# Patient Record
Sex: MALE | Race: WHITE | Employment: OTHER | URBAN - METROPOLITAN AREA
[De-identification: names, ages, dates, MRNs, and addresses within clinical notes are randomized per-mention and may not be internally consistent; named-entity substitution may affect disease eponyms.]

---

## 2019-01-05 ENCOUNTER — APPOINTMENT (OUTPATIENT)
Dept: GENERAL RADIOLOGY | Age: 64
End: 2019-01-05

## 2019-01-05 ENCOUNTER — HOSPITAL ENCOUNTER (EMERGENCY)
Age: 64
Discharge: HOME | End: 2019-01-05

## 2019-01-05 VITALS
RESPIRATION RATE: 16 BRPM | SYSTOLIC BLOOD PRESSURE: 168 MMHG | BODY MASS INDEX: 27.56 KG/M2 | HEART RATE: 80 BPM | DIASTOLIC BLOOD PRESSURE: 88 MMHG | TEMPERATURE: 98 F | OXYGEN SATURATION: 96 % | WEIGHT: 220.46 LBS

## 2019-01-05 DIAGNOSIS — L03.113 CELLULITIS OF RIGHT UPPER EXTREMITY: ICD-10-CM

## 2019-01-05 DIAGNOSIS — M25.531 ACUTE PAIN OF RIGHT WRIST: Primary | ICD-10-CM

## 2019-01-05 DIAGNOSIS — R93.7 ABNORMAL X-RAY OF BONE: ICD-10-CM

## 2019-01-05 LAB
ANION GAP SERPL CALCULATED.3IONS-SCNC: 14 MMOL/L (ref 3–16)
BASOPHILS ABSOLUTE: 0.1 K/UL (ref 0–0.2)
BASOPHILS RELATIVE PERCENT: 0.6 %
BUN BLDV-MCNC: 10 MG/DL (ref 7–20)
C-REACTIVE PROTEIN: 100.3 MG/L (ref 0–5.1)
CALCIUM SERPL-MCNC: 9.5 MG/DL (ref 8.3–10.6)
CHLORIDE BLD-SCNC: 96 MMOL/L (ref 99–110)
CO2: 25 MMOL/L (ref 21–32)
CREAT SERPL-MCNC: <0.5 MG/DL (ref 0.8–1.3)
EOSINOPHILS ABSOLUTE: 0.1 K/UL (ref 0–0.6)
EOSINOPHILS RELATIVE PERCENT: 0.5 %
GFR AFRICAN AMERICAN: >60
GFR NON-AFRICAN AMERICAN: >60
GLUCOSE BLD-MCNC: 117 MG/DL (ref 70–99)
HCT VFR BLD CALC: 47 % (ref 40.5–52.5)
HEMOGLOBIN: 16.1 G/DL (ref 13.5–17.5)
LYMPHOCYTES ABSOLUTE: 1.7 K/UL (ref 1–5.1)
LYMPHOCYTES RELATIVE PERCENT: 14.8 %
MCH RBC QN AUTO: 33.2 PG (ref 26–34)
MCHC RBC AUTO-ENTMCNC: 34.2 G/DL (ref 31–36)
MCV RBC AUTO: 96.9 FL (ref 80–100)
MONOCYTES ABSOLUTE: 1 K/UL (ref 0–1.3)
MONOCYTES RELATIVE PERCENT: 8.4 %
NEUTROPHILS ABSOLUTE: 8.9 K/UL (ref 1.7–7.7)
NEUTROPHILS RELATIVE PERCENT: 75.7 %
PDW BLD-RTO: 14.7 % (ref 12.4–15.4)
PLATELET # BLD: 136 K/UL (ref 135–450)
PMV BLD AUTO: 8.1 FL (ref 5–10.5)
POTASSIUM SERPL-SCNC: 3.5 MMOL/L (ref 3.5–5.1)
RBC # BLD: 4.85 M/UL (ref 4.2–5.9)
SEDIMENTATION RATE, ERYTHROCYTE: 29 MM/HR (ref 0–20)
SODIUM BLD-SCNC: 135 MMOL/L (ref 136–145)
URIC ACID, SERUM: 4.3 MG/DL (ref 3.5–7.2)
WBC # BLD: 11.7 K/UL (ref 4–11)

## 2019-01-05 PROCEDURE — 99283 EMERGENCY DEPT VISIT LOW MDM: CPT

## 2019-01-05 PROCEDURE — 86140 C-REACTIVE PROTEIN: CPT

## 2019-01-05 PROCEDURE — 36415 COLL VENOUS BLD VENIPUNCTURE: CPT

## 2019-01-05 PROCEDURE — 73110 X-RAY EXAM OF WRIST: CPT

## 2019-01-05 PROCEDURE — 85025 COMPLETE CBC W/AUTO DIFF WBC: CPT

## 2019-01-05 PROCEDURE — 85652 RBC SED RATE AUTOMATED: CPT

## 2019-01-05 PROCEDURE — 80048 BASIC METABOLIC PNL TOTAL CA: CPT

## 2019-01-05 PROCEDURE — 84550 ASSAY OF BLOOD/URIC ACID: CPT

## 2019-01-05 PROCEDURE — 6370000000 HC RX 637 (ALT 250 FOR IP): Performed by: NURSE PRACTITIONER

## 2019-01-05 RX ORDER — MELOXICAM 7.5 MG/1
7.5 TABLET ORAL DAILY
Qty: 10 TABLET | Refills: 0 | Status: SHIPPED | OUTPATIENT
Start: 2019-01-05 | End: 2019-06-04

## 2019-01-05 RX ORDER — CEPHALEXIN 500 MG/1
500 CAPSULE ORAL 4 TIMES DAILY
Qty: 40 CAPSULE | Refills: 0 | Status: SHIPPED | OUTPATIENT
Start: 2019-01-05 | End: 2019-06-04 | Stop reason: ALTCHOICE

## 2019-01-05 RX ORDER — HYDROCODONE BITARTRATE AND ACETAMINOPHEN 5; 325 MG/1; MG/1
1 TABLET ORAL EVERY 8 HOURS PRN
Qty: 9 TABLET | Refills: 0 | Status: SHIPPED | OUTPATIENT
Start: 2019-01-05 | End: 2019-01-08

## 2019-01-05 RX ORDER — SULFAMETHOXAZOLE AND TRIMETHOPRIM 800; 160 MG/1; MG/1
1 TABLET ORAL ONCE
Status: COMPLETED | OUTPATIENT
Start: 2019-01-05 | End: 2019-01-05

## 2019-01-05 RX ORDER — SULFAMETHOXAZOLE AND TRIMETHOPRIM 800; 160 MG/1; MG/1
1 TABLET ORAL 2 TIMES DAILY
Qty: 20 TABLET | Refills: 0 | Status: SHIPPED | OUTPATIENT
Start: 2019-01-05 | End: 2019-01-15

## 2019-01-05 RX ORDER — HYDROCODONE BITARTRATE AND ACETAMINOPHEN 5; 325 MG/1; MG/1
1 TABLET ORAL ONCE
Status: COMPLETED | OUTPATIENT
Start: 2019-01-05 | End: 2019-01-05

## 2019-01-05 RX ORDER — CEPHALEXIN 500 MG/1
500 CAPSULE ORAL ONCE
Status: COMPLETED | OUTPATIENT
Start: 2019-01-05 | End: 2019-01-05

## 2019-01-05 RX ADMIN — CEPHALEXIN 500 MG: 500 CAPSULE ORAL at 15:41

## 2019-01-05 RX ADMIN — SULFAMETHOXAZOLE AND TRIMETHOPRIM 1 TABLET: 800; 160 TABLET ORAL at 15:42

## 2019-01-05 RX ADMIN — HYDROCODONE BITARTRATE AND ACETAMINOPHEN 1 TABLET: 5; 325 TABLET ORAL at 14:35

## 2019-01-05 ASSESSMENT — PAIN SCALES - GENERAL
PAINLEVEL_OUTOF10: 8
PAINLEVEL_OUTOF10: 10
PAINLEVEL_OUTOF10: 10
PAINLEVEL_OUTOF10: 7

## 2019-01-05 ASSESSMENT — PAIN DESCRIPTION - PAIN TYPE: TYPE: ACUTE PAIN

## 2019-01-05 ASSESSMENT — PAIN DESCRIPTION - LOCATION: LOCATION: WRIST;HAND

## 2019-01-05 ASSESSMENT — PAIN DESCRIPTION - ORIENTATION: ORIENTATION: RIGHT

## 2019-01-06 ASSESSMENT — ENCOUNTER SYMPTOMS
RESPIRATORY NEGATIVE: 1
GASTROINTESTINAL NEGATIVE: 1

## 2019-06-04 ENCOUNTER — OFFICE VISIT (OUTPATIENT)
Dept: FAMILY MEDICINE CLINIC | Age: 64
End: 2019-06-04
Payer: MEDICARE

## 2019-06-04 VITALS
HEART RATE: 66 BPM | WEIGHT: 243 LBS | SYSTOLIC BLOOD PRESSURE: 136 MMHG | BODY MASS INDEX: 32.91 KG/M2 | DIASTOLIC BLOOD PRESSURE: 82 MMHG | HEIGHT: 72 IN | OXYGEN SATURATION: 99 %

## 2019-06-04 DIAGNOSIS — Z23 NEED FOR PNEUMOCOCCAL VACCINE: ICD-10-CM

## 2019-06-04 DIAGNOSIS — I25.10 CORONARY ARTERY DISEASE INVOLVING NATIVE CORONARY ARTERY OF NATIVE HEART WITHOUT ANGINA PECTORIS: ICD-10-CM

## 2019-06-04 DIAGNOSIS — F17.211 CIGARETTE NICOTINE DEPENDENCE IN REMISSION: ICD-10-CM

## 2019-06-04 DIAGNOSIS — M51.36 DDD (DEGENERATIVE DISC DISEASE), LUMBAR: Primary | ICD-10-CM

## 2019-06-04 DIAGNOSIS — N32.81 OAB (OVERACTIVE BLADDER): ICD-10-CM

## 2019-06-04 DIAGNOSIS — M54.16 LUMBAR RADICULOPATHY: ICD-10-CM

## 2019-06-04 DIAGNOSIS — R39.12 WEAK URINARY STREAM: ICD-10-CM

## 2019-06-04 DIAGNOSIS — L30.9 DERMATITIS: ICD-10-CM

## 2019-06-04 PROCEDURE — 99203 OFFICE O/P NEW LOW 30 MIN: CPT | Performed by: FAMILY MEDICINE

## 2019-06-04 PROCEDURE — G0009 ADMIN PNEUMOCOCCAL VACCINE: HCPCS | Performed by: FAMILY MEDICINE

## 2019-06-04 PROCEDURE — 90670 PCV13 VACCINE IM: CPT | Performed by: FAMILY MEDICINE

## 2019-06-04 RX ORDER — OXYBUTYNIN CHLORIDE 10 MG/1
10 TABLET, EXTENDED RELEASE ORAL DAILY
Qty: 30 TABLET | Refills: 3 | Status: SHIPPED | OUTPATIENT
Start: 2019-06-04 | End: 2020-08-25 | Stop reason: ALTCHOICE

## 2019-06-04 RX ORDER — HYDROCHLOROTHIAZIDE 25 MG/1
25 TABLET ORAL DAILY
COMMUNITY
End: 2019-08-13 | Stop reason: SDUPTHER

## 2019-06-04 RX ORDER — ISOSORBIDE MONONITRATE 30 MG/1
30 TABLET, EXTENDED RELEASE ORAL DAILY
COMMUNITY
End: 2019-08-13 | Stop reason: SDUPTHER

## 2019-06-04 ASSESSMENT — PATIENT HEALTH QUESTIONNAIRE - PHQ9
SUM OF ALL RESPONSES TO PHQ QUESTIONS 1-9: 0
SUM OF ALL RESPONSES TO PHQ QUESTIONS 1-9: 0
1. LITTLE INTEREST OR PLEASURE IN DOING THINGS: 0
SUM OF ALL RESPONSES TO PHQ9 QUESTIONS 1 & 2: 0
2. FEELING DOWN, DEPRESSED OR HOPELESS: 0

## 2019-06-04 NOTE — PROGRESS NOTES
2019     Nikki Liu (:  1955) is a 58 y.o. male, here for evaluation of the following medical concerns:    HPI  Chief Complaint   Patient presents with    New Patient     NEW PT ESTABLISH CARE IS NOT INTERESTED IN  [facility] [employer] RIGHT SIDE 2 MO OR MORE NO INJURY HAD BACK SURGERY IN  HAS NOT HAD ANY ISSUE SINCE UNTIL NOW      A lot of low back pain for couple months   Had laminectomy . Sometimes hurts a lot to stand -other times not painful at all  Found new pair of dr. Davis Ruiz shoes which may help - has pes planus. Some right radicular pain at times - had on left side prior to surgery    Fine ever since. nki - no change in activity. Pain mainly right low back - occ pain to buttocks/ back of thigh -not a lot. No stretching/ exercise. Raising leg while standing seems to help some. Always moving through day - quit smoking   Knew he had to quit - could hear rattling in chest - hocking up mucus - went away quickly w/ stopping smoking. Smoked 1/2 to 1 pack per day for years. Gained 30# since stopped smoking - joining [employer] - silver sneakers. Uses ibuprofen which helps well w/ pain. No stomach issues w/ this  No n/v/c/d/ gerd/ abd pain  Urge to urinate strong lately - in last 1-2 years. Flow is fine - stands for a little while - slightly weak stream - not sure emptying bladder all the way. Took tamsulosin and didn't see response. Pretty frequent. Not big on chocolate -not big caffeine. Not drinking water as often. Doesn't like carbonation  No cp/palp/sob - had ami  - cad - seen by cardiology - matthias  No angina  No dizzy/ ha  No allergy issues since stopped smoking  Vision/ hearing stable.   Dry skin in winter - now mildly itchy rash on forearms  Past Surgical History:   Procedure Laterality Date    BACK SURGERY N/A     herniated disc    CORONARY ANGIOPLASTY WITH STENT PLACEMENT           Review of Systems    Prior to Visit moist. No oropharyngeal exudate. Eyes: Conjunctivae are normal. No scleral icterus. Neck: No thyromegaly present. Cardiovascular: Normal rate, regular rhythm, normal heart sounds and intact distal pulses. Exam reveals no gallop. No murmur heard. Pulmonary/Chest: Effort normal and breath sounds normal. No respiratory distress. He has no wheezes. He has no rhonchi. He has no rales. Abdominal: Soft. Bowel sounds are normal. He exhibits no distension. There is no tenderness. Musculoskeletal: He exhibits no edema. Lymphadenopathy:     He has no cervical adenopathy. Neurological: He is alert. Skin: Skin is intact. No rash noted. No erythema. Psychiatric: He has a normal mood and affect. ASSESSMENT/PLAN:  There are no diagnoses linked to this encounter. Diagnosis Orders   1. DDD (degenerative disc disease), lumbar     2. Cigarette nicotine dependence in remission  CT Chest WO Contrast   3. OAB (overactive bladder)     4. Weak urinary stream     5. Dermatitis     6. Lumbar radiculopathy     7. Coronary artery disease involving native coronary artery of native heart without angina pectoris         No follow-ups on file. Ct of chest  Immunizations d/w pt   Start prevnar now and pneumovax 1 year  Consider tdap/ shingrix  Ditropan for oab  Avoid bladder irritants - push water  Topical hc cream/ moisturizer for mild eczema rash  Cad stable -on present meds  cpm  Some component of bph likely  Lipid / cmp in near future w/ psa due to bph  Holding off on colon ca screen for now - dw/ pt  Exercise for back/ back care d/w pt  Motrin prn    An electronic signature was used to authenticate this note.     --Paolo Varma MD on 6/4/2019 at 11:12 AM

## 2019-06-12 ENCOUNTER — TELEPHONE (OUTPATIENT)
Dept: FAMILY MEDICINE CLINIC | Age: 64
End: 2019-06-12

## 2019-06-12 DIAGNOSIS — Z12.9 CANCER SCREENING: Primary | ICD-10-CM

## 2019-06-12 DIAGNOSIS — F17.210 CIGARETTE NICOTINE DEPENDENCE, UNCOMPLICATED: ICD-10-CM

## 2019-06-12 NOTE — TELEPHONE ENCOUNTER
[facility] is calling because they needs us to change the order for CT CHEST [facility] (ORDER [id number]) TO CT LUNG SCREENING - .

## 2019-06-14 ENCOUNTER — TELEPHONE (OUTPATIENT)
Dept: FAMILY MEDICINE CLINIC | Age: 64
End: 2019-06-14

## 2019-06-14 ENCOUNTER — HOSPITAL ENCOUNTER (OUTPATIENT)
Dept: CT IMAGING | Age: 64
Discharge: HOME | End: 2019-06-14
Payer: MEDICARE

## 2019-06-14 DIAGNOSIS — Z12.9 CANCER SCREENING: ICD-10-CM

## 2019-06-14 DIAGNOSIS — F17.211 CIGARETTE NICOTINE DEPENDENCE IN REMISSION: ICD-10-CM

## 2019-06-14 DIAGNOSIS — F17.210 CIGARETTE NICOTINE DEPENDENCE, UNCOMPLICATED: ICD-10-CM

## 2019-06-14 PROCEDURE — G0297 LDCT FOR LUNG CA SCREEN: HCPCS

## 2019-06-14 NOTE — TELEPHONE ENCOUNTER
PT @  [phone]    PLEASE CALL WITH RESULTS - CT OF THE LUNG TODAY BEFORE THE WEEKEND - PT VERY ANXIOUS FOR THE RESULTS

## 2019-08-13 RX ORDER — ISOSORBIDE MONONITRATE 30 MG/1
30 TABLET, EXTENDED RELEASE ORAL DAILY
Qty: 30 TABLET | Refills: 3 | Status: SHIPPED | OUTPATIENT
Start: 2019-08-13 | End: 2020-02-13 | Stop reason: SDUPTHER

## 2019-08-13 RX ORDER — SERTRALINE HYDROCHLORIDE 100 MG/1
100 TABLET, FILM COATED ORAL DAILY
Qty: 30 TABLET | Refills: 3 | Status: SHIPPED | OUTPATIENT
Start: 2019-08-13 | End: 2020-02-13 | Stop reason: SDUPTHER

## 2019-08-13 RX ORDER — HYDROCHLOROTHIAZIDE 25 MG/1
25 TABLET ORAL DAILY
Qty: 30 TABLET | Refills: 3 | Status: SHIPPED | OUTPATIENT
Start: 2019-08-13 | End: 2020-02-13 | Stop reason: SDUPTHER

## 2019-08-13 RX ORDER — LISINOPRIL 20 MG/1
10 TABLET ORAL DAILY
Qty: 30 TABLET | Refills: 3 | Status: SHIPPED | OUTPATIENT
Start: 2019-08-13 | End: 2020-02-13 | Stop reason: SDUPTHER

## 2019-08-13 RX ORDER — ATENOLOL 50 MG/1
TABLET ORAL
Qty: 30 TABLET | Refills: 3 | Status: SHIPPED | OUTPATIENT
Start: 2019-08-13 | End: 2020-02-13 | Stop reason: SDUPTHER

## 2020-01-16 ENCOUNTER — OFFICE VISIT (OUTPATIENT)
Dept: FAMILY MEDICINE CLINIC | Age: 65
End: 2020-01-16
Payer: MEDICARE

## 2020-01-16 VITALS
BODY MASS INDEX: 33.59 KG/M2 | SYSTOLIC BLOOD PRESSURE: 128 MMHG | WEIGHT: 248 LBS | DIASTOLIC BLOOD PRESSURE: 82 MMHG | HEIGHT: 72 IN

## 2020-01-16 LAB
A/G RATIO: 1.8 (ref 1.1–2.2)
ALBUMIN SERPL-MCNC: 4.6 G/DL (ref 3.4–5)
ALP BLD-CCNC: 65 U/L (ref 40–129)
ALT SERPL-CCNC: 62 U/L (ref 10–40)
ANION GAP SERPL CALCULATED.3IONS-SCNC: 14 MMOL/L (ref 3–16)
AST SERPL-CCNC: 56 U/L (ref 15–37)
BILIRUB SERPL-MCNC: 0.5 MG/DL (ref 0–1)
BILIRUBIN, POC: ABNORMAL
BLOOD URINE, POC: ABNORMAL
BUN BLDV-MCNC: 14 MG/DL (ref 7–20)
CALCIUM SERPL-MCNC: 9.5 MG/DL (ref 8.3–10.6)
CHLORIDE BLD-SCNC: 100 MMOL/L (ref 99–110)
CLARITY, POC: CLEAR
CO2: 27 MMOL/L (ref 21–32)
COLOR, POC: YELLOW
CREAT SERPL-MCNC: 0.8 MG/DL (ref 0.8–1.3)
GFR AFRICAN AMERICAN: >60
GFR NON-AFRICAN AMERICAN: >60
GLOBULIN: 2.6 G/DL
GLUCOSE BLD-MCNC: 141 MG/DL (ref 70–99)
GLUCOSE URINE, POC: ABNORMAL
KETONES, POC: ABNORMAL
LEUKOCYTE EST, POC: ABNORMAL
NITRITE, POC: ABNORMAL
PH, POC: 6.5
POTASSIUM SERPL-SCNC: 4.3 MMOL/L (ref 3.5–5.1)
PROSTATE SPECIFIC ANTIGEN: 1.41 NG/ML (ref 0–4)
PROTEIN, POC: 30
SODIUM BLD-SCNC: 141 MMOL/L (ref 136–145)
SPECIFIC GRAVITY, POC: 1.02
TOTAL PROTEIN: 7.2 G/DL (ref 6.4–8.2)
UROBILINOGEN, POC: 0.2

## 2020-01-16 PROCEDURE — 99213 OFFICE O/P EST LOW 20 MIN: CPT | Performed by: FAMILY MEDICINE

## 2020-01-16 PROCEDURE — 36415 COLL VENOUS BLD VENIPUNCTURE: CPT | Performed by: FAMILY MEDICINE

## 2020-01-16 PROCEDURE — 81002 URINALYSIS NONAUTO W/O SCOPE: CPT | Performed by: FAMILY MEDICINE

## 2020-01-16 RX ORDER — TAMSULOSIN HYDROCHLORIDE 0.4 MG/1
0.4 CAPSULE ORAL DAILY
Qty: 30 CAPSULE | Refills: 0 | Status: SHIPPED | OUTPATIENT
Start: 2020-01-16 | End: 2020-02-11

## 2020-01-16 ASSESSMENT — PATIENT HEALTH QUESTIONNAIRE - PHQ9
SUM OF ALL RESPONSES TO PHQ9 QUESTIONS 1 & 2: 0
SUM OF ALL RESPONSES TO PHQ QUESTIONS 1-9: 0
SUM OF ALL RESPONSES TO PHQ QUESTIONS 1-9: 0
2. FEELING DOWN, DEPRESSED OR HOPELESS: 0
1. LITTLE INTEREST OR PLEASURE IN DOING THINGS: 0

## 2020-01-16 NOTE — PROGRESS NOTES
1 tablet by mouth daily 30 tablet 3    lisinopril (PRINIVIL;ZESTRIL) 20 MG tablet Take 0.5 tablets by mouth daily 30 tablet 3    Multiple Vitamins-Minerals (THERAPEUTIC MULTIVITAMIN-MINERALS) tablet Take 1 tablet by mouth daily      Ascorbic Acid (VITAMIN C) 500 MG tablet Take 500 mg by mouth daily      aspirin 325 MG tablet Take 1 tablet by mouth daily. 30 tablet     nitroGLYCERIN (NITROSTAT) 0.4 MG SL tablet Place 1 tablet under the tongue every 5 minutes as needed for Chest pain. 25 tablet 2    oxybutynin (DITROPAN XL) 10 MG extended release tablet Take 1 tablet by mouth daily (Patient not taking: Reported on 2020) 30 tablet 3     No current facility-administered medications for this visit. Review of Systems:   All others are negative, except as noted in the HPI. Patient History:  Past Medical History:   Diagnosis Date    Acute MI ([facility] [address] 2013    Anxiety     Arthritis     CAD (coronary artery disease)     Hypertension     Influenza A 01/15/2017        Past Surgical History:   Procedure Laterality Date    BACK SURGERY N/A     herniated disc    CORONARY ANGIOPLASTY WITH STENT PLACEMENT          Social History     Socioeconomic History    Marital status: Single     Spouse name: Not on file    Number of children: Not on file    Years of education: Not on file    Highest education level: Not on file   Occupational History    Not on file   Social Needs    Financial resource strain: Not on file    Food insecurity:     Worry: Not on file     Inability: Not on file    Transportation needs:     Medical: Not on file     Non-medical: Not on file   Tobacco Use    Smoking status: Former Smoker     Packs/day: 0.50     Years: 40.00     Pack years: 20.00     Types: Cigarettes     Last attempt to quit: 2019     Years since quittin.0    Smokeless tobacco: Former User   Substance and Sexual Activity    Alcohol use:  Yes     Alcohol/week: 2.0 standard drinks     Types: 2 Cans person, place, and time. Laboratory Studies:  No results found for: LABA1C     Lab Results   Component Value Date    WBC 11.7 (H) 01/05/2019    HGB 16.1 01/05/2019    HCT 47.0 01/05/2019    MCV 96.9 01/05/2019     01/05/2019        Lab Results   Component Value Date     (L) 01/05/2019    K 3.5 01/05/2019    CL 96 (L) 01/05/2019    CO2 25 01/05/2019    BUN 10 01/05/2019    CREATININE <0.5 (L) 01/05/2019    GLUCOSE 117 (H) 01/05/2019    CALCIUM 9.5 01/05/2019    LABGLOM >60 01/05/2019    GFRAA >60 01/05/2019       Lab Results   Component Value Date    CHOL 154 09/09/2013    TRIG 64 09/09/2013    HDL 51 09/09/2013    LDLCALC 90 09/09/2013    LABVLDL 13 09/09/2013       No results found for: TSH, L5NKOOQ, H2XKHAD, THYROIDAB, FT3, T4FREE     No results found for: [address] DIGGS Maintenance Review:  Health Maintenance Due   Topic Date Due    AAA screen  1955    Hepatitis C screen  1955    DTaP/Tdap/Td vaccine (1 - Tdap) 06/20/1965    HIV screen  06/20/1969    Diabetes screen  06/20/1994    Shingles Vaccine (1 of 2) 06/20/2004    Colon cancer screen colonoscopy  06/20/2004    Lipid screen  09/09/2018    Annual Wellness Visit (AWV)  06/04/2019    Potassium monitoring  01/05/2020    Creatinine monitoring  01/05/2020         Immunizations:  Immunization History   Administered Date(s) Administered    Influenza, High Dose (Fluzone 65 yrs and older) 11/25/2019    Pneumococcal Conjugate 13-valent ([id number]) 06/04/2019         Assessment:   1. Urinary frequency    2. Cigarette nicotine dependence in remission           Plan:  Urinary frequency  -     POCT Urinalysis no Micro  - UA showed trace of protein, no blood, Urine concentrated  -     PSA, Prostatic Specific Antigen; Future  -     Comprehensive Metabolic Panel; Future  -     Amb External Referral To Urology  -     tamsulosin (FLOMAX) 0.4 MG capsule;  Take 1 capsule by mouth daily  - Side effects dw/ pt      Cigarette

## 2020-01-17 ENCOUNTER — TELEPHONE (OUTPATIENT)
Dept: FAMILY MEDICINE CLINIC | Age: 65
End: 2020-01-17

## 2020-02-11 RX ORDER — TAMSULOSIN HYDROCHLORIDE 0.4 MG/1
CAPSULE ORAL
Qty: 30 CAPSULE | Refills: 0 | Status: SHIPPED | OUTPATIENT
Start: 2020-02-11 | End: 2020-03-13

## 2020-02-13 RX ORDER — ATENOLOL 50 MG/1
TABLET ORAL
Qty: 30 TABLET | Refills: 2 | Status: SHIPPED | OUTPATIENT
Start: 2020-02-13 | End: 2020-02-17 | Stop reason: SDUPTHER

## 2020-02-13 RX ORDER — LISINOPRIL 20 MG/1
10 TABLET ORAL DAILY
Qty: 30 TABLET | Refills: 2 | Status: SHIPPED | OUTPATIENT
Start: 2020-02-13 | End: 2020-02-17 | Stop reason: SDUPTHER

## 2020-02-13 RX ORDER — ISOSORBIDE MONONITRATE 30 MG/1
30 TABLET, EXTENDED RELEASE ORAL DAILY
Qty: 30 TABLET | Refills: 2 | Status: SHIPPED | OUTPATIENT
Start: 2020-02-13 | End: 2020-02-17 | Stop reason: SDUPTHER

## 2020-02-13 RX ORDER — SERTRALINE HYDROCHLORIDE 100 MG/1
100 TABLET, FILM COATED ORAL DAILY
Qty: 30 TABLET | Refills: 2 | Status: SHIPPED | OUTPATIENT
Start: 2020-02-13 | End: 2020-02-17 | Stop reason: SDUPTHER

## 2020-02-13 RX ORDER — HYDROCHLOROTHIAZIDE 25 MG/1
25 TABLET ORAL DAILY
Qty: 30 TABLET | Refills: 2 | Status: SHIPPED | OUTPATIENT
Start: 2020-02-13 | End: 2020-02-17 | Stop reason: SDUPTHER

## 2020-02-17 ENCOUNTER — TELEPHONE (OUTPATIENT)
Dept: FAMILY MEDICINE CLINIC | Age: 65
End: 2020-02-17

## 2020-02-17 RX ORDER — LISINOPRIL 20 MG/1
10 TABLET ORAL DAILY
Qty: 30 TABLET | Refills: 2 | Status: SHIPPED | OUTPATIENT
Start: 2020-02-17 | End: 2020-08-17

## 2020-02-17 RX ORDER — SERTRALINE HYDROCHLORIDE 100 MG/1
100 TABLET, FILM COATED ORAL DAILY
Qty: 30 TABLET | Refills: 2 | Status: SHIPPED | OUTPATIENT
Start: 2020-02-17 | End: 2020-06-01

## 2020-02-17 RX ORDER — HYDROCHLOROTHIAZIDE 25 MG/1
25 TABLET ORAL DAILY
Qty: 30 TABLET | Refills: 2 | Status: SHIPPED | OUTPATIENT
Start: 2020-02-17 | End: 2020-06-01

## 2020-02-17 RX ORDER — ATENOLOL 50 MG/1
TABLET ORAL
Qty: 30 TABLET | Refills: 2 | Status: SHIPPED | OUTPATIENT
Start: 2020-02-17 | End: 2020-06-01

## 2020-02-17 RX ORDER — ISOSORBIDE MONONITRATE 30 MG/1
30 TABLET, EXTENDED RELEASE ORAL DAILY
Qty: 30 TABLET | Refills: 2 | Status: SHIPPED | OUTPATIENT
Start: 2020-02-17 | End: 2020-06-15

## 2020-02-17 NOTE — TELEPHONE ENCOUNTER
Patient is calling because he wants to know why [facility] Dmit[facility] is only giving him enough medication for a 15 day supply and in his chart, it looks like we sent these to [facility] [employer] on 2/13/20 and should have been sent to [address] [place] [address]    Please give him a call back.

## 2020-02-27 RX ORDER — ISOSORBIDE MONONITRATE 30 MG/1
TABLET, EXTENDED RELEASE ORAL
Qty: 15 TABLET | Refills: 0 | OUTPATIENT
Start: 2020-02-27

## 2020-03-13 RX ORDER — TAMSULOSIN HYDROCHLORIDE 0.4 MG/1
CAPSULE ORAL
Qty: 30 CAPSULE | Refills: 2 | Status: SHIPPED | OUTPATIENT
Start: 2020-03-13 | End: 2020-06-12

## 2020-03-26 ENCOUNTER — TELEPHONE (OUTPATIENT)
Dept: FAMILY MEDICINE CLINIC | Age: 65
End: 2020-03-26

## 2020-06-01 RX ORDER — ATENOLOL 50 MG/1
TABLET ORAL
Qty: 90 TABLET | Refills: 1 | Status: SHIPPED | OUTPATIENT
Start: 2020-06-01 | End: 2020-11-13

## 2020-06-01 RX ORDER — HYDROCHLOROTHIAZIDE 25 MG/1
TABLET ORAL
Qty: 90 TABLET | Refills: 1 | Status: SHIPPED | OUTPATIENT
Start: 2020-06-01 | End: 2020-11-13

## 2020-06-01 RX ORDER — SERTRALINE HYDROCHLORIDE 100 MG/1
TABLET, FILM COATED ORAL
Qty: 90 TABLET | Refills: 1 | Status: SHIPPED | OUTPATIENT
Start: 2020-06-01 | End: 2020-11-13

## 2020-06-12 RX ORDER — TAMSULOSIN HYDROCHLORIDE 0.4 MG/1
CAPSULE ORAL
Qty: 90 CAPSULE | Refills: 0 | Status: SHIPPED | OUTPATIENT
Start: 2020-06-12 | End: 2020-09-14

## 2020-06-15 RX ORDER — ISOSORBIDE MONONITRATE 30 MG/1
TABLET, EXTENDED RELEASE ORAL
Qty: 30 TABLET | Refills: 0 | Status: SHIPPED | OUTPATIENT
Start: 2020-06-15 | End: 2020-07-16

## 2020-07-16 RX ORDER — ISOSORBIDE MONONITRATE 30 MG/1
TABLET, EXTENDED RELEASE ORAL
Qty: 30 TABLET | Refills: 0 | Status: SHIPPED | OUTPATIENT
Start: 2020-07-16 | End: 2020-08-17

## 2020-08-17 RX ORDER — ISOSORBIDE MONONITRATE 30 MG/1
TABLET, EXTENDED RELEASE ORAL
Qty: 30 TABLET | Refills: 0 | Status: SHIPPED | OUTPATIENT
Start: 2020-08-17 | End: 2020-09-14

## 2020-08-17 NOTE — TELEPHONE ENCOUNTER
LAST VISIT 06/04/2019 WITH DR VILLAVICENCIO, NEXT VISIT NONE. I SPOKE TO PT AND SCHEDULED AN APPT FOR 08/25/2020 WITH PILAR.   [address] [place] [address]

## 2020-08-25 ENCOUNTER — VIRTUAL VISIT (OUTPATIENT)
Dept: FAMILY MEDICINE CLINIC | Age: 65
End: 2020-08-25
Payer: MEDICARE

## 2020-08-25 PROCEDURE — 99213 OFFICE O/P EST LOW 20 MIN: CPT | Performed by: NURSE PRACTITIONER

## 2020-08-25 RX ORDER — MIRABEGRON 50 MG/1
1 TABLET, FILM COATED, EXTENDED RELEASE ORAL DAILY
Status: ON HOLD | COMMUNITY
Start: 2020-08-15 | End: 2022-01-10

## 2020-08-25 NOTE — PROGRESS NOTES
2020     Rossy Chamorro (:  1955) is a 76 y.o. male, here for evaluation of the following medical concerns:  Rossy Chamorro is a 76 y.o. male being evaluated by a Virtual Visit (video visit) encounter to address concerns as mentioned above. A caregiver was present when appropriate. Due to this being a TeleHealth encounter (During EZHUG-25 public health emergency), evaluation of the following organ systems was limited: Vitals/Constitutional/EENT/Resp/CV/GI//MS/Neuro/Skin/Heme-Lymph-Imm. Pursuant to the emergency declaration under the [address] [place] [address], [address] [place] [address] authority and the [employer] Act, this Virtual Visit was conducted with patient's (and/or legal guardian's) consent, to reduce the patient's risk of exposure to COVID-19 and provide necessary medical care. The patient (and/or legal guardian) has also been advised to contact this office for worsening conditions or problems, and seek emergency medical treatment and/or call 911 if deemed necessary. Patient identification was verified at the start of the visit: Yes    Total time spent for this encounter: 15 min    Services were provided through a video synchronous discussion virtually to substitute for in-person clinic visit. Patient and provider were located at their individual homes. --MARSHALL Gerardo CNP on 2020 at 11:18 AM    An electronic signature was used to authenticate this note. HPI   Chief Complaint   Patient presents with    Hypertension     PT TO FOLLOW UP ON HYPERTENSION      Hypertension:  Home blood pressure monitoring: Yes today he did but not usually- his cuff is [de-identified] years old- today it was really high 2000/100. He is adherent to a low sodium diet. Patient denies chest pain, shortness of breath, headache, lightheadedness, blurred vision, peripheral edema, palpitations, dry cough and fatigue. Antihypertensive medication side effects: no medication side effects noted. Use of agents associated with hypertension: none. Sodium (mmol/L)   Date Value   01/16/2020 141    BUN (mg/dL)   Date Value   01/16/2020 14    Glucose (mg/dL)   Date Value   01/16/2020 141 (H)      Potassium (mmol/L)   Date Value   01/16/2020 4.3    CREATININE (mg/dL)   Date Value   01/16/2020 0.8           Review of Systems   Eyes: Negative for visual disturbance. Cardiovascular: Negative for chest pain, palpitations and leg swelling. All other systems reviewed and are negative. Prior to Visit Medications    Medication Sig Taking? Authorizing Provider   MYRBETRIQ 50 MG TB24 Take 1 tablet by mouth daily Yes Historical Provider, MD   isosorbide mononitrate (IMDUR) 30 MG extended release tablet TAKE ONE TABLET BY MOUTH DAILY Yes MARSHALL Prakash CNP   lisinopril (PRINIVIL;ZESTRIL) 20 MG tablet TAKE ONE-HALF TABLET BY MOUTH DAILY Yes Erin Nash MD   tamsulosin (FLOMAX) 0.4 MG capsule TAKE ONE CAPSULE BY MOUTH DAILY Yes Erin Nash MD   sertraline (ZOLOFT) 100 MG tablet TAKE ONE TABLET BY MOUTH DAILY Yes Erin Nash MD   hydroCHLOROthiazide (HYDRODIURIL) 25 MG tablet TAKE ONE TABLET BY MOUTH DAILY Yes Erin Nash MD   atenolol (TENORMIN) 50 MG tablet TAKE ONE TABLET BY MOUTH DAILY Yes Erin Nash MD   Multiple Vitamins-Minerals (THERAPEUTIC MULTIVITAMIN-MINERALS) tablet Take 1 tablet by mouth daily Yes Historical Provider, MD   Ascorbic Acid (VITAMIN C) 500 MG tablet Take 500 mg by mouth daily Yes Historical Provider, MD   aspirin 325 MG tablet Take 1 tablet by mouth daily. Yes Krista Leventhal, APRN - CNP   nitroGLYCERIN (NITROSTAT) 0.4 MG SL tablet Place 1 tablet under the tongue every 5 minutes as needed for Chest pain.  Yes Krista Leventhal, APRN - CNP        Social History     Tobacco Use    Smoking status: Former Smoker     Packs/day: 0.50     Years: 40.00     Pack years: 20. 00     Types: Cigarettes     Last attempt to quit: 2019     Years since quittin.6    Smokeless tobacco: Former User   Substance Use Topics    Alcohol use: Yes     Alcohol/week: 2.0 standard drinks     Types: 2 Cans of beer per week     Comment: states used to drink 1 pint/vodka a day / now socially drinks        There were no vitals filed for this visit. Estimated body mass index is 33.63 kg/m² as calculated from the following:    Height as of 20: 6' (1.829 m). Weight as of 20: 248 lb (112.5 kg). Physical Exam  Constitutional:       General: He is awake. He is not in acute distress. Appearance: Normal appearance. He is well-developed, well-groomed and normal weight. He is not ill-appearing, toxic-appearing or diaphoretic. Neurological:      Mental Status: He is alert. Psychiatric:         Attention and Perception: Attention and perception normal.         Mood and Affect: Mood and affect normal.         Speech: Speech normal.         Behavior: Behavior normal. Behavior is cooperative. Thought Content: Thought content normal.         Cognition and Memory: Cognition and memory normal.         Judgment: Judgment normal.         Michelle Boucher was seen today for hypertension. Diagnoses and all orders for this visit:    Essential hypertension  Pt advised to go to er due to elevated b/p 200/100 he declined states he does not feel bad- I  Told him the HTN is the silent killer and does not always present with any symptoms but it does increase his risk of cardiovascular events  He will take the lisinopril 10 mg as he only takes half the tab in the am- he will cone to office for b/p check bringing his home cuff  Consider increasing tenormin to 50 mg if b/p elevated in office  Follow up in office three months  Lipid screening  -     LIPID PANEL;  Future    Elevated glucose  -     GLUCOSE; Future

## 2020-11-13 RX ORDER — SERTRALINE HYDROCHLORIDE 100 MG/1
TABLET, FILM COATED ORAL
Qty: 90 TABLET | Refills: 0 | Status: SHIPPED | OUTPATIENT
Start: 2020-11-13 | End: 2021-01-15

## 2020-11-13 RX ORDER — ATENOLOL 50 MG/1
TABLET ORAL
Qty: 90 TABLET | Refills: 0 | Status: SHIPPED | OUTPATIENT
Start: 2020-11-13 | End: 2021-02-12

## 2020-11-13 RX ORDER — HYDROCHLOROTHIAZIDE 25 MG/1
TABLET ORAL
Qty: 90 TABLET | Refills: 0 | Status: SHIPPED | OUTPATIENT
Start: 2020-11-13 | End: 2021-02-12

## 2020-11-13 RX ORDER — LISINOPRIL 20 MG/1
TABLET ORAL
Qty: 45 TABLET | Refills: 0 | Status: SHIPPED | OUTPATIENT
Start: 2020-11-13 | End: 2021-03-16 | Stop reason: SDUPTHER

## 2021-01-15 ENCOUNTER — TELEPHONE (OUTPATIENT)
Dept: FAMILY MEDICINE CLINIC | Age: 66
End: 2021-01-15

## 2021-01-15 RX ORDER — SERTRALINE HYDROCHLORIDE 100 MG/1
150 TABLET, FILM COATED ORAL DAILY
Qty: 45 TABLET | Refills: 0 | Status: SHIPPED | OUTPATIENT
Start: 2021-01-15 | End: 2021-02-12

## 2021-01-15 NOTE — TELEPHONE ENCOUNTER
[facility] rx [facility] [address] for sertraline - increase to 150mg daily (1.5 tabs)  F/u in next month

## 2021-01-15 NOTE — TELEPHONE ENCOUNTER
Patient has been on th same dose of sertraline for years. Has now started having occasional panic attacks and wants to know if he should be increasing his dose.   Please advise

## 2021-01-15 NOTE — TELEPHONE ENCOUNTER
Called pt and informed. He will call back to schedule VV.  I told him to call back if  He has any questions or concerns

## 2021-02-12 RX ORDER — HYDROCHLOROTHIAZIDE 25 MG/1
TABLET ORAL
Qty: 90 TABLET | Refills: 0 | Status: SHIPPED | OUTPATIENT
Start: 2021-02-12 | End: 2021-05-17

## 2021-02-12 RX ORDER — SERTRALINE HYDROCHLORIDE 100 MG/1
TABLET, FILM COATED ORAL
Qty: 45 TABLET | Refills: 0 | Status: SHIPPED | OUTPATIENT
Start: 2021-02-12 | End: 2021-03-15

## 2021-02-12 RX ORDER — ATENOLOL 50 MG/1
TABLET ORAL
Qty: 90 TABLET | Refills: 0 | Status: ON HOLD | OUTPATIENT
Start: 2021-02-12 | End: 2021-05-12 | Stop reason: SDUPTHER

## 2021-02-12 NOTE — TELEPHONE ENCOUNTER
I spoke to pt today and scheduled a HTN/Panic attack f/u inMarch. He did state that the increase in his medication has helped the panic attack.

## 2021-02-26 ENCOUNTER — IMMUNIZATION (OUTPATIENT)
Dept: PRIMARY CARE CLINIC | Age: 66
End: 2021-02-26
Payer: MEDICARE

## 2021-02-26 PROCEDURE — 91300 COVID-19, PFIZER VACCINE 30MCG/0.3ML DOSE: CPT | Performed by: FAMILY MEDICINE

## 2021-02-26 PROCEDURE — 0001A COVID-19, PFIZER VACCINE 30MCG/0.3ML DOSE: CPT | Performed by: FAMILY MEDICINE

## 2021-03-15 RX ORDER — SERTRALINE HYDROCHLORIDE 100 MG/1
TABLET, FILM COATED ORAL
Qty: 45 TABLET | Refills: 0 | Status: SHIPPED | OUTPATIENT
Start: 2021-03-15 | End: 2021-04-12

## 2021-03-15 RX ORDER — TAMSULOSIN HYDROCHLORIDE 0.4 MG/1
CAPSULE ORAL
Qty: 90 CAPSULE | Refills: 0 | Status: ON HOLD
Start: 2021-03-15 | End: 2021-05-12 | Stop reason: HOSPADM

## 2021-03-16 ENCOUNTER — OFFICE VISIT (OUTPATIENT)
Dept: FAMILY MEDICINE CLINIC | Age: 66
End: 2021-03-16
Payer: MEDICARE

## 2021-03-16 VITALS
BODY MASS INDEX: 33.72 KG/M2 | HEART RATE: 66 BPM | DIASTOLIC BLOOD PRESSURE: 76 MMHG | SYSTOLIC BLOOD PRESSURE: 128 MMHG | HEIGHT: 72 IN | OXYGEN SATURATION: 95 % | TEMPERATURE: 98 F | RESPIRATION RATE: 18 BRPM | WEIGHT: 249 LBS

## 2021-03-16 DIAGNOSIS — R74.8 ELEVATED LIVER ENZYMES: ICD-10-CM

## 2021-03-16 DIAGNOSIS — M19.90 ARTHRITIS: ICD-10-CM

## 2021-03-16 DIAGNOSIS — I10 ESSENTIAL HYPERTENSION: Primary | ICD-10-CM

## 2021-03-16 DIAGNOSIS — N40.1 BENIGN PROSTATIC HYPERPLASIA WITH WEAK URINARY STREAM: ICD-10-CM

## 2021-03-16 DIAGNOSIS — N32.81 OAB (OVERACTIVE BLADDER): ICD-10-CM

## 2021-03-16 DIAGNOSIS — R39.12 BENIGN PROSTATIC HYPERPLASIA WITH WEAK URINARY STREAM: ICD-10-CM

## 2021-03-16 DIAGNOSIS — Z12.11 COLON CANCER SCREENING: ICD-10-CM

## 2021-03-16 DIAGNOSIS — F32.5 MAJOR DEPRESSION IN REMISSION (HCC): ICD-10-CM

## 2021-03-16 DIAGNOSIS — I10 ESSENTIAL HYPERTENSION: ICD-10-CM

## 2021-03-16 DIAGNOSIS — I25.10 CORONARY ARTERY DISEASE INVOLVING NATIVE HEART WITHOUT ANGINA PECTORIS, UNSPECIFIED VESSEL OR LESION TYPE: ICD-10-CM

## 2021-03-16 LAB
A/G RATIO: 1.5 (ref 1.1–2.2)
ALBUMIN SERPL-MCNC: 4.2 G/DL (ref 3.4–5)
ALP BLD-CCNC: 74 U/L (ref 40–129)
ALT SERPL-CCNC: 30 U/L (ref 10–40)
ANION GAP SERPL CALCULATED.3IONS-SCNC: 11 MMOL/L (ref 3–16)
AST SERPL-CCNC: 25 U/L (ref 15–37)
BILIRUB SERPL-MCNC: 0.8 MG/DL (ref 0–1)
BUN BLDV-MCNC: 14 MG/DL (ref 7–20)
CALCIUM SERPL-MCNC: 9.4 MG/DL (ref 8.3–10.6)
CHLORIDE BLD-SCNC: 100 MMOL/L (ref 99–110)
CO2: 30 MMOL/L (ref 21–32)
CREAT SERPL-MCNC: 0.8 MG/DL (ref 0.8–1.3)
GAMMA GLUTAMYL TRANSFERASE: 27 U/L (ref 8–61)
GFR AFRICAN AMERICAN: >60
GFR NON-AFRICAN AMERICAN: >60
GLOBULIN: 2.8 G/DL
GLUCOSE BLD-MCNC: 165 MG/DL (ref 70–99)
HEPATITIS C ANTIBODY INTERPRETATION: NORMAL
POTASSIUM SERPL-SCNC: 3.8 MMOL/L (ref 3.5–5.1)
PROSTATE SPECIFIC ANTIGEN: 1.59 NG/ML (ref 0–4)
SODIUM BLD-SCNC: 141 MMOL/L (ref 136–145)
TOTAL PROTEIN: 7 G/DL (ref 6.4–8.2)

## 2021-03-16 PROCEDURE — 99214 OFFICE O/P EST MOD 30 MIN: CPT | Performed by: FAMILY MEDICINE

## 2021-03-16 PROCEDURE — 36415 COLL VENOUS BLD VENIPUNCTURE: CPT | Performed by: FAMILY MEDICINE

## 2021-03-16 RX ORDER — LISINOPRIL 20 MG/1
TABLET ORAL
Qty: 45 TABLET | Refills: 3 | Status: ON HOLD | OUTPATIENT
Start: 2021-03-16 | End: 2021-05-12 | Stop reason: SDUPTHER

## 2021-03-16 SDOH — ECONOMIC STABILITY: INCOME INSECURITY: HOW HARD IS IT FOR YOU TO PAY FOR THE VERY BASICS LIKE FOOD, HOUSING, MEDICAL CARE, AND HEATING?: NOT HARD AT ALL

## 2021-03-16 SDOH — ECONOMIC STABILITY: FOOD INSECURITY: WITHIN THE PAST 12 MONTHS, THE FOOD YOU BOUGHT JUST DIDN'T LAST AND YOU DIDN'T HAVE MONEY TO GET MORE.: NEVER TRUE

## 2021-03-16 SDOH — ECONOMIC STABILITY: FOOD INSECURITY: WITHIN THE PAST 12 MONTHS, YOU WORRIED THAT YOUR FOOD WOULD RUN OUT BEFORE YOU GOT MONEY TO BUY MORE.: NEVER TRUE

## 2021-03-16 SDOH — ECONOMIC STABILITY: TRANSPORTATION INSECURITY
IN THE PAST 12 MONTHS, HAS THE LACK OF TRANSPORTATION KEPT YOU FROM MEDICAL APPOINTMENTS OR FROM GETTING MEDICATIONS?: NO

## 2021-03-16 ASSESSMENT — PATIENT HEALTH QUESTIONNAIRE - PHQ9
SUM OF ALL RESPONSES TO PHQ QUESTIONS 1-9: 0
SUM OF ALL RESPONSES TO PHQ9 QUESTIONS 1 & 2: 0
2. FEELING DOWN, DEPRESSED OR HOPELESS: 0
SUM OF ALL RESPONSES TO PHQ QUESTIONS 1-9: 0

## 2021-03-16 NOTE — PROGRESS NOTES
[address] Note    Date: 3/16/2021                                               Subjective:   No chief complaint on file. Chief Complaint   Patient presents with    Hypertension     HTN ROUTINE FOLLOW UP    GETS 2ND SHOT ON Friday FOR COVID VACCINE  81 yo mom lives w/ her - cooks for mom - provides for her needs  Doing well overall. Works on projects around house - retired from job  Stays moving  Diet about the same overall - not real good. Having some weak stream - seen by dr. Shelli Pathak - on myrbetriq  Prostate enlarged on exam.  Quit smoking 2 years ago - noted change soon after stopping  Able to breathe better - less soboe  Drinks 3 beers/ day. No liquor anymore. Not a lot of caffeine - 1 cup tea in am.  No dizzy/ ha/ sob/ cp/ palp  May get 2 ha/ year  No swelling in feet/ ankles.     HPI         Patient Active Problem List    Diagnosis Date Noted    Acute MI ([address]     Tobacco abuse 09/19/2013    HTN (hypertension) 09/19/2013    MI, acute, non ST segment elevation ([address] 09/09/2013    Chest pain 09/08/2013     Past Medical History:   Diagnosis Date    Acute MI ([address] 09/2013    Anxiety     Arthritis     CAD (coronary artery disease)     Hypertension     Influenza A 01/15/2017        Past Surgical History:   Procedure Laterality Date    BACK SURGERY N/A 1989    herniated disc    CORONARY ANGIOPLASTY WITH STENT PLACEMENT  9/13     Office Visit on 01/16/2020   Component Date Value Ref Range Status    Color, UA 01/16/2020 YELLOW   Final    Clarity, UA 01/16/2020 CLEAR   Final    Glucose, UA POC 01/16/2020 NEG   Final    Bilirubin, UA 01/16/2020 NEG   Final    Ketones, UA 01/16/2020 NEG   Final    Spec Grav, UA 01/16/2020 1.025   Final    Blood, UA POC 01/16/2020 NEG   Final    pH, UA 01/16/2020 6.5   Final    Protein, UA POC 01/16/2020 30   Final    Urobilinogen, UA 01/16/2020 0.2   Final    Leukocytes, UA 01/16/2020 NEG   Final    Nitrite, UA 01/16/2020 NEG   Final  Sodium 01/16/2020 141  136 - 145 mmol/L Final    Potassium 01/16/2020 4.3  3.5 - 5.1 mmol/L Final    Chloride 01/16/2020 100  99 - 110 mmol/L Final    CO2 01/16/2020 27  21 - 32 mmol/L Final    Anion Gap 01/16/2020 14  3 - 16 Final    Glucose 01/16/2020 141* 70 - 99 mg/dL Final    BUN 01/16/2020 14  7 - 20 mg/dL Final    CREATININE 01/16/2020 0.8  0.8 - 1.3 mg/dL Final    GFR Non- 01/16/2020 >60  >60 Final    Comment: >60 mL/min/1.73m2 EGFR, calc. for ages 25 and older using the  MDRD formula (not corrected for weight), is valid for stable  renal function.  GFR  01/16/2020 >60  >60 Final    Comment: Chronic Kidney Disease: less than 60 ml/min/1.73 sq.m. Kidney Failure: less than 15 ml/min/1.73 sq.m. Results valid for patients 18 years and older.       Calcium 01/16/2020 9.5  8.3 - 10.6 mg/dL Final    Total Protein 01/16/2020 7.2  6.4 - 8.2 g/dL Final    Albumin 01/16/2020 4.6  3.4 - 5.0 g/dL Final    Albumin/Globulin Ratio 01/16/2020 1.8  1.1 - 2.2 Final    Total Bilirubin 01/16/2020 0.5  0.0 - 1.0 mg/dL Final    Alkaline Phosphatase 01/16/2020 65  40 - 129 U/L Final    ALT 01/16/2020 62* 10 - 40 U/L Final    AST 01/16/2020 56* 15 - 37 U/L Final    Globulin 01/16/2020 2.6  g/dL Final    PSA 01/16/2020 1.41  0.00 - 4.00 ng/mL Final     Family History   Problem Relation Age of Onset    Hypertension Mother     Hypertension Father     Heart Failure Father     Stroke Maternal Grandfather     Cancer Paternal Uncle         throat     Current Outpatient Medications   Medication Sig Dispense Refill    tamsulosin (FLOMAX) 0.4 MG capsule TAKE ONE CAPSULE BY MOUTH DAILY 90 capsule 0    sertraline (ZOLOFT) 100 MG tablet TAKE 1 AND 1/2 TABLET BY MOUTH DAILY 45 tablet 0    atenolol (TENORMIN) 50 MG tablet TAKE ONE TABLET BY MOUTH DAILY 90 tablet 0    hydroCHLOROthiazide (HYDRODIURIL) 25 MG tablet TAKE ONE TABLET BY MOUTH DAILY 90 tablet 0    lisinopril (PRINIVIL;ZESTRIL) 20 MG tablet TAKE ONE-HALF TABLET BY MOUTH DAILY 45 tablet 0    isosorbide mononitrate (IMDUR) 30 MG extended release tablet TAKE ONE TABLET BY MOUTH DAILY 30 tablet 3    MYRBETRIQ 50 MG TB24 Take 1 tablet by mouth daily      Multiple Vitamins-Minerals (THERAPEUTIC MULTIVITAMIN-MINERALS) tablet Take 1 tablet by mouth daily      Ascorbic Acid (VITAMIN C) 500 MG tablet Take 500 mg by mouth daily      aspirin 325 MG tablet Take 1 tablet by mouth daily. 30 tablet     nitroGLYCERIN (NITROSTAT) 0.4 MG SL tablet Place 1 tablet under the tongue every 5 minutes as needed for Chest pain. 25 tablet 2     No current facility-administered medications for this visit. No Known Allergies    Review of Systems    Objective: There were no vitals taken for this visit. BP Readings from Last 3 Encounters:   01/16/20 128/82   06/04/19 136/82   01/05/19 (!) 168/88     BP Readings from Last 3 Encounters:   03/16/21 128/76   01/16/20 128/82   06/04/19 136/82     Pulse Readings from Last 3 Encounters:   03/16/21 66   06/04/19 66   01/05/19 80         Pulse Readings from Last 3 Encounters:   06/04/19 66   01/05/19 80   01/19/17 60       Wt Readings from Last 3 Encounters:   01/16/20 248 lb (112.5 kg)   06/04/19 243 lb (110.2 kg)   01/05/19 220 lb 7.4 oz (100 kg)       Physical Exam  Constitutional:       Appearance: Normal appearance. HENT:      Head: Normocephalic. Cardiovascular:      Rate and Rhythm: Normal rate and regular rhythm. Heart sounds: Normal heart sounds. No murmur. Pulmonary:      Effort: Pulmonary effort is normal.      Breath sounds: Normal breath sounds. Abdominal:      General: There is no distension. Palpations: Abdomen is soft. Musculoskeletal:         General: No swelling. Neurological:      Mental Status: He is alert. Assessment/Plan:   There are no diagnoses linked to this encounter. Diagnosis Orders   1.  Essential hypertension  Comprehensive Metabolic Panel   2. Coronary artery disease involving native heart without angina pectoris, unspecified vessel or lesion type     3. Elevated liver enzymes     4. Benign prostatic hyperplasia with weak urinary stream     5. OAB (overactive bladder)     6. Major depression in remission ([facility] [address]     7. Colon cancer screening  Cologuard (For External Results Only)   8. Arthritis       Care of 79 yo mother w/ early dementia dw pt  Cont zoloft for now - helping w/ mood  cologuard as low risk - no hx of colonnoscopy  Glucosamine/ chondroitin o/w consider turmeric/ cbd  Diet/ activity dw/ pt  Good bp control  Refill lisinopril  No angina  Seen by urology - cont flomax/ myrbetriq w/ hydration during day - working well  Check liver/ hep c - consider u/s if elevated liver enzymes persist  No follow-ups on file.     Laurent Rothman DO  3/16/2021  10:53 AM

## 2021-03-19 ENCOUNTER — IMMUNIZATION (OUTPATIENT)
Dept: PRIMARY CARE CLINIC | Age: 66
End: 2021-03-19
Payer: MEDICARE

## 2021-03-19 PROCEDURE — 91300 COVID-19, PFIZER VACCINE 30MCG/0.3ML DOSE: CPT | Performed by: FAMILY MEDICINE

## 2021-03-19 PROCEDURE — 0002A COVID-19, PFIZER VACCINE 30MCG/0.3ML DOSE: CPT | Performed by: FAMILY MEDICINE

## 2021-03-22 ENCOUNTER — PATIENT MESSAGE (OUTPATIENT)
Dept: FAMILY MEDICINE CLINIC | Age: 66
End: 2021-03-22

## 2021-04-12 RX ORDER — SERTRALINE HYDROCHLORIDE 100 MG/1
TABLET, FILM COATED ORAL
Qty: 45 TABLET | Refills: 2 | Status: ON HOLD | OUTPATIENT
Start: 2021-04-12 | End: 2022-09-02

## 2021-04-15 NOTE — TELEPHONE ENCOUNTER
[address] NOTIFIED ME THAT PT HAD NOT SEEN HIS RESULTS. I SENT THE PT A LETTER IN THE MAIL INSTEAD.  [facility]

## 2021-04-21 ENCOUNTER — APPOINTMENT (OUTPATIENT)
Dept: GENERAL RADIOLOGY | Age: 66
DRG: 065 | End: 2021-04-21
Payer: MEDICARE

## 2021-04-21 ENCOUNTER — HOSPITAL ENCOUNTER (INPATIENT)
Age: 66
LOS: 2 days | Discharge: NURSING, REHAB OR LONG-TERM CARE | DRG: 065 | End: 2021-04-23
Attending: EMERGENCY MEDICINE | Admitting: STUDENT IN AN ORGANIZED HEALTH CARE EDUCATION/TRAINING PROGRAM
Payer: MEDICARE

## 2021-04-21 ENCOUNTER — APPOINTMENT (OUTPATIENT)
Dept: CT IMAGING | Age: 66
DRG: 065 | End: 2021-04-21
Payer: MEDICARE

## 2021-04-21 DIAGNOSIS — I63.9 RIGHT SIDED CEREBRAL HEMISPHERE CEREBROVASCULAR ACCIDENT (CVA) (HCC): Primary | ICD-10-CM

## 2021-04-21 PROBLEM — R29.898 RIGHT LEG WEAKNESS: Status: ACTIVE | Noted: 2021-04-21

## 2021-04-21 LAB
A/G RATIO: 1.5 (ref 1.1–2.2)
ALBUMIN SERPL-MCNC: 4 G/DL (ref 3.4–5)
ALP BLD-CCNC: 70 U/L (ref 40–129)
ALT SERPL-CCNC: 21 U/L (ref 10–40)
ANION GAP SERPL CALCULATED.3IONS-SCNC: 9 MMOL/L (ref 3–16)
APTT: 28.2 SEC (ref 24.2–36.2)
AST SERPL-CCNC: 23 U/L (ref 15–37)
BASOPHILS ABSOLUTE: 0 K/UL (ref 0–0.2)
BASOPHILS RELATIVE PERCENT: 0.6 %
BILIRUB SERPL-MCNC: 0.4 MG/DL (ref 0–1)
BILIRUBIN URINE: NEGATIVE
BLOOD, URINE: NEGATIVE
BUN BLDV-MCNC: 17 MG/DL (ref 7–20)
CALCIUM SERPL-MCNC: 9.1 MG/DL (ref 8.3–10.6)
CHLORIDE BLD-SCNC: 100 MMOL/L (ref 99–110)
CLARITY: CLEAR
CO2: 28 MMOL/L (ref 21–32)
COLOR: YELLOW
CREAT SERPL-MCNC: 0.7 MG/DL (ref 0.8–1.3)
EOSINOPHILS ABSOLUTE: 0.2 K/UL (ref 0–0.6)
EOSINOPHILS RELATIVE PERCENT: 2.6 %
GFR AFRICAN AMERICAN: >60
GFR NON-AFRICAN AMERICAN: >60
GLOBULIN: 2.7 G/DL
GLUCOSE BLD-MCNC: 149 MG/DL (ref 70–99)
GLUCOSE URINE: NEGATIVE MG/DL
HCT VFR BLD CALC: 40.6 % (ref 40.5–52.5)
HEMOGLOBIN: 14.2 G/DL (ref 13.5–17.5)
INR BLD: 1.09 (ref 0.86–1.14)
KETONES, URINE: NEGATIVE MG/DL
LEUKOCYTE ESTERASE, URINE: NEGATIVE
LYMPHOCYTES ABSOLUTE: 2.4 K/UL (ref 1–5.1)
LYMPHOCYTES RELATIVE PERCENT: 29.1 %
MAGNESIUM: 1.8 MG/DL (ref 1.8–2.4)
MCH RBC QN AUTO: 32.5 PG (ref 26–34)
MCHC RBC AUTO-ENTMCNC: 35 G/DL (ref 31–36)
MCV RBC AUTO: 92.8 FL (ref 80–100)
MICROSCOPIC EXAMINATION: NORMAL
MONOCYTES ABSOLUTE: 0.6 K/UL (ref 0–1.3)
MONOCYTES RELATIVE PERCENT: 7.6 %
NEUTROPHILS ABSOLUTE: 4.9 K/UL (ref 1.7–7.7)
NEUTROPHILS RELATIVE PERCENT: 60.1 %
NITRITE, URINE: NEGATIVE
PDW BLD-RTO: 14.8 % (ref 12.4–15.4)
PH UA: 6 (ref 5–8)
PLATELET # BLD: 135 K/UL (ref 135–450)
PMV BLD AUTO: 7.9 FL (ref 5–10.5)
POTASSIUM REFLEX MAGNESIUM: 3.5 MMOL/L (ref 3.5–5.1)
PROTEIN UA: NEGATIVE MG/DL
PROTHROMBIN TIME: 12.6 SEC (ref 10–13.2)
RBC # BLD: 4.37 M/UL (ref 4.2–5.9)
SODIUM BLD-SCNC: 137 MMOL/L (ref 136–145)
SPECIFIC GRAVITY UA: 1.01 (ref 1–1.03)
TOTAL PROTEIN: 6.7 G/DL (ref 6.4–8.2)
URINE REFLEX TO CULTURE: NORMAL
URINE TYPE: NORMAL
UROBILINOGEN, URINE: 1 E.U./DL
WBC # BLD: 8.1 K/UL (ref 4–11)

## 2021-04-21 PROCEDURE — 85730 THROMBOPLASTIN TIME PARTIAL: CPT

## 2021-04-21 PROCEDURE — 70450 CT HEAD/BRAIN W/O DYE: CPT

## 2021-04-21 PROCEDURE — 81003 URINALYSIS AUTO W/O SCOPE: CPT

## 2021-04-21 PROCEDURE — 85025 COMPLETE CBC W/AUTO DIFF WBC: CPT

## 2021-04-21 PROCEDURE — 83735 ASSAY OF MAGNESIUM: CPT

## 2021-04-21 PROCEDURE — 6370000000 HC RX 637 (ALT 250 FOR IP): Performed by: EMERGENCY MEDICINE

## 2021-04-21 PROCEDURE — 71045 X-RAY EXAM CHEST 1 VIEW: CPT

## 2021-04-21 PROCEDURE — 93005 ELECTROCARDIOGRAM TRACING: CPT | Performed by: EMERGENCY MEDICINE

## 2021-04-21 PROCEDURE — 85610 PROTHROMBIN TIME: CPT

## 2021-04-21 PROCEDURE — 70496 CT ANGIOGRAPHY HEAD: CPT

## 2021-04-21 PROCEDURE — 99285 EMERGENCY DEPT VISIT HI MDM: CPT

## 2021-04-21 PROCEDURE — 1200000000 HC SEMI PRIVATE

## 2021-04-21 PROCEDURE — 80053 COMPREHEN METABOLIC PANEL: CPT

## 2021-04-21 PROCEDURE — 36415 COLL VENOUS BLD VENIPUNCTURE: CPT

## 2021-04-21 PROCEDURE — 6360000004 HC RX CONTRAST MEDICATION: Performed by: EMERGENCY MEDICINE

## 2021-04-21 RX ORDER — ASPIRIN 81 MG/1
81 TABLET, CHEWABLE ORAL DAILY
Status: ON HOLD | COMMUNITY
End: 2022-01-11 | Stop reason: HOSPADM

## 2021-04-21 RX ORDER — IBUPROFEN 200 MG
800 TABLET ORAL PRN
Status: ON HOLD | COMMUNITY
End: 2021-04-23 | Stop reason: HOSPADM

## 2021-04-21 RX ORDER — DIPHENHYDRAMINE HCL 25 MG
50 TABLET ORAL NIGHTLY PRN
Status: DISCONTINUED | OUTPATIENT
Start: 2021-04-21 | End: 2021-04-23 | Stop reason: HOSPADM

## 2021-04-21 RX ADMIN — IOPAMIDOL 75 ML: 755 INJECTION, SOLUTION INTRAVENOUS at 21:18

## 2021-04-21 RX ADMIN — ASPIRIN 325 MG: 325 TABLET, COATED ORAL at 22:50

## 2021-04-21 ASSESSMENT — ENCOUNTER SYMPTOMS
FACIAL SWELLING: 0
TROUBLE SWALLOWING: 0
DIARRHEA: 0
WHEEZING: 0
STRIDOR: 0
ABDOMINAL PAIN: 0
CHOKING: 0
PHOTOPHOBIA: 0
NAUSEA: 0
VOMITING: 0
CONSTIPATION: 0
COUGH: 0
SHORTNESS OF BREATH: 0
CHEST TIGHTNESS: 0

## 2021-04-22 ENCOUNTER — APPOINTMENT (OUTPATIENT)
Dept: MRI IMAGING | Age: 66
DRG: 065 | End: 2021-04-22
Payer: MEDICARE

## 2021-04-22 LAB
ANION GAP SERPL CALCULATED.3IONS-SCNC: 11 MMOL/L (ref 3–16)
BASOPHILS ABSOLUTE: 0 K/UL (ref 0–0.2)
BASOPHILS RELATIVE PERCENT: 0.6 %
BUN BLDV-MCNC: 15 MG/DL (ref 7–20)
CALCIUM SERPL-MCNC: 9.2 MG/DL (ref 8.3–10.6)
CHLORIDE BLD-SCNC: 101 MMOL/L (ref 99–110)
CHOLESTEROL, TOTAL: 161 MG/DL (ref 0–199)
CO2: 26 MMOL/L (ref 21–32)
CREAT SERPL-MCNC: 0.7 MG/DL (ref 0.8–1.3)
EKG ATRIAL RATE: 60 BPM
EKG DIAGNOSIS: NORMAL
EKG P AXIS: 47 DEGREES
EKG P-R INTERVAL: 212 MS
EKG Q-T INTERVAL: 466 MS
EKG QRS DURATION: 108 MS
EKG QTC CALCULATION (BAZETT): 466 MS
EKG R AXIS: -20 DEGREES
EKG T AXIS: 102 DEGREES
EKG VENTRICULAR RATE: 60 BPM
EOSINOPHILS ABSOLUTE: 0.2 K/UL (ref 0–0.6)
EOSINOPHILS RELATIVE PERCENT: 2 %
ESTIMATED AVERAGE GLUCOSE: 116.9 MG/DL
GFR AFRICAN AMERICAN: >60
GFR NON-AFRICAN AMERICAN: >60
GLUCOSE BLD-MCNC: 114 MG/DL (ref 70–99)
HBA1C MFR BLD: 5.7 %
HCT VFR BLD CALC: 41.2 % (ref 40.5–52.5)
HDLC SERPL-MCNC: 38 MG/DL (ref 40–60)
HEMOGLOBIN: 14.5 G/DL (ref 13.5–17.5)
LDL CHOLESTEROL CALCULATED: 100 MG/DL
LV EF: 60 %
LVEF MODALITY: NORMAL
LYMPHOCYTES ABSOLUTE: 2.5 K/UL (ref 1–5.1)
LYMPHOCYTES RELATIVE PERCENT: 27.8 %
MAGNESIUM: 2 MG/DL (ref 1.8–2.4)
MCH RBC QN AUTO: 32.5 PG (ref 26–34)
MCHC RBC AUTO-ENTMCNC: 35.2 G/DL (ref 31–36)
MCV RBC AUTO: 92.4 FL (ref 80–100)
MONOCYTES ABSOLUTE: 0.8 K/UL (ref 0–1.3)
MONOCYTES RELATIVE PERCENT: 9.2 %
NEUTROPHILS ABSOLUTE: 5.4 K/UL (ref 1.7–7.7)
NEUTROPHILS RELATIVE PERCENT: 60.4 %
PDW BLD-RTO: 14.7 % (ref 12.4–15.4)
PLATELET # BLD: 137 K/UL (ref 135–450)
PMV BLD AUTO: 8 FL (ref 5–10.5)
POTASSIUM SERPL-SCNC: 3.5 MMOL/L (ref 3.5–5.1)
RBC # BLD: 4.46 M/UL (ref 4.2–5.9)
SODIUM BLD-SCNC: 138 MMOL/L (ref 136–145)
TRIGL SERPL-MCNC: 115 MG/DL (ref 0–150)
VLDLC SERPL CALC-MCNC: 23 MG/DL
WBC # BLD: 8.9 K/UL (ref 4–11)

## 2021-04-22 PROCEDURE — 92610 EVALUATE SWALLOWING FUNCTION: CPT

## 2021-04-22 PROCEDURE — 6370000000 HC RX 637 (ALT 250 FOR IP): Performed by: STUDENT IN AN ORGANIZED HEALTH CARE EDUCATION/TRAINING PROGRAM

## 2021-04-22 PROCEDURE — 93010 ELECTROCARDIOGRAM REPORT: CPT | Performed by: INTERNAL MEDICINE

## 2021-04-22 PROCEDURE — 83036 HEMOGLOBIN GLYCOSYLATED A1C: CPT

## 2021-04-22 PROCEDURE — 80048 BASIC METABOLIC PNL TOTAL CA: CPT

## 2021-04-22 PROCEDURE — 83735 ASSAY OF MAGNESIUM: CPT

## 2021-04-22 PROCEDURE — 85025 COMPLETE CBC W/AUTO DIFF WBC: CPT

## 2021-04-22 PROCEDURE — 94760 N-INVAS EAR/PLS OXIMETRY 1: CPT

## 2021-04-22 PROCEDURE — 2060000000 HC ICU INTERMEDIATE R&B

## 2021-04-22 PROCEDURE — 6360000002 HC RX W HCPCS: Performed by: STUDENT IN AN ORGANIZED HEALTH CARE EDUCATION/TRAINING PROGRAM

## 2021-04-22 PROCEDURE — 97162 PT EVAL MOD COMPLEX 30 MIN: CPT

## 2021-04-22 PROCEDURE — 97166 OT EVAL MOD COMPLEX 45 MIN: CPT

## 2021-04-22 PROCEDURE — 6360000002 HC RX W HCPCS: Performed by: HOSPITALIST

## 2021-04-22 PROCEDURE — 36415 COLL VENOUS BLD VENIPUNCTURE: CPT

## 2021-04-22 PROCEDURE — 2500000003 HC RX 250 WO HCPCS: Performed by: STUDENT IN AN ORGANIZED HEALTH CARE EDUCATION/TRAINING PROGRAM

## 2021-04-22 PROCEDURE — 97530 THERAPEUTIC ACTIVITIES: CPT

## 2021-04-22 PROCEDURE — 92523 SPEECH SOUND LANG COMPREHEN: CPT

## 2021-04-22 PROCEDURE — 93306 TTE W/DOPPLER COMPLETE: CPT

## 2021-04-22 PROCEDURE — 2580000003 HC RX 258: Performed by: STUDENT IN AN ORGANIZED HEALTH CARE EDUCATION/TRAINING PROGRAM

## 2021-04-22 PROCEDURE — 80061 LIPID PANEL: CPT

## 2021-04-22 PROCEDURE — 70551 MRI BRAIN STEM W/O DYE: CPT

## 2021-04-22 RX ORDER — LORAZEPAM 2 MG/ML
1 INJECTION INTRAMUSCULAR ONCE
Status: COMPLETED | OUTPATIENT
Start: 2021-04-22 | End: 2021-04-22

## 2021-04-22 RX ORDER — ASPIRIN 300 MG/1
300 SUPPOSITORY RECTAL DAILY
Status: DISCONTINUED | OUTPATIENT
Start: 2021-04-22 | End: 2021-04-23 | Stop reason: HOSPADM

## 2021-04-22 RX ORDER — SERTRALINE HYDROCHLORIDE 100 MG/1
100 TABLET, FILM COATED ORAL DAILY
Status: DISCONTINUED | OUTPATIENT
Start: 2021-04-22 | End: 2021-04-22

## 2021-04-22 RX ORDER — MEPERIDINE HYDROCHLORIDE 25 MG/ML
12.5 INJECTION INTRAMUSCULAR; INTRAVENOUS; SUBCUTANEOUS ONCE
Status: DISCONTINUED | OUTPATIENT
Start: 2021-04-22 | End: 2021-04-22

## 2021-04-22 RX ORDER — ONDANSETRON 2 MG/ML
4 INJECTION INTRAMUSCULAR; INTRAVENOUS EVERY 6 HOURS PRN
Status: DISCONTINUED | OUTPATIENT
Start: 2021-04-22 | End: 2021-04-23 | Stop reason: HOSPADM

## 2021-04-22 RX ORDER — SODIUM CHLORIDE 0.9 % (FLUSH) 0.9 %
5-40 SYRINGE (ML) INJECTION PRN
Status: DISCONTINUED | OUTPATIENT
Start: 2021-04-22 | End: 2021-04-23 | Stop reason: HOSPADM

## 2021-04-22 RX ORDER — SODIUM CHLORIDE 9 MG/ML
25 INJECTION, SOLUTION INTRAVENOUS PRN
Status: DISCONTINUED | OUTPATIENT
Start: 2021-04-22 | End: 2021-04-23 | Stop reason: HOSPADM

## 2021-04-22 RX ORDER — LABETALOL HYDROCHLORIDE 5 MG/ML
10 INJECTION, SOLUTION INTRAVENOUS EVERY 10 MIN PRN
Status: DISCONTINUED | OUTPATIENT
Start: 2021-04-22 | End: 2021-04-23 | Stop reason: HOSPADM

## 2021-04-22 RX ORDER — ATORVASTATIN CALCIUM 40 MG/1
80 TABLET, FILM COATED ORAL NIGHTLY
Status: DISCONTINUED | OUTPATIENT
Start: 2021-04-22 | End: 2021-04-23 | Stop reason: HOSPADM

## 2021-04-22 RX ORDER — SODIUM CHLORIDE 0.9 % (FLUSH) 0.9 %
5-40 SYRINGE (ML) INJECTION EVERY 12 HOURS SCHEDULED
Status: DISCONTINUED | OUTPATIENT
Start: 2021-04-22 | End: 2021-04-23 | Stop reason: HOSPADM

## 2021-04-22 RX ORDER — ASPIRIN 81 MG/1
81 TABLET ORAL DAILY
Status: DISCONTINUED | OUTPATIENT
Start: 2021-04-22 | End: 2021-04-23 | Stop reason: HOSPADM

## 2021-04-22 RX ORDER — TAMSULOSIN HYDROCHLORIDE 0.4 MG/1
0.4 CAPSULE ORAL DAILY
Status: DISCONTINUED | OUTPATIENT
Start: 2021-04-22 | End: 2021-04-23 | Stop reason: HOSPADM

## 2021-04-22 RX ADMIN — SERTRALINE 100 MG: 100 TABLET, FILM COATED ORAL at 09:05

## 2021-04-22 RX ADMIN — ATORVASTATIN CALCIUM 80 MG: 40 TABLET, FILM COATED ORAL at 01:44

## 2021-04-22 RX ADMIN — ATORVASTATIN CALCIUM 80 MG: 40 TABLET, FILM COATED ORAL at 21:11

## 2021-04-22 RX ADMIN — TAMSULOSIN HYDROCHLORIDE 0.4 MG: 0.4 CAPSULE ORAL at 09:05

## 2021-04-22 RX ADMIN — LABETALOL HYDROCHLORIDE 10 MG: 5 INJECTION INTRAVENOUS at 07:55

## 2021-04-22 RX ADMIN — LORAZEPAM 1 MG: 2 INJECTION INTRAMUSCULAR; INTRAVENOUS at 10:49

## 2021-04-22 RX ADMIN — ENOXAPARIN SODIUM 40 MG: 40 INJECTION SUBCUTANEOUS at 09:04

## 2021-04-22 RX ADMIN — DIPHENHYDRAMINE HCL 50 MG: 25 TABLET ORAL at 22:26

## 2021-04-22 RX ADMIN — ASPIRIN 81 MG: 81 TABLET, COATED ORAL at 09:05

## 2021-04-22 RX ADMIN — Medication 10 ML: at 09:05

## 2021-04-22 RX ADMIN — DIPHENHYDRAMINE HCL 50 MG: 25 TABLET ORAL at 01:45

## 2021-04-22 RX ADMIN — Medication 10 ML: at 21:12

## 2021-04-22 ASSESSMENT — PAIN SCALES - GENERAL
PAINLEVEL_OUTOF10: 0

## 2021-04-22 NOTE — CARE COORDINATION
INITIAL CASE MANAGEMENT ASSESSMENT    Reviewed chart, met with patient and mom Rachid Castorena to assess possible discharge needs. Explained Case Management role/services. Living Situation: Confirmed address, lives w/ significant other and his mother, ranch style home w/ basement, 1 step into house    ADLs: Patient was independent prior to admission     DME: Wasn't using any DME but says he has a walker somewhere in house    PT/OT Recs: Continue to assess pending progress, 5-7 sessions per week, Patient would benefit from continued therapy after discharge     Active Services: None     Transportation: Active       Medications: Uses [facility] in [place] - no issues    PCP: Ely Romero      HD/PD: n/a    PLAN/COMMENTS: DC plan undetermined. Discussed PT/OT recs for ARU or SNF if not approved. Patient given both ARU and SNF lists. CM provided contact information for patient or family to call with any questions. CM will follow and assist as needed. Electronically signed by Steffi Delacruz RN Case Management [phone] on 4/22/2021 at 3:08 PM     The Plan for Transition of Care is related to the following treatment goals: rehab    The Patient  was provided with a choice of provider and agrees   with the discharge plan. [x] Yes [] No    Freedom of choice list was provided with basic dialogue that supports the patient's individualized plan of care/goals, treatment preferences and shares the quality data associated with the providers.  [x] Yes [] No

## 2021-04-22 NOTE — ED NOTES
Report called to [place], RN on 5N. Denies further questions.      Oralee Console, ALENA  04/22/21 [id number]

## 2021-04-22 NOTE — PROGRESS NOTES
Pharmacy Medication Reconciliation Note     List of medications patient is currently taking is complete. Source of information:   1. Disp history  2. MD office notes  3. patient    Notes regarding home medications:   1. Imdur 30mg refill refused in Jan 2021 due to needing office appt. Patient went to MD office March 2021 but Imdur was not addressed on that visit. Patient made aware to address with MD on discharge.       Denies taking any other OTC or herbal medications    Carrillo Gonzalez, [address] 4/22/2021 1:50 PM

## 2021-04-22 NOTE — CONSULTS
Neurology Consult Note  Reason for Consult: stroke    Chief complaint: R leg weakness    Dr Linda Gonzalez MD asked me to see Zoya Jarrell in consultation for evaluation of stroke    History of Present Illness:  Zoya Jarrell is a 76 y.o. male who presents with weakness. I obtained my information via interview w/ the patient, supplemented by chart review. The patient says that he noticed he was having a difficult time ambulating secondary to primarily R leg weakness some time around [id number]. This persisted. Throughout the day his weakness got progressively worse and at one point he sort of fell onto the couch w/out injury but was unable to get up due to the weakness. His wife eventually encouraged him to come to the ED around 2100 in order to be evaluated. SBP was in the 140s-160s. CT head was w/out any acute findings. CTA head/neck did not identify a LVO or any significant stenosis.  Stroke Team was involved though the patient was not a candidate for any acute intervention. His exam was notable for right facial weakness, RUE weakness, an inability to move his RLE, development of language trouble a couple hours into his ED time. Today, his symptoms persist.      He denies any hx of stroke. He stopped smoking 2 years ago. He takes low dose asa at home.      Medical History:  Past Medical History:   Diagnosis Date    Acute MI ([address] 09/2013    Anxiety     Arthritis     CAD (coronary artery disease)     Hypertension     Influenza A 01/15/2017     Past Surgical History:   Procedure Laterality Date    BACK SURGERY N/A 1989    herniated disc    CORONARY ANGIOPLASTY WITH STENT PLACEMENT  9/13     Scheduled Meds:   sodium chloride flush  5-40 mL Intravenous 2 times per day    enoxaparin  40 mg Subcutaneous Daily    aspirin  81 mg Oral Daily    Or    aspirin  300 mg Rectal Daily    atorvastatin  80 mg Oral Nightly    sertraline  100 mg Oral Daily    tamsulosin  0.4 Gastrointestinal- No Abdominal pain. No Vomiting. Genitourinary- No incontinence. No urinary retention  Musculoskeletal- No myalgia. No arthralgia  Skin- No rash. No easy bruising. Psychiatric- No depression. No anxiety  Endocrine- No diabetes. No thyroid issues. Hematologic- No bleeding difficulty. No fatigue  Neurologic- + weakness. No Headache. Exam:  Blood pressure (!) 209/99, pulse 62, temperature 98.1 °F (36.7 °C), temperature source Oral, resp. rate 16, height 6' 3\" (1.905 m), weight 243 lb 9.7 oz (110.5 kg), SpO2 93 %. Constitutional    Vital signs: BP, HR, and RR reviewed   General alert, no distress, well-nourished  Eyes: unable to visualize the fundi  Cardiovascular: pulses symmetric in all 4 extremities. No peripheral edema. Psychiatric: cooperative with examination, no psychotic behavior noted. Neurologic  Mental status:   orientation to person, place, time, situation. General fund of knowledge grossly intact   Memory grossly intact   Attention intact as able to attend well to the exam     Language delayed. Comprehension intact; follows simple commands  Cranial nerves:   CN2: visual fields full  CN 3,4,6: extraocular muscles intact  CN5: facial sensation symmetric   CN7: minimal R facial weakness though seems to activate relatively well. CN8: hearing grossly intact  CN9: palate elevated symmetrically  CN11: trap full strength on shoulder shrug  CN12: tongue midline with protrusion  Strength: mild RUE drift/weakness though able to maintain antigravity. Paralysis of RLE. Good strength in LUE/LLE. Deep tendon reflexes: normal in all 4 extremities  Sensory: diminished sensation in his RUE/RLE. Cerebellar/coordination: finger nose finger normal without ataxia in LUE. Tone: flaccid RLE. Gait: deferred at this time for safety given weakness.       Labs  HgA1c 5.7    Cholesterol, Total 161   HDL Cholesterol 38 (L)   LDL Calculated 100 (H)   Triglycerides 115   VLDL Cholesterol Calculated 23     ALT 21  AST 23    UA negative    Studies  MRI brain w/o 4/22/21, independently reviewed  1. Multifocal acute infarctions in the parasagittal left frontal lobe within   the distal left anterior cerebral artery vascular territory.  No associated   hemorrhage. 2. 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 3. Diffuse parenchymal volume loss and sequela of moderate chronic   microvascular ischemic changes. 4. Old infarctions in the basal ganglia, thalami, and right cerebellum. CTA head/neck 4/21/21, independently reviewed  No hemodynamically significant stenosis involving the head and neck arteries. EKG 4/21/21  SR w/ first degree AV block    Impression  1. Acute R hemiparesis (LE>>UE) and some degree of language impairment. MRI brain completed this afternoon w/ evidence of scattered infarcts w/in the L LEXA territory and suggestion of an occluded/thrombosed A3 segment which per discussion w/ Radiology was present on CTA head/neck. Stroke risk factors include HTN, HLD, and former smoker. 2.  Hypertension. 3.  Hyperlipidemia. 4.  Former smoker. Recommendations  1. TTE w/ bubble study. 2.  Permissive HTN for 24-48 hours. 3.  Continue 81 mg asa daily. May consider DAPT for 30 days transitioning to monotherapy w/ either full dose asa or Plavix alone. 4.  High intensity statin. LDL < 70.    5.  Telemetry. Depending on workup and inpatient monitoring, he may need a prolonged cardiac event monitor. 6.  PT/OT/SLP evaluations underway.       Gibran Loya NP  [address] [place] [address] [id number] Neurology    A copy of this note was provided for Dr Damir Olivares MD

## 2021-04-22 NOTE — PROGRESS NOTES
Occupational Therapy   Occupational Therapy Initial Assessment and Tentative D/C    Date: 2021   Patient Name: Rosy Becerra  MRN: [id number]     : 1955    Date of Service: 2021    Discharge Recommendations: Rosy Becerra scored a 13/24 on the AM-PAC ADL Inpatient form. Current research shows that an AM-PAC score of 17 or less is typically not associated with a discharge to the patient's home setting. Based on the patient's AM-PAC score and their current ADL deficits, it is recommended that the patient have 5-7 sessions per week of Occupational Therapy at d/c to increase the patient's independence. At this time, this patient demonstrates the endurance, and/or tolerance for 3 hours of therapy each day, with a treatment frequency of 5-7x/wk. Please see assessment section for further patient specific details. If patient discharges prior to next session this note will serve as a discharge summary. Please see below for the latest assessment towards goals. Continue to assess pending progress, 5-7 sessions per week, Patient would benefit from continued therapy after discharge  OT Equipment Recommendations  Equipment Needed: No  Other: defer to next level of care    Assessment   Performance deficits / Impairments: Decreased functional mobility ; Decreased strength;Decreased endurance;Decreased ADL status; Decreased high-level IADLs;Decreased balance;Decreased ROM; Decreased sensation;Decreased coordination;Decreased fine motor control  Assessment: Rosy Becerra is a 76 y.o. male who presents to the emergency department by EMS for right side facial droop, right side leg weakness and developing expressive aphasia Symptoms started roughly 9am (10  To 11 hours prior to arrival). PTA pt from home with significant other where pt was Ind with mobility and ADLs. Pt currently limited due to R sided weakness. Pt presents with minimal AROM in R LE and ~3/5 general strength in R UE.  Pt reports diminished sensation in R UE and LE. Pt completes transfers with Min Ax2 and use of stedy. Pt able to stand to RW with Mod Ax2. Anticipate pt needing up to Max/Total A for ADLs. Pt will benefit from skilled OT services at this time. Anticipate pt with need for ongoing OT at time of D/C. Prognosis: Fair;Good  Decision Making: Medium Complexity  Exam: see above  Assistance / Modification: stedy  OT Education: OT Role;Plan of Care;Transfer Training  REQUIRES OT FOLLOW UP: Yes  Activity Tolerance  Activity Tolerance: Patient Tolerated treatment well  Safety Devices  Safety Devices in place: Yes  Type of devices: Left in chair;Chair alarm in place;Call light within reach;Gait belt;Nurse notified           Patient Diagnosis(es): The encounter diagnosis was Right sided cerebral hemisphere cerebrovascular accident (CVA) ([facility] has a past medical history of Acute MI ([facility] Anxiety, Arthritis, CAD (coronary artery disease), Hypertension, and Influenza A.   has a past surgical history that includes back surgery (N/A, 1989) and Coronary angioplasty with stent (9/13). Restrictions  Restrictions/Precautions  Restrictions/Precautions: Fall Risk  Position Activity Restriction  Other position/activity restrictions: R Hemiplegia (*LE). Subjective   General  Chart Reviewed: Yes  Patient assessed for rehabilitation services?: Yes  Additional Pertinent Hx: per ED note, Kyle Peña is a 76 y.o. male who presents to the emergency department by EMS for right side facial droop, right side leg weakness and developing expressive aphasia Symptoms started roughly 9am (10  To 11 hours prior to arrival). Family / Caregiver Present: No  Referring Practitioner: Addie Leyden, DO  Subjective  Subjective: Pt agreeable to OT evaluation. Pt reports no pain. Pt presents with decreased strength to R side. General Comment  Comments: okay for therapy per RN.   Vital Signs  Temp: 98.1 °F (36.7 °C)  Temp Source: Oral  Pulse: 62 Heart Rate Source: Monitor  Resp: 16  BP: (!) 209/99  BP Location: Left Arm  MAP (mmHg): 136  Oxygen Therapy  SpO2: 93 %  Pulse Oximeter Device Mode: Intermittent  Pulse Oximeter Device Location: Finger  O2 Device: None (Room air)  Social/Functional History  Social/Functional History  Lives With: Significant other(Sign other [place]am[place]))  Type of Home: House  Home Layout: Two level, Able to Live on Main level with bedroom/bathroom, Laundry in basement(1 story with basement.)  Home Access: Stairs to enter without rails(1 step to enter.)  Bathroom Shower/Tub: Tub/Shower unit(Tub Shower main level and basement.)  Bathroom Toilet: Standard  Bathroom Equipment: Grab bars in shower  Bathroom Accessibility: Accessible  Home Equipment: (No DME.)  ADL Assistance: Independent  Homemaking Assistance: (Shared with sign other.)  Ambulation Assistance: Independent(Without assist device.)  Transfer Assistance: Independent  Active : Yes  Occupation: Retired  Type of occupation: Retired : Sales  Leisure & Hobbies: Not doing much. \"Need to find something to do. \"       Objective   Vision: Within Functional Limits(pt reports no vision changes)  Hearing: Within functional limits    Orientation  Overall Orientation Status: Within Functional Limits  Orientation Level: Oriented X4  Observation/Palpation  Observation: Mild R Facial Droop.   Balance  Sitting Balance: Contact guard assistance(seated EOB)  Standing Balance: Minimal assistance(in stance in stedy; Min A with RW)  Functional Mobility  Functional Mobility Comments: unable to progress B LEs to attempt steps this date  Wheelchair Bed Transfers  Wheelchair/Bed - Technique: (stedy)  Equipment Used: Bed;Other(bed to chair)  Level of Asssistance: Dependent/Total  ADL  Additional Comments: Anticipate pt needing up to Max/total A for ADLs including dressing, bathing, and toileting based on ROM, strength, and balance  Tone RUE  RUE Tone: Normotonic  Tone LUE  LUE Tone: Normotonic Coordination  Movements Are Fluid And Coordinated: No  Coordination and Movement description: Decreased speed;Right UE     Bed mobility  Supine to Sit: Moderate assistance;2 Person assistance  Sit to Supine: Unable to assess  Scooting: Moderate assistance;2 Person assistance  Transfers  Sit to stand: 2 Person assistance; Moderate assistance;Minimal assistance  Stand to sit: 2 Person assistance; Moderate assistance;Minimal assistance  Transfer Comments: Min Ax2 to/from stedy;  Mod Ax2 to/from [employer]  Overall Cognitive Status: WFL        Sensation  Overall Sensation Status: Impaired  Light Touch: Partial deficits in the RUE;Partial deficits in the RLE        LUE AROM (degrees)  LUE AROM : WFL  Left Hand AROM (degrees)  Left Hand AROM: WFL  RUE PROM (degrees)  RUE PROM: WFL  RUE AROM (degrees)  RUE AROM : Exceptions  RUE General AROM: limited due to weakness  Right Hand PROM (degrees)  Right Hand PROM: WFL  Right Hand AROM (degrees)  Right Hand AROM: WFL  LUE Strength  Gross LUE Strength: [facility]  RUE Strength  Gross RUE Strength: Exceptions to [facility] Hand General: 3/5  RUE Strength Comment: generally 3/5 throughout                   Plan   Plan  Times per week: 3-5x  Current Treatment Recommendations: Strengthening, Endurance Training, Balance Training, ROM, Functional Mobility Training, Safety Education & Training, Gait Training, Self-Care / ADL, Patient/Caregiver Education & Training, Neuromuscular Re-education      AM-PAC Score        AM-[facility] Inpatient Daily Activity Raw Score: 13 (04/22/21 0832)  AM-PAC Inpatient ADL T-Scale Score : 32.03 (04/22/21 0832)  ADL Inpatient CMS 0-100% Score: 63.03 (04/22/21 0832)  ADL Inpatient CMS G-Code Modifier : CL (04/22/21 4437)    Goals  Short term goals  Time Frame for Short term goals: prior to D/C  Short term goal 1: complete functional transfers with Min A  Short term goal 2: complete bed mobility with Min A  Short term goal 3: complete bathing and dressing with Min A Short term goal 4: complete toileting with Min A  Short term goal 5: tolerate B UE exercises x10 reps for increased strength and endurance with ADLs  Long term goals  Time Frame for Long term goals : STG=LTG  Patient Goals   Patient goals : return to PLOF       Therapy Time   Individual Concurrent Group Co-treatment   Time In 0745         Time Out 0830         Minutes 45         Timed Code Treatment Minutes: 30 Minutes(15 minute eval)       Ilan Chakraborty OTR/L

## 2021-04-22 NOTE — PROGRESS NOTES
Physical Therapy    Facility/Department: [address] PROGRESSIVE CARE  Initial Assessment    NAME: Timothy Spaulding  : 1955  MRN: [id number]    Date of Service: 2021    Discharge Recommendations:  Continue to assess pending progress   PT Equipment Recommendations  Other: Defer to next level of care. Timothy Spaulding scored a 9/ on the AM-PAC short mobility form. Current research shows that an AM-PAC score of 17 or less is typically not associated with a discharge to the patient's home setting. Based on the patient's AM-PAC score and their current functional mobility deficits, it is recommended that the patient have 5-7 sessions per week of Physical Therapy at d/c to increase the patient's independence. At this time, this patient demonstrates the endurance, and/or tolerance for 3 hours of therapy each day, with a treatment frequency of 5-7x/wk. Please see assessment section for further patient specific details. If patient discharges prior to next session this note will serve as a discharge summary. Please see below for the latest assessment towards goals. Assessment   Body structures, Functions, Activity limitations: Decreased functional mobility ; Decreased strength;Decreased endurance;Decreased sensation;Decreased balance  Assessment: 77 y/o male admit 2021 with R Facial Droop, Aphasia, R Side Weakness. Onset of Sxs ~ 9AM; did not seek medical attention ~12 hr later. CT Head negative. CTA Head/Neck negative. MRI pending. PMH as noted including CAD, Angio with Stent, MI, Back Surg, HTN. PTA pt living with sign other in home with 1 step to enter and 1st floor bed/bath. Pt was independent with daily care and functional mobility (without assist device). At this time pt requiring assist x 2 for OOB via Stedy; brief stand with Walker assist x 2. Sign weak/diminished motor function throughout R LE. Would recommend cont PT Services (5-7, In-Pt Rehab) upon d/c.   Will monitor pt's progress. Prognosis: Fair;Good  Decision Making: Medium Complexity  History: 75 y/o male admit 4/21/2021 with R Facial Droop, Aphasia, R Side Weakness. Onset of Sxs ~ 9AM; did not seek medical attention ~12 hr later. CT Head negative. CTA Head/Neck negative. MRI pending. PMH as noted including CAD, Angio with Stent, MI, Back Surg, HTN. Exam: See above. Clinical Presentation: See above. Patient Education: Role of PT, POC, Need to call for assist, OOB via Stedy. Barriers to Learning: Appears some delayed processing. REQUIRES PT FOLLOW UP: Yes  Activity Tolerance  Activity Tolerance: Patient limited by endurance       Patient Diagnosis(es): The encounter diagnosis was Right sided cerebral hemisphere cerebrovascular accident (CVA) ([facility] [address] has a past medical history of Acute MI ([facility] [address] Anxiety, Arthritis, CAD (coronary artery disease), Hypertension, and Influenza A.   has a past surgical history that includes back surgery (N/A, 1989) and Coronary angioplasty with stent (9/13). Restrictions  Restrictions/Precautions  Restrictions/Precautions: Fall Risk  Position Activity Restriction  Other position/activity restrictions: R Hemiplegia (*LE). Vision/Hearing  Vision: Within Functional Limits  Hearing: Within functional limits     Subjective  General  Chart Reviewed: Yes  Patient assessed for rehabilitation services?: Yes  Additional Pertinent Hx: 75 y/o male admit 4/21/2021 with R Facial Droop, Aphasia, R Side Weakness. Onset of Sxs ~ 9AM; did not seek medical attention ~12 hr later. CT Head negative. CTA Head/Neck negative. MRI pending. PMH as noted including CAD, Angio with Stent, MI, Back Surg, HTN. Family / Caregiver Present: No  Referring Practitioner: Dr. Warner Giron. Diagnosis: CVA  Follows Commands: Within Functional Limits  Other (Comment): Alittle delayed. Subjective  Subjective: Pt agreeable to PT Eval/Rx.   Pain Screening  Patient Currently in Pain: Denies          Orientation assist x 2 (difficult with transfer UEs to/from Walker due to weak/balance). Ambulation  Ambulation?: No  WB Status: Pt uable to lift advance either LE due to extent weak/impair R LE. Balance  Comments: EOB Static CGA. Mod assist scooting to EOB for LE place on floor. Static Sitting during Transfer via Stedy Close CGA. Static Stand (brief) with Stedy : Min assist.  Static Stand (brief) with Walker : Mod assist.  Exercises  Comments: Initiated Static Stand/Lat Wgt Shift with use of Stedy; R LE Terminal Knee Flex/Ext during use of Stedy. Plan   Plan  Times per week: 3-5x week while in acute care setting. Current Treatment Recommendations: Strengthening, Balance Training, Functional Mobility Training, Transfer Training, Wheelchair Mobility Training, Gait Training, Neuromuscular Re-education, Safety Education & Training, Patient/Caregiver Education & Training  Safety Devices  Type of devices: Call light within reach, Chair alarm in place, Left in chair, Nurse notified      AM-PAC Score  AM-PAC Inpatient Mobility Raw Score : 9 (04/22/21 0844)  AM-PAC Inpatient T-Scale Score : 30.55 (04/22/21 0844)  Mobility Inpatient CMS 0-100% Score: 81.38 (04/22/21 0844)  Mobility Inpatient CMS G-Code Modifier : CM (04/22/21 2915)          Goals  Short term goals  Time Frame for Short term goals: Upon d/c acute care setting. Short term goal 1: Bed Mob Min assist.  Short term goal 2: Transfer with assist device Min assist x 2. Short term goal 3: Pt able to initiate R LE lift/advance during stand (with Walker)  Min assist x 2. Short term goal 4: Pt participating in approp Strength/Neuromus Ex/Activities. Patient Goals   Patient goals : Get better, be able to get back home.        Therapy Time   Individual Concurrent Group Co-treatment   Time In 0745         Time Out 0830         Minutes [address], PT

## 2021-04-22 NOTE — PROGRESS NOTES
Hospitalist Progress Note  4/22/2021 12:35 PM    PCP: rGeg Dumont MD    [phone]     Date of Admission: 4/21/2021                                                                                                                     HOSPITAL COURSE    Patient demographics:  The patient  Dorian Renee is a 76 y.o. male     Significant past medical history:   Patient Active Problem List   Diagnosis    Chest pain    [facility], acute, non [facility] segment elevation [facility]    Tobacco abuse    HTN (hypertension)    Acute [facility] ([facility]    Acute CVA (cerebrovascular accident) ([facility]    Right leg weakness         Presenting symptoms:  Right leg weakness    Diagnostic workup:      CONSULTS DURING ADMISSION :   IP CONSULT TO NEUROLOGY      Patient was diagnosed with:        Treatment while inpatient:  Pt  [presents to [facility] with concern for new onset right-sided leg weakness as well as some progressive worsening of expressive aphasia with word finding difficulty and initially also some report of right-sided facial droop                                                                                       ----------------------------------------------------------      SUBJECTIVE COMPLAINTS- follow up for Right leg weakness    Diet: DIET GENERAL; Low Sodium (2 GM)      OBJECTIVE:   Patient Active Problem List   Diagnosis    Chest pain    MI, acute, non ST segment elevation ([facility] [address] [facility]    Tobacco abuse    HTN (hypertension)    Acute MI ([facility] [address] [facility]    [facility]    Right leg weakness       Allergies  Patient has no known allergies.     Medications    Scheduled Meds:   sodium chloride flush  5-40 mL Intravenous 2 times per day    enoxaparin  40 mg Subcutaneous Daily    aspirin  81 mg Oral Daily    Or    aspirin  300 mg Rectal Daily    atorvastatin  80 mg Oral Nightly    sertraline  100 mg Oral Daily    tamsulosin  0.4 mg Oral Daily     Continuous Infusions:   sodium chloride       PRN Meds: sodium chloride flush, sodium chloride, perflutren lipid microspheres, ondansetron, labetalol, diphenhydrAMINE    Vitals   Vitals /wt   Patient Vitals for the past 8 hrs:   BP Temp Temp src Pulse Resp SpO2   04/22/21 0808     16 93 %   04/22/21 0752 (!) 209/99 98.1 °F (36.7 °C) Oral 62 14 93 %        72HR INTAKE/OUTPUT:      Intake/Output Summary (Last 24 hours) at 4/22/2021 1235  Last data filed at 4/22/2021 6150  Gross per 24 hour   Intake 240 ml   Output 2000 ml   Net -1760 ml       Exam:    Gen:   Alert and oriented ×2, some language deficit  Eyes: PERRL. No sclera icterus. No conjunctival injection. ENT: No discharge. Pharynx clear. External appearance of ears and nose normal.  Neck: Trachea midline. No obvious mass. Resp: No accessory muscle use. No crackles. No wheezes. No rhonchi. CV: Regular rate. Regular rhythm. No murmur or rub. No edema. GI: Non-tender. Non-distended. No hernia. Skin: Warm, dry, normal texture and turgor. Lymph: No cervical LAD. No supraclavicular LAD. M/S: / Ext. Right-sided hemiparesis  Neuro: CN 2-12 are intact,  no neurologic deficits noted. PT/INR:   Recent Labs     04/21/21 2132   PROTIME 12.6   INR 1.09     APTT:   Recent Labs     04/21/21 2132   APTT 28.2       CBC:   Recent Labs     04/21/21 2132 04/22/21  0444   WBC 8.1 8.9   HGB 14.2 14.5   HCT 40.6 41.2   MCV 92.8 92.4    137       BMP:   Recent Labs     04/21/21 2132 04/22/21  0444    138   K 3.5 3.5    101   CO2 28 26   BUN 17 15   CREATININE 0.7* 0.7*       LIVER PROFILE:   Recent Labs     04/21/21 2132   ALKPHOS 70   AST 23   ALT 21   BILITOT 0.4     No results for input(s): AMYLASE in the last 72 hours. No results for input(s): LIPASE in the last 72 hours. UA:No results for input(s): NITRITE, LABCAST, WBCUA, RBCUA, MUCUS in the last 72 hours. TROPONIN: No results for input(s): Oumar Cobble in the last 72 hours.     Lab Results   Component Value Date/Time URRFLXCULT Not Indicated 04/21/2021 09:32 PM       No results for input(s): TSHREFLEX in the last 72 hours. No components found for: [id number]  POC GLUCOSE:  No results for input(s): POCGLU in the last 72 hours. Recent Labs     04/22/21  0444   LABA1C 5.7      Lab Results   Component Value Date    LABA1C 5.7 04/22/2021     Global ejection fraction is normal and estimated at 55 %. (1/15/2017)    ASSESSMENT AND PLAN  Acute right hemiparesis  MRI-scattered infarcts within the left anterior cerebral artery territory  Swallowing evaluation  Aspirin and statin  Echocardiogram shows normal ejection fraction with grade 2 diastolic dysfunction  OT PT  Acute rehab consult  Neurology consult is appreciated        Resume patient's home medication        Code Status: Full Code        Dispo -cc        The patient and / or the family were informed of the results of any tests, a time was given to answer questions, a plan was proposed and they agreed with plan. Kong Hutton MD    This note was transcribed using [id number] [employer]. Please disregard any translational errors.

## 2021-04-22 NOTE — CONSULTS
Stroke Team Consult Note    Patient Name:  Myranda Nath  :  1955    Date of Encounter:  2021  Stroke Team Paged:  21:11 PM    Patient is a 75 yo M with PMHx of tobacco use, CAD with previous NSTEMI, obesity (BMI 33.77 per chart), and HTN who presents with LKN 09:00 am today, had sudden onset of R-sided weakness. RUE has improved a bit, but RLE remains weak. Not a candidate for alteplase due to LKN > 4.5 h. Not a candidate for thrombectomy due to no LVO. CT head w/o contrast is concerning for small vessel disease and a few remote infarcts (R caudate, bilateral thalami). No acute intracranial hemorrhage, no early ischemic changes seen otherwise, no hyperdense vessel. CTA head/neck with L fetal PCA. No LVO noted. Not able to window much yet as just loaded, but no significant stenosis or atherosclerosis to my review. Radiology read pending. A/P:  Suspect lacunar syndrome of pure motor stroke. Risk factors of HTN and tobacco use for small vessel disease.    -Permissive HTN to < 220/110 x 24 h with gradual reduction to goal of < 130/80  -MRI Brain w/o contrast to confirm etiology   -TTE with bubble study if embolic-appearing;   -Lipid panel (no labs seen since )--start statin for LDL > 70 for stroke prevention  -Check Hgb A1c, goal < 7.0% for stroke prevention  -Smoking cessation counseling given known CAD and now likely stroke  -Additional lifestyle modifications for stroke prevention as indicated with diet/exercise  -Telemetry while admitted  -PT, OT, SLP eval and treat as indicated  -Patient likely to meet criteria for mild stroke per POINT protocol:   -Continue home  mg qd (weight-based dosing is ok)   -Load clopidogrel 600 mg x 1 with 21 days of clopidogrel 75 mg qd thereafter unless otherwise contraindicated    Please call with any questions or concerns related to acute stroke management.     Manjinder Perry MD  PGY5, Vascular Neurology  Attending:  Dr. Geovanni Field

## 2021-04-22 NOTE — PROGRESS NOTES
NAME:  Sailaja Farrell  YOB: 1955  MEDICAL RECORD NUMBER:  [id number]  TODAYS DATE:  4/22/2021    Discussed personal risk factors for Stroke /TIA with patient/family, and ways to reduce the risk for a recurrent stroke. Patient's personal risk factors which were identified are:     [] Alcohol Abuse: check with your physician before any alcohol consumption. [] Atrial fibrillation: may cause blood clots. [] Drug Abuse: Seek help, talk with your doctor  [] Clotting Disorder  [] Diabetes  [] Family history of stroke or heart disease  [x] High Blood Pressure/Hypertension: work with your physician.  [] High cholesterol: monitor cholesterol levels with your physician.   [] Overweight/Obesity: work with your physician for your ideal body weight.  [] Physical Inactivity: get regular exercise as directed by your physician. [] Personal history of previous TIA or stroke  [] Poor Diet; decrease salt (sodium) in your diet, follow diet directed by physician. [] Smoking: Cigarette/Cigar: stop smoking. Advised pt. that you can reduce your risk for stroke/TIA by modifying/controlling the risk factors that you have. Pt.advised to take the medications as prescribed, which will be detailed in the discharge instructions, and to not stop taking them without consulting their physician. In addition, pt. advised to maintain a healthy diet, exercise regularly and to not smoke. [facility] Stroke treatment and prevention, Managing your recovery  notebook  provided and/or reviewed  with patient/family. The notebook includes, but not limited to, sections addressing warning signs & symptoms of a stroke, which are: sudden numbness or weakness especially on one side of the body, sudden confusion, difficulty speaking or understanding, sudden changes in vision, sudden dizziness or loss of balance/ coordination, or sudden severe headache.   The need to call EMS (911) immediately if signs & symptoms occur is emphasized . The notebook also provides education on Stroke community resources and stroke advocacy. The need for follow-up after discharge was highlighted with patient/family with them being able to repeat understanding of the importance of this.       Electronically signed by Frances Gomez RN on 4/22/2021 at 3:22 AM

## 2021-04-22 NOTE — PROGRESS NOTES
4 Eyes Skin Assessment     NAME:  Aydee Tuttle  YOB: 1955  MEDICAL RECORD NUMBER:  [id number]    The patient is being assess for  Admission    I agree that 2 RN's have performed a thorough Head to Toe Skin Assessment on the patient. ALL assessment sites listed below have been assessed. Areas assessed by both nurses:    Head, Face, Ears, Shoulders, Back, Chest, Arms, Elbows, Hands, Sacrum. Buttock, Coccyx, Ischium and Legs. Feet and Heels        Does the Patient have a Wound?  No noted wound(s)       Ilia Prevention initiated:  NA   Wound Care Orders initiated:  NA    Pressure Injury (Stage 3,4, Unstageable, DTI, NWPT, and Complex wounds) if present place consult order under [de-identified] No    New and Established Ostomies if present place consult order under : No      Nurse 1 eSignature: Electronically signed by Mac Telles RN on 4/22/21 at 3:00 AM EDT    **SHARE this note so that the co-signing nurse is able to place an eSignature**    Nurse 2 eSignature: Electronically signed by Kaylynn Fuller RN on 4/22/21 at 5:30 AM EDT

## 2021-04-22 NOTE — H&P
History:        Procedure Laterality Date    BACK SURGERY N/A 1989    herniated disc    CORONARY ANGIOPLASTY WITH STENT PLACEMENT  9/13       Medications Prior to Admission:    Prior to Admission medications    Medication Sig Start Date End Date Taking? Authorizing Provider   aspirin 81 MG EC tablet Take 81 mg by mouth daily   Yes Historical Provider, MD   ibuprofen (ADVIL;MOTRIN) 200 MG tablet Take 800 mg by mouth as needed for Pain   Yes Historical Provider, MD   sertraline (ZOLOFT) 100 MG tablet TAKE 1 AND 1/2 TABLET BY MOUTH DAILY 4/12/21  Yes Ravi Crews MD   lisinopril (PRINIVIL;ZESTRIL) 20 MG tablet TAKE ONE-HALF TABLET BY MOUTH DAILY 3/16/21  Yes Ravi Crews MD   tamsulosin (FLOMAX) 0.4 MG capsule TAKE ONE CAPSULE BY MOUTH DAILY 3/15/21  Yes Ravi Crews MD   atenolol (TENORMIN) 50 MG tablet TAKE ONE TABLET BY MOUTH DAILY 2/12/21  Yes Ravi Crews MD   hydroCHLOROthiazide (HYDRODIURIL) 25 MG tablet TAKE ONE TABLET BY MOUTH DAILY 2/12/21  Yes Ravi Crews MD   isosorbide mononitrate (IMDUR) 30 MG extended release tablet TAKE ONE TABLET BY MOUTH DAILY 9/14/20  Yes MARSHALL Waggoner CNP   MYRBETRIQ 50 MG TB24 Take 1 tablet by mouth daily 8/15/20  Yes Historical Provider, MD   Multiple Vitamins-Minerals (THERAPEUTIC MULTIVITAMIN-MINERALS) tablet Take 1 tablet by mouth daily   Yes Historical Provider, MD   Ascorbic Acid (VITAMIN C) 500 MG tablet Take 500 mg by mouth daily   Yes Historical Provider, MD   nitroGLYCERIN (NITROSTAT) 0.4 MG SL tablet Place 1 tablet under the tongue every 5 minutes as needed for Chest pain. 9/11/13  Yes MARSHALL Rick CNP       Allergies:  Patient has no known allergies. Social History:  The patient currently lives home    TOBACCO:   reports that he quit smoking about 2 years ago. His smoking use included cigarettes. He has a 20.00 pack-year smoking history.  He has quit using smokeless tobacco.  ETOH:   reports current alcohol use of about 2.0 standard drinks of alcohol per week. Family History:  Reviewed in detail and negative for DM, Early CAD, Cancer, CVA. Positive as follows:        Problem Relation Age of Onset    Hypertension Mother     Hypertension Father     Heart Failure Father     Stroke Maternal Grandfather     Cancer Paternal Uncle         throat       REVIEW OF SYSTEMS:    as noted in the HPI. All other systems reviewed and negative. PHYSICAL EXAM:    BP (!) 164/87   Pulse 61   Temp 97.3 °F (36.3 °C) (Oral)   Resp 16   Wt 253 lb 4.9 oz (114.9 kg)   SpO2 92%   BMI 34.35 kg/m²     General appearance: No acute respiratory distress, conversational although somewhat slow compared to patient's reported baseline, alert and oriented overall, somewhat anxious and reports he is nervous about his current condition  HEENT Normal cephalic, atraumatic without obvious deformity. Pupils equal, round, and reactive to light. Extra ocular muscles intact. Conjunctivae/corneas clear. Mildly dry mucous membranes  Neck: Supple, no JVD, no bruit  Lungs: Clear to auscultation, bilaterally without Rales/Wheezes/Rhonchi with good respiratory effort. Heart: Regular rate and rhythm, S1 and S2 noted, no murmurs  Abdomen: Soft, non-tender or non-distended without rigidity or guarding and positive bowel sounds all four quadrants.   Extremities: No edema noted bilaterally to lower extremities  Skin: No rashes  Neurologic: Cranial nerves II through XII intact, 5 out of 5 motor strength to left upper and lower extremity, 4-5  strength noted to right upper extremity and 1 out of 5 strength noted to right lower extremity, sensation is impaired to the right lower extremity compared to the left lower extremity but upper extremities and facial area all demonstrate intact symmetric sensation, visual field testing seems to be intact on examination  Mental status: Alert, oriented, thought content appropriate although somewhat slow to respond compared to baseline   capillary Refill: Acceptable  < 3 seconds  Peripheral Pulses: +3 Easily felt, not easily obliterated with pressure      Chest x-ray: No acute process  CT head noncontrast, CTA of the head and neck with IV contrast:  1. No acute intracranial abnormality. 2. Chronic and age related changes including age-appropriate cerebral volume   loss and nonspecific white matter hypodensities which are likely the sequela   of chronic small vessel ischemic disease. 3. No hemodynamically significant stenosis involving the head and neck   arteries. CBC   Recent Labs     04/21/21 2132   WBC 8.1   HGB 14.2   HCT 40.6         RENAL  Recent Labs     04/21/21 2132      K 3.5      CO2 28   BUN 17   CREATININE 0.7*     LFT'S  Recent Labs     04/21/21 2132   AST 23   ALT 21   BILITOT 0.4   ALKPHOS 70     COAG  Recent Labs     04/21/21 2132   INR 1.09     CARDIAC ENZYMES  No results for input(s): CKTOTAL, CKMB, CKMBINDEX, TROPONINI in the last 72 hours.     U/A:    Lab Results   Component Value Date    NITRITE NEG 01/16/2020    COLORU YELLOW 04/21/2021    WBCUA 2 01/16/2017    RBCUA 1 01/16/2017    CLARITYU Clear 04/21/2021    SPECGRAV 1.012 04/21/2021    LEUKOCYTESUR Negative 04/21/2021    BLOODU Negative 04/21/2021    GLUCOSEU Negative 04/21/2021       ABG  No results found for: HCI6RPI, BEART, E9IEIDBH, PHART, THGBART, VKG5JVC, PO2ART, ZSE2GXD        Active Hospital Problems    Diagnosis Date Noted    Acute CVA (cerebrovascular accident) ([facility] [address] [I63.9] 04/21/2021    Right leg weakness [R29.898] 04/21/2021   · Hypertension  · CAD  · Anxiety      PHYSICIANS CERTIFICATION:    I certify that Omar Tong is expected to be hospitalized for greater than 2 midnights based on the following assessment and plan:      ASSESSMENT/PLAN:  · Repeat NIH stroke scales, check speech and swallow functions, every 4 hours neurochecks, restart patient's home medications but hold the patient's antihypertensives for now to allow for permissive hypertension  · Patient not a candidate for TPA based on timeframe and stroke team was contacted in the ED  · Start aspirin and statin therapy  · Ordering MRI of the brain without IV contrast for the morning  · Ordering transthoracic echo for the morning  · Neurology consulted  · Speech/OT/PT ordered       DVT Prophylaxis: Lovenox  Diet: DIET GENERAL; Low Sodium (2 GM)  Code Status: Full Code  PT/OT Eval Status: Ambulatory at baseline    Dispo -pending clinical course       Mt Cabrera DO    Thank you Megan Lim MD for the opportunity to be involved in this patient's care. If you have any questions or concerns please feel free to contact me at [phone].

## 2021-04-22 NOTE — PROGRESS NOTES
Pt arrived to floor via stretcher from ED and transfered to bed. Telemetry activated and confirmed with [facility]. Tele box # [id number]. Patient oriented to room and use of call light. Call light and personal items within reach. Admission and assessment initiated. Fall assessment completed and fall contract signed. POC and education initiated and reviewed with patient. Pt's significant other updated and all questions answered. Denied further needs or questions at this time. Will continue to monitor.

## 2021-04-22 NOTE — PROGRESS NOTES
Speech Language/Pathology   SPEECH LANGUAGE AND CLINICAL BEDSIDE SWALLOWING EVALUATION   Speech Therapy Department       Lia Narvaez Evelia  AGE: 76 y.o. GENDER: male  : 1955  [id number]  EPISODE DATE:  2021    MEDICAL DIAGNOSIS: CVA  Chief Complaint   Patient presents with    Extremity Weakness     Pt in via EMS with stroke like symptoms that \"began this morning around 0900\". EMS states that pt has had \"worsening facial droop and right sided weakness\" throughout day. Pt alert and oriented on arrival with no signs of distress noted at this time. PAST MEDICAL HISTORY        Diagnosis Date    [facility] [address] 2013    Anxiety     Arthritis     CAD (coronary artery disease)     Hypertension     Influenza A 01/15/2017       PAST SURGICAL HISTORY    Past Surgical History:   Procedure Laterality Date    BACK SURGERY N/A     herniated disc    CORONARY ANGIOPLASTY WITH STENT PLACEMENT         ALLERGIES  No Known Allergies    History of Present Illness:  Timothy Spaulding is a 76 y.o. male who presents with weakness. The patient says that he noticed he was having a difficult time ambulating secondary to primarily R leg weakness some time around [id number]. This persisted. Throughout the day his weakness got progressively worse and at one point he sort of fell onto the couch w/out injury but was unable to get up due to the weakness. His wife eventually encouraged him to come to the ED around 2100 in order to be evaluated. SBP was in the 140s-160s. CT head was w/out any acute findings. CTA head/neck did not identify a LVO or any significant stenosis.  Stroke Team was involved though the patient was not a candidate for any acute intervention. His exam was notable for right facial weakness, RUE weakness, an inability to move his RLE, development of language trouble a couple hours into his ED time. Today, his symptoms persist.     MRI BRAIN (preliminary) 2021  Impression   1. Recommend regular textures and thin liquids with ongoing monitor to confirm tolerance. Cognitive Diagnosis: Suspected mild cognitive linguistic impairment, warranting ongoing assessment of complex, high level and abstract PS/reasoning, as well as executive functioning, thought organization and planning. Pt was independent with ADLs and IADLs PTA. Basic cognition appears intact in areas of orientation, sustained and selective attention, immediate recall, simple and concrete PS/reasoning. Noted delayed thought processing and verbal output. Aphasia Diagnosis: Mildly impaired auditory comprehension for complex and abstract information processing. Basic and concrete auditory/visual language comprehension appears intact. Mild to moderately impaired verbal expression for complex verbal output in connected speech, requiring increased time to formulate responses. Occasionally impaired high level word retrieval; mildly impaired convergent naming. Speech Diagnosis: Mildly impaired motor speech skills characterized by minimal to mild decreased verbal agility affecting speech fluency with diadochokinetic tasks, without impact on speech intelligibility at sentence level     Recommended Diet:  Diet Solids Recommendation: Regular  Liquid Consistency Recommendation: Thin     Strategies:   Compensatory Swallowing Strategies: Upright as possible for all oral intake, Remain upright for 30-45 minutes after meals    Plan:    Recommendations  D/C Recommendations: To be determined  Requires SLP Intervention: Yes    Therapy  Requires SLP Intervention: Yes  Therapeutic Interventions: Diet tolerance monitoring, Patient/Family education, Bolus control exercises; cognitive-communication remediation  Duration/Frequency of Treatment: 3-5x/wk while on acute unit    Dysphagia Goals  Dysphagia Goals:  The patient will tolerate recommended diet without observed clinical signs of aspiration, The patient will improve oral preparation phase via session.     Timed Code Treatment:  Time In: 8203  SLP Individual Minutes  Time In: 1350  Time Out: 1500  Minutes: 70  Total Treatment Time:  20 Clinical Swallow Eval  48 SLP Assessment       SIGNED:   Lashay Chopra MS, CCC-SLP #[id number]  Speech Language Pathologist

## 2021-04-22 NOTE — ED NOTES
Bed: [facility]  Expected date:   Expected time:   Means of arrival:   Comments:  Stroke like symptoms     Yolanda Carter RN  04/21/21 2104

## 2021-04-22 NOTE — PROGRESS NOTES
[facility]  Personalized Stroke Treatment Plan  My Stroke Type:   [] Ischemic Stroke (Blockage of blood flow to the brain)     [] TIA  Transient Ischemic Attack (mini-stroke)    Personal risk factors you can control include:    [] Alcohol Abuse: check with your physician before any alcohol consumption. [] Atrial fibrillation: may cause blood clots. [] Drug Abuse: Seek help, talk with your doctor  [] Clotting Disorder  [] Diabetes  [x] Family history of stroke or heart disease  [x] High Blood Pressure/Hypertension: work with your physician.  [] High cholesterol: monitor cholesterol levels with your physician. [x] Overweight/Obesity: work with your physician for your ideal body weight. [x] Physical Inactivity: get regular exercise as directed by your physician. [] Personal history of previous TIA or stroke  [x] Poor Diet; decrease salt (sodium) in your diet, follow diet directed by physician. [x] Smoking: Cigarette/Cigar: stop smoking. Follow up with your physician is important after discharge. TAKE all medications as prescribed. Do not stop taking any medications   without talking to your physician. BE FAST is a simple way to remember the main symptoms of stroke. Recognizing these symptoms helps you know when to call for medical help. BE FAST stands for:                                                 B Balance problems                                                 E Eyes, vision lost        F  Face Drooping      A  Arm Weakness        S  Speech Difficulty      T  Time to Call 9-1-1  DO NOT DELAY THIS! Educated patient and/or family on personal risk factors for stroke/TIA. Included ways to reduce the risk for recurrent stroke. [facility] [employer]'s Stroke treatment and prevention, Managing your recovery  notebook  provided to patient. The notebook includes, but not limited to, sections addressing warning signs & symptoms of a stroke.  The need to call EMS (911) immediately if signs & symptoms occur is emphasized . Medication information will be reviewed at discharge. The notebook also provides education on Stroke community resources and stroke advocacy.     Electronically signed by Electronically signed by Nadja Salazar RN on 4/22/2021 at 10:38 AM

## 2021-04-22 NOTE — ED PROVIDER NOTES
eMERGENCY dEPARTMENT eNCOUnter      Pt Name: Sona Monet  MRN: [id number]  [place] 1955  Date of evaluation: 4/21/2021  Provider: Mary Guzman MD     [address] [place] [address]       Chief Complaint   Patient presents with    Extremity Weakness     Pt in via EMS with stroke like symptoms that \"began this morning around 0900\". EMS states that pt has had \"worsening facial droop and right sided weakness\" throughout day. Pt alert and oriented on arrival with no signs of distress noted at this time. HISTORY OF PRESENT ILLNESS   (Location/Symptom, Timing/Onset,Context/Setting, Quality, Duration, Modifying Factors, Severity) Note limiting factors. Sona Monet is a 76 y.o. male who presents to the emergency department by EMS for right side facial droop, right side leg weakness and developing expressive aphasia Symptoms started roughly 9am (10  To 11 hours prior to arrival). He has not taken anything at home. Denies any other symptoms today. HPI and ROS limited by acuity of condition          REVIEW OFSYSTEMS    (2+ for level 4; 10+ for level 5)   Review of Systems   Constitutional: Positive for activity change. Negative for appetite change, chills, fatigue and fever. HENT: Negative for drooling, ear discharge, facial swelling and trouble swallowing. Eyes: Negative for photophobia and visual disturbance. Respiratory: Negative for cough, choking, chest tightness, shortness of breath, wheezing and stridor. Cardiovascular: Negative for chest pain, palpitations and leg swelling. Gastrointestinal: Negative for abdominal pain, constipation, diarrhea, nausea and vomiting. Genitourinary: Negative for dysuria and hematuria. Musculoskeletal: Positive for gait problem. Skin: Negative. Neurological: Positive for facial asymmetry, speech difficulty, weakness and numbness. Negative for dizziness, tremors, seizures, syncope, light-headedness and headaches.    Psychiatric/Behavioral: Positive for education level: None   Occupational History    None   Social Needs    Financial resource strain: Not hard at all   Steff-Edi insecurity     Worry: Never true     Inability: Never true   Kashmi needs     Medical: No     Non-medical: No   Tobacco Use    Smoking status: Former Smoker     Packs/day: 0.50     Years: 40.00     Pack years: 20.00     Types: Cigarettes     Quit date: 2019     Years since quittin.2    Smokeless tobacco: Former User   Substance and Sexual Activity    Alcohol use: Yes     Alcohol/week: 2.0 standard drinks     Types: 2 Cans of beer per week     Comment: states used to drink 1 pint/vodka a day / now socially drinks    Drug use: No    Sexual activity: Yes     Partners: Female   Lifestyle    Physical activity     Days per week: None     Minutes per session: None    Stress: None   Relationships    Social connections     Talks on phone: None     Gets together: None     Attends Gnosticism service: None     Active member of club or organization: None     Attends meetings of clubs or organizations: None     Relationship status: None    Intimate partner violence     Fear of current or ex partner: None     Emotionally abused: None     Physically abused: None     Forced sexual activity: None   Other Topics Concern    None   Social History Narrative    None       SCREENINGS   NIH Stroke Scale  NIH Stroke Scale Assessed: Yes  Interval: Baseline  Level of Consciousness (1a. ): Alert  LOC Questions (1b. ):  Answers both correctly  LOC Commands (1c. ): Performs both tasks correctly  Best Gaze (2. ): (!) Partial gaze palsy  Visual (3. ): No visual loss  Facial Palsy (4. ): (!) Minor paralysis  Motor Arm, Left (5a. ): No drift  Motor Arm, Right (5b. ): No drift  Motor Leg, Left (6a. ): No drift  Motor Leg, Right (6b. ): No effort against gravity  Limb Ataxia (7. ): Absent  Sensory (8. ): (!) Mild to Moderate  Best Language (9. ): No aphasia  Dysarthria (10. ): Normal  Extinction and Inattention (11): No abnormality  Total: 6Glasgow Coma Scale  Eye Opening: Spontaneous  Best Verbal Response: Oriented  Best Motor Response: Obeys commands  Stephenie Coma Scale Score: 15      PHYSICAL EXAM    (up to 7 for level 4, 8 or more for level 5)     ED Triage Vitals   BP Temp Temp src Pulse Resp SpO2 Height Weight   -- -- -- -- -- -- -- --       Physical Exam  Vitals signs and nursing note reviewed. Constitutional:       General: He is in acute distress. Appearance: He is obese. He is ill-appearing. HENT:      Head: Normocephalic and atraumatic. Right Ear: External ear normal.      Left Ear: External ear normal.      Nose: Nose normal.      Mouth/Throat:      Mouth: Mucous membranes are moist.      Pharynx: Oropharynx is clear. Eyes:      Pupils: Pupils are equal, round, and reactive to light. Neck:      Musculoskeletal: Normal range of motion. No muscular tenderness. Cardiovascular:      Rate and Rhythm: Normal rate and regular rhythm. Pulses: Normal pulses. Heart sounds: Normal heart sounds. Pulmonary:      Effort: Pulmonary effort is normal. No respiratory distress. Breath sounds: Normal breath sounds. No stridor. No wheezing. Abdominal:      General: There is distension. Palpations: Abdomen is soft. Tenderness: There is no abdominal tenderness. There is no guarding. Musculoskeletal:         General: No deformity or signs of injury. Skin:     General: Skin is warm and dry. Capillary Refill: Capillary refill takes less than 2 seconds. Coloration: Skin is pale. Neurological:      Mental Status: He is alert. GCS: GCS eye subscore is 4. GCS verbal subscore is 5. GCS motor subscore is 6. Cranial Nerves: Cranial nerve deficit present. Sensory: Sensory deficit present. Motor: Weakness present. Comments: Right sided facial droop. EOM not intact   Right hand strength diminished with no drift.   Unable to move right lower nonspecific but likely represent the sequela of chronic small vessel ischemic disease. Small old infarct in the peripheral right cerebellar hemisphere. ORBITS: The visualized portion of the orbits demonstrate no acute abnormality. SINUSES:  The visualized paranasal sinuses and mastoid air cells demonstrate no acute abnormality. SOFT TISSUES/SKULL: No acute abnormality of the visualized skull or soft tissues. CTA NECK: AORTIC ARCH/ARCH VESSELS: No dissection or arterial injury. No significant stenosis of the brachiocephalic or subclavian arteries. CAROTID ARTERIES: Thick atherosclerotic calcified plaque is noted at the level of the bilateral carotid bulbs and extending into the origin of the bilateral cervical ICAs. No dissection, arterial injury, or hemodynamically significant stenosis by NASCET criteria. VERTEBRAL ARTERIES: No dissection, arterial injury, or significant stenosis. SOFT TISSUES: The lung apices are clear. No cervical or superior mediastinal lymphadenopathy. The larynx and pharynx are unremarkable. No acute abnormality of the salivary and thyroid glands. BONES: No acute osseous abnormality. CTA HEAD: ANTERIOR CIRCULATION: No significant stenosis of the intracranial internal carotid, anterior cerebral, or middle cerebral arteries. No aneurysm. POSTERIOR CIRCULATION: No significant stenosis of the vertebral, basilar, or posterior cerebral arteries. No aneurysm. Persistent fetal origin of the left PCA, a normal variant. OTHER: No dural venous sinus thrombosis on this non-dedicated study     1. No acute intracranial abnormality. 2. Chronic and age related changes including age-appropriate cerebral volume loss and nonspecific white matter hypodensities which are likely the sequela of chronic small vessel ischemic disease. 3. No hemodynamically significant stenosis involving the head and neck arteries.      Xr Chest Portable    Result Date: 4/21/2021  EXAMINATION: ONE XRAY VIEW OF THE CHEST 4/21/2021 9:19 pm COMPARISON: 01/15/2017 HISTORY: ORDERING SYSTEM PROVIDED HISTORY: stroke TECHNOLOGIST PROVIDED HISTORY: Reason for exam:->stroke Reason for Exam: Extremity Weakness, ?cva Acuity: Acute Type of Exam: Initial FINDINGS: Monitor wires overlie the chest. The trachea is midline. The cardiac silhouette is unremarkable. The lungs are clear without evidence of infiltrate, mass, or pneumothorax. There is no pleural fluid. No acute cardiopulmonary process. Cta Head Neck W Contrast    Result Date: 4/21/2021  EXAMINATION: CT OF THE HEAD WITHOUT CONTRAST; CTA OF THE HEAD AND NECK WITH CONTRAST 4/21/2021 9:13 pm: TECHNIQUE: CT of the head was performed without the administration of intravenous contrast. Dose modulation, iterative reconstruction, and/or weight based adjustment of the mA/kV was utilized to reduce the radiation dose to as low as reasonably achievable.; CTA of the head and neck was performed with the administration of intravenous contrast. Multiplanar reformatted images are provided for review. MIP images are provided for review. Stenosis of the internal carotid arteries measured using NASCET criteria. Dose modulation, iterative reconstruction, and/or weight based adjustment of the mA/kV was utilized to reduce the radiation dose to as low as reasonably achievable. Noncontrast CT of the head with reconstructed 2-D images are also provided for review. COMPARISON: None. HISTORY: ORDERING SYSTEM PROVIDED HISTORY: stroke, right leg weak TECHNOLOGIST PROVIDED HISTORY: Reason for exam:->stroke, right leg weak Has a \"code stroke\" or \"stroke alert\" been called? ->Yes Decision Support Exception->Emergency Medical Condition (MA) Reason for Exam: stroke, right leg weak FINDINGS: CT HEAD: BRAIN/VENTRICLES:  No acute intracranial hemorrhage or extraaxial fluid collection. Grey-white differentiation is maintained. No evidence of mass, mass effect or midline shift. No evidence of hydrocephalus.   Age-appropriate cerebral volume loss. Scattered areas of white matter hypodensities are noted which are nonspecific but likely represent the sequela of chronic small vessel ischemic disease. Small old infarct in the peripheral right cerebellar hemisphere. ORBITS: The visualized portion of the orbits demonstrate no acute abnormality. SINUSES:  The visualized paranasal sinuses and mastoid air cells demonstrate no acute abnormality. SOFT TISSUES/SKULL: No acute abnormality of the visualized skull or soft tissues. CTA NECK: AORTIC ARCH/ARCH VESSELS: No dissection or arterial injury. No significant stenosis of the brachiocephalic or subclavian arteries. CAROTID ARTERIES: Thick atherosclerotic calcified plaque is noted at the level of the bilateral carotid bulbs and extending into the origin of the bilateral cervical ICAs. No dissection, arterial injury, or hemodynamically significant stenosis by NASCET criteria. VERTEBRAL ARTERIES: No dissection, arterial injury, or significant stenosis. SOFT TISSUES: The lung apices are clear. No cervical or superior mediastinal lymphadenopathy. The larynx and pharynx are unremarkable. No acute abnormality of the salivary and thyroid glands. BONES: No acute osseous abnormality. CTA HEAD: ANTERIOR CIRCULATION: No significant stenosis of the intracranial internal carotid, anterior cerebral, or middle cerebral arteries. No aneurysm. POSTERIOR CIRCULATION: No significant stenosis of the vertebral, basilar, or posterior cerebral arteries. No aneurysm. Persistent fetal origin of the left PCA, a normal variant. OTHER: No dural venous sinus thrombosis on this non-dedicated study     1. No acute intracranial abnormality. 2. Chronic and age related changes including age-appropriate cerebral volume loss and nonspecific white matter hypodensities which are likely the sequela of chronic small vessel ischemic disease. 3. No hemodynamically significant stenosis involving the head and neck arteries.        ED BEDSIDE ULTRASOUND:   Performed by ED Physician - none    LABS:  Labs Reviewed   COMPREHENSIVE METABOLIC PANEL W/ REFLEX TO MG FOR LOW K - Abnormal; Notable for the following components:       Result Value    Glucose 149 (*)     CREATININE 0.7 (*)     All other components within normal limits    Narrative:     Performed at:  [facility]  [address] [place] [address]   Phone [phone]   PROTIME-INR    Narrative:     Performed at:  [facility]  [address] [place] [address]   Phone [phone]   APTT    Narrative:     Performed at:  [facility]  [address] [place] [address]   Phone [phone]   MAGNESIUM    Narrative:     Performed at:  [facility]  [address] [place] [address]   Phone [phone]   CBC WITH AUTO DIFFERENTIAL   URINE RT REFLEX TO CULTURE        All other labs were within normal range or not returned as of this dictation. Procedures      EMERGENCY DEPARTMENT COURSE and DIFFERENTIAL DIAGNOSIS/MDM:   Vitals:    Vitals:    04/21/21 2110 04/21/21 2145 04/21/21 2230   BP: (!) 145/75 (!) 151/73 (!) 157/77   Pulse: 64 63 59   Resp: 17 20 17   Temp:  98.6 °F (37 °C)    TempSrc:  Oral    SpO2: 95% 94% 95%   Weight:   253 lb 4.9 oz (114.9 kg)       Medications   aspirin EC tablet 325 mg (has no administration in time range)   iopamidol (ISOVUE-370) 76 % injection 75 mL (75 mLs Intravenous Given 4/21/21 2118)       MDM. Patient is a 26-year-old came in with right-sided weakness. Patient unable to ambulate. Started around 9 AM.  Patient came in around 5 PM so he is out of the window. It is been 12 hours of his symptoms. Patient is able to talk to me. But getting more confused. There is no dysarthria but he is got some expressive aphasia. Patient's labs were within normal limit.   CAT scan obtained through a stroke alert. There was no bleed. And CTA was also negative. There was nothing else to add by the stroke team.  Patient will be need to be admitted to hospitalist.  May need MRI tomorrow. Aspirin will be started. Further per hospitalist    REVAL:         CRITICAL CARE TIME   Total CriticalCare time was 30 minutes, excluding separately reportable procedures. There was a high probability of clinically significant/life threatening deterioration in the patient's condition which required my urgent intervention. CONSULTS:  None    PROCEDURES:  Unless otherwise noted below, none     [unfilled]    FINAL IMPRESSION      1. Right sided cerebral hemisphere cerebrovascular accident (CVA) [facility])          DISPOSITION/PLAN   DISPOSITION Decision To Admit 04/21/2021 10:36:38 PM      PATIENT REFERRED TO:  No follow-up provider specified. DISCHARGE MEDICATIONS:  New Prescriptions    No medications on file          (Please note:  Portions of this note were completed with a voice recognition program.Efforts were made to edit the dictations but occasionally words and phrases are mis-transcribed.)  Form v2016. J.5-cn    Elie FRENCH MD (electronically signed)  Emergency Medicine Provider        Elio Byrnes MD  04/21/21 4006

## 2021-04-23 ENCOUNTER — APPOINTMENT (OUTPATIENT)
Dept: GENERAL RADIOLOGY | Age: 66
DRG: 065 | End: 2021-04-23
Payer: MEDICARE

## 2021-04-23 ENCOUNTER — HOSPITAL ENCOUNTER (INPATIENT)
Age: 66
LOS: 20 days | Discharge: NURSING, REHAB OR LONG-TERM CARE | DRG: 057 | End: 2021-05-13
Attending: PHYSICAL MEDICINE & REHABILITATION | Admitting: PHYSICAL MEDICINE & REHABILITATION
Payer: MEDICARE

## 2021-04-23 VITALS
HEIGHT: 75 IN | TEMPERATURE: 98.3 F | WEIGHT: 239.42 LBS | BODY MASS INDEX: 29.77 KG/M2 | HEART RATE: 77 BPM | SYSTOLIC BLOOD PRESSURE: 160 MMHG | RESPIRATION RATE: 18 BRPM | OXYGEN SATURATION: 93 % | DIASTOLIC BLOOD PRESSURE: 84 MMHG

## 2021-04-23 PROBLEM — I63.522 ACUTE ISCHEMIC LEFT ACA STROKE (HCC): Status: ACTIVE | Noted: 2021-04-23

## 2021-04-23 LAB
ANION GAP SERPL CALCULATED.3IONS-SCNC: 12 MMOL/L (ref 3–16)
BASOPHILS ABSOLUTE: 0.1 K/UL (ref 0–0.2)
BASOPHILS RELATIVE PERCENT: 0.7 %
BUN BLDV-MCNC: 14 MG/DL (ref 7–20)
CALCIUM SERPL-MCNC: 9.2 MG/DL (ref 8.3–10.6)
CHLORIDE BLD-SCNC: 101 MMOL/L (ref 99–110)
CO2: 25 MMOL/L (ref 21–32)
CREAT SERPL-MCNC: 0.6 MG/DL (ref 0.8–1.3)
EOSINOPHILS ABSOLUTE: 0.1 K/UL (ref 0–0.6)
EOSINOPHILS RELATIVE PERCENT: 0.9 %
GFR AFRICAN AMERICAN: >60
GFR NON-AFRICAN AMERICAN: >60
GLUCOSE BLD-MCNC: 123 MG/DL (ref 70–99)
HCT VFR BLD CALC: 43.4 % (ref 40.5–52.5)
HEMOGLOBIN: 15 G/DL (ref 13.5–17.5)
LYMPHOCYTES ABSOLUTE: 1.5 K/UL (ref 1–5.1)
LYMPHOCYTES RELATIVE PERCENT: 15.5 %
MAGNESIUM: 1.9 MG/DL (ref 1.8–2.4)
MCH RBC QN AUTO: 32.4 PG (ref 26–34)
MCHC RBC AUTO-ENTMCNC: 34.7 G/DL (ref 31–36)
MCV RBC AUTO: 93.2 FL (ref 80–100)
MONOCYTES ABSOLUTE: 0.8 K/UL (ref 0–1.3)
MONOCYTES RELATIVE PERCENT: 7.6 %
NEUTROPHILS ABSOLUTE: 7.4 K/UL (ref 1.7–7.7)
NEUTROPHILS RELATIVE PERCENT: 75.3 %
PDW BLD-RTO: 15.2 % (ref 12.4–15.4)
PLATELET # BLD: 126 K/UL (ref 135–450)
PMV BLD AUTO: 7.7 FL (ref 5–10.5)
POTASSIUM SERPL-SCNC: 3.8 MMOL/L (ref 3.5–5.1)
RBC # BLD: 4.65 M/UL (ref 4.2–5.9)
SODIUM BLD-SCNC: 138 MMOL/L (ref 136–145)
TOTAL CK: 63 U/L (ref 39–308)
WBC # BLD: 9.9 K/UL (ref 4–11)

## 2021-04-23 PROCEDURE — 97530 THERAPEUTIC ACTIVITIES: CPT

## 2021-04-23 PROCEDURE — 85025 COMPLETE CBC W/AUTO DIFF WBC: CPT

## 2021-04-23 PROCEDURE — 6370000000 HC RX 637 (ALT 250 FOR IP): Performed by: PHYSICAL MEDICINE & REHABILITATION

## 2021-04-23 PROCEDURE — 94150 VITAL CAPACITY TEST: CPT

## 2021-04-23 PROCEDURE — 92526 ORAL FUNCTION THERAPY: CPT

## 2021-04-23 PROCEDURE — 1280000000 HC REHAB R&B

## 2021-04-23 PROCEDURE — 95819 EEG AWAKE AND ASLEEP: CPT

## 2021-04-23 PROCEDURE — 6360000002 HC RX W HCPCS: Performed by: STUDENT IN AN ORGANIZED HEALTH CARE EDUCATION/TRAINING PROGRAM

## 2021-04-23 PROCEDURE — 36415 COLL VENOUS BLD VENIPUNCTURE: CPT

## 2021-04-23 PROCEDURE — 2580000003 HC RX 258: Performed by: STUDENT IN AN ORGANIZED HEALTH CARE EDUCATION/TRAINING PROGRAM

## 2021-04-23 PROCEDURE — 94760 N-INVAS EAR/PLS OXIMETRY 1: CPT

## 2021-04-23 PROCEDURE — 83735 ASSAY OF MAGNESIUM: CPT

## 2021-04-23 PROCEDURE — 92507 TX SP LANG VOICE COMM INDIV: CPT

## 2021-04-23 PROCEDURE — 6370000000 HC RX 637 (ALT 250 FOR IP): Performed by: NURSE PRACTITIONER

## 2021-04-23 PROCEDURE — 6370000000 HC RX 637 (ALT 250 FOR IP): Performed by: HOSPITALIST

## 2021-04-23 PROCEDURE — 80048 BASIC METABOLIC PNL TOTAL CA: CPT

## 2021-04-23 PROCEDURE — 6370000000 HC RX 637 (ALT 250 FOR IP): Performed by: STUDENT IN AN ORGANIZED HEALTH CARE EDUCATION/TRAINING PROGRAM

## 2021-04-23 PROCEDURE — 82550 ASSAY OF CK (CPK): CPT

## 2021-04-23 PROCEDURE — 73560 X-RAY EXAM OF KNEE 1 OR 2: CPT

## 2021-04-23 RX ORDER — CLOPIDOGREL BISULFATE 75 MG/1
75 TABLET ORAL DAILY
Status: DISCONTINUED | OUTPATIENT
Start: 2021-04-24 | End: 2021-05-13 | Stop reason: HOSPADM

## 2021-04-23 RX ORDER — POLYETHYLENE GLYCOL 3350 17 G/17G
17 POWDER, FOR SOLUTION ORAL DAILY PRN
Status: DISCONTINUED | OUTPATIENT
Start: 2021-04-23 | End: 2021-05-13 | Stop reason: HOSPADM

## 2021-04-23 RX ORDER — ATORVASTATIN CALCIUM 80 MG/1
80 TABLET, FILM COATED ORAL NIGHTLY
Status: CANCELLED | OUTPATIENT
Start: 2021-04-23

## 2021-04-23 RX ORDER — ASPIRIN 300 MG/1
300 SUPPOSITORY RECTAL DAILY
Status: DISCONTINUED | OUTPATIENT
Start: 2021-04-24 | End: 2021-05-10 | Stop reason: SDUPTHER

## 2021-04-23 RX ORDER — DIPHENHYDRAMINE HCL 25 MG
50 TABLET ORAL NIGHTLY PRN
Status: DISCONTINUED | OUTPATIENT
Start: 2021-04-23 | End: 2021-05-13 | Stop reason: HOSPADM

## 2021-04-23 RX ORDER — ATENOLOL 50 MG/1
50 TABLET ORAL DAILY
Status: CANCELLED | OUTPATIENT
Start: 2021-04-24

## 2021-04-23 RX ORDER — HYDROCHLOROTHIAZIDE 25 MG/1
25 TABLET ORAL DAILY
Status: CANCELLED | OUTPATIENT
Start: 2021-04-24

## 2021-04-23 RX ORDER — HYDRALAZINE HYDROCHLORIDE 25 MG/1
25 TABLET, FILM COATED ORAL EVERY 6 HOURS PRN
Status: CANCELLED | OUTPATIENT
Start: 2021-04-23

## 2021-04-23 RX ORDER — ATORVASTATIN CALCIUM 80 MG/1
80 TABLET, FILM COATED ORAL NIGHTLY
Qty: 30 TABLET | Refills: 3
Start: 2021-04-23

## 2021-04-23 RX ORDER — CLOPIDOGREL BISULFATE 75 MG/1
75 TABLET ORAL DAILY
Status: CANCELLED | OUTPATIENT
Start: 2021-04-24

## 2021-04-23 RX ORDER — HYDRALAZINE HYDROCHLORIDE 25 MG/1
25 TABLET, FILM COATED ORAL EVERY 6 HOURS PRN
Status: DISCONTINUED | OUTPATIENT
Start: 2021-04-23 | End: 2021-05-13 | Stop reason: HOSPADM

## 2021-04-23 RX ORDER — ATENOLOL 50 MG/1
50 TABLET ORAL DAILY
Status: DISCONTINUED | OUTPATIENT
Start: 2021-04-24 | End: 2021-05-11

## 2021-04-23 RX ORDER — TAMSULOSIN HYDROCHLORIDE 0.4 MG/1
0.4 CAPSULE ORAL DAILY
Status: CANCELLED | OUTPATIENT
Start: 2021-04-24

## 2021-04-23 RX ORDER — ACETAMINOPHEN 325 MG/1
650 TABLET ORAL EVERY 6 HOURS PRN
Status: DISCONTINUED | OUTPATIENT
Start: 2021-04-23 | End: 2021-05-13 | Stop reason: HOSPADM

## 2021-04-23 RX ORDER — BISACODYL 10 MG
10 SUPPOSITORY, RECTAL RECTAL DAILY PRN
Status: CANCELLED | OUTPATIENT
Start: 2021-04-23

## 2021-04-23 RX ORDER — ATORVASTATIN CALCIUM 80 MG/1
80 TABLET, FILM COATED ORAL NIGHTLY
Status: DISCONTINUED | OUTPATIENT
Start: 2021-04-23 | End: 2021-05-13 | Stop reason: HOSPADM

## 2021-04-23 RX ORDER — POLYETHYLENE GLYCOL 3350 17 G/17G
17 POWDER, FOR SOLUTION ORAL DAILY PRN
Status: CANCELLED | OUTPATIENT
Start: 2021-04-23

## 2021-04-23 RX ORDER — ONDANSETRON 4 MG/1
4 TABLET, FILM COATED ORAL EVERY 8 HOURS PRN
Status: DISCONTINUED | OUTPATIENT
Start: 2021-04-23 | End: 2021-05-13 | Stop reason: HOSPADM

## 2021-04-23 RX ORDER — SENNA AND DOCUSATE SODIUM 50; 8.6 MG/1; MG/1
1 TABLET, FILM COATED ORAL 2 TIMES DAILY
Status: CANCELLED | OUTPATIENT
Start: 2021-04-23

## 2021-04-23 RX ORDER — SENNA AND DOCUSATE SODIUM 50; 8.6 MG/1; MG/1
1 TABLET, FILM COATED ORAL 2 TIMES DAILY
Status: DISCONTINUED | OUTPATIENT
Start: 2021-04-23 | End: 2021-05-04

## 2021-04-23 RX ORDER — DIPHENHYDRAMINE HCL 25 MG
50 TABLET ORAL NIGHTLY PRN
Status: CANCELLED | OUTPATIENT
Start: 2021-04-23

## 2021-04-23 RX ORDER — CLOPIDOGREL BISULFATE 75 MG/1
75 TABLET ORAL DAILY
Qty: 30 TABLET | Refills: 0 | Status: ON HOLD
Start: 2021-04-24 | End: 2021-05-12 | Stop reason: SDUPTHER

## 2021-04-23 RX ORDER — ONDANSETRON 4 MG/1
4 TABLET, FILM COATED ORAL EVERY 8 HOURS PRN
Status: CANCELLED | OUTPATIENT
Start: 2021-04-23

## 2021-04-23 RX ORDER — ASPIRIN 81 MG/1
81 TABLET ORAL DAILY
Status: CANCELLED | OUTPATIENT
Start: 2021-04-24

## 2021-04-23 RX ORDER — HYDROCHLOROTHIAZIDE 25 MG/1
25 TABLET ORAL DAILY
Status: DISCONTINUED | OUTPATIENT
Start: 2021-04-24 | End: 2021-05-13 | Stop reason: HOSPADM

## 2021-04-23 RX ORDER — BISACODYL 10 MG
10 SUPPOSITORY, RECTAL RECTAL DAILY PRN
Status: DISCONTINUED | OUTPATIENT
Start: 2021-04-23 | End: 2021-05-13 | Stop reason: HOSPADM

## 2021-04-23 RX ORDER — CLOPIDOGREL BISULFATE 75 MG/1
75 TABLET ORAL DAILY
Status: DISCONTINUED | OUTPATIENT
Start: 2021-04-23 | End: 2021-04-23 | Stop reason: HOSPADM

## 2021-04-23 RX ORDER — ASPIRIN 300 MG/1
300 SUPPOSITORY RECTAL DAILY
Status: CANCELLED | OUTPATIENT
Start: 2021-04-24

## 2021-04-23 RX ORDER — ACETAMINOPHEN 325 MG/1
650 TABLET ORAL EVERY 6 HOURS PRN
Status: CANCELLED | OUTPATIENT
Start: 2021-04-23

## 2021-04-23 RX ORDER — TAMSULOSIN HYDROCHLORIDE 0.4 MG/1
0.4 CAPSULE ORAL DAILY
Status: DISCONTINUED | OUTPATIENT
Start: 2021-04-24 | End: 2021-05-07

## 2021-04-23 RX ORDER — ASPIRIN 81 MG/1
81 TABLET ORAL DAILY
Status: DISCONTINUED | OUTPATIENT
Start: 2021-04-24 | End: 2021-05-10 | Stop reason: SDUPTHER

## 2021-04-23 RX ADMIN — HYDRALAZINE HYDROCHLORIDE 25 MG: 25 TABLET, FILM COATED ORAL at 22:08

## 2021-04-23 RX ADMIN — ASPIRIN 81 MG: 81 TABLET, COATED ORAL at 09:08

## 2021-04-23 RX ADMIN — TAMSULOSIN HYDROCHLORIDE 0.4 MG: 0.4 CAPSULE ORAL at 09:08

## 2021-04-23 RX ADMIN — ATORVASTATIN CALCIUM 80 MG: 80 TABLET, FILM COATED ORAL at 22:08

## 2021-04-23 RX ADMIN — ENOXAPARIN SODIUM 40 MG: 40 INJECTION SUBCUTANEOUS at 09:08

## 2021-04-23 RX ADMIN — SERTRALINE 150 MG: 100 TABLET, FILM COATED ORAL at 09:08

## 2021-04-23 RX ADMIN — DIPHENHYDRAMINE HCL 50 MG: 25 TABLET ORAL at 22:08

## 2021-04-23 RX ADMIN — DOCUSATE SODIUM 50 MG AND SENNOSIDES 8.6 MG 1 TABLET: 8.6; 5 TABLET, FILM COATED ORAL at 22:08

## 2021-04-23 RX ADMIN — Medication 10 ML: at 09:09

## 2021-04-23 RX ADMIN — CLOPIDOGREL BISULFATE 75 MG: 75 TABLET ORAL at 14:19

## 2021-04-23 ASSESSMENT — PAIN SCALES - GENERAL
PAINLEVEL_OUTOF10: 0
PAINLEVEL_OUTOF10: 0

## 2021-04-23 NOTE — CARE COORDINATION
Per [facility] and [facility] has approved ARU. They can accept today. Messaged Dr Bill Billingsley for DC orders. Updated patient, he is agreeable to DC to ARU, he was happy it was approved. Electronically signed by Emmett Gaspar RN Case Management [phone] on 4/23/2021 at 4:19 PM     Dr Bill Billingsley said he would DC. [place] in [place] [id number] notified. Bedside RN Missy Dunbar notified.   Electronically signed by Emmett Gaspar RN on 4/23/2021 at 4:22 PM

## 2021-04-23 NOTE — PLAN OF CARE
Problem: HEMODYNAMIC STATUS  Goal: Patient has stable vital signs and fluid balance  Outcome: Ongoing     Problem: ACTIVITY INTOLERANCE/IMPAIRED MOBILITY  Goal: Mobility/activity is maintained at optimum level for patient  Outcome: Ongoing     Problem: COMMUNICATION IMPAIRMENT  Goal: Ability to express needs and understand communication  Outcome: Ongoing   Patient free from falls this shift. Fall precautions in place at all times. Bed in lowest position with two side rails up and wheels locked. Call light within reach. Patient able and agreeable to contact for safety appropriately.

## 2021-04-23 NOTE — PROGRESS NOTES
recliner or low bed height, and CG-SBA for standing at [facility]. Pt able to tolerate full WB into RLE for LLE march with min A, progresses to SBA with minimal knee buckling but remains in slightly knee flexed position due to quad weakness. Pt able to tolerate up to 1 min standing time with cues for maintaining midline and for RLE muscle activation in stance. Pt progressing well but continues to be severely limited in functional independence due to neuromotor and strength deficits secondary to CVA. Pt would be excellent candidate for continued therapy 5-7x/week of mod-high intensity to improve motor activation, strength, ease, safety, and independence with all functional mobility and optimize outcome. Pt educated on recommendations and verbalized understanding. Will continue to see and monitor progress in acute setting. Treatment Diagnosis: impaired functional mobility  Prognosis: Good  History: Pt is 75 y/o M admitted 4/21/21 to ED with stroke like symptoms affecting R side ongoing ~12hours. MRI revealed multifocal acute infarctions in the parasagital left front along distal L LEXA territory with occluded/thrombosed A3 segment of L LEXA. MRI also revealed old infarction in basal ganglia, thalami, R cerebellum. PMHx significant for CAD, Angio w/ stent, MI, lumbar sx, HTN. PT Education: PT Role;Goals; Functional Mobility Training;General Safety;Home Exercise Program;Disease Specific Education;Plan of Care  REQUIRES PT FOLLOW UP: Yes  Activity Tolerance  Activity Tolerance: Patient Tolerated treatment well       Patient Diagnosis(es): The encounter diagnosis was Right sided cerebral hemisphere cerebrovascular accident (CVA) ([facility] [address] has a past medical history of Acute MI ([facility] [address] Anxiety, Arthritis, CAD (coronary artery disease), Hypertension, and Influenza A.   has a past surgical history that includes back surgery (N/A, 1989) and Coronary angioplasty with stent (9/13).     Restrictions  Restrictions/Precautions Restrictions/Precautions: Fall Risk  Position Activity Restriction  Other position/activity restrictions: R Hemiplegia (LE>UE)  Vision/Hearing  Vision: Impaired  Vision Exceptions: Wears glasses for reading  Hearing: Within functional limits     Subjective  General  Chart Reviewed: Yes  Patient assessed for rehabilitation services?: Yes  Additional Pertinent Hx: Pt is 75 y/o M admitted 4/21/21 to ED with stroke like symptoms affecting R side ongoing ~12hours. MRI revealed multifocal acute infarctions in the parasagital left front along distal L LEXA territory with occluded/thrombosed A3 segment of L LEXA. MRI also revealed old infarction in basal ganglia, thalami, R cerebellum. PMHx significant for CAD, Angio w/ stent, MI, lumbar sx, HTN. Pt is 75 y/o M admitted 4/21/21 to ED with stroke like symptoms affecting R side ongoing ~12hours. MRI revealed multifocal acute infarctions in the parasagital left front along distal L LEXA territory with occluded/thrombosed A3 segment of L LEXA. MRI also revealed old infarction in basal ganglia, thalami, R cerebellum. PMHx significant for CAD, Angio w/ stent, MI, lumbar sx, HTN. Response To Previous Treatment: Patient with no complaints from previous session. Family / Caregiver Present: No  Referring Practitioner: Dr. Dale Davis. Diagnosis: CVA  Follows Commands: Impaired  Other (Comment): increased time processing but able to follow commands accurately  Subjective  Subjective: Pt supine in bed, pleasant and agreeable for PT/OT treatment.   Pain Screening  Patient Currently in Pain: Denies    Orientation  Orientation  Overall Orientation Status: Within Functional Limits  Social/Functional History  Social/Functional History  Lives With: Significant other(Geraldine other Kelsea iLz)  Type of Home: House  Home Layout: Two level, Able to Live on Main level with bedroom/bathroom, Laundry in basement(1 story with basement.)  Home Access: Stairs to enter without rails(1 step to enter.) Bathroom Shower/Tub: Tub/Shower unit(Tub Shower main level and basement.)  Bathroom Toilet: Standard  Bathroom Equipment: Grab bars in shower  Bathroom Accessibility: Accessible  Home Equipment: (No DME.)  ADL Assistance: Independent  Homemaking Assistance: (Shared with sign other.)  Ambulation Assistance: Independent(Without assist device.)  Transfer Assistance: Independent  Active : Yes  Occupation: Retired  Type of occupation: Retired : Sales  Leisure & Hobbies: Not doing much. \"Need to find something to do. \"    Objective   Strength RLE  Comment: 1-2/5 gross RLE in open chain, able to accept weight with CGA-min A at [facility] for LLE march  Strength LLE  Strength LLE: WFL     Sensation  Overall Sensation Status: Impaired(able to distinguish light touch of RLE ~50% of time, questionable understanding of test)  Bed mobility  Supine to Sit: Moderate assistance;2 Person assistance  Sit to Supine: Unable to assess(left in recliner)  Transfers  Sit to Stand: Moderate Assistance;2 Person Assistance(St[place])  Stand to sit: Minimal Assistance;2 Person Assistance  Bed to Chair: Dependent/Total(Stedy)  Ambulation  Ambulation?: No  WB Status: Able to perform weight acceptance into RLE at [facility], difficulty lifting RLE, pregait activities at [facility] today only     Balance  Posture: Fair  Sitting - Static: Fair;+  Sitting - Dynamic: Fair  Standing - Static: Fair(Stands at [facility] with CG-SBA up to 1min)  Standing - Dynamic: Fair;-  Exercises  Comments: Pregait facilatory  training in [place]: standing midline x10sec, standing LLE march 5, standing assisted RLE heel raise 4, standing weight shifting     Plan   Plan  Times per week: 3-5x week while in acute care setting. Times per day: Daily  Current Treatment Recommendations: Functional Mobility Training, Safety Education & Training, Neuromuscular Re-education, Strengthening, ADL/Self-care Training  Safety Devices  Type of devices:  All fall risk precautions in place, Call light within reach, Chair alarm in place, Left in chair, Nurse notified    AM-PAC Score  AM-PAC Inpatient Mobility Raw Score : 9 (04/23/21 1559)  AM-PAC Inpatient T-Scale Score : 30.55 (04/23/21 1559)  Mobility Inpatient CMS 0-100% Score: 81.38 (04/23/21 1559)  Mobility Inpatient CMS G-Code Modifier : CM (04/23/21 1559)     Goals  Short term goals  Time Frame for Short term goals: Upon d/c acute care setting. All ongoing as of 4/23/21  Short term goal 1: Bed Mob Min assist.  Short term goal 2: Transfer with assist device Min assist x 2. Short term goal 3: Pt able to initiate R LE lift/advance during stand (with Walker)  Min assist x 2. Short term goal 4: Pt participating in approp Strength/Neuromus Ex/Activities. Patient Goals   Patient goals : Get better, be able to get back home.        Therapy Time   Individual Concurrent Group Co-treatment   Time In 1500         Time Out 1530         Minutes [facility], Student Physical Therapist  I attest that I was present for and made a skilled & mindful clinical judgement during the evaluation and/or treatment of this patient on 4/23/2021  Electronically signed by Camron Kumari, [address] [place] [address] Drive [phone])  on 4/23/2021 at 4:12 PM

## 2021-04-23 NOTE — PLAN OF CARE
Problem: HEMODYNAMIC STATUS  Goal: Patient has stable vital signs and fluid balance  4/23/2021 0111 by Ed Ibarra RN  Outcome: Ongoing     Problem: ACTIVITY INTOLERANCE/IMPAIRED MOBILITY  Goal: Mobility/activity is maintained at optimum level for patient  4/23/2021 0111 by Ed Ibarra RN  Outcome: Ongoing     Problem: COMMUNICATION IMPAIRMENT  Goal: Ability to express needs and understand communication  4/23/2021 0111 by Ed Ibarra RN  Outcome: Ongoing     Problem: Falls - Risk of:  Goal: Will remain free from falls  Description: Will remain free from falls  4/23/2021 0111 by Ed Ibarra RN  Outcome: Ongoing     Problem: Falls - Risk of:  Goal: Absence of physical injury  Description: Absence of physical injury  4/23/2021 0111 by Ed Ibarra RN  Outcome: Ongoing

## 2021-04-23 NOTE — PROGRESS NOTES
Occupational Therapy  Facility/Department: [address] PROGRESSIVE CARE  Daily Treatment Note  NAME: Ghislaine Wild  : 1955  MRN: [id number]    Date of Service: 2021    Assessment: Pt tolerated session well this date. Limited by expressive aphasia and R side weakness. Pt motivated to work hard with therapy. Pt with R  strength 3/5, LUE WFL. Pt completed bed mobility with min/mod Ax2 and sit <> stand transfers with mod Ax2. Practiced weight shifting on RLE with assist to block R knee. Completed PROM to RUE - pt reports no pain. Pt remains unsafe to d/c home at this time and would benefit from continued therapy to increase mobility, safety, and independence. Anticipate would tolerate high frequency and would be very motivated. Discharge Recommendations:  Continue to assess pending progress, 5-7 sessions per week, Patient would benefit from continued therapy after discharge  OT Equipment Recommendations  Other: defer to next level of care    Ghislaine Wild scored a 13/24 on the AM-PAC ADL Inpatient form. Current research shows that an AM-PAC score of 17 or less is typically not associated with a discharge to the patient's home setting. Based on the patient's AM-PAC score and their current ADL deficits, it is recommended that the patient have 5-7 sessions per week of Occupational Therapy at d/c to increase the patient's independence. At this time, this patient demonstrates the endurance, and/or tolerance for 3 hours of therapy each day, with a treatment frequency of 5-7x/wk. Please see assessment section for further patient specific details. If patient discharges prior to next session this note will serve as a discharge summary. Please see below for the latest assessment towards goals. Assessment   Performance deficits / Impairments: Decreased functional mobility ; Decreased strength;Decreased endurance;Decreased ADL status; Decreased high-level IADLs;Decreased balance;Decreased ROM;Decreased sensation;Decreased coordination;Decreased fine motor control  Assessment: Pt tolerated session well this date. Limited by expressive aphasia and R side weakness. Pt motivated to work hard with therapy. Pt with R  strength 3/5, LUE WFL. Pt completed bed mobility with min/mod Ax2 and sit <> stand transfers with mod Ax2. Practiced weight shifting on RLE with assist to block R knee. Completed PROM to RUE - pt reports no pain. Pt remains unsafe to d/c home at this time and would benefit from continued therapy to increase mobility, safety, and independence. Anticipate would tolerate high frequency and would be very motivated. OT Education: OT Role;Plan of Care;Transfer Training;Precautions; ADL Adaptive Strategies  Patient Education: Educated on importance of R side awareness  REQUIRES OT FOLLOW UP: Yes  Activity Tolerance  Activity Tolerance: Patient Tolerated treatment well  Safety Devices  Safety Devices in place: Yes(ALENA Villalba) aware)  Type of devices: Left in chair;Chair alarm in place;Call light within reach;Gait belt;Nurse notified         Patient Diagnosis(es): The encounter diagnosis was Right sided cerebral hemisphere cerebrovascular accident (CVA) ([facility] [address] has a past medical history of Acute MI ([facility] [address] Anxiety, Arthritis, CAD (coronary artery disease), Hypertension, and Influenza A.   has a past surgical history that includes back surgery (N/A, 1989) and Coronary angioplasty with stent (9/13).     Restrictions  Restrictions/Precautions  Restrictions/Precautions: Fall Risk  Position Activity Restriction  Other position/activity restrictions: R Hemiplegia (LE>UE)     Subjective   General  Chart Reviewed: Yes  Patient assessed for rehabilitation services?: Yes  Additional Pertinent Hx: per ED note, Roberth Whitman is a 76 y.o. male who presents to the emergency department by EMS for right side facial droop, right side leg weakness and developing expressive aphasia Symptoms started roughly it is recommended that the patient have 5-7 sessions per week of Occupational Therapy at d/c to increase the patient's independence. At this time, this patient demonstrates the endurance, and/or tolerance for 3 hours of therapy each day, with a treatment frequency of 5-7x/wk. Please see assessment section for further patient specific details. If patient discharges prior to next session this note will serve as a discharge summary. Please see below for the latest assessment towards goals. AM-[facility] Inpatient Daily Activity Raw Score: 13 (04/23/21 1541)  AM-PAC Inpatient ADL T-Scale Score : 32.03 (04/23/21 1541)  ADL Inpatient CMS 0-100% Score: 63.03 (04/23/21 1541)  ADL Inpatient CMS G-Code Modifier : CL (04/23/21 1541)    Goals  Short term goals  Time Frame for Short term goals: prior to D/C: status of goals ongoing as of 4/23/21  Short term goal 1: complete functional transfers with Min A  Short term goal 2: complete bed mobility with Min A  Short term goal 3: complete bathing and dressing with Min A  Short term goal 4: complete toileting with Min A  Short term goal 5: tolerate B UE exercises x10 reps for increased strength and endurance with ADLs  Long term goals  Time Frame for Long term goals : STG=LTG  Patient Goals   Patient goals : return to PLOF       Therapy Time   Individual Concurrent Group Co-treatment   Time In       1500   Time Out       1530   Minutes       30   Timed Code Treatment Minutes: 30 Minutes     If pt is discharged prior to next OT session, this note will serve as the discharge summary.     Clementeen Primrose, VKB/M#[id number]

## 2021-04-23 NOTE — PROGRESS NOTES
NAME:  Sharmila Arambula  YOB: 1955  MEDICAL RECORD NUMBER:  [id number]  TODAYS DATE:  4/23/2021    Discussed personal risk factors for Stroke /TIA with patient/family, and ways to reduce the risk for a recurrent stroke. Patient's personal risk factors which were identified are:     [] Alcohol Abuse: check with your physician before any alcohol consumption. [] Atrial fibrillation: may cause blood clots. [] Drug Abuse: Seek help, talk with your doctor  [] Clotting Disorder  [] Diabetes  [x] Family history of stroke or heart disease  [x] High Blood Pressure/Hypertension: work with your physician.  [] High cholesterol: monitor cholesterol levels with your physician. [x] Overweight/Obesity: work with your physician for your ideal body weight. [x] Physical Inactivity: get regular exercise as directed by your physician. [] Personal history of previous TIA or stroke  [x] Poor Diet; decrease salt (sodium) in your diet, follow diet directed by physician. [x] Smoking: Cigarette/Cigar: stop smoking. Advised pt. that you can reduce your risk for stroke/TIA by modifying/controlling the risk factors that you have. Pt.advised to take the medications as prescribed, which will be detailed in the discharge instructions, and to not stop taking them without consulting their physician. In addition, pt. advised to maintain a healthy diet, exercise regularly and to not smoke. [facility]'s Stroke treatment and prevention, Managing your recovery  notebook  provided and/or reviewed  with patient/family. The notebook includes, but not limited to, sections addressing warning signs & symptoms of a stroke, which are: sudden numbness or weakness especially on one side of the body, sudden confusion, difficulty speaking or understanding, sudden changes in vision, sudden dizziness or loss of balance/ coordination, or sudden severe headache.   The need to call EMS ([phone]) immediately if signs & symptoms occur is emphasized . The notebook also provides education on Stroke community resources and stroke advocacy. The need for follow-up after discharge was highlighted with patient/family with them being able to repeat understanding of the importance of this.       Electronically signed by Roseanne Barahona RN on 4/23/2021 at 3:21 AM

## 2021-04-23 NOTE — PROGRESS NOTES
Occupational Therapy  Unable to see pt at this time due to pt currently off floor at testing at time of attempt. Will follow up with pt as time allows. Continue with POC.     Francisco Javier Acevedo, OTR/L

## 2021-04-23 NOTE — CARE COORDINATION
Mainor CROWDER received referral. Christ CROWDER going to submit for insurance approval today.    Electronically signed by Pao Candelario RN Case Management [phone] on 4/23/2021 at 10:53 AM

## 2021-04-23 NOTE — PROGRESS NOTES
Progress Note    Updates  No significant events overnight. Complaining of R knee pain. Past Medical History:   Diagnosis Date    Anxiety     Arthritis     CAD (coronary artery disease)     Hypertension     Influenza A 01/15/2017     Past Surgical History:   Procedure Laterality Date    BACK SURGERY N/A 1989    herniated disc    CORONARY ANGIOPLASTY WITH STENT PLACEMENT  9/13     Current Facility-Administered Medications:     sodium chloride flush 0.9 % injection 5-40 mL, 5-40 mL, Intravenous, 2 times per day, [facility] Popeye, DO, 10 mL at 04/22/21 2112    sodium chloride flush 0.9 % injection 5-40 mL, 5-40 mL, Intravenous, PRN, Mt  Popeye, DO    0.9 % sodium chloride infusion, 25 mL, Intravenous, PRN, Mt M Popeye, DO    enoxaparin (LOVENOX) injection 40 mg, 40 mg, Subcutaneous, Daily, Mt M Popeye, DO, 40 mg at 04/22/21 0904    aspirin EC tablet 81 mg, 81 mg, Oral, Daily, 81 mg at 04/22/21 0905 **OR** aspirin suppository 300 mg, 300 mg, Rectal, Daily, Mt M Popeye, DO    perflutren lipid microspheres (DEFINITY) injection 1.65 mg, 1.5 mL, Intravenous, ONCE PRN, Mt  Popeye, DO    ondansetron (ZOFRAN) injection 4 mg, 4 mg, Intravenous, Q6H PRN, Mt  Popeye, DO    atorvastatin (LIPITOR) tablet 80 mg, 80 mg, Oral, Nightly, Mt M Popeye, DO, 80 mg at 04/22/21 2111    labetalol (NORMODYNE;TRANDATE) injection 10 mg, 10 mg, Intravenous, Q10 Min PRN, Mt M Popeye, DO, 10 mg at 04/22/21 0755    tamsulosin (FLOMAX) capsule 0.4 mg, 0.4 mg, Oral, Daily, Mt M Popeye, DO, 0.4 mg at 04/22/21 0905    sertraline (ZOLOFT) tablet 150 mg, 150 mg, Oral, Daily, Mariano Matthews MD    diphenhydrAMINE (BENADRYL) tablet 50 mg, 50 mg, Oral, Nightly PRN, Mt LETTY DO Popeye, 50 mg at 04/22/21 2226    Exam  Blood pressure (!) 184/98, pulse 71, temperature 98.5 °F (36.9 °C), resp. rate 15, height 6' 3\" (1.905 m), weight 239 lb 6.7 oz (108.6 kg), SpO2 93 %. Constitutional                          Vital signs: BP, HR, and RR reviewed            General alert, no distress  Eyes: unable to visualize the fundi  Cardiovascular: pulses symmetric in all 4 extremities. Psychiatric: cooperative with examination  Neurologic  Mental status:   orientation to person, place              Attention intact as able to attend well to the exam                Language delayed, though seems improved today. Comprehension intact; follows simple commands  Cranial nerves:   CN2: visual fields full  CN 3,4,6: extraocular muscles intact  CN5: facial sensation symmetric   CN7: minimal R facial weakness though seems to activate relatively well. No dysarthria. CN8: hearing grossly intact  CN12: tongue midline with protrusion  Strength: RUE drift/weakness though able to maintain antigravity. No purposeful movement of RLE. Good strength in LUE/LLE. Sensory: diminished sensation in his RUE/RLE. Cerebellar/coordination: finger nose finger normal without ataxia in LUE. Tone: increased RLE. Gait: deferred at this time for safety given weakness. ROS  Constitutional- No weight loss or fevers  Eyes- No diplopia. No photophobia. Ears/nose/throat- No dysphagia. No Dysarthria  Cardiovascular- No palpitations. No chest pain  Respiratory- No dyspnea. No Cough  Gastrointestinal- No Abdominal pain. No Vomiting. Genitourinary- No incontinence. No urinary retention  Musculoskeletal- No myalgia. + arthralgia  Skin- No rash. No easy bruising. Psychiatric- No depression. No anxiety  Endocrine- No diabetes. No thyroid issues. Hematologic- No bleeding difficulty. No fatigue  Neurologic- + weakness. No Headache. Labs  HgA1c 5.7     Cholesterol, Total 161   HDL Cholesterol 38 (L)   LDL Calculated 100 (H)   Triglycerides 115   VLDL Cholesterol Calculated 23      Studies  MRI brain w/o 4/22/21  1.  Multifocal acute infarctions in the parasagittal left frontal lobe within   the distal w/ Hilario Esqueda CNP at [facility] in 1 month after discharge. Will comment on EEG if necessary. Otherwise, will sign off. Please call back w/ any additional questions or concerns. Thank you.       Haley Pagan NP  [address] [place] [address] [address] Neurology    A copy of this note was provided for Dr Surya Juares MD

## 2021-04-23 NOTE — PROGRESS NOTES
Speech Language/Pathology   SPEECH LANGUAGE AND CLINICAL BEDSIDE SWALLOWING TREATMENT  Speech Therapy Department       Shannon Altamirano  AGE: 76 y.o. GENDER: male  : 1955  [id number]  EPISODE DATE:  2021    MEDICAL DIAGNOSIS: CVA  Chief Complaint   Patient presents with    Extremity Weakness     Pt in via EMS with stroke like symptoms that \"began this morning around 0900\". EMS states that pt has had \"worsening facial droop and right sided weakness\" throughout day. Pt alert and oriented on arrival with no signs of distress noted at this time. PAST MEDICAL HISTORY        Diagnosis Date    Acute MI ([facility] [address] 2013    Anxiety     Arthritis     CAD (coronary artery disease)     Hypertension     Influenza A 01/15/2017       PAST SURGICAL HISTORY    Past Surgical History:   Procedure Laterality Date    BACK SURGERY N/A     herniated disc    CORONARY ANGIOPLASTY WITH STENT PLACEMENT         ALLERGIES  No Known Allergies    History of Present Illness:  Bharath Kin a 76 y. o. male who presents with weakness.  The patient says that he noticed he was having a difficult time ambulating secondary to primarily R leg weakness some time around [id number].  This persisted.  Throughout the day his weakness got progressively worse and at one point he sort of fell onto the couch w/out injury but was unable to get up due to the weakness.  His wife eventually encouraged him to come to the ED around 2100 in order to be evaluated.  SBP was in the 140s-160s.  CT head was w/out any acute findings.   CTA head/neck did not identify a LVO or any significant stenosis.   Stroke Team was involved though the patient was not a candidate for any acute intervention.  His exam was notable for right facial weakness, RUE weakness, an inability to move his RLE, development of language trouble a couple hours into his ED time.  Today, his symptoms persist.      MRI BRAIN (preliminary) 2021 Impression   1. Multifocal acute infarctions in the parasagittal left frontal lobe within   the distal left anterior cerebral artery vascular territory.  No associated   hemorrhage. 2. 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 3. Diffuse parenchymal volume loss and sequela of moderate chronic   microvascular ischemic changes. 4. 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  \"Need to find something to do. \"     Subjective:     Current diet  Regular, thin liquids     Pt/staff statements regarding diet tolerance:   Nurse and pt report no difficulty or concern    Cognitive-communication treatment progress:  Neuro note reports slightly improved but remains aphasic  Pt reports persistent difficulty: it's in my head but I can't get it out     Objective: Assessment/Therapy activities and impression of progress/goal status   Treatment this session  Objective:  Oral/Motor  Oral Motor: Exceptions to [facility]  Labial Symmetry: Abnormal symmetry right(trace)  Lingual Symmetry: Abnormal symmetry right(trace)  Lingual Coordination: Reduced     Auditory Comprehension  Comprehension: Exceptions  One Step Basic Commands: (WFL)  Two Step Basic Commands: (WFL)  Multistep Basic Commands: Mild  Complex/Abstract Commands: Mild  Conversation: WFL  Interfering Components: Motor planning;Processing speed  Effective Techniques: Extra processing time     Reading Comprehension  Reading Status: Within functional limits(simple sentence/question reading and comprehension)     Verbal Expression  Verbal Expression: Exceptions to functional limits  Repetition: [facility])  Automatic Speech: (WFL)  Confrontation: (WFL)  Convergent: (WFL)  Divergent: Mild  Responsive: (WFL)  Conversation: Moderately delayed verbal output and fluency in connected speech at complex sentence level; with extra time, pt is able to formulate highly complex words  Interfering Components: Impaired thought organization  Effective Techniques: Provide extra time  Written Expression  Dominant Hand: Right  Written Expression: (dominant R hand weakness)     Motor Speech  Motor Speech: Exceptions to [facility]  Dysarthria : Minimal to mild decreased verbal agility affecting fluency with diadochokinetic tasks, without impact on speech intelligibility at sentence level      Pragmatics/Social Functioning  Pragmatics: Within functional limits  Cognition  Orientation  Overall Orientation Status: Within Functional Limits  Attention  Attention: Exceptions to [facility]  Alternating Attention: To be assessed in therapy  Divided Attention: To be assessed in therapy  Selective Attention: [facility])  Sustained Attention: [facility])  Memory  Memory: Exceptions to [facility]  Short-term Memory: Min-mild: IND recall of 3 SLP details from previous session  Problem Solving  Problem Solving: Exceptions to [place]  Complex Functional Tasks: To be assessed in therapy  Managing Finances: To be assessed in therapy  Managing Medications: To be assessed in therapy  Simple Functional Tasks: [facility])  Verbal Reasoning Skills: [facility] simple/concrete and multiple meanings)  Numeric Reasoning  Calculations: To be assessed in therapy  Abstract Reasoning  Abstract Reasoning: Exceptions to [facility]  Divergent Thinking: Mild  Safety/Judgement  Safety/Judgement: Exceptions to [facility]  Novel Situations:  To be assessed in therapy  Insight: appears intact    PO presentations: Thin liquids via straw 4oz including consecutive boluses without immediate or delayed overt s/s of aspiration    IMPRESSIONS  Dysphagia Impression: Mild oropharyngeal dysphagia characterized by slow but effective mastication, minimal right sided perioral weakness, reduced lingual coordination, reduced AP transit, potential swallow delay and mildly reduced laryngeal elevation. No immediate or delayed overt s/s of aspiration; trace oral residue is cleared by pt. Recommend regular textures and thin liquids with ongoing monitor to confirm tolerance. Cognitive Diagnosis: Potential mild cognitive linguistic impairment, warranting ongoing assessment of complex, high level and abstract PS/reasoning, as well as executive functioning, thought organization and planning. Pt was independent with ADLs and IADLs PTA. Basic cognition appears intact in areas of orientation, sustained and selective attention, immediate recall, simple and concrete PS/reasoning. Noted persistent delayed thought processing and verbal output. Aphasia Diagnosis: Mildly impaired auditory comprehension for complex and abstract information processing. Basic and concrete auditory/visual language comprehension appears intact. Mild to moderately impaired verbal expression for complex verbal output in connected speech, requiring increased time to formulate responses. Occasionally impaired high level word retrieval; mildly impaired convergent naming.   Speech Diagnosis: Mildly impaired motor speech skills characterized by minimal to mild decreased verbal agility affecting speech fluency with diadochokinetic tasks, without impact on speech intelligibility at sentence level    Recommended Diet:  Regular  Thin liquids  Medication Administration: PO    Compensatory Strategies:   Compensatory Swallowing Strategies: Upright as possible for all oral intake, Remain upright for 30-45 minutes after meals    Dysphagia Goals  The patient will tolerate recommended diet without observed clinical signs of aspiration ongoing; noris thin via straw without overt s/s  The patient will improve oral preparation phase via bolus control/manipulation exercises to 5/5 each trial. ongoing  Speech and Language Goals  Goal 1: Pt will improve short term recall for 4-5 new functional units after delay with occasional cues ongoing; IND recall of SLP role, POC and name from 4/22  Goal 2: Pt will improve verbal agility in complex connected speech with occasional errors ongoing; occasional imprecision noted at conversation level  Goal 3: Pt will improve verbal expression of complex thoughts/ideas at sentence level in structured functional tasks with mild (<5 second) delay ongoing; moderate delays and disruption in verbal output at complex sentence level  Goal 4: Pt will participate with ongoing assessment of EF, planning, sequencing and complex reasoning with additional goals PRN ongoing; demo'd flexible thought processing for multiple meaning words    Plan   Plan:    Recommendations  D/C Recommendations: would benefit from ARU and/or speech therapy upon DC    Therapy  Requires SLP Intervention: Yes  Therapeutic Interventions: Diet tolerance monitoring, Patient/Family education, Bolus control exercises; cognitive-communication remediation  Duration/Frequency of Treatment: 3-5x/wk while on acute unit    Prognosis  Prognosis for safe diet advancement: good     Education  Consulted and agree with results and recommendations: Patient, Family member, RN  Patient Education: results, recommendations  Patient Education Response: Verbalizes understanding    Discharge Recommendations:  Pt will benefit from continued skilled Speech Therapy for Speech and Dysphagia services, prior to returning home. Please accept this as Speech Therapy Discharge status, if pt is discharged prior to next therapy session.     Timed Code Treatment:  SLP Individual Minutes  Time In:1145   Time Out: 3863 Minutes: 40  Total Treatment Time:  10 dysphagia tx   30 Speech therapy     Signed:  Rosalinda David MS, CCC-SLP #[id number]  Speech Language Pathologist

## 2021-04-23 NOTE — PROGRESS NOTES
Physical Therapy  Status Note    4/23/2021  Zoya Jarrell  [id number]  [id number]    Unable to see patient at this time; patient in EEG. Will attempt to see later, as co tx with OT, as patient available and able to participate. Of note, X ray of R knee is also ordered.      Electronically signed by Yoandy Ragsdale, [address] [place] [address] Drive [phone])  on 4/23/2021 at 11:50 AM

## 2021-04-23 NOTE — PROGRESS NOTES
Hospitalist Progress Note  4/23/2021 10:38 AM    PCP: Laurel Hooker MD    [id number]     Date of Admission: 4/21/2021                                                                                                                     HOSPITAL COURSE    Patient demographics:  The patient  Myranda Nath is a 76 y.o. male     Significant past medical history:   Patient Active Problem List   Diagnosis    Chest pain    [facility], acute, non ST segment elevation [facility] [address]    Tobacco abuse    HTN (hypertension)    Acute MI [facility] [address]    Acute CVA [facility]cerebrovascular accident) ([facility] [address]    Right leg weakness         Presenting symptoms:  Right leg weakness    Diagnostic workup:      CONSULTS DURING ADMISSION :   IP CONSULT TO NEUROLOGY  IP CONSULT TO PHYSICAL MEDICINE REHAB      Patient was diagnosed with:  Ischemic infarct in the left anterior cerebral artery area  Acute right hemiparesis  Right knee osteoarthritis    Treatment while inpatient:  76years old male with medical history significant for smoking hypertension and hyperlipidemia. Patient presented to the emergency room with right-sided weakness and some language difficulties with expressive aphasia. Patient was diagnosed with acute ischemic infarct in the left anterior cerebral artery territory with right hemiparesis and expressive aphasia. Patient's expressive aphasia improved significantly. Patient remains weak in the right upper and lower extremity. Patient has pain in the right knee for which x-ray was done and it is indicative of osteoarthritis  No other etiology is suspected at this time. If it continues to be a problem patient can be seen by orthopedic surgery. Patient is being discharged on dual antiplatelet therapy for 1 month and then patient can continue with one antiplatelet agent indefinitely. Patient will continue on statin.   Control his blood pressure with range systolic blood pressure less than 002 and diastolic blood pressure less than 90. Patient is being discharged to the rehab unit. Discharge Condition:  stable:    Discharged to:  skilled nursing  facility    Activity:   as tolerated: Follow Up:  Patient will follow up with her primary care physician once discharged from the rehabilitation unit  Patient is to follow-up with [facility] neuroscience in 1 month after discharge                                                    ----------------------------------------------------------      [address] follow up for Right leg weakness    Diet: DIET GENERAL; Low Sodium (2 GM)      OBJECTIVE:   Patient Active Problem List   Diagnosis    Chest pain    MI, acute, non ST segment elevation ([facility] [address]    Tobacco abuse    HTN (hypertension)    Acute MI ([facility] [address]    Acute CVA [facility]cerebrovascular accident) ([facility] [address]    Right leg weakness       Allergies  Patient has no known allergies. Medications    Scheduled Meds:   sodium chloride flush  5-40 mL Intravenous 2 times per day    enoxaparin  40 mg Subcutaneous Daily    aspirin  81 mg Oral Daily    Or    aspirin  300 mg Rectal Daily    atorvastatin  80 mg Oral Nightly    tamsulosin  0.4 mg Oral Daily    sertraline  150 mg Oral Daily     Continuous Infusions:   sodium chloride       PRN Meds:  sodium chloride flush, sodium chloride, perflutren lipid microspheres, ondansetron, labetalol, diphenhydrAMINE    Vitals   Vitals /wt   Patient Vitals for the past 8 hrs:   BP Temp Temp src Pulse Resp SpO2 Weight   04/23/21 0857     16 92 %    04/23/21 0814 (!) 184/98 98.5 °F (36.9 °C)  71  93 %    04/23/21 0355 (!) 188/94      239 lb 6.7 oz (108.6 kg)   04/23/21 0352 (!) 199/98 97.7 °F (36.5 °C) Oral 68 15 92 %         72HR INTAKE/OUTPUT:      Intake/Output Summary (Last 24 hours) at 4/23/2021 1038  Last data filed at 4/23/2021 0736  Gross per 24 hour   Intake    Output 700 ml   Net -700 ml       Exam:    Gen:   Alert and oriented ×2, some language deficit  Eyes: PERRL. cerebral artery territory  Swallowing evaluation  Aspirin and statin  Echocardiogram shows normal ejection fraction with grade 2 diastolic dysfunction  OT PT  Acute rehab consult  Neurology consult is appreciated        Resume patient's home medication        Code Status: Full Code        Dispo -cc        The patient and / or the family were informed of the results of any tests, a time was given to answer questions, a plan was proposed and they agreed with plan. Venus Stafford MD    This note was transcribed using [employer]. Please disregard any translational errors.

## 2021-04-23 NOTE — PROGRESS NOTES
Physical Therapy    04/23/21  Clara Farm  [id number]  [phone]    Attempt Note      Therapist to room to see patient. Unable to see pt, out of room for imaging. Will attempt to see later as time allows and patient able to participate.       Javier Barker, Student Physical Therapist  I attest that I was present for and made a skilled & mindful clinical judgement during the evaluation and/or treatment of this patient on 4/23/2021  Electronically signed by Ramon Webb [address] [place] [address] [phone])  on 4/23/2021 at 1:41 PM

## 2021-04-23 NOTE — CONSULTS
Consult Note  Physical Medicine and Rehabilitation    Patient: Harshal Garay  [id number]  Date: 4/23/2021      Chief Complaint: right side weakness    History of Present Illness/Hospital Course:  Patient is a 77 yo M with pmh CAD s/p stent, HTN, OA, Anxiety, former tobacco use who initially presented 4/21/2021 with right side weakness and aphasia. He was outside TPA window. Imaging revealed acute ischemic infarcts in the left LEXA territory with thrombosis in A3 segment. Neurology consulted. Patient is being treated with DAPT (for 30 days then monotherapy) and statin. Course complicated by HTN. Currently, patient reports ongoing right side weakness and difficult with speech. He denies headache, vision changes, tingling/numbness.  He is motivated to start inpatient rehab program.     Prior Level of Function:  Independent for mobility, ADLs, and IADLs    Current Level of Function:  Min-modA x 2 for transfers  Up to maxA for ADLs    Pertinent Social History:  Support: lives with sig other and elderly mother  Home set-up: 2 story home, 1 step to enter    Past Medical History:   Diagnosis Date    Acute MI ([facility] [address] 09/2013    Anxiety     Arthritis     CAD (coronary artery disease)     Hypertension     Influenza A 01/15/2017       Past Surgical History:   Procedure Laterality Date    BACK SURGERY N/A 1989    herniated disc    CORONARY ANGIOPLASTY WITH STENT PLACEMENT  9/13       Family History   Problem Relation Age of Onset    Hypertension Mother     Hypertension Father     Heart Failure Father     Stroke Maternal Grandfather     Cancer Paternal Uncle         throat       Social History     Socioeconomic History    Marital status: Single     Spouse name: None    Number of children: None    Years of education: None    Highest education level: None   Occupational History    None   Social Needs    Financial resource strain: Not hard at all   Steff-Edi insecurity     Worry: Never true     Inability: Never true  Transportation needs     Medical: No     Non-medical: No   Tobacco Use    Smoking status: Former Smoker     Packs/day: 0.50     Years: 40.00     Pack years: 20.00     Types: Cigarettes     Quit date: 2019     Years since quittin.2    Smokeless tobacco: Former User   Substance and Sexual Activity    Alcohol use:  Yes     Alcohol/week: 2.0 standard drinks     Types: 2 Cans of beer per week     Comment: states used to drink 1 pint/vodka a day / now socially drinks    Drug use: No    Sexual activity: Yes     Partners: Female   Lifestyle    Physical activity     Days per week: None     Minutes per session: None    Stress: None   Relationships    Social connections     Talks on phone: None     Gets together: None     Attends [employer] service: None     Active member of club or organization: None     Attends meetings of clubs or organizations: None     Relationship status: None    Intimate partner violence     Fear of current or ex partner: None     Emotionally abused: None     Physically abused: None     Forced sexual activity: None   Other Topics Concern    None   Social History Narrative    None           REVIEW OF SYSTEMS:   CONSTITUTIONAL: negative for fevers, chills, diaphoresis, appetite change, night sweats, unexpected weight change, fatigue  EYES: negative for blurred vision, eye discharge, visual disturbance and icterus  HEENT: negative for hearing loss, tinnitus, ear drainage, sinus pressure, nasal congestion, epistaxis and snoring  RESPIRATORY: Negative for hemoptysis, cough, sputum production  CARDIOVASCULAR: negative for chest pain, palpitations, exertional chest pressure/discomfort, syncope, edema  GASTROINTESTINAL: negative for nausea, vomiting, diarrhea, blood in stool, abdominal pain, constipation  GENITOURINARY: negative for frequency, dysuria, urinary incontinence, decreased urine volume, and hematuria  HEMATOLOGIC/LYMPHATIC: negative for easy bruising, bleeding and lymphadenopathy ALLERGIC/IMMUNOLOGIC: negative for recurrent infections, angioedema, anaphylaxis and drug reactions  ENDOCRINE: negative for weight changes and diabetic symptoms including polyuria, polydipsia and polyphagia  MUSCULOSKELETAL: negative for pain, joint swelling, decreased range of motion  NEUROLOGICAL: refer to HPI  PSYCHIATRIC/BEHAVIORAL: negative for hallucinations, behavioral problems, confusion and agitation. Physical Examination:  Vitals:   Patient Vitals for the past 24 hrs:   BP Temp Temp src Pulse Resp SpO2 Weight   04/23/21 1105 (!) 193/102 98.2 °F (36.8 °C) Oral 77 16 93 %    04/23/21 0857     16 92 %    04/23/21 0814 (!) 184/98 98.5 °F (36.9 °C)  71  93 %    04/23/21 0355 (!) 188/94      239 lb 6.7 oz (108.6 kg)   04/23/21 0352 (!) 199/98 97.7 °F (36.5 °C) Oral 68 15 92 %    04/22/21 2320 (!) 189/91 98.1 °F (36.7 °C) Oral 67 16 91 %    04/22/21 1956 (!) 169/88 98.3 °F (36.8 °C) Oral 63 15 92 %    04/22/21 1501 (!) 163/83 97.7 °F (36.5 °C) Oral 62 18 93 %      Const: Alert. WDWN. No distress  Eyes: Conjunctiva noninjected, no icterus noted; pupils equal, round, and reactive to light. HENT: Atraumatic, normocephalic; Oral mucosa moist  Neck: Trachea midline, neck supple. No thyromegaly noted. CV: Regular rate and rhythm, no murmur rub or gallop noted  Resp: Lungs clear to auscultation bilaterally, no rales wheezes or rhonchi, no retractions. Respirations unlabored. GI: Soft, nontender, nondistended. Normal bowel sounds. No palpable masses. Skin: Normal temperature and turgor. No rashes or breakdown noted. Ext: No significant edema appreciated. No varicosities. MSK: No joint tenderness, erythema, warmth noted. PROM intact. Neuro: Alert, oriented, appropriate but with delayed processing. Mild expressive speech deficits vs. Apraxia. Right facial droop, otherwise no cranial nerve deficits appreciated. Sensation intact to light touch.  Motor examination reveals strength 1/5 RUE except 2-3/5 finger flexion and extension, 1/5 proximal RLE, 0/5 distal RLE, 5/5 LUE and LLE. No abnormalities with finger/nose noted on left, unable to perform on right due to weakness. Reflexes diminished, symmetric. Psych: Stable mood, normal judgement, normal affect     Lab Results   Component Value Date    WBC 9.9 04/23/2021    HGB 15.0 04/23/2021    HCT 43.4 04/23/2021    MCV 93.2 04/23/2021     (L) 04/23/2021     Lab Results   Component Value Date    INR 1.09 04/21/2021    INR 1.10 01/16/2017    PROTIME 12.6 04/21/2021    PROTIME 12.6 01/16/2017     Lab Results   Component Value Date    CREATININE 0.6 (L) 04/23/2021    BUN 14 04/23/2021     04/23/2021    K 3.8 04/23/2021     04/23/2021    CO2 25 04/23/2021     Lab Results   Component Value Date    ALT 21 04/21/2021    AST 23 04/21/2021    GGT 27 03/16/2021    ALKPHOS 70 04/21/2021    BILITOT 0.4 04/21/2021       Most recent echocardiogram revealed: Mod conc. LVH with normal LV size & wall motion. EF is    60%. Grade II   diastolic dysfunction with elevated L heart filling pressures. The left atrium is moderately dilated. Normal right ventricular size and function. Most recent EKG revealed:  Sinus rhythm with 1st degree A-V blockLeft ventricular hypertrophy with repolarization abnormalityCannot rule out Septal infarct , age undeterminedConfirmed by EVAN LA, Khoa Bustamante ([id number]) on 4/22/2021 7:23:15 AM    Most recent imaging studies revealed:    MRI brain  1. Multifocal acute infarctions in the parasagittal left frontal lobe within   the distal left anterior cerebral artery vascular territory.  No associated   hemorrhage. 2. 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 3. Diffuse parenchymal volume loss and sequela of moderate chronic   microvascular ischemic changes.    4. Old infarctions in the basal ganglia, thalami, and right cerebellum. The findings were sent to the Radiology Results [address] at 12:03   pm on 4/22/2021to be communicated to a licensed caregiver. CTA head and neck  1. No acute intracranial abnormality. 2. Chronic and age related changes including age-appropriate cerebral volume   loss and nonspecific white matter hypodensities which are likely the sequela   of chronic small vessel ischemic disease. 3. No hemodynamically significant stenosis involving the head and neck   arteries. Xray right knee  1. No acute osseous abnormality of the right knee. 2. Mild tricompartmental degenerative changes with trace joint effusion. On my review, CXR displays no consolidations or effusions. Assessment:  Acute ischemic Left LEXA stroke  Dysphagia  HTN  CAD  OA  Anxiety    Impairments: right hemiparesis, aphasia, dysphagia    Recommendations:    Patient with new functional deficits and ongoing medical complexity. Demonstrates ability to tolerate 3 hours therapy/day. He is a good candidate for acute inpatient rehab when medically appropriate. Requires insurance pre-cert. Thank you for this consult. Please contact me with any questions or concerns. Arlen Huitron.  Mirian Sierra MD 4/23/2021, 3:00 PM

## 2021-04-23 NOTE — PROGRESS NOTES
When changing patient's sheets, RN noticed that patient's right knee was bent. Rn asked if patient was trying to bend knee and he stated no. RN helped patient straighten knee, patient stated that his knee was uncomfortable when unbent.

## 2021-04-24 LAB
ANION GAP SERPL CALCULATED.3IONS-SCNC: 13 MMOL/L (ref 3–16)
BASOPHILS ABSOLUTE: 0.1 K/UL (ref 0–0.2)
BASOPHILS RELATIVE PERCENT: 0.7 %
BUN BLDV-MCNC: 14 MG/DL (ref 7–20)
CALCIUM SERPL-MCNC: 8.8 MG/DL (ref 8.3–10.6)
CHLORIDE BLD-SCNC: 101 MMOL/L (ref 99–110)
CO2: 23 MMOL/L (ref 21–32)
CREAT SERPL-MCNC: <0.5 MG/DL (ref 0.8–1.3)
EOSINOPHILS ABSOLUTE: 0.1 K/UL (ref 0–0.6)
EOSINOPHILS RELATIVE PERCENT: 0.5 %
GFR AFRICAN AMERICAN: >60
GFR NON-AFRICAN AMERICAN: >60
GLUCOSE BLD-MCNC: 121 MG/DL (ref 70–99)
HCT VFR BLD CALC: 43.7 % (ref 40.5–52.5)
HEMOGLOBIN: 15.2 G/DL (ref 13.5–17.5)
LYMPHOCYTES ABSOLUTE: 1.4 K/UL (ref 1–5.1)
LYMPHOCYTES RELATIVE PERCENT: 13.5 %
MCH RBC QN AUTO: 32.2 PG (ref 26–34)
MCHC RBC AUTO-ENTMCNC: 34.8 G/DL (ref 31–36)
MCV RBC AUTO: 92.6 FL (ref 80–100)
MONOCYTES ABSOLUTE: 0.9 K/UL (ref 0–1.3)
MONOCYTES RELATIVE PERCENT: 9.1 %
NEUTROPHILS ABSOLUTE: 7.9 K/UL (ref 1.7–7.7)
NEUTROPHILS RELATIVE PERCENT: 76.2 %
PDW BLD-RTO: 15.2 % (ref 12.4–15.4)
PLATELET # BLD: 128 K/UL (ref 135–450)
PMV BLD AUTO: 7.8 FL (ref 5–10.5)
POTASSIUM REFLEX MAGNESIUM: 3.9 MMOL/L (ref 3.5–5.1)
PREALBUMIN: 18.6 MG/DL (ref 20–40)
RBC # BLD: 4.72 M/UL (ref 4.2–5.9)
SODIUM BLD-SCNC: 137 MMOL/L (ref 136–145)
WBC # BLD: 10.4 K/UL (ref 4–11)

## 2021-04-24 PROCEDURE — 97535 SELF CARE MNGMENT TRAINING: CPT

## 2021-04-24 PROCEDURE — 97163 PT EVAL HIGH COMPLEX 45 MIN: CPT

## 2021-04-24 PROCEDURE — 36415 COLL VENOUS BLD VENIPUNCTURE: CPT

## 2021-04-24 PROCEDURE — 80048 BASIC METABOLIC PNL TOTAL CA: CPT

## 2021-04-24 PROCEDURE — 92523 SPEECH SOUND LANG COMPREHEN: CPT

## 2021-04-24 PROCEDURE — 85025 COMPLETE CBC W/AUTO DIFF WBC: CPT

## 2021-04-24 PROCEDURE — 6360000002 HC RX W HCPCS: Performed by: PHYSICAL MEDICINE & REHABILITATION

## 2021-04-24 PROCEDURE — 97530 THERAPEUTIC ACTIVITIES: CPT

## 2021-04-24 PROCEDURE — 94761 N-INVAS EAR/PLS OXIMETRY MLT: CPT

## 2021-04-24 PROCEDURE — 97167 OT EVAL HIGH COMPLEX 60 MIN: CPT

## 2021-04-24 PROCEDURE — 84134 ASSAY OF PREALBUMIN: CPT

## 2021-04-24 PROCEDURE — 1280000000 HC REHAB R&B

## 2021-04-24 PROCEDURE — 92610 EVALUATE SWALLOWING FUNCTION: CPT

## 2021-04-24 PROCEDURE — 6370000000 HC RX 637 (ALT 250 FOR IP): Performed by: PHYSICAL MEDICINE & REHABILITATION

## 2021-04-24 RX ORDER — LISINOPRIL 10 MG/1
10 TABLET ORAL DAILY
Status: DISCONTINUED | OUTPATIENT
Start: 2021-04-24 | End: 2021-04-30

## 2021-04-24 RX ORDER — TRAZODONE HYDROCHLORIDE 50 MG/1
50 TABLET ORAL NIGHTLY
Status: DISCONTINUED | OUTPATIENT
Start: 2021-04-24 | End: 2021-05-13 | Stop reason: HOSPADM

## 2021-04-24 RX ADMIN — ACETAMINOPHEN 650 MG: 325 TABLET ORAL at 07:23

## 2021-04-24 RX ADMIN — ATORVASTATIN CALCIUM 80 MG: 80 TABLET, FILM COATED ORAL at 21:03

## 2021-04-24 RX ADMIN — HYDROCHLOROTHIAZIDE 25 MG: 25 TABLET ORAL at 07:24

## 2021-04-24 RX ADMIN — ASPIRIN 81 MG: 81 TABLET, COATED ORAL at 07:24

## 2021-04-24 RX ADMIN — HYDRALAZINE HYDROCHLORIDE 25 MG: 25 TABLET, FILM COATED ORAL at 21:03

## 2021-04-24 RX ADMIN — TRAZODONE HYDROCHLORIDE 50 MG: 50 TABLET ORAL at 21:03

## 2021-04-24 RX ADMIN — HYDRALAZINE HYDROCHLORIDE 25 MG: 25 TABLET, FILM COATED ORAL at 04:07

## 2021-04-24 RX ADMIN — DOCUSATE SODIUM 50 MG AND SENNOSIDES 8.6 MG 1 TABLET: 8.6; 5 TABLET, FILM COATED ORAL at 07:24

## 2021-04-24 RX ADMIN — CLOPIDOGREL BISULFATE 75 MG: 75 TABLET ORAL at 07:24

## 2021-04-24 RX ADMIN — SERTRALINE 150 MG: 100 TABLET, FILM COATED ORAL at 07:24

## 2021-04-24 RX ADMIN — ATENOLOL 50 MG: 50 TABLET ORAL at 07:24

## 2021-04-24 RX ADMIN — LISINOPRIL 10 MG: 10 TABLET ORAL at 16:40

## 2021-04-24 RX ADMIN — ENOXAPARIN SODIUM 40 MG: 40 INJECTION SUBCUTANEOUS at 07:24

## 2021-04-24 RX ADMIN — ACETAMINOPHEN 650 MG: 325 TABLET ORAL at 16:40

## 2021-04-24 RX ADMIN — TAMSULOSIN HYDROCHLORIDE 0.4 MG: 0.4 CAPSULE ORAL at 07:24

## 2021-04-24 ASSESSMENT — PAIN DESCRIPTION - FREQUENCY: FREQUENCY: CONTINUOUS

## 2021-04-24 ASSESSMENT — PAIN DESCRIPTION - PROGRESSION
CLINICAL_PROGRESSION: GRADUALLY WORSENING
CLINICAL_PROGRESSION: GRADUALLY WORSENING
CLINICAL_PROGRESSION: RESOLVED

## 2021-04-24 ASSESSMENT — PAIN DESCRIPTION - PAIN TYPE
TYPE: ACUTE PAIN
TYPE: ACUTE PAIN

## 2021-04-24 ASSESSMENT — PAIN SCALES - GENERAL
PAINLEVEL_OUTOF10: 0
PAINLEVEL_OUTOF10: 0

## 2021-04-24 ASSESSMENT — PAIN DESCRIPTION - ONSET: ONSET: ON-GOING

## 2021-04-24 ASSESSMENT — PAIN DESCRIPTION - DESCRIPTORS: DESCRIPTORS: ACHING

## 2021-04-24 ASSESSMENT — PAIN DESCRIPTION - LOCATION: LOCATION: NECK

## 2021-04-24 NOTE — PROGRESS NOTES
4 Eyes Skin Assessment     NAME:  Lyn Jerez  YOB: 1955  MEDICAL RECORD NUMBER:  [id number]    The patient is being assess for  Admission    I agree that 2 RN's have performed a thorough Head to Toe Skin Assessment on the patient. ALL assessment sites listed below have been assessed. Areas assessed by both nurses: aSntos/Madonna    Head, Face, Ears, Shoulders, Back, Chest, Arms, Elbows, Hands, Sacrum. Buttock, Coccyx, Ischium and Legs. Feet and Heels        Does the Patient have a Wound?  No noted wound(s)       Ilia Prevention initiated:  No   Wound Care Orders initiated:  No    Pressure Injury (Stage 3,4, Unstageable, DTI, NWPT, and Complex wounds) if present place consult order under [de-identified] No    New and Established Ostomies if present place consult order under : No      Nurse 1 eSignature: Electronically signed by Priya Farias RN on 4/24/21 at 2:04 AM EDT    **SHARE this note so that the co-signing nurse is able to place an eSignature**    Nurse 2 eSignature: Electronically signed by Carin Smith RN on 4/24/21 at 4:20 AM EDT

## 2021-04-24 NOTE — PLAN OF CARE
Problem: Falls - Risk of:  Goal: Will remain free from falls  Description: Will remain free from falls  Outcome: Ongoing  Note: Patient free from falls this shift. Fall precautions in place at all times. Bed in lowest position with two side rails up and wheels locked. Call light within reach. Patient able and agreeable to contact for safety appropriately. Problem: HEMODYNAMIC STATUS  Goal: Patient has stable vital signs and fluid balance  Outcome: Ongoing  Note: VSS. Peripheral pulse palpable. Skin pink, dry and warm. No edema. I&O monitor. Daily weight. Medication given as ordered. Education provided. Will continue to monitor. Problem: ACTIVITY INTOLERANCE/IMPAIRED MOBILITY  Goal: Mobility/activity is maintained at optimum level for patient  Outcome: Ongoing  Note: Patient noted with limited movement to right upper and lower ext. Passive movement. Weak  to right hand. Problem: Skin Integrity:  Goal: Absence of new skin breakdown  Description: Absence of new skin breakdown  Outcome: Ongoing  Note: Skin assessment performed each shift per protocol. Patient turned and repositioned every two hours and prn with pillow support. Patient checked for incontence every two hours. Skin is intact, bottom noted with redness but is blanchable. Problem: Pain:  Description: Pain management should include both nonpharmacologic and pharmacologic interventions. Goal: Control of acute pain  Description: Control of acute pain  Outcome: Ongoing  Note: Patient denied pain or discomfort. Encouraged to inform staff of any changes in condition.

## 2021-04-24 NOTE — PLAN OF CARE
ARU PATIENT TREATMENT PLAN  [address] [place]  [phone]    Ghislaine Wild    : 1955  Acct #: [de-identified]  MRN: [id number]   PHYSICIAN:  Meir Davidson MD    Rehabilitation Diagnosis:    Acute ischemic Left LEXA stroke  -Secondary ppx with DAPT x 30 days (then monotherapy), statin  -Telemetry  -PT/OT/SLP     Dysphagia   -Dietitian and SLP consults.   -Currently on regular + thins     HTN, uncontrolled  -resume home atenolol, HCTZ  -consider resuming lisinopril, Imdur pending BP trend  -prn hydralazine     CAD  -DAPT, statin, bb     OA  -acetaminophen     Anxiety  -sertraline     Bladder   -High risk retention   -Monitor PVRs, SC prn >300cc  -Flomax     Bowel   -High risk constipation   -senna+colace BID, PRN miralax, MoM, and bisacodyl supp.     Safety   -fall and aspiration precautions     DVT ppx  -lovenox    ADMIT DATE:2021    Patient Goals: become more independent     Admitting Impairments: right hemiparesis, aphasia, dysphagia  Barriers: right hemiparesis, aphasia, dysphagia, medical comorbidities  Participation: patient lives with sig.  Other- was independent and retired, no DME      CARE PLAN     NURSING:  Ghislaine Wild while on this unit will:     [x] Be continent of bowel and bladder     [x] Have an adequate number of bowel movements  [x] Urinate with no urinary retention >300ml in bladder  [] Complete bladder protocol with boss removal  [x] Maintain O2 SATs at 92%  [x] Have pain managed while on ARU       [] Be pain free by discharge   [x] Have no skin breakdown while on ARU  [] Have improved skin integrity via wound measurements  [] Have no signs/symptoms of infection at the wound site  [x] Be free from injury during hospitalization   [x] Complete education with patient/family with understanding demonstrated for:  [x] Adjustment   [x] Other: CVA, communication needs, diet and swallowing precautions, continue education benefits of smoking cessation. Nursing interventions may include bowel/bladder training, education for medical assistive devices, medication education, O2 saturation management, energy conservation, stress management techniques, fall prevention, alarms protocol, seating and positioning, skin/wound care, pressure relief instruction,dressing changes,  infection protection, DVT prophylaxis, and/or assistance with in room safety with transfers to bed, toilet, wheelchair, shower as well as bathroom activities and hygiene. Patient/caregiver education for:   [x] Disease/sustained injury/management      [x] Medication Use   [] Surgical intervention   [x] Safety   [x] Body mechanics and or joint protection   [x] Health maintenance         PHYSICAL THERAPY:  Goals:                  Short term goals  Time Frame for Short term goals: In 2 weeks pt will perform  Short term goal 1: Bed mobility Mod A  Short term goal 2: Transfers Mod A  Short term goal 3: Propel wc 48' with Mod A  Short term goal 4: Ascend/Descend curb step with LRAD, Mod A            Long term goals  Time Frame for Long term goals : In 4 weeks pt will perform  Long term goal 1: Bed mobility CGA  Long term goal 2: Transfers CGA  Long term goal 3: Propel wc 48' with CGA-SBA  Long term goal 4: Ascend/Descend curb step with LRAD, Min A  These goals were reviewed with this patient at the time of assessment and William Ruth is in agreement. Plan of Care: Pt to be seen 90   mins per day for 5-6 days/week for 4 weeks.                    Current Treatment Recommendations: Strengthening, ROM, Balance Training, Endurance Training, Functional Mobility Training, Wheelchair Mobility Training, Transfer Training, Gait Training, Stair training, Positioning, Pain Management, Equipment Evaluation, Education, & procurement, Modalities, Patient/Caregiver Education & Training, Safety Education & Training, Home Exercise Program, Neuromuscular Re-education      OCCUPATIONAL THERAPY:  Goals:             Short term goals  Time Frame for Short term goals: 1 week  Short term goal 1: Pt will be min A with functional transfers  Short term goal 2: Pt will be min A with functional mobility  Short term goal 3: Pt will be min A with bed mobility  Short term goal 4: Pt will be mod A with bathing and dressing  Short term goal 5: Pt will tolerate UE neuromuscular reeducation to increase function in RUE for assist with ADLs :  Long term goals  Time Frame for Long term goals : 3-4 weeks  Long term goal 1: Pt will be mod I with functional transfers  Long term goal 2: Pt will be mod I with functional mobility  Long term goal 3: Pt will be mod I with bed mobility  Long term goal 4: Pt will be mod I with bathing and dressing  Long term goal 5: Pt will be mod I with toileting : These goals were reviewed with this patient at the time of assessment and Sona Monet is in agreement    Plan of Care:  Pt to be seen 90   mins per day for 5-6 day/week 3-4 weeks. SPEECH THERAPY: Goals will be left blank if speech is not following this patient. Goals:                            Dysphagia Goals: The patient will tolerate recommended diet without observed clinical signs of aspiration, The patient will improve oral preparation phase via bolus control/manipulation exercises to 5/5 each trial., The patient/caregiver will demonstrate understanding of compensatory strategies for improved swallowing safety. Short-term Goals  Goal 1: Pt will demonstrate improved recall of new learning strategies; daily therapy activities and new learning information with set up, use of memory strategies and minimal assist  Goal 2: Pt will improve oral and/or verbal agility and ROM for coordinated and alternating motions to < mild impairment.   Goal 3: Pt will improve verbal expression of complex thoughts/ideas at sentence level in structured functional tasks with <5 second delay  Goal 4: Pt will participate with ongoing assessment of EF, planning, sequencing and complex reasoning with additional goals PRN                 These goals were reviewed with this patient at the time of assessment and Zhane Hampton is in agreement    Plan of Care: Pt to be seen 45-60 mins per day for 5 day/week 3-4 weeks. CASE MANAGEMENT:  Goals:   Assist patient/family with discharge planning, patient/family counseling,   and coordination with insurance during ARU stay. Admission Period/Goal QIM CODES   QIM  Admit/Goal Score    Eating  4/6   Oral Hygiene  5/6   [address] [place]  1/6   Shower/Bathe Self  2/6   Upper Body Dressing  3/6   Lower Body Dressing  1/6   Putting on/Taking off Footwear  1/6   Roll Left and Right  2/4   Sit to Lying  1/4   Lying to Sitting on Side of Bed  88/4   Sit to Stand  1/4   Chair/Bed to Chair Transfer  1/4   Toilet Transfer  88/4   Car Transfer  88/4   Walk 10 feet  88   Walk 50 feet with 2 Turns  88   Walk 150 feet with 2 turns  88   Walk 10 feet on Uneven Surfaces  88   1 Step  88/3   4 Steps  88   12 Steps  88   Picking up Object  88   Wheel 50 feet with 2 Turns   Type?  88/4 manual   Wheel 150 feet with 2 Turns   Type?  88/4 manual          Zhane Memo will be seen a minimum of 3 hours of therapy per day, a minimum of 5 out of 7 days per week. [] In this rare instance due to the nature of this patient's medical involvement, this patient will be seen 15 hours per week (900 minutes within a 7 day period). Treatments may include therapeutic exercises, gait training, neuromuscular re-ed, transfer training, community reintegration, bed mobility, w/c mobility and training, self care, home mgmt, cognitive training, energy conservation,dysphagia tx, speech/language/communication therapy, and patient/family education.  In addition, dietician/nutritionist may monitor calorie count as well as intake and collaboratively work with SLP on dietary upgrades. Neuropsychology/Psychology may evaluate and provide necessary support. Medical issues being managed closely and that require 24 hour availability of a physician:   [x] Swallowing Precautions  [x] Bowel/Bladder Fx  [] Weight bearing precautions   [] Wound Care    [x] Pain Mgmt   [x] Infection Protection   [x] DVT Prophylaxis   [x] Fall Precautions  [x] Fluid/Electrolyte/Nutrition Balance   [] Voice Protection   [] Respiratory  [] Other:    Medical Prognosis: [x] Good  [] Fair    [] Guarded   Total expected IRF days 21-28  Anticipated discharge destination:    [] Home Independently     [x] Home with supervision    []SNF     [] Other                                           Physician anticipated functional outcomes:  Improve mobility, transfers, [facility] home with sig. other   IPOC brief synthesis: Patient is a 75 yo M with pmh CAD s/p stent, HTN, OA, Anxiety, former tobacco use who initially presented 4/21/2021 with right side weakness and aphasia. He was outside TPA window. Imaging revealed acute ischemic infarcts in the left LEXA territory with thrombosis in A3 segment. Neurology consulted. Patient is being treated with DAPT (for 30 days then monotherapy) and statin. Course complicated by HTN. Now presents to ARU with impairments including: right hemiparesis, aphasia, dysphagia, cognition. He requires comprehensive inpatient rehab program in order to return to community setting. I have reviewed this initial plan of care and agree with its contents:    Title   Name    Date    Time    Physician: Gina Moreno. Donny Mar MD 4/26/2021, 9:45 AM      Case Mgmt:   Sherylhaylee P[place], [place]     Case Management   [phone]    4/26/2021  4:48 PM      OT: Desire Charles, OTR/L #[id number] 4/24/2021 1:43 PM    PT: Electronically signed by Jonathan Bridges, RAY [id number] on 4/24/2021 at 1:43 PM    RN: Kevin Price RN, [address] [place] [address] 04/25/2021 2:33 pm    ST: Seble Arambula. Jeffery,MS,CCC,SLP [id number] Speech and Language Pathologist late entry signed 4/27/2021 11:28 am for date of eval 4/24/2021      :  Eliceo King PT,MPT 4/26/2021  1210    Other:

## 2021-04-24 NOTE — PROGRESS NOTES
Speech Language/Pathology   SPEECH LANGUAGE AND CLINICAL BEDSIDE SWALLOWING EVALUATION   Speech Therapy Department       Carienelly Altamirano  AGE: 76 y.o. GENDER: male  : 1955  [id number]  EPISODE DATE:  2021    MEDICAL DIAGNOSIS: CVA  ONSET:  2021    PAST MEDICAL HISTORY      Diagnosis Date    Acute MI ([address] 2013    Anxiety     Arthritis     CAD (coronary artery disease)     Hypertension     Influenza A 01/15/2017       PAST SURGICAL HISTORY  Past Surgical History:   Procedure Laterality Date    BACK SURGERY N/A     herniated disc    CORONARY ANGIOPLASTY WITH STENT PLACEMENT         ALLERGIES  No Known Allergies      CHART REVIEW:  2021 BRAIN MRI  Impression   1. Multifocal acute infarctions in the parasagittal left frontal lobe within   the distal left anterior cerebral artery vascular territory.  No associated   hemorrhage. 2. 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 3. Diffuse parenchymal volume loss and sequela of moderate chronic   microvascular ischemic changes. 4. Old infarctions in the basal ganglia, thalami, and right cerebellum. 2021 ARU ADMISSION H&P:  History of Present Illness/Hospital Course:  Patient is a 77 yo M with pmh CAD s/p stent, HTN, OA, Anxiety, former tobacco use who initially presented 2021 with right side weakness and aphasia. He was outside TPA window. Imaging revealed acute ischemic infarcts in the left LEXA territory with thrombosis in A3 segment. Neurology consulted. Patient is being treated with DAPT (for 30 days then monotherapy) and statin. Course complicated by HTN. Currently, patient reports ongoing right side weakness and difficult with speech. He denies headache, vision changes, tingling/numbness.  He is motivated to start inpatient rehab program.       SUBJECTIVE:  Chart Reviewed: Yes  Patient assessed for executive functioning, thought organization and planning is recommended as patient's processing and initiation improve. Pt was independent with ADLs and IADLs PTA. Given significant extra time for processing and initiation, basic cognition appears intact in areas of orientation, sustained and selective, and simple/concrete PS/reasoning. Mildly decreased recall for daily events/information and moderately reduced recall for novel information noted this date. Aforementioned delayed thought processing and initiation for expression is a barrier to demonstration of and execution for cognitive-language skills/tasks. Aphasia Diagnosis: Given extra time for processing and initiation, patient appears to present with mildly impaired auditory comprehension for complex and abstract information processing. Basic and concrete auditory/visual language comprehension appears intact. Mild to moderately impaired verbal expression for complex concepts requiring increased time to formulate and generate responses. Speech Diagnosis: Mildly impaired motor speech skills characterized by minimal to mild decreased verbal agility affecting speech fluency without impact on speech intelligibility at sentence level    Recommended Diet:  Diet Solids Recommendation: Regular  Liquid Consistency Recommendation: Thin  Medication Administration: PO  Compensatory Swallowing Strategies: Upright as possible for all oral intake, Remain upright for 30-45 minutes after meals      PLAN  Requires SLP Intervention: Yes  Therapeutic Interventions: Diet tolerance monitoring, Patient/Family education, Bolus control exercises, cognitive-communication remediation  Duration/Frequency of Treatment: ST to tx 5-6 days per week during acute rehab admission    Dysphagia Goals  Dysphagia Goals:  The patient will tolerate recommended diet without observed clinical signs of aspiration, The patient will improve oral preparation phase via bolus control/manipulation exercises to 5/5 each trial., The patient/caregiver will demonstrate understanding of compensatory strategies for improved swallowing safety. Speech and Language Goals  Short-term Goals  Goal 1: Pt will demonstrate improved recall of new learning strategies; daily therapy activities and new learning information with set up, use of memory strategies and minimal assist  Goal 2: Pt will improve oral and/or verbal agility and ROM for coordinated and alternating motions to < mild impairment.   Goal 3: Pt will improve verbal expression of complex thoughts/ideas at sentence level in structured functional tasks with <5 second delay  Goal 4: Pt will participate with ongoing assessment of EF, planning, sequencing and complex reasoning with additional goals PRN    Barriers to Progress: Cognitive deficit    Prognosis  Good for ST needs    Education  Consulted and agree with results and recommendations: Patient, RN  Patient Education: results, recommendations  Patient Education Response: Verbalizes understanding    Discharge Recommendations:    D/C Recommendations: (suspect need for continued skilled ST upon discharge from ARU)      OBJECTIVE  ASSESSMENT: Included Clinical Evaluation of Swallowing; Oral Motor Speech Evaluation and Cognitive-Linguistic Assessment  Oral/Motor  Labial ROM: Reduced right  Labial Symmetry: Abnormal symmetry right(trace-mild)  Labial Strength: Reduced  Labial Coordination: Reduced  Lingual ROM: Reduced right  Lingual Symmetry: Abnormal symmetry left(trace)  Lingual Strength: Reduced  Lingual Coordination: Reduced  Auditory Comprehension  Basic Questions: (WFL with extra time for processing and initiation)  Complex Questions: Mild(with extra time for processing and initiation)  One Step Basic Commands: (WFL with extra time for processing and initiation)  Two Step Basic Commands: (WFL with extra time for processing and initiation)  Multistep Basic Commands: Mild(with extra time for processing and Phase - Comment: decreased lingual coordination; no pocketing noted  Indicators of Pharyngeal Phase Dysfunction  · Delayed Swallow: All  · Decreased Laryngeal Elevation: All      Please accept this as Speech Therapy Discharge status, if pt is discharged prior to next therapy session. Timed Code Treatment:  SLP Individual Minutes  Time In: 1010  Time Out: 5374  Minutes: 45    Total Treatment Time:  15\" swallow, 30\" SLP    SIGNED:   Matilde Dash.  [address], [facility], [id number]  Speech-Language Pathologist  4/24/2021 10:55 AM

## 2021-04-24 NOTE — PROGRESS NOTES
Occupational Therapy   Occupational Therapy Initial Assessment  Date: 2021   Patient Name: Oumou Toribio  MRN: [id number]     : 1955    Date of Service: 2021    Discharge Recommendations:  Continue to assess pending progress, Patient would benefit from continued therapy after discharge       Assessment   Performance deficits / Impairments: Decreased functional mobility ; Decreased strength;Decreased endurance;Decreased ADL status; Decreased high-level IADLs;Decreased balance;Decreased ROM; Decreased sensation;Decreased coordination;Decreased fine motor control  Assessment: Pt presents to rehab unit after parasagital left frontal lobe stroke. Pt with right hemiplegia and expressive aphasia. Pt required max A for bed mobility and sit to  jojo stedy. He was dependent for LB dressing and max A for LB bathing. He requires OT intervention to increase RUE function and independence with self care and functional mobility. Anticipate extended stay on rehab prior to d/c home with wife. Prognosis: Good  Decision Making: High Complexity  History: see above  Exam: see above  Assistance / Modification: parris  OT Education: OT Role;Plan of Care;Transfer Training;Precautions; ADL Adaptive Strategies  Barriers to Learning: exp aphasia  REQUIRES OT FOLLOW UP: Yes  Activity Tolerance  Activity Tolerance: Patient Tolerated treatment well;Patient limited by pain  Activity Tolerance: pain in right wrist  Safety Devices  Safety Devices in place: Yes  Type of devices: Left in chair;Chair alarm in place;Call light within reach;Gait belt;Nurse notified           Patient Diagnosis(es): There were no encounter diagnoses. has a past medical history of Acute MI [address] [address] Anxiety, Arthritis, CAD (coronary artery disease), Hypertension, and Influenza A.   has a past surgical history that includes back surgery (N/A, ) and Coronary angioplasty with stent (). much. \"Need to find something to do. \"       Objective   Vision: Impaired  Vision Exceptions: Wears glasses for reading  Hearing: Within functional limits    Orientation  Overall Orientation Status: Within Functional Limits  Orientation Level: Oriented X4     Balance  Sitting Balance: Minimal assistance(CG/Min A for sitting balance prior to standing in [facility]. Used shower chair with armrests during shower)  Standing Balance: Unable to assess(comment)(jojo nye)  Functional Mobility  Functional Mobility Comments: Use of [facility]  Shower Transfers  Shower - Transfer Type: To and From  Shower - Transfer To: Shower seat with back  Shower - Technique: (jojo nye)  Shower Transfers: Dependent  ADL  Grooming: Minimal assistance(set up of toothpaste on toothbrush, set up with brush, min A to don deodorant)  UE Bathing: Minimal assistance(for LUE)  LE Bathing: Dependent/Total(periarea cleaned from opening of shower chair, assist for feet)  UE Dressing:  Moderate assistance(OT donned over RUE, pt assisted to pull over head)  LE Dressing: Dependent/Total(OT donned over feet and pulled up while pt stood in [facility], OT donned socks for patient)  Toileting: Dependent/Total(incontinent of bowel and bladder, cleanup through shower chair)  Tone RUE  RUE Tone: Hypertonic  Tone Description: increased flexor tone noted, easily broken up on UE (more on RLE)  Tone LUE  LUE Tone: Normotonic  Coordination  Movements Are Fluid And Coordinated: No  Coordination and Movement description: Decreased speed;Right UE;Fine motor impairments  Quality of Movement Other  Comment: min movement noted in right hand     Bed mobility  Supine to Sit: Maximum assistance  Sit to Supine: Unable to assess(left in recliner at the end of the session)  Transfers  Sit to stand: Maximum assistance  Stand to sit: Moderate assistance  Transfer Comments: sit to stand from elevated bed and shower chair to [address] [place], [place] [address]e: Wears glasses only for reading  Cognition  Cognition Comment: Expressive aphasia therefore some delayed responses. Difficult to assess with communication deficits        Sensation  Overall Sensation Status: Impaired(reports numbness and pain in RUE, need further assessment)  Light Touch: Partial deficits in the RUE        LUE AROM (degrees)  LUE AROM : WFL  RUE PROM (degrees)  RUE PROM: WNL  RUE AROM (degrees)  RUE AROM : Exceptions  RUE General AROM: Pt unable to initiate movement, trace muscle contracted felt distally on command. PROM WNL (wrist avoided due to pain)  LUE Strength  Gross LUE Strength: WFL  RUE Strength  Gross RUE Strength: Exceptions to [facility]  RUE Strength Comment: Slightly more than trace in .  and muscle contraction palpated by elbow                   Plan   Plan  Times per week: 5-6  Times per day: Twice a day  Plan weeks: 3-4  Current Treatment Recommendations: Strengthening, Endurance Training, Balance Training, ROM, Functional Mobility Training, Safety Education & Training, Gait Training, Self-Care / ADL, Patient/Caregiver Education & Training, Neuromuscular Re-education, Home Management Training, Equipment Evaluation, Education, & procurement                 Goals  Short term goals  Time Frame for Short term goals: 1 week  Short term goal 1: Pt will be min A with functional transfers  Short term goal 2: Pt will be min A with functional mobility  Short term goal 3: Pt will be min A with bed mobility  Short term goal 4: Pt will be mod A with bathing and dressing  Short term goal 5: Pt will tolerate UE neuromuscular reeducation to increase function in RUE for assist with ADLs  Long term goals  Time Frame for Long term goals : 3-4 weeks  Long term goal 1: Pt will be mod I with functional transfers  Long term goal 2: Pt will be mod I with functional mobility  Long term goal 3: Pt will be mod I with bed mobility  Long term goal 4: Pt will be mod I with bathing and dressing  Long term goal 5: Pt will be mod I with toileting  Patient Goals   Patient goals : Pt nodded agreement when asked if he wanted to be more independent       Therapy Time   Individual Concurrent Group Co-treatment   Time In 0745         Time Out 0930         Minutes 105         Timed Code Treatment Minutes: [address] [facility]

## 2021-04-24 NOTE — H&P
Department of Physical Medicine & Rehabilitation  History & Physical      Patient Identification:  Lisa Wahl  : 1955  Admit date: 2021   Attending provider: Julia Rivas MD        Primary care provider: Pauline Sanz MD     Chief Complaint: right side weakness     History of Present Illness/Hospital Course:  Patient is a 75 yo M with pmh CAD s/p stent, HTN, OA, Anxiety, former tobacco use who initially presented 2021 with right side weakness and aphasia. He was outside TPA window. Imaging revealed acute ischemic infarcts in the left LEXA territory with thrombosis in A3 segment. Neurology consulted. Patient is being treated with DAPT (for 30 days then monotherapy) and statin. Course complicated by HTN. Currently, patient reports ongoing right side weakness and difficult with speech. He denies headache, vision changes, tingling/numbness.  He is motivated to start inpatient rehab program.      Prior Level of Function:  Independent for mobility, ADLs, and IADLs     Current Level of Function:  Min-modA x 2 for transfers  Up to maxA for ADLs     Pertinent Social History:  Support: lives with sig other and elderly mother  Home set-up: 2 story home, 1 step to enter    Past Medical History:   Diagnosis Date    Acute MI ([address] 2013    Anxiety     Arthritis     CAD (coronary artery disease)     Hypertension     Influenza A 01/15/2017       Past Surgical History:   Procedure Laterality Date    BACK SURGERY N/A     herniated disc    CORONARY ANGIOPLASTY WITH STENT PLACEMENT         Family History   Problem Relation Age of Onset    Hypertension Mother     Hypertension Father     Heart Failure Father     Stroke Maternal Grandfather     Cancer Paternal Uncle         throat       Social History     Socioeconomic History    Marital status: Single     Spouse name: Not on file    Number of children: Not on file    Years of education: Not on file    Highest education level: Not on file   Occupational History    Not on file   Social Needs    Financial resource strain: Not hard at all   Steff-Edi insecurity     Worry: Never true     Inability: Never true   [employer] needs     Medical: No     Non-medical: No   Tobacco Use    Smoking status: Former Smoker     Packs/day: 0.50     Years: 40.00     Pack years: 20.00     Types: Cigarettes     Quit date: 2019     Years since quittin.2    Smokeless tobacco: Former User   Substance and Sexual Activity    Alcohol use:  Yes     Alcohol/week: 2.0 standard drinks     Types: 2 Cans of beer per week     Comment: states used to drink 1 pint/vodka a day / now socially drinks    Drug use: No    Sexual activity: Yes     Partners: Female   Lifestyle    Physical activity     Days per week: Not on file     Minutes per session: Not on file    Stress: Not on file   Relationships    Social connections     Talks on phone: Not on file     Gets together: Not on file     Attends [employer] service: Not on file     Active member of club or organization: Not on file     Attends meetings of clubs or organizations: Not on file     Relationship status: Not on file    Intimate partner violence     Fear of current or ex partner: Not on file     Emotionally abused: Not on file     Physically abused: Not on file     Forced sexual activity: Not on file   Other Topics Concern    Not on file   Social History Narrative    Not on file       No Known Allergies      Current Facility-Administered Medications   Medication Dose Route Frequency Provider Last Rate Last Timothy Royals ON 2021] aspirin EC tablet 81 mg  81 mg Oral Daily Dejuan Russell MD        Or   Jeronimo [START ON 2021] aspirin suppository 300 mg  300 mg Rectal Daily Dejuan Russell MD        atorvastatin (LIPITOR) tablet 80 mg  80 mg Oral Nightly Dejuan Russell MD   80 mg at 21 2208    acetaminophen (TYLENOL) tablet 650 mg  650 mg Oral Q6H PRN MD Jeronimo Benson [START ON 4/24/2021] enoxaparin (LOVENOX) injection 40 mg  40 mg Subcutaneous Daily Valeria Perez MD        magnesium hydroxide (MILK OF MAGNESIA) 400 MG/5ML suspension 30 mL  30 mL Oral Daily PRN Valeria Perez MD        polyethylene glycol [facility]) packet 17 g  17 g Oral Daily PRN Valeria Perez MD        sennosides-docusate sodium (SENOKOT-S) 8.6-50 MG tablet 1 tablet  1 tablet Oral BID Valeria Perez MD   1 tablet at 04/23/21 2208    [START ON 4/24/2021] atenolol (TENORMIN) tablet 50 mg  50 mg Oral Daily Valeria Perez MD        bisacodyl (DULCOLAX) suppository 10 mg  10 mg Rectal Daily PRN Valeria Perez MD        [START ON 4/24/2021] clopidogrel (PLAVIX) tablet 75 mg  75 mg Oral Daily Valeria Perez MD        diphenhydrAMINE (BENADRYL) tablet 50 mg  50 mg Oral Nightly PRN Valeria Perez MD   50 mg at 04/23/21 2208    hydrALAZINE (APRESOLINE) tablet 25 mg  25 mg Oral Q6H PRN Valeria Perez MD   25 mg at 04/23/21 2208    [START ON 4/24/2021] hydroCHLOROthiazide (HYDRODIURIL) tablet 25 mg  25 mg Oral Daily Valeria Perez MD        ondansetron [facility]) tablet 4 mg  4 mg Oral Q8H PRN Valeria Perez MD        [START ON 4/24/2021] sertraline (ZOLOFT) tablet 150 mg  150 mg Oral Daily MD Ronaldo Dockery Amel ON 4/24/2021] tamsulosin [facility]) capsule 0.4 mg  0.4 mg Oral Daily Valeria Perez MD             REVIEW OF SYSTEMS:   CONSTITUTIONAL: negative for fevers, chills, diaphoresis, appetite change, night sweats, unexpected weight change, fatigue  EYES: negative for blurred vision, eye discharge, visual disturbance and icterus  HEENT: negative for hearing loss, tinnitus, ear drainage, sinus pressure, nasal congestion, epistaxis and snoring  RESPIRATORY: Negative for hemoptysis, cough, sputum production  CARDIOVASCULAR: negative for chest pain, palpitations, exertional chest pressure/discomfort, syncope, edema  GASTROINTESTINAL: negative for nausea, vomiting, diarrhea, blood in stool, abdominal pain, constipation  GENITOURINARY: negative for frequency, dysuria, urinary incontinence, decreased urine volume, and hematuria  HEMATOLOGIC/LYMPHATIC: negative for easy bruising, bleeding and lymphadenopathy  ALLERGIC/IMMUNOLOGIC: negative for recurrent infections, angioedema, anaphylaxis and drug reactions  ENDOCRINE: negative for weight changes and diabetic symptoms including polyuria, polydipsia and polyphagia  MUSCULOSKELETAL: negative for pain, joint swelling, decreased range of motion  NEUROLOGICAL: refer to HPI  PSYCHIATRIC/BEHAVIORAL: negative for hallucinations, behavioral problems, confusion and agitation. All pertinent positives are noted in the HPI. Physical Examination:  Vitals:   Patient Vitals for the past 24 hrs:   BP Temp Temp src Pulse Resp SpO2   04/23/21 2048 (!) 213/100 98 °F (36.7 °C) Oral 78 16 95 %       Const: Alert. WDWN. No distress  Eyes: Conjunctiva noninjected, no icterus noted; pupils equal, round, and reactive to light. HENT: Atraumatic, normocephalic; Oral mucosa moist  Neck: Trachea midline, neck supple. No thyromegaly noted. CV: Regular rate and rhythm, no murmur rub or gallop noted  Resp: Lungs clear to auscultation bilaterally, no rales wheezes or rhonchi, no retractions. Respirations unlabored. GI: Soft, nontender, nondistended. Normal bowel sounds. No palpable masses. Skin: Normal temperature and turgor. No rashes or breakdown noted. Ext: No significant edema appreciated. No varicosities. MSK: No joint tenderness, erythema, warmth noted. PROM intact. Neuro: Alert, oriented, appropriate but with delayed processing. Mild expressive speech deficits vs. Apraxia. Right facial droop, otherwise no cranial nerve deficits appreciated. Sensation intact to light touch. Motor examination reveals strength 1/5 RUE except 2-3/5 finger flexion and extension, 1/5 proximal RLE, 0/5 distal RLE, 5/5 LUE and LLE.  No abnormalities with finger/nose noted on left, unable to perform on right due to weakness. Reflexes diminished, symmetric. Psych: Stable mood, normal judgement, normal affect     Lab Results   Component Value Date    WBC 9.9 04/23/2021    HGB 15.0 04/23/2021    HCT 43.4 04/23/2021    MCV 93.2 04/23/2021     (L) 04/23/2021     Lab Results   Component Value Date    INR 1.09 04/21/2021    INR 1.10 01/16/2017    PROTIME 12.6 04/21/2021    PROTIME 12.6 01/16/2017     Lab Results   Component Value Date    CREATININE 0.6 (L) 04/23/2021    BUN 14 04/23/2021     04/23/2021    K 3.8 04/23/2021     04/23/2021    CO2 25 04/23/2021     Lab Results   Component Value Date    ALT 21 04/21/2021    AST 23 04/21/2021    GGT 27 03/16/2021    ALKPHOS 70 04/21/2021    BILITOT 0.4 04/21/2021       Most recent echocardiogram revealed:   Mod conc. LVH with normal LV size & wall motion. EF is    60%. Grade II   diastolic dysfunction with elevated L heart filling pressures.   The left atrium is moderately dilated.   Normal right ventricular size and function.        Most recent EKG revealed:  Sinus rhythm with 1st degree A-V blockLeft ventricular hypertrophy with repolarization abnormalityCannot rule out Septal infarct , age undeterminedConfirmed by Estela GORDON MD ([id number]) on 4/22/2021 7:23:15 AM     Most recent imaging studies revealed:     MRI brain  1. Multifocal acute infarctions in the parasagittal left frontal lobe within   the distal left anterior cerebral artery vascular territory.  No associated   hemorrhage. 2. 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 3. Diffuse parenchymal volume loss and sequela of moderate chronic   microvascular ischemic changes. 4. Old infarctions in the basal ganglia, thalami, and right cerebellum.    The findings were sent to the Radiology Results [address] at 12:03 pm on 4/22/2021to be communicated to a licensed caregiver.         CTA head and neck  1. No acute intracranial abnormality. 2. Chronic and age related changes including age-appropriate cerebral volume   loss and nonspecific white matter hypodensities which are likely the sequela   of chronic small vessel ischemic disease. 3. No hemodynamically significant stenosis involving the head and neck   arteries.            Xray right knee  1. No acute osseous abnormality of the right knee. 2. Mild tricompartmental degenerative changes with trace joint effusion.            On my review, CXR displays no consolidations or effusions. The above laboratory data have been reviewed. The above imaging data have been reviewed. The above medical testing have been reviewed. There is no height or weight on file to calculate BMI. POST ADMISSION PHYSICIAN EVALUATION  The patient has agreed to being admitted to our comprehensive inpatient rehabilitation facility and can tolerate the intensity of service consisting of at least:  --180 minutes of therapy a day, 5 out of 7 days a week. OR  --15 hours of intensive therapy within a 7 consecutive day period. The patient/family has a good understanding of our discharge process and will benefit from an interdisciplinary inpatient rehabilitation program. The patient has potential to make improvement and is in need of at least two of the following multidisciplinary therapies including but not limited to physical, occupational, respiratory, and speech, nutritional services, wound care, and prosthetics and orthotics. Given the patients complex condition and risk of further medical complications, rehabilitation services cannot be safely provided at a lower level of care such as a skilled nursing facility. All of the goals listed below were reviewed with the patient and he/she is in agreement.     I have compared the patients medical and functional status at the time of the preadmission screening and the same on this date, and there are no significant changes. By signing this document, I acknowledge that I have personally performed a full physical examination on this patient within 24 hours of admission to this inpatient rehabilitation facility and have determined the patient to be able to tolerate the above course of treatment at an intensive level for a reasonable period of time. I will be completing a detailed individualized  Plan of Care for this patient by day four of the patients stay based upon the Preadmission Screen, this Post-Admission Evaluation, and the therapy evaluations. Barriers: right hemiparesis, aphasia, dysphagia, medical comorbidities  Services Required: PT, OT, SLP  Goals: Luisa-Miriam for mobility and ADLs  Prognosis: Good  Anticipated Dispo: home with sig other and elderly mother  ELOS: 3 weeks    Rehabilitation Diagnosis:   Stroke, 1.2, Right Body (L Brain)      Assessment and Plan:    Impairments:right hemiparesis, aphasia, dysphagia    Acute ischemic Left LEXA stroke  -Secondary ppx with DAPT x 30 days (then monotherapy), statin  -Telemetry  -PT/OT/SLP    Dysphagia   -Dietitian and SLP consults.   -Currently on regular + thins    HTN, uncontrolled  -resume home atenolol, HCTZ  -consider resuming lisinopril, Imdur pending BP trend  -prn hydralazine    CAD  -DAPT, statin, bb    OA  -acetaminophen    Anxiety  -sertraline    Bladder   -High risk retention   -Monitor PVRs, SC prn >300cc  -Flomax    Bowel   -High risk constipation   -senna+colace BID, PRN miralax, MoM, and bisacodyl supp. Safety   -fall and aspiration precautions    DVT ppx  -lovenox    FULL CODE    Nathan Mccoy MD 4/23/2021, 11:20 PM

## 2021-04-24 NOTE — PROGRESS NOTES
Patient admitted to rehab with CVA. A&Ox2 with expressive aphasia. Pt currently on telemetry. Transfers with stedy x2 assist. On general, low sodium diet, tolerating well. Medications taken whole with thin liquids. On Lovenox for DVT prophylaxis. Pt has scattered bruising/abrasions. On room air. Has been incontinent of bowel and bladder. LBM 4/24/2021. Chair/bed alarms in use and call light in reach. Will continue to monitor.

## 2021-04-24 NOTE — PROCEDURES
[address] [place] [address]                          ELECTROENCEPHALOGRAM REPORT    PATIENT NAME: Elisa Vasquez                   :        1955  MED REC NO:   [id number]                          ROOM:       [id number]  ACCOUNT NO:   [de-identified]                           ADMIT DATE: 2021  PROVIDER:     Hugh Santos DO    DATE OF EE2021    REFERRING PROVIDER:  Lord Al NP    REASON FOR STUDY:  Focal seizures. BRIEF HISTORY AND NEUROLOGIC FINDINGS:  The patient is a 50-year-old  male being evaluated for reported focal seizures following an acute  stroke. MEDICATIONS:  The patient's centrally acting medications are unknown. EEG FINDINGS:  This is a 20-channel digital EEG performed utilizing  bipolar and referential montages. Wakefulness, drowsiness, and stage 2  sleep were obtained during the recording. During maximum wakefulness,  there is a moderate voltage, symmetric, fairly well-regulated, and  reactive 8 to 9 Hz posterior background rhythm. The anterior background  consists of low voltage fast frequencies. Drowsiness is manifested by  attenuation of the awakening background rhythms. Sleep was manifested  by sleep spindles and K-complexes. Rare left central/parasagittal sharp waves were noted during the  recording. Photic stimulation was performed at various flash frequencies and did  not evoke a significant posterior driving response. Hyperventilation  exercise was not performed due to the patient's age and/or clinical  history. A one-channel EKG rhythm strip was reviewed and showed no obvious  cardiac dysrhythmias. EEG DIAGNOSIS:  This EEG is abnormal due to the presence of rare left  central/parasagittal sharp waves.     CLINICAL INTERPRETATION:  The focal sharp waves are nonspecific though  suggest at least the possibility of an area of focal cortical  irritability in the

## 2021-04-24 NOTE — PROGRESS NOTES
Physical Therapy    Facility/Department: [address] IP REHAB  Initial Assessment/Treatment Session    NAME: Kyle Peña  : 1955  MRN: [id number]    Date of Service: 2021    Discharge Recommendations:  Patient would benefit from continued therapy after discharge, Continue to assess pending progress   PT Equipment Recommendations  Other: Will likely require wc upon D/C. Assessment   Body structures, Functions, Activity limitations: Decreased functional mobility ; Decreased strength;Decreased endurance;Decreased sensation;Decreased balance  Assessment: Pt is a 76 y.o. M. admitted  to [facility] s/p CVA. He presents with no active RUE shoulder, elbow, or wrist motion, and minimal motion in R fingers. PT noted 1/5 strength in R hip extensors and adductors. Pt unable to elicit any other active motion in RLE. Based on his extreme weakness, and increased time to respond to questions/commands, pt required total assist to move from chair to bed (stedy), and to move sit > supine in bed. PT recommends use of maxi-move for transfers at this time. Pt would benefit from 4 weeks of rehab to gradually progress EOB/OOB activity, and gain independence with functional mobility tasks as able. Treatment Diagnosis: impaired functional mobility  Specific instructions for Next Treatment: Transfers; practice wc mobility; strengthen RLE as able  Prognosis: Guarded  Decision Making: High Complexity  Exam: Strength; ROM; Muscle tone; Balance; Transfers  Clinical Presentation: Unstable  PT Education: PT Role;Goals; Functional Mobility Training;General Safety;Home Exercise Program;Disease Specific Education;Plan of Care  Barriers to Learning: Severely delayed processing; minimal motion of RUE/RLE  REQUIRES PT FOLLOW UP: Yes  Activity Tolerance  Activity Tolerance: Patient limited by fatigue;Patient limited by endurance; Patient limited by cognitive status; Patient limited by pain       Patient Diagnosis(es): There were no encounter diagnoses. has a past medical history of Acute MI ([facility] [address] Anxiety, Arthritis, CAD (coronary artery disease), Hypertension, and Influenza A.   has a past surgical history that includes back surgery (N/A, 1989) and Coronary angioplasty with stent (9/13). Restrictions  Restrictions/Precautions  Restrictions/Precautions: Fall Risk  Position Activity Restriction  Other position/activity restrictions: R hemiplegia and increased tone, exp. aphasia     Vision/Hearing  Vision: Impaired  Vision Exceptions: Wears glasses for reading  Hearing: Within functional limits       Subjective  General  Chart Reviewed: Yes  Patient assessed for rehabilitation services?: Yes  Additional Pertinent Hx: Pt is 75 y/o M admitted 4/21/21 to ED with stroke like symptoms affecting R side ongoing ~12hours. MRI revealed multifocal acute infarctions in the parasagital left front along distal L LEXA territory with occluded/thrombosed A3 segment of L LEXA. MRI also revealed old infarction in basal ganglia, thalami, R cerebellum. PMHx significant for CAD, Angio w/ stent, MI, lumbar sx, HTN. Pt is 75 y/o M admitted 4/21/21 to ED with stroke like symptoms affecting R side ongoing ~12hours. MRI revealed multifocal acute infarctions in the parasagital left front along distal L LEXA territory with occluded/thrombosed A3 segment of L LEXA. MRI also revealed old infarction in basal ganglia, thalami, R cerebellum. PMHx significant for CAD, Angio w/ stent, MI, lumbar sx, HTN. Response To Previous Treatment: Not applicable  Family / Caregiver Present: No  Referring Practitioner: Dr. Ricky Gomez  Referral Date : 04/23/21  Diagnosis: CVA  Follows Commands: Impaired  Other (Comment): increased time processing but able to accurately  Subjective  Subjective: Pt sitting up in chair. Extremely delayed response time. Agreeable to activity.   Pain Screening  Patient Currently in Pain: Yes(R wrist and R knee; patient with difficulty rating pain at this time)    Orientation  Orientation  Overall Orientation Status: Within Functional Limits(Oriented to person, place, time, situation. Very long response time.)     Social/Functional History  Social/Functional History  Lives With: Significant other(Sign other Truitt Burkitt))  Type of Home: House  Home Layout: Two level, Able to Live on Main level with bedroom/bathroom, Laundry in basement(1 story with basement.)  Home Access: Stairs to enter without rails(1 step to enter.)  Bathroom Shower/Tub: Tub/Shower unit(Tub Shower main level and basement.)  Bathroom Toilet: Standard  Bathroom Equipment: Grab bars in shower  Bathroom Accessibility: Accessible  Home Equipment: (No DME.)  ADL Assistance: Independent  Homemaking Assistance: (Shared with sign other.)  Homemaking Responsibilities: (shared with significant other prior to admission)  Ambulation Assistance: Independent(Without assist device.)  Transfer Assistance: Independent  Active : Yes  Occupation: Retired  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 \"Need to find something to do. \"    Objective    PROM RLE (degrees)  RLE General PROM: Hip Flex, Knee Flex/Ext, and Ankle PF/DF WNL  AROM RLE (degrees)  RLE General AROM: No Active R LE throughout. AROM LLE (degrees)  LLE AROM : WNL  LLE General AROM: Hip Flex, Knee Flex/Ext, and Ankle PF/DF WNL  PROM RUE (degrees)  RUE General PROM: Shoulder Flex, ABD, Elbow Flex/ext WFL. Severe pain noted with attempt to flex/ext R wrist.  AROM RUE (degrees)  RUE General AROM: No active shoulder, elbow, or wrist motion. Minimal PIP/DIP flexion noted fingers 1-5 (able to initiate minimal motion on command).   AROM LUE (degrees)  LUE AROM : WNL  LUE General AROM: Shoulder Flex, ABd, Elbow Flex/Ext WNL    Strength RLE  Strength RLE: Exception  R Hip Flexion: 0/5  R Hip Extension: 1/5(Max cues to initiate)  R Hip ABduction: 0/5  R Hip ADduction: 1/5  R Knee Flexion: 0/5  R Knee Extension: 0/5  R Ankle Dorsiflexion: 0/5  R Ankle Plantar flexion: 0/5    Strength LLE  Strength LLE: WFL  Comment: Hip Flex, Knee Flex/Ext, ankle DF grossly 5/5  Strength RUE  Strength RUE: Exception  Comment: Trace DIP/PIP Flexion strength fingers 1-5. R Shoulder Flexion: 0/5  R Shoulder ABduction: 0/5  R Shoulder External Rotation: 0/5  R Elbow Flexion: 0/5  R Elbow Extension: 0/5  R Wrist Flexion: 0/5(Severe pain with attempts at motion)  R Wrist Extension: 0/5(Severe pain with attempts at motion)  Strength LUE  Comment: Shoulder Flex, ABD, Elbow Flex/Ext WNL    Tone RLE  RLE Tone: Hypertonic  Tone Description: Multiple beats of clonus noted R ankle. RLE Modified Rubin Scale  RLE Modified Rubin Scale Completed: Yes  RLE Modified Rubin Scale  RLE Knee Flexors Score: 3  RLE Knee Extensors Score: (Hypotonic)  Tone LLE  LLE Tone: Normotonic     Bed mobility  Sit to Supine: Dependent/Total(Bed flat)     Transfers  Sit to Stand: Dependent/Total(To stedy)  Stand to sit: Dependent/Total(From stedy)  Bed to Chair: Dependent/Total(Using stedy; Recommend maxi move for future transfers)     Ambulation  Ambulation?: No  Stairs/Curb  Stairs?: No     Balance  Comments: Min-CGA to maintain sitting balance at EOB. Total assist to maintain standing balance at stedy. Pt limited d/t fatigue. Other: PT noted pt's brief and fozia-area were soiled with dried feces. PT provided Mod A, and pt was able to participate in washing of fozia-area. PT provided max A for pt to don new brief.      Plan   Plan  Times per week: 5-6x  Times per day: Twice a day  Plan weeks: 4 weeks  Specific instructions for Next Treatment: Transfers; practice wc mobility; strengthen RLE as able  Current Treatment Recommendations: Strengthening, ROM, Balance Training, Endurance Training, Functional Mobility Training, Wheelchair Mobility Training, Transfer Training, Gait Training, Stair training, Positioning, Pain Management, Equipment Evaluation, Education, & procurement, Modalities, Patient/Caregiver Education & Training, Safety Education & Training, Home Exercise Program, Neuromuscular Re-education  Safety Devices  Type of devices: All fall risk precautions in place, Call light within reach, Chair alarm in place, Gait belt, Left in bed, Nurse notified  Restraints  Initially in place: No    Goals  Short term goals  Time Frame for Short term goals: In 2 weeks pt will perform  Short term goal 1: Bed mobility Mod A  Short term goal 2: Transfers Mod A  Short term goal 3: Propel wc 48' with Mod A  Short term goal 4: Ascend/Descend curb step with LRAD, Mod A  Long term goals  Time Frame for Long term goals :  In 4 weeks pt will perform  Long term goal 1: Bed mobility CGA  Long term goal 2: Transfers CGA  Long term goal 3: Propel wc 48' with CGA-SBA  Long term goal 4: Ascend/Descend curb step with LRAD, Min A  Patient Goals   Patient goals : Did not state today (minimally verbal)       Therapy Time   Individual Concurrent Group Co-treatment   Time In 1230         Time Out 1320         Minutes 50         Timed Code Treatment Minutes: 50 Minutes   High Complexity Evaluation    Levi Tai PT    Electronically signed by Levi Tai, PT [id number] on 4/24/2021 at 1:41 PM

## 2021-04-24 NOTE — CONSULTS
29.9  · Adjusted Body Weight:  ; No Adjustment   · BMI Categories: Overweight (BMI 25.0-29. 9)       Nutrition Diagnosis:   · Inadequate oral intake related to (poor appetite) as evidenced by poor intake prior to admission      Nutrition Interventions:   Food and/or Nutrient Delivery:  Continue Current Diet, Start Oral Nutrition Supplement  Nutrition Education/Counseling:  No recommendation at this time   Coordination of Nutrition Care:  Continue to monitor while inpatient    Goals:   Tolerate diet and consume greater than 50% of meals and supplements this admission       Nutrition Monitoring and Evaluation:   Behavioral-Environmental Outcomes:  None Identified   Food/Nutrient Intake Outcomes:  Food and Nutrient Intake, Supplement Intake  Physical Signs/Symptoms Outcomes:  Biochemical Data, Chewing or Swallowing, Nausea or Vomiting, Nutrition Focused Physical Findings, Skin, Weight     Discharge Planning:    No discharge needs at this time     Electronically signed by Kenzie Serna RD, LD on 4/24/21 at 12:23 PM EDT    Contact: [phone]

## 2021-04-25 PROCEDURE — 6360000002 HC RX W HCPCS: Performed by: PHYSICAL MEDICINE & REHABILITATION

## 2021-04-25 PROCEDURE — 94761 N-INVAS EAR/PLS OXIMETRY MLT: CPT

## 2021-04-25 PROCEDURE — 6370000000 HC RX 637 (ALT 250 FOR IP): Performed by: PHYSICAL MEDICINE & REHABILITATION

## 2021-04-25 PROCEDURE — 1280000000 HC REHAB R&B

## 2021-04-25 RX ADMIN — LISINOPRIL 10 MG: 10 TABLET ORAL at 08:05

## 2021-04-25 RX ADMIN — ATENOLOL 50 MG: 50 TABLET ORAL at 08:05

## 2021-04-25 RX ADMIN — SERTRALINE 150 MG: 100 TABLET, FILM COATED ORAL at 08:05

## 2021-04-25 RX ADMIN — ASPIRIN 81 MG: 81 TABLET, COATED ORAL at 08:05

## 2021-04-25 RX ADMIN — TAMSULOSIN HYDROCHLORIDE 0.4 MG: 0.4 CAPSULE ORAL at 08:05

## 2021-04-25 RX ADMIN — ENOXAPARIN SODIUM 40 MG: 40 INJECTION SUBCUTANEOUS at 08:06

## 2021-04-25 RX ADMIN — ATORVASTATIN CALCIUM 80 MG: 80 TABLET, FILM COATED ORAL at 21:51

## 2021-04-25 RX ADMIN — TRAZODONE HYDROCHLORIDE 50 MG: 50 TABLET ORAL at 21:51

## 2021-04-25 RX ADMIN — HYDROCHLOROTHIAZIDE 25 MG: 25 TABLET ORAL at 08:05

## 2021-04-25 RX ADMIN — ACETAMINOPHEN 650 MG: 325 TABLET ORAL at 00:13

## 2021-04-25 RX ADMIN — CLOPIDOGREL BISULFATE 75 MG: 75 TABLET ORAL at 08:05

## 2021-04-25 RX ADMIN — ACETAMINOPHEN 650 MG: 325 TABLET ORAL at 08:05

## 2021-04-25 ASSESSMENT — PAIN DESCRIPTION - DESCRIPTORS
DESCRIPTORS: DISCOMFORT
DESCRIPTORS: ACHING

## 2021-04-25 ASSESSMENT — PAIN DESCRIPTION - FREQUENCY
FREQUENCY: CONTINUOUS
FREQUENCY: CONTINUOUS

## 2021-04-25 ASSESSMENT — PAIN DESCRIPTION - LOCATION
LOCATION: NECK
LOCATION: NECK

## 2021-04-25 ASSESSMENT — PAIN DESCRIPTION - ONSET: ONSET: ON-GOING

## 2021-04-25 ASSESSMENT — PAIN - FUNCTIONAL ASSESSMENT: PAIN_FUNCTIONAL_ASSESSMENT: ACTIVITIES ARE NOT PREVENTED

## 2021-04-25 ASSESSMENT — PAIN SCALES - GENERAL: PAINLEVEL_OUTOF10: 0

## 2021-04-25 NOTE — PLAN OF CARE
Problem: Falls - Risk of:  Goal: Will remain free from falls  Description: Will remain free from falls  4/25/2021 1012 by Nenita Wolfe RN  Outcome: Ongoing  Note: Fall risk assessment completed. Fall precautions in place. Call light within reach. Pt educated on calling for assistance before getting up. Walkway free of clutter. Will continue to monitor. Problem: HEMODYNAMIC STATUS  Goal: Patient has stable vital signs and fluid balance  4/25/2021 1012 by Nenita Wolfe RN  Outcome: Ongoing  Note: Pt's daily loya and I&O being monitored. VSS. Pt currently on low-sodium diet. CVA education being given. Will continue to educate and monitor. Problem: ACTIVITY INTOLERANCE/IMPAIRED MOBILITY  Goal: Mobility/activity is maintained at optimum level for patient  4/25/2021 1012 by Nenita Wolfe RN  Outcome: Ongoing  Note: Pt continues with therapy. Pt is x2 assist with maxi-move. Will continue to monitor. Problem: COMMUNICATION IMPAIRMENT  Goal: Ability to express needs and understand communication  4/25/2021 1012 by Nenita Wolfe RN  Outcome: Ongoing  Note: Pt is A&Ox2 with expressive aphasia. Call light within reach at all times. RN/PCA doing hourly rounds. Needs are being met this shift. Will continue to monitor. Problem: Skin Integrity:  Goal: Absence of new skin breakdown  Description: Absence of new skin breakdown  4/25/2021 1012 by Nenita Wolfe RN  Outcome: Ongoing  Note: Patient's skin was assessed. Skin is currently intact and no new findings were noted. Patient is being educated on importance of turning/repostioning at least every two hours. RN will continue to educate and monitor. Problem: Pain:  Goal: Control of acute pain  Description: Control of acute pain  4/25/2021 1012 by Nenita Wolfe RN  Outcome: Ongoing  Note: Pt assessed for pain. Pt in pain and assessed with 0-10 pain rating scale. Pt given prescribed analgesic for pain.  (See eMar) Pt satisfied with pain relief thus far. Will reassess and continue to monitor.

## 2021-04-25 NOTE — PLAN OF CARE
Problem: Falls - Risk of:  Goal: Will remain free from falls  Description: Will remain free from falls  Outcome: Ongoing  Note: Patient free from falls this shift. Fall precautions in place at all times. Bed in lowest position with two side rails up and wheels locked. Call light within reach. Patient able and agreeable to contact for safety appropriately. Problem: HEMODYNAMIC STATUS  Goal: Patient has stable vital signs and fluid balance  Outcome: Ongoing  Note: VSS. Peripheral pulse palpable. Skin pink, dry and warm. I&O monitor. Daily weight. Medication given as ordered. Education provided. Will continue to monitor. Problem: Skin Integrity:  Goal: Absence of new skin breakdown  Description: Absence of new skin breakdown  Outcome: Ongoing  Note: Skin assessment performed each shift per protocol. Patient turned and repositioned every two hours and prn with pillow support. Patient checked for incontence every two hours. Skin remains intact. Problem: Pain:  Description: Pain management should include both nonpharmacologic and pharmacologic interventions. Goal: Control of acute pain  Description: Control of acute pain  Outcome: Ongoing  Note: Pt able to express presence/absence of pain and rate pain appropriately using numerical scale. Pain/discomfort being managed with PRN analgesics per MD orders (see MAR). Pain assessed every shift and after interventions.

## 2021-04-25 NOTE — PROGRESS NOTES
Patient sleeping at intervals. Patient is AAO x 4 but does have some delayed responses and aphasia. Admitted to ARU s/p CVA. Lungs clear and diminished, respirations easy, and patient noted with an occasional cough, varies from dry to moist.  BS are active with last BM charted 4/24, which was a large loose incontinent stool. Held evening senokot. Male pure wick catheter in place and hooked to suction. Patient remains incontinent of urine. Takes medications whole with thin liquids without difficulty. Right arm and leg with very limited movement. Right arm elevated on a pillow. Has a weak  with right hand. BP continues to run elevated, prn apresoline given at 2103 for a BP of 162/99. RAC PIV flushed easily, dressing is clean, dry, and intact. Patient with complaints of generalized pain to multiple locations. Neck pain rated 8 on 1-10 pain scale, medicated with prn Tylenol at 0013 per request.  Patient also states some mild pain to right wrist and left knee. Transfer with maxi-move, not out of bed this evening. Dr. Karly Leung notified, patient requesting a stronger sleep medication, new order for Trazadone. Call light and personal belongings are within reach. Fall precautions are in place. Will continue to monitor and assist as needed.

## 2021-04-25 NOTE — PROGRESS NOTES
Patient admitted to rehab with CVA. A&Ox2 with expressive aphasia. Pt currently on telemetry. Transfers with maxi-move x2 assist. On general, low sodium diet, tolerating well. Medications taken whole with thin liquids. On Lovenox for DVT prophylaxis. Pt has scattered bruising/abrasions. On room air. Has been incontinent of bowel and bladder, male purewick in use while in bed. LBM 4/24/2021. Pt rates pain/discomfort 3/10, tylenol given per pt request. Chair/bed alarms in use and call light in reach. Will continue to monitor.

## 2021-04-26 ENCOUNTER — APPOINTMENT (OUTPATIENT)
Dept: GENERAL RADIOLOGY | Age: 66
DRG: 057 | End: 2021-04-26
Attending: PHYSICAL MEDICINE & REHABILITATION
Payer: MEDICARE

## 2021-04-26 LAB
ANION GAP SERPL CALCULATED.3IONS-SCNC: 14 MMOL/L (ref 3–16)
BASOPHILS ABSOLUTE: 0.1 K/UL (ref 0–0.2)
BASOPHILS RELATIVE PERCENT: 1 %
BILIRUBIN URINE: ABNORMAL
BLOOD, URINE: NEGATIVE
BUN BLDV-MCNC: 26 MG/DL (ref 7–20)
CALCIUM SERPL-MCNC: 9.2 MG/DL (ref 8.3–10.6)
CHLORIDE BLD-SCNC: 102 MMOL/L (ref 99–110)
CLARITY: ABNORMAL
CO2: 24 MMOL/L (ref 21–32)
COLOR: ABNORMAL
COMMENT UA: NORMAL
CREAT SERPL-MCNC: 0.7 MG/DL (ref 0.8–1.3)
EOSINOPHILS ABSOLUTE: 0.1 K/UL (ref 0–0.6)
EOSINOPHILS RELATIVE PERCENT: 1 %
EPITHELIAL CELLS, UA: 0 /HPF (ref 0–5)
GFR AFRICAN AMERICAN: >60
GFR NON-AFRICAN AMERICAN: >60
GLUCOSE BLD-MCNC: 133 MG/DL (ref 70–99)
GLUCOSE URINE: NEGATIVE MG/DL
HCT VFR BLD CALC: 44 % (ref 40.5–52.5)
HEMOGLOBIN: 14.9 G/DL (ref 13.5–17.5)
HYALINE CASTS: 0 /LPF (ref 0–8)
KETONES, URINE: NEGATIVE MG/DL
LEUKOCYTE ESTERASE, URINE: NEGATIVE
LYMPHOCYTES ABSOLUTE: 1.4 K/UL (ref 1–5.1)
LYMPHOCYTES RELATIVE PERCENT: 11.6 %
MCH RBC QN AUTO: 31.5 PG (ref 26–34)
MCHC RBC AUTO-ENTMCNC: 33.8 G/DL (ref 31–36)
MCV RBC AUTO: 93.2 FL (ref 80–100)
MICROSCOPIC EXAMINATION: YES
MONOCYTES ABSOLUTE: 1.1 K/UL (ref 0–1.3)
MONOCYTES RELATIVE PERCENT: 9.2 %
NEUTROPHILS ABSOLUTE: 9.4 K/UL (ref 1.7–7.7)
NEUTROPHILS RELATIVE PERCENT: 77.2 %
NITRITE, URINE: NEGATIVE
PDW BLD-RTO: 15.2 % (ref 12.4–15.4)
PH UA: 6 (ref 5–8)
PLATELET # BLD: 148 K/UL (ref 135–450)
PMV BLD AUTO: 8.1 FL (ref 5–10.5)
POTASSIUM REFLEX MAGNESIUM: 3.6 MMOL/L (ref 3.5–5.1)
PROTEIN UA: ABNORMAL MG/DL
RBC # BLD: 4.72 M/UL (ref 4.2–5.9)
RBC UA: 1 /HPF (ref 0–4)
SODIUM BLD-SCNC: 140 MMOL/L (ref 136–145)
SPECIFIC GRAVITY UA: 1.03 (ref 1–1.03)
URINE REFLEX TO CULTURE: ABNORMAL
URINE TYPE: ABNORMAL
UROBILINOGEN, URINE: 1 E.U./DL
WBC # BLD: 12.2 K/UL (ref 4–11)
WBC UA: 1 /HPF (ref 0–5)

## 2021-04-26 PROCEDURE — 97530 THERAPEUTIC ACTIVITIES: CPT | Performed by: PHYSICAL THERAPIST

## 2021-04-26 PROCEDURE — 80048 BASIC METABOLIC PNL TOTAL CA: CPT

## 2021-04-26 PROCEDURE — 85025 COMPLETE CBC W/AUTO DIFF WBC: CPT

## 2021-04-26 PROCEDURE — 6370000000 HC RX 637 (ALT 250 FOR IP): Performed by: PHYSICAL MEDICINE & REHABILITATION

## 2021-04-26 PROCEDURE — 92526 ORAL FUNCTION THERAPY: CPT

## 2021-04-26 PROCEDURE — 97530 THERAPEUTIC ACTIVITIES: CPT

## 2021-04-26 PROCEDURE — 97112 NEUROMUSCULAR REEDUCATION: CPT

## 2021-04-26 PROCEDURE — 81001 URINALYSIS AUTO W/SCOPE: CPT

## 2021-04-26 PROCEDURE — 71045 X-RAY EXAM CHEST 1 VIEW: CPT

## 2021-04-26 PROCEDURE — 97110 THERAPEUTIC EXERCISES: CPT | Performed by: PHYSICAL THERAPIST

## 2021-04-26 PROCEDURE — 92507 TX SP LANG VOICE COMM INDIV: CPT

## 2021-04-26 PROCEDURE — 6360000002 HC RX W HCPCS: Performed by: PHYSICAL MEDICINE & REHABILITATION

## 2021-04-26 PROCEDURE — 97542 WHEELCHAIR MNGMENT TRAINING: CPT

## 2021-04-26 PROCEDURE — 36415 COLL VENOUS BLD VENIPUNCTURE: CPT

## 2021-04-26 PROCEDURE — 97116 GAIT TRAINING THERAPY: CPT

## 2021-04-26 PROCEDURE — 1280000000 HC REHAB R&B

## 2021-04-26 RX ORDER — BACLOFEN 10 MG/1
5 TABLET ORAL 3 TIMES DAILY
Status: DISCONTINUED | OUTPATIENT
Start: 2021-04-26 | End: 2021-05-10

## 2021-04-26 RX ORDER — ISOSORBIDE MONONITRATE 30 MG/1
30 TABLET, EXTENDED RELEASE ORAL DAILY
Status: DISCONTINUED | OUTPATIENT
Start: 2021-04-27 | End: 2021-05-07

## 2021-04-26 RX ADMIN — HYDROCHLOROTHIAZIDE 25 MG: 25 TABLET ORAL at 08:05

## 2021-04-26 RX ADMIN — CLOPIDOGREL BISULFATE 75 MG: 75 TABLET ORAL at 08:07

## 2021-04-26 RX ADMIN — ATORVASTATIN CALCIUM 80 MG: 80 TABLET, FILM COATED ORAL at 20:55

## 2021-04-26 RX ADMIN — SERTRALINE 150 MG: 100 TABLET, FILM COATED ORAL at 08:05

## 2021-04-26 RX ADMIN — LISINOPRIL 10 MG: 10 TABLET ORAL at 08:05

## 2021-04-26 RX ADMIN — BACLOFEN 5 MG: 10 TABLET ORAL at 12:57

## 2021-04-26 RX ADMIN — ATENOLOL 50 MG: 50 TABLET ORAL at 08:06

## 2021-04-26 RX ADMIN — TAMSULOSIN HYDROCHLORIDE 0.4 MG: 0.4 CAPSULE ORAL at 08:07

## 2021-04-26 RX ADMIN — BACLOFEN 5 MG: 10 TABLET ORAL at 20:55

## 2021-04-26 RX ADMIN — TRAZODONE HYDROCHLORIDE 50 MG: 50 TABLET ORAL at 20:55

## 2021-04-26 RX ADMIN — ASPIRIN 81 MG: 81 TABLET, COATED ORAL at 08:05

## 2021-04-26 RX ADMIN — ENOXAPARIN SODIUM 40 MG: 40 INJECTION SUBCUTANEOUS at 08:07

## 2021-04-26 ASSESSMENT — PAIN SCALES - GENERAL
PAINLEVEL_OUTOF10: 0

## 2021-04-26 ASSESSMENT — PAIN SCALES - WONG BAKER: WONGBAKER_NUMERICALRESPONSE: 0

## 2021-04-26 NOTE — PLAN OF CARE
Problem: Falls - Risk of:  Goal: Will remain free from falls  Description: Will remain free from falls  4/26/2021 1012 by Leo Barrera RN  Note: Fall risk band on patient. Yellow light on outside of room. Non skid footwear in place. Alarms used appropriately. Patient instructed to call and wait for staff before getting up. Rounding done to anticipate needs. Appropriate safety devices used for transfers. Problem: ACTIVITY INTOLERANCE/IMPAIRED MOBILITY  Goal: Mobility/activity is maintained at optimum level for patient  4/26/2021 1012 by Leo Barrera RN  Note: Patient working with therapy at least 3 hours/day to obtain maximal mobility. Safety devices used. Problem: Skin Integrity:  Goal: Will show no infection signs and symptoms  Description: Will show no infection signs and symptoms  Note: Skin assessment completed on admission and every shift. Barrier wipes used in the event of incontinence. Pressure relief techniques used as needed while in chair and in bed. Position changes encouraged at least every two hours while in bed.

## 2021-04-26 NOTE — PROGRESS NOTES
Occupational Therapy  Facility/Department: [address] REHAB  Daily Treatment Note  NAME: Bill Foster  : 1955  MRN: [id number]    Date of Service: 2021    Discharge Recommendations:  Continue to assess pending progress, Patient would benefit from continued therapy after discharge  OT Equipment Recommendations  Other: defer to next level of care    Assessment   Performance deficits / Impairments: Decreased functional mobility ; Decreased strength;Decreased endurance;Decreased ADL status; Decreased high-level IADLs;Decreased balance;Decreased ROM; Decreased sensation;Decreased coordination;Decreased fine motor control  Assessment: Pt tolerated session fair to poor this date - minimally verbal, appears distracted - unsure if patient understood verbal cueing. Pt with assist completed self-PROM, therapist completed joint compressions as well as weight bearing on elbows. Pt requiring assist of 2-3 for sit <> stand with assist to stabilize RLE. Pt would benefit from continued intense therapy. Continue with POC. OT Education: OT Role;Plan of Care;Transfer Training;Precautions; ADL Adaptive Strategies  Barriers to Learning: exp aphasia  REQUIRES OT FOLLOW UP: Yes  Activity Tolerance  Activity Tolerance: Patient Tolerated treatment well;Patient limited by pain  Safety Devices  Safety Devices in place: Yes(Left in care of SLP)  Type of devices: Chair alarm in place;Gait belt         Patient Diagnosis(es): There were no encounter diagnoses. has a past medical history of Acute MI ([facility] [address] Anxiety, Arthritis, CAD (coronary artery disease), Hypertension, and Influenza A.   has a past surgical history that includes back surgery (N/A, ) and Coronary angioplasty with stent (). Restrictions  Restrictions/Precautions  Restrictions/Precautions: Fall Risk  Position Activity Restriction  Other position/activity restrictions: R hemiplegia and increased tone RLE, exp.  aphasia     Subjective   General  Chart digit. Weight Bearing  Weight Bearing Technique: Yes  RUE Weight Bearing: Forearm seated  Response To Weight Bearing Technique: Pt completed weight bearing on bilateral elbows with focus on R side. Pt was seated in w/c at table bearing through forearms and elbows shifting L and R x10 reps x3 sets. Pt did fatigue quickly requiring rest breaks in between - likely d/t use of trunk muscles while leaning forward at table. Heavily supporting forward posture with  of L hand on table. Cognition  Cognition Comment: Pt more delayed this date - continues to have expressive aphasia with a delay in response. Compared to 4/23 pt is less verbal and appears to have less outward signs of understanding of education provided. 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 Type of ROM/Therapeutic Exercise  Comment: Completed PROM exercises on RUE, also educated pt on self PROM for RUE with use of LUE.  Pt voiced understanding and demonstrated this x3 sets for elbow flexion/extension, wrist flexion/extension, and digit flexion/extension x10 reps each           Plan   Plan  Times per week: 5-6  Times per day: Twice a day  Plan weeks: 3-4  Current Treatment Recommendations: Strengthening, Endurance Training, Balance Training, ROM, Functional Mobility Training, Safety Education & Training, Gait Training, Self-Care / ADL, Patient/Caregiver Education & Training, Neuromuscular Re-education, Home Management Training, Equipment Evaluation, Education, & procurement          Goals  Short term goals  Time Frame for Short term goals: 1 week  Short term goal 1: Pt will be min A with functional transfers  Short term goal 2: Pt will be min A with functional mobility  Short term goal 3: Pt will be min A with bed mobility  Short term goal 4: Pt will be mod A with bathing and dressing  Short term goal 5: Pt will tolerate UE neuromuscular reeducation to increase function in RUE for assist with ADLs  Long term goals  Time Frame for Long term goals : 3-4 weeks  Long term goal 1: Pt will be mod I with functional transfers  Long term goal 2: Pt will be mod I with functional mobility  Long term goal 3: Pt will be mod I with bed mobility  Long term goal 4: Pt will be mod I with bathing and dressing  Long term goal 5: Pt will be mod I with toileting  Patient Goals   Patient goals : Pt nodded agreement when asked if he wanted to be more independent       Therapy Time   Individual Concurrent Group Co-treatment   Time In 1345         Time Out 1430         Minutes 45         Timed Code Treatment Minutes: [address] [facility], [address], [address] [facility]

## 2021-04-26 NOTE — PROGRESS NOTES
Physical Therapy  Facility/Department: [address] REHAB  Daily Treatment Note  NAME: Paramjit Ospina  : 1955  MRN: [id number]    Date of Service: 2021    Discharge Recommendations:  Patient would benefit from continued therapy after discharge, Continue to assess pending progress        Assessment   Body structures, Functions, Activity limitations: Decreased functional mobility ; Decreased strength;Decreased endurance;Decreased sensation;Decreased balance  Assessment: pt was very drowsy this session and was minimally verbal with very delayed processing; pt limited by slow processing and increased flex tone in his RLE. Pt needed mod/max A of 2-3 for sit to  stedy and mod/max A of 2 for stand to sit from stedy; he was able to practice wt shifting in steady with mod A of 2 for balance and assist to keep R foot on stedy due to increased tone. Pt will need extensive therapy to assist pt in attaining his max potential  Treatment Diagnosis: impaired functional mobility  Specific instructions for Next Treatment: Transfers; practice wc mobility; strengthen RLE as able  Prognosis: Guarded  History: Pt is 75 y/o M admitted 21 to ED with stroke like symptoms affecting R side ongoing ~12hours. MRI revealed multifocal acute infarctions in the parasagital left front along distal L LEXA territory with occluded/thrombosed A3 segment of L LEXA. MRI also revealed old infarction in basal ganglia, thalami, R cerebellum. PMHx significant for CAD, Angio w/ stent, MI, lumbar sx, HTN. Clinical Presentation: Unstable  Barriers to Learning: Severely delayed processing; R LE flex tone  REQUIRES PT FOLLOW UP: Yes  Activity Tolerance  Activity Tolerance: Patient limited by fatigue  Activity Tolerance: pt appeared very drowsy during session     Patient Diagnosis(es): There were no encounter diagnoses.      has a past medical history of Acute MI ([facility] [address] Anxiety, Arthritis, CAD (coronary artery disease), Hypertension, and parasagital left front along distal L LEXA territory with occluded/thrombosed A3 segment of L LEXA. MRI also revealed old infarction in basal ganglia, thalami, R cerebellum. PMHx significant for CAD, Angio w/ stent, MI, lumbar sx, HTN. Response To Previous Treatment: (pt minimally verbalizing; did shake his head yes when asked if he was ready to go to therapy but very delayed processing)  Subjective  Subjective: pt up in chair with lunch tray in front; pt only ate a few bites of grilled cheese sandwich, when soup poured into cup and given to him he drank it; drank tea when handed to him and ate a few more bites of sandwich when handed to him; appears to have some apraxia          Orientation  Orientation  Overall Orientation Status: (unable to eval as pt minimally verbal)  Cognition   Cognition  Cognition Comment: unable to assess as pt minimally verbal this session  Objective   Bed mobility  Comment: pt up in w/c at beginning and end of session  Transfers  Sit to Stand: Maximum Assistance; Moderate Assistance(2-3 with stedy)  Stand to sit: Moderate Assistance;Maximum Assistance;2 Person Assistance  Wheelchair Activities  Wheelchair Size: 20\"  Wheelchair Parts Management: Yes  Left Brakes Level of Assistance: Minimal assistance  Right Brakes Level of Assistance: Dependent/Total  Propulsion: Yes  Propulsion 1  Propulsion: Manual  Level: Level Tile  Method: LUE;LLE  Level of Assistance: Minimal assistance; Moderate assistance  Description/ Details: pt needing several cues to use his LLE to assist with w/c proulsion  Distance: 80' with very slow movement with assist to manage direction of w/c and propelling forward     Balance  Comments: pt having difficulty with leaning forward in w/c, needed min/mod A; stood in stedy with mod A of 2 and assist to maintain R foot on stedy; practiced standing wt shift but pt fatigued after shifting back and forth 10-15 times  Exercises  Comments: performed LE ex in sitting; needed assist with RLE as no active movement noted, increased tone therefore stretched R leg with knee ext and foot DF; pt needed frequent cues to complete LLE ex in sitting               Goals  Short term goals  Time Frame for Short term goals: In 2 weeks pt will perform  Short term goal 1: Bed mobility Mod A  Short term goal 2: Transfers Mod A  Short term goal 3: Propel wc 48' with Mod A  Short term goal 4: Ascend/Descend curb step with LRAD, Mod A  Long term goals  Time Frame for Long term goals : In 4 weeks pt will perform  Long term goal 1: Bed mobility CGA  Long term goal 2: Transfers CGA  Long term goal 3: Propel wc 48' with CGA-SBA  Long term goal 4: Ascend/Descend curb step with LRAD, Min A  Patient Goals   Patient goals : Did not state today (minimally verbal)    Plan    Plan  Times per week: 5-6x  Times per day: Twice a day  Plan weeks: 4 weeks  Specific instructions for Next Treatment: Transfers; practice wc mobility; strengthen RLE as able  Current Treatment Recommendations: Strengthening, ROM, Balance Training, Endurance Training, Functional Mobility Training, Wheelchair Mobility Training, Transfer Training, Gait Training, Stair training, Positioning, Pain Management, Equipment Evaluation, Education, & procurement, Modalities, Patient/Caregiver Education & Training, Safety Education & Training, Home Exercise Program, Neuromuscular Re-education  Safety Devices  Type of devices:  All fall risk precautions in place  Restraints  Initially in place: No     Therapy Time   Individual Concurrent Group Co-treatment   Time In 1300         Time Out 1345         Minutes 45         Timed Code Treatment Minutes: 48 Minutes       BISI CRAIN, PT

## 2021-04-26 NOTE — CARE COORDINATION
sherri spoke with Vanessa Ta, girl friend to initiate discussion regarding DC planning. SOCIAL WORK ASSESSMENT      GOAL:   To return home. HOME SITUATION:  Patient, Vanessa Ta (girlfriend) and his mother live in a house with one step entry; [address] His mother is 80years old and is able to care for herself. They have three dogs in the home. Vanessa Ta works out of the house and is gone pretty much during the week. She reports he will need to be independent to return home at discharge. He is active with Dr Estefania Wills for pcp needs and he fills his scripts at [facility] on [address] He was overseeing his mother's medications for her. He did the lawn jobs and his own medications. HE did NOT drive. Vanessa Ta is the only  of the three. He did take care of the dogs. Personal Goals:           PRIOR LEVEL OF FUNCTIONING:               PERSONAL CARE:     Totally independnt             Drives:   NO            Finances:  [employer]            Meals:   shared            Grocery Shopping:  indepent                                           DME CURRENTLY AT HOME:   Bar in [facility]. Has a borrowed wh walker that was becca's but it is small. CURRENT HOME CARE/SERVICES:   None. SHERRI informed her of possible post acute services such as home care or outpatient services to continue his progress. She is open to these services if needed. PREFERRED HOME CARE:  To be determined. ADVANCED DIRECTIVES:   There are no Advanced Directives in place. She reports he does have two adult children but they do not live in the state. She is hopeful he will be able to create these documents prior to his leaving the Unit. TEAM CONFERENCE DAY:  Wednesday. SHERRI informed her of weekly Team Conferences to review his progress, DME recommendations and DC date. She prefers to be called late in the day on Wednesdays.     SHERRI informed patient of preferred  time on

## 2021-04-26 NOTE — PROGRESS NOTES
Patient admitted to rehab with CVA. A/A/O x 4, expressive aphasia. Transfers with Maxi move. On General; 2gm NA-cut up meats diet, tolerating well. Medications taken whole w/ thin liquids. On Plavix/ASA for DVT prophylaxis. Skin scattered bruising and abrasions. On room air. Has been incontinent of bowel and bladder. LBM 4/26-loose. Chair/bed alarms in use and call light in reach. Tele. Will monitor for safety.

## 2021-04-26 NOTE — PROGRESS NOTES
Pt resting in bed comfortably. Admitted on 4/23/2021 with acute ischemic left LEXA stroke. Right sided weakness. Expressive aphasia noted. HR regular. Telemetry. Lungs sounds clear/diminished to base. Abd soft and nontender. Active bowel sounds. Had multiple loose BM this shift. Patient refused bowel medication last night. Incontinent of bowel & bladder. Purewick. Depends on. Skin dry and intact. PIV to right AC, NSL. Transfers with [facility]. HS medication given. Tolerated well. Call light in reach. Patient instructed to call if there is any needs or changes.   Electronically signed by Fransico Abraham RN on 4/26/2021 at 6:28 AM

## 2021-04-26 NOTE — PROGRESS NOTES
Speech Language Pathology  ACUTE REHAB UNIT  SPEECH/LANGUAGE PATHOLOGY      [x] Daily  [] Weekly Care Conference Note  [] Discharge    Patient:Carl Aguilar      TDO:4/85[birth year]  [facility]:[id number]  Rehab Dx/Hx: Acute ischemic left LEXA stroke (HCC) [I63.522]    Precautions: FAll risk; Cognitive-Communication deficits  Home situation: Lives with significant other  ST Dx: [x] Aphasia  [] Dysarthria  [] Apraxia   [x] Oropharyngeal dysphagia [x] Cognitive   Impairment  [] Other:   Initial Speech Therapy Assessment Diagnosis:   1. Cognitive Diagnosis: Mild cognitive linguistic impairment for routine daily functional needs. Ongoing assessment of complex, high level and abstract PS/reasoning, as well as executive functioning, thought organization and planning is recommended as patient's processing and initation improve. Pt was independent with ADLs and IADLs PTA. Given signifcant extra time for processing and intiaotn, basic cognition appears intact in areas of orientation, sustained and selective, and simple/concrete PS/reasoning. Mildly decreased recall for daily events/information and moderately redcued recall for novel informaiton noted this date. Aforemnetioned delayed thought processing and initiation for expression is a barrier to demonstration of and exectuion for congitive-language skills/tasks. 2.  Aphasia Diagnosis: Given extra time for processing and initaiton, patient appears to present with mildly impaired auditory comprehension for complex and abstract information processing. Basic and concrete auditory/visual language comprehension appears intact. Mild to moderately impaired verbal expression for complex concepts requiring increased time to formulate and generate responses. 3.  Speech Diagnosis: Mildly impaired motor speech skills characterized by minimal to mild decreased verbal agility affecting speech fluency without impact on speech intelligibility at sentence level  4.   Dysphagia Diagnosis: reduced pt insight)    1 step commands: motor perseveration noted without reduced pt insight    REading processing at 3-4 unit Y/N?: 80%    REading Wh? With multipe choice format: moderate assist n/a   Other areas targeted:     Education:   Ongoing pt ed    Safety Devices: [] Call light within reach  [] Chair alarm activated  [] Bed alarm activated  [x] Other:SLP returned pt to room; call light activated as pt wanted to get out of w/c. SLP waited with pt as RN arrived and waiting on PCA to assist with use of stedy or maxi move [] Call light within reach  [] Chair alarm activated  [] Bed alarm activated  [] Other:    Progress Assessment: 4/26/2021: Pt with increase receptive and expressive language deficits with concern for potential oral motor /verbal dyspraxia; pt unable to participate in executive function . Unclear if due to fatigue (this SLP session was the last of the day)   Plan: Continue as per plan of care. Continued Tx Upon Discharge: ?    [x] Yes [] No [] TBD based on progress while on ARU [] Vital Stim indicated [] Other:   Estimated discharge date: TBD   Discharge recommendations:   [] Home independently  [x] Home with assistance [x]  24 hour supervision  [] ECF [] Other:     Additional information:     Interventions used during Rehab Stay:  [x] Speech/Language Treatment  [] Instruction in HEP [] Group [x] Dysphagia Treatment [x] Cognitive Treatment   [] Other:    Adverse Reactions to Treatment/Significant Change in Status: n/a    Electronically Signed by    Claudette Hodges. Jeffery,MS,CCC,SLP [id number]  Speech and Language Pathologist

## 2021-04-26 NOTE — PROGRESS NOTES
Department of Physical Medicine & Rehabilitation  Progress Note    Patient Identification:  Paramjit Ospina  [id number]  : 1955  Admit date: 2021    Chief Complaint: Acute ischemic left LEXA stroke (HCC)    Subjective:   No acute events overnight. Patient seen this am sitting up in bed. Reports his weakness is stable. He denies any pain. He denies any new concerns. Labs reviewed. WBC increased. ROS: No f/c, n/v, cp     Objective:  Patient Vitals for the past 24 hrs:   BP Temp Temp src Pulse Resp SpO2 Weight   21 0805 (!) 168/91 98.8 °F (37.1 °C) Oral 73 16 93 % --   21 0550 (!) 143/85 97.8 °F (36.6 °C) Oral 74 18 91 % 232 lb 9.4 oz (105.5 kg)   21 0458 (!) 173/112 98.1 °F (36.7 °C) Oral 79 16 93 % --   21 0016 (!) 165/84 98.3 °F (36.8 °C) Oral 75 16 91 % --   21 1953 (!) 148/76 98.2 °F (36.8 °C) Oral 71 18 92 % --   21 1551 (!) 146/80 97.2 °F (36.2 °C) Oral 69 16 92 % --   21 1045 (!) 154/77 98.4 °F (36.9 °C) Oral 66 16 93 % --     Const: Alert. No distress, pleasant. HEENT: Normocephalic, atraumatic. Normal sclera/conjunctiva. MMM. CV: Regular rate and rhythm. Resp: No respiratory distress. Lungs decreased at bases (decreased inspiratory effort)  Abd: Soft, nontender, nondistended, NABS+   Ext: trace edema. Neuro: Alert, oriented, delayed processing. Right hemiparesis overall 1/5   Psych: Cooperative, appropriate mood and affect    Laboratory data: Available via EMR.    Last 24 hour lab  Recent Results (from the past 24 hour(s))   Basic Metabolic Panel w/ Reflex to MG    Collection Time: 21  7:55 AM   Result Value Ref Range    Sodium 140 136 - 145 mmol/L    Potassium reflex Magnesium 3.6 3.5 - 5.1 mmol/L    Chloride 102 99 - 110 mmol/L    CO2 24 21 - 32 mmol/L    Anion Gap 14 3 - 16    Glucose 133 (H) 70 - 99 mg/dL    BUN 26 (H) 7 - 20 mg/dL    CREATININE 0.7 (L) 0.8 - 1.3 mg/dL    GFR Non-African American >60 >60    GFR  >300cc  -Flomax     Bowel   -High risk constipation   -senna+colace BID, PRN miralax, MoM, and bisacodyl supp.     Safety   -fall and aspiration precautions     DVT ppx  -lovenox    Rehab Progress: Tolerating therapy thus far. Limited by severe right hemiparesis. Working on functional mobility, compensatory strategies, cognition. Anticipated Dispo: home with sig other and elderly mother  Services/DME: TBD  ELOS: 3 weeks      [address]  Noman Sher MD 4/26/2021, 9:43 AM

## 2021-04-26 NOTE — PROGRESS NOTES
Physical Therapy  Facility/Department: [address] IP REHAB  Daily Treatment Note  NAME: Lyn Jerez  : 1955  MRN: [id number]    Date of Service: 2021    Discharge Recommendations:  Patient would benefit from continued therapy after discharge, Continue to assess pending progress   PT Equipment Recommendations  Other: Will likely require wc upon D/C. Assessment   Body structures, Functions, Activity limitations: Decreased functional mobility ; Decreased strength;Decreased endurance;Decreased sensation;Decreased balance  Assessment: Pt is a 76 y.o. M. admitted  to ARU s/p CVA. He presents with no active RUE shoulder, elbow, or wrist motion, and minimal motion in R fingers. PT noted 1/5 strength in R hip extensors and adductors. Pt unable to elicit any other active motion in RLE. Based on his extreme weakness, and increased time to respond to questions/commands, pt required total assist to move from chair to bed (stedy), and to move sit > supine in bed. PT recommends use of maxi-move for transfers at this time. Attempted jojo plus for transfer and stepping but pt with increased R flexor tone in stance phase. Pt would benefit from 4 weeks of rehab to gradually progress EOB/OOB activity, and gain independence with functional mobility tasks as able. Treatment Diagnosis: impaired functional mobility  Specific instructions for Next Treatment: Transfers; practice wc mobility; strengthen RLE as able  Prognosis: Guarded  REQUIRES PT FOLLOW UP: Yes  Activity Tolerance  Activity Tolerance: Patient limited by fatigue     Patient Diagnosis(es): There were no encounter diagnoses. has a past medical history of Acute MI ([facility] [address] Anxiety, Arthritis, CAD (coronary artery disease), Hypertension, and Influenza A.   has a past surgical history that includes back surgery (N/A, ) and Coronary angioplasty with stent ().     Restrictions  Restrictions/Precautions  Restrictions/Precautions: Fall Risk  Position Activity Restriction  Other position/activity restrictions: R hemiplegia and increased tone, exp. aphasia  Subjective   General  Chart Reviewed: Yes  Additional Pertinent Hx: Pt is 77 y/o M admitted 4/21/21 to ED with stroke like symptoms affecting R side ongoing ~12hours. MRI revealed multifocal acute infarctions in the parasagital left front along distal L LEXA territory with occluded/thrombosed A3 segment of L LEXA. MRI also revealed old infarction in basal ganglia, thalami, R cerebellum. PMHx significant for CAD, Angio w/ stent, MI, lumbar sx, HTN. Pt is 77 y/o M admitted 4/21/21 to ED with stroke like symptoms affecting R side ongoing ~12hours. MRI revealed multifocal acute infarctions in the parasagital left front along distal L LEXA territory with occluded/thrombosed A3 segment of L LEXA. MRI also revealed old infarction in basal ganglia, thalami, R cerebellum. PMHx significant for CAD, Angio w/ stent, MI, lumbar sx, HTN. Response To Previous Treatment: Patient reporting fatigue but able to participate. Family / Caregiver Present: No  Referring Practitioner: Dr. Winston Morris  Subjective  Subjective: Pt sitting up in chair. Extremely delayed response time. Agreeable to activity. Pain Screening  Patient Currently in Pain: Denies  Vital Signs  Patient Currently in Pain: Denies       Orientation     Cognition      Objective      Transfers  Sit to Stand: Minimal Assistance;2 Person Assistance(in // bars)  Stand to sit: 2 Person Assistance;Maximum Assistance  Bed to Chair: Dependent/Total  Comment: Stood first in // bars with mod to max A x 2, gisell to maintain stance with mod to max A, able to follow cues to stand tall  Ambulation  Ambulation?: No  WB Status: used Ge[place] Plus to stand with good placement of B feet.   Attempted to [place] plus forward to allow pt to take a step, max A to advance R LE, then when pt tried to advance L LE, R LE had significant flexor tone and crossed into adduction behind L procurement, Modalities, Patient/Caregiver Education & Training, Safety Education & Training, Home Exercise Program, Neuromuscular Re-education  Safety Devices  Type of devices:  All fall risk precautions in place, Call light within reach, Chair alarm in place, Gait belt, Left in chair, Nurse notified  Restraints  Initially in place: No     Therapy Time   Individual Concurrent Group Co-treatment   Time In 1115         Time Out 1205         Minutes 50         Timed Code Treatment Minutes: Lisha [address], [address]

## 2021-04-27 LAB
BASOPHILS ABSOLUTE: 0 K/UL (ref 0–0.2)
BASOPHILS RELATIVE PERCENT: 0.4 %
EOSINOPHILS ABSOLUTE: 0.1 K/UL (ref 0–0.6)
EOSINOPHILS RELATIVE PERCENT: 0.9 %
HCT VFR BLD CALC: 42.9 % (ref 40.5–52.5)
HEMOGLOBIN: 14.8 G/DL (ref 13.5–17.5)
LYMPHOCYTES ABSOLUTE: 1.6 K/UL (ref 1–5.1)
LYMPHOCYTES RELATIVE PERCENT: 15.9 %
MCH RBC QN AUTO: 32 PG (ref 26–34)
MCHC RBC AUTO-ENTMCNC: 34.5 G/DL (ref 31–36)
MCV RBC AUTO: 92.9 FL (ref 80–100)
MONOCYTES ABSOLUTE: 0.8 K/UL (ref 0–1.3)
MONOCYTES RELATIVE PERCENT: 8.2 %
NEUTROPHILS ABSOLUTE: 7.6 K/UL (ref 1.7–7.7)
NEUTROPHILS RELATIVE PERCENT: 74.6 %
PDW BLD-RTO: 15.2 % (ref 12.4–15.4)
PLATELET # BLD: 157 K/UL (ref 135–450)
PMV BLD AUTO: 8.4 FL (ref 5–10.5)
RBC # BLD: 4.62 M/UL (ref 4.2–5.9)
WBC # BLD: 10.2 K/UL (ref 4–11)

## 2021-04-27 PROCEDURE — 97530 THERAPEUTIC ACTIVITIES: CPT

## 2021-04-27 PROCEDURE — 6360000002 HC RX W HCPCS: Performed by: PHYSICAL MEDICINE & REHABILITATION

## 2021-04-27 PROCEDURE — 36415 COLL VENOUS BLD VENIPUNCTURE: CPT

## 2021-04-27 PROCEDURE — 85025 COMPLETE CBC W/AUTO DIFF WBC: CPT

## 2021-04-27 PROCEDURE — 92526 ORAL FUNCTION THERAPY: CPT

## 2021-04-27 PROCEDURE — 94761 N-INVAS EAR/PLS OXIMETRY MLT: CPT

## 2021-04-27 PROCEDURE — 1280000000 HC REHAB R&B

## 2021-04-27 PROCEDURE — 6370000000 HC RX 637 (ALT 250 FOR IP): Performed by: PHYSICAL MEDICINE & REHABILITATION

## 2021-04-27 PROCEDURE — 97535 SELF CARE MNGMENT TRAINING: CPT

## 2021-04-27 PROCEDURE — 92507 TX SP LANG VOICE COMM INDIV: CPT

## 2021-04-27 RX ADMIN — ENOXAPARIN SODIUM 40 MG: 40 INJECTION SUBCUTANEOUS at 08:38

## 2021-04-27 RX ADMIN — SERTRALINE 150 MG: 100 TABLET, FILM COATED ORAL at 08:39

## 2021-04-27 RX ADMIN — ATENOLOL 50 MG: 50 TABLET ORAL at 08:39

## 2021-04-27 RX ADMIN — ASPIRIN 81 MG: 81 TABLET, COATED ORAL at 08:39

## 2021-04-27 RX ADMIN — TAMSULOSIN HYDROCHLORIDE 0.4 MG: 0.4 CAPSULE ORAL at 08:38

## 2021-04-27 RX ADMIN — ATORVASTATIN CALCIUM 80 MG: 80 TABLET, FILM COATED ORAL at 21:08

## 2021-04-27 RX ADMIN — BACLOFEN 5 MG: 10 TABLET ORAL at 21:08

## 2021-04-27 RX ADMIN — CLOPIDOGREL BISULFATE 75 MG: 75 TABLET ORAL at 08:38

## 2021-04-27 RX ADMIN — LISINOPRIL 10 MG: 10 TABLET ORAL at 08:39

## 2021-04-27 RX ADMIN — BACLOFEN 5 MG: 10 TABLET ORAL at 14:28

## 2021-04-27 RX ADMIN — HYDROCHLOROTHIAZIDE 25 MG: 25 TABLET ORAL at 08:39

## 2021-04-27 RX ADMIN — DOCUSATE SODIUM 50 MG AND SENNOSIDES 8.6 MG 1 TABLET: 8.6; 5 TABLET, FILM COATED ORAL at 21:08

## 2021-04-27 RX ADMIN — ISOSORBIDE MONONITRATE 30 MG: 30 TABLET, EXTENDED RELEASE ORAL at 08:39

## 2021-04-27 RX ADMIN — BACLOFEN 5 MG: 10 TABLET ORAL at 08:50

## 2021-04-27 RX ADMIN — DOCUSATE SODIUM 50 MG AND SENNOSIDES 8.6 MG 1 TABLET: 8.6; 5 TABLET, FILM COATED ORAL at 08:38

## 2021-04-27 RX ADMIN — TRAZODONE HYDROCHLORIDE 50 MG: 50 TABLET ORAL at 21:08

## 2021-04-27 ASSESSMENT — PAIN SCALES - GENERAL
PAINLEVEL_OUTOF10: 0

## 2021-04-27 NOTE — PATIENT CARE CONFERENCE
[facility]  Inpatient Rehabilitation  Weekly Team Conference Note      Date: 2021  Patient Name:  Sona Monet    MRN: [id number]  : 1955  Gender: Male   Physician: Dr. Brina Castellanos   Diagnosis: Acute ischemic left LEXA stroke [facility]) [F97.175]    CASE MANAGEMENT  Assessment:   Goal is home. PHYSICAL THERAPY    Bed mobility  Bridging: (Pt partially bridged hips with assist to position R LE)  Rolling to Left: Moderate assistance, 2 Person assistance(He required assist to flex up right LE but two person assist to roll onto left side.)  Rolling to Right: Minimal assistance(able to flex up left LE but required min assist to bring left UE across body to pull on rail, then was CGA to roll onto right side.)  Supine to Sit: Moderate assistance, Minimal assistance, 2 Person assistance(mod assist of one for trunk and min assist for right LE.)  Sit to Supine: Dependent/Total(Bed flat)  Scooting: Dependent/Total, 2 Person assistance(to scoot up toward HOB in supine)  Comment: bed mobility done on flat hospital bed with use of rails    Transfers:  Sit to Stand: 2 Person Assistance, Moderate Assistance, Maximum Assistance(mod assist of one and max assist of one. Initially difficulty using left LE to push and achieve stand. 2nd stand was max of one and second person to hold right hand on parallel bar)  Stand to sit: Minimal Assistance, Maximum Assistance, 2 Person Assistance(first trial was max of one and min of one.  second was max assist of one. Patient with difficulty flexing at hips to sit.)  Bed to Chair: Dependent/Total  Comment: initial  parallel bars was flexed ot the left over railing and required moderate verbal and tactile cueing to stand upright. Patient able to achieve right knee extension with min assist and then can maintain for several seconds, but then begins to flex. WB Status: used Gege Plus to stand with good placement of B feet.   Attempted to [place] plus forward to allow pt to take a step, max A to advance R LE, then when pt tried to advance L LE, R LE had significant flexor tone and crossed into adduction behind L LE, max A x 2 to 3 to sit safely in chair due to R LE being far under chair and pt sliding hips forward in chair    Propulsion: Manual  Level: Level Tile  Method: ROSA GUERRA  Level of Assistance: Minimal assistance  Description/ Details: patient with improved use of left LE to correct his path. He was able to initiate with CGA and intermittent min assist with veering to the right. min assist to complete a full turn. Patient became constant min assist to maintain straight path secondary to fatigue. He was able to get over threshold with CGA. Distance: 80' with two turns. Assessment: Patient with improved alertness this date but has great difficulty processing new tasks. He has difficulty activating musculature on left LE to allow for any standing in parallel bars. Patient with improved tone this date and less flesion withdrawl happening with mobility. Patient continues to require two person assist for any mobility. Patient would benefit from co-treatment twice a day secondary to significant impairements and 2 therapists required for safe mobilization. Patient well below baseline and will benefit from continued skilled therapy for strengthening, functional mobility training, and balance training to reduce assistance required. SPEECH THERAPY (intentionally left blank if not actively being seen by this service):  Assessment/Progress: Pt was seen for a Clinical Evaluation of Swallowing and for a SLP Evaluation. Expressive>receptive expressive aphasia impacting initiation and functional verbal expression of needs/wants and directing care needs. 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 Pt with verbal Y/N perseveration which does impact effectiveness/consistency of responses to varied Y/N questions. Pt's rate of processing is slow. There is a breakdown in auditory and visual language processing as sentence length increases or as complexity increases. Verbal expression is most optimal for naming tasks; responsive naming tasks; automatics. Breakdown with initiation in conveying needs/wants; directing care due to word retrieval and thought organization for formulating multiple word utterances. There is a concern for cognitive-linguistic deficits with potential memory deficits; PS/reasoning deficits and visual deficits. Have noted a concern for potential motor dyspraxia-will discuss with team.  Oropharyngeal Dysphagia with recommendations for regular diet with thin liquids 4/24/2021. Pt f/u has been limited since 4/24/2021. Pt tolerated thin liquids without \"overt\" clinical s/s of aspiration. Pt with large amount of phlegm/ sputum 4/27/2021-alerted RN. Will check records and discuss at team. Potential MBSS if concern for silent aspiration. Will continue therapy. Pt will need assist at d/c. Sada Gil,MS,CCC,SLP [id number]  Speech and Language Pathologist      OCCUPATIONAL THERAPY    ADL  Feeding: Setup(Pt able to eat lunch using right UE in bed with setup)  Grooming: Minimal assistance(set up of toothpaste on toothbrush, set up with brush, min A to don deodorant)  UE Bathing: Minimal assistance(for LUE)  LE Bathing: Dependent/Total(periarea cleaned from opening of shower chair, assist for feet)  UE Dressing: Moderate assistance(OT donned over RUE, pt assisted to pull over head)  LE Dressing: Dependent/Total(Assist x2 to doff soiled briefs/shorts, don new hospital pants in bed. Pt rolled to both sides with max A, then was able to partially bridge hips to assist)  Toileting: Dependent/Total(Pt incontinent of stool during PT session, returned to bed via maximove.  Pt rolled to both sides with max A, and required assist x2 for hygiene, changing depends/pants)    Bed mobility  Bridging: (Pt partially bridged hips with assist to position R LE)  Rolling to Left: Moderate assistance, 2 Person assistance(He required assist to flex up right LE but two person assist to roll onto left side.)  Rolling to Right: Minimal assistance(able to flex up left LE but required min assist to bring left UE across body to pull on rail, then was CGA to roll onto right side.)  Supine to Sit: Moderate assistance, Minimal assistance, 2 Person assistance(mod assist of one for trunk and min assist for right LE.)  Sit to Supine: Dependent/Total(Bed flat)  Scooting: Dependent/Total, 2 Person assistance(to scoot up toward HOB in supine)  Comment: bed mobility done on flat hospital bed with use of rails    Functional Transfers: Toilet Transfers  Toilet - Technique: (via jojo stedy)  Equipment Used: Extra wide bedside commode(HD BSC placed over commode)  Toilet Transfer: Maximum assistance, 2 Person assistance  Toilet Transfers Comments: max Ax2 for sit<> jojo stedy, + assist of another to maintain R hand grasp on jojo stedy bar     Shower Transfers  Shower - Transfer Type: To and From  Shower - Transfer To: Shower seat with back  Shower - Technique: (jojo Tidemark)  Shower Transfers: Dependent    UE Function:  L UE: WFL, R UE: No active movement noted, wrist edema and reported pain    Assessment: Session limited this date d/t pt with incontinence of stool at end of PT session. Pt required assist x2 with use of maximove (yarelis) lift to transfer from w/c > bed d/t fatigue and incontinence. Pt required max A to roll to both sides in bed, and total Ax2 for all aspects of hygiene, changing briefs/pants/linens. Pt completed feeding in bed with setup. Pt continues to be limited by aphasia, though able to give one-word responses to indicate needs with extra time. Pt will benefit from continued cotx 1-2 times a day to maximize function.  Cont per POC        NUTRITION  Most recent weightWeight: 233 lb 0.4 oz (105.7 kg)  BMI (Calculated): 29.2   Diet Order: Dietary Nutrition Supplements: Standard High Calorie Oral Supplement  DIET GENERAL; Low Sodium (2 GM)  PO Meals Eaten (%): 51 - 75%  Wt trending down - will monitor  Please see nutrition note for details. NURSING  Incontinent of bladder and bowel, uses Purwic at HS. Monitor and maintain skin integrity, redness buttocks. Family Education: Patient education: CVA, communication needs, bladder and bowel program/schedule, medications, diet and swallowing precautions, oral care, skin care and prevention, pain control as needed, safety and fall prevention. MEDICAL  Work up for leukocytosis negative  Started baclofen for spasticity    TEAM SUMMARY AND DISCHARGE PLAN  Estimated Length of Stay:2-3 weeks  Destination: goal is home with home care- will need to secure 24 hour supervision/support   · Anticipated Services at Discharge:    [x] OT  [x] PT   [x] SLP    [x] RN   [x] Home Health aide []   Community Resources: _______________________________  Equipment recommendations:  [] Hospital bed [x] Tub bench  [] Shower chair [] Hand held shower  [] Raised toilet seat [] Toilet safety frame [x] Bedside commode (HD BSC?)  [] W/C: _____  [] Rolling Walker [] Standard walker [] Gait belt [] cane: _________  [] Sliding board [] Alternate seating/furniture [] O2 [] Hip Kit: _______  [] Life Line [] Other: _______  Factors facilitating achievement of predicted outcomes: Family support, Motivated and Cooperative  Barriers to the achievement of predicted outcomes/Interventions:   Fatigue, right hemiparesis, impaired motor planning- strengthening, conditioning, neuromuscular re-education, balance, coordination training, santana techniques, adaptive equipment training and compensatory strategies for self care and mobility      Interdisciplinary Individualized Plan of Care Review:    · Continue Current Plan of Care:  Yes    · Modifications:_____________________________    Special Needs in the Upcoming Week :    [x] Family/Caregiver Education  [] Home visit  []Therapeutic Pass [] Consults:_______    [] Other;_______    Patient Rehab Team Goals for the Upcoming Week:  1. Patient will be able to transfer in room without use of maxi move   2. Pt will tolerate diet without medical complications  3. Pt will demonstrate improved verbal expression for expression of needs/wants on the unit for basic ADLs with moderate clarification and/or expansion cues  4. Pt will stand with mod/max Ax1 (in jojo [email or web address]) to increase IND in ADLs in room  5. Patient goals : Pt nodded agreement when asked if he wanted to be more independent        Team Members Present at Conference:  Physician: Dr Stacey Melvin  : Kam Mckenna   Occupational Therapist: Ken Franco, #[id number]  Physical Therapist: Louis Veliz [place]  Speech Therapist: Smiley Langley. Jeffery,MS,CCC,SLP [id number]  Speech and Language Pathologist  Nurse:MARCELLUS Logan RN, CRRN   Tali Parikh       I led this team conference and I approve the established interdisciplinary plan of care as documented within the medical record of Joe Guajardo. MD: Albert Fall.  Stacey Melvin MD 4/28/2021, 12:09 PM

## 2021-04-27 NOTE — PROGRESS NOTES
Speech Language Pathology  ACUTE REHAB UNIT  SPEECH/LANGUAGE PATHOLOGY      [x] Daily  [x] Weekly Care Conference Note  [] Discharge    Patient:Carl Donaldson      TET:0/66/5729  [facility]:[id number]  Rehab Dx/Hx: Acute ischemic left LEXA stroke (HCC) [I63.522]    Precautions: FAll risk; Cognitive-Communication deficits  Home situation: Lives with significant other  ST Dx: [x] Aphasia  [] Dysarthria  [] Apraxia   [x] Oropharyngeal dysphagia [x] Cognitive   Impairment  [] Other:   Initial Speech Therapy Assessment Diagnosis:   1. Cognitive Diagnosis: Mild cognitive linguistic impairment for routine daily functional needs. Ongoing assessment of complex, high level and abstract PS/reasoning, as well as executive functioning, thought organization and planning is recommended as patient's processing and initation improve. Pt was independent with ADLs and IADLs PTA. Given signifcant extra time for processing and intiaotn, basic cognition appears intact in areas of orientation, sustained and selective, and simple/concrete PS/reasoning. Mildly decreased recall for daily events/information and moderately redcued recall for novel informaiton noted this date. Aforemnetioned delayed thought processing and initiation for expression is a barrier to demonstration of and exectuion for congitive-language skills/tasks. 2.  Aphasia Diagnosis: Given extra time for processing and initaiton, patient appears to present with mildly impaired auditory comprehension for complex and abstract information processing. Basic and concrete auditory/visual language comprehension appears intact. Mild to moderately impaired verbal expression for complex concepts requiring increased time to formulate and generate responses. 3.  Speech Diagnosis: Mildly impaired motor speech skills characterized by minimal to mild decreased verbal agility affecting speech fluency without impact on speech intelligibility at sentence level  4.   Dysphagia Diagnosis: Mild oropharyngeal dysphagia characterized by slow but effective mastication, right sided perioral weakness, reduced lingual coordination, reduced AP transit, potential swallow delay and mildly reduced laryngeal elevation. No immediate or delayed overt s/s of aspiration; trace oral residue is cleared by pt. Recommend regular textures and thin liquids with ongoing monitor to confirm tolerance.   Date of Admit: 4/23/2021  Room #: P5X-3974/3264-01  Date: 4/27/2021       Current functional status (updated daily):         Current Diet Order:Dietary Nutrition Supplements: Standard High Calorie Oral Supplement  DIET GENERAL; Low Sodium (2 GM)   Behavior: [x] Alert  [x] Cooperative  [x]  Pleasant  [] Confused  [] Agitated  [] Uncooperative  [] Distractible [] Motivated  [] Self-Limiting [] Anxious  [] Other:  Endurance:  [] Adequate for participation in SLP sessions  [x] Reduced overall  [] Lethargic  [] Other:  Safety: [] No concerns at this time  [x] Reduced insight into deficits  []  Reduced safety awareness [] Not following call light procedures  [] Unable to Assess  [x] Other:ongoing assessment as communication skills improve  Swallowing Precautions: Upright as possible for all oral intake, Remain upright for 30-45 minutes after meals  Barriers toward progress: none unless medical issues and caregiver assist           Date: 4/27/2021      Tx session 1 Tx session 2   Total Timed Code Min SLP Individual Minutes  Time In: 1300  Time Out: 6459  Minutes: 45  Coded treatment time  15 n/a   Group Treatment Minutes 0 0   Co-Treat Minutes 0 0   Variance/Reason:  n/a    Pain Jn/a    Pain Intervention [x] RN notified  [] Repositioned  [] Intervention offered and patient declined  [] N/A  [x] Other: PCA [] RN notified  [] Repositioned  [] Intervention offered and patient declined  [] N/A  [] Other:   Subjective     Pt seen bedside  At onset pt was not talking or verbally responding  Oral care revealed large amount of sputum in mouth (~tablspoon amount of thick tan sputum)  Also concern regarding leads for heart monitor out of place    Objective:  Goals       Dysphagia goals: Pt did not identify a goal. Team goal is for safe toleration of po diet    therapy working toward pt goal n/a   1. The patient will tolerate regular food with thin liquid diet without observed clinical signs of aspiration,  Thin liquids: isolated sips taken without overt clinical s/s of aspiration or drooling    Pt can be impulsive and takes multiple sips in succession. Still no drooling. No overt clinical s/s of aspiration    Food consistencies: pt declined   n/a   2. The patient will improve oral preparation phase via bolus control/manipulation exercises to 5/5 each trial., . Right oral motor weakness. Right oral mtor rom/stretchand strengthening ex completed. Continue to note some oral motor planning in addition to right om weakness    Labial seal was adequate for straw seal; cup seal n/a   3.  The patient/caregiver will demonstrate understanding of compensatory strategies for improved swallowing safety Reviewed strategies :   · Reducing rate of intake  · Review of lingual sweep to clear oral residue   n/a   Short-term Goals  Goal 1: Pt will demonstrate improved recall of new learning strategies; daily therapy activities and new learning information with set up, use of memory strategies and minimal assist   STM/Working memory:   Persistent expressive language deficits impact objective assessment  -Y/N? ; choice ?: verbal perseveration noted and/or 'no response' errors  -non-verbal responses suggest reduced insight/awareness of therapy schedule  -reduced use of call light (but this may also be related to motor planning deficits)    Continue ongoing assessment and use of written cues to assist recall of daily events; o-m ex; etc n/a   Goal 2: Pt will improve oral and/or verbal agility and ROM for coordinated and alternating motions to < mild impairment. Isolated rom: slow and minimal to moderate right labial/buccal/lingual    Paired movements: deficits with concern for motor planning      Continue goal n/a   Goal 3: Pt will improve verbal expression of complex thoughts/ideas at sentence level in structured functional tasks with <5 second delay Verbal expression;     Continue to note verbal Y/N perseveration with reduced pt insight.    · Began providing immediate feedback and pairing head nods with verbal response    CFN: last targeted 2021 continue      Responsive namin%    Generative listing items within a category (highly familiar and daily living categories): cues to initiate and expansion cues moderate +    Sentence formulation for describing action pictures OR expressing needs/wants to provided situation: moderate assist    Answering general open ended Wh?: verbal perseveration; <60%    Word level during automatic sentence completion 88% n/a          Goal 4: Pt will participate with ongoing assessment of EF, planning, sequencing and complex reasoning with additional goals PRN     Auditory processing :  · 3 -4 unit Y/N? (informational/ideational): 70%   · 4-6 unit comparative questions: 60%  Max feedback with head nods for confirmation due to concern for verbal perseveration    1 step commands: continue to note motor perseveration noted without reduced pt insight    REading processing at 3-4 unit Y/N?: last targeted 2021    Processing information on white board: discontinued as unsure if pt able to see information on board and/or due to verbal expression deficits n/a   Other areas targeted:     Education:   Ongoing pt ed    Safety Devices: [x] Call light within reach  [] Chair alarm activated  [x] Bed alarm activated  [] Other:  [] Call light within reach  [] Chair alarm activated  [] Bed alarm activated  [] Other:    Progress Assessment: 2021: Pt with increase receptive and expressive language deficits with concern for potential oral motor /verbal dyspraxia; pt unable to participate in executive function . Unclear if due to fatigue (this SLP session was the last of the day)  4/27/2021: Alerted PCA and RN due to pt having a large amount of sputum/phlegm in his mouth upon SLP arrival to room. Pt with another episode of cough with expectoration of large amount of phlegm. Will check acute floor documentation for any dysphagia instrumentals. PO sample size small and no overt clinical s/s at the bedside. Will discuss at team meeting. Potential need for MBS to r/o silent aspiration contributing to pt's status regarding phlegm   Plan: Continue as per plan of care. Continued Tx Upon Discharge: ?    [x] Yes [] No [] TBD based on progress while on ARU [] Vital Stim indicated [] Other:   Estimated discharge date: TBD   Discharge recommendations:   [] Home independently  [x] Home with assistance [x]  24 hour supervision  [] ECF [] Other:     Additional information:     Interventions used during Rehab Stay:  [x] Speech/Language Treatment  [] Instruction in HEP [] Group [x] Dysphagia Treatment [x] Cognitive Treatment   [] Other:    Adverse Reactions to Treatment/Significant Change in Status: n/a    Electronically Signed by    Linda Watt. Jeffery,MS,CCC,SLP [id number]  Speech and Language Pathologist

## 2021-04-27 NOTE — PLAN OF CARE
Problem: Falls - Risk of:  Goal: Will remain free from falls  Description: Will remain free from falls  4/27/2021 0944 by Nereida Vidal RN  Outcome: Ongoing  Note: Assessment completed. Fall precautions in place. Uses maxZeusControlsmove for transfers. Fall precautions in place. Non skid footwear and armband on. Bed/chair alarms in place and functioning. Personal items and call light within reach. Monitored frequently. 4/26/2021 2321 by Ilene Hong RN  Outcome: Ongoing  Note: Pt educated on falls precautions and safety. Call light and personal belongings within reach at all times. Non skid socks in use when up. Hourly rounding and alarms active. Goal: Absence of physical injury  Description: Absence of physical injury  4/27/2021 0944 by Nereida Vidal RN  Outcome: Ongoing  4/26/2021 2321 by Ilene Hong RN  Outcome: Ongoing     Problem: HEMODYNAMIC STATUS  Goal: Patient has stable vital signs and fluid balance  4/27/2021 0944 by Nereida Vidal RN  Outcome: Ongoing  Note: VSS. Monitored as ordered. 4/26/2021 2321 by Ilene Hong RN  Outcome: Ongoing     Problem: ACTIVITY INTOLERANCE/IMPAIRED MOBILITY  Goal: Mobility/activity is maintained at optimum level for patient  4/27/2021 0944 by Nereida Vidal RN  Outcome: Ongoing  Note: Evaluated and treated by OT and PT  4/26/2021 2321 by Ilene Hong RN  Outcome: Ongoing     Problem: COMMUNICATION IMPAIRMENT  Goal: Ability to express needs and understand communication  4/27/2021 0944 by Nereida Vidal RN  Outcome: Ongoing  Note: Has expressive aphagia. Evaluated and treated by ST  4/26/2021 2321 by Ilene Hong RN  Outcome: Ongoing     Problem: Skin Integrity:  Goal: Will show no infection signs and symptoms  Description: Will show no infection signs and symptoms  4/27/2021 0944 by Nereida Vidal RN  Outcome: Ongoing  Note: Assessment completed. No sign or symptoms of infection noted. Will continue to monitor.    4/26/2021 2321 by Ilene Hong RN  Outcome: Ongoing  Goal: Absence of new skin breakdown  Description: Absence of new skin breakdown  4/27/2021 0944 by Bill Schwarz RN  Outcome: Ongoing  4/26/2021 2321 by Malka Bullock RN  Outcome: Ongoing     Problem: Pain:  Goal: Pain level will decrease  Description: Pain level will decrease  4/26/2021 2321 by Malka Bullock RN  Outcome: Ongoing  Note: Able to rate pain using a 1-10 scale, medicated per prn orders, see MAR.  Able to verbalize a reduction in pain and/or able to fall asleep and remain asleep without any s/s of pain    Goal: Control of acute pain  Description: Control of acute pain  4/26/2021 2321 by Malka Bullock RN  Outcome: Ongoing  Goal: Control of chronic pain  Description: Control of chronic pain  4/26/2021 2321 by Malka Bullock RN  Outcome: Ongoing     Problem: Nutrition  Goal: Optimal nutrition therapy  4/26/2021 2321 by Malka Bullock RN  Outcome: Ongoing

## 2021-04-27 NOTE — PLAN OF CARE
Problem: Falls - Risk of:  Goal: Will remain free from falls  Description: Will remain free from falls  4/26/2021 2321 by Malka Bullock RN  Outcome: Ongoing  Note: Pt educated on falls precautions and safety. Call light and personal belongings within reach at all times. Non skid socks in use when up. Hourly rounding and alarms active. Problem: Pain:  Goal: Pain level will decrease  Description: Pain level will decrease  Outcome: Ongoing  Note: Able to rate pain using a 1-10 scale, medicated per prn orders, see MAR.  Able to verbalize a reduction in pain and/or able to fall asleep and remain asleep without any s/s of pain

## 2021-04-27 NOTE — PROGRESS NOTES
Department of Physical Medicine & Rehabilitation  Progress Note    Patient Identification:  Thomas Peace  [id number]  : 1955  Admit date: 2021    Chief Complaint: Acute ischemic left LEXA stroke (HCC)    Subjective:   No acute events overnight. Patient seen this evening sitting up in room. He denies any new concerns. Reports no improving in right side weakness. He is unsure if baclofen has helped spasms. Labs reviewed. WBC normalized     ROS: No f/c, n/v, cp     Objective:  Patient Vitals for the past 24 hrs:   BP Temp Temp src Pulse Resp SpO2 Weight   21 1506 127/75 98.1 °F (36.7 °C) Oral 60 16 93 % --   21 1013 -- -- -- -- -- 93 % --   21 0838 (!) 164/90 97.9 °F (36.6 °C) Oral 69 18 -- --   21 0513 (!) 161/84 97.9 °F (36.6 °C) Oral 64 16 91 % 233 lb 0.4 oz (105.7 kg)   21 0007 127/74 97.3 °F (36.3 °C) Oral 62 16 90 % --   21 1909 137/77 97.3 °F (36.3 °C) Oral 64 16 93 % --     Const: Alert. No distress, pleasant. HEENT: Normocephalic, atraumatic. Normal sclera/conjunctiva. MMM. CV: Regular rate and rhythm. Resp: No respiratory distress. Lungs decreased at bases (decreased inspiratory effort)  Abd: Soft, nontender, nondistended, NABS+   Ext: trace edema. Neuro: Alert, oriented, delayed processing. Right hemiparesis overall 1/5   Psych: Cooperative, appropriate mood and affect    Laboratory data: Available via EMR.    Last 24 hour lab  Recent Results (from the past 24 hour(s))   CBC Auto Differential    Collection Time: 21  5:45 AM   Result Value Ref Range    WBC 10.2 4.0 - 11.0 K/uL    RBC 4.62 4.20 - 5.90 M/uL    Hemoglobin 14.8 13.5 - 17.5 g/dL    Hematocrit 42.9 40.5 - 52.5 %    MCV 92.9 80.0 - 100.0 fL    MCH 32.0 26.0 - 34.0 pg    MCHC 34.5 31.0 - 36.0 g/dL    RDW 15.2 12.4 - 15.4 %    Platelets 571 675 - 579 K/uL    MPV 8.4 5.0 - 10.5 fL    Neutrophils % 74.6 %    Lymphocytes % 15.9 %    Monocytes % 8.2 %    Eosinophils % 0.9 %    Basophils % 0.4 %    Neutrophils Absolute 7.6 1.7 - 7.7 K/uL    Lymphocytes Absolute 1.6 1.0 - 5.1 K/uL    Monocytes Absolute 0.8 0.0 - 1.3 K/uL    Eosinophils Absolute 0.1 0.0 - 0.6 K/uL    Basophils Absolute 0.0 0.0 - 0.2 K/uL       Therapy progress:  PT  Position Activity Restriction  Other position/activity restrictions: R hemiplegia and increased tone RLE, exp. aphasia  Objective     Sit to Stand: 2 Person Assistance, Moderate Assistance, Maximum Assistance(mod assist of one and max assist of one. Initially difficulty using left LE to push and achieve stand. 2nd stand was max of one and second person to hold right hand on parallel bar)  Stand to sit: Minimal Assistance, Maximum Assistance, 2 Person Assistance(first trial was max of one and min of one.  second was max assist of one. Patient with difficulty flexing at hips to sit.)  Bed to Chair: Dependent/Total     OT  PT Equipment Recommendations  Other: Will likely require wc upon D/C. Toilet - Technique: (via jojo stedy)  Equipment Used: Extra wide bedside commode(HD BSC placed over commode)  Toilet Transfers Comments: max Ax2 for sit<> jojo stedy, + assist of another to maintain R hand grasp on jojo stedy bar  Assessment        SLP  Diet Solids Recommendation: Regular  Liquid Consistency Recommendation: Thin    Body mass index is 29.13 kg/m².     Rehabilitation Diagnosis:   Stroke, 1.2, Right Body (L Brain)        Assessment and Plan:     Impairments:right hemiparesis, aphasia, dysphagia, cognition     Acute ischemic Left LEXA stroke  -Secondary ppx with DAPT x 30 days (then monotherapy), statin  -Telemetry  -PT/OT/SLP  -Started baclofen for spasticity - monitor response     Dysphagia   -Dietitian and SLP consults.   -Currently on regular + thins     HTN, uncontrolled  -resume home atenolol, HCTZ, lisinopril, Imdur -- trend improving  -prn hydralazine     CAD  -DAPT, statin, bb     OA  -acetaminophen     Anxiety  -sertraline    Leukocytosis  -Resolved  -UA and CXR negative     Bladder   -High risk retention   -Monitor PVRs, SC prn >300cc  -Flomax     Bowel   -High risk constipation   -senna+colace BID, PRN miralax, MoM, and bisacodyl supp.     Safety   -fall and aspiration precautions     DVT ppx  -lovenox    Rehab Progress: Tolerating therapy thus far. Limited by severe right hemiparesis. Working on functional mobility, compensatory strategies, cognition. Anticipated Dispo: home with sig other and elderly mother  Services/DME: TBD  ELOS: 3 weeks      Loretta E Odette Bryant.  Eliseo Chavarria MD 4/27/2021, 6:17 PM

## 2021-04-27 NOTE — PROGRESS NOTES
Patient admitted to rehab with CVA. A/A/O x 4 has expressive aphasia. Transfers with maxi move. On general 2 g na+ (cut up meats; requires set up) diet, tolerating well. Medications taken crushed in applesauce. On Lovenox for DVT prophylaxis. Skin has scattered bruising and abrasions. On room air. Has been continent of bowel and bladder. [facility] 4/26 Chair/bed alarms in use and call light in reach. L/D/A. Tele. Will monitor for safety.

## 2021-04-27 NOTE — PROGRESS NOTES
Pt has been up most of the evening. Assisted with repositioning as needed. Denies pain. Admitted with a CVA. Pt is oriented to self, but difficult to fully assess d/t expressive aphasia. Also with delayed responses. Transfers with a maxi move. Requires moderate assistance to turn in the bed. Right side is flaccid. Pt unable to move his upper or lower extremity at the time of assessment. Lungs clear but diminished, HR regular. On telemetry showing NSR. Abdomen non tender with active bowel sounds. Can be incontinent of bowel. External catheter in place, halima urine output. Denies pain. All needs assessed with Q2H rounding. Alarms active. Will monitor.  Raman Price RN

## 2021-04-27 NOTE — PROGRESS NOTES
Physical Therapy  Facility/Department: [address] IP REHAB  Daily Treatment Note  NAME: Zoya Jarrell  : 1955  MRN: [id number]    Date of Service: 2021    Discharge Recommendations:  Continue to assess pending progress, Patient would benefit from continued therapy after discharge        Assessment   Body structures, Functions, Activity limitations: Decreased functional mobility ; Decreased strength;Decreased endurance;Decreased sensation;Decreased balance  Assessment: Patient with improved alertness this date but has great difficulty processing new tasks. He has difficulty activating musculature on left LE to allow for any standing in parallel bars. Patient with improved tone this date and less flexion withdrawal happening with mobility. Patient continues to require two person assist for any mobility. Patient would benefit from co-treatment twice a day secondary to significant impairments and 2 therapists required for safe mobilization. Patient well below baseline and will benefit from continued skilled therapy for strengthening, functional mobility training, and balance training to reduce assistance required. Treatment Diagnosis: impaired functional mobility  Specific instructions for Next Treatment: Transfers; practice wc mobility; strengthen RLE as able  Prognosis: Guarded; Fair  PT Education: PT Role;Goals; Functional Mobility Training;General Safety;Home Exercise Program;Disease Specific Education;Plan of Care  Activity Tolerance  Activity Tolerance: Patient limited by fatigue;Patient limited by cognitive status  Activity Tolerance: Patient appears to have some processing difficulties and needing increased time for new activity directions and tactile cues to process     Patient Diagnosis(es): There were no encounter diagnoses.      has a past medical history of Acute MI ([facility] [address] Anxiety, Arthritis, CAD (coronary artery disease), Hypertension, and Influenza A.   has a past surgical history that includes back surgery (N/A, 1989) and Coronary angioplasty with stent (9/13). Social/Functional History  Lives With: Significant other(Sign other Stephanie))  Type of Home: House  Home Layout: Two level, Able to Live on Main level with bedroom/bathroom, Laundry in basement(1 story with basement.)  Home Access: Stairs to enter without rails(1 step to enter.)  Bathroom Shower/Tub: Tub/Shower unit(Tub Shower main level and basement.)  Bathroom Toilet: Standard  Bathroom Equipment: Grab bars in shower  Bathroom Accessibility: Accessible  Home Equipment: (No DME.)  ADL Assistance: Independent  Homemaking Assistance: (Shared with sign other.)  Homemaking Responsibilities: (shared with significant other prior to admission)  Ambulation Assistance: Independent(Without assist device.)  Transfer Assistance: Independent  Active : Yes  Occupation: Retired  Type of occupation: Retired from real estate and engineering sales; pt reported [address] himself on traveling country and doing sales presentations in front of [employer]: Not doing much. \"Need to find something to do. \"    Restrictions  Restrictions/Precautions  Restrictions/Precautions: Fall Risk  Position Activity Restriction  Other position/activity restrictions: R hemiplegia and increased tone RLE, exp. aphasia     Subjective   General  Chart Reviewed: Yes  Additional Pertinent Hx: Pt is 75 y/o M admitted 4/21/21 to ED with stroke like symptoms affecting R side ongoing ~12hours. MRI revealed multifocal acute infarctions in the parasagital left front along distal L LEXA territory with occluded/thrombosed A3 segment of L LEXA. MRI also revealed old infarction in basal ganglia, thalami, R cerebellum. PMHx significant for CAD, Angio w/ stent, MI, lumbar sx, HTN. Pt is 75 y/o M admitted 4/21/21 to ED with stroke like symptoms affecting R side ongoing ~12hours.  MRI revealed multifocal acute infarctions in the parasagital left front along distal L LEXA territory with occluded/thrombosed A3 segment of L LEXA. MRI also revealed old infarction in basal ganglia, thalami, R cerebellum. PMHx significant for CAD, Angio w/ stent, MI, lumbar sx, HTN. Response To Previous Treatment: (pt minimally verbalizing; did shake his head yes when asked if he was ready to go to therapy but very delayed processing)  Family / Caregiver Present: No  Referring Practitioner: Dr. Kinjal Mccloud: patient seated in wheelchair stating he is \"feeling old,\" as he chuckled. Patient has no complaints of pain at this time. Objective      Transfers  Sit to Stand: 2 Person Assistance; Moderate Assistance;Maximum Assistance(mod assist of one and max assist of one. Initially difficulty using left LE to push and achieve stand. 2nd stand was max of one and second person to hold right hand on parallel bar)  Stand to sit: Minimal Assistance;Maximum Assistance;2 Person Assistance(first trial was max of one and min of one.  second was max assist of one. Patient with difficulty flexing at hips to sit.)  Comment: initial  parallel bars was flexed to the left over railing and required moderate verbal and tactile cueing to stand upright. Patient able to achieve right knee extension with min assist and then can maintain for several seconds, but then begins to flex. Wheelchair Activities  Wheelchair Size: 20\"  Wheelchair Type: Standard  Wheelchair Cushion: Standard  Pressure Relief Type: Lateral lean  Level of Assistance for pressure relief activities: Maximum assistance  Wheelchair Parts Management: Yes  Left Brakes Level of Assistance: Stand by assistance(verbal cues)  Right Brakes Level of Assistance: Dependent/Total(unable to reach with left hand)  Propulsion: Yes  Propulsion 1  Propulsion: Manual  Level: Level Tile  Method: LUE;LLE  Level of Assistance: Minimal assistance  Description/ Details: patient with improved use of left LE to correct his path.   He was able to initiate with CGA and intermittent min assist with veering to the right. min assist to complete a full turn. Patient became constant min assist to maintain straight path secondary to fatigue. He was able to get over threshold with CGA. Distance: 80' with two turns. Balance  Comments: stood in parallel bars x2. 1 minute first time and 1.5 minutes the second time. patient attempted two other stands and was unable to achieve stand with only one person assisting. He requires initial max assist to push through right LE to extend knee, but was min-mod assist to keep right LE in a functional position with knee extension. Does have some tone that pull right LE into external rotation and adduction. patient required mod assist to maintain stand secondary to rotation at hips and trunk causing right hip flexion and rotation to the right. Patient was unable to correct this on his own. He also requires max cueing to bring chest upright and head upright. Stood a 3rd time for 1 minute, similar to second trial. He was min-mod assist to stand fully upright and reduce rotation. Mirror used to try and promote recognition of upright. Patient began to have bowel movement in standing that was liquid and dripping out of his depends. patient was returned to sitting in wheelchair and OT, Adolph Lehman, took patient as session had ended and OT was starting. PM Session  Patient supine in bed upon arrival.  Patient is a co-treatment secondary to level of assist required to maintain safety. Patient reports some pain in right wrist but declined tylenol. Bed was flattened and patient able to flex up left LE but required min assist to bring left UE across body to pull on rail, then was CGA to roll onto right side. Rolling to the left was mod assist of two. He required assist to flex up right LE but two person assist to roll onto left side. Supine>sit with mod assist of one for trunk and min assist for right LE.     Patient sitting at EOB with leaning to right with some pushing with left UE. He required cueing to hold to rail with left UE then able to maintain balance with CGA. Patient also with posterior lean initially and required min-mod assist to correct. Weight shifting anterior to posterior performed. Patient with difficulty following instructions to lean forward. He required a target to bring his head forward to. Then he was able to shift weight forward with CGA holding to left rail. Patient was then able to maintain CGA sitting balance. Patient set up in [address] [place] [address]. He completed sit>stand with max assist of 2. Patient with difficulty initiating stand with bilateral LE and requires therapists to initiate the stand and provide facilitation at right quad and glut. Patient with difficulty maintaining weight bearing on right LE and requires facilitation with pushing down through leg to prevent flexion withdrawal.  Patient sat back onto the stedy platform with mod assist of two to allow for flexion at hips. Patient was taken to bathroom in [address] [place] [address] stood from [facility] and sat back onto commode with max assist of 2. Patient with great difficulty flexing at hips to sit back evenly onto commode. He was dependent for pants and depends maintenance. Patient was incontinent of urine in depends. Patient was unable to urinate or have a BM while seated. He was max of 2 and min of a third to stand from commode. Right LE flexed up under the patient making right lean worse and was dependent to sit back and took two people to position right foot safely back onto the step platform of the stedy. Last stand from platform of the stedy, patient was min assist of 2. He requires therapist to help maintain hand position on stedy, but tone does help to keep the hand on the bar. Patient was then max assist of two to sit back into recliner secondary to great difficulty flexing hips and sitting back into chair, also with increased lean to the right. Patient with increased difficulty speaking and having difficulty forming sentences. He mostly gives one to two word answers. Patient did make one inappropriate comment to therapist, but was able to move past this. PT and OT assisted with positioning the patient in the chair with pillow under legs under right UE. Patient will need frequent checks for safety while up in the chair. PCA, Kimberli, is aware he is up and recommendations given to continue use of maxi move as patient has great difficulty using left LE. Safety Device - Type of devices:  [x]  All fall risk precautions in place [] Bed alarm in place  [x] Call light within reach [x] Chair alarm in place [] Positioning belt [x] Gait belt [] Patient at risk for falls [] Left in bed [x] Left in chair [] Telesitter in use [] Sitter present [x] Arsenio Domínguez, notified and RN, Calos Buckner, aware patient up in the chair. []  None        Goals  Short term goals  Time Frame for Short term goals: In 2 weeks pt will perform  Short term goal 1: Bed mobility Mod A  Short term goal 2: Transfers Mod A  Short term goal 3: Propel wc 48' with Mod A  Short term goal 4: Ascend/Descend curb step with LRAD, Mod A  Long term goals  Time Frame for Long term goals :  In 4 weeks pt will perform  Long term goal 1: Bed mobility CGA  Long term goal 2: Transfers CGA  Long term goal 3: Propel wc 48' with CGA-SBA  Long term goal 4: Ascend/Descend curb step with LRAD, Min A  Patient Goals   Patient goals : Did not state today (minimally verbal)    Plan    Plan  Times per week: 5-6x  Times per day: Twice a day  Plan weeks: 4 weeks  Specific instructions for Next Treatment: Transfers; practice wc mobility; strengthen RLE as able  Current Treatment Recommendations: Strengthening, ROM, Balance Training, Endurance Training, Functional Mobility Training, Wheelchair Mobility Training, Transfer Training, Gait Training, Stair training, Positioning, Pain Management, Equipment Evaluation, Education, &

## 2021-04-27 NOTE — PROGRESS NOTES
Occupational Therapy  Facility/Department: [address] REHAB  Daily Treatment Note  NAME: Zoya Jarrell  : 1955  MRN: [id number]    Date of Service: 2021    Discharge Recommendations:  Continue to assess pending progress, Patient would benefit from continued therapy after discharge       Assessment   Performance deficits / Impairments: Decreased functional mobility ; Decreased strength;Decreased endurance;Decreased ADL status; Decreased high-level IADLs;Decreased balance;Decreased ROM; Decreased sensation;Decreased coordination;Decreased fine motor control  Assessment: Session limited this date d/t pt with incontinence of stool at end of PT session. Pt required assist x2 with use of maximove (yarelis) lift to transfer from w/c > bed d/t fatigue and incontinence. Pt required max A to roll to both sides in bed, and total Ax2 for all aspects of hygiene, changing briefs/pants/linens. Pt completed feeding in bed with setup. Pt continues to be limited by aphasia, though able to give one-word responses to indicate needs with extra time. Pt will benefit from continued cotx 1-2 times a day to maximize function. Cont per POC  Prognosis: Good  OT Education: OT Role;Plan of Care;Precautions; ADL Adaptive Strategies  Barriers to Learning: exp aphasia  REQUIRES OT FOLLOW UP: Yes  Activity Tolerance  Activity Tolerance: Patient limited by fatigue;Treatment limited secondary to decreased cognition  Activity Tolerance: session limited d/t incontinence  Safety Devices  Safety Devices in place: Yes  Type of devices: Gait belt;Bed alarm in place; Left in bed;Patient at risk for falls         Patient Diagnosis(es): There were no encounter diagnoses.       has a past medical history of Acute MI ([facility] [address] Anxiety, Arthritis, CAD (coronary artery disease), Hypertension, and Influenza A.   has a past surgical history that includes back surgery (N/A, ) and Coronary angioplasty with stent (). Restrictions  Restrictions/Precautions  Restrictions/Precautions: Fall Risk  Position Activity Restriction  Other position/activity restrictions: R hemiplegia and increased tone RLE, exp. aphasia  Subjective   General  Chart Reviewed: Yes  Patient assessed for rehabilitation services?: Yes  Additional Pertinent Hx: Per Dr. Jen Koo is a 75 yo M with pmh CAD s/p stent, HTN, OA, Anxiety, former tobacco use who initially presented 2021 with right side weakness and aphasia. He was outside TPA window. Imaging revealed acute ischemic infarcts in the left LEXA territory with thrombosis in A3 segment. Neurology consulted. Patient is being treated with DAPT (for 30 days then monotherapy) and statin. Course complicated by HTN. Currently, patient reports ongoing right side weakness and difficult with speech. He denies headache, vision changes, tingling/numbness. He is motivated to start inpatient rehab program.\"  Family / Caregiver Present: No  Referring Practitioner: Dr Emma Apgar  Diagnosis: CVA- parasagital left frontal lobe  Subjective  Subjective: Pt met in dept for OT. Pt was incontinent of stool at the end of PT session so was returned to his room to clean up. Pt does not appear to be in pain      Orientation     Objective    ADL  Feeding: Setup(Pt able to eat lunch using right UE in bed with setup)  LE Dressing: Dependent/Total(Assist x2 to doff soiled briefs/shorts, don new hospital pants in bed. Pt rolled to both sides with max A, then was able to partially bridge hips to assist)  Toileting: Dependent/Total(Pt incontinent of stool during PT session, returned to bed via maximove. Pt rolled to both sides with max A, and required assist x2 for hygiene, changing depends/pants)        Functional Mobility  Functional Mobility Comments: Dependent for w/c mobility d/t incontinence.  Then required assist x2 with use of maximove to transfer from w/c > bed  Wheelchair Bed Transfers  Wheelchair/Bed - Technique: (maximove)  Equipment Used: Wheelchair;Bed  Level of Asssistance: Dependent/Total  Wheelchair Transfers Comments: Assist x2 with use of maximove to transfer from w/c > bed  Bed mobility  Bridging: (Pt partially bridged hips with assist to position R LE)  Rolling to Left: Maximum assistance  Rolling to Right: Maximum assistance  Scooting: Dependent/Total;2 Person assistance(to scoot up toward HOB in supine)  Comment: use of maximove to transfer from w/c > supine in bed                          Cognition  Cognition Comment: Pt continues to be limited by expressive aphasia, able to give one word responses at times to indicate needs              Plan   Plan  Times per week: 5-6  Times per day: Twice a day  Plan weeks: 3-4  Current Treatment Recommendations: Strengthening, Endurance Training, Balance Training, ROM, Functional Mobility Training, Safety Education & Training, Gait Training, Self-Care / ADL, Patient/Caregiver Education & Training, Neuromuscular Re-education, Home Management Training, Equipment Evaluation, Education, & procurement    PM Session:  Subjective: Pt met b/s for OT cotx with PT. Pt in bed, agreeable to therapy. Pt c/o pain in right wrist but did not rate    Objective:  Bed mobility: Completed on flat bed with use of bed rails. Pt rolled to the right with min A to reach with L UE to bed rail. Pt rolled to the left with mod Ax2 with assist to position R LE/UE. Pt completed supine > sit min Ax1 to bring R LE to EOB and mod Ax1 to raise trunk. Pt sat EOB x5 mins with initial min/mod A progressing to CGA. Pt leaned trunk forward/back x3 times, demo'd success with target of touching pt's forehead to PT's forehead. Pt attempted to complete shoulder retraction sitting EOB but minimal movement noted, appears partly d/t decreased command following. Pt required max Ax2 for sit<> jojo stedy to transfer from bed > HD BSC placed over commode.  Pt required total A for pants up/down + max Ax2 for standing balance in jojo stedy. Pt unable to scoot back enough on BSC so OT held urinal in place, pt unable to void. Pt required max Ax2 + assist of another to hold R UE in place on jojo stedy for sit>stand from [facility]. Pt also required A to maintain R foot placement on platform of jojo stedy d/t increased tone. Pt completed sit>stand from elevated seat of jojo stedy with min Ax2. Pt completed stand>sit with max Ax2 with max cues to flex at hip/knees. Pt required max Ax2 to scoot back in recliner chair. Pt was left positioned in recliner with alarm engaged, needs in reach. Assessment: Pt tolerated tx session fairly well despite fatigue, expressive aphasia. Pt was able to transfer with jojo stedy lift this date though required significant assist x2 therapists. Pt is far below baseline and will benefit from continued OT on ARU to maximize function.     Goals  Short term goals  Time Frame for Short term goals: 1 week  Short term goal 1: Pt will be min A with functional transfers  Short term goal 2: Pt will be min A with functional mobility  Short term goal 3: Pt will be min A with bed mobility  Short term goal 4: Pt will be mod A with bathing and dressing  Short term goal 5: Pt will tolerate UE neuromuscular reeducation to increase function in RUE for assist with ADLs  Long term goals  Time Frame for Long term goals : 3-4 weeks  Long term goal 1: Pt will be mod I with functional transfers  Long term goal 2: Pt will be mod I with functional mobility  Long term goal 3: Pt will be mod I with bed mobility  Long term goal 4: Pt will be mod I with bathing and dressing  Long term goal 5: Pt will be mod I with toileting  Patient Goals   Patient goals : Pt nodded agreement when asked if he wanted to be more independent       Therapy Time   Individual Concurrent Group Co-treatment   Time In 1115         Time Out 1200         Minutes 45            Therapy Time   Individual Co-treatment   Time In  1515   Time Out

## 2021-04-28 PROCEDURE — 6360000002 HC RX W HCPCS: Performed by: PHYSICAL MEDICINE & REHABILITATION

## 2021-04-28 PROCEDURE — 92507 TX SP LANG VOICE COMM INDIV: CPT

## 2021-04-28 PROCEDURE — 97530 THERAPEUTIC ACTIVITIES: CPT

## 2021-04-28 PROCEDURE — 1280000000 HC REHAB R&B

## 2021-04-28 PROCEDURE — 6370000000 HC RX 637 (ALT 250 FOR IP): Performed by: PHYSICAL MEDICINE & REHABILITATION

## 2021-04-28 PROCEDURE — 97535 SELF CARE MNGMENT TRAINING: CPT

## 2021-04-28 PROCEDURE — 97110 THERAPEUTIC EXERCISES: CPT

## 2021-04-28 PROCEDURE — 92526 ORAL FUNCTION THERAPY: CPT

## 2021-04-28 PROCEDURE — 94761 N-INVAS EAR/PLS OXIMETRY MLT: CPT

## 2021-04-28 PROCEDURE — 97542 WHEELCHAIR MNGMENT TRAINING: CPT

## 2021-04-28 RX ORDER — FLUTICASONE PROPIONATE 50 MCG
1 SPRAY, SUSPENSION (ML) NASAL DAILY
Status: DISCONTINUED | OUTPATIENT
Start: 2021-04-28 | End: 2021-05-13 | Stop reason: HOSPADM

## 2021-04-28 RX ADMIN — TRAZODONE HYDROCHLORIDE 50 MG: 50 TABLET ORAL at 20:42

## 2021-04-28 RX ADMIN — ASPIRIN 81 MG: 81 TABLET, COATED ORAL at 07:28

## 2021-04-28 RX ADMIN — LISINOPRIL 10 MG: 10 TABLET ORAL at 07:28

## 2021-04-28 RX ADMIN — ATENOLOL 50 MG: 50 TABLET ORAL at 07:29

## 2021-04-28 RX ADMIN — HYDROCHLOROTHIAZIDE 25 MG: 25 TABLET ORAL at 07:28

## 2021-04-28 RX ADMIN — CLOPIDOGREL BISULFATE 75 MG: 75 TABLET ORAL at 07:28

## 2021-04-28 RX ADMIN — FLUTICASONE PROPIONATE 1 SPRAY: 50 SPRAY, METERED NASAL at 13:02

## 2021-04-28 RX ADMIN — BACLOFEN 5 MG: 10 TABLET ORAL at 07:28

## 2021-04-28 RX ADMIN — ENOXAPARIN SODIUM 40 MG: 40 INJECTION SUBCUTANEOUS at 07:28

## 2021-04-28 RX ADMIN — BACLOFEN 5 MG: 10 TABLET ORAL at 13:02

## 2021-04-28 RX ADMIN — BACLOFEN 5 MG: 10 TABLET ORAL at 20:42

## 2021-04-28 RX ADMIN — ACETAMINOPHEN 650 MG: 325 TABLET ORAL at 07:28

## 2021-04-28 RX ADMIN — ISOSORBIDE MONONITRATE 30 MG: 30 TABLET, EXTENDED RELEASE ORAL at 07:29

## 2021-04-28 RX ADMIN — SERTRALINE 150 MG: 100 TABLET, FILM COATED ORAL at 07:29

## 2021-04-28 RX ADMIN — ATORVASTATIN CALCIUM 80 MG: 80 TABLET, FILM COATED ORAL at 20:42

## 2021-04-28 RX ADMIN — TAMSULOSIN HYDROCHLORIDE 0.4 MG: 0.4 CAPSULE ORAL at 07:28

## 2021-04-28 RX ADMIN — DOCUSATE SODIUM 50 MG AND SENNOSIDES 8.6 MG 1 TABLET: 8.6; 5 TABLET, FILM COATED ORAL at 07:29

## 2021-04-28 ASSESSMENT — PAIN DESCRIPTION - FREQUENCY: FREQUENCY: CONTINUOUS

## 2021-04-28 ASSESSMENT — PAIN DESCRIPTION - ORIENTATION: ORIENTATION: POSTERIOR

## 2021-04-28 ASSESSMENT — PAIN SCALES - GENERAL: PAINLEVEL_OUTOF10: 0

## 2021-04-28 ASSESSMENT — PAIN DESCRIPTION - DESCRIPTORS: DESCRIPTORS: DISCOMFORT

## 2021-04-28 ASSESSMENT — PAIN DESCRIPTION - ONSET: ONSET: ON-GOING

## 2021-04-28 ASSESSMENT — PAIN DESCRIPTION - PROGRESSION
CLINICAL_PROGRESSION: RESOLVED
CLINICAL_PROGRESSION: GRADUALLY WORSENING

## 2021-04-28 ASSESSMENT — PAIN DESCRIPTION - LOCATION: LOCATION: NECK

## 2021-04-28 ASSESSMENT — PAIN DESCRIPTION - PAIN TYPE: TYPE: ACUTE PAIN

## 2021-04-28 NOTE — PROGRESS NOTES
Patient is currently resting quietly and in no apparent distress. Admitted to ARU with a CVA. Patient is oriented to self and place, has expressive aphasia and delayed responses. Transfers well with a maxi move and 2 assist.  Requires assistance in turning and repositioning. Bottom noted with blanchable redness this evening, zinc cream applied and patient turned through out the night q 2 hrs. Lungs clear and diminished, with respirations unlabored and no cough noted. BS active with last BM charted 4/26. Patient took medications whole with thin liquids without difficulty swallowing. External catheter applied when placed in bed for the night per patient request, he is incontinent of halima colored urine. Patient denied any pain or discomfort. PIV flushes well. Telemetry remains on with patient in SB and SR. Fall precautions are in place. Call light and personal belongings are within reach. Will continue to monitor and assist as needed.

## 2021-04-28 NOTE — PROGRESS NOTES
Department of Physical Medicine & Rehabilitation  Progress Note    Patient Identification:  Ally Lombardo  [id number]  : 1955  Admit date: 2021    Chief Complaint: Acute ischemic left LEXA stroke (HCC)    Subjective:   No acute events overnight. Patient seen this am sitting up in room. Sitting up to eat meal. He denies any pain or spasms. Using more complete sentences to respond to questions today. Labs reviewed. ROS: No f/c, n/v, cp     Objective:  Patient Vitals for the past 24 hrs:   BP Temp Temp src Pulse Resp SpO2 Weight   21 0822 -- -- -- -- -- 93 % --   21 0715 (!) 175/89 98 °F (36.7 °C) Oral 64 16 93 % --   21 0534 (!) 159/85 97.9 °F (36.6 °C) Oral 64 16 91 % 229 lb 11.5 oz (104.2 kg)   21 0010 109/66 97.8 °F (36.6 °C) Oral 59 16 91 % --   21 2102 133/74 94.1 °F (34.5 °C) Oral 56 16 95 % --   21 1506 127/75 98.1 °F (36.7 °C) Oral 60 16 93 % --     Const: Alert. No distress, pleasant. HEENT: Normocephalic, atraumatic. Normal sclera/conjunctiva. MMM. CV: Regular rate and rhythm. Resp: No respiratory distress. Lungs decreased at bases (decreased inspiratory effort)  Abd: Soft, nontender, nondistended, NABS+   Ext: trace edema. Neuro: Alert, oriented, delayed processing. Expressive and receptive language deficits but able to respond if given enough time. Right hemiparesis overall 1/   Psych: Cooperative, appropriate mood and affect    Laboratory data: Available via EMR. Last 24 hour lab  No results found for this or any previous visit (from the past 24 hour(s)). Therapy progress:  PT  Position Activity Restriction  Other position/activity restrictions: R hemiplegia and increased tone RLE, exp.  aphasia  Objective     Sit to Stand: 2 Person Assistance, Moderate Assistance, Maximum Assistance  Stand to sit: 2 Person Assistance, Minimal Assistance, Moderate Assistance  Bed to Chair: Dependent/Total(with stedy)     OT  PT Equipment Recommendations  Other: Will likely require wc upon D/C. Toilet - Technique: (via jojo stedy)  Equipment Used: Extra wide bedside commode(HD BSC placed over commode)  Toilet Transfers Comments: max Ax2 for sit<> jojo stedy, + assist of another to maintain R hand grasp on jojo stedy bar  Assessment        SLP  Diet Solids Recommendation: Regular  Liquid Consistency Recommendation: Thin    Body mass index is 28.71 kg/m². Rehabilitation Diagnosis:   Stroke, 1.2, Right Body (L Brain)        Assessment and Plan:     Impairments:right hemiparesis, aphasia, dysphagia, cognition     Acute ischemic Left LEXA stroke  -Secondary ppx with DAPT x 30 days (then monotherapy), statin  -Telemetry  -PT/OT/SLP  -Started baclofen for spasticity - PT reports improvement     Dysphagia   -Dietitian and SLP consults.   -Currently on regular + thins     HTN, uncontrolled  -resume home atenolol, HCTZ, lisinopril, Imdur -- trend improving  -prn hydralazine     CAD  -DAPT, statin, bb     OA  -acetaminophen     Anxiety  -sertraline    Leukocytosis  -Resolved  -UA and CXR negative     Bladder   -High risk retention   -Monitor PVRs, SC prn >300cc  -Flomax     Bowel   -High risk constipation   -senna+colace BID, PRN miralax, MoM, and bisacodyl supp.     Safety   -fall and aspiration precautions     DVT ppx  -lovenox    Rehab Progress: Interdisciplinary team conference was held today with entire rehab treatment team including PT, OT, SLP, Dietician, RN, and SW. Discussion focused on progress toward rehab goals and discharge planning. Making gradual progress. Limited by severe right hemiparesis, aphasia, delayed processing. Working on functional mobility, compensatory strategies, cognition. Separate conference then held with patient/family, questions answered and concerns addressed. Total treatment time >35 min with greater than 50% spent in care coordination.   Anticipated Dispo: home with sig other and elderly mother  Services: [place] PT, OT, SLP, RN, Aide  DME: [facility]  ELOS: 5/14      [address]  Shauna Franks MD 4/28/2021, 11:19 AM

## 2021-04-28 NOTE — PLAN OF CARE
Problem: Falls - Risk of:  Goal: Will remain free from falls  Description: Will remain free from falls  Outcome: Ongoing  Note: Fall risk assessment completed. Fall precautions in place. Call light within reach. Pt educated on calling for assistance before getting up. Walkway free of clutter. Will continue to monitor. Problem: HEMODYNAMIC STATUS  Goal: Patient has stable vital signs and fluid balance  Outcome: Ongoing  Note: Pt's daily loya and I&O being monitored. VSS. Pt currently on low-sodium diet. CVA education being given. Will continue to educate and monitor. Problem: ACTIVITY INTOLERANCE/IMPAIRED MOBILITY  Goal: Mobility/activity is maintained at optimum level for patient  Outcome: Ongoing  Note: Pt continues with therapy. Pt is x2 assist with maxi-move. Will continue to monitor. Problem: COMMUNICATION IMPAIRMENT  Goal: Ability to express needs and understand communication  Outcome: Ongoing  Note: Pt is A&Ox2 with expressive aphasia. Call light within reach at all times. RN/PCA doing hourly rounds. Needs are being met this shift. Will continue to monitor. Problem: Skin Integrity:  Goal: Absence of new skin breakdown  Description: Absence of new skin breakdown  Outcome: Ongoing  Note: Patient's skin was assessed. Skin is currently intact and no new findings were noted. Patient is being educated on importance of turning/repostioning at least every two hours. RN will continue to educate and monitor. Problem: Pain:  Goal: Control of acute pain  Description: Control of acute pain  Outcome: Ongoing  Note: Pt assessed for pain. Pt in pain and assessed with 0-10 pain rating scale. Pt given prescribed analgesic for pain. (See eMar) Pt satisfied with pain relief thus far. Will reassess and continue to monitor.

## 2021-04-28 NOTE — PROGRESS NOTES
Patient admitted to rehab [address]. A&Ox2 with expressive aphasia. Pt currently on telemetry. Transfers with maxi-move x2 assist. On general, low sodium diet, tolerating well. Medications taken whole with thin liquids. On Lovenox for DVT prophylaxis. Pt has scattered bruising/abrasions. On room air. Has been incontinent of bowel and bladder, male purewick in use while in bed. LBM 4/28/2021. Pt denies pain/discomfort at this time. Chair/bed alarms in use and call light in reach. Will continue to monitor.

## 2021-04-28 NOTE — CARE COORDINATION
Team Conference held today. Team reviewed progress and goals. MD thinks his stroke extended. He is participating in all three therapies. Team recommends continued stay on rehab to further his progress in personal care and ambulation and speech needs. Call placed to Vanessa Cely to leave message of this update and request he continue his rehab stay on the unit. LSW left phone number for returned call for futher information if needed.   [place], [place]     Case Management   [phone]    4/28/2021  4:45 PM

## 2021-04-28 NOTE — PROGRESS NOTES
Speech Language Pathology  ACUTE REHAB UNIT  SPEECH/LANGUAGE PATHOLOGY      [x] Daily  [] Weekly Care Conference Note  [] Discharge    Patient:Carl Baxter Signs      DWE:[birth year]  [facility]:[id number]  Rehab Dx/Hx: Acute ischemic left LEXA stroke (HCC) [I63.522]    Precautions: FAll risk; Cognitive-Communication deficits  Home situation: Lives with significant other  ST Dx: [x] Aphasia  [] Dysarthria  [] Apraxia   [x] Oropharyngeal dysphagia [x] Cognitive   Impairment  [] Other:   Initial Speech Therapy Assessment Diagnosis:   1. Cognitive Diagnosis: Mild cognitive linguistic impairment for routine daily functional needs. Ongoing assessment of complex, high level and abstract PS/reasoning, as well as executive functioning, thought organization and planning is recommended as patient's processing and initation improve. Pt was independent with ADLs and IADLs PTA. Given signifcant extra time for processing and intiaotn, basic cognition appears intact in areas of orientation, sustained and selective, and simple/concrete PS/reasoning. Mildly decreased recall for daily events/information and moderately redcued recall for novel informaiton noted this date. Aforemnetioned delayed thought processing and initiation for expression is a barrier to demonstration of and exectuion for congitive-language skills/tasks. 2.  Aphasia Diagnosis: Given extra time for processing and initaiton, patient appears to present with mildly impaired auditory comprehension for complex and abstract information processing. Basic and concrete auditory/visual language comprehension appears intact. Mild to moderately impaired verbal expression for complex concepts requiring increased time to formulate and generate responses. 3.  Speech Diagnosis: Mildly impaired motor speech skills characterized by minimal to mild decreased verbal agility affecting speech fluency without impact on speech intelligibility at sentence level  4.   Dysphagia Diagnosis: Mild oropharyngeal dysphagia characterized by slow but effective mastication, right sided perioral weakness, reduced lingual coordination, reduced AP transit, potential swallow delay and mildly reduced laryngeal elevation. No immediate or delayed overt s/s of aspiration; trace oral residue is cleared by pt. Recommend regular textures and thin liquids with ongoing monitor to confirm tolerance.   Date of Admit: 4/23/2021  Room #: P4X-1798/3264-01  Date: 4/28/2021       Current functional status (updated daily):         Current Diet Order:Dietary Nutrition Supplements: Standard High Calorie Oral Supplement  DIET GENERAL; Low Sodium (2 GM)   Behavior: [x] Alert  [x] Cooperative  [x]  Pleasant  [] Confused  [] Agitated  [] Uncooperative  [] Distractible [] Motivated  [] Self-Limiting [] Anxious  [] Other:  Endurance:  [] Adequate for participation in SLP sessions  [x] Reduced overall  [] Lethargic  [] Other:  Safety: [] No concerns at this time  [x] Reduced insight into deficits  []  Reduced safety awareness [] Not following call light procedures  [] Unable to Assess  [x] Other:ongoing assessment as communication skills improve  Swallowing Precautions: Upright as possible for all oral intake, Remain upright for 30-45 minutes after meals  Barriers toward progress: none unless medical issues and caregiver assist           Date: 4/28/2021      Tx session 1 Tx session 2   Total Timed Code Min SLP Individual Minutes  Time In: 7822  Time Out: 1115  Minutes: 50  Coded treatment time  0 n/a   Group Treatment Minutes 0 0   Co-Treat Minutes 0 0   Variance/Reason:  n/a    Pain Jn/a    Pain Intervention [x] RN notified  [] Repositioned  [] Intervention offered and patient declined  [] N/A  [x] Other: PCA [] RN notified  [] Repositioned  [] Intervention offered and patient declined  [] N/A  [] Other:   Subjective     Pt seen bedside  And in treatment office    Objective:  Goals       Dysphagia goals: Pt did not identify a goal. Team goal is for safe toleration of po diet    therapy working toward pt goal n/a   1. The patient will tolerate regular food with thin liquid diet without observed clinical signs of aspiration,  Thin liquids: isolated sips taken without overt clinical s/s of aspiration or drooling    Food consistencies: semi soft; soft/bite size; regular solid foods: functional mastication; functional bolus control; IND management of oral pocketing;no overt clinical s/s of aspiration    Pt continues to impulsive and takes multiple sips in succession OR taking large bites. Still no drooling. No overt clinical s/s of aspiration   n/a   2. The patient will improve oral preparation phase via bolus control/manipulation exercises to 5/5 each trial., . Right oral motor weakness. Right labial/buccal/lingual ex targeted during po trials    Labial seal was adequate for straw seal; cup seal n/a   3. The patient/caregiver will demonstrate understanding of compensatory strategies for improved swallowing safety Reviewed strategies :   · Reducing rate of intake  · Review of lingual sweep to clear oral residue   n/a   Short-term Goals  Goal 1: Pt will demonstrate improved recall of new learning strategies; daily therapy activities and new learning information with set up, use of memory strategies and minimal assist   STM/Working memory:   Persistent expressive language deficits impact objective assessment  -Y/N? ; choice ?: verbal perseveration noted and/or 'no response' errors  -non-verbal responses suggest reduced insight/awareness of therapy schedule  -reduced use of call light (but this may also be related to motor planning deficits)   n/a   Goal 2: Pt will improve oral and/or verbal agility and ROM for coordinated and alternating motions to < mild impairment.    Isolated rom: slow and minimal to moderate right labial/buccal/lingual    Paired movements: deficits with concern for motor planning    n/a   Goal 3: Pt will improve verbal expression of complex thoughts/ideas at sentence level in structured functional tasks with <5 second delay Verbal expression;   Verbal Y/N ? Verbal responses:    ·  providing immediate feedback and pairing head nods with verbal response    Word level:   · Closed set (automatic sentence completion): >90%  · Open set (sentence completion in which varied responses able): 75%  · Responsive namin%  · Generative listing items within a category (highly familiar and daily living categories): moderate + prompting  Oral reading: functional at 4-6 word utterance level for 80% to 100% oral/written agreement. Word retrieval and thought organization for phrase /sentence formulation for:   ·  describing action pictures : 0 to moderate prompting  · Using pictures for dialog boxes for highly familiar situations: 0 to moderate prompting  · Answering general open ended Wh? Requiring >2 word utterances verbal perseveration; and moderate + prompting n/a          Goal 4: Pt will participate with ongoing assessment of EF, planning, sequencing and complex reasoning with additional goals PRN     Auditory processing :  · 3 -4 unit Y/N? (informational/ideational): 83%   · 4-6 unit comparative questions: 70%  Max feedback with head nods for confirmation due to concern for verbal perseveration    1 step commands: extra time and moderate to max cues.  Continue to note motor perseveration noted without reduced pt insight    REading processing at 3-4 unit Y/N?: continue ; no data taken n/a   Other areas targeted:     Education:   Ongoing pt ed    Safety Devices: [] Call light within reach  [] Chair alarm activated  [] Bed alarm activated  [x] Other: transport returned pt to room [] Call light within reach  [] Chair alarm activated  [] Bed alarm activated  [] Other:    Progress Assessment: 2021: Pt with increase receptive and expressive language deficits with concern for potential oral motor /verbal dyspraxia; pt unable to participate in executive function . Unclear if due to fatigue (this SLP session was the last of the day)  4/27/2021: Alerted PCA and RN due to pt having a large amount of sputum/phlegm in his mouth upon SLP arrival to room. Pt with another episode of cough with expectoration of large amount of phlegm. Will check acute floor documentation for any dysphagia instrumentals. PO sample size small and no overt clinical s/s at the bedside. Will discuss at team meeting. Potential need for MBS to r/o silent aspiration contributing to pt's status regarding phlegm   Plan: Continue as per plan of care. Continued Tx Upon Discharge: ?    [x] Yes [] No [] TBD based on progress while on ARU [] Vital Stim indicated [] Other:   Estimated discharge date: TBD   Discharge recommendations:   [] Home independently  [x] Home with assistance [x]  24 hour supervision  [] ECF [] Other:     Additional information:     Interventions used during Rehab Stay:  [x] Speech/Language Treatment  [] Instruction in HEP [] Group [x] Dysphagia Treatment [x] Cognitive Treatment   [] Other:    Adverse Reactions to Treatment/Significant Change in Status: n/a    Electronically Signed by    Wes Coronado. Jeffery,MS,CCC,SLP [id number]  Speech and Language Pathologist

## 2021-04-28 NOTE — PROGRESS NOTES
PHYSICAL THERAPY  Progress Note   Second Session    Patient Name: Dahiana Bhat  Medical Record Number: [id number]    Treatment Diagnosis: impaired functional mobility      Chart Reviewed: Yes   Restrictions/Precautions: Fall Risk Other position/activity restrictions: R hemiplegia and increased tone RLE, exp. aphasia   Additional Pertinent Hx: Pt is 75 y/o M admitted 4/21/21 to ED with stroke like symptoms affecting R side ongoing ~12hours. MRI revealed multifocal acute infarctions in the parasagital left front along distal L LEXA territory with occluded/thrombosed A3 segment of L LEXA. MRI also revealed old infarction in basal ganglia, thalami, R cerebellum. PMHx significant for CAD, Angio w/ stent, MI, lumbar sx, HTN. Pt is 75 y/o M admitted 4/21/21 to ED with stroke like symptoms affecting R side ongoing ~12hours. MRI revealed multifocal acute infarctions in the parasagital left front along distal L LEXA territory with occluded/thrombosed A3 segment of L LEXA. MRI also revealed old infarction in basal ganglia, thalami, R cerebellum. PMHx significant for CAD, Angio w/ stent, MI, lumbar sx, HTN. WB Status: used Gege Plus to stand with good placement of B feet. Attempted to [place] plus forward to allow pt to take a step, max A to advance R LE, then when pt tried to advance L LE, R LE had significant flexor tone and crossed into adduction behind L LE, max A x 2 to 3 to sit safely in chair due to R LE being far under chair and pt sliding hips forward in chair      Subjective: patient seated in wheelchair at start of session. He continues to be minimally verbal and mostly only gets 1-2 word phrases out. Increased time required to converse at all. Patient seen as a co-treatment with OT for safety during session. Objective  Patient taken to the bathroom in wheelchair and set up with railing on the left of the toilet. Patient able to stand pivot to commode using the left hand on the rail and max assist of 2. Patient required PT to position the right foot and to slide it around as he stepped around. Patient does achieve quad contraction to maintain knee extension in right LE to allow for stepping around with left LE, but requires cues to straighten his knee. OT assisted with pants and depends down and patient was max assist of two to sit back onto commode. He was CGA and able to maintain balance on commode with use of left grab bar. Patient was unable to void while on commode (depends was saturated and PT/OT assisted with changing depends and pants as urine had saturated through depends and onto pants). Sit>stand at bar with max assist of 2 and PT assisting with positioning of right LE. He was dependent to pull pants up by OT and max assist from PT for balance. Pivot transfer back to wheelchair with max assist of 2. He is unable to advance right LE on his own and is max assist to slide it forward. Able to step around with left LE with PT assisting to block right knee. Max of 2 to safely sit back into wheelchair. Patient set up with jojo plus with foot platform removed. Sit> jojo plus with CGA of one and min assist of one to facilitate extension of right LE. Attempted ambulated with two person assist for balance and third person for control of the jojo plus. Patient was able to step forward with left LE with mod to max assist at right LE for weight shifting and to extend knee. Attempted to step right LE forward and patient attempts to lift right LE, but tone really pushes patient into plantar flexion and was unable to flex right knee up despite max assist of two. Second stand to jojo plus with shin guard removed, patient performed weight shifting onto left LE and performed right hip flexion with knee flexion. He was max assist to lift right LE off the floor but able to initiate and then clear foot from the floor. Patient took seated rest break and then third trial occurred.   He performed several hip flexion reps, then transitioned to ambulation. Patient is max assist of 2-3 as one is needed for control of jojo plus. He requires max verbal cueing, increased time to process instructions, then able to slowly step forward with each foot. Max assist to lift right LE up and step forward. Requires assist to return to sit back into wheelchair. Max assist x2 to stand pivot to the left to bed. Max assist of 2 for sit>supine  Patient positioned with pillow under bilateral knees and under right UE. Assessment: Patient limited by tone in right LE but was able to begin to ambulate with jojo plus and max assist of 3. He continues to have slow processing and requires increased time to process instructions and to begin mobility.         Safety Device - Type of devices:  [x]  All fall risk precautions in place [x] Bed alarm in place  [x] Call light within reach [] Chair alarm in place [] Positioning belt [x] Gait belt [] Patient at risk for falls [x] Left in bed [] Left in chair [] Telesitter in use [] Sitter present [] Nurse notified []  None      Therapy Time   Individual Co-treatment   Time In  1300   Time Out  1345   Minutes  45       Electronically signed by Carrie Perdomo PT on 4/28/2021 at 3:39 PM

## 2021-04-28 NOTE — PROGRESS NOTES
parris)  Ambulation  Ambulation?: No  Wheelchair Activities  Wheelchair Size: 20\"  Wheelchair Type: Standard  Wheelchair Cushion: Standard  Pressure Relief Type: Lateral lean  Level of Assistance for pressure relief activities: Moderate assistance  Wheelchair Parts Management: Yes  Left Brakes Level of Assistance: Stand by assistance  Right Brakes Level of Assistance: Dependent/Total  Propulsion: Yes  Propulsion 1  Propulsion: Manual  Level: Level Tile  Level of Assistance: Minimal assistance  Description/ Details: patient with improved use of left LE to correct his path. He was able to initiate with CGA and intermittent min assist with veering to the right. Min assist to complete a full turn. Patient became constant min assist to maintain straight path secondary to fatigue. He was able to get over threshold with CGA. Distance: 220'  Gait Deviations  Gait Deviations: Slow Mary     Balance  Posture: Fair  Sitting - Static: Fair;+  Sitting - Dynamic: Fair;+  Standing - Static: Fair;-  Standing - Dynamic: Fair;-  Comments: Stood at martinez rail with max A x 1 and mod A of another to stand, then able to maintain static stance min A once in proper position. Stood x 8 minutes with shift side to side with min A an blocking of R knee, then max A to shift weight to R and take very small step with L LE, then shidt weight forward and L to back and R, then practiced minimally flexing R knee and extending it again min to mod A x 10, then shifting weight to R and maintaining it 5 to 10 sec several times, then shifting weigt R and tapping L toes x 5 before returning to midline required max A first time and mod A sevcond time      Goals  Short term goals  Time Frame for Short term goals:  In 2 weeks pt will perform  Short term goal 1: Bed mobility Mod A  Short term goal 2: Transfers Mod A  Short term goal 3: Propel wc 48' with Mod A  Short term goal 4: Ascend/Descend curb step with LRAD, Mod A  Long term goals  Time Frame for Long term goals :  In 4 weeks pt will perform  Long term goal 1: Bed mobility CGA  Long term goal 2: Transfers CGA  Long term goal 3: Propel wc 48' with CGA-SBA  Long term goal 4: Ascend/Descend curb step with LRAD, Min A  Patient Goals   Patient goals : Did not state today (minimally verbal)    Plan    Plan  Times per week: 5-6x  Times per day: Twice a day  Plan weeks: 4 weeks  Specific instructions for Next Treatment: Transfers; practice wc mobility; strengthen RLE as able  Current Treatment Recommendations: Strengthening, ROM, Balance Training, Endurance Training, Functional Mobility Training, Wheelchair Mobility Training, Transfer Training, Gait Training, Stair training, Positioning, Pain Management, Equipment Evaluation, Education, & procurement, Modalities, Patient/Caregiver Education & Training, Safety Education & Training, Home Exercise Program, Neuromuscular Re-education  Safety Devices  Type of devices: Gait belt, All fall risk precautions in place  Restraints  Initially in place: No     Therapy Time   Individual Concurrent Group Co-treatment   Time In 0915     0900   Time Out 0945     0915   Minutes 27     [id number] [address], [facility] [address]

## 2021-04-28 NOTE — PROGRESS NOTES
Occupational Therapy  Facility/Department: [address] IP REHAB  Daily Treatment Note  NAME: Dahiana Bhat  : 1955  MRN: [id number]    Date of Service: 2021    Discharge Recommendations:  Continue to assess pending progress, Patient would benefit from continued therapy after discharge, 24 hour supervision or assist  OT Equipment Recommendations  Other: defer to next level of care    Assessment   Performance deficits / Impairments: Decreased functional mobility ; Decreased strength;Decreased endurance;Decreased ADL status; Decreased high-level IADLs;Decreased balance;Decreased ROM; Decreased sensation;Decreased coordination;Decreased fine motor control  Assessment: Pt participated in ADL sponge bath session this date with assist x1 for UB ADLs in bed, then assist x2 for LB ADLs. Pt required min A for UB bathing, mod/max A for UB dressing, total A for LB bathing/dressing, setup for grooming. Pt was incontinent of stool x2 during session, requiring total Ax1-2 for all aspects of hygiene, changing briefs/linens in bed. Pt required min/mod Ax2 for bed mobility, and mod/max Ax2 for sit<>stand transfers in [facility] Cont per POC with cotx 1-2 times/day to maximize function. Prognosis: Good  OT Education: OT Role;Plan of Care;Precautions; ADL Adaptive Strategies;Transfer Training  Barriers to Learning: exp aphasia  REQUIRES OT FOLLOW UP: Yes  Activity Tolerance  Activity Tolerance: Patient limited by fatigue;Treatment limited secondary to decreased cognition  Activity Tolerance: session limited d/t multiple episodes of incontinence  Safety Devices  Safety Devices in place: Not Applicable(seated in w/c with PT)  Type of devices: Gait belt         Patient Diagnosis(es): There were no encounter diagnoses.       has a past medical history of Acute MI ([facility] [address] Anxiety, Arthritis, CAD (coronary artery disease), Hypertension, and Influenza A.   has a past surgical history that includes back surgery (N/A, ) and Coronary angioplasty with stent (9/13). Restrictions  Restrictions/Precautions  Restrictions/Precautions: Fall Risk  Position Activity Restriction  Other position/activity restrictions: R hemiplegia and increased tone RLE, exp. aphasia  Subjective   General  Chart Reviewed: Yes  Patient assessed for rehabilitation services?: Yes  Additional Pertinent Hx: Per Dr. Ham Diss is a 77 yo M with pmh CAD s/p stent, HTN, OA, Anxiety, former tobacco use who initially presented 4/21/2021 with right side weakness and aphasia. He was outside TPA window. Imaging revealed acute ischemic infarcts in the left LEXA territory with thrombosis in A3 segment. Neurology consulted. Patient is being treated with DAPT (for 30 days then monotherapy) and statin. Course complicated by HTN. Currently, patient reports ongoing right side weakness and difficult with speech. He denies headache, vision changes, tingling/numbness. He is motivated to start inpatient rehab program.\"  Family / Caregiver Present: No  Referring Practitioner: Dr Agapito Mccoy  Diagnosis: CVA- parasagital left frontal lobe  Subjective  Subjective: Pt met b/s for OT. Pt agreeable to sponge bath. Pt c/o pain in right wrist      Orientation     Objective    ADL  Grooming: Setup(Pt used mouthwash, washed face with setup + vc's)  UE Bathing: Minimal assistance;Verbal cueing;Setup(Assist to bathe/dry L UE, cues to bathe all parts)  LE Bathing: Dependent/Total(Assist for all parts, completed in bed)  UE Dressing: Maximum assistance; Moderate assistance; Increased time to complete;Setup;Verbal cueing(Pt able to pull shirt overhead to doff but required A for B sleeves. OT threaded shirt sleeve over R UE, pt required min A to thread L UE. Pt able to pull overhead, required A to pull down in back)  LE Dressing: Dependent/Total(Donned briefs in bed by rolling with Ax2.  OT thread hospital pants over pt's LEs in bed, and pulled up over hips while pt stood in [address] [facility] [address] with max A from PT)  Toileting: Dependent/Total(Pt incontinent of large, loose stool x2 in bed during session. Required total Ax1-2 for all aspects of hygiene, changing briefs, and changing bed linens)  Additional Comments: Pt participated in sponge bath in bed; completed LB ADLs during overlap portion of tx with PT d/t requiring assist x2        Balance  Sitting Balance: Minimal assistance(min A/CGA EOB)  Standing Balance: Maximum assistance(max Ax1-2 in jojo stedy)  Functional Mobility  Functional Mobility Comments: Assist x2 with use of jojo stedy to transfer from bed > chair  Wheelchair Bed Transfers  Wheelchair/Bed - Technique: (jojo stedy)  Equipment Used: Bed;Wheelchair  Level of Asssistance: Dependent/Total  Wheelchair Transfers Comments: mod/max Ax2  Bed mobility  Rolling to Left: Moderate assistance;2 Person assistance  Rolling to Right: Minimal assistance  Supine to Sit: Moderate assistance;Minimal assistance;2 Person assistance  Scooting: Moderate assistance;2 Person assistance;Maximal assistance  Transfers  Sit to stand: Maximum assistance;2 Person assistance; Moderate assistance  Stand to sit: Moderate assistance;2 Person assistance;Minimal assistance  Transfer Comments: from EOB > jojo stedy > w/c            Cognition  Overall Cognitive Status: Exceptions  Arousal/Alertness: Delayed responses to stimuli  Following Commands: Follows one step commands with repetition; Follows one step commands with increased time  Attention Span: Attends with cues to redirect  Memory: (difficult to assess)  Safety Judgement: Decreased awareness of need for assistance;Decreased awareness of need for safety  Insights: Decreased awareness of deficits  Initiation: Requires cues for all  Sequencing: Requires cues for all  Cognition Comment: Pt continues to be limited by expressive aphasia, able to give one word responses at times to indicate needs         PM Session:  Subjective: Pt met b/s for OT cotx with PT.  Pt seated in recliner, agreeable to therapy. Pt with less verbalizations this session, though smiles and laughs with therapists. Pt denied pain    Objective:  Pt propelled w/c into bathroom with min/mod A to steer and max cues to attend to and avoid objects on right side. Therapists positioned w/c for transfers throughout session. Pt completed stand pivot transfer from w/c > HD BSC over commode to the left using GB with max Ax2. Pt stood with max A from PT while OT pulled down briefs/pants. Pt sat on commode x5 mins with CGA but did not void. Pt's briefs were saturated with urine - OT unstrapped to doff and then threaded new briefs through LEs. Pt completed sit>stand from HD BSC using L grab bar with max Ax2. Pt required max A for standing balance from PT and total A from OT to pull up briefs/pants over hips. Pivot transfer to w/c with max Ax2. Pt able to take small step with LLE with max cues, but requires A to advance R LE. Pt was set up in jojo plus with foot platform removed. Sit> jojo plus with CGAx1/min Ax1 (dependent with use of lift). Attempted ambulation with 2 person assist + assist of another to manage controls of jojo plus. Pt able to take small step with LLE with max cues (+max A to manage R LE). Pt unable to advance R LE despite max A from PT d/t increased flexor tone. Stand>sit dependent in jojo plus. Second attempt: Removed shin guard from jojo plus. Pt was able to weight shift to left side and PT provided max A to raise R LE for multiple trials. Then initiated ambulation: pt able to step with LLE with max cues for sequencing, though required max A from PT to step with R LE. Pt required max A from PT throughout to safely manage R LE, OT provided min A and facilitation to guide trunk from R <> L throughout. Pt completed stand pivot from w/c > bed with max Ax2 using bed rail. Max Ax2 for sit>supine. Patient was left positioned in bed for comfort with alarm engaged, needs in reach.     Assessment: Pt tolerated tx session fairly well despite R side weakness, tone, neglect. Pt continues to require assist x2-3 people for all transfers/mobility for safety.  Cont per POC    Plan   Plan  Times per week: 5-6  Times per day: Twice a day  Plan weeks: 3-4  Current Treatment Recommendations: Strengthening, Endurance Training, Balance Training, ROM, Functional Mobility Training, Safety Education & Training, Gait Training, Self-Care / ADL, Patient/Caregiver Education & Training, Neuromuscular Re-education, Home Management Training, Equipment Evaluation, Education, & procurement    Goals  Short term goals  Time Frame for Short term goals: 1 week  Short term goal 1: Pt will be min A with functional transfers  Short term goal 2: Pt will be min A with functional mobility  Short term goal 3: Pt will be min A with bed mobility  Short term goal 4: Pt will be mod A with bathing and dressing  Short term goal 5: Pt will tolerate UE neuromuscular reeducation to increase function in RUE for assist with ADLs  Long term goals  Time Frame for Long term goals : 3-4 weeks  Long term goal 1: Pt will be mod I with functional transfers  Long term goal 2: Pt will be mod I with functional mobility  Long term goal 3: Pt will be mod I with bed mobility  Long term goal 4: Pt will be mod I with bathing and dressing  Long term goal 5: Pt will be mod I with toileting  Patient Goals   Patient goals : Pt nodded agreement when asked if he wanted to be more independent       Therapy Time   Individual Concurrent Group Co-treatment   Time In 0815     0900   Time Out 0900     0915   Minutes 45     15      Therapy Time   Individual Co-treatment   Time In  1300   Time Out  1345   Minutes  [address], OTR/L [address]

## 2021-04-29 LAB
ANION GAP SERPL CALCULATED.3IONS-SCNC: 13 MMOL/L (ref 3–16)
BASOPHILS ABSOLUTE: 0 K/UL (ref 0–0.2)
BASOPHILS RELATIVE PERCENT: 0.6 %
BUN BLDV-MCNC: 26 MG/DL (ref 7–20)
CALCIUM SERPL-MCNC: 9.1 MG/DL (ref 8.3–10.6)
CHLORIDE BLD-SCNC: 98 MMOL/L (ref 99–110)
CO2: 27 MMOL/L (ref 21–32)
CREAT SERPL-MCNC: 0.6 MG/DL (ref 0.8–1.3)
EOSINOPHILS ABSOLUTE: 0.1 K/UL (ref 0–0.6)
EOSINOPHILS RELATIVE PERCENT: 1.5 %
GFR AFRICAN AMERICAN: >60
GFR NON-AFRICAN AMERICAN: >60
GLUCOSE BLD-MCNC: 144 MG/DL (ref 70–99)
HCT VFR BLD CALC: 42.1 % (ref 40.5–52.5)
HEMOGLOBIN: 14.7 G/DL (ref 13.5–17.5)
LYMPHOCYTES ABSOLUTE: 1.3 K/UL (ref 1–5.1)
LYMPHOCYTES RELATIVE PERCENT: 15.7 %
MAGNESIUM: 2 MG/DL (ref 1.8–2.4)
MCH RBC QN AUTO: 32 PG (ref 26–34)
MCHC RBC AUTO-ENTMCNC: 34.9 G/DL (ref 31–36)
MCV RBC AUTO: 91.7 FL (ref 80–100)
MONOCYTES ABSOLUTE: 0.6 K/UL (ref 0–1.3)
MONOCYTES RELATIVE PERCENT: 7.6 %
NEUTROPHILS ABSOLUTE: 6.2 K/UL (ref 1.7–7.7)
NEUTROPHILS RELATIVE PERCENT: 74.6 %
PDW BLD-RTO: 15 % (ref 12.4–15.4)
PLATELET # BLD: 168 K/UL (ref 135–450)
PMV BLD AUTO: 8 FL (ref 5–10.5)
POTASSIUM REFLEX MAGNESIUM: 3 MMOL/L (ref 3.5–5.1)
RBC # BLD: 4.59 M/UL (ref 4.2–5.9)
SODIUM BLD-SCNC: 138 MMOL/L (ref 136–145)
WBC # BLD: 8.3 K/UL (ref 4–11)

## 2021-04-29 PROCEDURE — 97110 THERAPEUTIC EXERCISES: CPT

## 2021-04-29 PROCEDURE — 1280000000 HC REHAB R&B

## 2021-04-29 PROCEDURE — 97530 THERAPEUTIC ACTIVITIES: CPT

## 2021-04-29 PROCEDURE — 80048 BASIC METABOLIC PNL TOTAL CA: CPT

## 2021-04-29 PROCEDURE — 92526 ORAL FUNCTION THERAPY: CPT

## 2021-04-29 PROCEDURE — 97116 GAIT TRAINING THERAPY: CPT

## 2021-04-29 PROCEDURE — 83735 ASSAY OF MAGNESIUM: CPT

## 2021-04-29 PROCEDURE — 92507 TX SP LANG VOICE COMM INDIV: CPT

## 2021-04-29 PROCEDURE — 85025 COMPLETE CBC W/AUTO DIFF WBC: CPT

## 2021-04-29 PROCEDURE — 36415 COLL VENOUS BLD VENIPUNCTURE: CPT

## 2021-04-29 PROCEDURE — 94761 N-INVAS EAR/PLS OXIMETRY MLT: CPT

## 2021-04-29 PROCEDURE — 6370000000 HC RX 637 (ALT 250 FOR IP): Performed by: PHYSICAL MEDICINE & REHABILITATION

## 2021-04-29 PROCEDURE — 97542 WHEELCHAIR MNGMENT TRAINING: CPT

## 2021-04-29 PROCEDURE — 97535 SELF CARE MNGMENT TRAINING: CPT

## 2021-04-29 PROCEDURE — 6360000002 HC RX W HCPCS: Performed by: PHYSICAL MEDICINE & REHABILITATION

## 2021-04-29 PROCEDURE — 97112 NEUROMUSCULAR REEDUCATION: CPT

## 2021-04-29 RX ORDER — POTASSIUM CHLORIDE 20 MEQ/1
40 TABLET, EXTENDED RELEASE ORAL 2 TIMES DAILY WITH MEALS
Status: COMPLETED | OUTPATIENT
Start: 2021-04-29 | End: 2021-04-29

## 2021-04-29 RX ADMIN — ATENOLOL 50 MG: 50 TABLET ORAL at 08:51

## 2021-04-29 RX ADMIN — HYDROCHLOROTHIAZIDE 25 MG: 25 TABLET ORAL at 08:50

## 2021-04-29 RX ADMIN — BACLOFEN 5 MG: 10 TABLET ORAL at 20:00

## 2021-04-29 RX ADMIN — POTASSIUM CHLORIDE 40 MEQ: 1500 TABLET, EXTENDED RELEASE ORAL at 18:04

## 2021-04-29 RX ADMIN — TRAZODONE HYDROCHLORIDE 50 MG: 50 TABLET ORAL at 20:00

## 2021-04-29 RX ADMIN — FLUTICASONE PROPIONATE 1 SPRAY: 50 SPRAY, METERED NASAL at 11:02

## 2021-04-29 RX ADMIN — ASPIRIN 81 MG: 81 TABLET, COATED ORAL at 08:52

## 2021-04-29 RX ADMIN — ISOSORBIDE MONONITRATE 30 MG: 30 TABLET, EXTENDED RELEASE ORAL at 08:52

## 2021-04-29 RX ADMIN — DOCUSATE SODIUM 50 MG AND SENNOSIDES 8.6 MG 1 TABLET: 8.6; 5 TABLET, FILM COATED ORAL at 08:52

## 2021-04-29 RX ADMIN — BACLOFEN 5 MG: 10 TABLET ORAL at 08:51

## 2021-04-29 RX ADMIN — BACLOFEN 5 MG: 10 TABLET ORAL at 14:07

## 2021-04-29 RX ADMIN — LISINOPRIL 10 MG: 10 TABLET ORAL at 08:51

## 2021-04-29 RX ADMIN — CLOPIDOGREL BISULFATE 75 MG: 75 TABLET ORAL at 08:51

## 2021-04-29 RX ADMIN — SERTRALINE 150 MG: 100 TABLET, FILM COATED ORAL at 08:52

## 2021-04-29 RX ADMIN — ENOXAPARIN SODIUM 40 MG: 40 INJECTION SUBCUTANEOUS at 08:50

## 2021-04-29 RX ADMIN — POTASSIUM CHLORIDE 40 MEQ: 1500 TABLET, EXTENDED RELEASE ORAL at 12:23

## 2021-04-29 RX ADMIN — TAMSULOSIN HYDROCHLORIDE 0.4 MG: 0.4 CAPSULE ORAL at 08:50

## 2021-04-29 RX ADMIN — ATORVASTATIN CALCIUM 80 MG: 80 TABLET, FILM COATED ORAL at 20:00

## 2021-04-29 NOTE — PROGRESS NOTES
Physical Therapy  Facility/Department: [address] IP REHAB  Daily Treatment Note  NAME: Ross Ledesma  : 1955  MRN: [id number]    Date of Service: 2021    Discharge Recommendations:  Continue to assess pending progress, Patient would benefit from continued therapy after discharge   PT Equipment Recommendations  Other: Will likely require wc upon D/C. Assessment   Body structures, Functions, Activity limitations: Decreased functional mobility ; Decreased strength;Decreased endurance;Decreased sensation;Decreased balance  Assessment: Patient with improved alertness this date but has great difficulty processing new tasks. He has better activation of musculature on left LE to allow for standing at martinez rail. Patient with improved tone this date and less flexion withdrawl happening with mobility. Pt performed stand pivot transfers with mod to max A x 2 this date. Pt able to sit EOB x 15 minutes and perform reaching activities. Patient continues to require two person assist for any mobility. Patient would benefit from co-treatment twice a day secondary to significant impairements and 2 therapists required for safe mobilization. Patient well below baseline and will benefit from continued skilled therapy for strengthening, functional mobility training, and balance training to reduce assistance required. Specific instructions for Next Treatment: Transfers; practice wc mobility; strengthen RLE as able  REQUIRES PT FOLLOW UP: Yes  Activity Tolerance  Activity Tolerance: Patient limited by fatigue;Patient limited by cognitive status  Activity Tolerance: Patient appears to have some processing difficulties and needing increased time for new activity directions and tactile cues to process     Patient Diagnosis(es): There were no encounter diagnoses.      has a past medical history of Acute MI ([facility] [address] Anxiety, Arthritis, CAD (coronary artery disease), Hypertension, and Influenza A.   has a past surgical history that includes back surgery (N/A, 1989) and Coronary angioplasty with stent (9/13). Restrictions  Restrictions/Precautions  Restrictions/Precautions: Fall Risk  Position Activity Restriction  Other position/activity restrictions: R hemiplegia and increased tone RLE, exp. aphasia  Subjective   General  Chart Reviewed: Yes  Additional Pertinent Hx: Pt is 77 y/o M admitted 4/21/21 to ED with stroke like symptoms affecting R side ongoing ~12hours. MRI revealed multifocal acute infarctions in the parasagital left front along distal L LEXA territory with occluded/thrombosed A3 segment of L LEXA. MRI also revealed old infarction in basal ganglia, thalami, R cerebellum. PMHx significant for CAD, Angio w/ stent, MI, lumbar sx, HTN. Pt is 77 y/o M admitted 4/21/21 to ED with stroke like symptoms affecting R side ongoing ~12hours. MRI revealed multifocal acute infarctions in the parasagital left front along distal L LEXA territory with occluded/thrombosed A3 segment of L LEXA. MRI also revealed old infarction in basal ganglia, thalami, R cerebellum. PMHx significant for CAD, Angio w/ stent, MI, lumbar sx, HTN. Response To Previous Treatment: Patient with no complaints from previous session. Family / Caregiver Present: No  Referring Practitioner: Dr. Omar Coronado  Subjective  Subjective: co-treat with OT for safety  General Comment  Comments: pt reports dizziness this morning  Pain Screening  Patient Currently in Pain: Denies  Vital Signs  Patient Currently in Pain: Denies       Orientation  Orientation  Overall Orientation Status: Within Functional Limits(Able to satte name, day of week,month, year, hospital with extra time required)  Cognition      Objective   Bed mobility  Rolling to Left: Moderate assistance;2 Person assistance  Rolling to Right: Minimal assistance  Supine to Sit: 2 Person assistance;Maximum assistance  Sit to Supine: 2 Person assistance;Maximum assistance  Scooting:  Moderate assistance;2 Person assistance  Comment: bed mobiity on treatment mat,assisted pt to scoop R LE upwith L LE  Transfers  Sit to Stand: 2 Person Assistance; Moderate Assistance;Maximum Assistance  Stand to sit: 2 Person Assistance; Moderate Assistance  Bed to Chair: Moderate assistance;2 Person Assistance;Maximum assistance  Stand Pivot Transfers: Moderate Assistance;2 Person Assistance;Maximum Assistance  Ambulation  Ambulation?: No     Balance  Posture: Fair  Sitting - Static: Good;-  Sitting - Dynamic: Fair;+  Exercises  Bridging: Other(comment)(x 5 with assist on R hip)  Comments: supine stretching of dorsiflexion, hip adductors and hamstrings R LE 3x30 sec  Other exercises  Other exercises?: Yes  Other exercises 1: hook lying hip internal rotation with L LE resisted to increase IR R LE: pt able to bring R knee up to neutral x 1 and half way up x 1, needed assist for another 3  Other exercises 2: R heel slides with assist, when sliding R LE down tone would catch at about 60 degrees hip flexion, then would relax after about 5 seconds and allow leg to straighten         Comment: sat EOB x 15 minutes with CGA to SBA for static sitting, CGA to min A for multiple reaching activities with OT     Goals  Short term goals  Time Frame for Short term goals: In 2 weeks pt will perform  Short term goal 1: Bed mobility Mod A  Short term goal 2: Transfers Mod A  Short term goal 3: Propel wc 48' with Mod A  Short term goal 4: Ascend/Descend curb step with LRAD, Mod A  Long term goals  Time Frame for Long term goals :  In 4 weeks pt will perform  Long term goal 1: Bed mobility CGA  Long term goal 2: Transfers CGA  Long term goal 3: Propel wc 48' with CGA-SBA  Long term goal 4: Ascend/Descend curb step with LRAD, Min A  Patient Goals   Patient goals : Did not state today (minimally verbal)    Plan    Plan  Times per week: 5-6x  Times per day: Twice a day  Plan weeks: 4 weeks  Specific instructions for Next Treatment: Transfers; practice wc mobility; lucita attempt pt's R foot had increase in tone with plantarflexion of ankle and flexion of knee causing foot to slide off stedy platform backward and get caught between stedy footplate and commode, max A of one to help pt shift weight to L and max A of another to replace foot onto footplate and return to bed, sit to sup max A x 2. Second Session Therapy Time     Individual Co-treatment   Time In  1430   Time Out  1520   Minutes  50      Pt fatigued by end of session.     Electronically signed by Je Wolfe PT on 4/29/2021 at 4:43 PM

## 2021-04-29 NOTE — PROGRESS NOTES
Pt resting quietly in bed at this time. Admitted with CVA, right side flaccid. Pt was able to move R ring finger while trying to make fist. Pt has expressive aphasia, able to answer most questions with increased time. Transfers with maxi move. Lungs clear but diminished, HR regular and no adventitious sounds. Abdomen is nontender with active bowel sounds. Sherren Marking is in place. Pt denies pain. Safety precautions in place and call light is within reach. Will continue intentional rounding for safety and needs.

## 2021-04-29 NOTE — PLAN OF CARE
Problem: Falls - Risk of:  Goal: Will remain free from falls  Description: Will remain free from falls  Outcome: Ongoing  Goal: Absence of physical injury  Description: Absence of physical injury  Outcome: Ongoing  Note: Pt free of falls and injury this shift.  Tolerates transfer with maximove well     Problem: COMMUNICATION IMPAIRMENT  Goal: Ability to express needs and understand communication  Outcome: Ongoing  Note: Pt able to express needs and answer most questions with additional time     Problem: Skin Integrity:  Goal: Absence of new skin breakdown  Description: Absence of new skin breakdown  Outcome: Ongoing     Problem: Pain:  Goal: Control of acute pain  Description: Control of acute pain  Outcome: Ongoing  Note: Pt denies pain this shift

## 2021-04-29 NOTE — PROGRESS NOTES
Speech Language Pathology  ACUTE REHAB UNIT  SPEECH/LANGUAGE PATHOLOGY      [x] Daily  [] Weekly Care Conference Note  [] Discharge    Patient:Carl Castillo      VBM:3/35/2807  [facility]  Rehab Dx/Hx: Acute ischemic left LEXA stroke (HCC) [I63.522]    Precautions: FAll risk; Cognitive-Communication deficits  Home situation: Lives with significant other  ST Dx: [x] Aphasia  [] Dysarthria  [] Apraxia   [x] Oropharyngeal dysphagia [x] Cognitive   Impairment  [] Other:   Initial Speech Therapy Assessment Diagnosis:   1. Cognitive Diagnosis: Mild cognitive linguistic impairment for routine daily functional needs. Ongoing assessment of complex, high level and abstract PS/reasoning, as well as executive functioning, thought organization and planning is recommended as patient's processing and initation improve. Pt was independent with ADLs and IADLs PTA. Given signifcant extra time for processing and intiaotn, basic cognition appears intact in areas of orientation, sustained and selective, and simple/concrete PS/reasoning. Mildly decreased recall for daily events/information and moderately redcued recall for novel informaiton noted this date. Aforemnetioned delayed thought processing and initiation for expression is a barrier to demonstration of and exectuion for congitive-language skills/tasks. 2.  Aphasia Diagnosis: Given extra time for processing and initaiton, patient appears to present with mildly impaired auditory comprehension for complex and abstract information processing. Basic and concrete auditory/visual language comprehension appears intact. Mild to moderately impaired verbal expression for complex concepts requiring increased time to formulate and generate responses. 3.  Speech Diagnosis: Mildly impaired motor speech skills characterized by minimal to mild decreased verbal agility affecting speech fluency without impact on speech intelligibility at sentence level  4.   Dysphagia Diagnosis: Mild oropharyngeal dysphagia characterized by slow but effective mastication, right sided perioral weakness, reduced lingual coordination, reduced AP transit, potential swallow delay and mildly reduced laryngeal elevation. No immediate or delayed overt s/s of aspiration; trace oral residue is cleared by pt. Recommend regular textures and thin liquids with ongoing monitor to confirm tolerance.   Date of Admit: 4/23/2021  Room #: G3Y-7795/3264-01  Date: 4/29/2021       Current functional status (updated daily):         Current Diet Order:Dietary Nutrition Supplements: Standard High Calorie Oral Supplement  DIET GENERAL; Low Sodium (2 GM)   Behavior: [x] Alert  [x] Cooperative  [x]  Pleasant  [] Confused  [] Agitated  [] Uncooperative  [] Distractible [] Motivated  [] Self-Limiting [] Anxious  [] Other:  Endurance:  [] Adequate for participation in SLP sessions  [x] Reduced overall  [] Lethargic  [] Other:  Safety: [] No concerns at this time  [x] Reduced insight into deficits  []  Reduced safety awareness [] Not following call light procedures  [] Unable to Assess  [x] Other:ongoing assessment as communication skills improve  Swallowing Precautions: Upright as possible for all oral intake, Remain upright for 30-45 minutes after meals  Barriers toward progress: none unless medical issues and caregiver assist           Date: 4/29/2021      Tx session 1 Tx session 2   Total Timed Code Min SLP Individual Minutes  Time In: 0910  Time Out: 0945  Minutes: 35  Coded treatment time  0 SLP Individual Minutes  Time In: 1230  Time Out: 7703  Minutes: 15  Coded treatment time  0   Group Treatment Minutes 0 0   Co-Treat Minutes 0 0   Variance/Reason:  10 minute nsg variance for care n/a   Pain n/a denied   Pain Intervention [x] RN notified  [] Repositioned  [] Intervention offered and patient declined  [] N/A  [x] Other: PCA [] RN notified  [] Repositioned  [] Intervention offered and patient declined  [] N/A  [] Other: Subjective     Pt was told to go bedside  Upon entry to room; pt was with RN and PCA and reportedly neded to take meds and get changed Pt was seen bedside  Pt was in chair (pt had PT/OTprior to lunch)  Pt with RN at the bedside giving meds  Pt was receptive to SLP   Objective:  Goals       Dysphagia goals: Pt did not identify a goal. Team goal is for safe toleration of po diet    therapy working toward pt goal n/a   1. The patient will tolerate regular food with thin liquid diet without observed clinical signs of aspiration,  meds with robin (crushed) RN here   Thin liquids: isolated sips taken without overt clinical s/s of aspiration or drooling    Food consistencies: soft/bite size; regular solid foods (peaches; potato chips): functional mastication; functional bolus control; IND management of oral pocketing;no overt clinical s/s of aspiration    Pt gravitating toward finger foods; problems with utensil use (suspect motor planning)   2. The patient will improve oral preparation phase via bolus control/manipulation exercises to 5/5 each trial., . Limited sample size for tsp presentations Right oral motor weakness. Right labial/buccal/lingual ex targeted during po trials    Labial seal was adequate for straw seal; cup seal; weak sustained labial seal    3.  The patient/caregiver will demonstrate understanding of compensatory strategies for improved swallowing safety Ongoing ed   Reviewed strategies :   · Reducing rate of intake  · Review of lingual sweep to clear oral residue   Short-term Goals  Goal 1: Pt will demonstrate improved recall of new learning strategies; daily therapy activities and new learning information with set up, use of memory strategies and minimal assist   STM/Working memory:   Persistent expressive language deficits impact objective assessment  -Y/N? ; choice ?: verbal perseveration noted and/or 'no response' errors  -non-verbal responses suggest reduced insight/awareness of therapy schedule  -reduced use of call light (but this may also be related to motor planning deficits)   Pt with intermittent problems with utensil use (motor planning/initiation) . Pt gravitating toward finger foods   Goal 2: Pt will improve oral and/or verbal agility and ROM for coordinated and alternating motions to < mild impairment. Isolated rom: warm up targeted right labial/buccal/lingual rom stretch    Paired movements: deficits with concern for motor planning    n/a   Goal 3: Pt will improve verbal expression of complex thoughts/ideas at sentence level in structured functional tasks with <5 second delay Verbal expression;   Verbal Y/N ? Verbal responses:    ·  mild to moderate assist    Word level:   · Closed set (automatic sentence completion): maintained >90%  · Open set (sentence completion in which varied responses able): >80%%  · Responsive naming: >90%  · Word level for FC? Question format: 60% (variable perseveration)  · Generative listing items within a category (highly familiar and daily living categories): not targeted    Oral reading:small sample size; but maintaining 80 to 100%  oral/written agreement. Imitative at phrase/sentence level regarding action pictures: 70% to 100%    Word retrieval and thought organization for phrase /sentence formulation for:   ·  describing action pictures : 0 to moderate prompting  · Answering Wh? Regarding 1 sentence information: 70%  · Answering general open ended Wh?  Requiring >2 word utterances verbal perseveration; and moderate + prompting  · Using pictures for dialog boxes for highly familiar situations: last targeted 4/28/2021  ·  targeted indirectly due to po trials          Goal 4: Pt will participate with ongoing assessment of EF, planning, sequencing and complex reasoning with additional goals PRN     Auditory processing :  · 3 -4 unit Y/N? (informational/ideational): mild assist   · 4-6 unit comparative questions: not targeted  Max feedback with head nods for confirmation due to concern for verbal perseveration    1 step commands: extra time and extrernal assist due to persistent concerns for motor perseveration noted without reduced pt insight    REading processing at 3-4 unit Y/N?: targeted use of environmental stimuli and large phrase/sentence level 1 step commands: extra time and intermittent cues for utensil use     Other areas targeted:     Education:   Ongoing pt ed Ongoing pt ed   Safety Devices: [x] Call light within reach  [] Chair alarm activated  [x] Bed alarm activated  [] Other: [x] Call light within reach  [x] Chair alarm activated  [] Bed alarm activated  [] Other:    Progress Assessment: 4/26/2021: Pt with increase receptive and expressive language deficits with concern for potential oral motor /verbal dyspraxia; pt unable to participate in executive function . Unclear if due to fatigue (this SLP session was the last of the day)  4/27/2021: Alerted PCA and RN due to pt having a large amount of sputum/phlegm in his mouth upon SLP arrival to room. Pt with another episode of cough with expectoration of large amount of phlegm. Will check acute floor documentation for any dysphagia instrumentals. PO sample size small and no overt clinical s/s at the bedside. Will discuss at team meeting. Potential need for MBS to r/o silent aspiration contributing to pt's status regarding phlegm   Plan: Continue as per plan of care.    Continued Tx Upon Discharge: ?    [x] Yes [] No [] TBD based on progress while on ARU [] Vital Stim indicated [] Other:   Estimated discharge date: TBD   Discharge recommendations:   [] Home independently  [x] Home with assistance [x]  24 hour supervision  [] ECF [] Other:     Additional information:     Interventions used during Rehab Stay:  [x] Speech/Language Treatment  [] Instruction in HEP [] Group [x] Dysphagia Treatment [x] Cognitive Treatment   [] Other:    Adverse Reactions to Treatment/Significant Change in Status: n/a    Electronically Signed by    Carly Hazel. Jeffery,MS,CCC,SLP [id number]  Speech and Language Pathologist

## 2021-04-29 NOTE — PROGRESS NOTES
Department of Physical Medicine & Rehabilitation  Progress Note    Patient Identification:  Sharmila Arambula  [id number]  : 1955  Admit date: 2021    Chief Complaint: Acute ischemic left LEXA stroke (HCC)    Subjective:   No acute events overnight. Patient seen this am working with PT/OT in gym. Spasticity currently well controlled. When asked how he is doing, patient responds, \"finer than a frog hair. \"  Labs reviewed. ROS: No f/c, n/v, cp     Objective:  Patient Vitals for the past 24 hrs:   BP Temp Temp src Pulse Resp SpO2 Weight   21 0854 -- -- -- -- -- 94 % --   21 0434 -- -- -- -- -- -- 232 lb 2.3 oz (105.3 kg)   21 0414 (!) 166/90 98.1 °F (36.7 °C) Oral 66 17 96 % --   21 2042 (!) 165/87 97.8 °F (36.6 °C) Oral 59 17 92 % --   21 1529 (!) 167/90 97.6 °F (36.4 °C) Oral 59 18 95 % --   21 1204 (!) 152/74 97.4 °F (36.3 °C) Oral 58 16 98 % --     Const: Alert. No distress, pleasant. HEENT: Normocephalic, atraumatic. Normal sclera/conjunctiva. MMM. CV: Regular rate and rhythm. Resp: No respiratory distress. Lungs decreased at bases (decreased inspiratory effort)  Abd: Soft, nontender, nondistended, NABS+   Ext: trace edema. Neuro: Alert, oriented, delayed processing. Expressive and receptive language deficits but able to respond if given enough time. Right hemiparesis overall 1/5   Psych: Cooperative, appropriate mood and affect    Laboratory data: Available via EMR.    Last 24 hour lab  Recent Results (from the past 24 hour(s))   Basic Metabolic Panel w/ Reflex to MG    Collection Time: 21  8:08 AM   Result Value Ref Range    Sodium 138 136 - 145 mmol/L    Potassium reflex Magnesium 3.0 (L) 3.5 - 5.1 mmol/L    Chloride 98 (L) 99 - 110 mmol/L    CO2 27 21 - 32 mmol/L    Anion Gap 13 3 - 16    Glucose 144 (H) 70 - 99 mg/dL    BUN 26 (H) 7 - 20 mg/dL    CREATININE 0.6 (L) 0.8 - 1.3 mg/dL    GFR Non-African American >60 >60    GFR  >60 >60    Calcium 9.1 8.3 - 10.6 mg/dL   CBC Auto Differential    Collection Time: 04/29/21  8:08 AM   Result Value Ref Range    WBC 8.3 4.0 - 11.0 K/uL    RBC 4.59 4.20 - 5.90 M/uL    Hemoglobin 14.7 13.5 - 17.5 g/dL    Hematocrit 42.1 40.5 - 52.5 %    MCV 91.7 80.0 - 100.0 fL    MCH 32.0 26.0 - 34.0 pg    MCHC 34.9 31.0 - 36.0 g/dL    RDW 15.0 12.4 - 15.4 %    Platelets 611 539 - 265 K/uL    MPV 8.0 5.0 - 10.5 fL    Neutrophils % 74.6 %    Lymphocytes % 15.7 %    Monocytes % 7.6 %    Eosinophils % 1.5 %    Basophils % 0.6 %    Neutrophils Absolute 6.2 1.7 - 7.7 K/uL    Lymphocytes Absolute 1.3 1.0 - 5.1 K/uL    Monocytes Absolute 0.6 0.0 - 1.3 K/uL    Eosinophils Absolute 0.1 0.0 - 0.6 K/uL    Basophils Absolute 0.0 0.0 - 0.2 K/uL   Magnesium    Collection Time: 04/29/21  8:08 AM   Result Value Ref Range    Magnesium 2.00 1.80 - 2.40 mg/dL       Therapy progress:  PT  Position Activity Restriction  Other position/activity restrictions: R hemiplegia and increased tone RLE, exp. aphasia  Objective     Sit to Stand: 2 Person Assistance, Moderate Assistance, Maximum Assistance  Stand to sit: 2 Person Assistance, Minimal Assistance, Moderate Assistance  Bed to Chair: Dependent/Total(with stedy)     OT  PT Equipment Recommendations  Other: Will likely require wc upon D/C. Toilet - Technique: (via [employer])  Equipment Used: Extra wide bedside commode(HD BSC placed over commode)  Toilet Transfers Comments: max Ax2 for sit<> [employer], + assist of another to maintain R hand grasp on [employer] bar  Assessment        SLP  Diet Solids Recommendation: Regular  Liquid Consistency Recommendation: Thin    Body mass index is 29.02 kg/m².     Rehabilitation Diagnosis:   Stroke, 1.2, Right Body (L Brain)        Assessment and Plan:     Impairments:right hemiparesis, aphasia, dysphagia, cognition     Acute ischemic Left LEXA stroke  -Secondary ppx with DAPT x 30 days (then monotherapy),

## 2021-04-30 LAB
ANION GAP SERPL CALCULATED.3IONS-SCNC: 9 MMOL/L (ref 3–16)
BUN BLDV-MCNC: 24 MG/DL (ref 7–20)
CALCIUM SERPL-MCNC: 9.3 MG/DL (ref 8.3–10.6)
CHLORIDE BLD-SCNC: 102 MMOL/L (ref 99–110)
CO2: 29 MMOL/L (ref 21–32)
CREAT SERPL-MCNC: 0.6 MG/DL (ref 0.8–1.3)
GFR AFRICAN AMERICAN: >60
GFR NON-AFRICAN AMERICAN: >60
GLUCOSE BLD-MCNC: 108 MG/DL (ref 70–99)
POTASSIUM REFLEX MAGNESIUM: 3.8 MMOL/L (ref 3.5–5.1)
SODIUM BLD-SCNC: 140 MMOL/L (ref 136–145)

## 2021-04-30 PROCEDURE — 6360000002 HC RX W HCPCS: Performed by: PHYSICAL MEDICINE & REHABILITATION

## 2021-04-30 PROCEDURE — 80048 BASIC METABOLIC PNL TOTAL CA: CPT

## 2021-04-30 PROCEDURE — 97535 SELF CARE MNGMENT TRAINING: CPT

## 2021-04-30 PROCEDURE — 6370000000 HC RX 637 (ALT 250 FOR IP): Performed by: PHYSICAL MEDICINE & REHABILITATION

## 2021-04-30 PROCEDURE — 97530 THERAPEUTIC ACTIVITIES: CPT

## 2021-04-30 PROCEDURE — 2580000003 HC RX 258: Performed by: PHYSICAL MEDICINE & REHABILITATION

## 2021-04-30 PROCEDURE — 92526 ORAL FUNCTION THERAPY: CPT

## 2021-04-30 PROCEDURE — 36415 COLL VENOUS BLD VENIPUNCTURE: CPT

## 2021-04-30 PROCEDURE — 94761 N-INVAS EAR/PLS OXIMETRY MLT: CPT

## 2021-04-30 PROCEDURE — 1280000000 HC REHAB R&B

## 2021-04-30 PROCEDURE — 97129 THER IVNTJ 1ST 15 MIN: CPT

## 2021-04-30 PROCEDURE — 92507 TX SP LANG VOICE COMM INDIV: CPT

## 2021-04-30 RX ORDER — SODIUM CHLORIDE 0.9 % (FLUSH) 0.9 %
5-40 SYRINGE (ML) INJECTION PRN
Status: DISCONTINUED | OUTPATIENT
Start: 2021-04-30 | End: 2021-05-13 | Stop reason: HOSPADM

## 2021-04-30 RX ORDER — SODIUM CHLORIDE 0.9 % (FLUSH) 0.9 %
5-40 SYRINGE (ML) INJECTION 2 TIMES DAILY
Status: DISCONTINUED | OUTPATIENT
Start: 2021-04-30 | End: 2021-05-04

## 2021-04-30 RX ORDER — LISINOPRIL 20 MG/1
20 TABLET ORAL DAILY
Status: DISCONTINUED | OUTPATIENT
Start: 2021-05-01 | End: 2021-05-13 | Stop reason: HOSPADM

## 2021-04-30 RX ADMIN — ASPIRIN 81 MG: 81 TABLET, COATED ORAL at 09:10

## 2021-04-30 RX ADMIN — TAMSULOSIN HYDROCHLORIDE 0.4 MG: 0.4 CAPSULE ORAL at 09:10

## 2021-04-30 RX ADMIN — SODIUM CHLORIDE, PRESERVATIVE FREE 10 ML: 5 INJECTION INTRAVENOUS at 20:35

## 2021-04-30 RX ADMIN — BACLOFEN 5 MG: 10 TABLET ORAL at 20:32

## 2021-04-30 RX ADMIN — BACLOFEN 5 MG: 10 TABLET ORAL at 13:42

## 2021-04-30 RX ADMIN — HYDROCHLOROTHIAZIDE 25 MG: 25 TABLET ORAL at 09:09

## 2021-04-30 RX ADMIN — ENOXAPARIN SODIUM 40 MG: 40 INJECTION SUBCUTANEOUS at 09:07

## 2021-04-30 RX ADMIN — ISOSORBIDE MONONITRATE 30 MG: 30 TABLET, EXTENDED RELEASE ORAL at 09:08

## 2021-04-30 RX ADMIN — ATORVASTATIN CALCIUM 80 MG: 80 TABLET, FILM COATED ORAL at 20:31

## 2021-04-30 RX ADMIN — FLUTICASONE PROPIONATE 1 SPRAY: 50 SPRAY, METERED NASAL at 09:20

## 2021-04-30 RX ADMIN — SERTRALINE 150 MG: 100 TABLET, FILM COATED ORAL at 09:08

## 2021-04-30 RX ADMIN — DOCUSATE SODIUM 50 MG AND SENNOSIDES 8.6 MG 1 TABLET: 8.6; 5 TABLET, FILM COATED ORAL at 20:31

## 2021-04-30 RX ADMIN — LISINOPRIL 10 MG: 10 TABLET ORAL at 09:10

## 2021-04-30 RX ADMIN — ATENOLOL 50 MG: 50 TABLET ORAL at 09:08

## 2021-04-30 RX ADMIN — TRAZODONE HYDROCHLORIDE 50 MG: 50 TABLET ORAL at 20:31

## 2021-04-30 RX ADMIN — BACLOFEN 5 MG: 10 TABLET ORAL at 09:09

## 2021-04-30 RX ADMIN — CLOPIDOGREL BISULFATE 75 MG: 75 TABLET ORAL at 09:09

## 2021-04-30 ASSESSMENT — PAIN SCALES - GENERAL
PAINLEVEL_OUTOF10: 0
PAINLEVEL_OUTOF10: 0

## 2021-04-30 NOTE — PROGRESS NOTES
Patient is currently resting quietly and in no apparent distress. Admitted to ARU with a CVA. Patient is oriented to self and place, has expressive aphasia and delayed responses. Patient in bed at shift change. Requires assistance in turning and repositioning. Bottom noted with blanchable redness this evening, zinc cream applied and patient turned through out the night q 2 hrs. Lungs clear and diminished, with respirations unlabored and no cough noted. BS active with last BM charted 4/28. Patient took medications crushed in applesauce without difficulty. External catheter applied during assessment per patient request, he is incontinent of halima colored urine. Patient denied any pain or discomfort. PIV flushes well. Telemetry remains on with patient in SB and SR. Fall precautions are in place. Call light and personal belongings are within reach. Will continue to monitor and assist as needed.

## 2021-04-30 NOTE — PLAN OF CARE
Problem: Falls - Risk of:  Goal: Will remain free from falls  Description: Will remain free from falls  4/30/2021 1044 by Ankit Benitez RN  Outcome: Ongoing     Problem: Falls - Risk of:  Goal: Absence of physical injury  Description: Absence of physical injury  4/30/2021 1044 by Ankit Benitez RN  Outcome: Ongoing     Problem: HEMODYNAMIC STATUS  Goal: Patient has stable vital signs and fluid balance  4/30/2021 1044 by Ankit Bentiez RN  Outcome: Ongoing     Problem: ACTIVITY INTOLERANCE/IMPAIRED MOBILITY  Goal: Mobility/activity is maintained at optimum level for patient  4/30/2021 1044 by Ankit Benitez RN  Outcome: Ongoing     Problem: COMMUNICATION IMPAIRMENT  Goal: Ability to express needs and understand communication  4/30/2021 1044 by Ankit Benitez RN  Outcome: Ongoing     Problem: Skin Integrity:  Goal: Will show no infection signs and symptoms  Description: Will show no infection signs and symptoms  4/30/2021 1044 by Ankit Benitez RN  Outcome: Ongoing     Problem: Skin Integrity:  Goal: Absence of new skin breakdown  Description: Absence of new skin breakdown  4/30/2021 1044 by Ankit Benitez RN  Outcome: Ongoing     Problem: Pain:  Goal: Pain level will decrease  Description: Pain level will decrease  4/30/2021 1044 by Ankit Benitez RN  Outcome: Ongoing     Problem: Pain:  Goal: Control of chronic pain  Description: Control of chronic pain  4/30/2021 1044 by Ankit Benitez RN  Outcome: Ongoing     Problem: Nutrition  Goal: Optimal nutrition therapy  4/30/2021 1044 by Ankit Benitez RN  Outcome: Ongoing

## 2021-04-30 NOTE — DISCHARGE SUMMARY
prolonged cardiac event monitor. Discharge Condition:  stable:    Discharged to:  skilled nursing  facility    Activity:   as tolerated: Follow Up:  Patient will follow up with her primary care physician once discharged from the rehabilitation unit      Labs: For convenience and continuity at follow-up the following most recent labs are provided:      CBC:   Lab Results   Component Value Date    WBC 8.3 2021    HGB 14.7 2021    HCT 42.1 2021     2021       RENAL:   Lab Results   Component Value Date     2021    K 3.8 2021     2021    CO2 29 2021    BUN 24 2021    CREATININE 0.6 2021           Discharge Medications:    Jeison Looney"   Home Medication Instructions II    Printed on:21   Medication Information                      Ascorbic Acid (VITAMIN C) 500 MG tablet  Take 500 mg by mouth daily             aspirin 81 MG EC tablet  Take 81 mg by mouth daily             atenolol (TENORMIN) 50 MG tablet  TAKE ONE TABLET BY MOUTH DAILY             atorvastatin (LIPITOR) 80 MG tablet  Take 1 tablet by mouth nightly             clopidogrel (PLAVIX) 75 MG tablet  Take 1 tablet by mouth daily             hydroCHLOROthiazide (HYDRODIURIL) 25 MG tablet  TAKE ONE TABLET BY MOUTH DAILY             isosorbide mononitrate (IMDUR) 30 MG extended release tablet  TAKE ONE TABLET BY MOUTH DAILY             lisinopril (PRINIVIL;ZESTRIL) 20 MG tablet  TAKE ONE-HALF TABLET BY MOUTH DAILY             Multiple Vitamins-Minerals (THERAPEUTIC MULTIVITAMIN-MINERALS) tablet  Take 1 tablet by mouth daily             MYRBETRIQ 50 MG TB24  Take 1 tablet by mouth daily             nitroGLYCERIN (NITROSTAT) 0.4 MG SL tablet  Place 1 tablet under the tongue every 5 minutes as needed for Chest pain.              sertraline (ZOLOFT) 100 MG tablet  TAKE 1 AND 1/2 TABLET BY MOUTH DAILY             tamsulosin (FLOMAX) 0.4 MG capsule TAKE ONE CAPSULE BY MOUTH DAILY                    Time Spent on discharge is more than 30 min in the examination, evaluation, counseling and review of medications and discharge plan. Signed:  Dylan Escudero MD   4/30/2021      Thank you Greg Dumont MD for the opportunity to be involved in this patient's care. If you have any questions or concerns please feel free to contact me at [phone]. This note was transcribed using [id number] [employer]. Please disregard any translational errors.

## 2021-04-30 NOTE — PROGRESS NOTES
Physical Therapy  Facility/Department: [address] REHAB  Daily Treatment Note  NAME: Naomi Matthew  : 1955  MRN: [id number]    Date of Service: 2021    Discharge Recommendations:  Continue to assess pending progress, Patient would benefit from continued therapy after discharge   PT Equipment Recommendations  Other: Will likely require wc upon D/C. Assessment   Body structures, Functions, Activity limitations: Decreased functional mobility ; Decreased strength;Decreased endurance;Decreased sensation;Decreased balance  Assessment: Patient with slight increased tone this date as he was due for his baclofen at end of session. Patient required more assist to keep right foot placed on the floor and prevent withdrawal. He continues to require assist with unsupported sitting balance initially and with dynamic sitting balance. He performed multiple transfers with PT and OT assist for ADL session and it is found that currently a stand pivot is more efficient and safe as patient has difficulty activating bilateral LE in squat pivot transfers. Patient continues to have slow processing and minimal verbalizations at this time. Patient continues to require two person assist for any mobility. Patient well below baseline and will benefit from continued skilled therapy for strengthening, functional mobility training, and balance training to reduce assistance required.   Treatment Diagnosis: impaired functional mobility  Specific instructions for Next Treatment: Transfers; practice wc mobility; strengthen RLE as able  PT Education: Functional Mobility Training;General Safety;Home Exercise Program;Disease Specific Education;Plan of Care  REQUIRES PT FOLLOW UP: Yes  Activity Tolerance  Activity Tolerance: Patient limited by fatigue;Patient limited by cognitive status  Activity Tolerance: Patient appears to have some processing difficulties and needing increased time for new activity directions and tactile cues to process     Patient Diagnosis(es): There were no encounter diagnoses. has a past medical history of Acute MI ([facility]), Anxiety, Arthritis, CAD (coronary artery disease), Hypertension, and Influenza A.   has a past surgical history that includes back surgery (N/A, 1989) and Coronary angioplasty with stent (9/13). Social/Functional History  Lives With: Significant other(Sign other April Ban))  Type of Home: House  Home Layout: Two level, Able to Live on Main level with bedroom/bathroom, Laundry in basement(1 story with basement.)  Home Access: Stairs to enter without rails(1 step to enter.)  Bathroom Shower/Tub: Tub/Shower unit(Tub Shower main level and basement.)  Bathroom Toilet: Standard  Bathroom Equipment: Grab bars in shower  Bathroom Accessibility: Accessible  Home Equipment: (No DME.)  ADL Assistance: Independent  Homemaking Assistance: (Shared with sign other.)  Homemaking Responsibilities: (shared with significant other prior to admission)  Ambulation Assistance: Independent(Without assist device.)  Transfer Assistance: Independent  Active : Yes  Occupation: Retired  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 \"Need to find something to do. \"    Restrictions  Restrictions/Precautions  Restrictions/Precautions: Fall Risk  Position Activity Restriction  Other position/activity restrictions: R hemiplegia and increased tone RLE, exp. aphasia     Subjective   General  Chart Reviewed: Yes  Additional Pertinent Hx: Pt is 75 y/o M admitted 4/21/21 to ED with stroke like symptoms affecting R side ongoing ~12hours. MRI revealed multifocal acute infarctions in the parasagital left front along distal L LEXA territory with occluded/thrombosed A3 segment of L LEXA. MRI also revealed old infarction in basal ganglia, thalami, R cerebellum.  PMHx significant for CAD, Angio w/ stent, MI, lumbar sx, HTN. Pt is 75 y/o M admitted 4/21/21 to ED with stroke like symptoms affecting R side ongoing ~12hours. MRI revealed multifocal acute infarctions in the parasagital left front along distal L LEXA territory with occluded/thrombosed A3 segment of L LEXA. MRI also revealed old infarction in basal ganglia, thalami, R cerebellum. PMHx significant for CAD, Angio w/ stent, MI, lumbar sx, HTN. Response To Previous Treatment: Patient with no complaints from previous session. Family / Caregiver Present: No  Referring Practitioner: Dr. Clarice Wilkes  Subjective  Subjective: patient supine in bed finishing breakfast with assistance from PCA when PT/OT arrived. Patient was in good spirits and agreeable to getting a shower. He has no complaints of pain at this time  General Comment  Comments: Patient seen as a co-treatment secondary to requiring 2 skilled persons to safely mobilize. PT and OT noted patient had Purewick system hooked up, but it was not in place (instead under his left hip) and patient's depends was completely saturated. Pain Screening  Patient Currently in Pain: Denies  Vital Signs  Patient Currently in Pain: Denies         Objective   Bed mobility  Supine to Sit: Moderate assistance;2 Person assistance(mod assist of 2 with  HOB elevated)  Sit to Supine: Unable to assess  Scooting: Moderate assistance;Maximal assistance(to scoot around to edge of bed)  Comment: Patient with initially poor balance with posterior lean to the left. He required moderate verbal cueing and use of railing in left hand to correct balance with min-mod assist of 1. Patient required several cues intermittently for the first 3 minutes up at EOB, then he was able to maintain balance with CGA-SBA    Transfers  Sit to Stand: 2 Person Assistance; Moderate Assistance;Maximum Assistance(pulling on rail or grab bar)  Stand to sit: 2 Person Assistance; Moderate Assistance(difficulty flexing at hips to sit back into left to pull pants down/up, remove depends, cleanse skin, and place new depends. Supine>sit with mod assist of two and moderate cueing for technique. Stand pivot transfer bed>wheelchair with max assist of two with PT blocking left knee. PT also blocked left foot to prevent sliding back and drawing up underneath him. Sit>stand at martinez rail on left side. Sit>stand with max assist of 2. Patient ambulated 15' with railing in left hand and max assist of 2. He does initiate advancement of right LE, but requires max assist to fully advance. Dorsiflex assist was on right LE. Patient requires max assist to maintain knee extension in SLS and assist to promote hip extension of right LE in SLS. Patient also requires cueing to abduct left LE when advancing it to increase RANI. Patient requires step by step cues for weight shifting. (PT had placed right hand in pants pocket for support while ambulating). Seated rest break given. Patient then stood one more time at railing on therapy terrace with max assist of 2. Maxi move sling was placed under patient to aide in transfer with nursing when patient wants to return to bed. Patient very sleepy initially but woke with increased mobility . Patient very minimally verbal this date. Care transitioned to SLP, Maegan Schwab with patient seated in the wheelchair. Goals  Short term goals  Time Frame for Short term goals: In 2 weeks pt will perform  Short term goal 1: Bed mobility Mod A  Short term goal 2: Transfers Mod A  Short term goal 3: Propel wc 48' with Mod A  Short term goal 4: Ascend/Descend curb step with LRAD, Mod A  Long term goals  Time Frame for Long term goals :  In 4 weeks pt will perform  Long term goal 1: Bed mobility CGA  Long term goal 2: Transfers CGA  Long term goal 3: Propel wc 48' with CGA-SBA  Long term goal 4: Ascend/Descend curb step with LRAD, Min A  Patient Goals   Patient goals : Did not state today (minimally verbal)    Plan Plan  Times per week: 5-6x  Times per day: Twice a day  Plan weeks: 4 weeks  Specific instructions for Next Treatment: Transfers; practice wc mobility; strengthen RLE as able  Current Treatment Recommendations: Strengthening, ROM, Balance Training, Endurance Training, Functional Mobility Training, Wheelchair Mobility Training, Transfer Training, Gait Training, Stair training, Positioning, Pain Management, Equipment Evaluation, Education, & procurement, Modalities, Patient/Caregiver Education & Training, Safety Education & Training, Home Exercise Program, Neuromuscular Re-education  Safety Devices  Type of devices:  All fall risk precautions in place, Gait belt, Left in chair(patient in wheelchair with SLP, Brenden Higgins, and RN, Ben Norman, present)  Restraints  Initially in place: No     Therapy Time   Individual Concurrent Group Co-treatment   Time In       0800   Time Out       0905   Minutes       65           Second Session Therapy Time     Individual Co-treatment   Time In  52 [facility]   Time Out  1545   Minutes  1000 [facility], [id number]

## 2021-04-30 NOTE — PROGRESS NOTES
Speech Language Pathology  ACUTE REHAB UNIT  SPEECH/LANGUAGE PATHOLOGY      [x] Daily  [] Weekly Care Conference Note  [] Discharge    Patient:Carl Trevizo      DVH:0/81/5475  KLR:[id number]  Rehab Dx/Hx: Acute ischemic left LEXA stroke (HCC) [I63.522]    Precautions: FAll risk; Cognitive-Communication deficits  Home situation: Lives with significant other  ST Dx: [x] Aphasia  [] Dysarthria  [] Apraxia   [x] Oropharyngeal dysphagia [x] Cognitive   Impairment  [] Other:   Initial Speech Therapy Assessment Diagnosis:   1. Cognitive Diagnosis: Mild cognitive linguistic impairment for routine daily functional needs. Ongoing assessment of complex, high level and abstract PS/reasoning, as well as executive functioning, thought organization and planning is recommended as patient's processing and initation improve. Pt was independent with ADLs and IADLs PTA. Given signifcant extra time for processing and intiaotn, basic cognition appears intact in areas of orientation, sustained and selective, and simple/concrete PS/reasoning. Mildly decreased recall for daily events/information and moderately redcued recall for novel informaiton noted this date. Aforemnetioned delayed thought processing and initiation for expression is a barrier to demonstration of and exectuion for congitive-language skills/tasks. 2.  Aphasia Diagnosis: Given extra time for processing and initaiton, patient appears to present with mildly impaired auditory comprehension for complex and abstract information processing. Basic and concrete auditory/visual language comprehension appears intact. Mild to moderately impaired verbal expression for complex concepts requiring increased time to formulate and generate responses. 3.  Speech Diagnosis: Mildly impaired motor speech skills characterized by minimal to mild decreased verbal agility affecting speech fluency without impact on speech intelligibility at sentence level  4.   Dysphagia Diagnosis: Mild oropharyngeal dysphagia characterized by slow but effective mastication, right sided perioral weakness, reduced lingual coordination, reduced AP transit, potential swallow delay and mildly reduced laryngeal elevation. No immediate or delayed overt s/s of aspiration; trace oral residue is cleared by pt. Recommend regular textures and thin liquids with ongoing monitor to confirm tolerance.   Date of Admit: 4/23/2021  Room #: Q1M-2761/5346-96  Date: 4/30/2021       Current functional status (updated daily):         Current Diet Order:Dietary Nutrition Supplements: Standard High Calorie Oral Supplement  DIET GENERAL; Low Sodium (2 GM)   Behavior: [x] Alert  [x] Cooperative  [x]  Pleasant  [] Confused  [] Agitated  [] Uncooperative  [] Distractible [] Motivated  [] Self-Limiting [] Anxious  [] Other:  Endurance:  [] Adequate for participation in SLP sessions  [x] Reduced overall  [] Lethargic  [] Other:  Safety: [] No concerns at this time  [x] Reduced insight into deficits  []  Reduced safety awareness [] Not following call light procedures  [] Unable to Assess  [x] Other:ongoing assessment as communication skills improve  Swallowing Precautions: Upright as possible for all oral intake, Remain upright for 30-45 minutes after meals  Barriers toward progress: none unless medical issues and caregiver assist           Date: 4/30/2021       Tx session 1 Tx session 2 TX Session 3   Total Timed Code Min SLP Individual Minutes  Time In: 8050  Time Out: 0945  Minutes: 40  Coded treatment time  10 SLP Individual Minutes  Time In:1445  Time Out: 1450  Minutes: 5  Coded treatment time  5 SLP Individual Minutes  Time In:1545  Time Out: 5340  Minutes: 20  Coded treatment time  0   Group Treatment Minutes 0 0 0-   Co-Treat Minutes 0 0 0   Variance/Reason:  5 minute PT/OT finishing with pt n/a n/a   Pain n/a denied denied   Pain Intervention [] RN notified  [] Repositioned  [] Intervention offered and patient declined  [] N/A  [] comparative questions: mild assist      1 step psychomotor commands: 100% with visual model; 70% wihtout visual model. 1 step commands for counting money (concrete): >80%       n/a REading processing at 3-4 unit Y/N?: persistent deficits impacting consistency of responses    Other areas targeted:      Education:   Ongoing pt ed Ongoing pt ed ongoing pt ed   Safety Devices: [] Call light within reach  [] Chair alarm activated  [] Bed alarm activated  [x] Other: transport returned pt to room [] Call light within reach  [] Chair alarm activated  [] Bed alarm activated  [] Other:  [] Call light within reach  [] Chair alarm activated  [] Bed alarm activated  [x] Other: transport returned pt to room   Progress Assessment: 4/26/2021: Pt with increase receptive and expressive language deficits with concern for potential oral motor /verbal dyspraxia; pt unable to participate in executive function . Unclear if due to fatigue (this SLP session was the last of the day)  4/27/2021: Alerted PCA and RN due to pt having a large amount of sputum/phlegm in his mouth upon SLP arrival to room. Pt with another episode of cough with expectoration of large amount of phlegm. Will check acute floor documentation for any dysphagia instrumentals. PO sample size small and no overt clinical s/s at the bedside. Will discuss at team meeting. Potential need for MBS to r/o silent aspiration contributing to pt's status regarding phlegm  4/30/2021: Plan to modify receptive and expressive language goals; verbal oral motor planning; and cognitive-communication goals    Plan: Continue as per plan of care.     Continued Tx Upon Discharge: ?    [x] Yes [] No [] TBD based on progress while on ARU [] Vital Stim indicated [] Other:   Estimated discharge date: TBD    Discharge recommendations:   [] Home independently  [x] Home with assistance [x]  24 hour supervision  [] ECF [] Other:      Additional information:     Interventions used during Rehab Stay:  [x] Speech/Language Treatment  [] Instruction in HEP [] Group [x] Dysphagia Treatment [x] Cognitive Treatment   [] Other:    Adverse Reactions to Treatment/Significant Change in Status: n/a    Electronically Signed by    Irene Marte. Jeffery,MS,CCC,SLP [id number]  Speech and Language Pathologist

## 2021-04-30 NOTE — PROGRESS NOTES
Department of Physical Medicine & Rehabilitation  Progress Note    Patient Identification:  Rosy Becerra  [id number]  : 1955  Admit date: 2021    Chief Complaint: Acute ischemic left LEXA stroke (HCC)    Subjective:   No acute events overnight. Patient seen this afternoon, napping in bed between therapy sessions. He is somewhat drowsy but easy to arouse. He denies new concerns. Labs reviewed. ROS: No f/c, n/v, cp     Objective:  Patient Vitals for the past 24 hrs:   BP Temp Temp src Pulse Resp SpO2 Weight   21 0907 (!) 168/76 97.5 °F (36.4 °C) Oral 69 18 96 % --   21 0826 -- -- -- -- -- 94 % --   21 0519 (!) 160/86 97.9 °F (36.6 °C) Oral 62 17 94 % 234 lb 9.1 oz (106.4 kg)   21 0027 (!) 144/82 97.9 °F (36.6 °C) Oral 56 16 91 % --   21 1946 (!) 141/76 97.7 °F (36.5 °C) Oral 59 16 96 % --   21 1729 126/73 98.1 °F (36.7 °C) -- 57 18 95 % --     Const: Alert. No distress, pleasant. HEENT: Normocephalic, atraumatic. Normal sclera/conjunctiva. MMM. CV: Regular rate and rhythm. Resp: No respiratory distress. Lungs decreased at bases (decreased inspiratory effort)  Abd: Soft, nontender, nondistended, NABS+   Ext: trace edema. Neuro: Alert, oriented, delayed processing. Expressive and receptive language deficits but able to respond if given enough time. Right hemiparesis overall 1/5   Psych: Cooperative, appropriate mood and affect    Laboratory data: Available via EMR.    Last 24 hour lab  Recent Results (from the past 24 hour(s))   Basic Metabolic Panel w/ Reflex to MG    Collection Time: 21  5:51 AM   Result Value Ref Range    Sodium 140 136 - 145 mmol/L    Potassium reflex Magnesium 3.8 3.5 - 5.1 mmol/L    Chloride 102 99 - 110 mmol/L    CO2 29 21 - 32 mmol/L    Anion Gap 9 3 - 16    Glucose 108 (H) 70 - 99 mg/dL    BUN 24 (H) 7 - 20 mg/dL    CREATININE 0.6 (L) 0.8 - 1.3 mg/dL    GFR Non-African American >60 >60    GFR  >60 >60 Calcium 9.3 8.3 - 10.6 mg/dL       Therapy progress:  PT  Position Activity Restriction  Other position/activity restrictions: R hemiplegia and increased tone RLE, exp. aphasia  Objective     Sit to Stand: 2 Person Assistance, Moderate Assistance, Maximum Assistance(pulling on rail or grab bar)  Stand to sit: 2 Person Assistance, Moderate Assistance(diffiuclty flexing at hips to sit back into wheelchair)  Bed to Chair: Moderate assistance, 2 Person Assistance, Maximum assistance     OT  PT Equipment Recommendations  Other: Will likely require wc upon D/C. Toilet - Technique: (via jojo stedy)  Equipment Used: Extra wide bedside commode(HD BSC placed over commode)  Toilet Transfers Comments: max Ax2 for sit<> jojo stedy, + assist of another to maintain R hand grasp on jojo stedy bar  Assessment        SLP  Diet Solids Recommendation: Regular  Liquid Consistency Recommendation: Thin    Body mass index is 29.32 kg/m². Rehabilitation Diagnosis:   Stroke, 1.2, Right Body (L Brain)        Assessment and Plan:     Impairments:right hemiparesis, aphasia, dysphagia, cognition     Acute ischemic Left LEXA stroke  -Secondary ppx with DAPT x 30 days (then monotherapy), statin  -Telemetry  -PT/OT/SLP  -Started baclofen for spasticity - PT reports improvement     Dysphagia   -Dietitian and SLP consults.   -Currently on regular + thins     HTN, uncontrolled  -resume home atenolol, HCTZ, lisinopril (increase dose), Imdur   -prn hydralazine     CAD  -DAPT, statin, bb     OA  -acetaminophen     Anxiety  -sertraline    Leukocytosis  -Resolved  -UA and CXR negative    Hypokalemia  -Improved with replacement     Bladder   -High risk retention   -Monitor PVRs, SC prn >300cc  -Flomax     Bowel   -High risk constipation   -senna+colace BID, PRN miralax, MoM, and bisacodyl supp.     Safety   -fall and aspiration precautions     DVT ppx  -lovenox    Rehab Progress: Making gradual progress.  Limited by severe right hemiparesis,

## 2021-04-30 NOTE — PROGRESS NOTES
Occupational Therapy  Facility/Department: [address] REHAB  Daily Treatment Note  NAME: Joe Guajardo  : 1955  MRN: [id number]    Date of Service: 2021    Discharge Recommendations:  Continue to assess pending progress, Patient would benefit from continued therapy after discharge, 24 hour supervision or assist       Assessment   Performance deficits / Impairments: Decreased functional mobility ; Decreased strength;Decreased endurance;Decreased ADL status; Decreased high-level IADLs;Decreased balance;Decreased ROM; Decreased sensation;Decreased coordination;Decreased fine motor control  Assessment: Pt tolerated COTX ADL session fairly well. He continues to require max Ax2 for sit<>stand and stand pivot transfers using GB or w/o device. Pt required setup for grooming, min A for UB bathing, mod A for UB dressing, max A for LB bathing, total A for LB dressing. Pt had been incontinent of urine in bed as purewick catheter was not connected, and required total A for managing briefs. PT provided assist throughout to assist with sitting balance during bathing. Pt required mod/max cues throughout to initiate and sequence tasks. Pt continues to be far below baseline and requires assist x2 therapists for all activity. Cont per POC to maximize function. Prognosis: Good  OT Education: OT Role;Plan of Care;Precautions; ADL Adaptive Strategies;Transfer Training  Barriers to Learning: exp aphasia  REQUIRES OT FOLLOW UP: Yes  Activity Tolerance  Activity Tolerance: Treatment limited secondary to decreased cognition;Patient limited by fatigue  Safety Devices  Safety Devices in place: Yes  Type of devices: Gait belt;Left in chair;Patient at risk for falls;Call light within reach(w/ SLP in room)         Patient Diagnosis(es): There were no encounter diagnoses.       has a past medical history of Acute MI ([facility] [address] Anxiety, Arthritis, CAD (coronary artery disease), Hypertension, and Influenza A.   has a past surgical history that includes back surgery (N/A, 1989) and Coronary angioplasty with stent (9/13). Restrictions  Restrictions/Precautions  Restrictions/Precautions: Fall Risk  Position Activity Restriction  Other position/activity restrictions: R hemiplegia and increased tone RLE, exp. aphasia  Subjective   General  Chart Reviewed: Yes  Patient assessed for rehabilitation services?: Yes  Additional Pertinent Hx: Per Dr. Seema Black is a 75 yo M with pmh CAD s/p stent, HTN, OA, Anxiety, former tobacco use who initially presented 4/21/2021 with right side weakness and aphasia. He was outside TPA window. Imaging revealed acute ischemic infarcts in the left LEXA territory with thrombosis in A3 segment. Neurology consulted. Patient is being treated with DAPT (for 30 days then monotherapy) and statin. Course complicated by HTN. Currently, patient reports ongoing right side weakness and difficult with speech. He denies headache, vision changes, tingling/numbness. He is motivated to start inpatient rehab program.\"  Family / Caregiver Present: No  Referring Practitioner: Dr Juan Basurto  Diagnosis: CVA- parasagital left frontal lobe  Subjective  Subjective: Pt met b/s for OT; seen as COTX w/ PT to safely perform ADLs and transfers. Agreeable to shower. Denied pain      Orientation     Objective    ADL  Grooming: Setup;Verbal cueing(Pt washed face during shower, brushed teeth seated @ sink. Pt did not aim into sink when spitting and got toothpaste on shirt, required A to hold basin to spit into. Pt perseverating on washing face requiring cues to terminate)  UE Bathing: Minimal assistance;Verbal cueing;Setup  LE Bathing: Maximum assistance;Verbal cueing;Setup(Pt bathed from thighs <> calves, required A for feet. Pt bathed anterior periarea seated w/ vc's. OT bathed pt's buttocks w/ bathing wipe after shower)  UE Dressing: Moderate assistance;Verbal cueing;Setup; Increased time to complete(Pt able to doff pullover shirt w/ cues for initiating/sequencing. Pt required A to thread B UEs through shirt, then pulled over head but required A to pull down and adjust)  LE Dressing: Dependent/Total(Assist for all, pt attempted to thread pants over L LE but unsuccessful. Stood @ GB w/ Max A from PT for balance while OT pulled pants up over hips)  Toileting: Dependent/Total(Pt's purewick catheter was not in place correctly while in bed, so pt was saturated w/ urine at start of session)  Additional Comments: Pt completed shower seated on shower chair w/ assist from PT for sitting balance assist from OT to perform ADLs. Pt required mod/max cues to initiate and sequence tasks, cues for safety and right side attention        Balance  Sitting Balance: Contact guard assistance(seated on shower chair)  Standing Balance: Maximum assistance(max Ax1-2 @ GBs or during transfers)  Functional Mobility  Functional - Mobility Device: Wheelchair  Activity: Other  Assist Level: Dependent/Total  Functional Mobility Comments: OT/PT managed w/c throughout session  Shower Transfers  Shower - Transfer From: Wheelchair  Shower - Transfer Type: To and From  Shower - Transfer To: Shower seat with back  Shower - Technique: Stand pivot  Shower Transfers: Maximal assistance;2 Person assistance  Shower Transfers Comments: use of GB, max cues for sequencing  Wheelchair Bed Transfers  Wheelchair/Bed - Technique: Stand pivot  Equipment Used: Wheelchair;Bed  Level of Asssistance: 2 Person assistance;Maximum assistance  Wheelchair Transfers Comments: max cues for sequencing  Bed mobility  Supine to Sit: Moderate assistance;2 Person assistance(mod assist of 2 with  HOB elevated)  Sit to Supine: Unable to assess  Scooting:  Moderate assistance;Maximal assistance(to scoot around to edge of bed)  Transfers  Sit to stand: 2 Person assistance;Maximum assistance  Stand to sit: Maximum assistance;2 Person assistance  Transfer Comments: using GB                       Cognition  Overall Cognitive Status: Exceptions  Arousal/Alertness: Delayed responses to stimuli  Following Commands: Follows one step commands with repetition; Follows one step commands with increased time  Attention Span: Attends with cues to redirect  Memory: (difficult to assess)  Safety Judgement: Decreased awareness of need for assistance;Decreased awareness of need for safety  Insights: Decreased awareness of deficits  Initiation: Requires cues for all  Sequencing: Requires cues for all  Cognition Comment: Pt requires mod/max cues for initiating and sequencing ADLs, noted to perseverate on tasks such as washing face            PM Session:  Subjective: Pt met b/s for therapy as he was still in bed. Pt very fatigued and hardly verbalizing anything at all. Does not appear to be in pain    Objective:  Pt incontinent of urine in bed. Rolled to R with min A and L with max A while OT/PT provided total A for managing pants, doffing soiled briefs and donning new briefs. Pt able to bridge L hip to allow OT/PT to pull up pants over hips. Pt completed bed mobility with mod Ax2 for sit>supine. Pt completed stand pivot transfer from bed > w/c to the left with max Ax2. Pt completed sit<>stand to hallway rail with max Ax2. Pt ambulated length of short hallway rail with max A from OT for standing balance, max A from PT for balance and advancing R LE. Pt agreeable to go outside briefly. Transported to therapy patio in w/c. Pt completed sit<>stand with max Ax2 using L railing. Pt stood with max A from OT while PT placed lift pad in w/c. Handoff with Darris Canavan, [address] Assessment: Pt was more fatigued this date and with very minimal verbalizations. Despite fatigue, he was able to ambulate at hallway railing with max Ax2. Use of hallway railing allowed for more fluid ambulation and pt appeared to prefer this method.  Cont per POC    Plan   Plan  Times per week: 5-6  Times per day: Twice a day  Plan weeks: 3-4  Current Treatment Recommendations: Strengthening, Endurance Training, Balance Training, ROM, Functional Mobility Training, Safety Education & Training, Gait Training, Self-Care / ADL, Patient/Caregiver Education & Training, Neuromuscular Re-education, Home Management Training, Equipment Evaluation, Education, & procurement      Goals  Short term goals  Time Frame for Short term goals: 1 week  Short term goal 1: Pt will be min A with functional transfers  Short term goal 2: Pt will be min A with functional mobility  Short term goal 3: Pt will be min A with bed mobility  Short term goal 4: Pt will be mod A with bathing and dressing  Short term goal 5: Pt will tolerate UE neuromuscular reeducation to increase function in RUE for assist with ADLs  Long term goals  Time Frame for Long term goals : 3-4 weeks  Long term goal 1: Pt will be mod I with functional transfers  Long term goal 2: Pt will be mod I with functional mobility  Long term goal 3: Pt will be mod I with bed mobility  Long term goal 4: Pt will be mod I with bathing and dressing  Long term goal 5: Pt will be mod I with toileting  Patient Goals   Patient goals : Pt nodded agreement when asked if he wanted to be more independent       Therapy Time   Individual Concurrent Group Co-treatment   Time In       0800   Time Out       0905   Minutes       65      Therapy Time   Individual Co-treatment   Time In  1515   Time Out  1545   Minutes  [address], [place] [address]

## 2021-04-30 NOTE — PROGRESS NOTES
Call in to Dr. Ricky Gomez, PIV clean and intact and flushes well, states to keep in until Monday after lab recheck, and then a decision will be made for the need for PIV access to continue.  Electronically signed by Gómez Rae RN on 4/30/2021 at 2:55 PM

## 2021-04-30 NOTE — PROGRESS NOTES
Signs/Symptoms Outcomes:  Weight, Skin     Discharge Planning:     Too soon to determine     Electronically signed by Daksha Cifuentes RD, LD on 4/30/21 at 2:18 PM EDT    Contact: [phone]

## 2021-05-01 PROCEDURE — 94760 N-INVAS EAR/PLS OXIMETRY 1: CPT

## 2021-05-01 PROCEDURE — 6360000002 HC RX W HCPCS: Performed by: PHYSICAL MEDICINE & REHABILITATION

## 2021-05-01 PROCEDURE — 6370000000 HC RX 637 (ALT 250 FOR IP): Performed by: PHYSICAL MEDICINE & REHABILITATION

## 2021-05-01 PROCEDURE — 1280000000 HC REHAB R&B

## 2021-05-01 PROCEDURE — 2580000003 HC RX 258: Performed by: PHYSICAL MEDICINE & REHABILITATION

## 2021-05-01 RX ADMIN — POTASSIUM BICARBONATE 20 MEQ: 782 TABLET, EFFERVESCENT ORAL at 18:04

## 2021-05-01 RX ADMIN — ENOXAPARIN SODIUM 40 MG: 40 INJECTION SUBCUTANEOUS at 08:42

## 2021-05-01 RX ADMIN — HYDROCHLOROTHIAZIDE 25 MG: 25 TABLET ORAL at 08:18

## 2021-05-01 RX ADMIN — ASPIRIN 81 MG: 81 TABLET, COATED ORAL at 08:18

## 2021-05-01 RX ADMIN — BACLOFEN 5 MG: 10 TABLET ORAL at 08:18

## 2021-05-01 RX ADMIN — SERTRALINE 150 MG: 100 TABLET, FILM COATED ORAL at 08:18

## 2021-05-01 RX ADMIN — BACLOFEN 5 MG: 10 TABLET ORAL at 14:15

## 2021-05-01 RX ADMIN — ATENOLOL 50 MG: 50 TABLET ORAL at 08:19

## 2021-05-01 RX ADMIN — TRAZODONE HYDROCHLORIDE 50 MG: 50 TABLET ORAL at 21:22

## 2021-05-01 RX ADMIN — ISOSORBIDE MONONITRATE 30 MG: 30 TABLET, EXTENDED RELEASE ORAL at 08:19

## 2021-05-01 RX ADMIN — LISINOPRIL 20 MG: 20 TABLET ORAL at 08:19

## 2021-05-01 RX ADMIN — ATORVASTATIN CALCIUM 80 MG: 80 TABLET, FILM COATED ORAL at 21:21

## 2021-05-01 RX ADMIN — SODIUM CHLORIDE, PRESERVATIVE FREE 10 ML: 5 INJECTION INTRAVENOUS at 21:30

## 2021-05-01 RX ADMIN — SODIUM CHLORIDE, PRESERVATIVE FREE 5 ML: 5 INJECTION INTRAVENOUS at 08:44

## 2021-05-01 RX ADMIN — TAMSULOSIN HYDROCHLORIDE 0.4 MG: 0.4 CAPSULE ORAL at 08:18

## 2021-05-01 RX ADMIN — CLOPIDOGREL BISULFATE 75 MG: 75 TABLET ORAL at 08:18

## 2021-05-01 RX ADMIN — BACLOFEN 5 MG: 10 TABLET ORAL at 21:21

## 2021-05-01 NOTE — PROGRESS NOTES
Pt had 9 beats v-tach, returned to NSR. VS stable. When asked if he felt any different/had any symptoms, pt stated \"no\". Jumana Martin made aware. See new orders.

## 2021-05-01 NOTE — PROGRESS NOTES
Patient is in bed rest quietly. Admitted with a CVA. Lungs clear. resp easy,denies having any sob,pain or discomfort. Right side flaccid. Patient is able to use left hand to assist with turning. Pt has expressive aphasia and delayed responses. able to verbalize needs, stated \" I'm wet\". Medication taken crushed w/applesauce,tolerated well. Pt incontinent of urine,pericare given,and barrier cream applied. Pt turned  repositioned every 2 hours. HOB elevated,2/3 SR elevated,call light in reach. Will continue to monitor.

## 2021-05-02 PROCEDURE — 94760 N-INVAS EAR/PLS OXIMETRY 1: CPT

## 2021-05-02 PROCEDURE — 6370000000 HC RX 637 (ALT 250 FOR IP): Performed by: PHYSICAL MEDICINE & REHABILITATION

## 2021-05-02 PROCEDURE — 6360000002 HC RX W HCPCS: Performed by: PHYSICAL MEDICINE & REHABILITATION

## 2021-05-02 PROCEDURE — 2580000003 HC RX 258: Performed by: PHYSICAL MEDICINE & REHABILITATION

## 2021-05-02 PROCEDURE — 1280000000 HC REHAB R&B

## 2021-05-02 RX ADMIN — POTASSIUM BICARBONATE 20 MEQ: 782 TABLET, EFFERVESCENT ORAL at 08:48

## 2021-05-02 RX ADMIN — BACLOFEN 5 MG: 10 TABLET ORAL at 08:53

## 2021-05-02 RX ADMIN — ATENOLOL 50 MG: 50 TABLET ORAL at 08:53

## 2021-05-02 RX ADMIN — ASPIRIN 81 MG: 81 TABLET, COATED ORAL at 08:53

## 2021-05-02 RX ADMIN — TRAZODONE HYDROCHLORIDE 50 MG: 50 TABLET ORAL at 21:51

## 2021-05-02 RX ADMIN — LISINOPRIL 20 MG: 20 TABLET ORAL at 08:53

## 2021-05-02 RX ADMIN — BACLOFEN 5 MG: 10 TABLET ORAL at 21:51

## 2021-05-02 RX ADMIN — ATORVASTATIN CALCIUM 80 MG: 80 TABLET, FILM COATED ORAL at 21:51

## 2021-05-02 RX ADMIN — CLOPIDOGREL BISULFATE 75 MG: 75 TABLET ORAL at 08:53

## 2021-05-02 RX ADMIN — BACLOFEN 5 MG: 10 TABLET ORAL at 13:53

## 2021-05-02 RX ADMIN — TAMSULOSIN HYDROCHLORIDE 0.4 MG: 0.4 CAPSULE ORAL at 08:52

## 2021-05-02 RX ADMIN — SODIUM CHLORIDE, PRESERVATIVE FREE 10 ML: 5 INJECTION INTRAVENOUS at 21:51

## 2021-05-02 RX ADMIN — DOCUSATE SODIUM 50 MG AND SENNOSIDES 8.6 MG 1 TABLET: 8.6; 5 TABLET, FILM COATED ORAL at 08:52

## 2021-05-02 RX ADMIN — ENOXAPARIN SODIUM 40 MG: 40 INJECTION SUBCUTANEOUS at 08:55

## 2021-05-02 RX ADMIN — SODIUM CHLORIDE, PRESERVATIVE FREE 10 ML: 5 INJECTION INTRAVENOUS at 08:54

## 2021-05-02 RX ADMIN — HYDROCHLOROTHIAZIDE 25 MG: 25 TABLET ORAL at 08:52

## 2021-05-02 RX ADMIN — ISOSORBIDE MONONITRATE 30 MG: 30 TABLET, EXTENDED RELEASE ORAL at 08:53

## 2021-05-02 RX ADMIN — SERTRALINE 150 MG: 100 TABLET, FILM COATED ORAL at 08:53

## 2021-05-02 NOTE — PLAN OF CARE
Problem: Falls - Risk of:  Goal: Will remain free from falls  Description: Will remain free from falls  Outcome: Ongoing  Goal: Absence of physical injury  Description: Absence of physical injury  Outcome: Ongoing     Problem: HEMODYNAMIC STATUS  Goal: Patient has stable vital signs and fluid balance  Outcome: Ongoing     Problem: ACTIVITY INTOLERANCE/IMPAIRED MOBILITY  Goal: Mobility/activity is maintained at optimum level for patient  Outcome: Ongoing     Problem: COMMUNICATION IMPAIRMENT  Goal: Ability to express needs and understand communication  Outcome: Ongoing     Problem: Skin Integrity:  Goal: Will show no infection signs and symptoms  Description: Will show no infection signs and symptoms  Outcome: Ongoing  Goal: Absence of new skin breakdown  Description: Absence of new skin breakdown  Outcome: Ongoing     Problem: Pain:  Goal: Pain level will decrease  Description: Pain level will decrease  Outcome: Ongoing  Goal: Control of acute pain  Description: Control of acute pain  Outcome: Ongoing  Goal: Control of chronic pain  Description: Control of chronic pain  Outcome: Ongoing     Problem: Nutrition  Goal: Optimal nutrition therapy  Outcome: Ongoing

## 2021-05-02 NOTE — PROGRESS NOTES
Pt. in bed eye closed. Appears comfortable. Respiration unlabored. Bed alarm. Call light and personal belonging in reach. Hourly rounding. Will continue to monitor.

## 2021-05-03 LAB
ANION GAP SERPL CALCULATED.3IONS-SCNC: 11 MMOL/L (ref 3–16)
BASOPHILS ABSOLUTE: 0.1 K/UL (ref 0–0.2)
BASOPHILS RELATIVE PERCENT: 0.7 %
BUN BLDV-MCNC: 23 MG/DL (ref 7–20)
CALCIUM SERPL-MCNC: 9.3 MG/DL (ref 8.3–10.6)
CHLORIDE BLD-SCNC: 99 MMOL/L (ref 99–110)
CO2: 27 MMOL/L (ref 21–32)
CREAT SERPL-MCNC: 0.7 MG/DL (ref 0.8–1.3)
EOSINOPHILS ABSOLUTE: 0.1 K/UL (ref 0–0.6)
EOSINOPHILS RELATIVE PERCENT: 1.3 %
GFR AFRICAN AMERICAN: >60
GFR NON-AFRICAN AMERICAN: >60
GLUCOSE BLD-MCNC: 102 MG/DL (ref 70–99)
HCT VFR BLD CALC: 40.7 % (ref 40.5–52.5)
HEMOGLOBIN: 14.6 G/DL (ref 13.5–17.5)
LYMPHOCYTES ABSOLUTE: 1.7 K/UL (ref 1–5.1)
LYMPHOCYTES RELATIVE PERCENT: 17 %
MCH RBC QN AUTO: 32.3 PG (ref 26–34)
MCHC RBC AUTO-ENTMCNC: 35.8 G/DL (ref 31–36)
MCV RBC AUTO: 90.2 FL (ref 80–100)
MONOCYTES ABSOLUTE: 0.9 K/UL (ref 0–1.3)
MONOCYTES RELATIVE PERCENT: 8.8 %
NEUTROPHILS ABSOLUTE: 7 K/UL (ref 1.7–7.7)
NEUTROPHILS RELATIVE PERCENT: 72.2 %
PDW BLD-RTO: 14.5 % (ref 12.4–15.4)
PLATELET # BLD: 182 K/UL (ref 135–450)
PMV BLD AUTO: 8 FL (ref 5–10.5)
POTASSIUM REFLEX MAGNESIUM: 3.8 MMOL/L (ref 3.5–5.1)
RBC # BLD: 4.51 M/UL (ref 4.2–5.9)
SODIUM BLD-SCNC: 137 MMOL/L (ref 136–145)
WBC # BLD: 9.8 K/UL (ref 4–11)

## 2021-05-03 PROCEDURE — 80048 BASIC METABOLIC PNL TOTAL CA: CPT

## 2021-05-03 PROCEDURE — 97129 THER IVNTJ 1ST 15 MIN: CPT

## 2021-05-03 PROCEDURE — 92507 TX SP LANG VOICE COMM INDIV: CPT

## 2021-05-03 PROCEDURE — 6370000000 HC RX 637 (ALT 250 FOR IP): Performed by: PHYSICAL MEDICINE & REHABILITATION

## 2021-05-03 PROCEDURE — 97535 SELF CARE MNGMENT TRAINING: CPT

## 2021-05-03 PROCEDURE — 85025 COMPLETE CBC W/AUTO DIFF WBC: CPT

## 2021-05-03 PROCEDURE — 97530 THERAPEUTIC ACTIVITIES: CPT

## 2021-05-03 PROCEDURE — 97116 GAIT TRAINING THERAPY: CPT

## 2021-05-03 PROCEDURE — 1280000000 HC REHAB R&B

## 2021-05-03 PROCEDURE — 6360000002 HC RX W HCPCS: Performed by: PHYSICAL MEDICINE & REHABILITATION

## 2021-05-03 PROCEDURE — 36415 COLL VENOUS BLD VENIPUNCTURE: CPT

## 2021-05-03 RX ADMIN — LISINOPRIL 20 MG: 20 TABLET ORAL at 09:14

## 2021-05-03 RX ADMIN — CLOPIDOGREL BISULFATE 75 MG: 75 TABLET ORAL at 09:14

## 2021-05-03 RX ADMIN — HYDROCHLOROTHIAZIDE 25 MG: 25 TABLET ORAL at 10:41

## 2021-05-03 RX ADMIN — BACLOFEN 5 MG: 10 TABLET ORAL at 20:30

## 2021-05-03 RX ADMIN — BACLOFEN 5 MG: 10 TABLET ORAL at 09:14

## 2021-05-03 RX ADMIN — ASPIRIN 81 MG: 81 TABLET, COATED ORAL at 09:13

## 2021-05-03 RX ADMIN — POTASSIUM BICARBONATE 20 MEQ: 782 TABLET, EFFERVESCENT ORAL at 09:14

## 2021-05-03 RX ADMIN — ENOXAPARIN SODIUM 40 MG: 40 INJECTION SUBCUTANEOUS at 09:13

## 2021-05-03 RX ADMIN — TAMSULOSIN HYDROCHLORIDE 0.4 MG: 0.4 CAPSULE ORAL at 09:14

## 2021-05-03 RX ADMIN — ISOSORBIDE MONONITRATE 30 MG: 30 TABLET, EXTENDED RELEASE ORAL at 09:13

## 2021-05-03 RX ADMIN — BACLOFEN 5 MG: 10 TABLET ORAL at 13:25

## 2021-05-03 RX ADMIN — TRAZODONE HYDROCHLORIDE 50 MG: 50 TABLET ORAL at 20:30

## 2021-05-03 RX ADMIN — ATENOLOL 50 MG: 50 TABLET ORAL at 09:14

## 2021-05-03 RX ADMIN — ATORVASTATIN CALCIUM 80 MG: 80 TABLET, FILM COATED ORAL at 20:30

## 2021-05-03 RX ADMIN — SERTRALINE 150 MG: 100 TABLET, FILM COATED ORAL at 09:13

## 2021-05-03 ASSESSMENT — PAIN SCALES - GENERAL
PAINLEVEL_OUTOF10: 0

## 2021-05-03 NOTE — PROGRESS NOTES
Physical Therapy  Facility/Department: [address] IP REHAB  Daily Treatment Note  NAME: Myranda Nath  : 1955  MRN: [id number]    Date of Service: 5/3/2021    Discharge Recommendations:  Continue to assess pending progress, Patient would benefit from continued therapy after discharge   PT Equipment Recommendations  Other: Will likely require wc upon D/C. Assessment   Body structures, Functions, Activity limitations: Decreased functional mobility ; Decreased strength;Decreased endurance;Decreased sensation;Decreased balance  Assessment: Patient with increased tone again this date as he was due for his baclofen until end of session. Patient required more assist to keep right foot placed on the floor and prevent withdrawal. He require assist with unsupported sitting balance  when reaching forward. He performed multiple transfers with PT and OT assist for ADL session and it is found that currently a stand pivot is more efficient and stand pivot continues to be safest option for transfers as he has minimal to no activation of quads in squated position. Patient continues to have slow processing and minimal verbalizations at this time. Patient continues to require two person assist for any mobility. Patient well below baseline and will benefit from continued skilled therapy for strengthening, functional mobility training, and balance training to reduce assistance required. Treatment Diagnosis: impaired functional mobility  Specific instructions for Next Treatment: Transfers; practice wc mobility; strengthen RLE as able  Prognosis: Guarded; Fair  PT Education: Functional Mobility Training;General Safety;Home Exercise Program;Disease Specific Education;Plan of Care;Transfer Training  REQUIRES PT FOLLOW UP: Yes  Activity Tolerance  Activity Tolerance: Patient limited by fatigue;Patient limited by cognitive status  Activity Tolerance: Patient with processing difficulties and needing increased time for new activity directions and tactile cues to process     Patient Diagnosis(es): There were no encounter diagnoses. has a past medical history of Acute MI ([facility] [address] Anxiety, Arthritis, CAD (coronary artery disease), Hypertension, and Influenza A.   has a past surgical history that includes back surgery (N/A, 1989) and Coronary angioplasty with stent (9/13). Social/Functional History  Lives With: Significant other(Sign other Ronald Alicia))  Type of Home: House  Home Layout: Two level, Able to Live on Main level with bedroom/bathroom, Laundry in basement(1 story with basement.)  Home Access: Stairs to enter without rails(1 step to enter.)  Bathroom Shower/Tub: Tub/Shower unit(Tub Shower main level and basement.)  Bathroom Toilet: Standard  Bathroom Equipment: Grab bars in shower  Bathroom Accessibility: Accessible  Home Equipment: (No DME.)  ADL Assistance: Independent  Homemaking Assistance: (Shared with sign other.)  Homemaking Responsibilities: (shared with significant other prior to admission)  Ambulation Assistance: Independent(Without assist device.)  Transfer Assistance: Independent  Active : Yes  Occupation: Retired  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 \"Need to find something to do. \"    Restrictions  Restrictions/Precautions  Restrictions/Precautions: Fall Risk  Position Activity Restriction  Other position/activity restrictions: R hemiplegia and increased tone RLE, exp. aphasia     Subjective   General  Chart Reviewed: Yes  Additional Pertinent Hx: Pt is 77 y/o M admitted 4/21/21 to ED with stroke like symptoms affecting R side ongoing ~12hours. MRI revealed multifocal acute infarctions in the parasagital left front along distal L LEXA territory with occluded/thrombosed A3 segment of L LEXA.  MRI also revealed old infarction in basal ganglia, thalami, R cerebellum. PMHx significant for CAD, Angio w/ stent, MI, lumbar sx, HTN. Pt is 75 y/o M admitted 4/21/21 to ED with stroke like symptoms affecting R side ongoing ~12hours. MRI revealed multifocal acute infarctions in the parasagital left front along distal L LEXA territory with occluded/thrombosed A3 segment of L LEXA. MRI also revealed old infarction in basal ganglia, thalami, R cerebellum. PMHx significant for CAD, Angio w/ stent, MI, lumbar sx, HTN. Response To Previous Treatment: Patient with no complaints from previous session. Family / Caregiver Present: No  Referring Practitioner: Dr. Robert Felix: patient up in wheelchair at start of session. He reports no pain but does state he is sleepy. General Comment  Comments: Patient seen as a co-treatment secondary to requiring 2 skilled persons to safely mobilize for completion of ADL            Objective      Transfers  Sit to Stand: Maximum Assistance;2 Person Assistance(with use of grab bar on left or in front of him)  Stand to sit: Moderate Assistance;2 Person Assistance(two person assist with difficulty flexing at hips as he sat back into the chair)  Bed to Chair: Maximum assistance;2 Person Assistance(Patient completed transfer W/C<>shower chair with grab bar and W/C>recliner chair pulling on arm rest of recliner (to the left))  Comment: patient required blocking of left knee and assistance to extend knee. He continues to have reduced initiation to extend knees at start of stand and requires therapist to begin the transfer before quads begin to kick in. Comment: PT assisting patient with ADL session with OT. Patient completed multiple transfers w/c>shower chair>stand at rail>sit in w/c>stand at rail then last transfer W/C>recliner. patient was two person assist for all transfers with stand pivots as squat pivots continue to be more difficult with initiating quad use.   Patient completed a shower while seated on the shower chair with railing on left side. Patient with improved stability reaching forward towards his feet, but does hav increased left lean when leaning fully forward off the back of the chair and required min assist several times. OT assisted with ADL tasks as PT ensured balance was kept. He increased time to process instructions. Patient required assistance to stand to rail with 2 person and it is unsafe to attempt stand when patient is wet in the shower as he requires so much assistance to stand. Please she OT note for full description of ADL session. It is unsafe to complete showering with one person assist due to patients assist level with mobility. PM Session  Patient finishing with SLP, Kenya Peralta at start of session. He was reporting fatigue. He stated he was having some pain but could not explain where. When asked to point where he hurt, he pointed to a spot on his shirt. There were no outward signs of pain. Patient set up at martinez rail and right foot was wrapped into DF with use of ace wrap. Sit>stand with max assist of 2 with very minimal activation until he is in partially in stance (patient pulling on rail with left hand)  Patient ambulated 16' with martinez rail, two times with max assist of PT at right LE. Patient unable to advance right LE on his own, but does initiate advancement intermittently. He also requires PT to block right knee to prevent buckling and facilitate right hip extension in SLS. OT providing mod assist at trunk for balance but improved to min assist with second ambulation. Patient fatigues with this activity and requires rest breaks between ambulation. Patient was slightly more restless but  Not verbalizing anything this PM.  PT asked if he needed to use bathroom and he nodded yes. Patient set up for toilet transfer with use of rail. He was max assist of 2 on and off commode and dependent for toileting.   When sitting back onto toilet he requires assistance to flex at hips and prevent fall back onto commode. Patient was then set up for transfer back into bed. Patient was very fatigued and was max-dependent x2. Patient required assistance to remove shoes. Sit>supine with max assist of one at trunk and dependent to lift bilateral LE up into the bed. PT and OT helped to position patient but he could not verbalize if he was comfortable or if he wanted anything different. Patient only able to look at therapist and laugh. Patient with less verbalizations this date. He continues to require max assist of 2 for all mobility. Safety Device - Type of devices:  [x]  All fall risk precautions in place [x] Bed alarm in place  [x] Call light within reach [] Chair alarm in place [] Positioning belt [x] Gait belt [] Patient at risk for falls [x] Left in bed [] Left in chair [] Telesitter in use [] Sitter present [x] Nurse notified (RN, Douglas Riojas, notified) []  None        Goals  Short term goals  Time Frame for Short term goals: In 2 weeks pt will perform  Short term goal 1: Bed mobility Mod A  Short term goal 2: Transfers Mod A  Short term goal 3: Propel wc 48' with Mod A  Short term goal 4: Ascend/Descend curb step with LRAD, Mod A  Long term goals  Time Frame for Long term goals :  In 4 weeks pt will perform  Long term goal 1: Bed mobility CGA  Long term goal 2: Transfers CGA  Long term goal 3: Propel wc 48' with CGA-SBA  Long term goal 4: Ascend/Descend curb step with LRAD, Min A  Patient Goals   Patient goals : Did not state today (minimally verbal)    Plan    Plan  Times per week: 5-6x  Times per day: Twice a day  Plan weeks: 4 weeks  Specific instructions for Next Treatment: Transfers; practice wc mobility; strengthen RLE as able  Current Treatment Recommendations: Strengthening, ROM, Balance Training, Endurance Training, Functional Mobility Training, Wheelchair Mobility Training, Transfer Training, Gait Training, Stair training, Positioning, Pain Management, Equipment Evaluation, Education, & procurement, Modalities, Patient/Caregiver Education & Training, Safety Education & Training, Home Exercise Program, Neuromuscular Re-education  Safety Devices  Type of devices:  All fall risk precautions in place, Call light within reach, Chair alarm in place, Gait belt, Left in chair  Restraints  Initially in place: No     Therapy Time   Individual Concurrent Group Co-treatment   Time In       0800   Time Out       0900   Minutes       60          Second Session Therapy Time     Individual Co-treatment   Time In  [address] [place] [address]   Time Out  1555   Minutes  [address] [id number]

## 2021-05-03 NOTE — PROGRESS NOTES
Speech Language Pathology  ACUTE REHAB UNIT  SPEECH/LANGUAGE PATHOLOGY      [x] Daily  [] Weekly Care Conference Note  [] Discharge    Patient:Carl Chapman      KBD:[birth year]  YQT:[id number]  Rehab Dx/Hx: Acute ischemic left LEXA stroke (HCC) [I63.522]    Precautions: FAll risk; Cognitive-Communication deficits  Home situation: Lives with significant other  ST Dx: [x] Aphasia  [] Dysarthria  [] Apraxia   [x] Oropharyngeal dysphagia [x] Cognitive   Impairment  [] Other:   Initial Speech Therapy Assessment Diagnosis:   1. Cognitive Diagnosis: Mild cognitive linguistic impairment for routine daily functional needs. Ongoing assessment of complex, high level and abstract PS/reasoning, as well as executive functioning, thought organization and planning is recommended as patient's processing and initation improve. Pt was independent with ADLs and IADLs PTA. Given signifcant extra time for processing and intiaotn, basic cognition appears intact in areas of orientation, sustained and selective, and simple/concrete PS/reasoning. Mildly decreased recall for daily events/information and moderately redcued recall for novel informaiton noted this date. Aforemnetioned delayed thought processing and initiation for expression is a barrier to demonstration of and exectuion for congitive-language skills/tasks. 2.  Aphasia Diagnosis: Given extra time for processing and initaiton, patient appears to present with mildly impaired auditory comprehension for complex and abstract information processing. Basic and concrete auditory/visual language comprehension appears intact. Mild to moderately impaired verbal expression for complex concepts requiring increased time to formulate and generate responses. 3.  Speech Diagnosis: Mildly impaired motor speech skills characterized by minimal to mild decreased verbal agility affecting speech fluency without impact on speech intelligibility at sentence level  4.   Dysphagia Diagnosis: Mild oropharyngeal dysphagia characterized by slow but effective mastication, right sided perioral weakness, reduced lingual coordination, reduced AP transit, potential swallow delay and mildly reduced laryngeal elevation. No immediate or delayed overt s/s of aspiration; trace oral residue is cleared by pt. Recommend regular textures and thin liquids with ongoing monitor to confirm tolerance.   Date of Admit: 4/23/2021  Room #: K5M-0103/5735-99  Date: 5/3/2021       Current functional status (updated daily):         Current Diet Order:DIET GENERAL; Low Sodium (2 GM)  Dietary Nutrition Supplements: Standard High Calorie Oral Supplement   Behavior: [x] Alert  [x] Cooperative  [x]  Pleasant  [] Confused  [] Agitated  [] Uncooperative  [] Distractible [] Motivated  [] Self-Limiting [] Anxious  [] Other:  Endurance:  [] Adequate for participation in SLP sessions  [x] Reduced overall  [] Lethargic  [] Other:  Safety: [] No concerns at this time  [x] Reduced insight into deficits  []  Reduced safety awareness [] Not following call light procedures  [] Unable to Assess  [x] Other:ongoing assessment as communication skills improve  Swallowing Precautions: Upright as possible for all oral intake, Remain upright for 30-45 minutes after meals  Barriers toward progress: none unless medical issues and caregiver assist           Date: 5/3/2021      Tx session 1 Tx session 2   Total Timed Code Min SLP Individual Minutes  Time In: 1030  Time Out: 1100  Minutes: 30  Coded treatment time  15 SLP Individual Minutes  Time In:1430  Time Out: 2331  Minutes: 39  Coded treatment time 20    Group Treatment Minutes 0 0   Co-Treat Minutes 0 0   Variance/Reason:  n/a n/a   Pain n/a denied   Pain Intervention [] RN notified  [] Repositioned  [] Intervention offered and patient declined  [] N/A  [] Other:  [] RN notified  [] Repositioned  [] Intervention offered and patient declined  [] N/A  [] Other:   Subjective     Pt was told to go bedside  Upon entry to room; Pt was awake in recliner watching TV  Pt was cooperative Pt seen in treatment office   Objective:  Goals       Dysphagia goals: Pt did not identify a goal. Team goal is for safe toleration of po diet    therapy working toward pt goal n/a   1. The patient will tolerate regular food with thin liquid diet without observed clinical signs of aspiration,  Not targeted with exception of med pass. All med tolerated. RN did crush and give with puree Pt declined   2. The patient will improve oral preparation phase via bolus control/manipulation exercises to 5/5 each trial., . Right oral motor rom/tactile stimulation    Oral suck swallow/lingual sweep to clear oral residue: persistent oral motor planning Pt declined   3. The patient/caregiver will demonstrate understanding of compensatory strategies for improved swallowing safety Reviewed strategies :   · Reducing rate of intake  · Review of lingual sweep to clear oral residue   Pt declined   Short-term Goals  Goal 1: Pt will demonstrate improved recall of new learning strategies; daily therapy activities and new learning information with set up, use of memory strategies and minimal assist   STM/Working memory:   Persistent expressive language deficits impact objective assessment  -right visual attention: external cues warranted for sustained eye contact while SLP sat to the right of the patient     Right visual attention for table top and for picture stimuli (2 page 2H58 page high color contrast): max cues for right of midline Continued to emphasize right visual attention for spatial organization task in which pt utilized 16 chips and tried to duplicate designs (minimal complexity): cues for right side   Goal 2: Pt will improve oral and/or verbal agility and ROM for coordinated and alternating motions to < mild impairment.    Isolated rom: moderate volitional labial/buccal/lingual rom stretch    Paired movements: persistent motor planning EF, planning, sequencing and complex reasoning with additional goals PRN       Not targeted Auditory processing :  · 3 -4 unit ? (regarding schedules; word list; TV guides): mild to max assist assist     1 step psychomotor commands: 100% with visual model    1 step commands for counting money (concrete): not targeted    Visual Language processing:   Reading sentences for word fill using MC format: 70% (SLP reinforcing visual scanning left to right to see 3 MC options)   Other areas targeted:     Education:   Ongoing pt ed Ongoing pt ed   Safety Devices: [x] Call light within reach  [x] Chair alarm activated  [] Bed alarm activated  [] Other:  [] Call light within reach  [] Chair alarm activated  [] Bed alarm activated  [x] Other: transport returned pt to room   Progress Assessment: 4/26/2021: Pt with increase receptive and expressive language deficits with concern for potential oral motor /verbal dyspraxia; pt unable to participate in executive function . Unclear if due to fatigue (this SLP session was the last of the day)  4/27/2021: Alerted PCA and RN due to pt having a large amount of sputum/phlegm in his mouth upon SLP arrival to room. Pt with another episode of cough with expectoration of large amount of phlegm. Will check acute floor documentation for any dysphagia instrumentals. PO sample size small and no overt clinical s/s at the bedside. Will discuss at team meeting. 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 Plan: Continue as per plan of care.    Continued Tx Upon Discharge: ?    [x] Yes [] No [] TBD based on progress while on ARU [] Vital Stim indicated [] Other:   Estimated discharge date: TBD   Discharge recommendations:   [] Home independently [x] Home with assistance [x]  24 hour supervision  [] ECF [] Other:     Additional information:     Interventions used during Rehab Stay:  [x] Speech/Language Treatment  [] Instruction in HEP [] Group [x] Dysphagia Treatment [x] Cognitive Treatment   [] Other:    Adverse Reactions to Treatment/Significant Change in Status: n/a    Electronically Signed by    Derrick Eller. Jeffery,MS,CCC,SLP [id number]  Speech and Language Pathologist

## 2021-05-03 NOTE — PROGRESS NOTES
uncontrolled  -resume home atenolol, HCTZ, lisinopril (increased dose), Imdur -- trend iproving  -prn hydralazine     CAD  -DAPT, statin, bb     OA  -acetaminophen     Anxiety  -sertraline    Leukocytosis  -Resolved  -UA and CXR negative    Hypokalemia  -Improved with replacement     Bladder   -High risk retention   -Monitor PVRs, SC prn >300cc  -Flomax     Bowel   -High risk constipation   -senna+colace BID, PRN miralax, MoM, and bisacodyl supp.     Safety   -fall and aspiration precautions     DVT ppx  -lovenox    Rehab Progress: Making gradual progress. Limited by severe right hemiparesis, aphasia, delayed processing. Working on functional mobility, compensatory strategies, cognition. Anticipated Dispo: home with sig other and elderly mother  Services:  PT, OT, SLP, RN, Aide  DME: [facility]  ELOS: 5/14      Hollie Britton.  Omar Coronado MD 5/3/2021, 11:06 AM

## 2021-05-03 NOTE — PROGRESS NOTES
Patient admitted to rehab with CVA. A/A/O with delayed responses. . Transfers with maxi move. On 2GM Na  diet, tolerating well with set up, Fed breakfast, would drink fluids by himself. Medications taken crushed in applesauce. On Lovenox for DVT prophylaxis. Has been incontinent of  bladder. LBM 5/2. Chair/bed alarms in use and call light in reach. Tele. In place. Will monitor for safety.

## 2021-05-03 NOTE — PROGRESS NOTES
Occupational Therapy  Facility/Department: [address] REHAB  Daily Treatment Note  NAME: Dahiana Bhat  : 1955  MRN: [id number]    Date of Service: 5/3/2021    Discharge Recommendations:  Continue to assess pending progress, Patient would benefit from continued therapy after discharge, 24 hour supervision or assist       Assessment   Performance deficits / Impairments: Decreased functional mobility ; Decreased strength;Decreased endurance;Decreased ADL status; Decreased high-level IADLs;Decreased balance;Decreased ROM; Decreased sensation;Decreased coordination;Decreased fine motor control  Assessment: Pt limited by increased tone (R LE worse than R UE) and fatigue this date, but tolerated full shower and dressing w/ assist from OT and PT for safety. Pt continues to require max Ax2 for stand pivot and sit<>stand transfers. Pt completed bathing w/ min A using LH sponge, but requires cues throughout to initiate/sequence tasks. Pt completed UB dressing w/ mod A, LB dressing max A w/. Initiated use of AE for LB dressing however highly limited by right side weakness. Plan to continue per POC w/ cotx 2x/day to safely progress pt's functional performance. Prognosis: Good  OT Education: OT Role;Plan of Care;Precautions; ADL Adaptive Strategies;Transfer Training  Barriers to Learning: exp aphasia  REQUIRES OT FOLLOW UP: Yes  Activity Tolerance  Activity Tolerance: Treatment limited secondary to decreased cognition;Patient limited by fatigue  Safety Devices  Safety Devices in place: Yes  Type of devices: Gait belt;Left in chair;Patient at risk for falls;Call light within reach; Chair alarm in place;Nurse notified         Patient Diagnosis(es): There were no encounter diagnoses.       has a past medical history of Acute MI ([facility] [address] Anxiety, Arthritis, CAD (coronary artery disease), Hypertension, and Influenza A.   has a past surgical history that includes back surgery (N/A, ) and Coronary angioplasty with stent pants over LEs, required A to manage LLE and overall max A to use reacher successfully. Total A for both socks and shoes)  Additional Comments: Pt completed shower seated on shower chair w/ assist from PT for sitting balance assist from OT to perform ADLs. Continues to require cues to sequence and bathe all parts, continues w/ decreased right side awareness. Pt able to perform static sitting on shower chair w/ close SBA/CGA, requires up to min A when reaching forward to reach feet        Balance  Sitting Balance: Contact guard assistance(on shower chair)  Standing Balance: Maximum assistance(max Ax1-2 @ GB)  Functional Mobility  Functional - Mobility Device: Wheelchair  Activity: To/from bathroom  Assist Level: Minimal assistance  Functional Mobility Comments: OT/PT managed w/c for majority of session, pt able to propel ~10 feet toward recliner w/ min A and max cues to attend to right side  Shower Transfers  Shower - Transfer From: Wheelchair  Shower - Transfer Type: To and From  Shower - Transfer To: Shower seat with back  Shower - Technique: Stand pivot  Shower Transfers: Maximal assistance;2 Person assistance  Shower Transfers Comments: use of GB, max cues for sequencing  Wheelchair Bed Transfers  Wheelchair/Bed - Technique: Stand pivot  Equipment Used: Wheelchair; Other(recliner)  Level of Asssistance: 2 Person assistance;Maximum assistance  Bed mobility  Comment: Pt OOB throughout session  Transfers  Sit to stand: 2 Person assistance;Maximum assistance  Stand to sit: Maximum assistance;2 Person assistance  Transfer Comments: using GB                       Cognition  Overall Cognitive Status: Exceptions  Arousal/Alertness: Delayed responses to stimuli  Following Commands: Follows one step commands with repetition; Follows one step commands with increased time  Attention Span: Attends with cues to redirect  Memory: (difficult to assess)  Safety Judgement: Decreased awareness of need for assistance;Decreased awareness of need for safety  Insights: Decreased awareness of deficits  Initiation: Requires cues for all  Sequencing: Requires cues for all  Cognition Comment: Pt requires mod/max cues for initiating and sequencing ADLs. Appeared fatigued and minimally verbal           PM Session:  Subjective: Pt met in dept for therapy. Seen as cotx w/ PT to safely perform fxl transfers and mobility. Pt pointed to his shirt when asked about pain, but does not appear to be in pain. Pt hardly verbalizing at all this session, even when given choices or provided w/ yes or no questions    Objective:  Pt completed sit<>stand to hallway rail with max Ax2 (x2 trials). Pt completed 2 trials of mobility along length of railing w/ max A from PT to promote R LE movement, initially mod A from OT for standing balance/trunk control progressing to min A (see PT note for details). Pt continues to require cues throughout to initiate/sequence mobility. Also required use of w/c follow d/t fatigue and requiring significant assist x2 people for mobility. Pt was returned to room in w/c for toileting. Pt completed sit>stand w/ max Ax2 to grab bar. Pt required max A for standing balance + total A to pull pants down off hips. Pt sat on commode, OT instructed pt to aim into toilet or use urinal, however pt began to void urine onto the floor. OT quickly placed urinal and pt was able to void into urinal. Pt required max Ax2 for sit>stand using grab bar, total A to pull briefs/pants up over hips. Stand pivot from w/c > bed w/ pt pulling on bed rail w/ max A/total Ax2 d/t pt fatigue and lack of initiation. Sit>supine w/ max/total Ax2. Pt was left positioned for comfort in bed w/ alarm engaged and needs in reach. Assessment: Pt appeared very fatigued this session and did not verbalize at all w/ therapists. Pt continues to require max Ax2 therapists for all transfers and mobility.  Cont per POC     Plan   Plan  Times per week: 5-6  Times

## 2021-05-03 NOTE — PROGRESS NOTES
Pt has slept fairly well overnight. Admitted with a CVA, right side affected. Pt able to grasp slightly with his hand. Pt also with his right leg withdrawn up for most of the night. Assisted with stretching and straightening leg multiple times. Pt c/o knee pain with leg straight. Pt withdrawals leg back up after only a few seconds, unsure if the movement is purposeful or non purposeful. Has been incontinent of both bowel and bladder. Leandra care provided and purewick in place. Coccyx slightly reddened and zinc cream applied. All needs assessed with Q2H rounding. Alarms active. Will monitor.  Sara Camejo RN

## 2021-05-04 PROCEDURE — 97112 NEUROMUSCULAR REEDUCATION: CPT

## 2021-05-04 PROCEDURE — 97530 THERAPEUTIC ACTIVITIES: CPT

## 2021-05-04 PROCEDURE — 6360000002 HC RX W HCPCS: Performed by: PHYSICAL MEDICINE & REHABILITATION

## 2021-05-04 PROCEDURE — 6370000000 HC RX 637 (ALT 250 FOR IP): Performed by: PHYSICAL MEDICINE & REHABILITATION

## 2021-05-04 PROCEDURE — 92507 TX SP LANG VOICE COMM INDIV: CPT

## 2021-05-04 PROCEDURE — 1280000000 HC REHAB R&B

## 2021-05-04 PROCEDURE — 94761 N-INVAS EAR/PLS OXIMETRY MLT: CPT

## 2021-05-04 PROCEDURE — 97116 GAIT TRAINING THERAPY: CPT

## 2021-05-04 PROCEDURE — 97129 THER IVNTJ 1ST 15 MIN: CPT

## 2021-05-04 RX ADMIN — SERTRALINE 150 MG: 100 TABLET, FILM COATED ORAL at 08:52

## 2021-05-04 RX ADMIN — BACLOFEN 5 MG: 10 TABLET ORAL at 08:52

## 2021-05-04 RX ADMIN — ATENOLOL 50 MG: 50 TABLET ORAL at 08:52

## 2021-05-04 RX ADMIN — ENOXAPARIN SODIUM 40 MG: 40 INJECTION SUBCUTANEOUS at 08:52

## 2021-05-04 RX ADMIN — HYDROCHLOROTHIAZIDE 25 MG: 25 TABLET ORAL at 08:52

## 2021-05-04 RX ADMIN — BACLOFEN 5 MG: 10 TABLET ORAL at 19:54

## 2021-05-04 RX ADMIN — CLOPIDOGREL BISULFATE 75 MG: 75 TABLET ORAL at 08:52

## 2021-05-04 RX ADMIN — BACLOFEN 5 MG: 10 TABLET ORAL at 13:33

## 2021-05-04 RX ADMIN — TAMSULOSIN HYDROCHLORIDE 0.4 MG: 0.4 CAPSULE ORAL at 08:52

## 2021-05-04 RX ADMIN — TRAZODONE HYDROCHLORIDE 50 MG: 50 TABLET ORAL at 19:55

## 2021-05-04 RX ADMIN — LISINOPRIL 20 MG: 20 TABLET ORAL at 08:53

## 2021-05-04 RX ADMIN — POTASSIUM BICARBONATE 20 MEQ: 782 TABLET, EFFERVESCENT ORAL at 08:53

## 2021-05-04 RX ADMIN — ASPIRIN 81 MG: 81 TABLET, COATED ORAL at 08:52

## 2021-05-04 RX ADMIN — ATORVASTATIN CALCIUM 80 MG: 80 TABLET, FILM COATED ORAL at 19:55

## 2021-05-04 RX ADMIN — ISOSORBIDE MONONITRATE 30 MG: 30 TABLET, EXTENDED RELEASE ORAL at 08:52

## 2021-05-04 NOTE — PROGRESS NOTES
Speech Language Pathology  ACUTE REHAB UNIT  SPEECH/LANGUAGE PATHOLOGY      [x] Daily  [x] Weekly Care Conference Note  [] Discharge    Patient:Carl Flores      BVZ:[id number]  POS:[id number]  Rehab Dx/Hx: Acute ischemic left LEXA stroke (HCC) [I63.522]    Precautions: FAll risk; Cognitive-Communication deficits  Home situation: Lives with significant other  ST Dx: [x] Aphasia  [] Dysarthria  [] Apraxia   [x] Oropharyngeal dysphagia [x] Cognitive   Impairment  [] Other:   Initial Speech Therapy Assessment Diagnosis:   1. Cognitive Diagnosis: Mild cognitive linguistic impairment for routine daily functional needs. Ongoing assessment of complex, high level and abstract PS/reasoning, as well as executive functioning, thought organization and planning is recommended as patient's processing and initation improve. Pt was independent with ADLs and IADLs PTA. Given signifcant extra time for processing and intiaotn, basic cognition appears intact in areas of orientation, sustained and selective, and simple/concrete PS/reasoning. Mildly decreased recall for daily events/information and moderately redcued recall for novel informaiton noted this date. Aforemnetioned delayed thought processing and initiation for expression is a barrier to demonstration of and exectuion for congitive-language skills/tasks. 2.  Aphasia Diagnosis: Given extra time for processing and initaiton, patient appears to present with mildly impaired auditory comprehension for complex and abstract information processing. Basic and concrete auditory/visual language comprehension appears intact. Mild to moderately impaired verbal expression for complex concepts requiring increased time to formulate and generate responses. 3.  Speech Diagnosis: Mildly impaired motor speech skills characterized by minimal to mild decreased verbal agility affecting speech fluency without impact on speech intelligibility at sentence level  4.   Dysphagia Diagnosis: Mild oropharyngeal dysphagia characterized by slow but effective mastication, right sided perioral weakness, reduced lingual coordination, reduced AP transit, potential swallow delay and mildly reduced laryngeal elevation. No immediate or delayed overt s/s of aspiration; trace oral residue is cleared by pt. Recommend regular textures and thin liquids with ongoing monitor to confirm tolerance.   Date of Admit: 4/23/2021  Room #: Z6Q-3430/5907-06  Date: 5/4/2021       Current functional status (updated daily):         Current Diet Order:DIET GENERAL; Low Sodium (2 GM)  Dietary Nutrition Supplements: Standard High Calorie Oral Supplement   Behavior: [x] Alert  [x] Cooperative  [x]  Pleasant  [] Confused  [] Agitated  [] Uncooperative  [] Distractible [] Motivated  [] Self-Limiting [] Anxious  [] Other:  Endurance:  [] Adequate for participation in SLP sessions  [x] Reduced overall  [] Lethargic  [] Other:  Safety: [] No concerns at this time  [x] Reduced insight into deficits  []  Reduced safety awareness [] Not following call light procedures  [] Unable to Assess  [x] Other:ongoing assessment as communication skills improve  Swallowing Precautions: Upright as possible for all oral intake, Remain upright for 30-45 minutes after meals  Barriers toward progress: none unless medical issues and caregiver assist           Date: 5/4/2021      Tx session 1 Tx session 2   Total Timed Code Min SLP Individual Minutes  Time In: 9046  Time Out: 1031  Minutes: 44  Coded treatment time  15 SLP Individual Minutes  Time In:1420  Time Out: 1440  Minutes: 20  Coded treatment time 0   Group Treatment Minutes 0 0   Co-Treat Minutes 0 0   Variance/Reason:  n/a n/a   Pain n/a denied   Pain Intervention [] RN notified  [] Repositioned  [] Intervention offered and patient declined  [] N/A  [] Other:  [] RN notified  [] Repositioned  [] Intervention offered and patient declined  [] N/A  [] Other:   Subjective     Pt seen in treatment office  Good effort in therapy  After about 35 minutes pt becoming physically restless (\"uncomfortable\"). Re-positioning more optimal than use of pillow  Pt seen bedside  Pt was awake in bed  Pt was receptive to therapy   Objective:  Goals       Dysphagia goals: Pt did not identify a goal. Team goal is for safe toleration of po diet    therapy working toward pt goal n/a   1. The patient will tolerate regular food with thin liquid diet without observed clinical signs of aspiration,  Not targeted declined   2. The patient will improve oral preparation phase via bolus control/manipulation exercises to 5/5 each trial., . Not targeted declined   3. The patient/caregiver will demonstrate understanding of compensatory strategies for improved swallowing safety Not targeted   declined   Short-term Goals  Goal 1: Pt will demonstrate improved recall of new learning strategies; daily therapy activities and new learning information with set up, use of memory strategies and minimal assist   Recall for right visual attention  · right visual attention: right of page during visual language tasks for [address] format  · right visual scanning for letter search: pt continues to require assist for right of midline. · right of midline search for details right of midline on color photo Not targeted directly   Goal 2: Pt will improve oral and/or verbal agility and ROM for coordinated and alternating motions to < mild impairment. Goal met for isolated rom; goal not met across tasks for agility/ coordination for paired movements   Targeted indirectly druing verbal language expression tasks. Variable non-fluent speech and sound sequencing errors   Goal 3: Pt will demonstrate improved word retrieval for varied naming tasks and concrete concept explanation tasks with <mild assist Verbal expression;   Verbal Y/N ?  Verbal responses:    ·  persistent verbal perseveration with variable pt insight    Word level:   · Closed set (automatic sentence completion): >94%  · Open set (sentence completion in which varied responses able): mild assist  · Responsive namin%  · [place] categorization in sentence completion format: >85%  · Generative listing for concrete ADLs: moderate prompting  · Antonyms in a sentence completion format: >90% for concrete    CFN varied money denominations on confrontation: last targeted 2021 CFN: 90%                Goal 4. Pt will demonstrate  Improved word retrieval and thought organization for multiple word utterances via picture description; open ended Wh? With <max promptins Word retrieval for phrase/sentence formulation using pictured stimuli for:   · question formulation to given situation: mild to moderate Assist  · Picture discription: moderate assist via use of carrier phrases    Word retrieval and thought organization for phrase /sentence formulation for:   ·  Answering general open ended Wh? ; delayed rate of response; quite variable <50%    Imitative at phrase/sentence level regarding action pictures at 5 to 7 syllable utterance level:  100%   [place] WH? Response regarding pictured stimuli: 78% but limited to 1-3 word utterances   Goal 5 : Pt will convey needs and wants via verbal; pointing/gestures with <moderate clarification Limited to verbal Y/N which as documented previously concern for perseveration and inaccuracy. REduced initiation Limited to verbal response to [facility]; Y/N ?'s primarily. Goal 6.  Pt will demonstrate improved auditory and visual language processing of multiple unit Y/N?; Wh? And sentence length information with mild assist  Auditory processing :  · 3 -4 unit  concrete [address]? (regarding pictured stimuli): 0 to mild expansion cues/prompting     1 step psychomotor commands: 100% with visual model    1 step commands for counting money (concrete): not targeted   Goal 7: Pt will participate with ongoing assessment of EF, planning, sequencing and complex reasoning with additional goals PRN     Persistent deficits initiation and carryover of learned techniques within the task and task to task  · Ie: right scanning: see above for working memory                Other areas targeted:     Education:   Ongoing pt ed Ongoing pt ed   Safety Devices: [] Call light within reach  [] Chair alarm activated  [] Bed alarm activated  [x] Other: transport returned pt to room [x] Call light within reach  [] Chair alarm activated  [x] Bed alarm activated  [x] Other:    Progress Assessment: 4/26/2021: Pt with increase receptive and expressive language deficits with concern for potential oral motor /verbal dyspraxia; pt unable to participate in executive function . Unclear if due to fatigue (this SLP session was the last of the day)  4/27/2021: Alerted PCA and RN due to pt having a large amount of sputum/phlegm in his mouth upon SLP arrival to room. Pt with another episode of cough with expectoration of large amount of phlegm. Will check acute floor documentation for any dysphagia instrumentals. PO sample size small and no overt clinical s/s at the bedside. Will discuss at team meeting. 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 Plan: Continue as per plan of care.    Continued Tx Upon Discharge: ?    [x] Yes [] No [] TBD based on progress while on ARU [] Vital Stim indicated [] Other:   Estimated discharge date: TBD   Discharge recommendations:   [] Home independently  [x] Home with assistance [x]  24 hour supervision  [] ECF [] Other:     Additional information:     Interventions used during Rehab Stay:  [x] Speech/Language Treatment  [] Instruction in HEP [] Group [x] Dysphagia Treatment [x] Cognitive Treatment   [] Other:    Adverse Reactions to Treatment/Significant Change in Status: n/a    Electronically Signed by    Franck Junior. Jeffery,MS,CCC,SLP [id number]  Speech and Language Pathologist

## 2021-05-04 NOTE — PROGRESS NOTES
back surgery (N/A, 1989) and Coronary angioplasty with stent (9/13). Restrictions  Restrictions/Precautions  Restrictions/Precautions: Fall Risk  Position Activity Restriction  Other position/activity restrictions: R hemiplegia and increased tone RLE, exp. aphasia  Subjective   General  Chart Reviewed: Yes  Patient assessed for rehabilitation services?: Yes  Additional Pertinent Hx: Per Dr. Hogan David is a 77 yo M with pmh CAD s/p stent, HTN, OA, Anxiety, former tobacco use who initially presented 4/21/2021 with right side weakness and aphasia. He was outside TPA window. Imaging revealed acute ischemic infarcts in the left LEXA territory with thrombosis in A3 segment. Neurology consulted. Patient is being treated with DAPT (for 30 days then monotherapy) and statin. Course complicated by HTN. Currently, patient reports ongoing right side weakness and difficult with speech. He denies headache, vision changes, tingling/numbness. He is motivated to start inpatient rehab program.\"  Family / Caregiver Present: No(wife and mother in room at end of session)  Referring Practitioner: Dr Clarice Wilkes  Diagnosis: CVA- parasagital left frontal lobe  Subjective  Subjective: Pt met in dept. Agreeable to therapy. Pt more talkative today, responding in one and 2 word statements. Pt denied pain. When asked if he slept last night pt reports \"hell no\"      Orientation     Objective             Balance  Sitting Balance: Contact guard assistance(CGA/SBA static - CGA/min A dynamic)  Standing Balance: Maximum assistance(x2)  Functional Mobility  Functional - Mobility Device: Wheelchair  Assist Level: Dependent/Total  Functional Mobility Comments: Therapists managed w/c during session  Wheelchair Bed Transfers  Wheelchair/Bed - Technique: Lateral  Equipment Used: Wheelchair;Bed;Other  Level of Asssistance: Moderate assistance;2 Person assistance  Wheelchair Transfers Comments: Pt educated in slide board transfer.  Completed slide board transfer from w/c > bed w/ mod Ax2. Then from w/c > recliner w/ mod Ax2. Pt able to initiate movement w/ max cues     Transfers  Sit Pivot Transfers: Maximum assistance;2 Person assistance  Sit to stand: 2 Person assistance;Maximum assistance  Stand to sit: Maximum assistance;2 Person assistance                 Neuromuscular Education  Functional Movement Patterns: Pt sat EOB x20 mins w/ SBA/CGA for static sitting balance, up to min A for dynamic sitting balance while reaching. Pt reached to left and across body to the right to place colored rings on dowel rods; pt required mod cues and extra time to scan and locate end of dowel latanya to place rings. Pt able to verbally ID 4 colors w/o difficulty. Pt completed x3 reps leaning to R and bearing weight through elbow > sitting upright w/ assist from PT  Weight Bearing  Weight Bearing Technique: Yes  RUE Weight Bearing: Forearm seated  Response To Weight Bearing Technique: Pt leaned B UEs on chair positioned in front of pt, leaning forward bearing weight through B UEs and LEs to clear buttocks from bed x5 reps     Cognition  Overall Cognitive Status: Exceptions  Arousal/Alertness: Delayed responses to stimuli  Following Commands: Follows one step commands with repetition; Follows one step commands with increased time  Attention Span: Attends with cues to redirect  Memory: (difficult to assess)  Safety Judgement: Decreased awareness of need for assistance;Decreased awareness of need for safety  Insights: Decreased awareness of deficits  Initiation: Requires cues for all  Sequencing: Requires cues for all  Cognition Comment: Pt much more talkative this date, able to string up to 3 words together to respond to questions with extra time      PM Session:  Subjective: Pt met in dept. Agreeable to therapy and seen as COTX w/ PT d/t requiring assist x2 for all fxl transfers and mobility. Reports wants to [place] home and have a. Nenita Crofts Nenita Crofts \" but does not finish sentence. Denied pain.  Appears reeducation to increase function in RUE for assist with ADLs  Long term goals  Time Frame for Long term goals : 3-4 weeks  Long term goal 1: Pt will be mod I with functional transfers  Long term goal 2: Pt will be mod I with functional mobility  Long term goal 3: Pt will be mod I with bed mobility  Long term goal 4: Pt will be mod I with bathing and dressing  Long term goal 5: Pt will be mod I with toileting  Patient Goals   Patient goals : Pt nodded agreement when asked if he wanted to be more independent       Therapy Time   Individual Concurrent Group Co-treatment   Time In       1115   Time Out       1205   Minutes       50        Therapy Time   Individual Co-treatment   Time In  [address] [place] [address]   Time Out  1600   Minutes  [address], OTR/L [address]

## 2021-05-04 NOTE — PATIENT CARE CONFERENCE
[facility]  Inpatient Rehabilitation  Weekly Team Conference Note      Date: 2021  Patient Name:  Clara Ortega    MRN: [id number]  : 1955  Gender: male  Physician: Dr Sweetie Up  Diagnosis: Acute ischemic left LEXA stroke [facility]) [I63.522]    CASE MANAGEMENT  Assessment:  Family's goal is to return home only if he needs minimal assistance as wife works and 80year old mother won't be of help. PHYSICAL THERAPY    Bed mobility  Bridging: (Pt partially bridged hips with assist to position R LE)  Rolling to Left: Moderate assistance, 2 Person assistance  Rolling to Right: Minimal assistance  Supine to Sit: Moderate assistance, 2 Person assistance(mod assist of 2 with  HOB elevated)  Sit to Supine: Dependent/Total, 2 Person assistance  Scooting: Moderate assistance, Contact guard assistance, 2 Person assistance(patient required assistance for placing right LE into hooklying position. able to reach left UE up and pull himself up in the bed with mod assist of PT pulling aureliano pad on right side. OT stabilizing legs.)  Comment: Pt OOB throughout session    Transfers:  Sit to Stand: Maximum Assistance, 2 Person Assistance  Stand to sit: Moderate Assistance, 2 Person Assistance  Bed to Chair: Maximum assistance, 2 Person Assistance(Patient completed transfer W/C<>shower chair with grab bar and W/C>recliner chair pulling on arm rest of recliner (to the left))  Squat Pivot Transfers: 2 Person Assistance, Maximum Assistance(W/C>bed)  Lateral Transfers: Moderate Assistance, 2 Person Assistance(PT demonstrated use of slide board. Board was placed by therapist and  then mod assist x2 to scoot across the board. Patient with more initiation with quad contraction trying to lift to scoot across)  Comment: second slide board trial done wheelchair>recliner in room and patient was mod assist of 2 with continued improvement in quad activation.     WB Status: used EvoApp Plus to stand with good placement of B feet.  Attempted to [place] plus forward to allow pt to take a step, max A to advance R LE, then when pt tried to advance L LE, R LE had significant flexor tone and crossed into adduction behind L LE, max A x 2 to 3 to sit safely in chair due to R LE being far under chair and pt sliding hips forward in chair    Propulsion: Manual  Level: Level Tile  Method: LUE, LLE  Level of Assistance: Minimal assistance  Description/ Details: patient with improved use of left LE to correct his path. He was able to initiate with CGA and intermittent min assist with veering to the right. Min assist to complete a full turn. Patient became constant min assist to maintain straight path secondary to fatigue. He was able to get over threshold with CGA. Distance: 26'                  Assessment: Mr. Joshua Dobbins continues to require two person assist for safe mobilization secondary to tone and weakness. He continues to have reduced activation of bilateral quads with initiation of transferring, but did have improvement with beginning slide board transfers. He requires min assist for dynamic sitting balance at times. Patient was more verbal this AM and able to name colors and give one word answers. He was also able to state his full name with increased time. Patient is well below baseline and will benefit from continued skilled therapy for strengthening, balance training, safety training, and neuromuscular re-education to allow for decreased assistance required. SPEECH THERAPY (intentionally left blank if not actively being seen by this service):  Assessment/Progress: Pt is making continued progress in structured receptive and expressive language tasks. 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 Residual right visual inattention/neglect noted during body scheme; table top/environment and visual language processing tasks.   Pt with improving right oral motor rom but residual coordination deficits. Pt appears to be tolerating current diet without oral pocketing or overt clinical s/s of aspiration. Will continue therapy. Pt will need assist at home due to residual mobility; visual and communication deficits. Will need family ed. Domonique Gil,MS,CCC,SLP [id number]  Speech and Language Pathologist      OCCUPATIONAL THERAPY    ADL  Feeding: Setup(Pt able to eat lunch using right UE in bed with setup)  Grooming: Setup, Verbal cueing(Cues for sequencing one-handed technique for brushing teeth)  UE Bathing: Verbal cueing, Setup, Minimal assistance(Pt used LH sponge to bathe R underarm w/c cues for technique. Assist to bathe rest of R UE)  LE Bathing: Minimal assistance, Setup, Verbal cueing(Pt bathed LEs while seated on shower chair w/ cues for all parts. Used LH sponge to wash feet w/ cues. After shower, stood w/ max A from PT @ GB while OT bathed buttocks)  UE Dressing: Moderate assistance, Verbal cueing, Setup, Increased time to complete  LE Dressing: Maximum assistance, Setup, Verbal cueing, Increased time to complete(A to doff briefs/pants/socks. Pt attempted to use reacher to thread pants over LEs, required A to manage LLE and overall max A to use reacher successfully. Total A for both socks and shoes)  Toileting: Dependent/Total(Pt's purewick catheter was not in place correctly while in bed, so pt was saturated w/ urine at start of session)  Additional Comments: Pt completed shower seated on shower chair w/ assist from PT for sitting balance assist from OT to perform ADLs. Continues to require cues to sequence and bathe all parts, continues w/ decreased right side awareness.  Pt able to perform static sitting on shower chair w/ close SBA/CGA, requires up to min A when reaching forward to reach feet    Bed mobility  Bridging: (Pt partially bridged hips with assist to position R LE)  Rolling to Left: Moderate assistance, 2 Person assistance  Rolling to Right: Minimal assistance  Supine to Sit: Moderate assistance, 2 Person assistance(mod assist of 2 with  HOB elevated)  Sit to Supine: Dependent/Total, 2 Person assistance  Scooting: Moderate assistance, Contact guard assistance, 2 Person assistance(patient required assistance for placing right LE into hooklying position. able to reach left UE up and pull himself up in the bed with mod assist of PT pulling aureliano pad on right side. OT stabilizing legs.)  Comment: Pt OOB throughout session    Functional Transfers: Toilet Transfers  Toilet - Technique: (via jojo stedy)  Equipment Used: Extra wide bedside commode(HD BSC placed over commode)  Toilet Transfer: Maximum assistance, 2 Person assistance  Toilet Transfers Comments: max Ax2 for sit<> jojo stedy, + assist of another to maintain R hand grasp on jojo stedy bar     Shower Transfers  Shower - Transfer From: Wheelchair  Shower - Transfer Type: To and From  Shower - Transfer To: Shower seat with back  Shower - Technique: Stand pivot  Shower Transfers: Maximal assistance, 2 Person assistance  Shower Transfers Comments: use of GB, max cues for sequencing      UE Function:  L UE: WFL  R UE: No active movement, some tone in fingers/hand noted at times    Assessment: Pt tolerated tx session fairly well with increased activity tolerance, increased verbalizations this date. Pt required max Ax2 for sit pivot transfers and sit<>stand transfers. He was able to participate in slide board transfers w/ mod Ax2; showed improved initiation and activation of muscles w/ this technique. Pt sat EOB x20 mins and participated in neuromuscular re-ed tasks, required up to min A for dynamic sitting balance when reaching outside of RANI. Pt continues to be far below baseline and will benefit from continued therapy on ARU to maximize function.         NUTRITION  Most recent weightWeight: 233 lb 11 oz (106 kg)  BMI (Calculated): 29.3   Diet Order: DIET GENERAL; Low Sodium (2 GM)  Dietary Nutrition Supplements: Standard High Calorie Oral Supplement TID  PO Meals Eaten (%): 76 - 100%  Please see nutrition note for details. NURSING  Incontinent of bladder and bowel. Monitor and maintain skin integrity, redness noted coccyx. Family Education: Patient education: CVA, communication needs, safety and fall prevention, self care needs, medications, pain control as needed, skin care and prevention, bladder/bowel program/schedule, diet and swallowing precautions. MEDICAL  Monitoring BP trend  Adjusted bowel regimen    TEAM SUMMARY AND DISCHARGE PLAN  Estimated Length of Stay: tentative DC 5/14/21  Destination: goal is home depending on patient progress and family ability to provide 24 hour assist in the home   · Anticipated Services at Discharge:    [x] OT  [x] PT   [x] SLP    [x] RN   [x] Home Health aide []   Community Resources: _______________________________  Equipment recommendations:  [] Hospital bed [] Tub bench  [] Shower chair [] Hand held shower  [] Raised toilet seat [] Toilet safety frame [] Bedside commode   [] W/C: _____  [] Rolling Walker [] Standard walker [] Gait belt [] cane: _________  [] Sliding board [] Alternate seating/furniture [] O2 [] Hip Kit: _______  [] Life Line [] Other: continue to assess  Factors facilitating achievement of predicted outcomes: Family support, Motivated, Cooperative and Pleasant  Barriers to the achievement of predicted outcomes/Interventions:   Poor initiation (neuro based), poor motor planning, right visual inattention, right significant hemiparesis- strengthening, neuromuscular re-education, balance and coordination training, right regard activities, adaptive equipment, santana techniques and other compensatory strategies for self care and mobility, verbal and visual cues to initiate tasks and sustain attention throughout tasks, cues to attend to right       Interdisciplinary Individualized Plan of Care Review:    · Continue Current Plan of Care:  Yes · Modifications:_____________________________    Special Needs in the Upcoming Week :    [x] Family/Caregiver Education  [] Home visit  []Therapeutic Pass   [] Consults:_______    [] Other;_______    Patient Rehab Team Goals for the Upcoming Week:  1. Pt will complete toilet transfer w/ mod Ax2  2. Complete slide board transfer with one person assist  3. Pt will tolerate current diet without medical complications  4. Pt will communicate basic needs/wants via verbal/gestures with <moderate clarification  5. Pt will attend to right side during varied mobility tasks; reading tasks with set up and moderate assist  6. Patient goals : Pt nodded agreement when asked if he wanted to be more independent         Team Members Present at Conference:  Physician: Dr Agapito Mccoy  : SHERRI Cabrales    Occupational Therapist: Freddie Powers, OTR/L 21   Physical Therapist:Libra Saunders [place], [address]  Speech Therapist: Anna Gil,MS,CCC,SLP [id number]  Speech and Language Pathologist  Nurse: Elias Hodges RN, CRRN  Dietician: Stephen Dooley RD, LD   Linda Momin      I led this team conference and I approve the established interdisciplinary plan of care as documented within the medical record of [address] [place] [address]. MD: Dorina Quijano.  Agapito Mccoy MD 5/5/2021, 11:49 AM

## 2021-05-04 NOTE — PROGRESS NOTES
Physical Therapy  Facility/Department: [address] IP REHAB  Daily Treatment Note  NAME: Oumou Toribio  : 1955  MRN: [id number]    Date of Service: 2021    Discharge Recommendations:  Continue to assess pending progress, Patient would benefit from continued therapy after discharge   PT Equipment Recommendations  Other: Will likely require wc upon D/C. Assessment   Body structures, Functions, Activity limitations: Decreased functional mobility ; Decreased strength;Decreased endurance;Decreased sensation;Decreased balance  Assessment: Mr. Reta Parikh continues to require two person assist for safe mobilization secondary to tone and weakness. He continues to have reduced activation of bilateral quads with initiation of transferring, but did have improvement with beginning slide board transfers. He requires min assist for dynamic sitting balance at times. Patient was more verbal this AM and able to name colors and give one word answers. He was also able to state his full name with increased time. Patient is well below baseline and will benefit from continued skilled therapy for strengthening, balance training, safety training, and neuromuscular re-education to allow for decreased assistance required. Treatment Diagnosis: impaired functional mobility  Specific instructions for Next Treatment: Transfers; practice wc mobility; strengthen RLE as able  Prognosis: Guarded; Fair  PT Education: Functional Mobility Training;General Safety;Home Exercise Program;Disease Specific Education;Plan of Care;Transfer Training  REQUIRES PT FOLLOW UP: Yes  Activity Tolerance  Activity Tolerance: Patient limited by fatigue;Patient limited by cognitive status  Activity Tolerance: Patient with processing difficulties and needing increased time for new activity directions and tactile cues to process     Patient Diagnosis(es): There were no encounter diagnoses.      has a past medical history of Acute MI ([facility] [address] Anxiety, Arthritis, CAD (coronary artery disease), Hypertension, and Influenza A.   has a past surgical history that includes back surgery (N/A, 1989) and Coronary angioplasty with stent (9/13). Social/Functional History  Lives With: Significant other(Sign other Lilliana García))  Type of Home: House  Home Layout: Two level, Able to Live on Main level with bedroom/bathroom, Laundry in basement(1 story with basement.)  Home Access: Stairs to enter without rails(1 step to enter.)  Bathroom Shower/Tub: Tub/Shower unit(Tub Shower main level and basement.)  Bathroom Toilet: Standard  Bathroom Equipment: Grab bars in shower  Bathroom Accessibility: Accessible  Home Equipment: (No DME.)  ADL Assistance: Independent  Homemaking Assistance: (Shared with sign other.)  Homemaking Responsibilities: (shared with significant other prior to admission)  Ambulation Assistance: Independent(Without assist device.)  Transfer Assistance: Independent  Active : Yes  Occupation: Retired  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 \"Need to find something to do. \"    Restrictions  Restrictions/Precautions  Restrictions/Precautions: Fall Risk  Position Activity Restriction  Other position/activity restrictions: R hemiplegia and increased tone RLE, exp. aphasia     Subjective   General  Chart Reviewed: Yes  Additional Pertinent Hx: Pt is 75 y/o M admitted 4/21/21 to ED with stroke like symptoms affecting R side ongoing ~12hours. MRI revealed multifocal acute infarctions in the parasagital left front along distal L LEXA territory with occluded/thrombosed A3 segment of L LEXA. MRI also revealed old infarction in basal ganglia, thalami, R cerebellum. PMHx significant for CAD, Angio w/ stent, MI, lumbar sx, HTN.  Pt is 75 y/o M admitted 4/21/21 to ED with stroke like symptoms affecting R side ongoing ~12hours. MRI revealed multifocal acute infarctions in the parasagital left front along distal L LEXA territory with occluded/thrombosed A3 segment of L LEXA. MRI also revealed old infarction in basal ganglia, thalami, R cerebellum. PMHx significant for CAD, Angio w/ stent, MI, lumbar sx, HTN. Response To Previous Treatment: Patient with no complaints from previous session. Family / Caregiver Present: No  Referring Practitioner: Dr. Donny Womack: patient up in wheelchair at start of session. He reports no pain at this time. Patient was more verbal this date and stating more words, but still no sentences  General Comment  Comments: Patient seen as a co-treatment secondary to requiring 2 skilled persons to safely mobilize for completion of ADL            Objective      Transfers  Sit to Stand: Maximum Assistance;2 Person Assistance  Stand to sit: Moderate Assistance;2 Person Assistance  Squat Pivot Transfers: 2 Person Assistance;Maximum Assistance(W/C>bed)  Lateral Transfers: Moderate Assistance;2 Person Assistance(PT demonstrated use of slide board. Board was placed by therapist and  then mod assist x2 to scoot across the board. Patient with more initiation with quad contraction trying to lift to scoot across)  Comment: second slide board trial done wheelchair>recliner in room and patient was mod assist of 2 with continued improvement in quad activation. Balance  Comments: patient seated at edge of bed and performed lateral side bends with min-mod assist.  Patient with minimal activation when coming up from right side bend. Performed forward trunk flexion to promote anterior weight shift. He was min assist to return to fully upright. Performed reaching tasks with intermittent min assist when reaching further out of RANI. Patient with great difficulty tracking to the right and would have to find an object and follow along with his hand to the end of the object on the right.     Other exercises  Other exercises 1: with patient seated at EOB, Heavy, low green chair was placed in front of patient (but turned around backwards). patient was able to place right UE up on the back of the chair using left UE to assist and intermittent min assist from therapist.  Performed forward weight shift with attempts to clear buttocks from the bed while weight bearing on bilateral forearms on the chair. Patient was max assist of 2 to complete this with minimal activation of left and no activation on right. PT attempted use of vibration with Magic Wand, but still unable to achieve any activation of quad on right LE. Comment: When patient was returned to room, wife and mother were present. PT and OT updated wife on current status and what activities are being worked on in therapy. Wife, Navjot Quinonez, was grateful for the update. PM Session  Patient seen for co-treatment in the PM. Patient again is non-verbal secondary to fatigue. Sit>stand with max assist of 2 with very minimal activation until he is in partially in stance (patient pulling on rail with left hand)  Patient ambulated 16' with martinez rail with max assist of PT at right LE. Patient unable to advance right LE on his own, but does initiate advancement intermittently. He also requires PT to block right knee to prevent buckling and facilitate right hip extension in SLS. OT providing mod assist to min  at trunk assist with second ambulation. Patient fatigues with this activity and requires rest breaks between ambulation. Performed sit<>stand x2 at martinez railing in left hand. Patient was max assist of 2 with minimal activation to initiate mobility. Patient set up with jojo plus with foot plate on. He was able to stand with CGA but right LE withdrew from the platform and he required max assist to weight shift to the left and other therapist to place right foot back onto the platform.    Patient with poor sitting balance initially and required intermittent min assist. Sit>supine with dependent assist of 2. Patient was mod of one and CGA of another to scoot up in the bed. Patient with poor tolerance this PM and reduced verbalization. Safety Device - Type of devices:  [x]  All fall risk precautions in place [x] Bed alarm in place  [x] Call light within reach [] Chair alarm in place [] Positioning belt [x] Gait belt [] Patient at risk for falls [x] Left in bed [] Left in chair [] Telesitter in use [] Sitter present [] Nurse notified []  None          Goals  Short term goals  Time Frame for Short term goals: In 2 weeks pt will perform  Short term goal 1: Bed mobility Mod A  Short term goal 2: Transfers Mod A  Short term goal 3: Propel wc 48' with Mod A  Short term goal 4: Ascend/Descend curb step with LRAD, Mod A  Long term goals  Time Frame for Long term goals : In 4 weeks pt will perform  Long term goal 1: Bed mobility CGA  Long term goal 2: Transfers CGA  Long term goal 3: Propel wc 48' with CGA-SBA  Long term goal 4: Ascend/Descend curb step with LRAD, Min A  Patient Goals   Patient goals : Did not state today (minimally verbal)    Plan    Plan  Times per week: 5-6x  Times per day: Twice a day  Plan weeks: 4 weeks  Specific instructions for Next Treatment: Transfers; practice wc mobility; strengthen RLE as able  Current Treatment Recommendations: Strengthening, ROM, Balance Training, Endurance Training, Functional Mobility Training, Wheelchair Mobility Training, Transfer Training, Gait Training, Stair training, Positioning, Pain Management, Equipment Evaluation, Education, & procurement, Modalities, Patient/Caregiver Education & Training, Safety Education & Training, Home Exercise Program, Neuromuscular Re-education  Safety Devices  Type of devices:  All fall risk precautions in place, Call light within reach, Chair alarm in place, Gait belt, Left in chair  Restraints  Initially in place: No     Therapy Time   Individual Concurrent Group Co-treatment   Time In       1115   Time Out       1205   Minutes       50          Second Session Therapy Time     Individual Co-treatment   Time In  [phone] [id number]   Time Out  1600   Minutes  [place], [id number]

## 2021-05-04 NOTE — PLAN OF CARE
Problem: Falls - Risk of:  Goal: Will remain free from falls  Description: Will remain free from falls  5/3/2021 2241 by Tam Lundberg RN  Outcome: Ongoing  Note: Fall risk assessment completed as charted. Pt is at risk for falls. Safety precautions in place. Call light and pt belongings in reach. Bed in low position. Bed/Chair Alarm on. Nonskid footwear on. All needs met. Pt instructed to call before getting up or out of bed. Pt verbalized understanding. Problem: Skin Integrity:  Goal: Will show no infection signs and symptoms  Description: Will show no infection signs and symptoms  5/3/2021 2241 by Tam Lundberg RN  Outcome: Ongoing  Note: Pt assessed for risk of skin breakdown. Encouraged pt to turn and reposition self while in bed. Skin clean and dry. No new skin breakdown noted. Heels floating. Will continue to assess skin condition each shift. Problem: Pain:  Goal: Pain level will decrease  Description: Pain level will decrease  Outcome: Ongoing  Note: Pt assessed for pain this shift. Pt pain/discomfort is managed with PRN pain medications per md order. Pt able to verbalize pain by using numerical scale. Education provided and documented.

## 2021-05-04 NOTE — PROGRESS NOTES
Patient admitted to rehab s/p CVA. A/A/O x 4, but continues with expressive aphasia. Transfers with [facility] and 2 staff assist. On 2gm NA diet, tolerating well. Medications taken crushed with applesauce. On lovenox for DVT prophylaxis. Skin intact with redness noted at coccyx that is blanchable. On room air. Has been incontinent of bladder. LBM 5/4. Chair/bed alarms in use. Call light and needed items within reach. Continue intentional rounding.

## 2021-05-04 NOTE — PROGRESS NOTES
Department of Physical Medicine & Rehabilitation  Progress Note    Patient Identification:  Naomi Matthew  [id number]  : 1955  Admit date: 2021    Chief Complaint: Acute ischemic left LEXA stroke (HCC)    Subjective:   No acute events overnight. Patient seen this am sitting up in room. Having some loose stools with bowel regimen so will dc scheduled senokot. He denies any other new concerns. Labs reviewed. ROS: No f/c, n/v, cp     Objective:  Patient Vitals for the past 24 hrs:   BP Temp Temp src Pulse Resp SpO2 Weight   21 1046 126/63 97.4 °F (36.3 °C) Oral 58 16 96 % --   21 0524 (!) 144/81 97.6 °F (36.4 °C) Oral 65 17 94 % 233 lb 11 oz (106 kg)   21 0033 134/73 97.4 °F (36.3 °C) Oral 61 16 92 % --   21 1913 129/75 97.8 °F (36.6 °C) Oral 59 16 93 % --   21 1645 125/72 97.6 °F (36.4 °C) Oral 75 16 93 % --     Const: Alert. No distress, pleasant. HEENT: Normocephalic, atraumatic. Normal sclera/conjunctiva. MMM. CV: Regular rate and rhythm. Resp: No respiratory distress. Lungs decreased at bases (decreased inspiratory effort)  Abd: Soft, nontender, nondistended, NABS+   Ext: trace edema. Neuro: Alert, oriented, delayed processing. Expressive and receptive language deficits but able to respond if given enough time. Right hemiparesis overall 1/   Psych: Cooperative, appropriate mood and affect    Laboratory data: Available via EMR. Last 24 hour lab  No results found for this or any previous visit (from the past 24 hour(s)). Therapy progress:  PT  Position Activity Restriction  Other position/activity restrictions: R hemiplegia and increased tone RLE, exp.  aphasia  Objective     Sit to Stand: Maximum Assistance, 2 Person Assistance(with use of grab bar on left or in front of him)  Stand to sit: Moderate Assistance, 2 Person Assistance(two person assist with difficulty flexing at hips as he sat back into the chair)  Bed to Chair: Maximum assistance, 2

## 2021-05-05 PROCEDURE — 97530 THERAPEUTIC ACTIVITIES: CPT

## 2021-05-05 PROCEDURE — 97129 THER IVNTJ 1ST 15 MIN: CPT

## 2021-05-05 PROCEDURE — 97535 SELF CARE MNGMENT TRAINING: CPT

## 2021-05-05 PROCEDURE — 97116 GAIT TRAINING THERAPY: CPT

## 2021-05-05 PROCEDURE — 97542 WHEELCHAIR MNGMENT TRAINING: CPT

## 2021-05-05 PROCEDURE — 1280000000 HC REHAB R&B

## 2021-05-05 PROCEDURE — 6370000000 HC RX 637 (ALT 250 FOR IP): Performed by: PHYSICAL MEDICINE & REHABILITATION

## 2021-05-05 PROCEDURE — 97110 THERAPEUTIC EXERCISES: CPT

## 2021-05-05 PROCEDURE — 92507 TX SP LANG VOICE COMM INDIV: CPT

## 2021-05-05 PROCEDURE — 94761 N-INVAS EAR/PLS OXIMETRY MLT: CPT

## 2021-05-05 PROCEDURE — 6360000002 HC RX W HCPCS: Performed by: PHYSICAL MEDICINE & REHABILITATION

## 2021-05-05 RX ADMIN — FLUTICASONE PROPIONATE 1 SPRAY: 50 SPRAY, METERED NASAL at 11:45

## 2021-05-05 RX ADMIN — BACLOFEN 5 MG: 10 TABLET ORAL at 11:07

## 2021-05-05 RX ADMIN — ATENOLOL 50 MG: 50 TABLET ORAL at 10:24

## 2021-05-05 RX ADMIN — TRAZODONE HYDROCHLORIDE 50 MG: 50 TABLET ORAL at 19:18

## 2021-05-05 RX ADMIN — ASPIRIN 81 MG: 81 TABLET, COATED ORAL at 10:22

## 2021-05-05 RX ADMIN — TAMSULOSIN HYDROCHLORIDE 0.4 MG: 0.4 CAPSULE ORAL at 10:26

## 2021-05-05 RX ADMIN — BACLOFEN 5 MG: 10 TABLET ORAL at 15:15

## 2021-05-05 RX ADMIN — SERTRALINE 150 MG: 100 TABLET, FILM COATED ORAL at 10:26

## 2021-05-05 RX ADMIN — HYDROCHLOROTHIAZIDE 25 MG: 25 TABLET ORAL at 10:23

## 2021-05-05 RX ADMIN — POTASSIUM BICARBONATE 20 MEQ: 782 TABLET, EFFERVESCENT ORAL at 10:23

## 2021-05-05 RX ADMIN — ISOSORBIDE MONONITRATE 30 MG: 30 TABLET, EXTENDED RELEASE ORAL at 10:25

## 2021-05-05 RX ADMIN — LISINOPRIL 20 MG: 20 TABLET ORAL at 10:23

## 2021-05-05 RX ADMIN — ENOXAPARIN SODIUM 40 MG: 40 INJECTION SUBCUTANEOUS at 10:31

## 2021-05-05 RX ADMIN — CLOPIDOGREL BISULFATE 75 MG: 75 TABLET ORAL at 10:26

## 2021-05-05 RX ADMIN — ATORVASTATIN CALCIUM 80 MG: 80 TABLET, FILM COATED ORAL at 19:18

## 2021-05-05 RX ADMIN — BACLOFEN 5 MG: 10 TABLET ORAL at 19:19

## 2021-05-05 ASSESSMENT — PAIN SCALES - GENERAL
PAINLEVEL_OUTOF10: 0

## 2021-05-05 NOTE — PROGRESS NOTES
Occupational Therapy  Facility/Department: [address] [facility]  Daily Treatment Note  NAME: Oumou Toribio  : 1955  MRN: [id number]    Date of Service: 2021    Discharge Recommendations:  Continue to assess pending progress, Patient would benefit from continued therapy after discharge, 24 hour supervision or assist  OT Equipment Recommendations  Other: continue to assess    Assessment   Performance deficits / Impairments: Decreased functional mobility ; Decreased strength;Decreased endurance;Decreased ADL status; Decreased high-level IADLs;Decreased balance;Decreased ROM; Decreased sensation;Decreased coordination;Decreased fine motor control  Assessment: Pt tolerated tx session well w/ improved activity tolerance and engagement in therapy. Pt initially required max Ax2 for stand pivot transfers d/t increased R UE/R LE tone, however improved to mod Ax2 for stand pivot and sit<>stand transfers by end of session w/ R knee blocked and positioned. Pt required min A for UB and LB bathing, but required setup for all parts and cues throughout for initiation/sequencing tasks appropriately. Pt completed UB dressing mod A, LB dressing max A, seated grooming w/ setup + vc's throughout. Pt also demo'd improved command following, and able to string together 3-4 words at a time to communicate w/ therapists. Cont per POC w/ cotx 2x/day to maximize function. Prognosis: Good  OT Education: OT Role;Plan of Care;Precautions;Transfer Training;Equipment;ADL Adaptive Strategies  Barriers to Learning: exp aphasia  REQUIRES OT FOLLOW UP: Yes  Activity Tolerance  Activity Tolerance: Patient Tolerated treatment well;Patient limited by fatigue  Activity Tolerance: Fatigued by end of session  Safety Devices  Safety Devices in place: Yes  Type of devices: Gait belt;Left in chair;Nurse notified(seated in w/c in care of RN)         Patient Diagnosis(es): There were no encounter diagnoses.       has a past medical history of Acute MI [facility]), Anxiety, Arthritis, CAD (coronary artery disease), Hypertension, and Influenza A.   has a past surgical history that includes back surgery (N/A, 1989) and Coronary angioplasty with stent (9/13). Restrictions  Restrictions/Precautions  Restrictions/Precautions: Fall Risk  Position Activity Restriction  Other position/activity restrictions: R hemiplegia and increased tone RLE, exp. aphasia  Subjective   General  Chart Reviewed: Yes  Patient assessed for rehabilitation services?: Yes  Additional Pertinent Hx: Per Dr. Owens Delio is a 75 yo M with pmh CAD s/p stent, HTN, OA, Anxiety, former tobacco use who initially presented 4/21/2021 with right side weakness and aphasia. He was outside TPA window. Imaging revealed acute ischemic infarcts in the left LEXA territory with thrombosis in A3 segment. Neurology consulted. Patient is being treated with DAPT (for 30 days then monotherapy) and statin. Course complicated by HTN. Currently, patient reports ongoing right side weakness and difficult with speech. He denies headache, vision changes, tingling/numbness. He is motivated to start inpatient rehab program.\"  Family / Caregiver Present: No  Referring Practitioner: Dr Marcia Bradley  Diagnosis: CVA- parasagital left frontal lobe  Subjective  Subjective: Pt met b/s. Agreeable to shower. Pt denied pain. Pt much more talkative today, able to put together 3-4 word phrases at a time and attempting to discuss events of last night      Orientation  Orientation  Overall Orientation Status: Within Functional Limits  Objective    ADL  Equipment Provided: Reacher;Long-handled sponge  Grooming: Setup;Verbal cueing; Increased time to complete(Cues for sequencing oral care)  UE Bathing: Verbal cueing;Setup;Minimal assistance; Increased time to complete(Assist to bathe L UE, cues for sequencing. Pt tends to perseverate on washing face)  LE Bathing: Minimal assistance;Setup;Verbal cueing; Increased time to complete(Assist to bathe posterior periarea while pt stood w/ max A from PT after shower. Pt completed all else seated on shower chair, LH sponge for feet. Cues for sequencing)  UE Dressing: Moderate assistance;Verbal cueing;Setup; Increased time to complete(Cues for sequencing all parts, pt able to assist doffing shirt overhead, then w/ threading L UE and pulling overhead)  LE Dressing: Maximum assistance;Setup;Verbal cueing; Increased time to complete  Toileting: (Declined need)  Additional Comments: Pt completed shower seated on shower chair w/ assist from PT for sitting balance assist from OT to perform ADLs. Pt requires cues throughout for initiation/sequencing of tasks, still perseverates on washing face and requires cues to attend to right side. Pt able to perform static sitting on shower chair w/ close SBA/CGA        Balance  Sitting Balance: Contact guard assistance(SBA/CGA)  Standing Balance: Maximum assistance(mod/max Ax2)  Standing Balance  Time: ~30 seconds x3  Activity: Use of grab bar vs bed rail for LB ADL completion  Comment: Required initial mod/max Ax2, but once in position and w/ R LE blocked, pt able to stand w/ mod Ax1  Functional Mobility  Functional - Mobility Device: Wheelchair  Activity: To/from bathroom  Assist Level: Dependent/Total  Functional Mobility Comments: Therapists managed w/c during session to conserve energy for ADL  Shower Transfers  Shower - Transfer From: Wheelchair  Shower - Transfer Type: To and From  Shower - Transfer To: Shower seat with back  Shower - Technique: Stand pivot  Shower Transfers: Moderate assistance;2 Person assistance  Shower Transfers Comments: use of GB, max cues for sequencing. R LE blocked  Wheelchair Bed Transfers  Wheelchair/Bed - Technique: Stand pivot  Equipment Used: Wheelchair;Bed  Level of Asssistance:  Moderate assistance;2 Person assistance;Maximum assistance  Wheelchair Transfers Comments: Initially max Ax2 for SPT from bed > w/c d/t pt's R LE pulling back during transfer (had not yet recieved Baclophen). Pt progressed to mod Ax2 from w/c <> shower chair by end of session w/ R LE blocked  Bed mobility  Supine to Sit: Moderate assistance;2 Person assistance  Scooting: Moderate assistance;Contact guard assistance;2 Person assistance  Transfers  Sit to stand: Maximum assistance;2 Person assistance; Moderate assistance  Stand to sit: Moderate assistance;2 Person assistance  Transfer Comments: max Ax2 progressing to mod Ax2                       Cognition  Overall Cognitive Status: Exceptions  Arousal/Alertness: Delayed responses to stimuli  Following Commands: Follows one step commands with repetition; Follows one step commands with increased time  Attention Span: Attends with cues to redirect  Memory: (difficult to assess)  Safety Judgement: Decreased awareness of need for assistance;Decreased awareness of need for safety  Insights: Decreased awareness of deficits  Initiation: Requires cues for all  Sequencing: Requires cues for all  Cognition Comment: Pt much more talkative and engaged in therapy this morning, began to discuss events of last night and able to string together 3-4 words at a time w/ extra time      PM Session:  Subjective: Pt met in dept. Agreeable to therapy, denied pain    Objective:  Pt dependent to manage w/c parts and position for all transfers throughout session. Sit>stand to hallway railing w/ max Ax2. Pt continues to demo difficulty initiating, even requires cues to push through L LE to assist w/ standing. Pt ambulated @ hallway railing x44 feet w/ max A from PT to facilitate R LE movement. Pt initially required min A/CGA for balance/trunk control from OT however required mod A by end d/t fatigue. Pt required cues throughout for upright posture and for sequencing. Pt performed w/c mobility on therapy terrace using L UE and L LE to propel w/c w/ SBA/CGA. Pt required mod vc's to avoid objects on the right.     Pt completed sit>stand to railing w/ max Ax2, max cues for initiation and sequencing. Pt stood x1.5-2 mins @ railing w/ min/mod Ax2. Pt weight shifted to both sides, completed toe tapping w/ L foot. Returned to room for toileting. Pt completed sit>stand to grab bar w/ max Ax2. Pt stood @ GB w/ Max A from PT and total A from OT to pull down briefs and pants. Pt was incontinent of stool in briefs and reports he was unaware. Pivot to commode w/ max Ax2. Pt sat on commode, OT offered urinal but pt did not void. Dependent to change strap-on briefs seated on commode. Sit>stand w/ max Ax2 while pulling on GB. Max A from PT for standing balance and total A from OT to pull up briefs/pants. Pivot to w/c w/ max Ax2. Transported to therapy dept in w/c, handoff with [facility] [place], [address] Assessment: Pt tolerated tx session fair though appeared more fatigued in PM. He required max Ax2 for all fxl transfers as he continues to demo decreased ability to initiate and activate B LE muscles for sit>stand.  Cont per POC      Plan   Plan  Times per week: 5-6  Times per day: Twice a day  Plan weeks: 3-4  Current Treatment Recommendations: Strengthening, Endurance Training, Balance Training, ROM, Functional Mobility Training, Safety Education & Training, Gait Training, Self-Care / ADL, Patient/Caregiver Education & Training, Neuromuscular Re-education, Home Management Training, Equipment Evaluation, Education, & procurement    Goals  Short term goals  Time Frame for Short term goals: 1 week  Short term goal 1: Pt will be min A with functional transfers  Short term goal 2: Pt will be min A with functional mobility  Short term goal 3: Pt will be min A with bed mobility  Short term goal 4: Pt will be mod A with bathing and dressing  Short term goal 5: Pt will tolerate UE neuromuscular reeducation to increase function in RUE for assist with ADLs  Long term goals  Time Frame for Long term goals : 3-4 weeks  Long term goal 1: Pt will be mod I with functional transfers  Long

## 2021-05-05 NOTE — PLAN OF CARE
Problem: Falls - Risk of:  Goal: Will remain free from falls  Description: Will remain free from falls  5/5/2021 0212 by Madisyn Santiago RN  Outcome: Ongoing   Patient remained free of any falls this shift. Call light in reach at all times. Non skid footwear on. Patient encouraged to call for help when needed. Room free of clutter. Alarms on. Problem: HEMODYNAMIC STATUS  Goal: Patient has stable vital signs and fluid balance  Outcome: Ongoing     Problem: Skin Integrity:  Goal: Will show no infection signs and symptoms  Description: Will show no infection signs and symptoms  Outcome: Ongoing   Patient is able to shift weight without assistance. Reposition every two hours. Skin assessed every shift. No new area of breakdown. Skin warm and dry to touch. Waffle cushion in chair. Problem: Pain:  Goal: Pain level will decrease  Description: Pain level will decrease  Outcome: Ongoing   Pain assessed using 0-10 scale. Offer PRN medication as ordered by MD. Jessie Prather 30 minutes after giving.

## 2021-05-05 NOTE — PROGRESS NOTES
Anxiety, Arthritis, CAD (coronary artery disease), Hypertension, and Influenza A.   has a past surgical history that includes back surgery (N/A, 1989) and Coronary angioplasty with stent (9/13). Restrictions  Restrictions/Precautions  Restrictions/Precautions: Fall Risk  Position Activity Restriction  Other position/activity restrictions: R hemiplegia and increased tone RLE, exp. aphasia  Subjective   General  Chart Reviewed: Yes  Additional Pertinent Hx: Pt is 77 y/o M admitted 4/21/21 to ED with stroke like symptoms affecting R side ongoing ~12hours. MRI revealed multifocal acute infarctions in the parasagital left front along distal L LEXA territory with occluded/thrombosed A3 segment of L LEXA. MRI also revealed old infarction in basal ganglia, thalami, R cerebellum. PMHx significant for CAD, Angio w/ stent, MI, lumbar sx, HTN. Pt is 77 y/o M admitted 4/21/21 to ED with stroke like symptoms affecting R side ongoing ~12hours. MRI revealed multifocal acute infarctions in the parasagital left front along distal L LEXA territory with occluded/thrombosed A3 segment of L LEXA. MRI also revealed old infarction in basal ganglia, thalami, R cerebellum. PMHx significant for CAD, Angio w/ stent, MI, lumbar sx, HTN. Response To Previous Treatment: Patient with no complaints from previous session.   Family / Caregiver Present: No  Referring Practitioner: Dr. Nina Gordon: Pt in bed at start of session, agrees to co-treat for shower, pt talking a little better this morning and able to find appropriate words with extra time  General Comment  Comments: Patient seen as a co-treatment secondary to requiring 2 skilled persons to safely mobilize for completion of ADL  Pain Screening  Patient Currently in Pain: Denies  Vital Signs  Patient Currently in Pain: Denies       Orientation  Orientation  Overall Orientation Status: Within Functional Limits(extra time required)  Cognition      Objective   Bed mobility  Supine to Sit: Moderate assistance;2 Person assistance  Scooting: Moderate assistance;Contact guard assistance;2 Person assistance  Transfers  Sit to Stand: Moderate Assistance;2 Person Assistance  Stand to sit: Moderate Assistance;2 Person Assistance  Stand Pivot Transfers: Moderate Assistance;2 Person Assistance;Maximum Assistance  Comment: Pt performed 3 trasnfers and 3 stands for dressing: first pivot transfer was max A x 2 due to pt's R LE curling under him with increased tone, R UE with increased tone this date also, pt has not had his baclofen yet and RN in at end of asession to administer meds        Balance  Posture: Good  Sitting - Static: Good  Sitting - Dynamic: Good;-  Standing - Static: Fair;+  Standing - Dynamic: Fair;+  Comments: pt able to stand x 3 today with mod A x 2 to achieve standing but then mod A x 1 to maintain while second therapist assisted with clothing management or placing lift pad in chair    Comment: shower, dressing, brushing teeth: see OT note for levels of assist     Goals  Short term goals  Time Frame for Short term goals: In 2 weeks pt will perform  Short term goal 1: Bed mobility Mod A  Short term goal 2: Transfers Mod A  Short term goal 3: Propel wc 48' with Mod A  Short term goal 4: Ascend/Descend curb step with LRAD, Mod A  Long term goals  Time Frame for Long term goals :  In 4 weeks pt will perform  Long term goal 1: Bed mobility CGA  Long term goal 2: Transfers CGA  Long term goal 3: Propel wc 48' with CGA-SBA  Long term goal 4: Ascend/Descend curb step with LRAD, Min A  Patient Goals   Patient goals : Did not state today (minimally verbal)    Plan    Plan  Times per week: 5-6x  Times per day: Twice a day  Plan weeks: 4 weeks  Specific instructions for Next Treatment: Transfers; practice wc mobility; strengthen RLE as able  Current Treatment Recommendations: Strengthening, ROM, Balance Training, Endurance Training, Functional Mobility Training, Wheelchair Mobility Training, Transfer Training, Gait Training, Stair training, Positioning, Pain Management, Equipment Evaluation, Education, & procurement, Modalities, Patient/Caregiver Education & Training, Safety Education & Training, Home Exercise Program, Neuromuscular Re-education  Safety Devices  Type of devices: All fall risk precautions in place, Gait belt(left with RN who was administering meds and then pt transported to therapy department for speech therapy)  Restraints  Initially in place: No     Therapy Time   Individual Concurrent Group Co-treatment   Time In       900   Time Out       1025   Minutes       85   Timed Code Treatment Minutes: [address], [address]     PM session: co-treat with OT for safety and increased mobility. Sit to stand at martinez rail with rail on the L, max A x 2 to initiate stand, pt able to participate about half way through transfer. And 40' with martinez rail on L, max A x 1 to advance R foot and maintain R hip and knee extension in stance phase, assist with weight shift. Min A of another progressing to mod A as pt fatigued for trunk control and assist to weight shift. Pt needed moderate cues to stand as tall as possible. Pt taken outside on therapy patio and propelled w/c on uneven surfaces and around 3 corners with SBA, came very close to several objects on the R but did not run into anything. Stood at rail with max A to transfer again due to lack of initiation. Once standing pt was min to mod A x 2, weight shift side to side 5 times, weight shift to R while tapping L toes 5 times, and mini squats with pt able to assist with R hip and knee extension 5 times. Returned to room for toileting: stand pivot transfer to L with grab bar max A x 2, stood with mod A x 1 while another performed toileting max A. Pivot transfer to R max A x 2. Returned to speech therapy session.   Assessment: pt standing and balance is improving, pt increased tolerance to activity allows

## 2021-05-05 NOTE — PROGRESS NOTES
Speech Language Pathology  ACUTE REHAB UNIT  SPEECH/LANGUAGE PATHOLOGY      [x] Daily  [] Weekly Care Conference Note  [] Discharge    Patient:Carl Wen      CEJ:[birth year]  ATF:[id number]  Rehab Dx/Hx: Acute ischemic left LEXA stroke (HCC) [I63.522]    Precautions: FAll risk; Cognitive-Communication deficits  Home situation: Lives with significant other  ST Dx: [x] Aphasia  [] Dysarthria  [] Apraxia   [x] Oropharyngeal dysphagia [x] Cognitive   Impairment  [] Other:   Initial Speech Therapy Assessment Diagnosis:   1. Cognitive Diagnosis: Mild cognitive linguistic impairment for routine daily functional needs. Ongoing assessment of complex, high level and abstract PS/reasoning, as well as executive functioning, thought organization and planning is recommended as patient's processing and initation improve. Pt was independent with ADLs and IADLs PTA. Given signifcant extra time for processing and intiaotn, basic cognition appears intact in areas of orientation, sustained and selective, and simple/concrete PS/reasoning. Mildly decreased recall for daily events/information and moderately redcued recall for novel informaiton noted this date. Aforemnetioned delayed thought processing and initiation for expression is a barrier to demonstration of and exectuion for congitive-language skills/tasks. 2.  Aphasia Diagnosis: Given extra time for processing and initaiton, patient appears to present with mildly impaired auditory comprehension for complex and abstract information processing. Basic and concrete auditory/visual language comprehension appears intact. Mild to moderately impaired verbal expression for complex concepts requiring increased time to formulate and generate responses. 3.  Speech Diagnosis: Mildly impaired motor speech skills characterized by minimal to mild decreased verbal agility affecting speech fluency without impact on speech intelligibility at sentence level  4.   Dysphagia Diagnosis: Mild oropharyngeal dysphagia characterized by slow but effective mastication, right sided perioral weakness, reduced lingual coordination, reduced AP transit, potential swallow delay and mildly reduced laryngeal elevation. No immediate or delayed overt s/s of aspiration; trace oral residue is cleared by pt. Recommend regular textures and thin liquids with ongoing monitor to confirm tolerance.   Date of Admit: 4/23/2021  Room #: U4J-5138/7194-41  Date: 5/5/2021       Current functional status (updated daily):         Current Diet Order:DIET GENERAL; Low Sodium (2 GM)  Dietary Nutrition Supplements: Standard High Calorie Oral Supplement   Behavior: [x] Alert  [x] Cooperative  [x]  Pleasant  [] Confused  [] Agitated  [] Uncooperative  [] Distractible [] Motivated  [] Self-Limiting [] Anxious  [] Other:  Endurance:  [] Adequate for participation in SLP sessions  [x] Reduced overall  [] Lethargic  [] Other:  Safety: [] No concerns at this time  [x] Reduced insight into deficits  []  Reduced safety awareness [] Not following call light procedures  [] Unable to Assess  [x] Other:ongoing assessment as communication skills improve  Swallowing Precautions: Upright as possible for all oral intake, Remain upright for 30-45 minutes after meals  Barriers toward progress: none unless medical issues and caregiver assist           Date: 5/5/2021      Tx session 1 Tx session 2   Total Timed Code Min SLP Individual Minutes  Time In: 0201  Time Out: 1120  Minutes: 45  Coded treatment time  20 SLP Individual Minutes  Time In: 1430  Time Out: 1510  Minutes: 40   Coded treatment time 15   Group Treatment Minutes 0 0   Co-Treat Minutes 0 0   Variance/Reason:  n/a n/a   Pain n/a denied   Pain Intervention [] RN notified  [] Repositioned  [] Intervention offered and patient declined  [] N/A  [] Other:  [] RN notified  [] Repositioned  [] Intervention offered and patient declined  [] N/A  [] Other:   Subjective     Went to pt room where [facility] was giving meds  Pt was in w/c and receptive to therapy  SLP unable to locate pt's glasses  Discussed with RN and pt and OT   Pt did use SLP reading glasses supply temporarily until his glasses are located Pt seen in treatment office  Pt was alert at onset of session but then increasing lethargy in last 5-10 minutes of session   Objective:  Goals       Dysphagia goals: Pt did not identify a goal. Team goal is for safe toleration of po diet    therapy working toward pt goal n/a   1. The patient will tolerate regular food with thin liquid diet without observed clinical signs of aspiration,  Not targeted Not targeted   2. The patient will improve oral preparation phase via bolus control/manipulation exercises to 5/5 each trial., . Not targeted Not targeted   3. The patient/caregiver will demonstrate understanding of compensatory strategies for improved swallowing safety Not targeted   Not targeted   Short-term Goals  Goal 1: Pt will demonstrate improved recall of new learning strategies; daily therapy activities and new learning information with set up, use of memory strategies and minimal assist   Recall for right visual attention  · Table top for manipulation of playing cards: set up and mild assist  · Table top for manipulation of money (coins in a field of 15 and 30): in both instances external cues for right of midline. Compensatory strategy introduced; external cues to utilize  Right visual attention for telling time:   · Digital clocks: >94%  · Analog clocks: 85% Free Recall  · Recall of noon meal items: \"uneventful\"  · Recall of therapy activities: \"eventful\"    Prompted recall via Y/N? With confirmation : (noon meal items; therapy activities): 50%   Goal 2: Pt will improve oral and/or verbal agility and ROM for coordinated and alternating motions to < mild impairment. Targeted indirectly   Continued to targeted indirectly during verbal language expression tasks.     Goal 3: Pt will demonstrate improved word retrieval for varied naming tasks and concrete concept explanation tasks with <mild assist Verbal expression;   Verbal Y/N ? Verbal responses:    ·  no data taken      CFN varied money denominations on confrontation: 75%    Word level:   · FC?: 75%  Phrase level  · FC?: delays 50%     Word level:   · CFN (objects; locations; occupations; food/beverages): 93%  · Responsive namin%  Word/phrase level  · F/C ?: quite variable in the session. Targeted in a structured task and then targeted for clarification. · Generative listing for concrete ADLs: continues to require moderate to max prompting                   Goal 4. Pt will demonstrate  Improved word retrieval and thought organization for multiple word utterances via picture description; open ended Wh? With <max promptins Word retrieval and thought organization for phrase /sentence formulation for:   ·  Answering general open ended Wh? ; regarding money amounts: quite variable delayed rate of responses and accuracy of money amounts    Imitative at phrase/sentence level : targeted indirectly via FC questions   [facility]? Response regarding pictured stimuli:  limited to 1-3 word utterances  · Assisted via use of F/C ? [facility] format       Goal 5 : Pt will convey needs and wants via verbal; pointing/gestures with <moderate clarification Continues to be limited to verbal Y/N responses and/or FC ? Responses and occasional spontaneous responses     Pt is not initiating requests for assist or wants Pt not requesting assist when having difficulty responding    Pt not requesting repetition when distracted (ie can be distracted by physical discomfort; visual stimuli)   Goal 6.  Pt will demonstrate improved auditory and visual language processing of multiple unit Y/N?; Wh? And sentence length information with mild assist 1 step commands for manipulation of playing cards (sequential and concrete): >90%    1 step commands for manipulation of money <3$: 0 to moderate assist Auditory processing :  · 3 -4 unit  concrete [address]? (regarding pictured stimuli): continued . Slow rate of processing and variable need for repetition and/or re-direct. 1 step psychomotor commands: not targeted directly; targeted for task instructions       Goal 7: Pt will participate with ongoing assessment of EF, planning, sequencing and complex reasoning with additional goals PRN     Persistent deficits initiation and carryover of learned techniques within the task and task to task  · Ie: right scanning: see above for working [employer] amounts: see above. Confusion/verbal perseveration noted and impacted         Pt not able to use gestured function to compensate for verbal deficits. Pt not using pictured stimuli to compensate via pointing to details etc.    Pt can be distractible by physical discomfort or visual stimuli which can impact attention and working memory     Other areas targeted:     Education:   Ongoing pt ed Ongoing pt ed   Safety Devices: [] Call light within reach  [] Chair alarm activated  [] Bed alarm activated  [x] Other: transport returned pt to room [x] Call light within reach  [x] Chair alarm activated  [] Bed alarm activated  [x] Other: visitor at his side   Progress Assessment: 4/26/2021: Pt with increase receptive and expressive language deficits with concern for potential oral motor /verbal dyspraxia; pt unable to participate in executive function . Unclear if due to fatigue (this SLP session was the last of the day)  4/27/2021: Alerted PCA and RN due to pt having a large amount of sputum/phlegm in his mouth upon SLP arrival to room. Pt with another episode of cough with expectoration of large amount of phlegm. Will check acute floor documentation for any dysphagia instrumentals. PO sample size small and no overt clinical s/s at the bedside. Will discuss at team meeting.  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 Plan: Continue as per plan of care. Continued Tx Upon Discharge: ?    [x] Yes [] No [] TBD based on progress while on ARU [] Vital Stim indicated [] Other:   Estimated discharge date: TBD   Discharge recommendations:   [] Home independently  [x] Home with assistance [x]  24 hour supervision  [] ECF [] Other:     Additional information:     Interventions used during Rehab Stay:  [x] Speech/Language Treatment  [] Instruction in HEP [] Group [x] Dysphagia Treatment [x] Cognitive Treatment   [] Other:    Adverse Reactions to Treatment/Significant Change in Status: n/a    Electronically Signed by    Gurpreet Myrick. Jeffery,MS,CCC,SLP [id number]  Speech and Language Pathologist

## 2021-05-05 NOTE — PROGRESS NOTES
Nutrition Assessment     Type and Reason for Visit: Reassess    Nutrition Recommendations/Plan:   - Continue low sodium diet  - Continue Ensure Enlive TID     Nutrition Assessment:  Follow-up. Pt is tolerating regular diet per SLP during care conference today. Continues on low sodium diet. Intakes have increased since last assessment to %. Pt does well with finger foods but sometimes struggles with utensils. Receiving Ensure Enlive three times daily although unable to assess intakes at this time. Will continue to monitor. Malnutrition Assessment:  Malnutrition Status: At risk for malnutrition (Comment)    Estimated Daily Nutrient Needs:  Energy (kcal): 1342-8368 kcal (20-22 kcal/kg ABW); Weight Used for Energy Requirements:  Current     Protein (g):  gm (0.8-1 gm/kg ABW); Weight Used for Protein Requirements:  Current        Fluid (ml/day):1 ml/kcal      Nutrition Related Findings: Oriented to person and place. Active BS. Trace RUE edema. Trace RLE edema. Current Nutrition Therapies:    DIET GENERAL; Low Sodium (2 GM)  Dietary Nutrition Supplements: Standard High Calorie Oral Supplement    Anthropometric Measures:  · Height: 6' 3\" (190.5 cm)  · Current Body Wt: 235 lb (106.6 kg)   · BMI: 29.4    Nutrition Diagnosis:   No nutrition diagnosis at this time    Nutrition Interventions:   Food and/or Nutrient Delivery:  Continue Current Diet, Continue Oral Nutrition Supplement  Nutrition Education/Counseling:  Education not indicated   Coordination of Nutrition Care:  Continue to monitor while inpatient    Goals: Tolerate diet and consume greater than 50% of meals and supplements this admission       Nutrition Monitoring and Evaluation:   Behavioral-Environmental Outcomes:  None Identified   Food/Nutrient Intake Outcomes:  Food and Nutrient Intake, Supplement Intake  Physical Signs/Symptoms Outcomes:  Weight, Skin, Meal Time Behavior     Discharge Planning:     Too soon to determine Electronically signed by Ida Peterson RD, LD on 5/5/21 at 10:57 AM EDT    Contact: [phone]

## 2021-05-05 NOTE — PLAN OF CARE
Problem: Falls - Risk of:  Goal: Will remain free from falls  Description: Will remain free from falls  Outcome: Ongoing     Problem: HEMODYNAMIC STATUS  Goal: Patient has stable vital signs and fluid balance  Outcome: Ongoing     Problem: ACTIVITY INTOLERANCE/IMPAIRED MOBILITY  Goal: Mobility/activity is maintained at optimum level for patient  Outcome: Ongoing     Problem: COMMUNICATION IMPAIRMENT  Goal: Ability to express needs and understand communication  Outcome: Ongoing     Problem: Skin Integrity:  Goal: Will show no infection signs and symptoms  Description: Will show no infection signs and symptoms  Outcome: Ongoing

## 2021-05-05 NOTE — PROGRESS NOTES
Department of Physical Medicine & Rehabilitation  Progress Note    Patient Identification:  Dahiana Bhat  [id number]  : 1955  Admit date: 2021    Chief Complaint: Acute ischemic left LEXA stroke (HCC)    Subjective:   No acute events overnight. Patient seen this am sitting up in room. No loose stools today. Denies new concerns. Labs reviewed. ROS: No f/c, n/v, cp     Objective:  Patient Vitals for the past 24 hrs:   BP Temp Temp src Pulse Resp SpO2 Height Weight   21 1053 -- -- -- -- -- -- 6' 3\" (1.905 m) --   21 1022 (!) 157/75 -- -- 60 -- -- -- --   21 0812 -- -- -- -- -- 95 % -- --   21 0515 (!) 158/77 97.7 °F (36.5 °C) Oral 58 16 95 % -- 235 lb 10.8 oz (106.9 kg)   21 0018 115/72 97.4 °F (36.3 °C) Oral 51 16 93 % -- --   21 2048 123/73 98.1 °F (36.7 °C) Oral 56 16 92 % -- --   21 1446 106/70 97.6 °F (36.4 °C) Oral 57 16 96 % -- --   21 1315 102/64 98 °F (36.7 °C) Oral 60 18 -- -- --   21 1257 -- -- -- -- -- 96 % -- --     Const: Alert. No distress, pleasant. HEENT: Normocephalic, atraumatic. Normal sclera/conjunctiva. MMM. CV: Regular rate and rhythm. Resp: No respiratory distress. Lungs decreased at bases (decreased inspiratory effort)  Abd: Soft, nontender, nondistended, NABS+   Ext: trace edema. Neuro: Alert, oriented, delayed processing. Expressive and receptive language deficits but able to respond if given enough time. Right hemiparesis overall 1/5   Psych: Cooperative, appropriate mood and affect    Laboratory data: Available via EMR. Last 24 hour lab  No results found for this or any previous visit (from the past 24 hour(s)). Therapy progress:  PT  Position Activity Restriction  Other position/activity restrictions: R hemiplegia and increased tone RLE, exp. aphasia  Objective     Sit to Stand:  Moderate Assistance, 2 Person Assistance  Stand to sit: Moderate Assistance, 2 Person Assistance  Bed to Chair: Maximum assistance, 2 Person Assistance(Patient completed transfer W/C<>shower chair with grab bar and W/C>recliner chair pulling on arm rest of recliner (to the left))     OT  PT Equipment Recommendations  Other: Will likely require wc upon D/C. Toilet - Technique: (via jojo stedy)  Equipment Used: Extra wide bedside commode(HD BSC placed over commode)  Toilet Transfers Comments: max Ax2 for sit<> jojo stedy, + assist of another to maintain R hand grasp on jojo stedy bar  Assessment        SLP  Diet Solids Recommendation: Regular  Liquid Consistency Recommendation: Thin    Body mass index is 29.46 kg/m². Rehabilitation Diagnosis:   Stroke, 1.2, Right Body (L Brain)        Assessment and Plan:     Impairments:right hemiparesis, aphasia, dysphagia, cognition     Acute ischemic Left LEXA stroke  -Secondary ppx with DAPT x 30 days (then monotherapy), statin  -Telemetry  -PT/OT/SLP  -Started baclofen for spasticity - PT reports improvement     Dysphagia   -Dietitian and SLP consults.   -Currently on regular + thins     HTN, uncontrolled  -resume home atenolol, HCTZ, lisinopril (increased dose), Imdur -- trend improving  -prn hydralazine     CAD  -DAPT, statin, bb     OA  -acetaminophen     Anxiety  -sertraline    Leukocytosis  -Resolved  -UA and CXR negative    Hypokalemia  -Improved with replacement     Bladder   -High risk retention   -Monitor PVRs, SC prn >300cc  -Flomax     Bowel   -High risk constipation   -senna+colace BID, PRN miralax, MoM, and bisacodyl supp.     Safety   -fall and aspiration precautions     DVT ppx  -lovenox    Rehab Progress: Interdisciplinary team conference was held today with entire rehab treatment team including PT, OT, SLP, Dietician, RN, and SW. Discussion focused on progress toward rehab goals and discharge planning. Making gradual progress. Limited by severe right hemiparesis, aphasia, delayed processing/decreased initiation.  Working on functional mobility, compensatory strategies, cognition. Goals to decrease caregiver burden. Separate conference then held with patient/family, questions answered and concerns addressed. Total treatment time >35 min with greater than 50% spent in care coordination. Anticipated Dispo: home with sig other and elderly mother vs SNF  Services:  PT, OT, SLP, RN, Aide  DME: DESHAWN  ELOS: 5/12      Camden Adkins.  Winston Morris MD 5/5/2021, 11:46 AM

## 2021-05-06 LAB
ANION GAP SERPL CALCULATED.3IONS-SCNC: 8 MMOL/L (ref 3–16)
BASOPHILS ABSOLUTE: 0.1 K/UL (ref 0–0.2)
BASOPHILS RELATIVE PERCENT: 0.5 %
BUN BLDV-MCNC: 36 MG/DL (ref 7–20)
CALCIUM SERPL-MCNC: 9.2 MG/DL (ref 8.3–10.6)
CHLORIDE BLD-SCNC: 97 MMOL/L (ref 99–110)
CO2: 32 MMOL/L (ref 21–32)
CREAT SERPL-MCNC: 0.8 MG/DL (ref 0.8–1.3)
EOSINOPHILS ABSOLUTE: 0.2 K/UL (ref 0–0.6)
EOSINOPHILS RELATIVE PERCENT: 2 %
GFR AFRICAN AMERICAN: >60
GFR NON-AFRICAN AMERICAN: >60
GLUCOSE BLD-MCNC: 101 MG/DL (ref 70–99)
HCT VFR BLD CALC: 40.3 % (ref 40.5–52.5)
HEMOGLOBIN: 14 G/DL (ref 13.5–17.5)
LYMPHOCYTES ABSOLUTE: 2.1 K/UL (ref 1–5.1)
LYMPHOCYTES RELATIVE PERCENT: 21.2 %
MCH RBC QN AUTO: 31.5 PG (ref 26–34)
MCHC RBC AUTO-ENTMCNC: 34.7 G/DL (ref 31–36)
MCV RBC AUTO: 90.8 FL (ref 80–100)
MONOCYTES ABSOLUTE: 0.8 K/UL (ref 0–1.3)
MONOCYTES RELATIVE PERCENT: 8 %
NEUTROPHILS ABSOLUTE: 6.6 K/UL (ref 1.7–7.7)
NEUTROPHILS RELATIVE PERCENT: 68.3 %
PDW BLD-RTO: 14.6 % (ref 12.4–15.4)
PLATELET # BLD: 175 K/UL (ref 135–450)
PMV BLD AUTO: 8.2 FL (ref 5–10.5)
POTASSIUM REFLEX MAGNESIUM: 3.9 MMOL/L (ref 3.5–5.1)
RBC # BLD: 4.44 M/UL (ref 4.2–5.9)
SODIUM BLD-SCNC: 137 MMOL/L (ref 136–145)
WBC # BLD: 9.7 K/UL (ref 4–11)

## 2021-05-06 PROCEDURE — 92507 TX SP LANG VOICE COMM INDIV: CPT

## 2021-05-06 PROCEDURE — 6370000000 HC RX 637 (ALT 250 FOR IP): Performed by: PHYSICAL MEDICINE & REHABILITATION

## 2021-05-06 PROCEDURE — 97110 THERAPEUTIC EXERCISES: CPT

## 2021-05-06 PROCEDURE — 97530 THERAPEUTIC ACTIVITIES: CPT

## 2021-05-06 PROCEDURE — 1280000000 HC REHAB R&B

## 2021-05-06 PROCEDURE — 85025 COMPLETE CBC W/AUTO DIFF WBC: CPT

## 2021-05-06 PROCEDURE — 80048 BASIC METABOLIC PNL TOTAL CA: CPT

## 2021-05-06 PROCEDURE — 97116 GAIT TRAINING THERAPY: CPT

## 2021-05-06 PROCEDURE — 97112 NEUROMUSCULAR REEDUCATION: CPT

## 2021-05-06 PROCEDURE — 97542 WHEELCHAIR MNGMENT TRAINING: CPT

## 2021-05-06 PROCEDURE — 94761 N-INVAS EAR/PLS OXIMETRY MLT: CPT

## 2021-05-06 PROCEDURE — 6360000002 HC RX W HCPCS: Performed by: PHYSICAL MEDICINE & REHABILITATION

## 2021-05-06 PROCEDURE — 36415 COLL VENOUS BLD VENIPUNCTURE: CPT

## 2021-05-06 PROCEDURE — 97129 THER IVNTJ 1ST 15 MIN: CPT

## 2021-05-06 PROCEDURE — 97535 SELF CARE MNGMENT TRAINING: CPT

## 2021-05-06 RX ADMIN — ASPIRIN 81 MG: 81 TABLET, COATED ORAL at 08:32

## 2021-05-06 RX ADMIN — BACLOFEN 5 MG: 10 TABLET ORAL at 08:32

## 2021-05-06 RX ADMIN — TRAZODONE HYDROCHLORIDE 50 MG: 50 TABLET ORAL at 20:35

## 2021-05-06 RX ADMIN — SERTRALINE 150 MG: 100 TABLET, FILM COATED ORAL at 08:32

## 2021-05-06 RX ADMIN — ISOSORBIDE MONONITRATE 30 MG: 30 TABLET, EXTENDED RELEASE ORAL at 08:32

## 2021-05-06 RX ADMIN — POTASSIUM BICARBONATE 20 MEQ: 782 TABLET, EFFERVESCENT ORAL at 08:33

## 2021-05-06 RX ADMIN — ATENOLOL 50 MG: 50 TABLET ORAL at 08:33

## 2021-05-06 RX ADMIN — BACLOFEN 5 MG: 10 TABLET ORAL at 13:55

## 2021-05-06 RX ADMIN — LISINOPRIL 20 MG: 20 TABLET ORAL at 08:32

## 2021-05-06 RX ADMIN — ENOXAPARIN SODIUM 40 MG: 40 INJECTION SUBCUTANEOUS at 08:33

## 2021-05-06 RX ADMIN — ATORVASTATIN CALCIUM 80 MG: 80 TABLET, FILM COATED ORAL at 20:35

## 2021-05-06 RX ADMIN — CLOPIDOGREL BISULFATE 75 MG: 75 TABLET ORAL at 08:32

## 2021-05-06 RX ADMIN — TAMSULOSIN HYDROCHLORIDE 0.4 MG: 0.4 CAPSULE ORAL at 08:32

## 2021-05-06 RX ADMIN — HYDROCHLOROTHIAZIDE 25 MG: 25 TABLET ORAL at 08:33

## 2021-05-06 RX ADMIN — BACLOFEN 5 MG: 10 TABLET ORAL at 20:35

## 2021-05-06 ASSESSMENT — PAIN SCALES - GENERAL
PAINLEVEL_OUTOF10: 0

## 2021-05-06 NOTE — PROGRESS NOTES
office  Pt complaints of tired; but good effort  Pt's glasses were found Pt seen in treatment office  Pt slightly more alert   Objective:  Goals       Dysphagia goals: Pt did not identify a goal. Team goal is for safe toleration of po diet    therapy working toward pt goal n/a   1. The patient will tolerate regular food with thin liquid diet without observed clinical signs of aspiration,  Not targeted Thin liquids via straw: >4 ounces taken during session without overt clinical s/s psot swallow and without voice quality changes post swallow    Pt declined all food presentations this date/time   2. The patient will improve oral preparation phase via bolus control/manipulation exercises to 5/5 each trial., . Not targeted Goal met   3. The patient/caregiver will demonstrate understanding of compensatory strategies for improved swallowing safety Not targeted   External cues for reduced rate   Short-term Goals  Goal 1: Pt will demonstrate improved recall of new learning strategies; daily therapy activities and new learning information with set up, use of memory strategies and minimal assist   · Working memory for task completion: pt easily distracted by extraneous stimuli with moderate re-direct at times Free Recall  · Recall of therapy activities viaPrompted recall via Y/N? With confirmation :  (therapy activities): 60%   Goal 2: Pt will improve oral and/or verbal agility and ROM for coordinated and alternating motions to < mild impairment. Targeted indirectly   Targeted with word repetition (1-4 syllable): 93% with intermittent sound sequencing errors    Repetition at 4 to 8 word level: 74% (variable sound sequencing errors) Pt did need reminders due to recall issues   Goal 3: Pt will demonstrate improved word retrieval for varied naming tasks and concrete concept explanation tasks with <mild assist Verbal expression;   Verbal Y/N ?  Verbal responses:    ·  less verbal perseveration and improved head nod/verbal agreement           Word level:   · CFN  : no data taken  · Responsive namin%  Word/phrase level  · F/C ?: (word level) for CFN: 75%                    Goal 4. Pt will demonstrate  Improved word retrieval and thought organization for multiple word utterances via picture description; open ended Wh? With <max promptins Word retrieval and thought organization for phrase /sentence formulation for:   ·  Answering general open ended Wh? ; regarding am events; meals; MD visits: slow rate of response and quite variable     Oral reading at  phrase/sentence level : not targeted   [place] [address]? Response regarding pictured stimuli:  limited to 1-3 word utterances for 70%    F/C? Response at phrase level: 50%         Goal 5 : Pt will convey needs and wants via verbal; pointing/gestures with <moderate clarification Pt conveying needs via automatic responses;  verbal Y/N responses and/or FC ? Responses and occasional spontaneous responses     Pt continues with difficulty initiating requests for assist or wants (question formation; 'need help' etc) Pt not requesting assist when having difficulty responding    Pt not requesting repetition when distracted (ie can be distracted by physical discomfort; visual stimuli)   Goal 6. Pt will demonstrate improved auditory and visual language processing of multiple unit Y/N?; Wh? And sentence length information with mild assist 1 step commands (psychomotor and r/l): improvement; intermittent mild tactile cues; and mild to moderate visual cues Auditory processing :  · 3 -4 unit  concrete WH?  (concrete inferences): 80%      1 step psychomotor high frequency commands: 0 to mild tactile / visual cues       Goal 7: Pt will participate with ongoing assessment of EF, planning, sequencing and complex reasoning with additional goals PRN     Distractibility          Working memory; distractibility and suspect frustration at verbal expressive language / verbal motor planning deficit further

## 2021-05-06 NOTE — PROGRESS NOTES
Department of Physical Medicine & Rehabilitation  Progress Note    Patient Identification:  Addison Irwin  [id number]  : 1955  Admit date: 2021    Chief Complaint: Acute ischemic left LEXA stroke (HCC)    Subjective:   No acute events overnight. Patient seen this afternoon sitting up in room, eating lunch. Reports good appetite and denies difficulty tolerating current diet. Labs reviewed. ROS: No f/c, n/v, cp     Objective:  Patient Vitals for the past 24 hrs:   BP Temp Temp src Pulse Resp SpO2 Weight   21 0938 -- -- -- -- -- 95 % --   21 0832 137/73 97.4 °F (36.3 °C) Oral 69 18 -- --   21 0330 (!) 162/90 97.7 °F (36.5 °C) Oral 57 18 95 % 261 lb 7.5 oz (118.6 kg)   21 2315 105/65 97.7 °F (36.5 °C) Oral 53 18 94 % --   21 1522 99/61 97.2 °F (36.2 °C) Oral 59 18 96 % --     Const: Alert. No distress, pleasant. HEENT: Normocephalic, atraumatic. Normal sclera/conjunctiva. MMM. CV: Regular rate and rhythm. Resp: No respiratory distress. Lungs decreased at bases (decreased inspiratory effort)  Abd: Soft, nontender, nondistended, NABS+   Ext: trace edema. Neuro: Alert, oriented, delayed processing. Expressive and receptive language deficits but able to respond if given enough time. Right hemiparesis overall 1/5   Psych: Cooperative, appropriate mood and affect    Laboratory data: Available via EMR.    Last 24 hour lab  Recent Results (from the past 24 hour(s))   Basic Metabolic Panel w/ Reflex to MG    Collection Time: 21  5:32 AM   Result Value Ref Range    Sodium 137 136 - 145 mmol/L    Potassium reflex Magnesium 3.9 3.5 - 5.1 mmol/L    Chloride 97 (L) 99 - 110 mmol/L    CO2 32 21 - 32 mmol/L    Anion Gap 8 3 - 16    Glucose 101 (H) 70 - 99 mg/dL    BUN 36 (H) 7 - 20 mg/dL    CREATININE 0.8 0.8 - 1.3 mg/dL    GFR Non-African American >60 >60    GFR African American >60 >60    Calcium 9.2 8.3 - 10.6 mg/dL   CBC Auto Differential    Collection Time: 21 5:32 AM   Result Value Ref Range    WBC 9.7 4.0 - 11.0 K/uL    RBC 4.44 4.20 - 5.90 M/uL    Hemoglobin 14.0 13.5 - 17.5 g/dL    Hematocrit 40.3 (L) 40.5 - 52.5 %    MCV 90.8 80.0 - 100.0 fL    MCH 31.5 26.0 - 34.0 pg    MCHC 34.7 31.0 - 36.0 g/dL    RDW 14.6 12.4 - 15.4 %    Platelets 432 220 - 540 K/uL    MPV 8.2 5.0 - 10.5 fL    Neutrophils % 68.3 %    Lymphocytes % 21.2 %    Monocytes % 8.0 %    Eosinophils % 2.0 %    Basophils % 0.5 %    Neutrophils Absolute 6.6 1.7 - 7.7 K/uL    Lymphocytes Absolute 2.1 1.0 - 5.1 K/uL    Monocytes Absolute 0.8 0.0 - 1.3 K/uL    Eosinophils Absolute 0.2 0.0 - 0.6 K/uL    Basophils Absolute 0.1 0.0 - 0.2 K/uL       Therapy progress:  PT  Position Activity Restriction  Other position/activity restrictions: R hemiplegia and increased tone RLE, exp. aphasia  Objective     Sit to Stand: Minimal Assistance, Moderate Assistance, Maximum Assistance, 2 Person Assistance(see comment below)  Stand to sit: Moderate Assistance, 2 Person Assistance  Bed to Chair: Maximum assistance, 2 Person Assistance(Patient completed transfer W/C<>shower chair with grab bar and W/C>recliner chair pulling on arm rest of recliner (to the left))  Device: [place]uitrago rail  Assistance: Maximum assistance, Minimal assistance  Distance: 16'  OT  PT Equipment Recommendations  Other: Will likely require wc upon D/C. Toilet - Technique: Stand pivot  Equipment Used: Grab bars  Toilet Transfers Comments: Assist to block R LE  Assessment        SLP  Diet Solids Recommendation: Regular  Liquid Consistency Recommendation: Thin    Body mass index is 32.68 kg/m².     Rehabilitation Diagnosis:   Stroke, 1.2, Right Body (L Brain)        Assessment and Plan:     Impairments:right hemiparesis, aphasia, dysphagia, cognition     Acute ischemic Left LEXA stroke  -Secondary ppx with DAPT x 30 days (then monotherapy), statin  -Telemetry  -PT/OT/SLP  -Started baclofen for spasticity - PT reports improvement     Dysphagia   -Dietitian and

## 2021-05-06 NOTE — PROGRESS NOTES
Patient admitted to rehab s/p CVA. A/A/O x 2 with expressive aphasia. Transfers with [facility] with 2 staff assist. On general 2gm sodium diet, tolerating well. Medications taken crushed in applesauce. On lovenox for DVT prophylaxis. Skin intact with scattered bruising. On room air. Has been incontinent of bladder. LBM 5/4. Chair/bed alarms in use. Call light and needed items within reach. Continue with intentional rounding.

## 2021-05-06 NOTE — PROGRESS NOTES
Physical Therapy  Facility/Department: [address] REHAB  Daily Treatment Note  NAME: Addison Irwin  : 1955  MRN: [id number]    Date of Service: 2021    Discharge Recommendations:  Continue to assess pending progress, Patient would benefit from continued therapy after discharge   PT Equipment Recommendations  Other: Will likely require wc upon D/C. Assessment   Body structures, Functions, Activity limitations: Decreased functional mobility ; Decreased strength;Decreased endurance;Decreased sensation;Decreased balance  Assessment: Mr. Clvie Macdonald continues to require two person assist for safe mobilization secondary to tone and weakness. He worked on standing this morning form surfaces of various heights with better activation of quads for transfer. Pt was able to maintain standing with min A once good paosture was achieved. Patient was more verbal this AM and able to find and vocalize one word at a time with extra time. Patient is well below baseline and will benefit from continued skilled therapy for strengthening, balance training, safety training, and neuromuscular re-education to allow for decreased assistance required. Specific instructions for Next Treatment: Transfers; practice wc mobility; strengthen RLE as able  REQUIRES PT FOLLOW UP: Yes  Activity Tolerance  Activity Tolerance: Patient limited by fatigue;Patient limited by cognitive status     Patient Diagnosis(es): There were no encounter diagnoses. has a past medical history of Acute MI ([facility] [address] Anxiety, Arthritis, CAD (coronary artery disease), Hypertension, and Influenza A.   has a past surgical history that includes back surgery (N/A, ) and Coronary angioplasty with stent (). Restrictions  Restrictions/Precautions  Restrictions/Precautions: Fall Risk  Position Activity Restriction  Other position/activity restrictions: R hemiplegia and increased tone RLE, exp.  aphasia  Subjective   General  Chart Reviewed: Yes  Additional Pertinent Hx: Pt is 77 y/o M admitted 4/21/21 to ED with stroke like symptoms affecting R side ongoing ~12hours. MRI revealed multifocal acute infarctions in the parasagital left front along distal L LEXA territory with occluded/thrombosed A3 segment of L LEXA. MRI also revealed old infarction in basal ganglia, thalami, R cerebellum. PMHx significant for CAD, Angio w/ stent, MI, lumbar sx, HTN. Pt is 77 y/o M admitted 4/21/21 to ED with stroke like symptoms affecting R side ongoing ~12hours. MRI revealed multifocal acute infarctions in the parasagital left front along distal L LEXA territory with occluded/thrombosed A3 segment of L LEXA. MRI also revealed old infarction in basal ganglia, thalami, R cerebellum. PMHx significant for CAD, Angio w/ stent, MI, lumbar sx, HTN. Response To Previous Treatment: Patient with no complaints from previous session. Family / Caregiver Present: No  Referring Practitioner: Dr. Rolando Watts: Pt to therapy gym in w/c, co-treat with OT for safety, pt verbalizing more and was able to greet PT by name. General Comment  Comments: Patient seen as a co-treatment secondary to requiring 2 skilled persons to safely mobilize for completion of ADL  Pain Screening  Patient Currently in Pain: Denies  Vital Signs  Patient Currently in Pain: Denies       Orientation     Cognition      Objective      Transfers  Sit to Stand: Minimal Assistance; Moderate Assistance;Maximum Assistance;2 Person Assistance(see comment below)  Stand Pivot Transfers: Moderate Assistance;2 Person Assistance;Maximum Assistance  Comment: Initial stand pivot trasnfer to R from w/c to high-low mat mod to max A x 2. Mat raised up to 26\" and pt able to stand to [facility] walker with minAx 1 with CGA of another, mat lowered to 25\" and then 24\" and pt stood with Miriam x 2.   [facility] walker then removed and pt performed partial stand pusing up from 24\" mat with Miriam x 1 and CGA of another, this performed 3 times.  Stand pivot transfer to w/c on R mod A x 2.  Stood at martinez rail with pushing up form w/c armrest and transitioning to martinez rail with mod to max A x 2. Pivot transfers in room x 3 (to commode, to w/c, and to recliner) max A x 2. Ambulation  Ambulation?: Yes  More Ambulation?: No  Ambulation 1  Surface: level tile  Device: Martinez rail  Assistance: Maximum assistance;Minimal assistance  Quality of Gait: max A to advacne R LE with one instance of increased tone, Miriam of 1-2 for weight shift and trunk control  Gait Deviations: Slow Mary  Distance: 16'  Stairs/Curb  Stairs?: No     Balance  Posture: Good(when cued to satnd tall)  Sitting - Static: Good  Sitting - Dynamic: Good;-  Standing - Static: Fair;+  Standing - Dynamic: Fair;+            Comment: toileting: see OT note for level of assist     Goals  Short term goals  Time Frame for Short term goals: In 2 weeks pt will perform  Short term goal 1: Bed mobility Mod A  Short term goal 2: Transfers Mod A  Short term goal 3: Propel wc 48' with Mod A  Short term goal 4: Ascend/Descend curb step with LRAD, Mod A  Long term goals  Time Frame for Long term goals :  In 4 weeks pt will perform  Long term goal 1: Bed mobility CGA  Long term goal 2: Transfers CGA  Long term goal 3: Propel wc 48' with CGA-SBA  Long term goal 4: Ascend/Descend curb step with LRAD, Min A  Patient Goals   Patient goals : Did not state today (minimally verbal)    Plan    Plan  Times per week: 5-6x  Times per day: Twice a day  Plan weeks: 4 weeks  Specific instructions for Next Treatment: Transfers; practice wc mobility; strengthen RLE as able  Current Treatment Recommendations: Strengthening, ROM, Balance Training, Endurance Training, Functional Mobility Training, Wheelchair Mobility Training, Transfer Training, Gait Training, Stair training, Positioning, Pain Management, Equipment Evaluation, Education, & procurement, Modalities, Patient/Caregiver Education & Training, Safety Education & Training, Home Exercise Program, Neuromuscular Re-education  Safety Devices  Type of devices: All fall risk precautions in place, Call light within reach, Chair alarm in place, Gait belt, Left in chair  Restraints  Initially in place: No     Therapy Time   Individual Concurrent Group Co-treatment   Time In       1115   Time Out       1200   Minutes       45   Timed Code Treatment Minutes: [address], [address]     PM session: pt up in recliner. Sit to stand mod A x 2, pivot to w/c mod A x 2. Pt taken to therapy gym, stand pivot transfer to therapy mat on pt's L mod A x 2. Sitting EOB with L hand in lap pt able to kick L LE x 10 with no LOB, attempted to march L knee up while L hand was in pt's lap but he could not do this due to feeling unsteady. Able to march L knee up with L hand supporting him on the mat. Sat EOB with CGA to SBA while performing reaching tasks with OT, pt able to reach far to the L with no LOB and no assist, pt less willing to reach to the R and needed encouragement and CGA. Pt hit beach ball with clasped hands to encourage movement of R arm but pt unwilling to reach far with hands clasped. [id number] [address] ball with L hand only with occasional min A to recover: pt tends to lean back and to the L with this activity. Pivot transfer to w/c with mod A x 2, w/c prop 40' in crowded area with cues to attend to objects on his R. Went out onto therapy patio. Pt stood at rail on patio with mod A x 2, maintained standing with CGA to min A once positioned well. Pt needing only CGA to maintain R knee extension, able to extend R hip and stand tall with cues. Stood 3 min and 1 min. Pt to speech therapy after PT/OT session.   Second Session Therapy Time     Individual Co-treatment   Time In  1430   Time Out  1142   Minutes  45      Electronically signed by Rosalinda Hernandez PT on 5/6/2021 at 4:26 PM

## 2021-05-06 NOTE — PROGRESS NOTES
Occupational Therapy  Facility/Department: [address] IP REHAB  Daily Treatment Note  NAME: Sailaja Farrell  : 1955  MRN: [id number]    Date of Service: 2021    Discharge Recommendations:  Continue to assess pending progress, Patient would benefit from continued therapy after discharge, 24 hour supervision or assist       Assessment   Performance deficits / Impairments: Decreased functional mobility ; Decreased strength;Decreased endurance;Decreased ADL status; Decreased high-level IADLs;Decreased balance;Decreased ROM; Decreased sensation;Decreased coordination;Decreased fine motor control  Assessment: Pt tolerated tx session fairly well, continues to work hard despite fatigue. Pt was able to complete sit<>stand from elevated mat w/ min Ax1-2 to Mitchell Friends, mod Ax2 @ hallway railing, but max Ax2 from w/c by end of session d/t fatigue. Pt ambulated w/ max A from PT to manage and advance R LE + min A from OT for balance/trunk control. Pt was incontinent of urine and unaware, and required total Ax2 for hygiene and changing briefs. Pt continues to be limited by significant right side weakness, right side neglect, and fatigue. Cont per POC in order to maximize function. OT Education: OT Role;Plan of Care;Precautions;Transfer Training;Equipment;ADL Adaptive Strategies  Barriers to Learning: exp aphasia, fatigue  Activity Tolerance  Activity Tolerance: Patient Tolerated treatment well;Patient limited by fatigue  Safety Devices  Safety Devices in place: Yes  Type of devices: Gait belt;Left in chair;Call light within reach; Chair alarm in place         Patient Diagnosis(es): There were no encounter diagnoses.       has a past medical history of Acute MI ([facility] [address] Anxiety, Arthritis, CAD (coronary artery disease), Hypertension, and Influenza A.   has a past surgical history that includes back surgery (N/A, ) and Coronary angioplasty with stent (9/13). Restrictions  Restrictions/Precautions  Restrictions/Precautions: Fall Risk  Position Activity Restriction  Other position/activity restrictions: R hemiplegia and increased tone RLE, exp. aphasia  Subjective   General  Chart Reviewed: Yes  Patient assessed for rehabilitation services?: Yes  Additional Pertinent Hx: Per Dr. Ham Nevaeh is a 77 yo M with pmh CAD s/p stent, HTN, OA, Anxiety, former tobacco use who initially presented 4/21/2021 with right side weakness and aphasia. He was outside TPA window. Imaging revealed acute ischemic infarcts in the left ELXA territory with thrombosis in A3 segment. Neurology consulted. Patient is being treated with DAPT (for 30 days then monotherapy) and statin. Course complicated by HTN. Currently, patient reports ongoing right side weakness and difficult with speech. He denies headache, vision changes, tingling/numbness. He is motivated to start inpatient rehab program.\"  Family / Caregiver Present: No  Referring Practitioner: Dr Agapito Mccoy  Diagnosis: CVA- parasagital left frontal lobe  Subjective  Subjective: pt met in dept. agreeable to therapy, denied pain         Objective    ADL  Toileting: Dependent/Total(Pt's briefs were saturated w/ urine; pt sat on commode but did not void more. Required total A for briefs up/down w/ max A for standing balance w/ use of GB)        Balance  Sitting Balance: Stand by assistance(close SBA)  Standing Balance: Maximum assistance(mod/max Ax2)  Functional Mobility  Functional - Mobility Device: (hallway railing)  Activity: Other  Assist Level: Dependent/Total  Functional Mobility Comments: Therapists managed w/c to conserve energy (was not focus of session).  Pt ambulated using hallway railing w/ max A from PT to manage/advance R LE and min A from OT for balance/trunk control  Toilet Transfers  Toilet - Technique: Stand pivot  Equipment Used: Grab bars  Toilet Transfer: Maximum assistance;2 Person assistance  Toilet Transfers Comments: Assist to block R LE  Wheelchair Bed Transfers  Wheelchair/Bed - Technique: Stand pivot  Equipment Used: Wheelchair; Other(recliner)  Level of Asssistance: Maximum assistance;2 Person assistance     Transfers  Sit to stand: 2 Person assistance  Stand to sit: 2 Person assistance  Transfer Comments: Pt completed multiple sit<>stand transfers from elevated mat to Hans Perez (min Ax1 from 26\" elevated mat, min Ax2 from 25\", min Ax2 from 24\"), min Ax2 for multiple partial sit<>stand from mat just to practice clearing buttocks from mat; mod Ax2 at hallway railing, max Ax2 at end of session d/t fatigue                       Cognition  Overall Cognitive Status: Exceptions  Arousal/Alertness: Delayed responses to stimuli  Following Commands: Follows one step commands with repetition; Follows one step commands with increased time  Attention Span: Attends with cues to redirect  Memory: (difficult to assess)  Safety Judgement: Decreased awareness of need for assistance;Decreased awareness of need for safety  Insights: Decreased awareness of deficits  Initiation: Requires cues for all  Sequencing: Requires cues for all      PM Session:  Subjective: pt met b/s, in recliner, agreeable to therapy. Pt very quiet this afternoon    Objective:  Sit> Lysbeth Craft LIFT mod Ax2. Sit>stand from platform of [employer] w/ min/mod Ax2. Stand>sit mod Ax2. Stand pivot transfer from w/c > mat mod Ax2 to the left. Pt sat on EOM x10 mins and participated in neuromuscular re-ed/balance/trunk control activities. Pt able to interlock right and left fingers together. Pt tapped beach ball back and forth w/ OT x3-4 mins; pt demo'd difficulty tapping beach ball using both hands together so trialed using L UE only while PT facilitated WB through R UE. Pt then reached in all planes to place large beads on dowel latanya using L UE while PT assisted to facilitate WB through R UE.  Pt required grossly CGA for sitting balance throughout but up

## 2021-05-06 NOTE — PLAN OF CARE
Problem: Skin Integrity:  Goal: Will show no infection signs and symptoms  Description: Will show no infection signs and symptoms  5/6/2021 1031 by Michaelle Sosa RN  Note: No change in skin integrity; continue current interventions. 5/6/2021 0138 by Tiara Ceja RN  Outcome: Ongoing     Problem: Falls - Risk of:  Goal: Will remain free from falls  Description: Will remain free from falls  5/6/2021 1031 by Michaelle Sosa RN  Outcome: Ongoing  Note: Risk assessment complete; interventions in place. Call light and needed items within reach. 5/6/2021 0138 by Tiara Ceja RN  Outcome: Ongoing     Problem: Pain:  Goal: Pain level will decrease  Description: Pain level will decrease  5/6/2021 1031 by Michaelle Sosa RN  Outcome: Ongoing  Note: Denies c/o pain; continue to position for comfort.    5/6/2021 0138 by Tiara Ceja RN  Outcome: Ongoing

## 2021-05-07 PROCEDURE — 92526 ORAL FUNCTION THERAPY: CPT

## 2021-05-07 PROCEDURE — 97530 THERAPEUTIC ACTIVITIES: CPT

## 2021-05-07 PROCEDURE — 6370000000 HC RX 637 (ALT 250 FOR IP): Performed by: PHYSICAL MEDICINE & REHABILITATION

## 2021-05-07 PROCEDURE — 97112 NEUROMUSCULAR REEDUCATION: CPT

## 2021-05-07 PROCEDURE — 97535 SELF CARE MNGMENT TRAINING: CPT

## 2021-05-07 PROCEDURE — 1280000000 HC REHAB R&B

## 2021-05-07 PROCEDURE — 94761 N-INVAS EAR/PLS OXIMETRY MLT: CPT

## 2021-05-07 PROCEDURE — 92507 TX SP LANG VOICE COMM INDIV: CPT

## 2021-05-07 PROCEDURE — 97129 THER IVNTJ 1ST 15 MIN: CPT

## 2021-05-07 PROCEDURE — 6360000002 HC RX W HCPCS: Performed by: PHYSICAL MEDICINE & REHABILITATION

## 2021-05-07 RX ORDER — ISOSORBIDE DINITRATE 10 MG/1
10 TABLET ORAL 3 TIMES DAILY
Status: DISCONTINUED | OUTPATIENT
Start: 2021-05-08 | End: 2021-05-12

## 2021-05-07 RX ORDER — DOXAZOSIN 2 MG/1
1 TABLET ORAL DAILY
Status: DISCONTINUED | OUTPATIENT
Start: 2021-05-08 | End: 2021-05-11

## 2021-05-07 RX ADMIN — SERTRALINE 150 MG: 100 TABLET, FILM COATED ORAL at 09:55

## 2021-05-07 RX ADMIN — FLUTICASONE PROPIONATE 1 SPRAY: 50 SPRAY, METERED NASAL at 09:57

## 2021-05-07 RX ADMIN — BACLOFEN 5 MG: 10 TABLET ORAL at 09:53

## 2021-05-07 RX ADMIN — ASPIRIN 81 MG: 81 TABLET, COATED ORAL at 09:53

## 2021-05-07 RX ADMIN — BACLOFEN 5 MG: 10 TABLET ORAL at 20:41

## 2021-05-07 RX ADMIN — ENOXAPARIN SODIUM 40 MG: 40 INJECTION SUBCUTANEOUS at 09:48

## 2021-05-07 RX ADMIN — BACLOFEN 5 MG: 10 TABLET ORAL at 13:27

## 2021-05-07 RX ADMIN — CLOPIDOGREL BISULFATE 75 MG: 75 TABLET ORAL at 09:52

## 2021-05-07 RX ADMIN — ATORVASTATIN CALCIUM 80 MG: 80 TABLET, FILM COATED ORAL at 20:41

## 2021-05-07 RX ADMIN — POTASSIUM BICARBONATE 20 MEQ: 782 TABLET, EFFERVESCENT ORAL at 11:26

## 2021-05-07 RX ADMIN — HYDROCHLOROTHIAZIDE 25 MG: 25 TABLET ORAL at 11:24

## 2021-05-07 RX ADMIN — TRAZODONE HYDROCHLORIDE 50 MG: 50 TABLET ORAL at 20:41

## 2021-05-07 RX ADMIN — LISINOPRIL 20 MG: 20 TABLET ORAL at 09:56

## 2021-05-07 ASSESSMENT — PAIN SCALES - GENERAL: PAINLEVEL_OUTOF10: 0

## 2021-05-07 NOTE — PLAN OF CARE
Problem: Falls - Risk of:  Goal: Will remain free from falls  Description: Will remain free from falls  5/7/2021 1110 by Thomas Baldwin RN  Outcome: Ongoing  Note: Continue to monitor safety, does not use call light. 5/6/2021 2313 by Colleen Rocha RN  Outcome: Ongoing     Problem: ACTIVITY INTOLERANCE/IMPAIRED MOBILITY  Goal: Mobility/activity is maintained at optimum level for patient  5/7/2021 1110 by Thomas Baldwin RN  Outcome: Ongoing  Note: Using stedy at this time. 5/6/2021 2313 by Colleen Rocha RN  Outcome: Ongoing     Problem: COMMUNICATION IMPAIRMENT  Goal: Ability to express needs and understand communication  5/7/2021 1110 by Thomas Baldwin RN  Note: Will not answer, continue to try to communicate. 5/6/2021 2313 by Colleen Rocha RN  Outcome: Ongoing     Problem: Pain:  Goal: Pain level will decrease  Description: Pain level will decrease  5/7/2021 1110 by Thomas Baldwin RN  Outcome: Ongoing  Note: No pain noted, monitor. 5/6/2021 2313 by Colleen Rocha RN  Outcome: Ongoing     Problem: Nutrition  Goal: Optimal nutrition therapy  Outcome: Ongoing  Note: Monitor intake. Problem: Discharge Planning:  Intervention: Assess knowledge level of healthcare  Note: Continue rehab, will need 24/7 at discharge.

## 2021-05-07 NOTE — PROGRESS NOTES
Physical Therapy  Facility/Department: [address] [facility] REHAB  Daily Treatment Note  NAME: Zhane Hampton  : 1955  MRN: [id number]    Date of Service: 2021    Discharge Recommendations:  Continue to assess pending progress, Patient would benefit from continued therapy after discharge   PT Equipment Recommendations  Other: Will likely require wc upon D/C. Assessment   Body structures, Functions, Activity limitations: Decreased functional mobility ; Decreased strength;Decreased endurance;Decreased sensation;Decreased balance  Assessment: Mr. Joshua Dobbins continues to require two person assist for safe mobilization secondary to tone and weakness. He continues to have difficulty with activating quads from a regular chair height. Slide board was done with mod assist of 2. PT was unable to achieve any activation of right LE musculature in sidelying with use of powder board. Patient is well below baseline and will benefit from continued skilled therapy for strengthening, balance training, safety training, and neuromuscular re-education to allow for decreased assistance required. Treatment Diagnosis: impaired functional mobility  Specific instructions for Next Treatment: Transfers; practice wc mobility; strengthen RLE as able  Prognosis: Guarded; Fair  PT Education: Functional Mobility Training;General Safety;Home Exercise Program;Disease Specific Education;Plan of Care;Transfer Training  REQUIRES PT FOLLOW UP: Yes  Activity Tolerance  Activity Tolerance: Patient limited by fatigue;Patient limited by cognitive status  Activity Tolerance: Patient with processing difficulties and needing increased time for new activity directions, increased tone this AM making mobility more difficult     Patient Diagnosis(es): There were no encounter diagnoses.      has a past medical history of Acute MI ([facility] [address] Anxiety, Arthritis, CAD (coronary artery disease), Hypertension, and Influenza A.   has a past surgical history that includes back surgery (N/A, 1989) and Coronary angioplasty with stent (9/13). Social/Functional History  Lives With: Significant other(Sign other Pauline Rahman))  Type of Home: House  Home Layout: Two level, Able to Live on Main level with bedroom/bathroom, Laundry in basement(1 story with basement.)  Home Access: Stairs to enter without rails(1 step to enter.)  Bathroom Shower/Tub: Tub/Shower unit(Tub Shower main level and basement.)  Bathroom Toilet: Standard  Bathroom Equipment: Grab bars in shower  Bathroom Accessibility: Accessible  Home Equipment: (No DME.)  ADL Assistance: Independent  Homemaking Assistance: (Shared with sign other.)  Homemaking Responsibilities: (shared with significant other prior to admission)  Ambulation Assistance: Independent(Without assist device.)  Transfer Assistance: Independent  Active : Yes  Occupation: Retired  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 \"Need to find something to do. \"    Restrictions  Restrictions/Precautions  Restrictions/Precautions: Fall Risk  Position Activity Restriction  Other position/activity restrictions: R hemiplegia and increased tone RLE, exp. aphasia     Subjective   General  Chart Reviewed: Yes  Additional Pertinent Hx: Pt is 75 y/o M admitted 4/21/21 to ED with stroke like symptoms affecting R side ongoing ~12hours. MRI revealed multifocal acute infarctions in the parasagital left front along distal L LEXA territory with occluded/thrombosed A3 segment of L LEXA. MRI also revealed old infarction in basal ganglia, thalami, R cerebellum. PMHx significant for CAD, Angio w/ stent, MI, lumbar sx, HTN. Pt is 75 y/o M admitted 4/21/21 to ED with stroke like symptoms affecting R side ongoing ~12hours.  MRI revealed multifocal acute infarctions in the parasagital left front along distal L LEXA territory with occluded/thrombosed A3 segment of L LEXA. MRI also revealed old infarction in basal ganglia, thalami, R cerebellum. PMHx significant for CAD, Angio w/ stent, MI, lumbar sx, HTN. Response To Previous Treatment: Patient with no complaints from previous session. Family / Caregiver Present: No  Referring Practitioner: Dr. Alivia Bowen: Patient attempting to verbalize and able to say short phrases with increased time. Patient is reporting no pain at this time. General Comment  Comments: Patient was an overlap with OT for 15 minutes following ADL session. Pain Screening  Patient Currently in Pain: Denies  Vital Signs  Patient Currently in Pain: Denies         Objective   Bed mobility  Supine to Sit: Moderate assistance;Maximum assistance(difficulty bringing bilateral LE over the edge of the bed and initiating push with left UE to lift trunk from the bed.  mod assist at both trunk and bilateral LE)  Sit to Supine: Maximum assistance(poor control with laying onto left side from sitting. required max assist to bring bilateral LE up into the bed as there is not much initiation to bring his legs up)  Comment: bed mobility done on flat therapy mat. Transfers  Sit to Stand: Maximum Assistance;2 Person Assistance; Moderate Assistance(mod-max assist of 2 to stand at bed rail)  Stand to sit: Moderate Assistance;2 Person Assistance(improved ability to flex at hips, but still required considerable assist to control sit back into chair with left hand back on arm rest)  Lateral Transfers: Moderate Assistance;2 Person Assistance(Patient recalled use of slide board. He required assistance to place the board. Attempted uphill to the therapy mat with one person assist, but unable.  Downhill to the right was max assist of 1 and min assist of 1)    Ambulation  Ambulation?: No    Stairs/Curb  Stairs?: No    Wheelchair Activities  Wheelchair Size: 20\"  Wheelchair Type: Standard  Wheelchair Cushion: extending over right hip. He continues to require max assist to advance right LE as no activation present to allow for advancement. He was unable to extend right knee on his own either. Difficulty with weight shifting at times and required max assist to shift to the left. Stand>sit at rail with mod assist x2. Stand pivot transfer W/C>therapy mat with max assist x2. Sit>stand from heightened therapy mat at 24\" with mod assist of two, but he requires PT to block right foot and knee to prevent flexion withdrawal.  Patient stood at heightened bedside table with min-mod assist of 2, but requires PT to block right knee and foot for positioning the entirety of the activity. PT placed right hand in slight weight bearing position with right hand on the table. Patient stood for 2.5 minutes then 4.75 minutes. Each time, patient fatigued and required seated rest break. Stand pivot transfer therapy mat->wheelchair with height of bed at 24\", but wheelchair was to patient's right. Increased difficulty with right flexion withdrawal and patient became dependent of 2. He was max assist to scoot buttocks back into the wheelchair. Patient with increased tone and continued inability to extend right LE. Safety Device - Type of devices:  [x]  All fall risk precautions in place [] Bed alarm in place  [] Call light within reach [] Chair alarm in place [] Positioning belt [x] Gait belt [] Patient at risk for falls [] Left in bed [] Left in chair [] Telesitter in use [] Sitter present [] Nurse notified [x]  In wheelchair to return to room with transportation. Goals  Short term goals  Time Frame for Short term goals: In 2 weeks pt will perform  Short term goal 1: Bed mobility Mod A  Short term goal 2: Transfers Mod A  Short term goal 3: Propel wc 48' with Mod A  Short term goal 4: Ascend/Descend curb step with LRAD, Mod A  Long term goals  Time Frame for Long term goals :  In 4 weeks pt will perform  Long term goal 1: Bed mobility CGA  Long term goal 2: Transfers CGA  Long term goal 3: Propel wc 48' with CGA-SBA  Long term goal 4: Ascend/Descend curb step with LRAD, Min A  Patient Goals   Patient goals : Did not state today (minimally verbal)    Plan    Plan  Times per week: 5-6x  Times per day: Twice a day  Plan weeks: 4 weeks  Specific instructions for Next Treatment: Transfers; practice wc mobility; strengthen RLE as able  Current Treatment Recommendations: Strengthening, ROM, Balance Training, Endurance Training, Functional Mobility Training, Wheelchair Mobility Training, Transfer Training, Gait Training, Stair training, Positioning, Pain Management, Equipment Evaluation, Education, & procurement, Modalities, Patient/Caregiver Education & Training, Safety Education & Training, Home Exercise Program, Neuromuscular Re-education  Safety Devices  Type of devices:  All fall risk precautions in place, Gait belt, Left in chair(in wheelchair to return to room with transporation)  Restraints  Initially in place: No     Therapy Time   Individual Concurrent Group Co-treatment   Time In 1000     0945   Time Out 1040     1000   Minutes 40     15          Second Session Therapy Time     Individual Co-treatment   Time In  1345   Time Out  1430   Minutes  Lexis [id number]

## 2021-05-07 NOTE — PROGRESS NOTES
Occupational Therapy  Facility/Department: [address] REHAB  Daily Treatment Note  NAME: Zoya Jarrell  : 1955  MRN: [id number]    Date of Service: 2021    Discharge Recommendations:  Continue to assess pending progress, Patient would benefit from continued therapy after discharge, 24 hour supervision or assist       Assessment   Performance deficits / Impairments: Decreased functional mobility ; Decreased strength;Decreased endurance;Decreased ADL status; Decreased high-level IADLs;Decreased balance;Decreased ROM; Decreased sensation;Decreased coordination;Decreased fine motor control  Assessment: Pt tolerated tx session well though very fatigued by end of session. Pt demo'd decreased motor planning/safety when attempting to use razor to shave face despite max cues from OT, so required overall max A for shaving. Pt required min/mod A for UB ADLs, total A for LB ADLs. Pt continues to be incontinent of urine and required total A for changing briefs. He demo'd improvement in transfers and able to complete sit<> jojo stedy w/ mod Ax2, plus was able to  ojjo stedy w/ min/mod A for balance to increase ease of LB ADLs. Pt did require max Ax2 for sit>stand w/ use of bedrail at end of session. Pt is motivated to progress however limited by significant weakness, decreased activity tolerance, right side inattention. Cont per POC to maximize function. Prognosis: Good  OT Education: OT Role;Plan of Care;Precautions;Transfer Training;Equipment;ADL Adaptive Strategies  Barriers to Learning: exp aphasia, fatigue  REQUIRES OT FOLLOW UP: Yes  Activity Tolerance  Activity Tolerance: Patient Tolerated treatment well;Patient limited by fatigue  Activity Tolerance: Pt becomes more fatigued as session progresses  Safety Devices  Safety Devices in place: Not Applicable(handoff w/ PT)  Type of devices: Left in chair;Gait belt         Patient Diagnosis(es): There were no encounter diagnoses.       has a past stedy w/ min/mod A for balance while OT bathed posterior periarea)  UE Dressing: Moderate assistance;Verbal cueing;Setup; Increased time to complete  LE Dressing: Dependent/Total(OT assisted w/ all parts d/t time constraints, pt stood in jojo frankydy w/ min/mod A for balance while OT managed briefs/pants up/down over hips)  Toileting: Dependent/Total  Additional Comments: Pt completed grooming and sponge bath seated in w/c at sink. Cues throughout for sequencing and attending to right side. Pt stood in [address] [facility] stedy x2 for LB ADLs        Balance  Sitting Balance: Stand by assistance  Standing Balance: Maximum assistance  Functional Mobility  Functional - Mobility Device: Wheelchair  Activity: To/from bathroom  Assist Level: Dependent/Total  Functional Mobility Comments: OT managed w/c this session to conserve energy for ADL  Wheelchair Bed Transfers  Equipment Used: Wheelchair  Wheelchair Transfers Comments: Pt completed sit<>stand pulling on bed rail w/ L UE w/ max Ax2     Transfers  Sit to stand: Moderate assistance;2 Person assistance  Stand to sit: 2 Person assistance; Moderate assistance  Transfer Comments: Mod Ax2 <> jojo frankydy w/ assist to position and block R LE, assist to place/remove R UE on bar of jojo nye (tone allows pt to maintain grasp on bar)                       Cognition  Overall Cognitive Status: Exceptions  Arousal/Alertness: Delayed responses to stimuli  Following Commands: Follows one step commands with repetition; Follows one step commands with increased time  Attention Span: Attends with cues to redirect  Memory: (difficult to assess)  Safety Judgement: Decreased awareness of need for assistance;Decreased awareness of need for safety  Insights: Decreased awareness of deficits  Initiation: Requires cues for all  Sequencing: Requires cues for all  Cognition Comment: Limited by decreased motor planning during ADL and unable to use razor safely, pt initially interacting w/ therapists more however becomes quieter as session progresses d/t fatigue         PM Session:  Subjective: Pt met b/s for OT cotx w/ PT and transported to dept in w/c. Pt appeared fatigued and very quiet this PM, agreeable to therapy    Objective:  Pt required max Ax2 for sit>stand @ hallway railing. Pt ambulated 32\" w/ hallway railing w/ max A from PT for managing/advancing R LE and min/mod A for standing balance/trunk control from OT. Pt demo'd increased difficulty maintaining upright posture toward the end d/t fatigue and required max cues + assist to prevent LEs from buckling/turning in. Stand>sit w/ mod Ax2. Stand pivot transfer from w/c > therapy mat max Ax2. Sit>stand from elevated mat w/ mod Ax2. Pt stood at bedside table w/ min/mod Ax2 + R knee blocked x2.5 mins, then 4 mins 45 seconds. PT assisted to facilitate WB through R UE on table. Pt played Connect 4 against OT but required max cues for understanding the game, for locating correct color pieces on his right side, and for attention to detail. Pt then put together 1/2 of simple [place] map puzzle w/ min cues to place pieces in correct place on puzzle. Stand pivot transfer from mat > w/c w/ max/total Ax2 d/t R LE flexion withdrawal and sitting close to edge of w/c. Max A to scoot back in w/c safely. Handoff with Patricio Petit, [address] Pt appears significantly fatigued this PM and limited by increased tone.  Cont per POC    Plan   Plan  Times per week: 5-6  Times per day: Twice a day  Plan weeks: 3-4  Current Treatment Recommendations: Strengthening, Endurance Training, Balance Training, ROM, Functional Mobility Training, Safety Education & Training, Gait Training, Self-Care / ADL, Patient/Caregiver Education & Training, Neuromuscular Re-education, Home Management Training, Equipment Evaluation, Education, & procurement    Goals  Short term goals  Time Frame for Short term goals: 1 week  Short term goal 1: Pt will be min A with functional transfers  Short term goal 2: Pt will be min A with functional mobility  Short term goal 3: Pt will be min A with bed mobility  Short term goal 4: Pt will be mod A with bathing and dressing  Short term goal 5: Pt will tolerate UE neuromuscular reeducation to increase function in RUE for assist with ADLs  Long term goals  Time Frame for Long term goals : 3-4 weeks  Long term goal 1: Pt will be mod I with functional transfers  Long term goal 2: Pt will be mod I with functional mobility  Long term goal 3: Pt will be mod I with bed mobility  Long term goal 4: Pt will be mod I with bathing and dressing  Long term goal 5: Pt will be mod I with toileting  Patient Goals   Patient goals : Pt nodded agreement when asked if he wanted to be more independent       Therapy Time   Individual Concurrent Group Co-treatment   Time In 0900     0945   Time Out 0945     1000   Minutes 45     15        Therapy Time   Individual Co-treatment   Time In  1430   Time Out  1515   Minutes  [address] OTR/L [id number]

## 2021-05-07 NOTE — PROGRESS NOTES
Department of Physical Medicine & Rehabilitation  Progress Note    Patient Identification:  Bill Foster  [id number]  : 1955  Admit date: 2021    Chief Complaint: Acute ischemic left LEXA stroke (HCC)    Subjective:   No acute events overnight. Patient seen this afternoon sitting up in room. He denies new concerns. Seems to be getting more active movement in the right hand. Labs reviewed. ROS: No f/c, n/v, cp     Objective:  Patient Vitals for the past 24 hrs:   BP Temp Temp src Pulse Resp SpO2 Weight   21 1537 132/75 97.8 °F (36.6 °C) Oral 60 16 96 % --   21 1245 124/75 98.1 °F (36.7 °C) Oral 60 18 95 % --   21 0747 (!) 160/72 97.6 °F (36.4 °C) Oral 55 18 93 % --   21 0410 (!) 152/80 98.4 °F (36.9 °C) Oral 60 17 93 % 261 lb 3.9 oz (118.5 kg)   21 0121 132/72 97.7 °F (36.5 °C) Oral 58 16 94 % --   21 2100 113/68 98.1 °F (36.7 °C) Oral 60 18 94 % --     Const: Alert. No distress, pleasant. HEENT: Normocephalic, atraumatic. Normal sclera/conjunctiva. MMM. CV: Regular rate and rhythm. Resp: No respiratory distress. Lungs decreased at bases (decreased inspiratory effort)  Abd: Soft, nontender, nondistended, NABS+   Ext: trace edema. Neuro: Alert, oriented, delayed processing. Expressive and receptive language deficits but able to respond if given enough time. Right hemiparesis overall 0-1/5 except 3/5 right hand  Psych: Cooperative, appropriate mood and affect    Laboratory data: Available via EMR. Last 24 hour lab  No results found for this or any previous visit (from the past 24 hour(s)). Therapy progress:  PT  Position Activity Restriction  Other position/activity restrictions: R hemiplegia and increased tone RLE, exp.  aphasia  Objective     Sit to Stand: Maximum Assistance, 2 Person Assistance, Moderate Assistance(mod-max assist of 2 to stand at bed rail)  Stand to sit: Moderate Assistance, 2 Person Assistance(improved ability to flex at hips, but still required considerable assist to control sit back into chair with left hand back on arm rest)  Bed to Chair: Maximum assistance, 2 Person Assistance(Patient completed transfer W/C<>shower chair with grab bar and W/C>recliner chair pulling on arm rest of recliner (to the left))  Device: Tiffanie Ramirez rail  Assistance: Maximum assistance, Minimal assistance  Distance: 16'  OT  PT Equipment Recommendations  Other: Will likely require wc upon D/C. Toilet - Technique: Stand pivot  Equipment Used: Grab bars  Toilet Transfers Comments: Assist to block R LE  Assessment        SLP  Diet Solids Recommendation: Regular  Liquid Consistency Recommendation: Thin    Body mass index is 32.65 kg/m². Rehabilitation Diagnosis:   Stroke, 1.2, Right Body (L Brain)        Assessment and Plan:     Impairments:right hemiparesis, aphasia, dysphagia, cognition     Acute ischemic Left LEXA stroke  -Secondary ppx with DAPT x 30 days (then monotherapy), statin  -Telemetry  -PT/OT/SLP  -Started baclofen for spasticity - PT reports improvement     Dysphagia   -Dietitian and SLP consults.   -Currently on regular + thins     HTN, uncontrolled  -resume home atenolol, HCTZ, lisinopril (increased dose), Imdur -- trend improving  -prn hydralazine     CAD  -DAPT, statin, bb     OA  -acetaminophen     Anxiety  -sertraline    Leukocytosis  -Resolved  -UA and CXR negative    Hypokalemia  -Improved with replacement     Bladder   -High risk retention   -Monitor PVRs, SC prn >300cc  -Flomax     Bowel   -High risk constipation   -senna+colace BID, PRN miralax, MoM, and bisacodyl supp.     Safety   -fall and aspiration precautions     DVT ppx  -lovenox    Rehab Progress:  Making gradual progress. Limited by severe right hemiparesis, aphasia, delayed processing/decreased initiation. Working on functional mobility, compensatory strategies, cognition. Goals to decrease caregiver burden.    Anticipated Dispo: home with sig other and elderly mother vs SNF  Services:  PT, OT, SLP, RN, Aide  DME: [facility]HAWN  ELOS: 5/12      Olive Reyna.  Bassam Lamb MD 5/7/2021, 4:48 PM

## 2021-05-07 NOTE — PLAN OF CARE
Problem: Falls - Risk of:  Goal: Will remain free from falls  Description: Will remain free from falls  5/6/2021 2313 by Moses Hope RN  Outcome: Ongoing    Problem: Falls - Risk of:  Goal: Absence of physical injury  Description: Absence of physical injury  Outcome: Ongoing     Problem: HEMODYNAMIC STATUS  Goal: Patient has stable vital signs and fluid balance  Outcome: Ongoing     Problem: ACTIVITY INTOLERANCE/IMPAIRED MOBILITY  Goal: Mobility/activity is maintained at optimum level for patient  Outcome: Ongoing     Problem: COMMUNICATION IMPAIRMENT  Goal: Ability to express needs and understand communication  Outcome: Ongoing     Problem: Skin Integrity:  Goal: Will show no infection signs and symptoms  Description: Will show no infection signs and symptoms  5/6/2021 2313 by Moses Hope RN  Outcome: Ongoing  Note: No change in skin integrity; continue current interventions. Problem: Skin Integrity:  Goal: Absence of new skin breakdown  Description: Absence of new skin breakdown  Outcome: Ongoing     Problem: Pain:  Goal: Pain level will decrease  Description: Pain level will decrease  5/6/2021 2313 by Moses Hope RN  Outcome: Ongoing  Note: Denies c/o pain; continue to position for comfort. Problem: Pain:  Goal: Control of acute pain  Description: Control of acute pain  Outcome: Ongoing   Problem: Pain:  Goal: Control of chronic pain  Description: Control of chronic pain  Outcome: Ongoing   Note: Risk assessment complete; interventions in place. Call light and needed items within reach.

## 2021-05-07 NOTE — PROGRESS NOTES
Speech Language Pathology  ACUTE REHAB UNIT  SPEECH/LANGUAGE PATHOLOGY      [x] Daily  [] Weekly Care Conference Note  [] Discharge    Patient:Carl Jean      RFB:[birth year]  FAT:[id number]  Rehab Dx/Hx: Acute ischemic left LEXA stroke (HCC) [I63.522]    Precautions: FAll risk; Cognitive-Communication deficits  Home situation: Lives with significant other  ST Dx: [x] Aphasia  [] Dysarthria  [] Apraxia   [x] Oropharyngeal dysphagia [x] Cognitive   Impairment  [] Other:   Initial Speech Therapy Assessment Diagnosis:   1. Cognitive Diagnosis: Mild cognitive linguistic impairment for routine daily functional needs. Ongoing assessment of complex, high level and abstract PS/reasoning, as well as executive functioning, thought organization and planning is recommended as patient's processing and initation improve. Pt was independent with ADLs and IADLs PTA. Given signifcant extra time for processing and intiaotn, basic cognition appears intact in areas of orientation, sustained and selective, and simple/concrete PS/reasoning. Mildly decreased recall for daily events/information and moderately redcued recall for novel informaiton noted this date. Aforemnetioned delayed thought processing and initiation for expression is a barrier to demonstration of and exectuion for congitive-language skills/tasks. 2.  Aphasia Diagnosis: Given extra time for processing and initaiton, patient appears to present with mildly impaired auditory comprehension for complex and abstract information processing. Basic and concrete auditory/visual language comprehension appears intact. Mild to moderately impaired verbal expression for complex concepts requiring increased time to formulate and generate responses. 3.  Speech Diagnosis: Mildly impaired motor speech skills characterized by minimal to mild decreased verbal agility affecting speech fluency without impact on speech intelligibility at sentence level  4.   Dysphagia Diagnosis: manipulation of 4-5 stimulus cards: set up and intermittent cues   Goal 2: Pt will improve oral and/or verbal agility and ROM for coordinated and alternating motions to < mild impairment. Targeted indirectly   Targeted with Repetition at 1 to 5 word utterance level: continued ; no data taken. Continue to note some sound sequencing errors and/or recall errors   Goal 3: Pt will demonstrate improved word retrieval for varied naming tasks and concrete concept explanation tasks with <mild assist Verbal expression;   Verbal Y/N ? Verbal responses:    ·  regarding likes/dislikes food/beverages: <25% verbal perseveration  · Regarding transfer techniques/equipment: increase in no response errors and reduced accuracy of responses           Word level:   · CFN  (coins; DL objects; home environment objects): 85%  · Responsive naming:not targeted  Word/phrase level  · F/C ?: (word level) for CFN: continued; no data taken. Gradual decrease in perseveration          Goal 4. Pt will demonstrate  Improved word retrieval and thought organization for multiple word utterances via picture description; open ended Wh? With <max promptins Open ended Wh? Response: targeted with wants/likes   [place] WH? Response regarding pictured stimuli:  Continued during sequencing activity    F/C? Response at phrase level: continued; comparable to 5/6/2021         Goal 5 : Pt will convey needs and wants via verbal; pointing/gestures with <moderate clarification Indicating likes/dislikes for meal: most optimal than needs for bed transfer; transfer from bed to w/c SLP returned pt to room:   Wh? response to needs prior to SLP leaving:   · Most optimal with Y/N?; MC?; social greetings   Goal 6.  Pt will demonstrate improved auditory and visual language processing of multiple unit Y/N?; Wh? 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 Plan: Continue as per plan of care. Continued Tx Upon Discharge: ?    [x] Yes [] No [] TBD based on progress while on ARU [] Vital Stim indicated [] Other:   Estimated discharge date: TBD   Discharge recommendations:   [] Home independently  [x] Home with assistance [x]  24 hour supervision  [] ECF [] Other:     Additional information:     Interventions used during Rehab Stay:  [x] Speech/Language Treatment  [] Instruction in HEP [] Group [x] Dysphagia Treatment [x] Cognitive Treatment   [] Other:    Adverse Reactions to Treatment/Significant Change in Status: n/a    Electronically Signed by    Anna Gil,MS,CCC,SLP [id number]  Speech and Language Pathologist

## 2021-05-08 PROCEDURE — 1280000000 HC REHAB R&B

## 2021-05-08 PROCEDURE — 6360000002 HC RX W HCPCS: Performed by: PHYSICAL MEDICINE & REHABILITATION

## 2021-05-08 PROCEDURE — 94760 N-INVAS EAR/PLS OXIMETRY 1: CPT

## 2021-05-08 PROCEDURE — 6370000000 HC RX 637 (ALT 250 FOR IP): Performed by: PHYSICAL MEDICINE & REHABILITATION

## 2021-05-08 RX ADMIN — ISOSORBIDE DINITRATE 10 MG: 10 TABLET ORAL at 20:47

## 2021-05-08 RX ADMIN — POTASSIUM BICARBONATE 20 MEQ: 782 TABLET, EFFERVESCENT ORAL at 08:33

## 2021-05-08 RX ADMIN — ENOXAPARIN SODIUM 40 MG: 40 INJECTION SUBCUTANEOUS at 08:33

## 2021-05-08 RX ADMIN — BACLOFEN 5 MG: 10 TABLET ORAL at 20:46

## 2021-05-08 RX ADMIN — SERTRALINE 150 MG: 100 TABLET, FILM COATED ORAL at 08:37

## 2021-05-08 RX ADMIN — HYDROCHLOROTHIAZIDE 25 MG: 25 TABLET ORAL at 08:38

## 2021-05-08 RX ADMIN — TRAZODONE HYDROCHLORIDE 50 MG: 50 TABLET ORAL at 20:46

## 2021-05-08 RX ADMIN — ATORVASTATIN CALCIUM 80 MG: 80 TABLET, FILM COATED ORAL at 20:46

## 2021-05-08 RX ADMIN — BACLOFEN 5 MG: 10 TABLET ORAL at 13:08

## 2021-05-08 RX ADMIN — ISOSORBIDE DINITRATE 10 MG: 10 TABLET ORAL at 13:08

## 2021-05-08 RX ADMIN — ISOSORBIDE DINITRATE 10 MG: 10 TABLET ORAL at 08:38

## 2021-05-08 RX ADMIN — ASPIRIN 81 MG: 81 TABLET, COATED ORAL at 08:39

## 2021-05-08 RX ADMIN — ATENOLOL 50 MG: 50 TABLET ORAL at 08:39

## 2021-05-08 RX ADMIN — BACLOFEN 5 MG: 10 TABLET ORAL at 08:36

## 2021-05-08 RX ADMIN — DOXAZOSIN 1 MG: 2 TABLET ORAL at 08:38

## 2021-05-08 RX ADMIN — CLOPIDOGREL BISULFATE 75 MG: 75 TABLET ORAL at 08:39

## 2021-05-08 RX ADMIN — LISINOPRIL 20 MG: 20 TABLET ORAL at 08:38

## 2021-05-08 ASSESSMENT — PAIN SCALES - GENERAL: PAINLEVEL_OUTOF10: 0

## 2021-05-08 NOTE — PLAN OF CARE
Problem: Falls - Risk of:  Goal: Will remain free from falls  Description: Will remain free from falls  Outcome: Ongoing  Note: Patient free from falls this shift. Fall precautions in place at all times. Bed in lowest position with two side rails up and wheels locked. Call light within reach. Patient able and agreeable to contact for safety appropriately. Problem: HEMODYNAMIC STATUS  Goal: Patient has stable vital signs and fluid balance  Outcome: Ongoing  Note: VSS. Peripheral pulse palpable. Skin pink, dry and warm. Trace edema. I&O monitor. Daily weight. Medication given as ordered. Education provided. Will continue to monitor. Problem: ACTIVITY INTOLERANCE/IMPAIRED MOBILITY  Goal: Mobility/activity is maintained at optimum level for patient  Outcome: Ongoing  Note: Patient is flaccid to right side. Problem: Skin Integrity:  Goal: Absence of new skin breakdown  Description: Absence of new skin breakdown  Outcome: Ongoing  Note: Skin assessment performed each shift per protocol. Patient turned and repositioned every two hours and prn with pillow support. Patient checked for incontence every two hours. Skin is intact, bottom noted with redness but is blanchable. Zinc applied. Problem: Pain:  Description: Pain management should include both nonpharmacologic and pharmacologic interventions. Goal: Control of acute pain  Description: Control of acute pain  Outcome: Ongoing  Note: Patient denied pain or discomfort. Encouraged to inform staff of any changes in condition.

## 2021-05-08 NOTE — PLAN OF CARE
Problem: Falls - Risk of:  Goal: Will remain free from falls  Description: Will remain free from falls  4/30/2021 1044 by Clarissa Aguirre RN  Outcome: Ongoing     Problem: Falls - Risk of:  Goal: Absence of physical injury  Description: Absence of physical injury  4/30/2021 1044 by Clarissa Aguirre RN  Outcome: Ongoing     Problem: HEMODYNAMIC STATUS  Goal: Patient has stable vital signs and fluid balance  4/30/2021 1044 by Clarissa Aguirre RN  Outcome: Ongoing     Problem: ACTIVITY INTOLERANCE/IMPAIRED MOBILITY  Goal: Mobility/activity is maintained at optimum level for patient  4/30/2021 1044 by Clarissa Aguirre RN  Outcome: Ongoing     Problem: COMMUNICATION IMPAIRMENT  Goal: Ability to express needs and understand communication  4/30/2021 1044 by Clarissa Aguirre RN  Outcome: Ongoing     Problem: Skin Integrity:  Goal: Will show no infection signs and symptoms  Description: Will show no infection signs and symptoms  4/30/2021 1044 by Clarissa Aguirre RN  Outcome: Ongoing     Problem: Skin Integrity:  Goal: Absence of new skin breakdown  Description: Absence of new skin breakdown  4/30/2021 1044 by Clarissa Aguirre RN  Outcome: Ongoing     Problem: Pain:  Goal: Pain level will decrease  Description: Pain level will decrease  4/30/2021 1044 by Clarissa Aguirre RN  Outcome: Ongoing     Problem: Pain:  Goal: Control of chronic pain  Description: Control of chronic pain  4/30/2021 1044 by Clarissa Aguirre RN  Outcome: Ongoing     Problem: Nutrition  Goal: Optimal nutrition therapy  4/30/2021 1044 by Clarissa Aguirre RN  Outcome: Ongoing

## 2021-05-09 PROCEDURE — 87205 SMEAR GRAM STAIN: CPT

## 2021-05-09 PROCEDURE — 87070 CULTURE OTHR SPECIMN AEROBIC: CPT

## 2021-05-09 PROCEDURE — 6360000002 HC RX W HCPCS: Performed by: PHYSICAL MEDICINE & REHABILITATION

## 2021-05-09 PROCEDURE — 87077 CULTURE AEROBIC IDENTIFY: CPT

## 2021-05-09 PROCEDURE — 87186 SC STD MICRODIL/AGAR DIL: CPT

## 2021-05-09 PROCEDURE — 94760 N-INVAS EAR/PLS OXIMETRY 1: CPT

## 2021-05-09 PROCEDURE — 6370000000 HC RX 637 (ALT 250 FOR IP): Performed by: PHYSICAL MEDICINE & REHABILITATION

## 2021-05-09 PROCEDURE — 1280000000 HC REHAB R&B

## 2021-05-09 RX ADMIN — BACLOFEN 5 MG: 10 TABLET ORAL at 13:22

## 2021-05-09 RX ADMIN — ATORVASTATIN CALCIUM 80 MG: 80 TABLET, FILM COATED ORAL at 21:05

## 2021-05-09 RX ADMIN — ISOSORBIDE DINITRATE 10 MG: 10 TABLET ORAL at 21:05

## 2021-05-09 RX ADMIN — DOXAZOSIN 1 MG: 2 TABLET ORAL at 08:46

## 2021-05-09 RX ADMIN — BACLOFEN 5 MG: 10 TABLET ORAL at 08:46

## 2021-05-09 RX ADMIN — BACLOFEN 5 MG: 10 TABLET ORAL at 21:05

## 2021-05-09 RX ADMIN — SERTRALINE 150 MG: 100 TABLET, FILM COATED ORAL at 08:47

## 2021-05-09 RX ADMIN — TRAZODONE HYDROCHLORIDE 50 MG: 50 TABLET ORAL at 21:05

## 2021-05-09 RX ADMIN — LISINOPRIL 20 MG: 20 TABLET ORAL at 08:52

## 2021-05-09 RX ADMIN — CLOPIDOGREL BISULFATE 75 MG: 75 TABLET ORAL at 08:48

## 2021-05-09 RX ADMIN — ISOSORBIDE DINITRATE 10 MG: 10 TABLET ORAL at 13:22

## 2021-05-09 RX ADMIN — ISOSORBIDE DINITRATE 10 MG: 10 TABLET ORAL at 08:47

## 2021-05-09 RX ADMIN — HYDROCHLOROTHIAZIDE 25 MG: 25 TABLET ORAL at 08:48

## 2021-05-09 RX ADMIN — ATENOLOL 50 MG: 50 TABLET ORAL at 08:50

## 2021-05-09 RX ADMIN — ENOXAPARIN SODIUM 40 MG: 40 INJECTION SUBCUTANEOUS at 10:33

## 2021-05-09 RX ADMIN — POTASSIUM BICARBONATE 20 MEQ: 782 TABLET, EFFERVESCENT ORAL at 08:47

## 2021-05-09 RX ADMIN — ASPIRIN 81 MG: 81 TABLET, COATED ORAL at 08:48

## 2021-05-09 ASSESSMENT — PAIN SCALES - GENERAL
PAINLEVEL_OUTOF10: 0
PAINLEVEL_OUTOF10: 0

## 2021-05-09 NOTE — PROGRESS NOTES
Department of Physical Medicine & Rehabilitation  Progress Note    Patient Identification:  Rosy Becerra  [id number]  : 1955  Admit date: 2021    Chief Complaint: Acute ischemic left LEXA stroke (HCC)    Subjective:   No acute events overnight. Patient seen this am sitting up in room. Had productive cough this morning per RN, sputum collected for culture. He is afebrile and denies feeling ill. Suspect this is from his limited mobility and dysphagia. Labs reviewed. ROS: No f/c, n/v, cp     Objective:  Patient Vitals for the past 24 hrs:   BP Temp Temp src Pulse Resp SpO2 Weight   21 0849 (!) 153/75 97.6 °F (36.4 °C) Oral 70 18 95 % --   21 0843 -- -- -- -- -- 96 % --   21 0400 125/72 97.7 °F (36.5 °C) Oral 65 16 95 % 261 lb 3.9 oz (118.5 kg)   21 0039 122/74 97.5 °F (36.4 °C) Oral 62 17 92 % --   21 2036 120/75 97.9 °F (36.6 °C) Oral 66 16 95 % --   21 1547 116/68 98.2 °F (36.8 °C) Oral 63 18 96 % --   21 1411 105/61 97.5 °F (36.4 °C) Oral 59 18 96 % --     Const: Alert. No distress, pleasant. HEENT: Normocephalic, atraumatic. Normal sclera/conjunctiva. MMM. CV: Regular rate and rhythm. Resp: No respiratory distress. Lungs decreased at bases (decreased inspiratory effort)  Abd: Soft, nontender, nondistended, NABS+   Ext: trace edema. Neuro: Alert, oriented, delayed processing. Expressive and receptive language deficits but able to respond if given enough time. Right hemiparesis overall 0-1/5 except 3/5 right hand  Psych: Cooperative, appropriate mood and affect    Laboratory data: Available via EMR. Last 24 hour lab  No results found for this or any previous visit (from the past 24 hour(s)). Therapy progress:  PT  Position Activity Restriction  Other position/activity restrictions: R hemiplegia and increased tone RLE, exp.  aphasia  Objective     Sit to Stand: Maximum Assistance, 2 Person Assistance, Moderate Assistance(mod-max assist of 2 to stand at bed rail)  Stand to sit: Moderate Assistance, 2 Person Assistance(improved ability to flex at hips, but still required considerable assist to control sit back into chair with left hand back on arm rest)  Bed to Chair: Maximum assistance, 2 Person Assistance(Patient completed transfer W/C<>shower chair with grab bar and W/C>recliner chair pulling on arm rest of recliner (to the left))  Device: Toña Sizer rail  Assistance: Maximum assistance, Minimal assistance  Distance: 16'  OT  PT Equipment Recommendations  Other: Will likely require wc upon D/C. Toilet - Technique: Stand pivot  Equipment Used: Grab bars  Toilet Transfers Comments: Assist to block R LE  Assessment        SLP  Diet Solids Recommendation: Regular  Liquid Consistency Recommendation: Thin    Body mass index is 32.65 kg/m². Rehabilitation Diagnosis:   Stroke, 1.2, Right Body (L Brain)        Assessment and Plan:     Impairments:right hemiparesis, aphasia, dysphagia, cognition     Acute ischemic Left LEXA stroke  -Secondary ppx with DAPT x 30 days (then monotherapy), statin  -Telemetry  -PT/OT/SLP  -Started baclofen for spasticity - PT reports improvement     Dysphagia   -Dietitian and SLP consults.   -Currently on regular + thins     HTN, uncontrolled  -resume home atenolol, HCTZ, lisinopril (increased dose), Imdur -- trend improving  -prn hydralazine     CAD  -DAPT, statin, bb     OA  -acetaminophen     Anxiety  -sertraline    Leukocytosis  -Resolved  -UA and CXR negative    Hypokalemia  -Improved with replacement     Bladder   -High risk retention   -Monitor PVRs, SC prn >300cc  -Flomax     Bowel   -High risk constipation   -senna+colace BID, PRN miralax, MoM, and bisacodyl supp.     Safety   -fall and aspiration precautions     DVT ppx  -lovenox    Rehab Progress:  Making gradual progress. Limited by severe right hemiparesis, aphasia, delayed processing/decreased initiation. Working on functional mobility, compensatory strategies, cognition. Goals to decrease caregiver burden. Anticipated Dispo: home with sig other and elderly mother vs SNF  Services: HH PT, OT, SLP, RN, Aide  DME: [facility]  ELOS:       Angela Carson.  Emma Apgar, MD 2021, 10:23 AM

## 2021-05-09 NOTE — PROGRESS NOTES
Pt coughed up brown ball of sputum x2 during this shift, at about 1020, Dr. Sweetie Up in room, stated to place order for sputum and send down. Order was placed and culture pending.  Electronically signed by Evelyn Mir RN on 5/9/2021 at 6:32 PM

## 2021-05-09 NOTE — PLAN OF CARE
Problem: Falls - Risk of:  Goal: Will remain free from falls  Description: Will remain free from falls  4/30/2021 1044 by Hollis Cash RN  Outcome: Ongoing     Problem: Falls - Risk of:  Goal: Absence of physical injury  Description: Absence of physical injury  4/30/2021 1044 by Hollis Cahs RN  Outcome: Ongoing     Problem: HEMODYNAMIC STATUS  Goal: Patient has stable vital signs and fluid balance  4/30/2021 1044 by Hollis Cash RN  Outcome: Ongoing     Problem: ACTIVITY INTOLERANCE/IMPAIRED MOBILITY  Goal: Mobility/activity is maintained at optimum level for patient  4/30/2021 1044 by Hollis Cash RN  Outcome: Ongoing     Problem: COMMUNICATION IMPAIRMENT  Goal: Ability to express needs and understand communication  4/30/2021 1044 by Hollis Cash RN  Outcome: Ongoing     Problem: Skin Integrity:  Goal: Will show no infection signs and symptoms  Description: Will show no infection signs and symptoms  4/30/2021 1044 by Hollis Cash RN  Outcome: Ongoing     Problem: Skin Integrity:  Goal: Absence of new skin breakdown  Description: Absence of new skin breakdown  4/30/2021 1044 by Hollis Cash RN  Outcome: Ongoing     Problem: Pain:  Goal: Pain level will decrease  Description: Pain level will decrease  4/30/2021 1044 by Hollis Cash RN  Outcome: Ongoing     Problem: Pain:  Goal: Control of chronic pain  Description: Control of chronic pain  4/30/2021 1044 by Hollis Cash RN  Outcome: Ongoing     Problem: Nutrition  Goal: Optimal nutrition therapy  4/30/2021 1044 by Hollis Cash RN  Outcome: Ongoing

## 2021-05-10 LAB
ANION GAP SERPL CALCULATED.3IONS-SCNC: 9 MMOL/L (ref 3–16)
BASOPHILS ABSOLUTE: 0.1 K/UL (ref 0–0.2)
BASOPHILS RELATIVE PERCENT: 0.6 %
BUN BLDV-MCNC: 23 MG/DL (ref 7–20)
CALCIUM SERPL-MCNC: 9.5 MG/DL (ref 8.3–10.6)
CHLORIDE BLD-SCNC: 99 MMOL/L (ref 99–110)
CO2: 30 MMOL/L (ref 21–32)
CREAT SERPL-MCNC: 0.7 MG/DL (ref 0.8–1.3)
EOSINOPHILS ABSOLUTE: 0.1 K/UL (ref 0–0.6)
EOSINOPHILS RELATIVE PERCENT: 0.9 %
GFR AFRICAN AMERICAN: >60
GFR NON-AFRICAN AMERICAN: >60
GLUCOSE BLD-MCNC: 108 MG/DL (ref 70–99)
HCT VFR BLD CALC: 40.4 % (ref 40.5–52.5)
HEMOGLOBIN: 14.1 G/DL (ref 13.5–17.5)
LYMPHOCYTES ABSOLUTE: 1.4 K/UL (ref 1–5.1)
LYMPHOCYTES RELATIVE PERCENT: 14.6 %
MCH RBC QN AUTO: 31.6 PG (ref 26–34)
MCHC RBC AUTO-ENTMCNC: 34.9 G/DL (ref 31–36)
MCV RBC AUTO: 90.6 FL (ref 80–100)
MONOCYTES ABSOLUTE: 0.7 K/UL (ref 0–1.3)
MONOCYTES RELATIVE PERCENT: 7.7 %
NEUTROPHILS ABSOLUTE: 7.2 K/UL (ref 1.7–7.7)
NEUTROPHILS RELATIVE PERCENT: 76.2 %
PDW BLD-RTO: 14.6 % (ref 12.4–15.4)
PLATELET # BLD: 166 K/UL (ref 135–450)
PMV BLD AUTO: 7.6 FL (ref 5–10.5)
POTASSIUM REFLEX MAGNESIUM: 4.3 MMOL/L (ref 3.5–5.1)
RBC # BLD: 4.46 M/UL (ref 4.2–5.9)
SODIUM BLD-SCNC: 138 MMOL/L (ref 136–145)
WBC # BLD: 9.5 K/UL (ref 4–11)

## 2021-05-10 PROCEDURE — 80048 BASIC METABOLIC PNL TOTAL CA: CPT

## 2021-05-10 PROCEDURE — 97535 SELF CARE MNGMENT TRAINING: CPT

## 2021-05-10 PROCEDURE — 92507 TX SP LANG VOICE COMM INDIV: CPT

## 2021-05-10 PROCEDURE — 97129 THER IVNTJ 1ST 15 MIN: CPT

## 2021-05-10 PROCEDURE — 6370000000 HC RX 637 (ALT 250 FOR IP): Performed by: PHYSICAL MEDICINE & REHABILITATION

## 2021-05-10 PROCEDURE — 85025 COMPLETE CBC W/AUTO DIFF WBC: CPT

## 2021-05-10 PROCEDURE — 97116 GAIT TRAINING THERAPY: CPT

## 2021-05-10 PROCEDURE — 97530 THERAPEUTIC ACTIVITIES: CPT

## 2021-05-10 PROCEDURE — 6370000000 HC RX 637 (ALT 250 FOR IP): Performed by: NURSE PRACTITIONER

## 2021-05-10 PROCEDURE — 97112 NEUROMUSCULAR REEDUCATION: CPT

## 2021-05-10 PROCEDURE — 36415 COLL VENOUS BLD VENIPUNCTURE: CPT

## 2021-05-10 PROCEDURE — 97542 WHEELCHAIR MNGMENT TRAINING: CPT

## 2021-05-10 PROCEDURE — 6360000002 HC RX W HCPCS: Performed by: PHYSICAL MEDICINE & REHABILITATION

## 2021-05-10 PROCEDURE — 1280000000 HC REHAB R&B

## 2021-05-10 RX ORDER — BACLOFEN 10 MG/1
10 TABLET ORAL 3 TIMES DAILY
Status: DISCONTINUED | OUTPATIENT
Start: 2021-05-10 | End: 2021-05-11

## 2021-05-10 RX ORDER — ASPIRIN 300 MG/1
300 SUPPOSITORY RECTAL DAILY
Status: DISCONTINUED | OUTPATIENT
Start: 2021-05-11 | End: 2021-05-13 | Stop reason: HOSPADM

## 2021-05-10 RX ORDER — ASPIRIN 81 MG/1
81 TABLET, CHEWABLE ORAL DAILY
Status: DISCONTINUED | OUTPATIENT
Start: 2021-05-11 | End: 2021-05-13 | Stop reason: HOSPADM

## 2021-05-10 RX ADMIN — ISOSORBIDE DINITRATE 10 MG: 10 TABLET ORAL at 12:49

## 2021-05-10 RX ADMIN — ATENOLOL 50 MG: 50 TABLET ORAL at 08:52

## 2021-05-10 RX ADMIN — FLUTICASONE PROPIONATE 1 SPRAY: 50 SPRAY, METERED NASAL at 09:01

## 2021-05-10 RX ADMIN — SERTRALINE 150 MG: 100 TABLET, FILM COATED ORAL at 08:52

## 2021-05-10 RX ADMIN — DOXAZOSIN 1 MG: 2 TABLET ORAL at 08:52

## 2021-05-10 RX ADMIN — CLOPIDOGREL BISULFATE 75 MG: 75 TABLET ORAL at 08:52

## 2021-05-10 RX ADMIN — HYDROCHLOROTHIAZIDE 25 MG: 25 TABLET ORAL at 08:52

## 2021-05-10 RX ADMIN — TRAZODONE HYDROCHLORIDE 50 MG: 50 TABLET ORAL at 21:14

## 2021-05-10 RX ADMIN — POTASSIUM BICARBONATE 20 MEQ: 782 TABLET, EFFERVESCENT ORAL at 08:52

## 2021-05-10 RX ADMIN — ISOSORBIDE DINITRATE 10 MG: 10 TABLET ORAL at 08:52

## 2021-05-10 RX ADMIN — BACLOFEN 10 MG: 10 TABLET ORAL at 21:14

## 2021-05-10 RX ADMIN — ASPIRIN 81 MG: 81 TABLET, COATED ORAL at 08:52

## 2021-05-10 RX ADMIN — BACLOFEN 10 MG: 10 TABLET ORAL at 12:50

## 2021-05-10 RX ADMIN — ISOSORBIDE DINITRATE 10 MG: 10 TABLET ORAL at 21:14

## 2021-05-10 RX ADMIN — LISINOPRIL 20 MG: 20 TABLET ORAL at 08:52

## 2021-05-10 RX ADMIN — BACLOFEN 5 MG: 10 TABLET ORAL at 08:52

## 2021-05-10 RX ADMIN — ATORVASTATIN CALCIUM 80 MG: 80 TABLET, FILM COATED ORAL at 21:14

## 2021-05-10 RX ADMIN — ACETAMINOPHEN 650 MG: 325 TABLET ORAL at 09:40

## 2021-05-10 RX ADMIN — ENOXAPARIN SODIUM 40 MG: 40 INJECTION SUBCUTANEOUS at 08:52

## 2021-05-10 ASSESSMENT — PAIN DESCRIPTION - PAIN TYPE: TYPE: ACUTE PAIN

## 2021-05-10 ASSESSMENT — PAIN SCALES - WONG BAKER: WONGBAKER_NUMERICALRESPONSE: 0

## 2021-05-10 ASSESSMENT — PAIN DESCRIPTION - FREQUENCY: FREQUENCY: INTERMITTENT

## 2021-05-10 ASSESSMENT — PAIN SCALES - GENERAL: PAINLEVEL_OUTOF10: 3

## 2021-05-10 NOTE — PROGRESS NOTES
Speech Language Pathology  ACUTE REHAB UNIT  SPEECH/LANGUAGE PATHOLOGY      [x] Daily  [] Weekly Care Conference Note  [] Discharge    Patient:Carl Figueroa      EIA:[birth year]  [facility]:[id number]  Rehab Dx/Hx: Acute ischemic left LEXA stroke (HCC) [I63.522]    Precautions: FAll risk; Cognitive-Communication deficits  Home situation: Lives with significant other  ST Dx: [x] Aphasia  [] Dysarthria  [] Apraxia   [x] Oropharyngeal dysphagia [x] Cognitive   Impairment  [] Other:   Initial Speech Therapy Assessment Diagnosis:   1. Cognitive Diagnosis: Mild cognitive linguistic impairment for routine daily functional needs. Ongoing assessment of complex, high level and abstract PS/reasoning, as well as executive functioning, thought organization and planning is recommended as patient's processing and initation improve. Pt was independent with ADLs and IADLs PTA. Given signifcant extra time for processing and intiaotn, basic cognition appears intact in areas of orientation, sustained and selective, and simple/concrete PS/reasoning. Mildly decreased recall for daily events/information and moderately redcued recall for novel informaiton noted this date. Aforemnetioned delayed thought processing and initiation for expression is a barrier to demonstration of and exectuion for congitive-language skills/tasks. 2.  Aphasia Diagnosis: Given extra time for processing and initaiton, patient appears to present with mildly impaired auditory comprehension for complex and abstract information processing. Basic and concrete auditory/visual language comprehension appears intact. Mild to moderately impaired verbal expression for complex concepts requiring increased time to formulate and generate responses. 3.  Speech Diagnosis: Mildly impaired motor speech skills characterized by minimal to mild decreased verbal agility affecting speech fluency without impact on speech intelligibility at sentence level  4.   Dysphagia Diagnosis: office  Pt was alert / cooperative   Pt seen in treatment office and bedside  Pt with good effort;but physically restless last 10 minutes of session. Re-positioning in s/c with Physical therapist assist. Pt with persistent restlessness    Objective:  Goals       Dysphagia goals: Pt did not identify a goal. Team goal is for safe toleration of po diet    therapy working toward pt goal n/a   1. The patient will tolerate regular food with thin liquid diet without observed clinical signs of aspiration,  Not targeted no reports of problems. Last direct dysphagia treatment 5/7/2021 Not targeted; pt declined   2. The patient will improve oral preparation phase via bolus control/manipulation exercises to 5/5 each trial., . Goal met for liquids and food consistencies 5/7/2021 Not targeted; pt declined   3. The patient/caregiver will demonstrate understanding of compensatory strategies for improved swallowing safety Last targeted 5/7/2021   Not targeted; pt declined   Short-term Goals  Goal 1: Pt will demonstrate improved recall of new learning strategies; daily therapy activities and new learning information with set up, use of memory strategies and minimal assist   Working memory for card game targeting working memory for 2-4 items (written cues provided to assist verbal responses)  · Good recall for general rule of card game   · 50% recall general details of care game; despite written cues available  · External cues warranted for pt to use written cues Free Recall  · Transfer using adaptive equipment: external prompts (Choice format and/or Y/N? Format)    Working memory for right side of body for transfer using adaptive equipment: pt spontaneously going to the right side of the body   Goal 2: Pt will improve oral and/or verbal agility and ROM for coordinated and alternating motions to < mild impairment.    1-5 syllable utterance level: 0 to mild sound sequencing errors   Not targeted directly   Goal 3: Pt will demonstrate improved word retrieval for varied naming tasks and concrete concept explanation tasks with <mild assist Verbal expression;   Verbal Y/N ? Verbal responses:    · Y/N using written cues PRN: external cues to utilize    CFN: playing cards utilized:   · 100% for suit  · 95% for numbers/face card labels    CFN using FC?: 80% (targeting high frequency ADL items; body parts)       Word level:   · CFn targeted with colors: >90%; and pictured actions and DL items (>85% and 40% respectively)          Goal 4. Pt will demonstrate  Improved word retrieval and thought organization for multiple word utterances via picture description; open ended Wh? With <max promptins Open ended Wh? Response: rearding general topics: 50% verbal response >2 word utterance length    Open ended Wh? Response regarding previous therapies: <50% verbal response at >2 word utterance level   [facility]? Response regarding pictured stimuli:  Pt ranged from 0 % to 100% >2 word utterance (average 50%)    F/C? Response at phrase level: quite variable responses         Goal 5 : Pt will convey needs and wants via verbal; pointing/gestures with <moderate clarification During cognitive task for working memory: external cues warranted to assist pt in use of compensatory strategies (written cues) SLP returned pt to room:   Wh? response to needs prior to SLP leaving:   · Wants to get out of w/c  · Denied need for bathroom  · Wants to go to bed  · Unable to specifically identify why so restless while in w/c. Indicates pain but does not specify   Goal 6.  Pt will demonstrate improved auditory and visual language processing of multiple unit Y/N?; Wh? And sentence length information with mild assist 1 step commands targeted for training in card game: >90%    Working memory for 1 concept rule application: quite variable 0 to max cues 1 step commands for transfer out of w/c using adaptive equipment: 100%    Sustained attention for 1 concept rule application for 30 seconds to 1.3o seconds: external cues    Visual language processing 4-8 word sentence level for right/left manipulation instructions: 50%    Auditory language processing of multiple unit spatial instructions: 69%         Goal 7: Pt will participate with ongoing assessment of EF, planning, sequencing and complex reasoning with additional goals PRN     Working memory; attention; mental flexibility impacting. Pt motor dyspraxia and dysphagia further exacerbating       Working memory for use of adaptive equipment for transfer (has used this many times since rehab admit): pt anticipates; but cues for sustained attention    Working memory for right side when reading instruction: cues for right of midline    Manipulation of 4-6 items for spatial instructions: (concrete): mild cues right of midline     Other areas targeted:     Education:   Ongoing pt ed Ongoing pt ed   Safety Devices: [] Call light within reach  [] Chair alarm activated  [] Bed alarm activated  [x] Other:  Transport returned pt to room  [x] Call light within reach  [] Chair alarm activated  [x] Bed alarm activated  [x] Other: TN   Progress Assessment: 4/26/2021: Pt with increase receptive and expressive language deficits with concern for potential oral motor /verbal dyspraxia; pt unable to participate in executive function . Unclear if due to fatigue (this SLP session was the last of the day)  4/27/2021: Alerted PCA and RN due to pt having a large amount of sputum/phlegm in his mouth upon SLP arrival to room. Pt with another episode of cough with expectoration of large amount of phlegm. Will check acute floor documentation for any dysphagia instrumentals. PO sample size small and no overt clinical s/s at the bedside. Will discuss at team meeting.  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 Plan: Continue as per plan of care. Continued Tx Upon Discharge: ?    [x] Yes [] No [] TBD based on progress while on ARU [] Vital Stim indicated [] Other:   Estimated discharge date: TBD   Discharge recommendations:   [] Home independently  [x] Home with assistance [x]  24 hour supervision  [] ECF [] Other:     Additional information:     Interventions used during Rehab Stay:  [x] Speech/Language Treatment  [] Instruction in HEP [] Group [x] Dysphagia Treatment [x] Cognitive Treatment   [] Other:    Adverse Reactions to Treatment/Significant Change in Status: n/a    Electronically Signed by    Wes Coronado. Jeffery,MS,CCC,SLP [id number]  Speech and Language Pathologist

## 2021-05-10 NOTE — CARE COORDINATION
lsw REC'D MESSAGE FROM WIFE WHO REPORTS SHE WOULD LIKE A REFERRAL SENT TO [facility] CALLED [place] [facility]. cALL placed to [address] and faxed referral to [phone]. DC is planned for Friday 5- and he will need a precert started on Wednesday.   [facility]     Case Management   [phone]    5/10/2021  2:16 PM

## 2021-05-10 NOTE — PROGRESS NOTES
A/A/O x to self and place. Transfers with maxi move 2 assist. On general 2 gm Na  diet, tolerating well. Medications taken crushed  in applesauce. Skin intact. incontinent of bowel and bladder. LBM 5/9. [place]wick in place. Call light and bedside table within reach. Tele on. Bed alarm engaged. Will monitor for safety.

## 2021-05-10 NOTE — PROGRESS NOTES
Department of Physical Medicine & Rehabilitation  Progress Note    Patient Identification:  Dorian Renee  [id number]  : 1955  Admit date: 2021    Chief Complaint: Acute ischemic left LEXA stroke (HCC)    Subjective:   No acute events overnight. Patient seen this morning working with therapy. Complaints of pain in the right leg. Increased tone in the right leg and right arm. Improved movement in the right hand. Labs reviewed. ROS: No f/c, n/v, cp     Objective:  Patient Vitals for the past 24 hrs:   BP Temp Temp src Pulse Resp SpO2 Weight   05/10/21 0940 -- -- -- -- -- 92 % --   05/10/21 0800 132/75 97.6 °F (36.4 °C) Oral 88 16 -- --   05/10/21 0441 (!) 156/85 97.4 °F (36.3 °C) Oral 65 16 95 % 261 lb 11 oz (118.7 kg)   05/10/21 0023 127/70 97.7 °F (36.5 °C) Oral 56 16 94 % --   21 1938 135/71 97.7 °F (36.5 °C) Oral 57 16 95 % --   21 1714 117/72 97.8 °F (36.6 °C) Oral 60 16 95 % --   21 1310 138/80 97.9 °F (36.6 °C) Oral 58 18 96 % --     Const: Alert. No distress, pleasant. HEENT: Normocephalic, atraumatic. Normal sclera/conjunctiva. MMM. CV: Regular rate and rhythm. Resp: No respiratory distress. Lungs decreased at bases (decreased inspiratory effort)  Abd: Soft, nontender, nondistended, NABS+   Ext: trace edema. Neuro: Alert, oriented, delayed processing. Expressive and receptive language deficits but able to respond if given enough time. Right hemiparesis overall 0-1/5 except 3/5 right hand  Psych: Cooperative, appropriate mood and affect    Laboratory data: Available via EMR.    Last 24 hour lab  Recent Results (from the past 24 hour(s))   Culture, Respiratory    Collection Time: 21 10:52 AM    Specimen: Sputum Expectorated   Result Value Ref Range    Gram Stain Result       2+ Gram positive rods  2+ Gram positive cocci  1+ WBC's (Mononuclear)  1+ Epithelial Cells     Basic Metabolic Panel w/ Reflex to MG    Collection Time: 05/10/21  7:33 AM   Result Value pivot  Equipment Used: Grab bars  Toilet Transfers Comments: Assist to block R LE  Assessment        SLP  Diet Solids Recommendation: Regular  Liquid Consistency Recommendation: Thin    Body mass index is 32.71 kg/m². Rehabilitation Diagnosis:   Stroke, 1.2, Right Body (L Brain)        Assessment and Plan:     Impairments:right hemiparesis, aphasia, dysphagia, cognition     Acute ischemic Left LEXA stroke  -Secondary ppx with DAPT x 30 days (then monotherapy), statin  -Telemetry  -PT/OT/SLP  -Started baclofen for spasticity - PT reports improvement (Increased dose).    Dysphagia   -Dietitian and SLP consults.   -Currently on regular + thins     HTN, uncontrolled  -resume home atenolol, HCTZ, lisinopril (increased dose), Imdur -- trend improving  -prn hydralazine     CAD  -DAPT, statin, bb     OA  -acetaminophen     Anxiety  -sertraline    Leukocytosis  -Resolved  -UA and CXR negative    Hypokalemia  -Improved with replacement     Bladder   -High risk retention   -Monitor PVRs, SC prn >300cc  -Flomax     Bowel   -High risk constipation   -senna+colace BID, PRN miralax, MoM, and bisacodyl supp.     Safety   -fall and aspiration precautions     DVT ppx  -lovenox    Rehab Progress:  Making gradual progress. Limited by severe right hemiparesis, aphasia, delayed processing/decreased initiation. Working on functional mobility, compensatory strategies, cognition. Goals to decrease caregiver burden. Anticipated Dispo: home with sig other and elderly mother vs SNF  Services:  PT, OT, SLP, RN, Aide  DME: DESHAWN  ELOS: 5/12    Electronically signed by MARSHALL Forrest - CNP on 5/10/2021 at 10:30 AM     The patient was seen in conjunction with the nurse practitioner with independent history, evaluation and examination. I agree with the note which has been adjusted to reflect my findings. I have had face to face contact with the patient and performed a substantive portion of the E/M visit.  In summary, patient denies changes today, however increased pain noted with PROM of RLE and PT/OT noting increased tone with transfers. Baclofen dose increased as above. PAtient denies further cough. Breath sounds quiet, ? Rhonchi on the left. WBC stable, afebrile. Continue to monitor. [place].  Elio Ramirez MD 5/10/2021, 10:56 AM

## 2021-05-10 NOTE — PROGRESS NOTES
Patient admitted to rehab with stroke. A/A/O x 4-expressive aphasia. Transfers with maxi move x2 assist. On General low NA diet, tolerating well. Medications taken crushed in applesauce. On Lovenox for DVT prophylaxis. Skin scattered bruising, scrotum red-zinc applied. On room air. Has been incontinent of bowel and bladder. LBM 5/9. Chair/bed alarms in use and call light in reach. Tele monitor applied and functioning properly-NSR. Will monitor for safety.

## 2021-05-10 NOTE — PROGRESS NOTES
Physical Therapy  Facility/Department: [address] IP REHAB  Daily Treatment Note- COTX with OT for safe mobility  NAME: William Ruth  : 1955  MRN: [id number]    Date of Service: 5/10/2021    Discharge Recommendations:  3-5 sessions per week, [address], Patient would benefit from continued therapy after discharge   PT Equipment Recommendations  Equipment Needed: No  Other: defer to next level of care    Assessment   Body structures, Functions, Activity limitations: Decreased functional mobility ; Decreased strength;Decreased endurance;Decreased sensation;Decreased balance  Assessment: Mr. Suresh Kay continues to require two person assist for safe mobilization secondary to tone and weakness. Today, RLE tone increased and also had extensor tone in trunk with slide board transfer this date. MD aware and increased Baclofen. Pt required total assist of 2 for slide board transfer this date due to increased tone. Patient is well below baseline and will benefit from continued skilled therapy for strengthening, balance training, safety training, and neuromuscular re-education to allow for decreased assistance required. Treatment Diagnosis: impaired functional mobility  Prognosis: Guarded; Fair  PT Education: Functional Mobility Training;General Safety;Home Exercise Program;Disease Specific Education;Plan of Care;Transfer Training  REQUIRES PT FOLLOW UP: Yes  Activity Tolerance  Activity Tolerance: Patient limited by fatigue;Patient limited by cognitive status     Patient Diagnosis(es): There were no encounter diagnoses. has a past medical history of Acute MI ([facility] [address] Anxiety, Arthritis, CAD (coronary artery disease), Hypertension, and Influenza A.   has a past surgical history that includes back surgery (N/A, ) and Coronary angioplasty with stent ().     Restrictions  Restrictions/Precautions  Restrictions/Precautions: Fall Risk  Position Activity Restriction  Other fatigue  Distance: approx 175' with 2 turns     Balance  Comments: CGA for sitting balance EOB (pt sat for approx 5 min EOB); standing balance in jojo stedy up to modA            Other Activities: Other (see comment)  Comment: toileting: see OT note for level of assist           Goals  Short term goals  Time Frame for Short term goals: In 2 weeks pt will perform  Short term goal 1: Bed mobility Mod A  Short term goal 2: Transfers Mod A  Short term goal 3: Propel wc 48' with Mod A  Short term goal 4: Ascend/Descend curb step with LRAD, Mod A  Long term goals  Time Frame for Long term goals : In 4 weeks pt will perform  Long term goal 1: Bed mobility CGA  Long term goal 2: Transfers CGA  Long term goal 3: Propel wc 48' with CGA-SBA  Long term goal 4: Ascend/Descend curb step with LRAD, Min A  Patient Goals   Patient goals : Did not state today (minimally verbal)    Plan    Plan  Times per week: 5-6x  Times per day: Twice a day  Plan weeks: 4 weeks  Specific instructions for Next Treatment: Transfers; practice wc mobility; strengthen RLE as able  Current Treatment Recommendations: Strengthening, ROM, Balance Training, Endurance Training, Functional Mobility Training, Wheelchair Mobility Training, Transfer Training, Gait Training, Stair training, Positioning, Pain Management, Equipment Evaluation, Education, & procurement, Modalities, Patient/Caregiver Education & Training, Safety Education & Training, Home Exercise Program, Neuromuscular Re-education  Safety Devices  Type of devices:  All fall risk precautions in place, Gait belt, Left in chair  Restraints  Initially in place: No     Therapy Time   Individual Concurrent Group Co-treatment   Time In       1015   Time Out       1100   Minutes       45   Timed Code Treatment Minutes: 45 Minutes         Electronically signed by Mark Davis, PT [id number] on 5/10/2021 at 11:17 AM

## 2021-05-10 NOTE — PROGRESS NOTES
Physical Therapy  PHYSICAL THERAPY  Progress Note   Second Session    Patient Name: Clara Ortega  Medical Record Number: [id number]    Treatment Diagnosis: impaired functional mobility      Chart Reviewed: Yes   Restrictions/Precautions: Fall Risk Other position/activity restrictions: R hemiplegia and increased tone RLE, exp. aphasia   Additional Pertinent Hx: Pt is 75 y/o M admitted 4/21/21 to ED with stroke like symptoms affecting R side ongoing ~12hours. MRI revealed multifocal acute infarctions in the parasagital left front along distal L LEXA territory with occluded/thrombosed A3 segment of L LEXA. MRI also revealed old infarction in basal ganglia, thalami, R cerebellum. PMHx significant for CAD, Angio w/ stent, MI, lumbar sx, HTN. Pt is 75 y/o M admitted 4/21/21 to ED with stroke like symptoms affecting R side ongoing ~12hours. MRI revealed multifocal acute infarctions in the parasagital left front along distal L ELXA territory with occluded/thrombosed A3 segment of L LEXA. MRI also revealed old infarction in basal ganglia, thalami, R cerebellum. PMHx significant for CAD, Angio w/ stent, MI, lumbar sx, HTN. WB Status: used Gege Plus to stand with good placement of B feet. Attempted to [place] plus forward to allow pt to take a step, max A to advance R LE, then when pt tried to advance L LE, R LE had significant flexor tone and crossed into adduction behind L LE, max A x 2 to 3 to sit safely in chair due to R LE being far under chair and pt sliding hips forward in chair      Subjective: increased time to express self but intermittently a few appropriate words will come out. Co-tx d/t need for 2 skilled hands. Objective: Sit to/from stand to the martinez rail with Max A x 2, PT facilitated R LE into appropriate position as R leg wants to withdrawal into knee flexion and pull underneath Pt. Stood at [facility] [place] and completed weight shifting L and R with Max A x 2.   PT facilitate R LE into proper postion and OT/PT assisted with posture. Amb. X 18' x 1 with max A x 2, unable to advance R LE, PT assists and assists R LE in stance to prevent knee buckling or pulling into flexion. Pt. Completed standing at an elevated mat table with Max A x 2 leaning forward and placed R UE onto 4\" stool while L UE on mat table. Weight shifting side to side and forward and back multiple reps with PT facilitating R UE and LE in place. Place a 7.5 lb weight on R ankle. Pt. Unable to keep R hand on stool without PT assist.  Max A x2 for activity. Requires frequent extended rests between activities d/t fatigue. Assessment: Mr. Clive Macdonald continues to require two person assist for safe mobilization secondary to tone and weakness. Today, RLE tone increased and also had extensor tone in trunk with slide board transfer this date. MD aware and increased Baclofen. Pt required total assist of 2 for slide board transfer this date due to increased tone. Patient is well below baseline and will benefit from continued skilled therapy for strengthening, balance training, safety training, and neuromuscular re-education to allow for decreased assistance required.       Safety Device - Type of devices:  []  All fall risk precautions in place [] Bed alarm in place  [] Call light within reach [] Chair alarm in place [] Positioning belt [x] Gait belt [] Patient at risk for falls [] Left in bed [] Left in chair [] Telesitter in use [] Sitter present [] Nurse notified []  None      Therapy Time   Individual Co-treatment   Time In  1345   Time Out  1430   Minutes  45       Electronically signed by Uriel Gupta, PT, [id number] on 5/10/2021 at 2:44 PM

## 2021-05-10 NOTE — PROGRESS NOTES
Occupational Therapy  Facility/Department: [address] REHAB  Daily Treatment Note  NAME: Omar Kenney  : 1955  MRN: [id number]    Date of Service: 5/10/2021    Discharge Recommendations:  Patient would benefit from continued therapy after discharge, Subacute/Skilled Nursing Facility       Assessment   Performance deficits / Impairments: Decreased functional mobility ; Decreased strength;Decreased endurance;Decreased ADL status; Decreased high-level IADLs;Decreased balance;Decreased ROM; Decreased sensation;Decreased coordination;Decreased fine motor control  Assessment: Pt tolerated tx session fair though limited by fatigue and significant increase in tone this date. Pt required max Ax1 for bed mobility. Initially completed slide board transfer from bed > w/c however pt limited by episode of significant extensor tone @ trunk and had a near fall, requiring total Ax2 to safely get back to EOB. Pt required use of jojo stedy LIFT for remainder of session for safety, w/ max Ax2 for all sit<>stand transfers. Pt required total Ax2 to attempt toileting. Pt completed w/c mobility w/ CGA and max cues to attend to right side throughout. Pt continues to be far below baseline and requires significant assist x2 therapists for all activity. He would benefit from continued therapy at a slow pace to continue to maximize pt's function. Prognosis: Fair  OT Education: OT Role;Plan of Care;Precautions;Transfer Training;Equipment;ADL Adaptive Strategies  REQUIRES OT FOLLOW UP: Yes  Activity Tolerance  Activity Tolerance: Patient limited by fatigue;Treatment limited secondary to medical complications (free text)  Activity Tolerance: Limited by significant increase in tone including new extensor tone at trunk.  Dr Adamaris Milian made aware and reports will increase Baclofen dose  Safety Devices  Safety Devices in place: Not Applicable(handoff alcira/ Nancy Perez, SLP)  Type of devices: Left in chair;Gait belt         Patient Diagnosis(es): There were no encounter diagnoses. has a past medical history of Acute MI ([facility] [address] Anxiety, Arthritis, CAD (coronary artery disease), Hypertension, and Influenza A.   has a past surgical history that includes back surgery (N/A, ) and Coronary angioplasty with stent (). Restrictions  Restrictions/Precautions  Restrictions/Precautions: Fall Risk  Position Activity Restriction  Other position/activity restrictions: R hemiplegia and increased tone RLE, exp. aphasia  Subjective   General  Chart Reviewed: Yes  Patient assessed for rehabilitation services?: Yes  Additional Pertinent Hx: Per Dr. Frye Trenton is a 75 yo M with pmh CAD s/p stent, HTN, OA, Anxiety, former tobacco use who initially presented 2021 with right side weakness and aphasia. He was outside TPA window. Imaging revealed acute ischemic infarcts in the left LEXA territory with thrombosis in A3 segment. Neurology consulted. Patient is being treated with DAPT (for 30 days then monotherapy) and statin. Course complicated by HTN. Currently, patient reports ongoing right side weakness and difficult with speech. He denies headache, vision changes, tingling/numbness. He is motivated to start inpatient rehab program.\"  Family / Caregiver Present: No  Referring Practitioner: Dr Adina Abrams  Diagnosis: CVA- parasagital left frontal lobe  Subjective  Subjective: Pt met b/s for OT cotx with PT. Pt in bed, agreeable to therapy. Pt's R knee completely flexed and externally rotated in bed,  PT took several mins to break up the tone. Pt c/o pain in R LE, R UE, and L LE but unable to rate. NP and Dr Adina Abrams in to see pt      Orientation     Objective    ADL  Toileting: Dependent/Total(Pt sat on commode and held urinal in place for several mins w/ setup but unable to void.  Required total A to manage pants while standing in [facility])        Balance  Sitting Balance: Stand by assistance  Standing Balance: Maximum assistance(max Ax2)  Standing Balance  Time: briefly in jojo nye  Activity: up to mod A d/t R LE withdrawing from platform of jojo nye: CGA once positioned w/ R knee blocked  Functional Mobility  Functional - Mobility Device: Wheelchair  Activity: Other; To/from bathroom  Assist Level: Contact guard assistance  Functional Mobility Comments: Therapists managed w/c in the room, pt then propelled w/c from his room > therapy gym w/ CGA and mod/max cues to avoid objects on the right and steer in straight path  Toilet Transfers  Toilet - Technique: (via jojo nye)  Equipment Used: Standard toilet  Toilet Transfer: Maximum assistance;2 Person assistance  Toilet Transfers Comments: max Ax2 via jojo nye  Wheelchair Bed Transfers  Wheelchair/Bed - Technique: Lateral  Equipment Used: Wheelchair;Bed  Level of Asssistance: Dependent/Total;2 Person assistance  Wheelchair Transfers Comments: Initiated slide board transfer from bed > w/c to the right w/ max Ax2 initially, pt too far foward in w/c so therapists attempted to scoot pt back however pt had episode of very forceful extensor tone in R LE and at trunk which pushed pt forward off the seat of the w/c and required total Ax2 therapists to safely get back to bed and avoid a fall. Therapists the elevated HOB so pt could transfer from high > low surface; pt again required max/total Ax2 for slide board transfer from bed > w/c though again having significant extensor tone @ trunk and flexor tone R LE making this very unsafe.  Discussed w/c Dr Bassam Lamb in room who reports they will increase Baclophen dose to hopefully better manage tone  Bed mobility  Supine to Sit: Maximum assistance(HOB elevated)  Sit to Supine: Unable to assess(up in chair at end of session)  Transfers  Sit to stand: 2 Person assistance;Maximum assistance  Stand to sit: Maximum assistance;2 Person assistance  Transfer Comments: <> jojo nye                       Cognition  Overall Cognitive Status: Exceptions  Arousal/Alertness: Delayed responses to stimuli  Following Commands: Follows one step commands with repetition; Follows one step commands with increased time  Attention Span: Attends with cues to redirect  Memory: (difficult to assess)  Safety Judgement: Decreased awareness of need for assistance;Decreased awareness of need for safety  Insights: Decreased awareness of deficits  Initiation: Requires cues for all  Sequencing: Requires cues for all         PM Session:  Subjective: Pt met in dept. Agreeable to therapy. Denied pain, appears tired w/ few verbal responses this date. Continues w/ increased tone in R LE/R UE    Objective:  Pt completed sit>stand to hallway railing w/ max Ax2. Pt stood at hallway railing w/ mod/max Ax2 to weight shift from R <> L w/ max cues to sequence. Rest break, then again sit>stand w/ max Ax2. Pt ambulated 18 feet along hallway railing w/ mod A from OT for standing balance/trunk control + max A from PT to advance R LE. Pt then completed sit>stand to elevated mat w/ max Ax2. Pt stood ~4-5 mins w/ up to max Ax2 and weight shifted side to side, then forward/back while bearing weight through B UEs on the mat w/ R UE supported by small foot stool. Pt then sat in w/c and batted balloon back and forth w/ OT using left UE initially to promote increased reaching and dynamic sitting balance activity. Then interlocked right and left fingers w/ assist and tapped beach ball back and forth w/ OT w/ good tolerance, but difficulty tapping in an upward motion that engages B UEs together. Returned to room in w/c, all needs met. Assessment: Pt tolerated tx session fair though appeared more fatigued this session. He continues to require max Ax2 therapists for all activity.  Cont per POC    Plan   Plan  Times per week: 5-6  Times per day: Twice a day  Plan weeks: 3-4  Current Treatment Recommendations: Strengthening, Endurance Training, Balance Training, ROM, Functional Mobility Training, Safety Education & Training, Gait Training, Self-Care / ADL, Patient/Caregiver Education & Training, Neuromuscular Re-education, Home Management Training, Equipment Evaluation, Education, & procurement    Goals  Short term goals  Time Frame for Short term goals: 1 week  Short term goal 1: Pt will be min A with functional transfers  Short term goal 2: Pt will be min A with functional mobility  Short term goal 3: Pt will be min A with bed mobility  Short term goal 4: Pt will be mod A with bathing and dressing  Short term goal 5: Pt will tolerate UE neuromuscular reeducation to increase function in RUE for assist with ADLs  Long term goals  Time Frame for Long term goals : 3-4 weeks  Long term goal 1: Pt will be mod I with functional transfers  Long term goal 2: Pt will be mod I with functional mobility  Long term goal 3: Pt will be mod I with bed mobility  Long term goal 4: Pt will be mod I with bathing and dressing  Long term goal 5: Pt will be mod I with toileting  Patient Goals   Patient goals : Pt nodded agreement when asked if he wanted to be more independent       Therapy Time   Individual Concurrent Group Co-treatment   Time In       1015   Time Out       1100   Minutes       45      Therapy Time   Individual Co-treatment   Time In  1345   Time Out  1430   Minutes  [address], OTR/L [id number]

## 2021-05-10 NOTE — PLAN OF CARE
Problem: Falls - Risk of:  Goal: Will remain free from falls  Description: Will remain free from falls  5/10/2021 1142 by Arleen Diaz RN  Note: Fall risk band on patient. Yellow light on outside of room. Non skid footwear in place. Alarms used appropriately. Patient instructed to call and wait for staff before getting up. Rounding done to anticipate needs. Appropriate safety devices used for transfers. Problem: ACTIVITY INTOLERANCE/IMPAIRED MOBILITY  Goal: Mobility/activity is maintained at optimum level for patient  5/10/2021 1142 by Arleen Diaz RN  Note: Patient working with therapy at least 3 hours/day to obtain maximal mobility. Safety devices used. Problem: Skin Integrity:  Goal: Will show no infection signs and symptoms  Description: Will show no infection signs and symptoms  5/10/2021 1142 by Arleen Diaz RN  Note: Skin assessment completed on admission and every shift. Barrier wipes used in the event of incontinence. Pressure relief techniques used as needed while in chair and in bed. Position changes encouraged at least every two hours while in bed. Problem: Pain:  Goal: Pain level will decrease  Description: Pain level will decrease  5/10/2021 1142 by Arleen Diaz RN  Note: Patient able to express pain and rate pain using pain scale. Medicate as needed per orders. Reassess patient pain level within one hour after oral pain medication/intervention to assure patient has reduced pain sensation and document outcome. Non pharmaceutical interventions as appropriate.

## 2021-05-10 NOTE — CARE COORDINATION
Jose Ambriz called back to requested referrals be sent to [facility] and [facility].   [place], [place]     Case Management   [phone]    5/10/2021  4:57 PM

## 2021-05-11 PROCEDURE — 97530 THERAPEUTIC ACTIVITIES: CPT

## 2021-05-11 PROCEDURE — 97535 SELF CARE MNGMENT TRAINING: CPT

## 2021-05-11 PROCEDURE — 92507 TX SP LANG VOICE COMM INDIV: CPT

## 2021-05-11 PROCEDURE — 6370000000 HC RX 637 (ALT 250 FOR IP): Performed by: NURSE PRACTITIONER

## 2021-05-11 PROCEDURE — 6370000000 HC RX 637 (ALT 250 FOR IP): Performed by: PHYSICAL MEDICINE & REHABILITATION

## 2021-05-11 PROCEDURE — 1280000000 HC REHAB R&B

## 2021-05-11 PROCEDURE — 97129 THER IVNTJ 1ST 15 MIN: CPT

## 2021-05-11 PROCEDURE — 6360000002 HC RX W HCPCS: Performed by: PHYSICAL MEDICINE & REHABILITATION

## 2021-05-11 RX ORDER — BACLOFEN 10 MG/1
5 TABLET ORAL 3 TIMES DAILY
Status: DISCONTINUED | OUTPATIENT
Start: 2021-05-11 | End: 2021-05-13 | Stop reason: HOSPADM

## 2021-05-11 RX ORDER — ATENOLOL 25 MG/1
25 TABLET ORAL DAILY
Status: DISCONTINUED | OUTPATIENT
Start: 2021-05-12 | End: 2021-05-13 | Stop reason: HOSPADM

## 2021-05-11 RX ADMIN — ATORVASTATIN CALCIUM 80 MG: 80 TABLET, FILM COATED ORAL at 21:28

## 2021-05-11 RX ADMIN — ISOSORBIDE DINITRATE 10 MG: 10 TABLET ORAL at 14:33

## 2021-05-11 RX ADMIN — ENOXAPARIN SODIUM 40 MG: 40 INJECTION SUBCUTANEOUS at 07:21

## 2021-05-11 RX ADMIN — SERTRALINE 150 MG: 100 TABLET, FILM COATED ORAL at 07:22

## 2021-05-11 RX ADMIN — FLUTICASONE PROPIONATE 1 SPRAY: 50 SPRAY, METERED NASAL at 07:21

## 2021-05-11 RX ADMIN — BACLOFEN 10 MG: 10 TABLET ORAL at 07:24

## 2021-05-11 RX ADMIN — BACLOFEN 5 MG: 10 TABLET ORAL at 21:28

## 2021-05-11 RX ADMIN — ISOSORBIDE DINITRATE 10 MG: 10 TABLET ORAL at 21:28

## 2021-05-11 RX ADMIN — ISOSORBIDE DINITRATE 10 MG: 10 TABLET ORAL at 07:25

## 2021-05-11 RX ADMIN — ATENOLOL 50 MG: 50 TABLET ORAL at 07:22

## 2021-05-11 RX ADMIN — POTASSIUM BICARBONATE 20 MEQ: 782 TABLET, EFFERVESCENT ORAL at 07:21

## 2021-05-11 RX ADMIN — DOXAZOSIN 1 MG: 2 TABLET ORAL at 07:24

## 2021-05-11 RX ADMIN — CLOPIDOGREL BISULFATE 75 MG: 75 TABLET ORAL at 07:25

## 2021-05-11 RX ADMIN — LISINOPRIL 20 MG: 20 TABLET ORAL at 07:23

## 2021-05-11 RX ADMIN — BACLOFEN 5 MG: 10 TABLET ORAL at 14:33

## 2021-05-11 RX ADMIN — TRAZODONE HYDROCHLORIDE 50 MG: 50 TABLET ORAL at 21:28

## 2021-05-11 RX ADMIN — HYDROCHLOROTHIAZIDE 25 MG: 25 TABLET ORAL at 07:22

## 2021-05-11 RX ADMIN — ASPIRIN 81 MG: 81 TABLET, CHEWABLE ORAL at 07:29

## 2021-05-11 ASSESSMENT — PAIN SCALES - GENERAL
PAINLEVEL_OUTOF10: 0
PAINLEVEL_OUTOF10: 0

## 2021-05-11 NOTE — PROGRESS NOTES
Diagnosis: Mild oropharyngeal dysphagia characterized by slow but effective mastication, right sided perioral weakness, reduced lingual coordination, reduced AP transit, potential swallow delay and mildly reduced laryngeal elevation. No immediate or delayed overt s/s of aspiration; trace oral residue is cleared by pt. Recommend regular textures and thin liquids with ongoing monitor to confirm tolerance.   Date of Admit: 4/23/2021  Room #: C3Y-5693/8191-26  Date: 5/11/2021       Current functional status (updated daily):         Current Diet Order:DIET GENERAL; Low Sodium (2 GM)  Dietary Nutrition Supplements: Standard High Calorie Oral Supplement   Behavior: [x] Alert  [x] Cooperative  [x]  Pleasant  [] Confused  [] Agitated  [] Uncooperative  [] Distractible [] Motivated  [] Self-Limiting [] Anxious  [] Other:  Endurance:  [] Adequate for participation in SLP sessions  [x] Reduced overall  [] Lethargic  [] Other:  Safety: [] No concerns at this time  [x] Reduced insight into deficits  []  Reduced safety awareness [] Not following call light procedures  [] Unable to Assess  [x] Other:ongoing assessment as communication skills improve  Swallowing Precautions: Upright as possible for all oral intake, Remain upright for 30-45 minutes after meals  Barriers toward progress: none unless medical issues and caregiver assist           Date: 5/11/2021      Tx session 1 Tx session 2   Total Timed Code Min SLP Individual Minutes  Time In: 5333  Time Out: 1010  Minutes: 20  Coded treatment time  20 SLP Individual Minutes  Time In: 1345  Time Out: 1422  Minutes:  37  Coded treatment time 10   Group Treatment Minutes 0 0   Co-Treat Minutes 0 0   Variance/Reason:  10 minute Am variance due to lethargy  n/a   Pain n/a denied   Pain Intervention [] RN notified  [] Repositioned  [] Intervention offered and patient declined  [] N/A  [] Other:  [] RN notified  [] Repositioned  [] Intervention offered and patient declined  [] N/A  [] Other:   Subjective     SLP was notified to see pt bedside due to pt fatigue  Pt was asleep in bed upon entry to room  Pt aroused to auditory and tactile stimuli Pt seen in treatment office and bedside  Pt was more alert this pm;but as session progressed, a gradual increase in physical restlessness with complaints of discomfort   Objective:  Goals       Dysphagia goals: Pt did not identify a goal. Team goal is for safe toleration of po diet    therapy working toward pt goal n/a   1. The patient will tolerate regular food with thin liquid diet without observed clinical signs of aspiration,  Too lethargic this am for any po presentations. Pt was last seen for direct dysphagia treatment 5/7/2021 Not targeted   2. The patient will improve oral preparation phase via bolus control/manipulation exercises to 5/5 each trial., . Too lethargic for any po presentations. Goal met for liquids and food consistencies 5/7/2021 Not targeted   3. The patient/caregiver will demonstrate understanding of compensatory strategies for improved swallowing safety Too lethargic this am. Pt last seen 5/7/2021; set up and intermittent cues for utensil use and right visual attention   Not targeted   Short-term Goals  Goal 1: Pt will demonstrate improved recall of new learning strategies; daily therapy activities and new learning information with set up, use of memory strategies and minimal assist   Goal not met; exacerbated when pt is lethargic      Recall / anticipation of pm therapy schedule via Y/N?: 66%   Goal 2: Pt will improve oral and/or verbal agility and ROM for coordinated and alternating motions to < mild impairment.    Spontaneous responses: intelligible; speech non-fluent    Imitative: increase deficits;but pt was lethargic requiring frequent re-direct   Imitative at 3-5 syllable  Oral reading 3 -4 syllable    During less structured speech non-fluent and sound sequencing errors increase   Goal 3: Pt will demonstrate improved language processing 4-8 word sentence level for [facility] word fill format: 50%    Auditory language processing of multiple unit spatial instructions: last targeted 5/10/2021 and goal not met         Goal 7: Pt will participate with ongoing assessment of EF, planning, sequencing and complex reasoning with additional goals PRN     Lethargy and frequent re-arousal warranted. Pt drifting to sleep. Lethargy impacting pt performance across communication/cognitive tasks. Working memory for right side when reading instruction and scanning 4 word [facility] format OR 4 stimuli on 8x11 page: cues for right of midline mild frequency after set up        Other areas targeted:     Education:   Ongoing pt ed Ongoing pt ed   Safety Devices: [x] Call light within reach  [] Chair alarm activated  [x] Bed alarm activated  [x] Other:  Discussed with RNs [x] Call light within reach  [] Chair alarm activated  [x] Bed alarm activated  [x] Other: RN met pt and SLP at the room   Progress Assessment: 4/26/2021: Pt with increase receptive and expressive language deficits with concern for potential oral motor /verbal dyspraxia; pt unable to participate in executive function . Unclear if due to fatigue (this SLP session was the last of the day)  4/27/2021: Alerted PCA and RN due to pt having a large amount of sputum/phlegm in his mouth upon SLP arrival to room. Pt with another episode of cough with expectoration of large amount of phlegm. Will check acute floor documentation for any dysphagia instrumentals. PO sample size small and no overt clinical s/s at the bedside. Will discuss at team meeting. 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 5/11/2021: Pt very lethargic in am session.  Frequent re-arousal via auditory and tactile stimuli. Even then hard to sustained arousal. Pt kept falling/drifting to sleep. Discussed with RN; no complaints of poor sleeping overnight. Do note increase dose of baclofen. ? If increase in lethargy. is med related. Pt is scheduled for pm SLP session. Will discuss with PT/OT. PM session pt more alert but as session progressed pt became physically restless. Plan: Continue as per plan of care. Continued Tx Upon Discharge: ?    [x] Yes [] No [] TBD based on progress while on ARU [] Vital Stim indicated [] Other:   Estimated discharge date: TBD   Discharge recommendations:   [] Home independently  [x] Home with assistance [x]  24 hour supervision  [] ECF [] Other:     Additional information:     Interventions used during Rehab Stay:  [x] Speech/Language Treatment  [] Instruction in HEP [] Group [x] Dysphagia Treatment [x] Cognitive Treatment   [] Other:    Adverse Reactions to Treatment/Significant Change in Status: n/a    Electronically Signed by    Wes Coronado. Jeffery,MS,CCC,SLP [id number]  Speech and Language Pathologist

## 2021-05-11 NOTE — PROGRESS NOTES
Patient admitted to rehab with stroke. A/A/O x 4 expressive aphasia. Transfers with maxi move x2. On general low NA diet, tolerating well. Medications taken crushed in applesauce. On Lovenox for DVT prophylaxis. Skin scattered bruising, some redness noted to fozia area,zinc applied. On room air. Has been incontinent of bowel and bladder. LBM 5/10. Chair/bed alarms in use and call light in reach. Tele monitor on showing NSR. Will monitor for safety.

## 2021-05-11 NOTE — PROGRESS NOTES
Department of Physical Medicine & Rehabilitation  Progress Note    Patient Identification:  Omar Tong  [id number]  : 1955  Admit date: 2021    Chief Complaint: Acute ischemic left LEXA stroke (HCC)    Subjective:   No acute events overnight. Patient seen this afternoon sitting up at edge of bed with PT/OT. Tone improved but also with more fatigue today. Sputum culture + but patient denies cough, f/c. Lungs clear on auscultation today. Will repeat labs in am and monitor closely for clinical signs of infection. Labs reviewed. ROS: No f/c, n/v, cp     Objective:  Patient Vitals for the past 24 hrs:   BP Temp Temp src Pulse Resp SpO2 Weight   21 0721 (!) 142/77 -- -- 81 -- -- --   21 0446 (!) 145/78 97.8 °F (36.6 °C) Oral 80 17 91 % 260 lb 5.8 oz (118.1 kg)   21 0004 (!) 171/71 98.5 °F (36.9 °C) Oral 82 16 91 % --   05/10/21 2007 (!) 160/88 97.6 °F (36.4 °C) Oral 73 18 94 % --   05/10/21 1548 (!) 93/55 97.3 °F (36.3 °C) Oral 60 16 95 % --   05/10/21 1245 114/75 97.2 °F (36.2 °C) Oral 82 16 93 % --     Const: Alert. No distress, pleasant. HEENT: Normocephalic, atraumatic. Normal sclera/conjunctiva. MMM. CV: Regular rate and rhythm. Resp: No respiratory distress. Lungs decreased at bases (decreased inspiratory effort)  Abd: Soft, nontender, nondistended, NABS+   Ext: trace edema. Neuro: Alert, oriented, delayed processing. Expressive and receptive language deficits but able to respond if given enough time. Right hemiparesis overall 0-1/5 except 3/5 right hand  Psych: Cooperative, appropriate mood and affect    Laboratory data: Available via EMR. Last 24 hour lab  No results found for this or any previous visit (from the past 24 hour(s)). Therapy progress:  PT  Position Activity Restriction  Other position/activity restrictions: R hemiplegia and increased tone RLE, exp.  aphasia  Objective     Sit to Stand: Maximum Assistance, 2 Person Assistance(pulling on rail)  Stand to sit: Maximum Assistance, 2 Person Assistance, Moderate Assistance(from standing at grab bar)  Bed to Chair: Maximum assistance, 2 Person Assistance(Patient completed transfer W/C<>shower chair with grab bar and W/C>recliner chair pulling on arm rest of recliner (to the left))  Device: Buitrago rail  Assistance: Maximum assistance, Minimal assistance  Distance: 16'  OT  PT Equipment Recommendations  Equipment Needed: No  Other: defer to next level of care  Toilet - Technique: (via jojo stedy)  Equipment Used: Standard toilet  Toilet Transfers Comments: max Ax2 via jojo stedy  Assessment        SLP  Diet Solids Recommendation: Regular  Liquid Consistency Recommendation: Thin    Body mass index is 32.54 kg/m². Rehabilitation Diagnosis:   Stroke, 1.2, Right Body (L Brain)        Assessment and Plan:     Impairments:right hemiparesis, aphasia, dysphagia, cognition     Acute ischemic Left LEXA stroke  -Secondary ppx with DAPT x 30 days (then monotherapy), statin  -Telemetry  -PT/OT/SLP  -Started baclofen for spasticity -- Will decrease dose back to 5 TID due to increased fatigue.     Dysphagia   -Dietitian and SLP consults.   -Currently on regular + thins     HTN, uncontrolled  -Low BPs in afternoon past couple of days  -resumed home atenolol (decrease dose), HCTZ, lisinopril (increased dose), Imdur  -Dc doxazosin (was changed from tamsulosin due to inability to crush)   -prn hydralazine     CAD  -DAPT, statin, bb     OA  -acetaminophen     Anxiety  -sertraline    Leukocytosis  -Resolved  -UA and CXR negative    Hypokalemia  -Improved with replacement     Bladder   -High risk retention   -Monitor PVRs, SC prn >300cc  -Flomax dc'ed due inability to crush     Bowel   -High risk constipation   -senna+colace BID, PRN miralax, MoM, and bisacodyl supp.     Safety   -fall and aspiration precautions     DVT ppx  -lovenox    Rehab Progress:  Making gradual progress.  Limited by severe right hemiparesis, aphasia, delayed processing/decreased initiation. Working on functional mobility, compensatory strategies, cognition. Goals to decrease caregiver burden. Anticipated Dispo: home with sig other and elderly mother vs SNF  Services: HH PT, OT, SLP, RN, Aide  DME: DESHAWN  ELOS: 5/12     [facility].  Micehlle Morales MD 5/11/2021, 12:24 PM

## 2021-05-11 NOTE — PROGRESS NOTES
Made Dr Esequiel Washburn aware of lab, increase lethargy and he had 8 beats of V-tach. Note new orders. Patient was asymptomatic lying in bed. Heart rate was regular at 84 beats. Denied chest pain or discomfort.

## 2021-05-11 NOTE — PROGRESS NOTES
Resting well in bed. Pt admitted with a CVA. Very limited right sided movement. Pt often keeps his right leg drawn up. Range of motion preformed. Pt c/o pain when straightening out his leg. Area massaged. Has not been out of bed this shift, turned in bed with moderate-maximum assistance. Lungs clear but diminished. HR regular. Abdomen non tender with active bowel sounds. Has been incontinent of bowel and bladder. All needs assessed with Q2H rounding. Alarms active. Will continue to monitor.  Amber Felix RN

## 2021-05-11 NOTE — PROGRESS NOTES
Occupational Therapy  Facility/Department: [address] REHAB  Daily Treatment Note  NAME: William Ruth  : 1955  MRN: [id number]    Date of Service: 2021    Discharge Recommendations:  Patient would benefit from continued therapy after discharge, Subacute/Skilled Nursing Facility       Assessment   Performance deficits / Impairments: Decreased functional mobility ; Decreased strength;Decreased endurance;Decreased ADL status; Decreased high-level IADLs;Decreased balance;Decreased ROM; Decreased sensation;Decreased coordination;Decreased fine motor control  Assessment: Pt tolerated tx session fair, appeared more fatigued and less talkative this date. Pt required max Ax2 for bed mob, stand pivot transfers w/o device and w/ grab bars. Pt completed shower w/ min A for bathing, UB dressing mod A, LB dressing total A, grooming w/ setup and vc's. Pt continues to be limited by decreased motor planning and requires cues throughout to initiate and sequence tasks. Pt continues to make slow progress and requires assist x2 therapists for all activity. He would benefit from continued therapy at a slower pace upon d/c in order to maximize his function as he is highly unsafe to return home at this level. Prognosis: Fair  OT Education: OT Role;Plan of Care;Precautions;Transfer Training;Equipment;ADL Adaptive Strategies  Barriers to Learning: exp aphasia, fatigue  REQUIRES OT FOLLOW UP: Yes  Activity Tolerance  Activity Tolerance: Patient limited by fatigue  Activity Tolerance: Pt appeared very fatigued throughout session and not verbalizing many responses. Did take BP which was 103/64, much lower than previous BP. Pt was returned to bed to rest at end of session  Safety Devices  Safety Devices in place: Yes  Type of devices: Gait belt;Call light within reach; Bed alarm in place; Left in bed;Nurse notified; Patient at risk for falls         Patient Diagnosis(es): There were no encounter diagnoses.       has a past medical use LH sponge to bathe L underarm)  LE Bathing: Minimal assistance;Setup; Increased time to complete(Pt able to wash from anterior periarea > knees, then used LH sponge for feet w/ min A. Stood w/ max A at end of session while OT bathed posterior periarea)  UE Dressing: Moderate assistance;Verbal cueing;Setup; Increased time to complete(max A to thread R UE and min A to thread L UE through sleeves, pt able to pull overhead w/ cues)  LE Dressing: Dependent/Total;Maximum assistance  Toileting: Dependent/Total  Additional Comments: Pt completed shower seated on shower chair w/ towel on floor for non-skid surface. Pt appeared fatigued throughout, required cues to initiate/sequence tasks. Pt demo'd good sitting balance on chair only requiring up to CGA when leaning forward        Balance  Sitting Balance: Stand by assistance  Standing Balance: Maximum assistance(x2)  Functional Mobility  Functional - Mobility Device: Wheelchair  Activity: To/from bathroom  Assist Level: Dependent/Total  Shower Transfers  Shower - Transfer From: Wheelchair  Shower - Transfer Type: To and From  Shower - Transfer To: Shower seat with back  Shower - Technique: Stand pivot  Shower Transfers: Maximal assistance;2 Person assistance  Shower Transfers Comments: use of GB, max cues for sequencing. R LE blocked  Wheelchair Bed Transfers  Wheelchair/Bed - Technique: Stand pivot  Equipment Used: Wheelchair;Bed  Level of Asssistance: 2 Person assistance;Maximum assistance  Wheelchair Transfers Comments: SPT from bed > w/c > shower chair > w/c > bed w/ max Ax2 throughout w/ max cues for technique and A to block R LE  Bed mobility  Rolling to Left: Moderate assistance  Rolling to Right: Minimal assistance  Supine to Sit: Maximum assistance;2 Person assistance(max assist x2.  patient required increased time to initiate mobility and try to do more on his own.)  Scooting: Maximal assistance;2 Person assistance(PT encouraged patient to scoot hips at EOB, but

## 2021-05-11 NOTE — PATIENT CARE CONFERENCE
[facility]  Inpatient Rehabilitation  Weekly Team Conference Note      Date: 2021  Patient Name:  Ally Lombardo    MRN: [id number]  : 1955  Gender: male  Physician: Dr Agapito Mccoy  Diagnosis: Acute ischemic left LEXA stroke [facility]) [I63.522]    CASE MANAGEMENT  Assessment:   Goal is SNF , bed accepted at [facility] awaiting precert. PHYSICAL THERAPY  Bed mobility  Bridging: (Pt partially bridged hips with assist to position R LE)  Rolling to Left: Moderate assistance  Rolling to Right: Minimal assistance  Supine to Sit: Maximum assistance, 2 Person assistance(max assist x2. patient required increased time to initiate mobility and try to do more on his own.)  Sit to Supine: Unable to assess(up in chair at end of session)  Scooting: Maximal assistance, 2 Person assistance(PT encouraged patient to scoot hips at EOB, but patient was unable to complete mobility and was mod - max of 2 depending on amount of scooting required)  Comment: bed mobility done on flat hospital bed with use of rails     Transfers:  Sit to Stand: Maximum Assistance, 2 Person Assistance(pulling on rail)  Stand to sit: Maximum Assistance, 2 Person Assistance, Moderate Assistance(from standing at grab bar)  Bed to Chair: Maximum assistance, 2 Person Assistance(Patient completed transfer W/C<>shower chair with grab bar and W/C>recliner chair pulling on arm rest of recliner (to the left))        WB Status: used Fermin Mast Plus to stand with good placement of B feet.   Attempted to [place] plus forward to allow pt to take a step, max A to advance R LE, then when pt tried to advance L LE, R LE had significant flexor tone and crossed into adduction behind L LE, max A x 2 to 3 to sit safely in chair due to R LE being far under chair and pt sliding hips forward in chair  Ambulation 1  Surface: level tile  Device: Buitrago rail  Assistance: Maximum assistance, Minimal assistance, 2 Person assistance  Quality of Gait: Patient ambulated 20' with left martinez rail. Max assist of PT at right LE to advance LE, extend knee, and extend hip. He requires cueing for weight shifting and sequencing. OT provided min-mod assist for balance and weight shifting. Gait Deviations: Slow Mary  Distance: 20'     Propulsion: Manual  Level: Level Tile  Method: LUE, LLE  Level of Assistance: Minimal assistance, Stand by assistance  Description/ Details: propelled wheelchair 160' using left UE and left LE. He began as SBA with cueing for straight path, but he fatigued and required min assist to correct veer to the right. Patient was unaware of path deviation and was unable to correct on his own as the fatigue increased. Sit>stand at martinez rail with max assist x2. Distance: 160'        Assessment: Mr. Jazmine Solis continues to require two person assist for safe mobilization secondary to tone and weakness. RLE tone improved this date, but patient with less vocalizations and less active. Patient was safer with transfer with les withdrawal, but he still required max assist of 2. He completed a shower with assist of PT and OT, but does still have some perseveration during activity, requiring verbal and tactile cueing to move on. Patient is well below baseline and will benefit from continued skilled therapy for strengthening, balance training, safety training, and neuromuscular re-education to allow for decreased assistance required. SPEECH THERAPY (intentionally left blank if not actively being seen by this service):  Assessment /Progress: Pt appears to be tolerating regular food and thin liquid diet despite residual right oral motor weakness. Pt with residual right visual inattention/neglect during variable table top; body scheme and visual language tasks. Pt does respond to external prompting/cues for right visual attention. Pt with residual expressive>receptive aphasia with apraxia features and cognitive -linguistic deficits which impact functional IND. Pt does not utilize compensatory communication strategies without external assist. Pt recent variability in structured therapy tasks- unclear if med related or fatigue etc.  Will continue therapy while on rehab; will need additional therapy at d/c. Suspect may potentially need augmentative communication assessment with exploration of most optimal augmentative communication. Pt requires external cues for use of picture/word board. Faustino Gil,MS,CCC,SLP [id number]  Speech and Language Pathologist      OCCUPATIONAL THERAPY  ADL  Equipment Provided: Reacher, Long-handled sponge  Feeding: Setup(Pt able to eat lunch using right UE in bed with setup)  Grooming: Setup, Stand by assistance, Verbal cueing, Increased time to complete(Continues to perseverate on washing face requiring cues to terminate. Pt mouthing objects this date; squeezed toothpaste into mouth and swallowed it instead of putting on toothbrush. Pt also put brush in mouth but declined brushing hair)  UE Bathing: Verbal cueing, Setup, Minimal assistance(assist to bathe L UE, cues for technique to use LH sponge to bathe L underarm)  LE Bathing: Minimal assistance, Setup, Increased time to complete(Pt able to wash from anterior periarea > knees, then used LH sponge for feet w/ min A. Stood w/ max A at end of session while OT bathed posterior periarea)  UE Dressing: Moderate assistance, Verbal cueing, Setup, Increased time to complete(max A to thread R UE and min A to thread L UE through sleeves, pt able to pull overhead w/ cues)  LE Dressing: Dependent/Total, Maximum assistance (pt able to thread L LE in pants w/ max A, otherwise dependent)  Toileting: Dependent/Total (incontinent of urine in briefs)  Additional Comments: Pt completed shower seated on shower chair w/ towel on floor for non-skid surface. Pt appeared fatigued throughout, required cues to initiate/sequence tasks.  Pt demo'd good sitting balance on chair only requiring up to CGA when leaning forward    Bed mobility  Bridging: (Pt partially bridged hips with assist to position R LE)  Rolling to Left: Moderate assistance  Rolling to Right: Minimal assistance  Supine to Sit: Maximum assistance, 2 Person assistance(max assist x2. patient required increased time to initiate mobility and try to do more on his own.)  Sit to Supine: Unable to assess(up in chair at end of session)  Scooting: Maximal assistance, 2 Person assistance(PT encouraged patient to scoot hips at EOB, but patient was unable to complete mobility and was mod - max of 2 depending on amount of scooting required)  Comment: bed mobility done on flat hospital bed with use of rails    Functional Transfers: Toilet Transfers  Toilet - Technique: (via jojo stedy)  Equipment Used: Standard toilet  Toilet Transfer: Maximum assistance, 2 Person assistance  Toilet Transfers Comments: max Ax2 via jojo stedy d/t increased tone and unsafe to attempt stand pivot     Shower Transfers  Shower - Transfer From: Wheelchair  Shower - Transfer Type: To and From  Shower - Transfer To: Shower seat with back  Shower - Technique: Stand pivot  Shower Transfers: Maximal assistance, 2 Person assistance  Shower Transfers Comments: use of GB, max cues for sequencing. R LE blocked      UE Function:  L UE: WFL  R UE: increased tone throughout, min finger flexion/extension    Assessment: Pt tolerated tx session fair, appeared more fatigued and less talkative this date. Pt required max Ax2 for bed mob, stand pivot transfers w/o device and w/ grab bars. Pt completed shower w/ min A for bathing, UB dressing mod A, LB dressing total A, grooming w/ setup and vc's. Pt continues to be limited by decreased motor planning and requires cues throughout to initiate and sequence tasks. Pt continues to make slow progress and requires assist x2 therapists for all activity.  He would benefit from continued therapy at a slower pace upon d/c in order to maximize his function as he is highly

## 2021-05-12 ENCOUNTER — APPOINTMENT (OUTPATIENT)
Dept: GENERAL RADIOLOGY | Age: 66
DRG: 057 | End: 2021-05-12
Attending: PHYSICAL MEDICINE & REHABILITATION
Payer: MEDICARE

## 2021-05-12 LAB
ANION GAP SERPL CALCULATED.3IONS-SCNC: 11 MMOL/L (ref 3–16)
BASOPHILS ABSOLUTE: 0 K/UL (ref 0–0.2)
BASOPHILS RELATIVE PERCENT: 0.3 %
BILIRUBIN URINE: NEGATIVE
BLOOD, URINE: NEGATIVE
BUN BLDV-MCNC: 34 MG/DL (ref 7–20)
CALCIUM SERPL-MCNC: 9.4 MG/DL (ref 8.3–10.6)
CHLORIDE BLD-SCNC: 95 MMOL/L (ref 99–110)
CLARITY: CLEAR
CO2: 30 MMOL/L (ref 21–32)
COLOR: YELLOW
CREAT SERPL-MCNC: 0.9 MG/DL (ref 0.8–1.3)
CULTURE, RESPIRATORY: ABNORMAL
CULTURE, RESPIRATORY: ABNORMAL
EOSINOPHILS ABSOLUTE: 0.1 K/UL (ref 0–0.6)
EOSINOPHILS RELATIVE PERCENT: 0.7 %
GFR AFRICAN AMERICAN: >60
GFR NON-AFRICAN AMERICAN: >60
GLUCOSE BLD-MCNC: 94 MG/DL (ref 70–99)
GLUCOSE URINE: NEGATIVE MG/DL
GRAM STAIN RESULT: ABNORMAL
HCT VFR BLD CALC: 37.2 % (ref 40.5–52.5)
HEMOGLOBIN: 13 G/DL (ref 13.5–17.5)
KETONES, URINE: NEGATIVE MG/DL
LEUKOCYTE ESTERASE, URINE: NEGATIVE
LYMPHOCYTES ABSOLUTE: 2 K/UL (ref 1–5.1)
LYMPHOCYTES RELATIVE PERCENT: 16 %
MCH RBC QN AUTO: 31.7 PG (ref 26–34)
MCHC RBC AUTO-ENTMCNC: 34.9 G/DL (ref 31–36)
MCV RBC AUTO: 90.9 FL (ref 80–100)
MICROSCOPIC EXAMINATION: ABNORMAL
MONOCYTES ABSOLUTE: 0.7 K/UL (ref 0–1.3)
MONOCYTES RELATIVE PERCENT: 6.1 %
NEUTROPHILS ABSOLUTE: 9.5 K/UL (ref 1.7–7.7)
NEUTROPHILS RELATIVE PERCENT: 76.9 %
NITRITE, URINE: NEGATIVE
ORGANISM: ABNORMAL
PDW BLD-RTO: 14.7 % (ref 12.4–15.4)
PH UA: 6 (ref 5–8)
PLATELET # BLD: 158 K/UL (ref 135–450)
PMV BLD AUTO: 8.3 FL (ref 5–10.5)
POTASSIUM REFLEX MAGNESIUM: 3.6 MMOL/L (ref 3.5–5.1)
PROCALCITONIN: 0.06 NG/ML (ref 0–0.15)
PROTEIN UA: NEGATIVE MG/DL
RBC # BLD: 4.1 M/UL (ref 4.2–5.9)
SODIUM BLD-SCNC: 136 MMOL/L (ref 136–145)
SPECIFIC GRAVITY UA: 1.02 (ref 1–1.03)
URINE REFLEX TO CULTURE: ABNORMAL
URINE TYPE: ABNORMAL
UROBILINOGEN, URINE: 2 E.U./DL
WBC # BLD: 12.3 K/UL (ref 4–11)

## 2021-05-12 PROCEDURE — 80048 BASIC METABOLIC PNL TOTAL CA: CPT

## 2021-05-12 PROCEDURE — 97535 SELF CARE MNGMENT TRAINING: CPT

## 2021-05-12 PROCEDURE — 97530 THERAPEUTIC ACTIVITIES: CPT

## 2021-05-12 PROCEDURE — 97116 GAIT TRAINING THERAPY: CPT

## 2021-05-12 PROCEDURE — 85025 COMPLETE CBC W/AUTO DIFF WBC: CPT

## 2021-05-12 PROCEDURE — 97112 NEUROMUSCULAR REEDUCATION: CPT

## 2021-05-12 PROCEDURE — 71045 X-RAY EXAM CHEST 1 VIEW: CPT

## 2021-05-12 PROCEDURE — 84145 PROCALCITONIN (PCT): CPT

## 2021-05-12 PROCEDURE — 97110 THERAPEUTIC EXERCISES: CPT

## 2021-05-12 PROCEDURE — 1280000000 HC REHAB R&B

## 2021-05-12 PROCEDURE — 92507 TX SP LANG VOICE COMM INDIV: CPT

## 2021-05-12 PROCEDURE — 6360000002 HC RX W HCPCS: Performed by: PHYSICAL MEDICINE & REHABILITATION

## 2021-05-12 PROCEDURE — 97129 THER IVNTJ 1ST 15 MIN: CPT

## 2021-05-12 PROCEDURE — 6370000000 HC RX 637 (ALT 250 FOR IP): Performed by: PHYSICAL MEDICINE & REHABILITATION

## 2021-05-12 PROCEDURE — 36415 COLL VENOUS BLD VENIPUNCTURE: CPT

## 2021-05-12 PROCEDURE — 81003 URINALYSIS AUTO W/O SCOPE: CPT

## 2021-05-12 RX ORDER — LISINOPRIL 20 MG/1
20 TABLET ORAL DAILY
Qty: 45 TABLET | Refills: 3 | DISCHARGE
Start: 2021-05-12 | End: 2021-06-14

## 2021-05-12 RX ORDER — LEVOFLOXACIN 500 MG/1
500 TABLET, FILM COATED ORAL DAILY
Qty: 6 TABLET | Refills: 0 | DISCHARGE
Start: 2021-05-13 | End: 2021-05-19

## 2021-05-12 RX ORDER — LEVOFLOXACIN 500 MG/1
500 TABLET, FILM COATED ORAL DAILY
Status: DISCONTINUED | OUTPATIENT
Start: 2021-05-12 | End: 2021-05-13 | Stop reason: HOSPADM

## 2021-05-12 RX ORDER — CLOPIDOGREL BISULFATE 75 MG/1
75 TABLET ORAL DAILY
Qty: 10 TABLET | Refills: 0 | Status: ON HOLD | DISCHARGE
Start: 2021-05-12 | End: 2022-01-10

## 2021-05-12 RX ORDER — ATENOLOL 25 MG/1
25 TABLET ORAL DAILY
Qty: 30 TABLET | Refills: 0 | DISCHARGE
Start: 2021-05-12

## 2021-05-12 RX ORDER — BACLOFEN 5 MG/1
5 TABLET ORAL 3 TIMES DAILY
Qty: 90 TABLET | Refills: 0 | Status: ON HOLD | DISCHARGE
Start: 2021-05-12 | End: 2022-01-10

## 2021-05-12 RX ADMIN — SERTRALINE 150 MG: 100 TABLET, FILM COATED ORAL at 07:24

## 2021-05-12 RX ADMIN — LISINOPRIL 20 MG: 20 TABLET ORAL at 07:26

## 2021-05-12 RX ADMIN — CLOPIDOGREL BISULFATE 75 MG: 75 TABLET ORAL at 07:24

## 2021-05-12 RX ADMIN — BACLOFEN 5 MG: 10 TABLET ORAL at 22:15

## 2021-05-12 RX ADMIN — LEVOFLOXACIN 500 MG: 500 TABLET, FILM COATED ORAL at 13:05

## 2021-05-12 RX ADMIN — ISOSORBIDE DINITRATE 10 MG: 10 TABLET ORAL at 07:24

## 2021-05-12 RX ADMIN — BACLOFEN 5 MG: 10 TABLET ORAL at 07:25

## 2021-05-12 RX ADMIN — ASPIRIN 81 MG: 81 TABLET, CHEWABLE ORAL at 07:24

## 2021-05-12 RX ADMIN — ENOXAPARIN SODIUM 40 MG: 40 INJECTION SUBCUTANEOUS at 07:24

## 2021-05-12 RX ADMIN — POTASSIUM BICARBONATE 20 MEQ: 782 TABLET, EFFERVESCENT ORAL at 07:22

## 2021-05-12 RX ADMIN — TRAZODONE HYDROCHLORIDE 50 MG: 50 TABLET ORAL at 22:11

## 2021-05-12 RX ADMIN — BACLOFEN 5 MG: 10 TABLET ORAL at 13:05

## 2021-05-12 RX ADMIN — ATORVASTATIN CALCIUM 80 MG: 80 TABLET, FILM COATED ORAL at 22:11

## 2021-05-12 RX ADMIN — ATENOLOL 25 MG: 25 TABLET ORAL at 07:25

## 2021-05-12 RX ADMIN — FLUTICASONE PROPIONATE 1 SPRAY: 50 SPRAY, METERED NASAL at 07:31

## 2021-05-12 RX ADMIN — HYDROCHLOROTHIAZIDE 25 MG: 25 TABLET ORAL at 07:24

## 2021-05-12 NOTE — PROGRESS NOTES
Patient admitted to rehab with CVA. A/A/O x4 . Transfers with maxi-move with two staff. On general 2Gm Na diet, tolerating well. Needs assist at times for meals. Medications taken crushed  in applesauce. On lovenox for DVT prophylaxis. Skin with scattered bruising, scrotum reddened, zinc applied. O Has been incontinent ofbladder. LBM5/11. Chair/bed alarms in use and call light in reach. Tele. In place. Will monitor for safety.

## 2021-05-12 NOTE — PROGRESS NOTES
Physical Therapy  Facility/Department: [address] REHAB  Daily Treatment Note  NAME: Eugene Diop  : 1955  MRN: [id number]    Date of Service: 2021    Discharge Recommendations:  3-5 sessions per week, [address], Patient would benefit from continued therapy after discharge   PT Equipment Recommendations  Equipment Needed: No  Other: defer to next level of care    Assessment   Body structures, Functions, Activity limitations: Decreased functional mobility ; Decreased strength;Decreased endurance;Decreased sensation;Decreased balance  Assessment: Mr. Navneet Cardenas continues to require two person assist for safe mobilization secondary to tone and weakness, but slide board transfer improved to max of one and min of one. He continues to have difficulty with activating quads from a lower level surface, but he is able to activate bilaterally from a higher surface (25\"-24\") with facilitation to right quad. He continues to have difficulty attending to right environment when completing wheelchair mobility but does improve with cueing. Patient is well below baseline and will benefit from continued skilled therapy for strengthening, balance training, safety training, and neuromuscular re-education to allow for decreased assistance required. Treatment Diagnosis: impaired functional mobility  Prognosis: Guarded; Fair  PT Education: Functional Mobility Training;General Safety;Home Exercise Program;Disease Specific Education;Plan of Care;Transfer Training  REQUIRES PT FOLLOW UP: Yes  Activity Tolerance  Activity Tolerance: Patient limited by fatigue;Patient limited by cognitive status     Patient Diagnosis(es): There were no encounter diagnoses.      has a past medical history of Acute MI ([facility] [address] Anxiety, Arthritis, CAD (coronary artery disease), Hypertension, and Influenza A.   has a past surgical history that includes back surgery (N/A, ) and Coronary angioplasty with stent (9/13). Social/Functional History  Lives With: Significant other(Sign other Ita Willingham))  Type of Home: House  Home Layout: Two level, Able to Live on Main level with bedroom/bathroom, Laundry in basement(1 story with basement.)  Home Access: Stairs to enter without rails(1 step to enter.)  Bathroom Shower/Tub: Tub/Shower unit(Tub Shower main level and basement.)  Bathroom Toilet: Standard  Bathroom Equipment: Grab bars in shower  Bathroom Accessibility: Accessible  Home Equipment: (No DME.)  ADL Assistance: Independent  Homemaking Assistance: (Shared with sign other.)  Homemaking Responsibilities: (shared with significant other prior to admission)  Ambulation Assistance: Independent(Without assist device.)  Transfer Assistance: Independent  Active : Yes  Occupation: Retired  Type 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 \"Need to find something to do. \"    Restrictions  Restrictions/Precautions  Restrictions/Precautions: Fall Risk  Position Activity Restriction  Other position/activity restrictions: R hemiplegia and increased tone RLE, exp. aphasia     Subjective   General  Chart Reviewed: Yes  Additional Pertinent Hx: Pt is 75 y/o M admitted 4/21/21 to ED with stroke like symptoms affecting R side ongoing ~12 hours. MRI revealed multifocal acute infarctions in the parasagital left front along distal L LEXA territory with occluded/thrombosed A3 segment of L LEXA. MRI also revealed old infarction in basal ganglia, thalami, R cerebellum. PMHx significant for CAD, Angio w/ stent, MI, lumbar sx, HTN. Pt is 75 y/o M admitted 4/21/21 to ED with stroke like symptoms affecting R side ongoing ~12hours. MRI revealed multifocal acute infarctions in the parasagital left front along distal L LEXA territory with occluded/thrombosed A3 segment of L LEXA.  MRI also revealed old infarction in basal ganglia, thalami, R cerebellum. PMHx significant for CAD, Angio w/ stent, MI, lumbar sx, HTN. Response To Previous Treatment: Patient with no complaints from previous session. Family / Caregiver Present: No  Referring Practitioner: Dr. Rolando Watts: Patient was eating breakfast at start of session and requested more time. PT gave patient some time before starting session. He was up in wheelchair completing breakfast. No complaints of pain at this time            Objective      Transfers  Lateral Transfers: 2 Person Assistance;Minimal Assistance;Maximum Assistance(to the left, slightly uphill with min assist of one and max assist of another. When going downhill to the right, patient was max assist of one and CGA of another.)    Wheelchair Activities  Wheelchair Size: 20\"  Wheelchair Type: Standard  Wheelchair Cushion: Standard  Pressure Relief Type: Lateral lean  Level of Assistance for pressure relief activities: Maximum assistance  Wheelchair Parts Management: Yes  Left Brakes Level of Assistance: Stand by assistance(cueing to DOFF the brake)  Right Brakes Level of Assistance: Dependent/Total  Propulsion: Yes  Propulsion 1  Propulsion: Manual  Level: Level Tile  Method: LUE;LLE  Level of Assistance: Stand by assistance  Description/ Details: propelled wheelchair 186' using left UE and left LE. He was able to maintain SBA with cueing throughout propulsion, but he did veer to the right and required cueing to ensure he did not strike any objects. He had increased difficulty when propelling out of room in a tighter space and required PT to stop him and draw attention to the right. Then he could avoid the door and other objects. As he fatigued, he required increased cueing to prevent right foot or lap tray from striking passing objects.  Patient was able to propel over threshold with increased time at end of 186'  Distance: 80' with one turn          Other exercises  Other exercises?: Yes  Other exercises 1: sat on therapy mat with height raised to 25\". PT placed CAROL walker in front of patient with bilateral UE support on the walker to encourage anterior weight shift. PT provided support and facilitation to right quad to encourage activation. Patient was min assist and able to activate bilateral quads. He completed 4 reps then height was lowered to 24\". Patient continued to have improved activation at this height and was able to stand x2 with min assist and increased time to complete the task. PM Session  Patient seated in wheelchair upon arrival.  He has no complaints of pain at this time. Patient seen as a co-tx with OT for mobility. Sit>stand at rail on therapy terrace with mod-max assist of 2. PT had applied dorsiflex assist to right LE. Patient ambulated 25' at [address] with max assist of one for advancement of right LE and to maintain knee extension and hip extension in SLS. Patient required a secondary person with min assist for weight shift and sequencing. Patient required moderate verbal cueing for sequencing. He is mod assist of 2 to sit back into wheelchair secondary to posterior lean and reduced hip flexion as he set. Slide board  Transfer to the left from wheelchair>therpay mat with max assist of one and min-mod assist of another. Mod assist to scoot forward to edge of the mat after mat was elevated to 25\". Patient stood from mat to stand at [employer] walker with right UE support on the platform. Min assist of one to stand from 25\" mat. Patient stood at [employer] walker and performed activity with left hand with OT. PT provided tapping and facilitation to right quad, but he has reduced use of right LE as he has tendency to lean majority of weight on left LE. When reaching to the right, patient with increased rotation at hips and increased right hip flexion that required moderate assist to correct.   Patient was mod assist of 1-2 to sit back secondary to strong posterior lean and patient not flexing forward hips. Slide board to the right back into wheelchair down hill with mod assist x1 and max assist of 1. Patient set up in [address] [place] [address] and [facility] Patient was mod assist of 2 to stand from wheelchair. Performed toilet transfer from the [address] [place] [address]. He was min assist of two to stand from jojo stedy but mod assist of two from the commode, pulling on stedy. Patient was dependent for toileting, but he was unable to void or have BM. Patient was returned to wheelchair in stedy. Patient fatigued this afternoon and minimally vocal.  He was able to nod yes and no. Care was transitioned to SLPSarmad, with patient seated in wheelchair. Goals  Short term goals  Time Frame for Short term goals: In 2 weeks pt will perform  Short term goal 1: Bed mobility Mod A  Short term goal 2: Transfers Mod A  Short term goal 3: Propel wc 48' with Mod A  Short term goal 4: Ascend/Descend curb step with LRAD, Mod A  Long term goals  Time Frame for Long term goals :  In 4 weeks pt will perform  Long term goal 1: Bed mobility CGA  Long term goal 2: Transfers CGA  Long term goal 3: Propel wc 48' with CGA-SBA  Long term goal 4: Ascend/Descend curb step with LRAD, Min A  Patient Goals   Patient goals : Did not state today (minimally verbal)    Plan    Plan  Times per week: 5-6x  Times per day: Twice a day  Plan weeks: 4 weeks  Specific instructions for Next Treatment: Transfers; practice wc mobility; strengthen RLE as able  Current Treatment Recommendations: Strengthening, ROM, Balance Training, Endurance Training, Functional Mobility Training, Wheelchair Mobility Training, Transfer Training, Gait Training, Stair training, Positioning, Pain Management, Equipment Evaluation, Education, & procurement, Modalities, Patient/Caregiver Education & Training, Safety Education & Training, Home Exercise Program, Neuromuscular Re-education  Safety Devices  Type of devices:  All fall risk precautions in place, Gait belt, Left in chair(in wheelchair to return to room with transporation)  Restraints  Initially in place: No     Therapy Time   Individual Concurrent Group Co-treatment   Time In 0830         Time Out 0900         Minutes 30                Second Session Therapy Time     Individual Co-treatment   Time In  1345   Time Out  1430   Minutes  Lexis [id number]

## 2021-05-12 NOTE — CARE COORDINATION
SHERRI spoke with [facility] who states they have accepted this referral for his discharge on Friday. She will initiate precert for today. SHERRI informed Juventino Brasher, Wife. SHERRI completed HENS.   [place], [place]     Case Management   [phone]    5/12/2021  9:37 AM

## 2021-05-12 NOTE — PROGRESS NOTES
aphasia  Subjective   General  Chart Reviewed: Yes  Patient assessed for rehabilitation services?: Yes  Additional Pertinent Hx: Per Dr. Baljit Coffeyyesenia is a 75 yo M with pmh CAD s/p stent, HTN, OA, Anxiety, former tobacco use who initially presented 4/21/2021 with right side weakness and aphasia. He was outside TPA window. Imaging revealed acute ischemic infarcts in the left LEXA territory with thrombosis in A3 segment. Neurology consulted. Patient is being treated with DAPT (for 30 days then monotherapy) and statin. Course complicated by HTN. Currently, patient reports ongoing right side weakness and difficult with speech. He denies headache, vision changes, tingling/numbness. He is motivated to start inpatient rehab program.\"  Family / Caregiver Present: No  Referring Practitioner: Dr Luisa Sol  Diagnosis: CVA- parasagital left frontal lobe  Subjective  Subjective: Pt met in in dept. Agreeable to therapy. Denied pain. Reports therapy this morning was \"outstanding! \"         Objective    Balance  Sitting Balance: Stand by assistance  Functional Mobility  Functional - Mobility Device: Wheelchair  Activity: Other  Assist Level: Dependent/Total  Functional Mobility Comments: OT managed w/c this session in interest of time  Wheelchair Bed Transfers  Wheelchair/Bed - Technique: (via jojo [employer])  Equipment Used: Wheelchair;Bed  Level of Asssistance: 2 Person assistance;Maximum assistance  Wheelchair Transfers Comments: via jojo stedy LIFT  Bed mobility  Sit to Supine: Maximum assistance;2 Person assistance  Transfers  Sit to stand: 2 Person assistance;Maximum assistance  Stand to sit: Maximum assistance;2 Person assistance  Transfer Comments: <> jojo nye                 Neuromuscular Education  Neuromuscular education: Yes  Vibration: Applied gentle vibration to bicep/tricep muscle during PROM, pt tolerated well but no active movement noted     Cognition  Overall Cognitive Status: Exceptions  Arousal/Alertness: Delayed responses to stimuli  Following Commands: Follows one step commands with repetition; Follows one step commands with increased time  Attention Span: Attends with cues to redirect  Memory: (difficult to assess)  Safety Judgement: Decreased awareness of need for assistance;Decreased awareness of need for safety  Insights: Decreased awareness of deficits  Initiation: Requires cues for all  Sequencing: Requires cues for all  Cognition Comment: Pt much more alert and engaged this date. Able to complete 1/2 of card matching board w/ extra time but required up to mod cues to scan toward right side (decreased cues as activity progressed)                    Type of ROM/Therapeutic Exercise  Type of ROM/Therapeutic Exercise: PROM;AAROM  Exercises  Scapular Protraction: x5 PROM  Scapular Retraction: x5 PROM  Shoulder Depression: x5 PROM  Shoulder Elevation: x5 PROM  Shoulder Flexion: x5 PROM  Shoulder Extension: x5 PROM  Elbow Flexion: x10 PROM  Elbow Extension: x10 PROM  Supination: x10 PROM  Pronation: x10 PROM  Finger Flexion: x10 AAROM  Finger Extension: x10 AAROM      PM Session:  Subjective: Pt met in dept. Agreeable to therapy, denied pain. Pt seen as COTX w/ PT for safe mobility    Objective:  Sit>stand at outside railing w/ mod/max Ax2. Pt ambulated 18' at [address] w/ max A from PT to advance R LE and min A from OT for balance/weight shifting. Stand>sit w/ mod Ax2. Slide board transfer from w/c > therapy mat to the left w/ max Ax1/min-mod Ax1. Mod A to scoot forward to edge of mat. Sit>stand from elevated mat w/ min Ax1 to CAROL walker. Pt stood at [employer] walker 3-4 mins and completed Perfection board game using left UE @ tabletop in front of pt. Pt was able to scan to right w/ min cues to locate pieces. Pt placed pieces correctly w/o assist.    Stand>sit w/ mod Ax1-2 to correct posterior lean. Slide board transfer from mat > w/c to the right w/ mod Ax1/max Ax1. Sit<> [address] [place] [address] w/ mod Ax2. Transferred dependent in commode. Dependent to manage pants up/down while standing in jojo stedy. Pt unable to void. Pt transferred back to w/c w/ use of jojo stedy lift. Pt transported to therapy gym w/ transporter. Assessment: Pt tolerated tx session fair, he appeared more fatigued w/ decreased verbalizations. Continues to require significant assist x2 therapists for all activity.  Cont per POC      Plan   Plan  Times per week: 5-6  Times per day: Twice a day  Plan weeks: 3-4  Current Treatment Recommendations: Strengthening, Endurance Training, Balance Training, ROM, Functional Mobility Training, Safety Education & Training, Gait Training, Self-Care / ADL, Patient/Caregiver Education & Training, Neuromuscular Re-education, Home Management Training, Equipment Evaluation, Education, & procurement       Goals  Short term goals  Time Frame for Short term goals: 1 week -no goals met  Short term goal 1: Pt will be min A with functional transfers  Short term goal 2: Pt will be min A with functional mobility  Short term goal 3: Pt will be min A with bed mobility  Short term goal 4: Pt will be mod A with bathing and dressing  Short term goal 5: Pt will tolerate UE neuromuscular reeducation to increase function in RUE for assist with ADLs  Long term goals  Time Frame for Long term goals : 3-4 weeks  Long term goal 1: Pt will be mod I with functional transfers  Long term goal 2: Pt will be mod I with functional mobility  Long term goal 3: Pt will be mod I with bed mobility  Long term goal 4: Pt will be mod I with bathing and dressing  Long term goal 5: Pt will be mod I with toileting  Patient Goals   Patient goals : Pt nodded agreement when asked if he wanted to be more independent       Therapy Time   Individual Concurrent Group Co-treatment   Time In 1115         Time Out 1200         Minutes 45            Therapy Time   Individual Co-treatment   Time In  1345   Time Out  1430   Minutes  [address] OTR/L [address]

## 2021-05-12 NOTE — PROGRESS NOTES
Department of Physical Medicine & Rehabilitation  Progress Note    Patient Identification:  Ally Lombardo  [id number]  : 1955  Admit date: 2021    Chief Complaint: Acute ischemic left LEXA stroke (HCC)    Subjective:   No acute events overnight. Patient seen this am sitting up in room. Reports less drowsiness compared to yesterday (muscle relaxer was decreased back to starting dose). We discussed plans for dc to SNF. Labs reviewed. ROS: No f/c, n/v, cp     Objective:  Patient Vitals for the past 24 hrs:   BP Temp Temp src Pulse Resp SpO2 Weight   21 0722 (!) 170/95 -- -- -- -- -- --   21 0525 (!) 150/77 97.4 °F (36.3 °C) Oral 62 16 92 % 261 lb 11 oz (118.7 kg)   21 0006 110/61 97.3 °F (36.3 °C) Oral 58 16 90 % --   21 1925 127/70 97.5 °F (36.4 °C) Oral 60 18 94 % --   21 1815 112/64 -- -- -- -- -- --   21 1529 (!) 96/54 -- -- 61 -- -- --   21 1519 (!) 83/53 97 °F (36.1 °C) Axillary 60 16 91 % --     Const: Alert. No distress, pleasant. HEENT: Normocephalic, atraumatic. Normal sclera/conjunctiva. MMM. CV: Regular rate and rhythm. Resp: No respiratory distress. Lungs decreased at bases (decreased inspiratory effort)  Abd: Soft, nontender, nondistended, NABS+   Ext: trace edema. Neuro: Alert, oriented, delayed processing. Expressive and receptive language deficits but able to respond if given enough time. Right hemiparesis overall 0-1/5 except 3/5 right hand  Psych: Cooperative, appropriate mood and affect    Laboratory data: Available via EMR.    Last 24 hour lab  Recent Results (from the past 24 hour(s))   CBC Auto Differential    Collection Time: 21  5:33 AM   Result Value Ref Range    WBC 12.3 (H) 4.0 - 11.0 K/uL    RBC 4.10 (L) 4.20 - 5.90 M/uL    Hemoglobin 13.0 (L) 13.5 - 17.5 g/dL    Hematocrit 37.2 (L) 40.5 - 52.5 %    MCV 90.9 80.0 - 100.0 fL    MCH 31.7 26.0 - 34.0 pg    MCHC 34.9 31.0 - 36.0 g/dL    RDW 14.7 12.4 - 15.4 % Platelets 735 002 - 983 K/uL    MPV 8.3 5.0 - 10.5 fL    Neutrophils % 76.9 %    Lymphocytes % 16.0 %    Monocytes % 6.1 %    Eosinophils % 0.7 %    Basophils % 0.3 %    Neutrophils Absolute 9.5 (H) 1.7 - 7.7 K/uL    Lymphocytes Absolute 2.0 1.0 - 5.1 K/uL    Monocytes Absolute 0.7 0.0 - 1.3 K/uL    Eosinophils Absolute 0.1 0.0 - 0.6 K/uL    Basophils Absolute 0.0 0.0 - 0.2 K/uL   Procalcitonin    Collection Time: 05/12/21  5:33 AM   Result Value Ref Range    Procalcitonin 0.06 0.00 - 0.15 ng/mL   Basic Metabolic Panel w/ Reflex to MG    Collection Time: 05/12/21  5:33 AM   Result Value Ref Range    Sodium 136 136 - 145 mmol/L    Potassium reflex Magnesium 3.6 3.5 - 5.1 mmol/L    Chloride 95 (L) 99 - 110 mmol/L    CO2 30 21 - 32 mmol/L    Anion Gap 11 3 - 16    Glucose 94 70 - 99 mg/dL    BUN 34 (H) 7 - 20 mg/dL    CREATININE 0.9 0.8 - 1.3 mg/dL    GFR Non-African American >60 >60    GFR African American >60 >60    Calcium 9.4 8.3 - 10.6 mg/dL       Therapy progress:  PT  Position Activity Restriction  Other position/activity restrictions: R hemiplegia and increased tone RLE, exp. aphasia  Objective     Sit to Stand: Maximum Assistance, 2 Person Assistance(pulling on rail)  Stand to sit: Maximum Assistance, 2 Person Assistance, Moderate Assistance(from standing at grab bar)  Bed to Chair: Maximum assistance, 2 Person Assistance(Patient completed transfer W/C<>shower chair with grab bar and W/C>recliner chair pulling on arm rest of recliner (to the left))  Device: Buitrago rail  Assistance: Maximum assistance, Minimal assistance, 2 Person assistance  Distance: 20'  OT  PT Equipment Recommendations  Equipment Needed: No  Other: defer to next level of care  Toilet - Technique: (via jojo stedy)  Equipment Used: Standard toilet  Toilet Transfers Comments: max Ax2 via jojo stedy  Assessment        SLP  Diet Solids Recommendation: Regular  Liquid Consistency Recommendation:  Thin    Body mass index is 32.71 kg/m². Rehabilitation Diagnosis:   Stroke, 1.2, Right Body (L Brain)        Assessment and Plan:     Impairments:right hemiparesis, aphasia, dysphagia, cognition     Acute ischemic Left LEXA stroke  -Secondary ppx with DAPT x 30 days (then monotherapy), statin  -Telemetry  -PT/OT/SLP  -Started baclofen 5 TID for spasticity (unable to tolerate dose increase due to drowsiness).    Dysphagia   -Dietitian and SLP consults.   -Currently on regular + thins     HTN, uncontrolled  -Low BPs in afternoon past couple of days  -resumed home atenolol (decreased dose), HCTZ, lisinopril (increased dose),   -Dc Imdur  -Dc'ed doxazosin (was a substitution for tamsulosin due to inability to crush)    -prn hydralazine     CAD  -DAPT, statin, bb     OA  -acetaminophen     Anxiety  -sertraline    Leukocytosis  -Resolved, now recurred - suspect could be developing pneumonia (intermittent productive cough, high risk due to dysphagia)  -Repeat UA negative  -CXR pending  -start empiric levofloxacin     Hypokalemia  -Improved with replacement     Bladder   -High risk retention   -Monitor PVRs, SC prn >300cc  -Flomax dc'ed due inability to crush     Bowel   -High risk constipation   -senna+colace BID, PRN miralax, MoM, and bisacodyl supp.     Safety   -fall and aspiration precautions     DVT ppx  -lovenox    Rehab Progress: Interdisciplinary team conference was held today with entire rehab treatment team including PT, OT, SLP, Dietician, RN, and SW. Discussion focused on progress toward rehab goals and discharge planning. Making gradual progress. Limited by severe right hemiparesis, aphasia, apraxia, delayed processing/decreased initiation. Working on functional mobility, compensatory strategies, cognition. Goals to decrease caregiver burden. Separate conference then held with patient/family, questions answered and concerns addressed. Total treatment time >35 min with greater than 50% spent in care coordination.   Anticipated Dispo: [facility]  ELOS:     [address].  Emma Apgar, MD 2021, 9:47 AM

## 2021-05-12 NOTE — DISCHARGE INSTR - COC
R29.898    Acute ischemic left LEXA stroke (HCC) K09.343       Isolation/Infection:   Isolation            No Isolation          Patient Infection Status       None to display            Nurse Assessment:  Last Vital Signs: BP (!) 170/95   Pulse 62   Temp 97.4 °F (36.3 °C) (Oral)   Resp 16   Ht 6' 3\" (1.905 m)   Wt 261 lb 11 oz (118.7 kg)   SpO2 92%   BMI 32.71 kg/m²     Last documented pain score (0-10 scale): Pain Level: 0  Last Weight:   Wt Readings from Last 1 Encounters:   05/12/21 261 lb 11 oz (118.7 kg)     Mental Status:  able to concentrate and follow conversation, aphasic    IV Access:  - None    Nursing Mobility/ADLs:  Walking   Dependent  Transfer  Dependent  Bathing  Dependent  Dressing  Dependent  Toileting  Dependent  Feeding  Assisted, needs set up and meats/tough foods cut  Med Admin  Dependent  Med Delivery   crushed    Wound Care Documentation and Therapy:        Elimination:  Continence:   · Bowel: No  · Bladder: No  Urinary Catheter: None   Colostomy/Ileostomy/Ileal Conduit: No       Date of Last BM: 5/11    Intake/Output Summary (Last 24 hours) at 5/12/2021 1355  Last data filed at 5/12/2021 0900  Gross per 24 hour   Intake 600 ml   Output --   Net 600 ml     I/O last 3 completed shifts: In: 480 [P.O.:480]  Out: -     Safety Concerns: At Risk for Falls    Impairments/Disabilities:      Expressive aphasia and L side flaccid    Nutrition Therapy:  Current Nutrition Therapy:   General diet, thin liquids. Cut up meats/tougher foods. Can feed self but requires set up. Routes of Feeding: Oral  Liquids: Thin Liquids  Daily Fluid Restriction: no  Last Modified Barium Swallow with Video (Video Swallowing Test):     Treatments at the Time of Hospital Discharge:   Respiratory Treatments:   Oxygen Therapy:  is not on home oxygen therapy.   Ventilator:    - No ventilator support    Rehab Therapies: Physical Therapy, Occupational Therapy and Speech/Language Therapy  Weight Bearing Status/Restrictions: No weight bearing restirctions  Other Medical Equipment (for information only, NOT a DME order):  wheelchair and hospital bed  Other Treatments:     Patient's personal belongings (please select all that are sent with patient):  all pt belongings    RN SIGNATURE:  Electronically signed by Flako Munoz RN on 5/13/21 at 12:37 PM EDT    CASE MANAGEMENT/SOCIAL WORK SECTION    Inpatient Status Date: 4-    Readmission Risk Assessment Score:  Readmission Risk              Risk of Unplanned Readmission:        19           Discharging to Facility/ Agency   · Name:    [facility]  · Address:  · Phone:    [phone]  · Fax:        [phone]  / signature: Moisés Garcia [place]     Case Management   [phone]    5/12/2021  5:04 PM      PHYSICIAN SECTION    Prognosis: Good    Condition at Discharge: Stable    Rehab Potential (if transferring to Rehab): Good    Recommended Labs or Other Treatments After Discharge:   Continue PT, OT, SLP  Repeat CBC and BMP in 1 week  Follow-up with PCP, Neurology, PM&R if needed    Physician Certification: I certify the above information and transfer of Sona Monet  is necessary for the continuing treatment of the diagnosis listed and that he requires [facility] for less 30 days.      Update Admission H&P: No change in H&P    PHYSICIAN SIGNATURE:  Electronically signed by Elijah Kern MD on 5/12/21 at 1:56 PM EDT

## 2021-05-12 NOTE — PROGRESS NOTES
Speech Language Pathology  ACUTE REHAB UNIT  SPEECH/LANGUAGE PATHOLOGY      [x] Daily  [] Weekly Care Conference Note  [] Discharge    Patient:Carl Castro      LU  OSJ:[id number]  Rehab Dx/Hx: Acute ischemic left LEXA stroke (HCC) [I63.522]    Precautions: FAll risk; Cognitive-Communication deficits  Home situation: Lives with significant other  ST Dx: [x] Aphasia  [] Dysarthria  [] Apraxia   [x] Oropharyngeal dysphagia [x] Cognitive   Impairment  [] Other:   Initial Speech Therapy Assessment Diagnosis:   1. Cognitive Diagnosis: Mild cognitive linguistic impairment for routine daily functional needs. Ongoing assessment of complex, high level and abstract PS/reasoning, as well as executive functioning, thought organization and planning is recommended as patient's processing and initation improve. Pt was independent with ADLs and IADLs PTA. Given signifcant extra time for processing and intiaotn, basic cognition appears intact in areas of orientation, sustained and selective, and simple/concrete PS/reasoning. Mildly decreased recall for daily events/information and moderately redcued recall for novel informaiton noted this date. Aforemnetioned delayed thought processing and initiation for expression is a barrier to demonstration of and exectuion for congitive-language skills/tasks. 2.  Aphasia Diagnosis: Given extra time for processing and initaiton, patient appears to present with mildly impaired auditory comprehension for complex and abstract information processing. Basic and concrete auditory/visual language comprehension appears intact. Mild to moderately impaired verbal expression for complex concepts requiring increased time to formulate and generate responses. 3.  Speech Diagnosis: Mildly impaired motor speech skills characterized by minimal to mild decreased verbal agility affecting speech fluency without impact on speech intelligibility at sentence level  4.   Dysphagia Diagnosis: Mild oropharyngeal dysphagia characterized by slow but effective mastication, right sided perioral weakness, reduced lingual coordination, reduced AP transit, potential swallow delay and mildly reduced laryngeal elevation. No immediate or delayed overt s/s of aspiration; trace oral residue is cleared by pt. Recommend regular textures and thin liquids with ongoing monitor to confirm tolerance.   Date of Admit: 4/23/2021  Room #: U3Y-4576/9810-20  Date: 5/12/2021       Current functional status (updated daily):         Current Diet Order:DIET GENERAL; Low Sodium (2 GM)  Dietary Nutrition Supplements: Standard High Calorie Oral Supplement   Behavior: [x] Alert  [x] Cooperative  [x]  Pleasant  [] Confused  [] Agitated  [] Uncooperative  [] Distractible [] Motivated  [] Self-Limiting [] Anxious  [] Other:  Endurance:  [] Adequate for participation in SLP sessions  [x] Reduced overall  [] Lethargic  [] Other:  Safety: [] No concerns at this time  [x] Reduced insight into deficits  []  Reduced safety awareness [] Not following call light procedures  [] Unable to Assess  [x] Other:ongoing assessment as communication skills improve  Swallowing Precautions: Upright as possible for all oral intake, Remain upright for 30-45 minutes after meals  Barriers toward progress: none unless medical issues and caregiver assist           Date: 5/12/2021      Tx session 1 Tx session 2   Total Timed Code Min SLP Individual Minutes  Time In: 9898  Time Out: 1115  Minutes: 30  Coded treatment time  0 SLP Individual Minutes  Time In:1435   Time Out: 1512  Minutes: 37   Coded treatment time 15   Group Treatment Minutes 0 0   Co-Treat Minutes 0 0   Variance/Reason:  10 minute Am variance due to lethargy  n/a   Pain n/a denied   Pain Intervention [] RN notified  [] Repositioned  [] Intervention offered and patient declined  [] N/A  [] Other:  [] RN notified  [] Repositioned  [] Intervention offered and patient declined  [] N/A  [] Other: Subjective     Pt seen in treatment office  Pt with brighter affect; improved rate of processing and improvement across varied tasks Pt seen in treatment office   Pt with continued bright affect  Pt with overall less physical restlessness  Pt was comparable to am session   Objective:  Goals       Dysphagia goals: Pt did not identify a goal. Team goal is for safe toleration of po diet    therapy working toward pt goal n/a   1. The patient will tolerate regular food with thin liquid diet without observed clinical signs of aspiration,  Not targeted Not targeted   2. Upgraded The patient will improve oral preparation phase via bolus control/manipulation exercises to 15/15 each trial., . Not targeted Not targeted   3. The patient/caregiver will demonstrate understanding of compensatory strategies for improved swallowing safety Not targeted   Not targeted   Short-term Goals  Goal 1: Pt will demonstrate improved recall of new learning strategies; daily therapy activities and new learning information with set up, use of memory strategies and minimal assist   Targeted during speech tasks: working memory for task instructions     Recall of previous therapy activities  via Y/N?: 60% at times pt reporting \"I don't know\"    Recall of current med (targeting muscle relaxant): \"I don't know\"   Goal 2: Pt will improve oral and/or verbal agility and ROM for coordinated and alternating motions to < mild impairment. Spontaneous responses: non-fluent but overall improved 1-4 word length    Imitative: more consistent today for 1-5 syllable length   Targeted indirectly   Goal 3: Pt will demonstrate improved word retrieval for varied naming tasks and concrete concept explanation tasks with <mild assist Verbal expression;   Verbal Y/N ?  Verbal responses:    · Targeting immediate feedback to assist pt in self correcting if perseverated and/or of verbal and head nod in disagreement    CFN: categorization in sentence completion format: 90%    CFN FC?: continued but not data taken       Word level:   · CFN (pictured animals; colors; numbers; letters; object ) by label: continued suing pictures of ADLs: continued but no data taken            Goal 4. Pt will demonstrate  Improved word retrieval and thought organization for multiple word utterances via picture description; open ended Wh? With <max promptins Open ended Wh? Response: rregarding pictured stimuli to identify needs or as in a dialog verbal responses targeting >2 word responses: 73%     Open ended Wh? Response: regarding pictured stimuli to identify needs or as in a dialog verbal responses targeting >2 word responses: 75%    Verbalizations regarding general information and previous therapy: expansion cues and assist as pt with recall issues. Goal 5 : Pt will convey needs and wants via verbal; pointing/gestures with <moderate clarification Pt using Y/N verbal responses ; spontaneous responses primarily PT using spontaneous responses to questions (ie Y/N/; FC?) . Active questioning continues to ensure    External cues to use any adaptive communication   Goal 6. Pt will demonstrate improved auditory and visual language processing of multiple unit Y/N?; Wh? And sentence length information with mild assist Overall improved following simple commands; needed re-direct due to visual distractibility Sustained attention for 1 concept rule application for 30 seconds to 1.30 seconds: 0 to moderate re-direct    Visual language processing 4-8 word sentence level for [facility] word fill format: not targeted       Goal 7: Pt will participate with ongoing assessment of EF, planning, sequencing and complex reasoning with additional goals PRN     More alert with improved rate of processing and increase in verbal responses oeverall.        Sequencing pictured steps to DL activities:   (4): IND with intermittent cues (all 4 p pictures in pt visual field in midline of table top)  (5-6): 0 to mild assist (1ll 6 restlessness   Plan: Continue as per plan of care. Continued Tx Upon Discharge: ?    [x] Yes [] No [] TBD based on progress while on ARU [] Vital Stim indicated [] Other:   Estimated discharge date: TBD   Discharge recommendations:   [] Home independently  [x] Home with assistance [x]  24 hour supervision  [] ECF [] Other:     Additional information:     Interventions used during Rehab Stay:  [x] Speech/Language Treatment  [] Instruction in HEP [] Group [x] Dysphagia Treatment [x] Cognitive Treatment   [] Other:    Adverse Reactions to Treatment/Significant Change in Status: n/a    Electronically Signed by    Juice Cheng. Jeffery,MS,CCC,SLP [id number]  Speech and Language Pathologist

## 2021-05-12 NOTE — PROGRESS NOTES
Pt awake and AAO lying in bed watching TV. Denies pain. Pt s/p CVA with R santana. R arm is flaccid. RLE drawn up with tone. Passive ROM given. Pt denies pain. Pt does have expressive aphasia with word finding difficulty. Requires pills to be crushed. Lungs clear and decreased. Infrequent congested cough. On RA. On telemetry. Belly round and soft with active BS. LBM today. Pt incontinent of urine at this time. Pericare given and depends changed. Zinc cream applied to buttocks. Trace edema to RUE and BLE's. Repositioned onto R side. Call light in reach. Bed alarm on.

## 2021-05-12 NOTE — CARE COORDINATION
Franck Ortega from  [facility] has been approved. He could be discharged Thursday or Friday. Hens and IMM are completed. LSW spoke with bedside RN to inform. Call placed to juana to inform.   Serina Draper [place]     Case Management   [phone]    5/12/2021  5:03 PM

## 2021-05-13 VITALS
HEART RATE: 55 BPM | SYSTOLIC BLOOD PRESSURE: 115 MMHG | TEMPERATURE: 97.9 F | RESPIRATION RATE: 18 BRPM | OXYGEN SATURATION: 97 % | DIASTOLIC BLOOD PRESSURE: 73 MMHG | BODY MASS INDEX: 32.67 KG/M2 | HEIGHT: 75 IN | WEIGHT: 262.79 LBS

## 2021-05-13 LAB
ANION GAP SERPL CALCULATED.3IONS-SCNC: 13 MMOL/L (ref 3–16)
BASOPHILS ABSOLUTE: 0 K/UL (ref 0–0.2)
BASOPHILS RELATIVE PERCENT: 0.4 %
BUN BLDV-MCNC: 28 MG/DL (ref 7–20)
CALCIUM SERPL-MCNC: 9.7 MG/DL (ref 8.3–10.6)
CHLORIDE BLD-SCNC: 95 MMOL/L (ref 99–110)
CO2: 29 MMOL/L (ref 21–32)
CREAT SERPL-MCNC: 0.8 MG/DL (ref 0.8–1.3)
EOSINOPHILS ABSOLUTE: 0.2 K/UL (ref 0–0.6)
EOSINOPHILS RELATIVE PERCENT: 1.6 %
GFR AFRICAN AMERICAN: >60
GFR NON-AFRICAN AMERICAN: >60
GLUCOSE BLD-MCNC: 106 MG/DL (ref 70–99)
HCT VFR BLD CALC: 38.7 % (ref 40.5–52.5)
HEMOGLOBIN: 13.6 G/DL (ref 13.5–17.5)
LYMPHOCYTES ABSOLUTE: 1.8 K/UL (ref 1–5.1)
LYMPHOCYTES RELATIVE PERCENT: 17.2 %
MCH RBC QN AUTO: 31.9 PG (ref 26–34)
MCHC RBC AUTO-ENTMCNC: 35.2 G/DL (ref 31–36)
MCV RBC AUTO: 90.6 FL (ref 80–100)
MONOCYTES ABSOLUTE: 0.8 K/UL (ref 0–1.3)
MONOCYTES RELATIVE PERCENT: 7.9 %
NEUTROPHILS ABSOLUTE: 7.5 K/UL (ref 1.7–7.7)
NEUTROPHILS RELATIVE PERCENT: 72.9 %
PDW BLD-RTO: 14.9 % (ref 12.4–15.4)
PLATELET # BLD: 167 K/UL (ref 135–450)
PMV BLD AUTO: 8 FL (ref 5–10.5)
POTASSIUM REFLEX MAGNESIUM: 4.1 MMOL/L (ref 3.5–5.1)
RBC # BLD: 4.27 M/UL (ref 4.2–5.9)
SARS-COV-2, NAAT: NOT DETECTED
SODIUM BLD-SCNC: 137 MMOL/L (ref 136–145)
WBC # BLD: 10.3 K/UL (ref 4–11)

## 2021-05-13 PROCEDURE — 97129 THER IVNTJ 1ST 15 MIN: CPT

## 2021-05-13 PROCEDURE — 6360000002 HC RX W HCPCS: Performed by: PHYSICAL MEDICINE & REHABILITATION

## 2021-05-13 PROCEDURE — 36415 COLL VENOUS BLD VENIPUNCTURE: CPT

## 2021-05-13 PROCEDURE — 6370000000 HC RX 637 (ALT 250 FOR IP): Performed by: PHYSICAL MEDICINE & REHABILITATION

## 2021-05-13 PROCEDURE — 92507 TX SP LANG VOICE COMM INDIV: CPT

## 2021-05-13 PROCEDURE — 85025 COMPLETE CBC W/AUTO DIFF WBC: CPT

## 2021-05-13 PROCEDURE — 80048 BASIC METABOLIC PNL TOTAL CA: CPT

## 2021-05-13 PROCEDURE — 97110 THERAPEUTIC EXERCISES: CPT

## 2021-05-13 PROCEDURE — 97530 THERAPEUTIC ACTIVITIES: CPT

## 2021-05-13 PROCEDURE — 97535 SELF CARE MNGMENT TRAINING: CPT

## 2021-05-13 PROCEDURE — 87635 SARS-COV-2 COVID-19 AMP PRB: CPT

## 2021-05-13 PROCEDURE — 97542 WHEELCHAIR MNGMENT TRAINING: CPT

## 2021-05-13 RX ADMIN — ATENOLOL 25 MG: 25 TABLET ORAL at 08:07

## 2021-05-13 RX ADMIN — ENOXAPARIN SODIUM 40 MG: 40 INJECTION SUBCUTANEOUS at 08:08

## 2021-05-13 RX ADMIN — ASPIRIN 81 MG: 81 TABLET, CHEWABLE ORAL at 08:07

## 2021-05-13 RX ADMIN — LEVOFLOXACIN 500 MG: 500 TABLET, FILM COATED ORAL at 08:07

## 2021-05-13 RX ADMIN — SERTRALINE 150 MG: 100 TABLET, FILM COATED ORAL at 08:07

## 2021-05-13 RX ADMIN — BACLOFEN 5 MG: 10 TABLET ORAL at 08:08

## 2021-05-13 RX ADMIN — POTASSIUM BICARBONATE 20 MEQ: 782 TABLET, EFFERVESCENT ORAL at 08:08

## 2021-05-13 RX ADMIN — CLOPIDOGREL BISULFATE 75 MG: 75 TABLET ORAL at 08:08

## 2021-05-13 RX ADMIN — BACLOFEN 5 MG: 10 TABLET ORAL at 12:52

## 2021-05-13 RX ADMIN — HYDROCHLOROTHIAZIDE 25 MG: 25 TABLET ORAL at 08:08

## 2021-05-13 RX ADMIN — LISINOPRIL 20 MG: 20 TABLET ORAL at 08:08

## 2021-05-13 RX ADMIN — FLUTICASONE PROPIONATE 1 SPRAY: 50 SPRAY, METERED NASAL at 08:09

## 2021-05-13 NOTE — PLAN OF CARE
Problem: Falls - Risk of:  Goal: Will remain free from falls  Description: Will remain free from falls  Outcome: Completed  Goal: Absence of physical injury  Description: Absence of physical injury  Outcome: Completed     Problem: HEMODYNAMIC STATUS  Goal: Patient has stable vital signs and fluid balance  Outcome: Completed     Problem: ACTIVITY INTOLERANCE/IMPAIRED MOBILITY  Goal: Mobility/activity is maintained at optimum level for patient  Outcome: Completed     Problem: COMMUNICATION IMPAIRMENT  Goal: Ability to express needs and understand communication  Outcome: Completed     Problem: Skin Integrity:  Goal: Will show no infection signs and symptoms  Description: Will show no infection signs and symptoms  Outcome: Completed  Goal: Absence of new skin breakdown  Description: Absence of new skin breakdown  Outcome: Completed     Problem: Pain:  Goal: Pain level will decrease  Description: Pain level will decrease  Outcome: Completed  Goal: Control of acute pain  Description: Control of acute pain  Outcome: Completed  Goal: Control of chronic pain  Description: Control of chronic pain  Outcome: Completed     Problem: Nutrition  Goal: Optimal nutrition therapy  Outcome: Completed

## 2021-05-13 NOTE — PROGRESS NOTES
Speech Language Pathology  ACUTE REHAB UNIT  SPEECH/LANGUAGE PATHOLOGY      [x] Daily  [] Weekly Care Conference Note  [x] Discharge    Patient:Carl Espinosa Number      SXE:[id number]  RGO:[id number]  Rehab Dx/Hx: Acute ischemic left LEXA stroke (HCC) [I63.522]    Precautions: FAll risk; Cognitive-Communication deficits  Home situation: Lives with significant other  ST Dx: [x] Aphasia  [] Dysarthria  [] Apraxia   [x] Oropharyngeal dysphagia [x] Cognitive   Impairment  [] Other:   Initial Speech Therapy Assessment Diagnosis:   1. Cognitive Diagnosis: Mild cognitive linguistic impairment for routine daily functional needs. Ongoing assessment of complex, high level and abstract PS/reasoning, as well as executive functioning, thought organization and planning is recommended as patient's processing and initation improve. Pt was independent with ADLs and IADLs PTA. Given signifcant extra time for processing and intiaotn, basic cognition appears intact in areas of orientation, sustained and selective, and simple/concrete PS/reasoning. Mildly decreased recall for daily events/information and moderately redcued recall for novel informaiton noted this date. Aforemnetioned delayed thought processing and initiation for expression is a barrier to demonstration of and exectuion for congitive-language skills/tasks. 2.  Aphasia Diagnosis: Given extra time for processing and initaiton, patient appears to present with mildly impaired auditory comprehension for complex and abstract information processing. Basic and concrete auditory/visual language comprehension appears intact. Mild to moderately impaired verbal expression for complex concepts requiring increased time to formulate and generate responses. 3.  Speech Diagnosis: Mildly impaired motor speech skills characterized by minimal to mild decreased verbal agility affecting speech fluency without impact on speech intelligibility at sentence level  4.   Dysphagia Diagnosis: Mild oropharyngeal dysphagia characterized by slow but effective mastication, right sided perioral weakness, reduced lingual coordination, reduced AP transit, potential swallow delay and mildly reduced laryngeal elevation. No immediate or delayed overt s/s of aspiration; trace oral residue is cleared by pt. Recommend regular textures and thin liquids with ongoing monitor to confirm tolerance.   Date of Admit: 4/23/2021  Room #: C2L-6443/9091-41  Date: 5/13/2021       Current functional status (updated daily):         Current Diet Order:DIET GENERAL; Low Sodium (2 GM)  Dietary Nutrition Supplements: Standard High Calorie Oral Supplement   Behavior: [x] Alert  [x] Cooperative  [x]  Pleasant  [] Confused  [] Agitated  [] Uncooperative  [] Distractible [] Motivated  [] Self-Limiting [] Anxious  [] Other:  Endurance:  [] Adequate for participation in SLP sessions  [x] Reduced overall  [] Lethargic  [] Other:  Safety: [] No concerns at this time  [x] Reduced insight into deficits  []  Reduced safety awareness [] Not following call light procedures  [] Unable to Assess  [x] Other:ongoing assessment as communication skills improve  Swallowing Precautions: Upright as possible for all oral intake, Remain upright for 30-45 minutes after meals  Barriers toward progress: none unless medical issues and caregiver assist           Date: 5/13/2021      Tx session 1 Tx session 2   Total Timed Code Min SLP Individual Minutes  Time In: 0945  Time Out: 1027  Minutes: 43  Coded treatment time  15 Notified pt was being discharged at 1500 today  Pt had a scheduled SLP appointment at 1430 pm but pt declining session   Group Treatment Minutes 0 0   Co-Treat Minutes 0 0   Variance/Reason:  10 minute Am variance due to lethargy  n/a   Pain n/a denied   Pain Intervention [] RN notified  [] Repositioned  [] Intervention offered and patient declined  [] N/A  [] Other:  [] RN notified  [] Repositioned  [] Intervention offered and patient declined  [] N/A  [] Other:   Subjective     Pt seen in treatment office  Pt with slow rate of responses as compared to 5/12/2021 (unsure if Fatigue/medication interreaction)    Objective:  Goals       Dysphagia goals: Pt did not identify a goal. Team goal is for safe toleration of po diet    therapy worked toward pt goal.Goal appears met with set up and variable assist with right visual attention and utensil use n/a   1. The patient will tolerate regular food with thin liquid diet without observed clinical signs of aspiration,  Goal appears met with set up and variable assist with right visual attention and utensil use n/a   2. Upgraded The patient will improve oral preparation phase via bolus control/manipulation exercises to 15/15 each trial., . Goal appears met for adequate labial seal; labial/buccal rom during mastication and management of any oral pocketing n/a   3. The patient/caregiver will demonstrate understanding of compensatory strategies for improved swallowing safety Goal met with set up; staff ed in intermittent supervision due to right visual inattention and variable confusion/perseveration with utensil use    n/a   Short-term Goals  Goal 1: Pt will demonstrate improved recall of new learning strategies; daily therapy activities and new learning information with set up, use of memory strategies and minimal assist   Goal not met across tasks/sessions    Pt 's motor planning problems and dysphasia further exacerbates use of compensatory strategies to compensate     n/a   Goal 2: Pt will improve oral and/or verbal agility and ROM for coordinated and alternating motions to < mild impairment. Goal met for isolated labial/buccal, lingual, mandibular rom stretch    Spontaneous responses: non-fluent but overall improved to 1-5 word length    Imitative: 1-6 syllable length with 66% to 100% accuracy.      At times non-fluent with sound sequencing errors    Goal partially met   n/a   Goal 3: Pt will demonstrate improved word retrieval for varied naming tasks and concrete concept explanation tasks with <mild assist Verbal expression;   Verbal Y/N ? Verbal responses:    · Targeting immediate feedback to assist pt in self correcting if perseverated and/or of verbal and head nod in disagreement  · Goal partially met    CFN: goal met with carrier phrases; imitative    CFN for categorization in sentence completion format: goal met    CFN for actions: goal met    CFN [facility]?: goal partially met    Generative listing: goal not met without assist n/a       Goal 4. Pt will demonstrate  Improved word retrieval and thought organization for multiple word utterances via picture description; open ended Wh? With <max promptins Open ended Wh? Response: rregarding pictured stimuli to identify needs or as in a dialog verbal responses targeting >2 word responses: goal met / exceeded    Open ended Wh? Response to topics regarding general information and previous therapy: max cues    Phrase/sentence formulation for picture description/concept description: goal partially met. Pt performance can be quite variable    Non-fluent aphasia ; apraxia features              Goal 5 : Pt will convey needs and wants via verbal; pointing/gestures with <moderate clarification Pt using Y/N verbal responses ; spontaneous responses primarily    PT using spontaneous responses to questions (ie Y/N/; FC?) . Active questioning continues to ensure confirmation of pt wants etc across situations/sessions    External cues to use any adaptive communication    Goal met with external assist n/a   Goal 6.  Pt will demonstrate improved auditory and visual language processing of multiple unit Y/N?; Wh? And sentence length information with mild assist  following simple commands: goal met    Following simple spatial psychomotor commands: goal met with visual /tactile cues PRN    Sustained attention for 1 concept rule application for 30 seconds to 1.30 seconds: 0 to moderate re-direct    Visual language processing 4-8 word sentence level for [facility] word fill format: goal not met across sessions. Needs due for right visual attention to processing    Visual Language processing for 2-4 unit Y/N? Regarding general information: goal met   n/a     Goal 7: Pt will participate with ongoing assessment of EF, planning, sequencing and complex reasoning with additional goals PRN     Cognitive deficits in domains attention; mental flexibility; right body attention and right visual attention; initiation and PS/memory    Pt 's motor planning; dysphasia and dyspraxia further exacerbates. Other areas targeted:     Education:   Ongoing pt ed Ongoing pt ed   Safety Devices: [] Call light within reach  [] Chair alarm activated  [] Bed alarm activated  [x] Other:  Transport returned pt to room [] Call light within reach  [] Chair alarm activated  [] Bed alarm activated  [] Other:    Progress Assessment: 4/26/2021: Pt with increase receptive and expressive language deficits with concern for potential oral motor /verbal dyspraxia; pt unable to participate in executive function . Unclear if due to fatigue (this SLP session was the last of the day)  4/27/2021: Alerted PCA and RN due to pt having a large amount of sputum/phlegm in his mouth upon SLP arrival to room. Pt with another episode of cough with expectoration of large amount of phlegm. Will check acute floor documentation for any dysphagia instrumentals. PO sample size small and no overt clinical s/s at the bedside. Will discuss at team meeting. 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 5/11/2021: Pt very lethargic in am session.  Frequent re-arousal via auditory and tactile stimuli. Even then hard to sustained arousal. Pt kept falling/drifting to sleep. Discussed with RN; no complaints of poor sleeping overnight. Do note increase dose of baclofen. ? If increase in lethargy. is med related. Pt is scheduled for pm SLP session. Will discuss with PT/OT. PM session pt more alert but as session progressed pt became physically restless. 5/12/2021 Pt with brighter affect both sessions with improved rate of processing and improved verbalization during structured verbal tasks; and pt with less physical restlessness. MD did report reduced Baclofen  5/13/2021: Pt seen in am; but pm session cancelled at pt being discharged to SNF. As mentioned in progress note 5/12/2021 pt will need continued therapy. At some point potential need to have augmentative communication assessment if dysphasia and dyspraxia severity continues to  impacting communicating effectiveness. Plan: Pt is being discharged to [facility] with continued therapy (Speech for Dysphagia and diet tolerance f/u; aphasia remediation and cognitive-communication remediation   Continued Tx Upon Discharge: ?    [x] Yes [] No [] TBD based on progress while on ARU [] Vital Stim indicated [] Other:   Estimated discharge date: 5/13/2021   Discharge recommendations:   [] Home independently  [x] Home with assistance [x]  24 hour supervision  [] ECF [] Other:     Additional information:     Interventions used during Rehab Stay:  [x] Speech/Language Treatment  [] Instruction in HEP [] Group [x] Dysphagia Treatment [x] Cognitive Treatment   [] Other:    Adverse Reactions to Treatment/Significant Change in Status: n/a    Electronically Signed by    Lucianne Goldmann. Jeffery,MS,CCC,SLP [id number]  Speech and Language Pathologist

## 2021-05-13 NOTE — PROGRESS NOTES
Pt discharged to [facility]. Transportation here with stretcher. Discharge instructions, Rx, and personal belongings given to pt. Discharge medications and instructions given in d/c packet. No questions, comments or concerns at this time.

## 2021-05-13 NOTE — DISCHARGE SUMMARY
Physical Medicine & Rehabilitation  Discharge Summary     Patient Identification:  Meka Pemberton  : 1955  Admit date: 2021  Discharge date:  2021  Attending provider: Reese Hein MD        Primary care provider: Megan Lim MD     Discharge Diagnoses:   Patient Active Problem List   Diagnosis    Chest pain    MI, acute, non ST segment elevation [facility] [address]    Tobacco abuse    HTN (hypertension)    Acute MI ([facility] [address] [facility]    Acute CVA (cerebrovascular accident) ([facility] [address] [facility]    Right leg weakness    Acute ischemic left LEXA stroke ([facility] [address]       History of Present Illness/Acute Hospital Course:  Patient is a 75 yo M with pmh CAD s/p stent, HTN, OA, Anxiety, former tobacco use who initially presented 2021 with right side weakness and aphasia. He was outside TPA window. Imaging revealed acute ischemic infarcts in the left LEXA territory with thrombosis in A3 segment. Neurology consulted. Patient is being treated with DAPT (for 30 days then monotherapy) and statin. Course complicated by HTN. Inpatient Rehabilitation Course:   Meka Pemberton is a 76 y.o. male admitted to inpatient rehabilitation on 2021 with Acute ischemic left LEXA stroke ([facility] [address] The patient participated in an aggressive multidisciplinary inpatient rehabilitation program involving 3 hours of therapy per day, at least 5 days per week. He made gradual progress with regard to cognition/communication and compensatory strategies. Remains limited by aphasia, apraxia, severe right hemiparesis, decreased initiation, cognition. Continues to require significant assist for mobility and ADLs. Family is unable to provide this level of assist. Therefore discharging to SNF for ongoing PT/OT/SLP. Patient will need follow-up with PCP, Neurology, and PM&R if needed.     Impairments: right hemiparesis, aphasia, apraxia, dysphagia, cognition    Medical Management:  Acute ischemic Left LEXA stroke  -Secondary ppx with DAPT x 30 days (then monotherapy), ,  , Assessment: Pt continues to make slow progress toward therapy goals. He completed LB bathing in bed w/ min A, UB bathing sitting EOB w/ min A, UB dressing w/ mod A, LB dressing total A, grooming w/ min A for shaving. Pt required max Ax1 for bed mobility. Pt was more quiet this date, but able to follow commands and sequence ADL fairly well. Pt then completed SPT w/ mod/max Ax2 w/o device. He is unsafe to return home at this level and will benefit from continued therapy at a slow pace in order to maximize his functional potential.    Speech therapy:         Significant Diagnostics:   Lab Results   Component Value Date    CREATININE 0.8 05/13/2021    BUN 28 (H) 05/13/2021     05/13/2021    K 4.1 05/13/2021    CL 95 (L) 05/13/2021    CO2 29 05/13/2021       Lab Results   Component Value Date    WBC 10.3 05/13/2021    HGB 13.6 05/13/2021    HCT 38.7 (L) 05/13/2021    MCV 90.6 05/13/2021     05/13/2021       Disposition:  SNF    Discharge Condition: Stable    Follow-up:  See after visit summary from hospitalization    Discharge Medications:     Medication List      START taking these medications    Baclofen 5 MG tablet  Commonly known as: LIORESAL  Take 1 tablet by mouth 3 times daily     levoFLOXacin 500 MG tablet  Commonly known as: LEVAQUIN  Take 1 tablet by mouth daily for 6 days     potassium bicarb-citric acid 20 MEQ Tbef effervescent tablet  Commonly known as: EFFER-K  Take 1 tablet by mouth daily        CHANGE how you take these medications    atenolol 25 MG tablet  Commonly known as: TENORMIN  Take 1 tablet by mouth daily  What changed:   · medication strength  · See the new instructions.      clopidogrel 75 MG tablet  Commonly known as: PLAVIX  Take 1 tablet by mouth daily for 10 days Take for 10 more days to complete 30 days course, then take ASA alone  What changed: additional instructions     lisinopril 20 MG tablet  Commonly known as: PRINIVIL;ZESTRIL  Take 1 tablet by mouth daily  What changed:   · how much to take  · how to take this  · when to take this  · additional instructions        CONTINUE taking these medications    aspirin 81 MG EC tablet     atorvastatin 80 MG tablet  Commonly known as: LIPITOR  Take 1 tablet by mouth nightly     hydroCHLOROthiazide 25 MG tablet  Commonly known as: HYDRODIURIL  TAKE ONE TABLET BY MOUTH DAILY     Myrbetriq 50 MG Tb24  Generic drug: mirabegron     nitroGLYCERIN 0.4 MG SL tablet  Commonly known as: NITROSTAT  Place 1 tablet under the tongue every 5 minutes as needed for Chest pain. sertraline 100 MG tablet  Commonly known as: ZOLOFT  TAKE 1 AND 1/2 TABLET BY MOUTH DAILY     therapeutic multivitamin-minerals tablet     vitamin C 500 MG tablet  Commonly known as: ASCORBIC ACID        STOP taking these medications    isosorbide mononitrate 30 MG extended release tablet  Commonly known as: IMDUR     tamsulosin 0.4 MG capsule  Commonly known as: FLOMAX           Where to Get Your Medications      Information about where to get these medications is not yet available    Ask your nurse or doctor about these medications  · atenolol 25 MG tablet  · Baclofen 5 MG tablet  · clopidogrel 75 MG tablet  · levoFLOXacin 500 MG tablet  · lisinopril 20 MG tablet  · potassium bicarb-citric acid 20 MEQ Tbef effervescent tablet           I spent over 35 minutes on this discharge encounter between counseling, coordination of care, and medication reconciliation.       Savana Ruiz MD

## 2021-05-13 NOTE — PROGRESS NOTES
Coronary angioplasty with stent (9/13). Restrictions  Restrictions/Precautions  Restrictions/Precautions: Fall Risk  Position Activity Restriction  Other position/activity restrictions: R hemiplegia and increased tone RLE, exp. aphasia  Subjective   General  Chart Reviewed: Yes  Additional Pertinent Hx: Pt is 75 y/o M admitted 4/21/21 to ED with stroke like symptoms affecting R side ongoing ~12hours. MRI revealed multifocal acute infarctions in the parasagital left front along distal L LEXA territory with occluded/thrombosed A3 segment of L LEXA. MRI also revealed old infarction in basal ganglia, thalami, R cerebellum. PMHx significant for CAD, Angio w/ stent, MI, lumbar sx, HTN. Pt is 75 y/o M admitted 4/21/21 to ED with stroke like symptoms affecting R side ongoing ~12hours. MRI revealed multifocal acute infarctions in the parasagital left front along distal L LEXA territory with occluded/thrombosed A3 segment of L LEXA. MRI also revealed old infarction in basal ganglia, thalami, R cerebellum. PMHx significant for CAD, Angio w/ stent, MI, lumbar sx, HTN. Response To Previous Treatment: Patient with no complaints from previous session. Family / Caregiver Present: No  Referring Practitioner: Dr. Rolando Watts: PT started with OT who was completing ADL session, PT came in to assist with transfers to the chair. Pain Screening  Patient Currently in Pain: Denies  Vital Signs  Patient Currently in Pain: Denies       Orientation     Cognition      Objective   Bed mobility  Rolling to Left: Moderate assistance  Rolling to Right: Minimal assistance  Supine to Sit: Moderate assistance(with legs for L sidelying to sit)  Sit to Supine: Moderate assistance(for legs for sit to left sidelying)  Scooting: Moderate assistance(to scoot one hip at time)  Transfers  Sit to Stand:  Moderate Assistance;2 Person Assistance;Minimal Assistance(min A x 2 from high (24\") bed, mod A x 2 from bed or w/c)  Bed to Chair: Moderate assistance;2 Person Assistance(stand pivot transfer to the R)  Stand Pivot Transfers: Moderate Assistance;2 Person Assistance  Squat Pivot Transfers: Moderate Assistance;2 Person Assistance  Wheelchair Activities  Propulsion: Yes  Propulsion 1  Propulsion: Manual  Level: Level Tile  Method: LUE;LLE  Level of Assistance: Stand by assistance;Supervision  Description/ Details: propelled wheelchair 175' using left UE and left LE. He was able to maintain SBA with minimal cueing throughout propulsion, did not drift to the R side of the hallway this morning, able to steer out of doorway without assist and over threshold without assist     Balance  Posture: Good  Sitting - Static: Good  Sitting - Dynamic: Good;-  Other exercises  Other exercises?: Yes  Other exercises 1: sat on therapy mat with height raised to 25\". PT placed CAROL walker in front of patient with bilateral UE support on the walker to encouage anterior weight shift. PT provided support and facilitation to right quad to encourage activation. Patient was min assist and able to activate bilateral quads. He completed 4 reps then height was lowered to 24\". Patient continued to have improved activation at this height and was able to stand x2 with min assist and increased time to complete the task. Comment: PT assisted with standing mod A x 2 to stand, mod x 1 to maintain stance while OT pulled up depends and pants     Goals  Short term goals  Time Frame for Short term goals: In 2 weeks pt will perform  Short term goal 1: Bed mobility Mod A met 5-13  Short term goal 2: Transfers Mod A  Short term goal 3: Propel wc 48' with Mod A met 5-13  Short term goal 4: Ascend/Descend curb step with LRAD, Mod A  Long term goals  Time Frame for Long term goals :  In 4 weeks pt will perform  Long term goal 1: Bed mobility CGA  Long term goal 2: Transfers CGA  Long term goal 3: Propel wc 48' with CGA-SBA met 5-13  Long term goal 4: Ascend/Descend curb step with LRAD, Min A  Patient Goals   Patient goals : Did not state today (minimally verbal)    Plan    Plan  Times per week: 5-6x  Times per day: Twice a day  Plan weeks: 4 weeks  Specific instructions for Next Treatment: Transfers; practice wc mobility; strengthen RLE as able  Current Treatment Recommendations: Strengthening, ROM, Balance Training, Endurance Training, Functional Mobility Training, Wheelchair Mobility Training, Transfer Training, Gait Training, Stair training, Positioning, Pain Management, Equipment Evaluation, Education, & procurement, Modalities, Patient/Caregiver Education & Training, Safety Education & Training, Home Exercise Program, Neuromuscular Re-education  Safety Devices  Type of devices: All fall risk precautions in place, Gait belt  Restraints  Initially in place: No     Therapy Time   Individual Concurrent Group Co-treatment   Time In 0915     0900   Time Out 09     0915   Minutes 30     15   Timed Code Treatment Minutes: [address], [address]     PM session: pt seen in room, getting cleaned and ready to transport to [facility]. Rolling L min A, L sidelying to sit mod A x 2. Sit to stand mod A x 2, stood with mod a x 1 to maintain R knee extension while OT pulled up pants: pt stood leaning to the R and with R hep posteriorly rotated. Stood 2 min with mod A. Returned to bed, mod A x 2 to sit to sidelying then supine. Maximove sling positioned under pt. Second Session Therapy Time     Individual Co-treatment   Time In  1355   Time Out  1410   Minutes  15        Physical Therapy  Discharge Summary    Name:Carl Altamirano  PKY:[id number]  :1955  Treatment Diagnosis: impaired functional mobility  Diagnosis: CVA    Restrictions/Precautions  Restrictions/Precautions: Fall Risk           Position Activity Restriction  Other position/activity restrictions: R hemiplegia and increased tone RLE, exp.  aphasia     Goals:                  Short term goals  Time Frame

## 2021-05-13 NOTE — PROGRESS NOTES
Occupational Therapy  Facility/Department: [address] IP RE[facility]  Daily Treatment Note/Discharge summary  NAME: Aaron Dixon  : 1955  MRN: [id number]    Date of Service: 2021    Discharge Recommendations:  Patient would benefit from continued therapy after discharge, Subacute/Skilled Nursing Facility  OT Equipment Recommendations  Other: defer to next LOC    Assessment   Performance deficits / Impairments: Decreased functional mobility ; Decreased strength;Decreased endurance;Decreased ADL status; Decreased high-level IADLs;Decreased balance;Decreased ROM; Decreased sensation;Decreased coordination;Decreased fine motor control  Assessment: Pt continues to make slow progress toward therapy goals. He completed LB bathing in bed w/ min A, UB bathing sitting EOB w/ min A, UB dressing w/ mod A, LB dressing total A, grooming w/ min A for shaving. Pt required max Ax1 for bed mobility. Pt was more quiet this date, but able to follow commands and sequence ADL fairly well. Pt then completed SPT w/ mod/max Ax2 w/o device. He is unsafe to return home at this level and will benefit from continued therapy at a slow pace in order to maximize his functional potential.  Prognosis: Fair  OT Education: OT Role;Plan of Care;Precautions;Transfer Training;ADL Adaptive Strategies  Barriers to Learning: exp aphasia, fatigue  REQUIRES OT FOLLOW UP: Yes  Activity Tolerance  Activity Tolerance: Patient limited by fatigue  Activity Tolerance: Pt very quiet initially, but more engaged by end of session  Safety Devices  Safety Devices in place: Not Applicable(in care of PTLaura)  Type of devices: Gait belt         Patient Diagnosis(es): There were no encounter diagnoses.       has a past medical history of Acute MI ([facility] [address] Anxiety, Arthritis, CAD (coronary artery disease), Hypertension, and Influenza A.   has a past surgical history that includes back surgery (N/A, ) and Coronary angioplasty with stent shoes)  Toileting: (declined need)  Additional Comments: Pt completed sponge bath in bed for LB and sitting EOB for UB. PT arrived for overlap session and assisted w/ standing and transfer to w/c        Balance  Sitting Balance: Stand by assistance  Standing Balance: Maximum assistance  Functional Mobility  Functional Mobility Comments: Did not assess this session  Wheelchair Bed Transfers  Wheelchair/Bed - Technique: Stand pivot  Equipment Used: Wheelchair;Bed  Level of Asssistance: 2 Person assistance; Moderate assistance  Wheelchair Transfers Comments: SPT from bed > w/c to the left w/ max Ax1/mod Ax1  Bed mobility  Rolling to Left: Moderate assistance  Rolling to Right: Stand by assistance(w/ use of bed rails)  Supine to Sit: Maximum assistance      Toilet transfer: Max Ax2 w/ jojo nye (5/12); scored dependent         Cognition  Overall Cognitive Status: Exceptions  Arousal/Alertness: Delayed responses to stimuli  Following Commands: Follows one step commands with repetition; Follows one step commands with increased time  Attention Span: Attends with cues to redirect  Memory: (difficult to assess)  Safety Judgement: Decreased awareness of need for assistance;Decreased awareness of need for safety  Insights: Decreased awareness of deficits  Initiation: Requires cues for all  Sequencing: Requires cues for all           PM Session:  Subjective: Pt met b/s. Pt in bed getting cleaned up w/ nursing. Pt leaving for transport scheduled at 3pm, but agreeable to short therapy session w/ min encouragement    Objective:  Pt required mod Ax2 for supine>sit. Min A to scoot to EOB. Pt sat EOB x5 mins w/ SBA. Sit>stand w/ mod Ax2. Pt stood w/ mod Ax1 while OT assisted to pull pants up over hips. Pt then stood x2 mins w/ mod Ax1 w/ R knee blocked and cues to correct right lean. Stand>sit w/ mod Ax2. Sit>supine w/ mod Ax2. Pt left positioned for comfort w/ alarm engaged and needs in reach.     Assessment: Pt has

## 2021-05-13 NOTE — CARE COORDINATION
Discharge Plan:  · Patient discharge to Name: [facility]  · Address:  [place] [address], [address] [address]   · Phone:  [phone]  Fax:  [phone] [address] in [facility]       RN-to  called report to [phone] transport with [facility],  time 3pm       Family advised of discharge and in agreement. Left message for [facility] and spoke with patient's mother.         HENS completed by Adryan Mosquera

## 2021-05-17 RX ORDER — ATENOLOL 50 MG/1
TABLET ORAL
Qty: 90 TABLET | Refills: 0 | OUTPATIENT
Start: 2021-05-17

## 2021-05-17 RX ORDER — HYDROCHLOROTHIAZIDE 25 MG/1
TABLET ORAL
Qty: 90 TABLET | Refills: 0 | Status: ON HOLD | OUTPATIENT
Start: 2021-05-17 | End: 2022-09-12 | Stop reason: HOSPADM

## 2021-05-28 ENCOUNTER — APPOINTMENT (OUTPATIENT)
Dept: CT IMAGING | Age: 66
End: 2021-05-28
Payer: MEDICARE

## 2021-05-28 ENCOUNTER — APPOINTMENT (OUTPATIENT)
Dept: GENERAL RADIOLOGY | Age: 66
End: 2021-05-28
Payer: MEDICARE

## 2021-05-28 ENCOUNTER — HOSPITAL ENCOUNTER (EMERGENCY)
Age: 66
Discharge: HOME | End: 2021-05-28
Attending: STUDENT IN AN ORGANIZED HEALTH CARE EDUCATION/TRAINING PROGRAM
Payer: MEDICARE

## 2021-05-28 VITALS
DIASTOLIC BLOOD PRESSURE: 84 MMHG | WEIGHT: 260 LBS | TEMPERATURE: 98 F | OXYGEN SATURATION: 98 % | HEIGHT: 73 IN | BODY MASS INDEX: 34.46 KG/M2 | SYSTOLIC BLOOD PRESSURE: 145 MMHG | HEART RATE: 81 BPM | RESPIRATION RATE: 16 BRPM

## 2021-05-28 DIAGNOSIS — E87.6 HYPOKALEMIA: ICD-10-CM
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
## 2021-05-28 DIAGNOSIS — E83.42 HYPOMAGNESEMIA: ICD-10-CM

## 2021-05-28 DIAGNOSIS — M85.851 OSTEOPENIA OF RIGHT HIP: ICD-10-CM

## 2021-05-28 DIAGNOSIS — S20.229A CONTUSION OF BACK, INITIAL ENCOUNTER: Primary | ICD-10-CM

## 2021-05-28 LAB
A/G RATIO: 0.8 (ref 1.1–2.2)
ALBUMIN SERPL-MCNC: 3 G/DL (ref 3.4–5)
ALP BLD-CCNC: 81 U/L (ref 40–129)
ALT SERPL-CCNC: 20 U/L (ref 10–40)
ANION GAP SERPL CALCULATED.3IONS-SCNC: 11 MMOL/L (ref 3–16)
AST SERPL-CCNC: 24 U/L (ref 15–37)
BASOPHILS ABSOLUTE: 0 K/UL (ref 0–0.2)
BASOPHILS RELATIVE PERCENT: 0.4 %
BILIRUB SERPL-MCNC: 0.9 MG/DL (ref 0–1)
BILIRUBIN URINE: NEGATIVE
BLOOD, URINE: NEGATIVE
BUN BLDV-MCNC: 22 MG/DL (ref 7–20)
CALCIUM SERPL-MCNC: 9.1 MG/DL (ref 8.3–10.6)
CHLORIDE BLD-SCNC: 97 MMOL/L (ref 99–110)
CLARITY: CLEAR
CO2: 28 MMOL/L (ref 21–32)
COLOR: YELLOW
CREAT SERPL-MCNC: 0.7 MG/DL (ref 0.8–1.3)
EOSINOPHILS ABSOLUTE: 0.1 K/UL (ref 0–0.6)
EOSINOPHILS RELATIVE PERCENT: 1.3 %
GFR AFRICAN AMERICAN: >60
GFR NON-AFRICAN AMERICAN: >60
GLOBULIN: 3.8 G/DL
GLUCOSE BLD-MCNC: 92 MG/DL (ref 70–99)
GLUCOSE URINE: NEGATIVE MG/DL
HCT VFR BLD CALC: 34.7 % (ref 40.5–52.5)
HEMOGLOBIN: 12.2 G/DL (ref 13.5–17.5)
KETONES, URINE: NEGATIVE MG/DL
LEUKOCYTE ESTERASE, URINE: NEGATIVE
LYMPHOCYTES ABSOLUTE: 1.7 K/UL (ref 1–5.1)
LYMPHOCYTES RELATIVE PERCENT: 22.1 %
MAGNESIUM: 1.6 MG/DL (ref 1.8–2.4)
MCH RBC QN AUTO: 30.8 PG (ref 26–34)
MCHC RBC AUTO-ENTMCNC: 35.1 G/DL (ref 31–36)
MCV RBC AUTO: 87.8 FL (ref 80–100)
MICROSCOPIC EXAMINATION: ABNORMAL
MONOCYTES ABSOLUTE: 0.7 K/UL (ref 0–1.3)
MONOCYTES RELATIVE PERCENT: 9 %
NEUTROPHILS ABSOLUTE: 5.2 K/UL (ref 1.7–7.7)
NEUTROPHILS RELATIVE PERCENT: 67.2 %
NITRITE, URINE: NEGATIVE
PDW BLD-RTO: 14.5 % (ref 12.4–15.4)
PH UA: 6 (ref 5–8)
PLATELET # BLD: 214 K/UL (ref 135–450)
PMV BLD AUTO: 7.1 FL (ref 5–10.5)
POTASSIUM REFLEX MAGNESIUM: 3.3 MMOL/L (ref 3.5–5.1)
PROTEIN UA: NEGATIVE MG/DL
RBC # BLD: 3.94 M/UL (ref 4.2–5.9)
SODIUM BLD-SCNC: 136 MMOL/L (ref 136–145)
SPECIFIC GRAVITY UA: 1.02 (ref 1–1.03)
TOTAL PROTEIN: 6.8 G/DL (ref 6.4–8.2)
TROPONIN: <0.01 NG/ML
URINE REFLEX TO CULTURE: ABNORMAL
URINE TYPE: ABNORMAL
UROBILINOGEN, URINE: 2 E.U./DL
WBC # BLD: 7.8 K/UL (ref 4–11)

## 2021-05-28 PROCEDURE — 83735 ASSAY OF MAGNESIUM: CPT

## 2021-05-28 PROCEDURE — 93005 ELECTROCARDIOGRAM TRACING: CPT | Performed by: NURSE PRACTITIONER

## 2021-05-28 PROCEDURE — 73503 X-RAY EXAM HIP UNI 4/> VIEWS: CPT

## 2021-05-28 PROCEDURE — 99285 EMERGENCY DEPT VISIT HI MDM: CPT

## 2021-05-28 PROCEDURE — 81003 URINALYSIS AUTO W/O SCOPE: CPT

## 2021-05-28 PROCEDURE — 84484 ASSAY OF TROPONIN QUANT: CPT

## 2021-05-28 PROCEDURE — 36415 COLL VENOUS BLD VENIPUNCTURE: CPT

## 2021-05-28 PROCEDURE — 6370000000 HC RX 637 (ALT 250 FOR IP): Performed by: NURSE PRACTITIONER

## 2021-05-28 PROCEDURE — 72131 CT LUMBAR SPINE W/O DYE: CPT

## 2021-05-28 PROCEDURE — 85025 COMPLETE CBC W/AUTO DIFF WBC: CPT

## 2021-05-28 PROCEDURE — 73552 X-RAY EXAM OF FEMUR 2/>: CPT

## 2021-05-28 PROCEDURE — 80053 COMPREHEN METABOLIC PANEL: CPT

## 2021-05-28 RX ORDER — LANOLIN ALCOHOL/MO/W.PET/CERES
400 CREAM (GRAM) TOPICAL DAILY
Status: DISCONTINUED | OUTPATIENT
Start: 2021-05-28 | End: 2021-05-29 | Stop reason: HOSPADM

## 2021-05-28 RX ORDER — POTASSIUM CHLORIDE 20 MEQ/1
40 TABLET, EXTENDED RELEASE ORAL ONCE
Status: COMPLETED | OUTPATIENT
Start: 2021-05-28 | End: 2021-05-28

## 2021-05-28 RX ADMIN — POTASSIUM CHLORIDE 40 MEQ: 20 TABLET, EXTENDED RELEASE ORAL at 19:31

## 2021-05-28 RX ADMIN — Medication 400 MG: at 19:30

## 2021-05-28 NOTE — ED PROVIDER NOTES
[facility]        Pt Name: Ally Lombardo  MRN: [id number]  [place] 1955  Date of evaluation: 5/28/2021  Provider: MARSHALL Painter - CNP  PCP: Eduin Lamar MD  Note Started: 2:16 PM EDT        I have seen and evaluated this patient with my supervising physician Elsa Joseph MD.    [address] [place] [address]       Chief Complaint   Patient presents with    Fall     Pt fell while attempting to stand from wheelchair, Pt has lumbar pain. Right side weakness with chronic right hip pain from CVA 5/2021       HISTORY OF PRESENT ILLNESS   (Location, Timing/Onset, Context/Setting, Quality, Duration, Modifying Factors, Severity, Associated Signs and Symptoms)  Note limiting factors. Ally Lombardo is a 76 y.o. 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 Patient was sitting in his wheelchair and attempting to stand up. He then fell straight down and has landed on his right hip and low back. He does have a history of chronic right hip pain and usually sits with his right leg flexed. He was not assisted up after the fall and instead EMS was called and transported to ED for further evaluation. He is at a baseline mental status per the nursing home. He does have residual right-sided weakness that is baseline per the nursing home. He did not have any head trauma or LOC. He is compliant with his medication to include aspirin 81 mg and Plavix. He denies any additional pain to include any chest pain, abdominal pain, headache, neck pain, numbness, tingling, or weakness. Former tobacco abuse, former alcohol abuse, denies street drugs. HPI is limited due to patient's baseline mental status and information was obtained from the nursing home.     Nursing Notes were all reviewed and agreed with or any disagreements were addressed in the HPI. REVIEW OF SYSTEMS    (2-9 systems for level 4, 10 or more for level 5)     Review of Systems    Positives and Pertinent negatives as per HPI. Except as noted above in the ROS, all other systems were reviewed and negative. PAST MEDICAL HISTORY     Past Medical History:   Diagnosis Date    Acute MI ([address] 09/2013    Anxiety     Arthritis     CAD (coronary artery disease)     Hypertension     Influenza A 01/15/2017         SURGICAL HISTORY     Past Surgical History:   Procedure Laterality Date    BACK SURGERY N/A 1989    herniated disc    CORONARY ANGIOPLASTY WITH STENT PLACEMENT  9/13         CURRENTMEDICATIONS       Previous Medications    ASCORBIC ACID (VITAMIN C) 500 MG TABLET    Take 500 mg by mouth daily    ASPIRIN 81 MG EC TABLET    Take 81 mg by mouth daily    ATENOLOL (TENORMIN) 25 MG TABLET    Take 1 tablet by mouth daily    ATORVASTATIN (LIPITOR) 80 MG TABLET    Take 1 tablet by mouth nightly    BACLOFEN (LIORESAL) 5 MG TABLET    Take 1 tablet by mouth 3 times daily    CLOPIDOGREL (PLAVIX) 75 MG TABLET    Take 1 tablet by mouth daily for 10 days Take for 10 more days to complete 30 days course, then take ASA alone    HYDROCHLOROTHIAZIDE (HYDRODIURIL) 25 MG TABLET    TAKE ONE TABLET BY MOUTH DAILY    LISINOPRIL (PRINIVIL;ZESTRIL) 20 MG TABLET    Take 1 tablet by mouth daily    MULTIPLE VITAMINS-MINERALS (THERAPEUTIC MULTIVITAMIN-MINERALS) TABLET    Take 1 tablet by mouth daily    MYRBETRIQ 50 MG TB24    Take 1 tablet by mouth daily    NITROGLYCERIN (NITROSTAT) 0.4 MG SL TABLET    Place 1 tablet under the tongue every 5 minutes as needed for Chest pain. POTASSIUM BICARB-CITRIC ACID (EFFER-K) 20 MEQ TBEF EFFERVESCENT TABLET    Take 1 tablet by mouth daily    SERTRALINE (ZOLOFT) 100 MG TABLET    TAKE 1 AND 1/2 TABLET BY MOUTH DAILY         ALLERGIES     Patient has no known allergies.     FAMILYHISTORY       Family History   Problem Relation Age of Onset  Hypertension Mother     Hypertension Father     Heart Failure Father     Stroke Maternal Grandfather     Cancer Paternal Uncle         throat          SOCIAL HISTORY       Social History     Tobacco Use    Smoking status: Former Smoker     Packs/day: 0.50     Years: 40.00     Pack years: 20.00     Types: Cigarettes     Quit date: 2019     Years since quittin.3    Smokeless tobacco: Former User   Vaping Use    Vaping Use: Never used   Substance Use Topics    Alcohol use: Yes     Alcohol/week: 2.0 standard drinks     Types: 2 Cans of beer per week     Comment: states used to drink 1 pint/vodka a day / now socially drinks    Drug use: No       SCREENINGS             PHYSICAL EXAM    (up to 7 for level 4, 8 or more for level 5)     ED Triage Vitals [21 1406]   BP Temp Temp Source Pulse Resp SpO2 Height Weight   (!) 142/82 98 °F (36.7 °C) Infrared 89 18 99 % 6' 1\" (1.854 m) 260 lb (117.9 kg)       Physical Exam  Vitals and nursing note reviewed. Constitutional:       General: He is awake. Appearance: Normal appearance. He is well-developed and overweight. HENT:      Head: Normocephalic and atraumatic. Nose: Nose normal.   Eyes:      General:         Right eye: No discharge. Left eye: No discharge. Cardiovascular:      Rate and Rhythm: Normal rate and regular rhythm. Pulses:           Dorsalis pedis pulses are 2+ on the right side and 2+ on the left side. Heart sounds: Normal heart sounds. Pulmonary:      Effort: Pulmonary effort is normal. No respiratory distress. Breath sounds: Normal breath sounds. Musculoskeletal:      Cervical back: Full passive range of motion without pain and normal range of motion. No spinous process tenderness or muscular tenderness. Right hip: Decreased range of motion (hip flexed and externally rotated). Right lower leg: No edema. Left lower leg: No edema.       Comments: Compression stockings to BLE   Skin: General: Skin is warm and dry. Coloration: Skin is not pale. Neurological:      Mental Status: He is alert. Mental status is at baseline. Motor: Weakness (RLE, RUE) present. Comments: His mental status is at his baseline per nursing home. Patient is able to answer yes and no and basic questions. Psychiatric:         Mood and Affect: Affect is flat. Behavior: Behavior normal. Behavior is cooperative.          DIAGNOSTIC RESULTS   LABS:    Labs Reviewed   CBC WITH AUTO DIFFERENTIAL - Abnormal; Notable for the following components:       Result Value    RBC 3.94 (*)     Hemoglobin 12.2 (*)     Hematocrit 34.7 (*)     All other components within normal limits    Narrative:     Performed at:  [address] [facility]   Phone [phone]   URINE RT REFLEX TO CULTURE - Abnormal; Notable for the following components:    Urobilinogen, Urine 2.0 (*)     All other components within normal limits    Narrative:     Performed at:  [address] [facility]   Phone [phone]   COMPREHENSIVE METABOLIC PANEL W/ REFLEX TO MG FOR LOW K - Abnormal; Notable for the following components:    Potassium reflex Magnesium 3.3 (*)     Chloride 97 (*)     BUN 22 (*)     CREATININE 0.7 (*)     Albumin 3.0 (*)     Albumin/Globulin Ratio 0.8 (*)     All other components within normal limits    Narrative:     Performed at:  [address] [facility]   Phone [phone]   MAGNESIUM - Abnormal; Notable for the following components:    Magnesium 1.60 (*)     All other components within normal limits    Narrative:     Performed at:  [address] [facility]   Phone [phone]   TROPONIN    Narrative:     Performed at:  [facility]  [address] [facility] [address]   Phone [phone]       All other labs were within normal range or not returned as of this dictation. EKG: All EKG's are interpreted by the Emergency Department Physician in the absence of a cardiologist.  Please see their note for interpretation of EKG. RADIOLOGY:   Non-plain film images such as CT, Ultrasound and MRI are read by the radiologist. Plain radiographic images are visualized and preliminarily interpreted by the ED Provider with the below findings:        Interpretation per the Radiologist below, if available at the time of this note:    XR HIP RIGHT MIN 4VW W PELVIS   Final Result   No acute fracture of the right hip, bony pelvis, or right femur. No acute   abnormality identified. Osteopenia. XR FEMUR RIGHT (MIN 2 VIEWS)   Final Result   No acute fracture of the right hip, bony pelvis, or right femur. No acute   abnormality identified. Osteopenia. CT Lumbar Spine WO Contrast   Final Result   No traumatic abnormality. No results found. PROCEDURES   Unless otherwise noted below, none     Procedures    CRITICAL CARE TIME   N/A    CONSULTS:  None      EMERGENCY DEPARTMENT COURSE and DIFFERENTIAL DIAGNOSIS/MDM:   Vitals:    Vitals:    05/28/21 1406 05/28/21 1530 05/28/21 1900   BP: (!) 142/82 (!) 140/80 (!) 145/84   Pulse: 89 88 81   Resp: 18 18 16   Temp: 98 °F (36.7 °C) 98 °F (36.7 °C) 98 °F (36.7 °C)   TempSrc: Infrared Infrared Infrared   SpO2: 99% 98% 98%   Weight: 260 lb (117.9 kg)     Height: 6' 1\" (1.854 m)         Patient was given the following medications:  Medications   magnesium oxide (MAG-OX) tablet 400 mg (400 mg Oral Given 5/28/21 1930)   potassium chloride (KLOR-CON M) extended release tablet 40 mEq (40 mEq Oral Given 5/28/21 1931)             Pertinent Labs & Imaging studies reviewed. (See chart for details)   -  Patient seen and evaluated in the emergency department.   -  Triage and nursing notes reviewed and incorporated. -  Old chart records reviewed and incorporated. -  Patient case discussed with attending physician, Dr. Petar Polk. They saw and examined patient. -  Differential diagnosis includes:  Sprain, strain, fracture, dislocation, dehydration, UTI, sepsis, metabolic encephalopathy, contusion, cauda equina syndrome, subluxation, abrasion, laceration, versus COVID-19  -  Work-up included:  See above CT lumbar spine, x-ray right hip, x-ray right femur, UA, troponin, EKG, CBC, CMP  -  ED treatment included:   Magnesium, potassium  - Consults: None  -  Results discussed with patient. Labs show  CT lumbar spine shows no traumatic abnormality. X-ray right hip shows no acute fracture of the right hip, bony pelvis, or right femur. No acute abnormality identified. Osteopenia. X-ray right femur shows no acute fracture. UA with urobilinogen 2. CBC with RBC 3.4, hemoglobin 12.2, hematocrit 34.7. CMP with potassium 3.3, chloride 97, magnesium 1.6. Troponin negative. Patient's wife called to check on him. Informed on results and will be transferring back to the nursing home. EMS is called for transport. Pt was given strict return precautions. The patient is agreeable with plan of care and disposition.  -  Disposition:   Nursing home    FINAL IMPRESSION      1. Contusion of back, initial encounter    2. Fall, initial encounter    3. Osteopenia of right hip    4. Hypokalemia    5.  Hypomagnesemia          DISPOSITION/PLAN   DISPOSITION        PATIENT REFERRED TO:  Kristie Santos [address] [address]  [phone]    Call in 3 days  As needed, If symptoms worsen    [facility]  [address] [id number] [phone]  Go to   As needed      DISCHARGE MEDICATIONS:  New Prescriptions    No medications on file       DISCONTINUED MEDICATIONS:  Discontinued Medications    No medications on file              (Please note that portions of this note were completed with a voice recognition program.  Efforts were made to edit the dictations but occasionally words are mis-transcribed.)    MARSHALL Ortiz CNP (electronically signed)            MARSHALL Ortiz CNP  05/28/21 2010

## 2021-05-28 NOTE — ED PROVIDER NOTES
Attending Supervisory Note/Shared Visit   I have personally performed a face to face diagnostic evaluation on this patient. I have reviewed the mid-levels findings and agree. History and Exam by me shows a 56-year-old male who presents from his nursing facility after a fall. Patient had a left-sided ischemic stroke and has persistent right-sided paralysis. Patient was unattended and tried to stand up from his wheelchair and fell. He was found sitting just in front of the wheelchair. Patient is able to answer yes/no questions, denies hitting his head and has no complaints of pain at the time of my assessment. He was reported to have landed on his right hip and lower back. He was reportedly at his mental status baseline per the facility. On exam he is well-appearing, afebrile, with normal vital signs. There is no traumatic findings on physical exam.  He has residual right-sided neurologic deficits, but strength is normal in the left upper and lower extremities. Description of the fall we did obtain imaging of the right hip and femur as well as the lumbar spine are negative for traumatic abnormality. As mental status baseline is difficult to assess given patient's verbal status, we did obtain a basic metabolic work-up as well to evaluate for possible risk factors for increased fall frequency. CBC is reassuring. Urinalysis is noninfectious. CMP shows mild hypokalemia and hypomagnesemia which were repleted. Troponin is negative. At this time, do feel the patient is safe for discharge back to his nursing facility. Stable at the time of transport.     Per my interpretation:    Electrocardiogram (ECG) 5/28/2021 1834  RATE: normal  RHYTHM: normal sinus  AXIS: normal  INTERVALS: normal  ST-T WAVE CHANGES: No evidence of ST segment elevation or T-wave inversion, nonspecific ST abnormality including slight depression in V4 through V6  Prior for comparison 4/21/2021      Faustino Jara MD  Attending Emergency Physician          Linda Randall MD  05/28/21 5823

## 2021-05-29 LAB
EKG ATRIAL RATE: 67 BPM
EKG DIAGNOSIS: NORMAL
EKG P AXIS: 56 DEGREES
EKG P-R INTERVAL: 212 MS
EKG Q-T INTERVAL: 448 MS
EKG QRS DURATION: 104 MS
EKG QTC CALCULATION (BAZETT): 473 MS
EKG R AXIS: 15 DEGREES
EKG T AXIS: 106 DEGREES
EKG VENTRICULAR RATE: 67 BPM

## 2021-05-29 PROCEDURE — 93010 ELECTROCARDIOGRAM REPORT: CPT | Performed by: INTERNAL MEDICINE

## 2021-06-08 ENCOUNTER — TELEPHONE (OUTPATIENT)
Dept: FAMILY MEDICINE CLINIC | Age: 66
End: 2021-06-08

## 2021-06-08 NOTE — TELEPHONE ENCOUNTER
Pt had a severe stroke in April. Pt did go to [facility] for 2 weeks . They are  trying to get pt back into an acute facility. His right side is/was paralyzed. Acute then skilled they are trying to get back into acute. His insurance will not allow this. They are looking for some help to be able to get pt back onto acute. To get home some help so he can come home. Right now he is at [facility]. Which is skilled. Pt cannot walk right now.      Please call Russell Loomis [phone]

## 2021-06-09 NOTE — TELEPHONE ENCOUNTER
[facility] GO TO ANOTHER NURSING HOME AND SEE PATIENTS WITHOUT PRIVLEDGES AT [address] I CALLED AND LEFT A DETAILED MESSAGE. Liu Tsai

## 2021-06-09 NOTE — TELEPHONE ENCOUNTER
I spoke with Becca--I gave her the message from Dr Sumeet Lucia. Would Dr Sumeet Lucia want to see the patient at [place] [address]? She is just wanting another physicians input on his condition now. You can leave Lory Alpers a message.

## 2021-06-14 RX ORDER — LISINOPRIL 20 MG/1
TABLET ORAL
Qty: 45 TABLET | Refills: 0 | Status: ON HOLD | OUTPATIENT
Start: 2021-06-14 | End: 2022-09-02

## 2021-06-14 RX ORDER — ATENOLOL 50 MG/1
TABLET ORAL
Qty: 90 TABLET | Refills: 0 | Status: ON HOLD | OUTPATIENT
Start: 2021-06-14 | End: 2022-01-10

## 2021-06-14 NOTE — TELEPHONE ENCOUNTER
PT HOSPITALIZED FOR A STROKE, PT CURRENTLY AT [facility] TO REHAB. AWARE THAT APPT IS DUE. OK PER [facility].   [address] [facility]

## 2021-06-21 RX ORDER — TAMSULOSIN HYDROCHLORIDE 0.4 MG/1
CAPSULE ORAL
Qty: 90 CAPSULE | Refills: 0 | Status: ON HOLD | OUTPATIENT
Start: 2021-06-21 | End: 2022-01-10

## 2021-08-10 ENCOUNTER — TELEPHONE (OUTPATIENT)
Dept: ORTHOPEDIC SURGERY | Age: 66
End: 2021-08-10

## 2021-08-10 ENCOUNTER — OFFICE VISIT (OUTPATIENT)
Dept: ORTHOPEDIC SURGERY | Age: 66
End: 2021-08-10
Payer: MEDICARE

## 2021-08-10 VITALS — HEIGHT: 75 IN | WEIGHT: 195 LBS | BODY MASS INDEX: 24.25 KG/M2

## 2021-08-10 DIAGNOSIS — R29.898 RIGHT LEG WEAKNESS: ICD-10-CM

## 2021-08-10 DIAGNOSIS — G81.11 RIGHT SPASTIC HEMIPLEGIA (HCC): ICD-10-CM

## 2021-08-10 DIAGNOSIS — I63.9 ACUTE CVA (CEREBROVASCULAR ACCIDENT) (HCC): Primary | ICD-10-CM

## 2021-08-10 PROCEDURE — 99204 OFFICE O/P NEW MOD 45 MIN: CPT | Performed by: PHYSICAL MEDICINE & REHABILITATION

## 2021-08-10 NOTE — TELEPHONE ENCOUNTER
He just meant do home exercises. I think the patient is already receiving therapy in the nursing home.

## 2021-08-10 NOTE — PROGRESS NOTES
[facility]) Physical Medicine and Rehabilitation   Outpatient Progress Note  Randy Craig DO, MPH    Patient Name: Mia Allen MRN: [id number]   Age: 78 y.o. YOB: 1955   Sex: male      [address] Complaint   Patient presents with    Other     CVA 4 months ago, right arm and leg weakness. Patient is currently admitted at [facility]. HISTORY OF PRESENT ILLNESS   Mia Allen is a 78 y.o. male with a PMH of CVA in April. He has severe right sided hemiplegia and spasticity and is currently undergoing therapy at a SNF. He also went to [address] post CVA. He comes in today with right sided hemiplegia and right lower extremity spasticity. He would like to be evaluated for Botox in the right leg. He currently is on baclofen but without much improvement. He has pain in his right hip and hamstrings during transfers.      PAST MEDICAL HISTORY      Past Medical History:   Diagnosis Date    Acute MI ([address] 09/2013    Anxiety     Arthritis     CAD (coronary artery disease)     Hypertension     Influenza A 01/15/2017       PAST SURGICAL HISTORY     Past Surgical History:   Procedure Laterality Date    BACK SURGERY N/A 1989    herniated disc    CORONARY ANGIOPLASTY WITH STENT PLACEMENT  9/13       MEDICATIONS     Current Outpatient Medications   Medication Sig Dispense Refill    tamsulosin (FLOMAX) 0.4 MG capsule TAKE ONE CAPSULE BY MOUTH DAILY 90 capsule 0    atenolol (TENORMIN) 50 MG tablet TAKE ONE TABLET BY MOUTH DAILY 90 tablet 0    lisinopril (PRINIVIL;ZESTRIL) 20 MG tablet TAKE 1/2 TABLET BY MOUTH DAILY 45 tablet 0    hydroCHLOROthiazide (HYDRODIURIL) 25 MG tablet TAKE ONE TABLET BY MOUTH DAILY 90 tablet 0    atenolol (TENORMIN) 25 MG tablet Take 1 tablet by mouth daily 30 tablet 0    potassium bicarb-citric acid (EFFER-K) 20 MEQ TBEF effervescent tablet Take 1 tablet by mouth daily 30 tablet 0    baclofen (LIORESAL) 5 MG tablet Take 1 file   Social History Narrative    Not on file     Social Determinants of Health     Financial Resource Strain: Low Risk     Difficulty of Paying Living Expenses: Not hard at all   Food Insecurity: No Food Insecurity    Worried About Running Out of Food in the Last Year: Never true    [address] in the Last Year: Never true   Transportation Needs: No Transportation Needs    Lack of Transportation (Medical): No    Lack of Transportation (Non-Medical): No   Physical Activity:     Days of Exercise per Week:     Minutes of Exercise per Session:    Stress:     Feeling of Stress :    Social Connections:     Frequency of Communication with Friends and Family:     Frequency of Social Gatherings with Friends and Family:     Attends Rastafari Services:     Active Member of Clubs or Organizations:     Attends Club or Organization Meetings:     Marital Status:    Intimate Partner Violence:     Fear of Current or Ex-Partner:     Emotionally Abused:     Physically Abused:     Sexually Abused:        REVIEW OF SYSTEMS   General: no fever, chills, night sweats, anorexia, malaise, fatigue, or weight change  Hematologic:  no unexplained bleeding or bruising  HEENT:   no nasal congestion, rhinorrhea, sore throat, or facial pain  Respiratory:  no cough, dyspnea, or chest pain  Cardiovascular:  no angina, SMITH, PND, orthopnea, dependent edema, or palpitations  Gastrointestinal:  no nausea, vomiting, diarrhea, constipation, or abdominal pain  Genitourinary:  no urinary urgency, frequency, dysuria, or hematuria  Musculoskeletal: see HPI  Endocrine:  no heat or cold intolerance and no polyphagia, polydipsia, or polyuria  Skin:  no skin eruptions or changing lesions  Neurologic:  no focal weakness, numbness/tingling, tremor, or severe headache. See HPI. See HPI for pertinent positives.     PHYSICAL EXAM   Vital Signs:   Vitals:    08/10/21 1254   Weight: 195 lb (88.5 kg)   Height: 6' 3\" (1.905 m)       General

## 2021-08-10 NOTE — TELEPHONE ENCOUNTER
[facility] INFORMATION    DRUG:  BOTOX [id number]    ADMIN:  [id number], [id number]    DX CODE: G81.11    UNITS:  300 units    LAST OV DATE: 08/10/21

## 2021-08-10 NOTE — TELEPHONE ENCOUNTER
The note says HEP, but can you tell me if it is physician directed through the nursing home he is in? If this requires a response please respond to the pool ( [facility] [address]). Thank you please advise patient.

## 2021-08-11 NOTE — TELEPHONE ENCOUNTER
I LVM with the therapy department at [facility] ( [phone]) so I can get some sort of note for Physical Therapy to send with PA for BOTOX.

## 2021-08-12 NOTE — TELEPHONE ENCOUNTER
The nursing ( Rehabilitative) center called back and the patient is current in [address] [place] [address]. They are currently in the process of getting more PHYSICAL THERAPY APPROVED. The patient is taking BACLOFEN currently to try and help with the spasticity.

## 2021-08-12 NOTE — TELEPHONE ENCOUNTER
Submitted PA for BOTOX, via FAX to [facility]. STATUS: APPROVED 8/12/2021-8/12/2022.     Please schedule patient

## 2021-08-21 ENCOUNTER — APPOINTMENT (OUTPATIENT)
Dept: CT IMAGING | Age: 66
End: 2021-08-21
Payer: MEDICARE

## 2021-08-21 ENCOUNTER — HOSPITAL ENCOUNTER (EMERGENCY)
Age: 66
Discharge: HOME | End: 2021-08-22
Attending: EMERGENCY MEDICINE
Payer: MEDICARE

## 2021-08-21 DIAGNOSIS — S39.012A STRAIN OF LUMBAR REGION, INITIAL ENCOUNTER: Primary | ICD-10-CM

## 2021-08-21 DIAGNOSIS — S16.1XXA ACUTE STRAIN OF NECK MUSCLE, INITIAL ENCOUNTER: ICD-10-CM
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
## 2021-08-21 PROCEDURE — 72131 CT LUMBAR SPINE W/O DYE: CPT

## 2021-08-21 PROCEDURE — 72125 CT NECK SPINE W/O DYE: CPT

## 2021-08-21 PROCEDURE — 99284 EMERGENCY DEPT VISIT MOD MDM: CPT

## 2021-08-21 PROCEDURE — 6370000000 HC RX 637 (ALT 250 FOR IP): Performed by: EMERGENCY MEDICINE

## 2021-08-21 RX ORDER — ACETAMINOPHEN 500 MG
1000 TABLET ORAL ONCE
Status: COMPLETED | OUTPATIENT
Start: 2021-08-21 | End: 2021-08-21

## 2021-08-21 RX ADMIN — ACETAMINOPHEN 1000 MG: 500 TABLET ORAL at 22:57

## 2021-08-21 ASSESSMENT — PAIN DESCRIPTION - DESCRIPTORS: DESCRIPTORS: ACHING

## 2021-08-21 ASSESSMENT — PAIN DESCRIPTION - LOCATION: LOCATION: BACK

## 2021-08-21 ASSESSMENT — PAIN SCALES - GENERAL
PAINLEVEL_OUTOF10: 7
PAINLEVEL_OUTOF10: 7
PAINLEVEL_OUTOF10: 8

## 2021-08-21 ASSESSMENT — PAIN DESCRIPTION - ORIENTATION: ORIENTATION: RIGHT;LOWER

## 2021-08-21 ASSESSMENT — PAIN DESCRIPTION - PAIN TYPE: TYPE: ACUTE PAIN

## 2021-08-21 NOTE — ED NOTES
Bed: B-10  Expected date: 8/21/21  Expected time: 7:28 PM  Means of arrival: [facility]  Comments:  Patti Donaldson RN  08/21/21 1939

## 2021-08-22 VITALS
DIASTOLIC BLOOD PRESSURE: 64 MMHG | WEIGHT: 197 LBS | BODY MASS INDEX: 24.62 KG/M2 | HEART RATE: 87 BPM | TEMPERATURE: 97.7 F | OXYGEN SATURATION: 95 % | SYSTOLIC BLOOD PRESSURE: 124 MMHG | RESPIRATION RATE: 19 BRPM

## 2021-08-22 NOTE — ED PROVIDER NOTES
[facility]      Pt Name: Dean Alvarez  MRN: [id number]  [place] 1955  Date of evaluation: 8/21/2021  Provider: Sumi Hernandez DO    CHIEF COMPLAINT  Chief Complaint   Patient presents with    Back Pain       I wore personal protective equipment when I was in the room the entire time. This includes gloves, N95 mask, face shield, and a glove over my stethoscope for protection. HPI  Dean Alvarez is a 78 y.o. male who presents with back pain. He states he fell out of wheelchair. He denies any other injuries. Is having low back pain. He denies any neck pain chest pain abdominal pain extremity pain paresthesias or weakness. He denies any dysuria nocturia hematuria. Denies any fevers or chills. He states it was a mechanical fall transferring from his wheelchair. Radha Perez REVIEW OF SYSTEMS  All systems negative except as noted in the HPI. Reviewed Nurses' notes and concur. No LMP for male patient. PAST MEDICAL HISTORY  Past Medical History:   Diagnosis Date    Acute [facility] [address] 09/2013    Anxiety     Arthritis     CAD (coronary artery disease)     Hypertension     Influenza A 01/15/2017       FAMILY HISTORY  Family History   Problem Relation Age of Onset    Hypertension Mother     Hypertension Father     Heart Failure Father     Stroke Maternal Grandfather     Cancer Paternal Uncle         throat       SOCIAL HISTORY   reports that he quit smoking about 2 years ago. His smoking use included cigarettes. He has a 20.00 pack-year smoking history. He has quit using smokeless tobacco. He reports current alcohol use of about 2.0 standard drinks of alcohol per week. He reports that he does not use drugs.     SURGICAL HISTORY  Past Surgical History:   Procedure Laterality Date    BACK SURGERY N/A 1989    herniated disc    CORONARY ANGIOPLASTY WITH STENT PLACEMENT  9/13       CURRENT MEDICATIONS  Current Outpatient Rx Medication Sig Dispense Refill    tamsulosin (FLOMAX) 0.4 MG capsule TAKE ONE CAPSULE BY MOUTH DAILY 90 capsule 0    atenolol (TENORMIN) 50 MG tablet TAKE ONE TABLET BY MOUTH DAILY 90 tablet 0    lisinopril (PRINIVIL;ZESTRIL) 20 MG tablet TAKE 1/2 TABLET BY MOUTH DAILY 45 tablet 0    hydroCHLOROthiazide (HYDRODIURIL) 25 MG tablet TAKE ONE TABLET BY MOUTH DAILY 90 tablet 0    atenolol (TENORMIN) 25 MG tablet Take 1 tablet by mouth daily 30 tablet 0    potassium bicarb-citric acid (EFFER-K) 20 MEQ TBEF effervescent tablet Take 1 tablet by mouth daily 30 tablet 0    clopidogrel (PLAVIX) 75 MG tablet Take 1 tablet by mouth daily for 10 days Take for 10 more days to complete 30 days course, then take ASA alone 10 tablet 0    baclofen (LIORESAL) 5 MG tablet Take 1 tablet by mouth 3 times daily 90 tablet 0    atorvastatin (LIPITOR) 80 MG tablet Take 1 tablet by mouth nightly 30 tablet 3    aspirin 81 MG EC tablet Take 81 mg by mouth daily      sertraline (ZOLOFT) 100 MG tablet TAKE 1 AND 1/2 TABLET BY MOUTH DAILY 45 tablet 2    MYRBETRIQ 50 MG TB24 Take 1 tablet by mouth daily      Multiple Vitamins-Minerals (THERAPEUTIC MULTIVITAMIN-MINERALS) tablet Take 1 tablet by mouth daily      Ascorbic Acid (VITAMIN C) 500 MG tablet Take 500 mg by mouth daily      nitroGLYCERIN (NITROSTAT) 0.4 MG SL tablet Place 1 tablet under the tongue every 5 minutes as needed for Chest pain. 25 tablet 2       ALLERGIES  No Known Allergies    Tetanus vaccination status reviewed: tetanus re-vaccination not indicated.     PHYSICAL EXAM  VITAL SIGNS: /84   Pulse 83   Temp 97.7 °F (36.5 °C) (Oral)   Resp 19   Wt 197 lb (89.4 kg)   SpO2 98%   BMI 24.62 kg/m²   Constitutional: Well-developed, well-nourished, appears normal, nontoxic, activity: Resting comfortably on the cart, no obvious pain, moving all extremities equally, not immobilized  HENT: Normocephalic, trauma-none, Bilateral external ears normal, Oropharynx moist, no oral injury, Nose normal.  Eyes: PERRLA, EOMI, Conjunctiva normal, atraumatic. Neck: Normal range of motion after cleared with CT, mild midline C5-C7 tenderness, no step-offs or deformities, supple,  Lymphatic: No lymphadenopathy noted. Cardiovascular: Normal heart rate, Normal rhythm, no murmurs, no gallops, no rubs. Thorax & Lungs: normal breath sounds, no respiratory distress, no wheezing, no rales, no rhonchi  Abdomen: Soft, no tender with no distension, no masses, no pulsatile masses, no hepatosplenomegaly, normal bowel sounds  Skin: Warm, Dry, No erythema, No rash. Back: Mild lumbar midline tenderness without step-offs or deformities, mildly decreased range of motion, No scoliosis. Extremities:  neurovascular intact, no deformity, no swelling, no erythema, no lacerations noted, no amputations, no cyanosis, no mottling, no ecchymosis, no tenderness, capillary refill less than 2 seconds. Musculoskeletal: Good range of motion in all major joints except as noted above, No tenderness to palpation or major deformities except as noted above. Neurologic: Alert & oriented x 3, CN II through XII are intact, normal motor function, normal sensory function, no focal deficits noted, DTRs are 2+/4. Psychiatric: Affect normal, Judgment normal, Mood normal.      RADIOLOGY/PROCEDURES  I personally reviewed the images for this case. CT LUMBAR SPINE WO CONTRAST   Final Result   No acute abnormality in the lumbar spine. Degenerative disc disease as described above. Spinal canal narrowing, moderate at L3-4, mild at L2-3 and L4-5. Foraminal narrowing, moderate at left L4-5, mild at right L3-4 and right L4-5. CT CERVICAL SPINE WO CONTRAST   Final Result   No acute abnormality of the cervical spine. Moderate cervical spondylosis. Severe facet arthropathy. COURSE & MEDICAL DECISION MAKING  Pertinent Imaging studies reviewed.  (See chart for details)    Vitals:    08/21/21 2045 08/21/21 2115 08/21/21 2130 08/21/21 2140   BP: (!) 143/73 128/85  119/84   Pulse: 86 83  83   Resp: 20 25 19   Temp:  97.7 °F (36.5 °C)     TempSrc:  Oral     SpO2:       Weight:   197 lb (89.4 kg)        Medications   acetaminophen (TYLENOL) tablet 1,000 mg (1,000 mg Oral Given 8/21/21 2257)       New Prescriptions    No medications on file       SEP-1 CORE MEASURE DATA  Exclusion criteria: the patient is NOT to be included for sepsis due to: Infection is not suspected        Patient remained stable in the ED. CT scans were negative for fracture dislocation. Patient was afebrile. He had no symptoms of cough or dysuria or other symptoms of infection. Therefore, patient was discharged when his CT scans returned as normal and instructed to continue his present care and return if any problems. His temperature was rechecked and was still normal.  His wife felt that he \"felt hot. \"    The patient's blood pressure was found to be elevated according to CMS/Medicare and the Affordable Care Act/ObamaCare criteria. Elevated blood pressure could occur because of pain or anxiety or other reasons and does not mean that they need to have their blood pressure treated or medications otherwise adjusted. However, this could also be a sign that they will need to have their blood pressure treated or medications changed. The patient was instructed to follow up closely with their personal physician to have their blood pressure rechecked. The patient was instructed to take a list of recent blood pressure readings to their next visit with their personal physician. See discharge instructions for specific medications, discharge information, and treatments. They were verbally instructed to return to emergency if any problems. (This chart has been completed using [address].  Although attempts have been made to ensure accuracy, words and/or phrases may not be transcribed as intended.)    Patient refused pain

## 2021-08-26 ENCOUNTER — OFFICE VISIT (OUTPATIENT)
Dept: ORTHOPEDIC SURGERY | Age: 66
End: 2021-08-26
Payer: MEDICARE

## 2021-08-26 VITALS — WEIGHT: 195 LBS | HEIGHT: 75 IN | BODY MASS INDEX: 24.25 KG/M2

## 2021-08-26 DIAGNOSIS — G81.11 RIGHT SPASTIC HEMIPLEGIA (HCC): Primary | ICD-10-CM

## 2021-08-26 PROCEDURE — 64642 CHEMODENERV 1 EXTREMITY 1-4: CPT | Performed by: PHYSICAL MEDICINE & REHABILITATION

## 2021-08-26 PROCEDURE — 95873 GUIDE NERV DESTR ELEC STIM: CPT | Performed by: PHYSICAL MEDICINE & REHABILITATION

## 2021-08-26 PROCEDURE — 99214 OFFICE O/P EST MOD 30 MIN: CPT | Performed by: PHYSICAL MEDICINE & REHABILITATION

## 2021-08-26 NOTE — PROGRESS NOTES
[facility]) Physical Medicine and Rehabilitation  Outpatient Progress Note  Binta Craig DO    Patient Name: Demi Pérez MRN: [id number]   Age: 78 y.o. YOB: 1955   Sex: male      [address] Complaint   Patient presents with    Follow-up     botox injections right leg       HISTORY OF PRESENT ILLNESS   Demi Pérez is a 78 y.o. male with a PMH of CVA in April. He has severe right sided hemiplegia and spasticity and is currently undergoing therapy at a SNF. He also went to [address] post CVA. He comes in today with right sided hemiplegia and right lower extremity spasticity. He follows up today for right LE botox injections.      PAST MEDICAL HISTORY      Past Medical History:   Diagnosis Date    Acute MI ([address] 09/2013    Anxiety     Arthritis     CAD (coronary artery disease)     Hypertension     Influenza A 01/15/2017       PAST SURGICAL HISTORY     Past Surgical History:   Procedure Laterality Date    BACK SURGERY N/A 1989    herniated disc    CORONARY ANGIOPLASTY WITH STENT PLACEMENT  9/13       MEDICATIONS     Current Outpatient Medications   Medication Sig Dispense Refill    tamsulosin (FLOMAX) 0.4 MG capsule TAKE ONE CAPSULE BY MOUTH DAILY 90 capsule 0    atenolol (TENORMIN) 50 MG tablet TAKE ONE TABLET BY MOUTH DAILY 90 tablet 0    lisinopril (PRINIVIL;ZESTRIL) 20 MG tablet TAKE 1/2 TABLET BY MOUTH DAILY 45 tablet 0    hydroCHLOROthiazide (HYDRODIURIL) 25 MG tablet TAKE ONE TABLET BY MOUTH DAILY 90 tablet 0    atenolol (TENORMIN) 25 MG tablet Take 1 tablet by mouth daily 30 tablet 0    potassium bicarb-citric acid (EFFER-K) 20 MEQ TBEF effervescent tablet Take 1 tablet by mouth daily 30 tablet 0    baclofen (LIORESAL) 5 MG tablet Take 1 tablet by mouth 3 times daily 90 tablet 0    atorvastatin (LIPITOR) 80 MG tablet Take 1 tablet by mouth nightly 30 tablet 3    aspirin 81 MG EC tablet Take 81 mg by mouth daily      sertraline (ZOLOFT) 100 MG tablet TAKE 1 AND 1/2 TABLET BY MOUTH DAILY 45 tablet 2    MYRBETRIQ 50 MG TB24 Take 1 tablet by mouth daily      Multiple Vitamins-Minerals (THERAPEUTIC MULTIVITAMIN-MINERALS) tablet Take 1 tablet by mouth daily      Ascorbic Acid (VITAMIN C) 500 MG tablet Take 500 mg by mouth daily      nitroGLYCERIN (NITROSTAT) 0.4 MG SL tablet Place 1 tablet under the tongue every 5 minutes as needed for Chest pain. 25 tablet 2    clopidogrel (PLAVIX) 75 MG tablet Take 1 tablet by mouth daily for 10 days Take for 10 more days to complete 30 days course, then take ASA alone 10 tablet 0     No current facility-administered medications for this visit. ALLERGIES   No Known Allergies    FAMILY HISTORY     Family History   Problem Relation Age of Onset    Hypertension Mother     Hypertension Father     Heart Failure Father     Stroke Maternal Grandfather     Cancer Paternal Uncle         throat       SOCIAL HISTORY     Social History     Socioeconomic History    Marital status: Single     Spouse name: Not on file    Number of children: Not on file    Years of education: Not on file    Highest education level: Not on file   Occupational History    Not on file   Tobacco Use    Smoking status: Former Smoker     Packs/day: 0.50     Years: 40.00     Pack years: 20.00     Types: Cigarettes     Quit date: 2019     Years since quittin.6    Smokeless tobacco: Former User   Vaping Use    Vaping Use: Never used   Substance and Sexual Activity    Alcohol use:  Yes     Alcohol/week: 2.0 standard drinks     Types: 2 Cans of beer per week     Comment: states used to drink 1 pint/vodka a day / now socially drinks    Drug use: No    Sexual activity: Yes     Partners: Female   Other Topics Concern    Not on file   Social History Narrative    Not on file     Social Determinants of Health     Financial Resource Strain: Low Risk     Difficulty of Paying Living Expenses: Not hard at all   Food Insecurity: No Food Insecurity    Worried About Running Out of Food in the Last Year: Never true    Raven of Food in the Last Year: Never true   Transportation Needs: No Transportation Needs    Lack of Transportation (Medical): No    Lack of Transportation (Non-Medical): No   Physical Activity:     Days of Exercise per Week:     Minutes of Exercise per Session:    Stress:     Feeling of Stress :    Social Connections:     Frequency of Communication with Friends and Family:     Frequency of Social Gatherings with Friends and Family:     Attends Mu-ism Services:     Active Member of Clubs or Organizations:     Attends Club or Organization Meetings:     Marital Status:    Intimate Partner Violence:     Fear of Current or Ex-Partner:     Emotionally Abused:     Physically Abused:     Sexually Abused:        REVIEW OF SYSTEMS   General: no fever, chills, night sweats, anorexia, malaise, fatigue, or weight change  Hematologic:  no unexplained bleeding or bruising  HEENT:   no nasal congestion, rhinorrhea, sore throat, or facial pain  Respiratory:  no cough, dyspnea, or chest pain  Cardiovascular:  no angina, SMITH, PND, orthopnea, dependent edema, or palpitations  Gastrointestinal:  no nausea, vomiting, diarrhea, constipation, or abdominal pain  Genitourinary:  no urinary urgency, frequency, dysuria, or hematuria  Musculoskeletal: see HPI  Endocrine:  no heat or cold intolerance and no polyphagia, polydipsia, or polyuria  Skin:  no skin eruptions or changing lesions  Neurologic:  right sided hemiplegia and numbness, no tremor, or severe headache. See HPI. See HPI for pertinent positives. PHYSICAL EXAM   Vital Signs:   Vitals:    08/26/21 1220   Weight: 195 lb (88.5 kg)   Height: 6' 3\" (1.905 m)     General appearance: alert, no distress  Skin: Skin color, texture, turgor normal. No rashes or lesions  HEENT: atraumatic, normocephalic.  PERRL  Respiratory: Unlabored breathing  Lymphatic: No adenopathy   Neuro: Alert and oriented, normal distal sensation, normal bilateral DTRs- Anomia- aphasia   Vascular: Normal distal capillary and distal pulses  Muskuloskeletal Exam: Right lower extremity spasticity- 4- mAshworth scale in hamstrings and hip flexors. 1/5 strength throughout  left LE 3/5 strength with inability to support his weight for extended periods of time  Right UE- 3+ spasticity throughout  Left UE 4/5 strength       PROCEDURE     Botox Injection:  The patient consented to the procedure and a time out was performed before proceeding. The left medial hamstring, lateral hamstring and pectineus was prepped in the normal sterile fashion. A 25 gauge needle was introduced into the muscle with EMG guidance and confirmed using EMG. 300 units of Botox was injected into the muscles (above) after the syringe was pulled back and confirmed negative aspiration. The injection flowed freely, and the patient tolerated the procedure well. Post injection expectations were reviewed with the patient. IMPRESSION     1. Right spastic hemiplegia ([facility]         PLAN   I had a lengthy discussion with patient today regarding diagnosis and treatment options and recommendations.     Modified botox schedule     1. Botox right lower leg only  - 100 units medial hamstring  - 100 units lateral hamstring  - 50 units pectineus  - 50 units adductor longus     300 units total      2. Continued right sided spasticity- hemiplegia continue current medications- NEW  botox- continue PT/OT daily- this is very important for the next  8 weeks as the patient will have improved efficacy. 3. Follow up in 3 months- may require increased botox and modified injection schedule. FOLLOWUP     Return in about 3 months (around 11/26/2021). No orders of the defined types were placed in this encounter. No orders of the defined types were placed in this encounter.       Patient was instructed on appropriate use of braces, participation in home exercise programs, healthy lifestyle choices and weight loss as appropriate     Ricky Craig, DO

## 2021-09-01 ENCOUNTER — TELEPHONE (OUTPATIENT)
Dept: ORTHOPEDIC SURGERY | Age: 66
End: 2021-09-01

## 2021-09-01 NOTE — TELEPHONE ENCOUNTER
Medical Facility Question     Facility Name: [facility]  Contact Name: Jb Solo  Contact Number: [phone]  Request or Information: FAX OFFICE VISITS 8-10 & 8-26 TO LINDA OR ISI TO FAX# [phone]

## 2021-09-08 ENCOUNTER — TELEPHONE (OUTPATIENT)
Dept: ORTHOPEDIC SURGERY | Age: 66
End: 2021-09-08

## 2021-09-16 ENCOUNTER — HOSPITAL ENCOUNTER (OUTPATIENT)
Dept: NUCLEAR MEDICINE | Age: 66
Discharge: HOME | End: 2021-09-16
Payer: MEDICARE

## 2021-09-16 DIAGNOSIS — M86.9: ICD-10-CM

## 2021-09-16 DIAGNOSIS — M86.9 OSTEITIS OF ANKLE AND FOOT (HCC): ICD-10-CM

## 2021-09-16 DIAGNOSIS — M86.9 OSTEOMYELITIS OF LOWER LEG (HCC): ICD-10-CM

## 2021-09-28 ENCOUNTER — HOSPITAL ENCOUNTER (OUTPATIENT)
Dept: NUCLEAR MEDICINE | Age: 66
Discharge: HOME | End: 2021-09-28
Payer: MEDICARE

## 2021-09-28 DIAGNOSIS — M86.9 OSTEITIS OF ANKLE AND FOOT (HCC): ICD-10-CM

## 2021-09-28 PROCEDURE — 3430000000 HC RX DIAGNOSTIC RADIOPHARMACEUTICAL: Performed by: FAMILY MEDICINE

## 2021-09-28 PROCEDURE — A9503 TC99M MEDRONATE: HCPCS | Performed by: FAMILY MEDICINE

## 2021-09-28 PROCEDURE — 78315 BONE IMAGING 3 PHASE: CPT

## 2021-09-28 RX ORDER — TC 99M MEDRONATE 20 MG/10ML
23.46 INJECTION, POWDER, LYOPHILIZED, FOR SOLUTION INTRAVENOUS
Status: COMPLETED | OUTPATIENT
Start: 2021-09-28 | End: 2021-09-28

## 2021-09-28 RX ADMIN — TC 99M MEDRONATE 23.46 MILLICURIE: 20 INJECTION, POWDER, LYOPHILIZED, FOR SOLUTION INTRAVENOUS at 12:57

## 2021-12-02 ENCOUNTER — OFFICE VISIT (OUTPATIENT)
Dept: ORTHOPEDIC SURGERY | Age: 66
End: 2021-12-02
Payer: MEDICARE

## 2021-12-02 ENCOUNTER — TELEPHONE (OUTPATIENT)
Dept: ORTHOPEDIC SURGERY | Age: 66
End: 2021-12-02

## 2021-12-02 VITALS — BODY MASS INDEX: 24.25 KG/M2 | HEIGHT: 75 IN | WEIGHT: 195 LBS

## 2021-12-02 DIAGNOSIS — G81.11 RIGHT SPASTIC HEMIPLEGIA (HCC): Primary | ICD-10-CM

## 2021-12-02 PROCEDURE — 99214 OFFICE O/P EST MOD 30 MIN: CPT | Performed by: PHYSICAL MEDICINE & REHABILITATION

## 2021-12-02 PROCEDURE — 95874 GUIDE NERV DESTR NEEDLE EMG: CPT | Performed by: PHYSICAL MEDICINE & REHABILITATION

## 2021-12-02 PROCEDURE — 64642 CHEMODENERV 1 EXTREMITY 1-4: CPT | Performed by: PHYSICAL MEDICINE & REHABILITATION

## 2021-12-02 NOTE — PROGRESS NOTES
[facility]) Physical Medicine and Rehabilitation  Outpatient Progress Note  Lamberto Craig DO    Patient Name: Sangita Love MRN: [id number]   Age: 78 y.o. YOB: 1955   Sex: male      [address] Complaint   Patient presents with    Follow-up     Botox RLE       HISTORY OF PRESENT ILLNESS   Bunny Maxwell a 78 y. o. male with a PMH of CVA in April. He has severe right sided hemiplegia and spasticity and is currently undergoing therapy at a SNF. He also went to [address] post CVA. He comes in today with right sided hemiplegia and right lower extremity spasticity. He follows up today for Repeat right LE botox injections. He did have good efficacy with his leg for the past 3 months but would like to increase the dose next time to improve the effect. Plan to switch nursing homes in the near future.      PAST MEDICAL HISTORY      Past Medical History:   Diagnosis Date    Acute MI ([facility] [address] 09/2013    Anxiety     Arthritis     CAD (coronary artery disease)     Hypertension     Influenza A 01/15/2017       PAST SURGICAL HISTORY     Past Surgical History:   Procedure Laterality Date    BACK SURGERY N/A 1989    herniated disc    CORONARY ANGIOPLASTY WITH STENT PLACEMENT  9/13       MEDICATIONS     Current Outpatient Medications   Medication Sig Dispense Refill    tamsulosin (FLOMAX) 0.4 MG capsule TAKE ONE CAPSULE BY MOUTH DAILY 90 capsule 0    atenolol (TENORMIN) 50 MG tablet TAKE ONE TABLET BY MOUTH DAILY 90 tablet 0    lisinopril (PRINIVIL;ZESTRIL) 20 MG tablet TAKE 1/2 TABLET BY MOUTH DAILY 45 tablet 0    hydroCHLOROthiazide (HYDRODIURIL) 25 MG tablet TAKE ONE TABLET BY MOUTH DAILY 90 tablet 0    atenolol (TENORMIN) 25 MG tablet Take 1 tablet by mouth daily 30 tablet 0    potassium bicarb-citric acid (EFFER-K) 20 MEQ TBEF effervescent tablet Take 1 tablet by mouth daily 30 tablet 0    baclofen (LIORESAL) 5 MG tablet Take 1 tablet by mouth 3 times daily 90 tablet 0    atorvastatin (LIPITOR) 80 MG tablet Take 1 tablet by mouth nightly 30 tablet 3    aspirin 81 MG EC tablet Take 81 mg by mouth daily      sertraline (ZOLOFT) 100 MG tablet TAKE 1 AND 1/2 TABLET BY MOUTH DAILY 45 tablet 2    MYRBETRIQ 50 MG TB24 Take 1 tablet by mouth daily      Multiple Vitamins-Minerals (THERAPEUTIC MULTIVITAMIN-MINERALS) tablet Take 1 tablet by mouth daily      Ascorbic Acid (VITAMIN C) 500 MG tablet Take 500 mg by mouth daily      nitroGLYCERIN (NITROSTAT) 0.4 MG SL tablet Place 1 tablet under the tongue every 5 minutes as needed for Chest pain. 25 tablet 2    clopidogrel (PLAVIX) 75 MG tablet Take 1 tablet by mouth daily for 10 days Take for 10 more days to complete 30 days course, then take ASA alone 10 tablet 0     No current facility-administered medications for this visit. ALLERGIES   No Known Allergies    FAMILY HISTORY     Family History   Problem Relation Age of Onset    Hypertension Mother     Hypertension Father     Heart Failure Father     Stroke Maternal Grandfather     Cancer Paternal Uncle         throat       SOCIAL HISTORY     Social History     Socioeconomic History    Marital status: Single     Spouse name: None    Number of children: None    Years of education: None    Highest education level: None   Occupational History    None   Tobacco Use    Smoking status: Former Smoker     Packs/day: 0.50     Years: 40.00     Pack years: 20.00     Types: Cigarettes     Quit date: 2019     Years since quittin.8    Smokeless tobacco: Former User   Vaping Use    Vaping Use: Never used   Substance and Sexual Activity    Alcohol use:  Yes     Alcohol/week: 2.0 standard drinks     Types: 2 Cans of beer per week     Comment: states used to drink 1 pint/vodka a day / now socially drinks    Drug use: No    Sexual activity: Yes     Partners: Female   Other Topics Concern    None   Social History Narrative    None     Social Determinants of Health     Financial Resource Strain: Low Risk     Difficulty of Paying Living Expenses: Not hard at all   Food Insecurity: No Food Insecurity    Worried About Running Out of Food in the Last Year: Never true    Raven of Food in the Last Year: Never true   Transportation Needs: No Transportation Needs    Lack of Transportation (Medical): No    Lack of Transportation (Non-Medical):  No   Physical Activity:     Days of Exercise per Week: Not on file    Minutes of Exercise per Session: Not on file   Stress:     Feeling of Stress : Not on file   Social Connections:     Frequency of Communication with Friends and Family: Not on file    Frequency of Social Gatherings with Friends and Family: Not on file    Attends Faith Services: Not on file    Active Member of [address] [place] [address] [employer] or Organizations: Not on file    Attends Club or Organization Meetings: Not on file    Marital Status: Not on file   Intimate Partner Violence:     Fear of Current or Ex-Partner: Not on file    Emotionally Abused: Not on file    Physically Abused: Not on file    Sexually Abused: Not on file   Housing Stability:     Unable to Pay for Housing in the Last Year: Not on file    Number of [place] in the Last Year: Not on file    Unstable Housing in the Last Year: Not on file       REVIEW OF SYSTEMS   General: no fever, chills, night sweats, anorexia, malaise, fatigue, or weight change  Hematologic:  no unexplained bleeding or bruising  HEENT:   no nasal congestion, rhinorrhea, sore throat, or facial pain  Respiratory:  no cough, dyspnea, or chest pain  Cardiovascular:  no angina, SMITH, PND, orthopnea, dependent edema, or palpitations  Gastrointestinal:  no nausea, vomiting, diarrhea, constipation, or abdominal pain  Genitourinary:  no urinary urgency, frequency, dysuria, or hematuria  Musculoskeletal: see HPI  Endocrine:  no heat or cold intolerance and no polyphagia, polydipsia, or polyuria  Skin:  no skin eruptions or changing lesions  Neurologic:  right sided hemiplegia and numbness, no tremor, or severe headache. See HPI.     See HPI for pertinent positives.     PHYSICAL EXAM   Vital Signs:   Vitals:    12/02/21 1332   Weight: 195 lb (88.5 kg)   Height: 6' 3\" (1.905 m)       General appearance: alert, no distress  Skin: Skin color, texture, turgor normal. No rashes or lesions  HEENT: atraumatic, normocephalic. PERRL  Respiratory: Unlabored breathing  Lymphatic: No adenopathy   Neuro: Alert and oriented, normal distal sensation, normal bilateral DTRs- Anomia- aphasia   Vascular: Normal distal capillary and distal pulses  Muskuloskeletal Exam: Right lower extremity spasticity- 4- mod Rubin scale in hamstrings and hip flexors. 2/5 strength throughout  left LE 3/5 strength with inability to support his weight for extended periods of time  Right UE- 3+ spasticity throughout  Left UE 4/5 strength        PROCEDURE      Botox Injection:  The patient consented to the procedure and a time out was performed before proceeding.  The left medial hamstring, lateral hamstring and pectineus was prepped in the normal sterile fashion.  A 25 gauge needle was introduced into the muscle with EMG guidance and confirmed using EMG. 300 units of Botox was injected into the muscles (above) after the syringe was pulled back and confirmed negative aspiration.  The injection flowed freely, and the patient tolerated the procedure well.  Post injection expectations were reviewed with the patient.     IMPRESSION     1. Right spastic hemiplegia ([facility] [address]         PLAN   I had a lengthy discussion with patient today regarding diagnosis and treatment options and recommendations. Next time    1. Botox right lower leg only  - 150 units medial hamstring  - 150 units lateral hamstring  - 50 units pectineus  - 50 units adductor longus    - 50 units Bicep  - 50 units FCU/FCR     500 units total      2.  Continued right sided spasticity- hemiplegia continue current medications- continue PT/OT     3. Switch SNF facilities- follow up with transportation in 3 months        FOLLOWUP     Return in about 3 months (around 3/2/2022). No orders of the defined types were placed in this encounter.      Orders Placed This Encounter   Medications    onabotulinumtoxin A (BOTOX) injection 300 Units       Patient was instructed on appropriate use of braces, participation in home exercise programs, healthy lifestyle choices and weight loss as appropriate     Ricky Craig, DO

## 2021-12-02 NOTE — TELEPHONE ENCOUNTER
There is already an APPROVAL on file that is good until 8/12/2022. Just please makes sure when scheduling that the patient is done no less that 90 days apart or the insurance will not pay. If this requires a response please respond to the pool ( [phone] [address]). Thank you please advise patient.

## 2021-12-02 NOTE — TELEPHONE ENCOUNTER
[place] INFORMATION    DRUG:  BOTOX     ADMIN:  [id number], [id number], [id number]    DX CODE: G81.11    UNITS:  500     LAST OV DATE: 12/02/21    Patient had previous auth for 300 units for RLE.   We need to up this to 500 units and now include his RUE

## 2021-12-02 NOTE — TELEPHONE ENCOUNTER
THIS IS NOT PRESCRIBED BY US? NOT SURE DR. VILLAVICENCIO NEEDS TO DO ANYTHING WITH THIS MED. A NEW PA IS NEEDED DUE TO A DOSE CHANGE IS WHAT THE PREVIOUS MESSAGE STATES. Janine Shipley

## 2022-01-06 ENCOUNTER — APPOINTMENT (OUTPATIENT)
Dept: GENERAL RADIOLOGY | Age: 67
DRG: 592 | End: 2022-01-06
Payer: MEDICARE

## 2022-01-06 ENCOUNTER — HOSPITAL ENCOUNTER (INPATIENT)
Age: 67
LOS: 5 days | Discharge: NURSING, REHAB OR LONG-TERM CARE | DRG: 592 | End: 2022-01-11
Attending: EMERGENCY MEDICINE | Admitting: INTERNAL MEDICINE
Payer: MEDICARE

## 2022-01-06 DIAGNOSIS — S91.301A NON-HEALING OPEN WOUND OF HEEL, RIGHT, INITIAL ENCOUNTER: Primary | ICD-10-CM

## 2022-01-06 DIAGNOSIS — S91.309A NONHEALING WOUND OF HEEL: ICD-10-CM

## 2022-01-06 DIAGNOSIS — S91.302A OPEN WOUND OF LEFT HEEL, INITIAL ENCOUNTER: ICD-10-CM

## 2022-01-06 LAB
ANION GAP SERPL CALCULATED.3IONS-SCNC: 13 MMOL/L (ref 3–16)
BASOPHILS ABSOLUTE: 0 K/UL (ref 0–0.2)
BASOPHILS RELATIVE PERCENT: 0.2 %
BUN BLDV-MCNC: 24 MG/DL (ref 7–20)
CALCIUM SERPL-MCNC: 9.4 MG/DL (ref 8.3–10.6)
CHLORIDE BLD-SCNC: 101 MMOL/L (ref 99–110)
CO2: 26 MMOL/L (ref 21–32)
CREAT SERPL-MCNC: 0.9 MG/DL (ref 0.8–1.3)
EOSINOPHILS ABSOLUTE: 0.1 K/UL (ref 0–0.6)
EOSINOPHILS RELATIVE PERCENT: 0.8 %
GFR AFRICAN AMERICAN: >60
GFR NON-AFRICAN AMERICAN: >60
GLUCOSE BLD-MCNC: 94 MG/DL (ref 70–99)
HCT VFR BLD CALC: 30.4 % (ref 40.5–52.5)
HEMOGLOBIN: 10.3 G/DL (ref 13.5–17.5)
LYMPHOCYTES ABSOLUTE: 1.8 K/UL (ref 1–5.1)
LYMPHOCYTES RELATIVE PERCENT: 22 %
MAGNESIUM: 1.6 MG/DL (ref 1.8–2.4)
MCH RBC QN AUTO: 26.7 PG (ref 26–34)
MCHC RBC AUTO-ENTMCNC: 33.9 G/DL (ref 31–36)
MCV RBC AUTO: 78.9 FL (ref 80–100)
MONOCYTES ABSOLUTE: 0.6 K/UL (ref 0–1.3)
MONOCYTES RELATIVE PERCENT: 7.7 %
NEUTROPHILS ABSOLUTE: 5.8 K/UL (ref 1.7–7.7)
NEUTROPHILS RELATIVE PERCENT: 69.3 %
PDW BLD-RTO: 17.4 % (ref 12.4–15.4)
PLATELET # BLD: 227 K/UL (ref 135–450)
PMV BLD AUTO: 6.8 FL (ref 5–10.5)
POTASSIUM REFLEX MAGNESIUM: 3.4 MMOL/L (ref 3.5–5.1)
RBC # BLD: 3.86 M/UL (ref 4.2–5.9)
SARS-COV-2, NAAT: NOT DETECTED
SODIUM BLD-SCNC: 140 MMOL/L (ref 136–145)
WBC # BLD: 8.4 K/UL (ref 4–11)

## 2022-01-06 PROCEDURE — 2060000000 HC ICU INTERMEDIATE R&B

## 2022-01-06 PROCEDURE — 83735 ASSAY OF MAGNESIUM: CPT

## 2022-01-06 PROCEDURE — U0005 INFEC AGEN DETEC AMPLI PROBE: HCPCS

## 2022-01-06 PROCEDURE — 96368 THER/DIAG CONCURRENT INF: CPT

## 2022-01-06 PROCEDURE — 96365 THER/PROPH/DIAG IV INF INIT: CPT

## 2022-01-06 PROCEDURE — 87635 SARS-COV-2 COVID-19 AMP PRB: CPT

## 2022-01-06 PROCEDURE — 36415 COLL VENOUS BLD VENIPUNCTURE: CPT

## 2022-01-06 PROCEDURE — U0003 INFECTIOUS AGENT DETECTION BY NUCLEIC ACID (DNA OR RNA); SEVERE ACUTE RESPIRATORY SYNDROME CORONAVIRUS 2 (SARS-COV-2) (CORONAVIRUS DISEASE [COVID-19]), AMPLIFIED PROBE TECHNIQUE, MAKING USE OF HIGH THROUGHPUT TECHNOLOGIES AS DESCRIBED BY CMS-2020-01-R: HCPCS

## 2022-01-06 PROCEDURE — 2580000003 HC RX 258: Performed by: NURSE PRACTITIONER

## 2022-01-06 PROCEDURE — 85025 COMPLETE CBC W/AUTO DIFF WBC: CPT

## 2022-01-06 PROCEDURE — 94761 N-INVAS EAR/PLS OXIMETRY MLT: CPT

## 2022-01-06 PROCEDURE — 99285 EMERGENCY DEPT VISIT HI MDM: CPT

## 2022-01-06 PROCEDURE — 80048 BASIC METABOLIC PNL TOTAL CA: CPT

## 2022-01-06 PROCEDURE — 73630 X-RAY EXAM OF FOOT: CPT

## 2022-01-06 PROCEDURE — 6360000002 HC RX W HCPCS: Performed by: EMERGENCY MEDICINE

## 2022-01-06 PROCEDURE — 2580000003 HC RX 258: Performed by: EMERGENCY MEDICINE

## 2022-01-06 PROCEDURE — 96366 THER/PROPH/DIAG IV INF ADDON: CPT

## 2022-01-06 RX ORDER — ACETAMINOPHEN 325 MG/1
650 TABLET ORAL EVERY 6 HOURS PRN
Status: DISCONTINUED | OUTPATIENT
Start: 2022-01-06 | End: 2022-01-11 | Stop reason: HOSPADM

## 2022-01-06 RX ORDER — ACETAMINOPHEN 650 MG/1
650 SUPPOSITORY RECTAL EVERY 6 HOURS PRN
Status: DISCONTINUED | OUTPATIENT
Start: 2022-01-06 | End: 2022-01-11 | Stop reason: HOSPADM

## 2022-01-06 RX ORDER — ASPIRIN 81 MG/1
81 TABLET ORAL DAILY
Status: DISCONTINUED | OUTPATIENT
Start: 2022-01-07 | End: 2022-01-11 | Stop reason: HOSPADM

## 2022-01-06 RX ORDER — POLYETHYLENE GLYCOL 3350 17 G/17G
17 POWDER, FOR SOLUTION ORAL DAILY PRN
Status: DISCONTINUED | OUTPATIENT
Start: 2022-01-06 | End: 2022-01-11 | Stop reason: HOSPADM

## 2022-01-06 RX ORDER — SODIUM CHLORIDE 0.9 % (FLUSH) 0.9 %
5-40 SYRINGE (ML) INJECTION PRN
Status: DISCONTINUED | OUTPATIENT
Start: 2022-01-06 | End: 2022-01-11 | Stop reason: HOSPADM

## 2022-01-06 RX ORDER — 0.9 % SODIUM CHLORIDE 0.9 %
1000 INTRAVENOUS SOLUTION INTRAVENOUS ONCE
Status: COMPLETED | OUTPATIENT
Start: 2022-01-06 | End: 2022-01-06

## 2022-01-06 RX ORDER — SODIUM CHLORIDE 0.9 % (FLUSH) 0.9 %
5-40 SYRINGE (ML) INJECTION EVERY 12 HOURS SCHEDULED
Status: DISCONTINUED | OUTPATIENT
Start: 2022-01-06 | End: 2022-01-11 | Stop reason: HOSPADM

## 2022-01-06 RX ORDER — SODIUM CHLORIDE 9 MG/ML
25 INJECTION, SOLUTION INTRAVENOUS PRN
Status: DISCONTINUED | OUTPATIENT
Start: 2022-01-06 | End: 2022-01-11 | Stop reason: HOSPADM

## 2022-01-06 RX ORDER — ONDANSETRON 4 MG/1
4 TABLET, ORALLY DISINTEGRATING ORAL EVERY 8 HOURS PRN
Status: DISCONTINUED | OUTPATIENT
Start: 2022-01-06 | End: 2022-01-11 | Stop reason: HOSPADM

## 2022-01-06 RX ORDER — ONDANSETRON 2 MG/ML
4 INJECTION INTRAMUSCULAR; INTRAVENOUS EVERY 6 HOURS PRN
Status: DISCONTINUED | OUTPATIENT
Start: 2022-01-06 | End: 2022-01-11 | Stop reason: HOSPADM

## 2022-01-06 RX ADMIN — SODIUM CHLORIDE 1000 ML: 9 INJECTION, SOLUTION INTRAVENOUS at 18:10

## 2022-01-06 RX ADMIN — Medication 1500 MG: at 18:07

## 2022-01-06 RX ADMIN — SODIUM CHLORIDE, PRESERVATIVE FREE 10 ML: 5 INJECTION INTRAVENOUS at 22:52

## 2022-01-06 RX ADMIN — CEFEPIME HYDROCHLORIDE 2000 MG: 2 INJECTION, POWDER, FOR SOLUTION INTRAVENOUS at 17:40

## 2022-01-06 ASSESSMENT — PAIN DESCRIPTION - ORIENTATION: ORIENTATION: RIGHT

## 2022-01-06 ASSESSMENT — PAIN SCALES - GENERAL: PAINLEVEL_OUTOF10: 5

## 2022-01-06 NOTE — ED NOTES
Heels elevated off the bed, pt curls legs up and will not keep heels on pillow.       Violet Hodges RN  01/06/22 2536

## 2022-01-06 NOTE — CONSULTS
Clinical Pharmacy Note  Vancomycin Consult    Pharmacy consult received for one-time dose of vancomycin in the Emergency Department per Dr. Steve Nelson. Ht Readings from Last 1 Encounters:   01/06/22 6' 3\" (1.905 m)        Wt Readings from Last 1 Encounters:   01/06/22 200 lb (90.7 kg)         Assessment/Plan:   Vancomycin 1500 mg x 1 in ED.  If Vancomycin is to continue on admission and pharmacy is to manage dosing, please re-consult with admission orders.

## 2022-01-06 NOTE — ED PROVIDER NOTES
[facility]      Pt Name: Lisa Wahl  MRN: [id number]  [place] 1955  Date of evaluation: 1/6/2022  Provider: Merlin Campbell MD    CHIEF COMPLAINT     My heel is not getting better  HISTORY OF PRESENT ILLNESS  (Location/Symptom, Timing/Onset,Context/Setting, Quality, Duration, Modifying Factors, Severity). Note limiting factors. Chief Complaint   Patient presents with    Wound Infection     wound to bottom of rt heel for months  nursing home reports worsening of wound      Lisa Wahl is a 78 y.o. male who presents to the emergency department secondary to concern for worsening wound to his heel. He has been at the nursing home since December 3 due to stroke. He was started on antibiotics (levofloxacin) on December 29 and completed them yesterday. Nursing home sent him today because his wounds are getting worse. The patient tells me he is \"feeling table\" however he also reports he was diagnosed with Covid a few days ago. Not sure exactly when. He states that the wound started off as a laceration he believes. He can answer questions regarding how he is feeling but is somewhat of a poor historian. Past medical history noted below, significant for acute MI, anxiety, arthritis, coronary artery disease, hypertension. Reportedly quit smoking. Aside from what is stated above denies any other symptoms or modifying factors. Nursing Notes reviewed. REVIEW OF SYSTEMS  (2-9 systems for level 4, 10 or more for level 5)   Review of Systems  Pertinent positive and negative findings as documented in the HPI; otherwise all other systems were reviewed and were negative.    PAST MEDICAL HISTORY     Past Medical History:   Diagnosis Date    Acute MI ([facility] [address] 09/2013    Anxiety     Arthritis     CAD (coronary artery disease)     COVID-19     Hypertension     Influenza A 01/15/2017       SURGICALHISTORY       Past Surgical History:   Procedure Laterality Date    BACK SURGERY N/A 1989    herniated disc    CORONARY ANGIOPLASTY WITH STENT PLACEMENT  9/13     CURRENT MEDICATIONS       Previous Medications    ASCORBIC ACID (VITAMIN C) 500 MG TABLET    Take 500 mg by mouth daily    ASPIRIN 81 MG EC TABLET    Take 81 mg by mouth daily    ATENOLOL (TENORMIN) 25 MG TABLET    Take 1 tablet by mouth daily    ATENOLOL (TENORMIN) 50 MG TABLET    TAKE ONE TABLET BY MOUTH DAILY    ATORVASTATIN (LIPITOR) 80 MG TABLET    Take 1 tablet by mouth nightly    BACLOFEN (LIORESAL) 5 MG TABLET    Take 1 tablet by mouth 3 times daily    CLOPIDOGREL (PLAVIX) 75 MG TABLET    Take 1 tablet by mouth daily for 10 days Take for 10 more days to complete 30 days course, then take ASA alone    HYDROCHLOROTHIAZIDE (HYDRODIURIL) 25 MG TABLET    TAKE ONE TABLET BY MOUTH DAILY    LISINOPRIL (PRINIVIL;ZESTRIL) 20 MG TABLET    TAKE 1/2 TABLET BY MOUTH DAILY    MULTIPLE VITAMINS-MINERALS (THERAPEUTIC MULTIVITAMIN-MINERALS) TABLET    Take 1 tablet by mouth daily    MYRBETRIQ 50 MG TB24    Take 1 tablet by mouth daily    NITROGLYCERIN (NITROSTAT) 0.4 MG SL TABLET    Place 1 tablet under the tongue every 5 minutes as needed for Chest pain. POTASSIUM BICARB-CITRIC ACID (EFFER-K) 20 MEQ TBEF EFFERVESCENT TABLET    Take 1 tablet by mouth daily    SERTRALINE (ZOLOFT) 100 MG TABLET    TAKE 1 AND 1/2 TABLET BY MOUTH DAILY    TAMSULOSIN (FLOMAX) 0.4 MG CAPSULE    TAKE ONE CAPSULE BY MOUTH DAILY      ALLERGIES     Patient has no known allergies.   FAMILY HISTORY       Family History   Problem Relation Age of Onset    Hypertension Mother     Hypertension Father     Heart Failure Father     Stroke Maternal Grandfather     Cancer Paternal Uncle         throat     SOCIAL HISTORY       Social History     Socioeconomic History    Marital status: Single     Spouse name: Not on file    Number of children: Not on file    Years of education: Not on file    Highest education level: Not on file   Occupational History    Not on file   Tobacco Use    Smoking status: Former Smoker     Packs/day: 0.50     Years: 40.00     Pack years: 20.00     Types: Cigarettes     Quit date: 2019     Years since quittin.9    Smokeless tobacco: Former User   Vaping Use    Vaping Use: Never used   Substance and Sexual Activity    Alcohol use: Yes     Alcohol/week: 2.0 standard drinks     Types: 2 Cans of beer per week     Comment: states used to drink 1 pint/vodka a day / now socially drinks    Drug use: No    Sexual activity: Yes     Partners: Female   Other Topics Concern    Not on file   Social History Narrative    Not on file     Social Determinants of Health     Financial Resource Strain: Low Risk     Difficulty of Paying Living Expenses: Not hard at all   Food Insecurity: No Food Insecurity    Worried About Running Out of Food in the Last Year: Never true    Raven of Food in the Last Year: Never true   Transportation Needs: No Transportation Needs    Lack of Transportation (Medical): No    Lack of Transportation (Non-Medical):  No   Physical Activity:     Days of Exercise per Week: Not on file    Minutes of Exercise per Session: Not on file   Stress:     Feeling of Stress : Not on file   Social Connections:     Frequency of Communication with Friends and Family: Not on file    Frequency of Social Gatherings with Friends and Family: Not on file    Attends Mandaeism Services: Not on file    Active Member of Clubs or Organizations: Not on file    Attends Club or Organization Meetings: Not on file    Marital Status: Not on file   Intimate Partner Violence:     Fear of Current or Ex-Partner: Not on file    Emotionally Abused: Not on file    Physically Abused: Not on file    Sexually Abused: Not on file   Housing Stability:     Unable to Pay for Housing in the Last Year: Not on file    Number of [place] in the Last Year: Not on file    Unstable Housing in the Last Year: Not on file     SCREENINGS    Stephenie Coma Scale  Eye Opening: Spontaneous  Best Verbal Response: Oriented  Best Motor Response: Obeys commands  [place] Coma Scale Score: 15    PHYSICAL EXAM  (up to 7 for level 4, 8 or more for level 5)   INITIAL VITALS: BP: (!) 117/59, Temp: 97.4 °F (36.3 °C), Pulse: 64, Resp: 16, SpO2: 95 %   Physical Exam  Vitals reviewed. Constitutional:       General: He is not in acute distress. Appearance: He is ill-appearing (Appears to feel generally unwell, does not appear acutely toxic). He is not toxic-appearing or diaphoretic. HENT:      Head: Normocephalic and atraumatic. Right Ear: External ear normal.      Left Ear: External ear normal.   Eyes:      General:         Right eye: No discharge. Left eye: No discharge. Conjunctiva/sclera: Conjunctivae normal.   Neck:      Trachea: No tracheal deviation. Cardiovascular:      Rate and Rhythm: Normal rate. Pulmonary:      Effort: Pulmonary effort is normal. No respiratory distress. Abdominal:      Tenderness: There is no abdominal tenderness. There is no guarding or rebound. Musculoskeletal:      Cervical back: Normal range of motion. Right lower leg: No edema. Left lower leg: No edema. Skin:     Capillary Refill: Capillary refill takes less than 2 seconds. Findings: Lesion (Bilateral heels, see images below) present. Neurological:      Mental Status: He is alert and oriented to person, place, and time. Comments: Right lower extremity with contracture approximately 90 degrees to the right knee. This does cause increased pressure to the right heel. Face is grossly symmetric. He does answer questions appropriately regarding name, date of birth, age, location though is a poor historian regarding what brings him in what medications he is currently taking or has been prescribed   Psychiatric:         Behavior: Behavior is cooperative.                      DIAGNOSTIC RESULTS RADIOLOGY:   Interpretation per Radiologist below, if available at the time of this note:  XR FOOT RIGHT (MIN 3 VIEWS)   Final Result   Soft tissue ulceration overlying the calcaneus, no destructive bony   abnormality         XR FOOT LEFT (MIN 3 VIEWS)   Final Result   No destructive bony lesion identified           LABS:  Labs Reviewed   CBC WITH AUTO DIFFERENTIAL - Abnormal; Notable for the following components:       Result Value    RBC 3.86 (*)     Hemoglobin 10.3 (*)     Hematocrit 30.4 (*)     MCV 78.9 (*)     RDW 17.4 (*)     All other components within normal limits    Narrative:     Performed at:  [address] [employer]   Phone [phone]   BASIC METABOLIC PANEL W/ REFLEX TO MG FOR LOW K - Abnormal; Notable for the following components:    Potassium reflex Magnesium 3.4 (*)     BUN 24 (*)     All other components within normal limits    Narrative:     Performed at:  [address] [employer]   Phone [phone]   MAGNESIUM - Abnormal; Notable for the following components:    Magnesium 1.60 (*)     All other components within normal limits    Narrative:     Performed at:  [address] [employer] [facility]   Phone [phone] RAPID    Narrative:     Performed at:  [facility]  [address] [place], [place] [address] [address]   Phone [phone]       [facility] and DIFFERENTIAL DIAGNOSIS/MDM:   Patient was given the following medications:  Orders Placed This Encounter   Medications    cefepime (MAXIPIME) 2000 mg IVPB minibag     Order Specific Question:   Antimicrobial Indications     Answer:   Skin and Soft Tissue Infection     Order Specific Question:   Suspected Organism(s)     Answer:   heel wound right worse than left    vancomycin (VANCOCIN) failing outpatient antibiotics with need for IV antibiotics and potentially OR debridement, I spent 36 minutes providing critical care. There was a high probability of clinically significant/life threatening deterioration in the patient's condition which required my urgent intervention. This time excludes time spent performing procedures but includes time spent on direct patient care, history retrieval, review of the chart, and discussions with patient, family, and consultant(s). FINAL IMPRESSION      1. Non-healing open wound of heel, right, initial encounter    2. Nonhealing wound of heel    3. Open wound of left heel, initial encounter        DISPOSITION/PLAN   DISPOSITION Decision To Admit 01/06/2022 07:15:11 PM      PATIENT REFERRED TO:  No follow-up provider specified.     DISCHARGE MEDICATIONS:  New Prescriptions    No medications on file            (Please note that portions of this note were completed with a voice recognition program. Efforts were made to edit the dictations but occasionally words are mis-transcribed.)    Grupo Trevino MD (electronically signed)  Attending Emergency Physician        Grupo Trevino MD  01/06/22 [id number]

## 2022-01-07 LAB
ANION GAP SERPL CALCULATED.3IONS-SCNC: 13 MMOL/L (ref 3–16)
BASOPHILS ABSOLUTE: 0.1 K/UL (ref 0–0.2)
BASOPHILS RELATIVE PERCENT: 0.7 %
BUN BLDV-MCNC: 20 MG/DL (ref 7–20)
CALCIUM SERPL-MCNC: 8.9 MG/DL (ref 8.3–10.6)
CHLORIDE BLD-SCNC: 104 MMOL/L (ref 99–110)
CO2: 22 MMOL/L (ref 21–32)
CREAT SERPL-MCNC: 0.7 MG/DL (ref 0.8–1.3)
EOSINOPHILS ABSOLUTE: 0.1 K/UL (ref 0–0.6)
EOSINOPHILS RELATIVE PERCENT: 1 %
GFR AFRICAN AMERICAN: >60
GFR NON-AFRICAN AMERICAN: >60
GLUCOSE BLD-MCNC: 75 MG/DL (ref 70–99)
HCT VFR BLD CALC: 29.5 % (ref 40.5–52.5)
HEMOGLOBIN: 10 G/DL (ref 13.5–17.5)
INR BLD: 1.61 (ref 0.88–1.12)
LYMPHOCYTES ABSOLUTE: 1.9 K/UL (ref 1–5.1)
LYMPHOCYTES RELATIVE PERCENT: 24.4 %
MAGNESIUM: 1.5 MG/DL (ref 1.8–2.4)
MCH RBC QN AUTO: 26.7 PG (ref 26–34)
MCHC RBC AUTO-ENTMCNC: 33.9 G/DL (ref 31–36)
MCV RBC AUTO: 78.8 FL (ref 80–100)
MONOCYTES ABSOLUTE: 0.6 K/UL (ref 0–1.3)
MONOCYTES RELATIVE PERCENT: 8.2 %
NEUTROPHILS ABSOLUTE: 5.1 K/UL (ref 1.7–7.7)
NEUTROPHILS RELATIVE PERCENT: 65.7 %
PDW BLD-RTO: 17.2 % (ref 12.4–15.4)
PLATELET # BLD: 193 K/UL (ref 135–450)
PMV BLD AUTO: 6.6 FL (ref 5–10.5)
POTASSIUM REFLEX MAGNESIUM: 3.2 MMOL/L (ref 3.5–5.1)
PROTHROMBIN TIME: 18.6 SEC (ref 9.9–12.7)
RBC # BLD: 3.74 M/UL (ref 4.2–5.9)
SARS-COV-2, PCR: DETECTED
SODIUM BLD-SCNC: 139 MMOL/L (ref 136–145)
WBC # BLD: 7.8 K/UL (ref 4–11)

## 2022-01-07 PROCEDURE — 85025 COMPLETE CBC W/AUTO DIFF WBC: CPT

## 2022-01-07 PROCEDURE — 6370000000 HC RX 637 (ALT 250 FOR IP): Performed by: NURSE PRACTITIONER

## 2022-01-07 PROCEDURE — 97535 SELF CARE MNGMENT TRAINING: CPT

## 2022-01-07 PROCEDURE — 6360000002 HC RX W HCPCS: Performed by: NURSE PRACTITIONER

## 2022-01-07 PROCEDURE — 2060000000 HC ICU INTERMEDIATE R&B

## 2022-01-07 PROCEDURE — 97530 THERAPEUTIC ACTIVITIES: CPT

## 2022-01-07 PROCEDURE — 2580000003 HC RX 258: Performed by: NURSE PRACTITIONER

## 2022-01-07 PROCEDURE — 36415 COLL VENOUS BLD VENIPUNCTURE: CPT

## 2022-01-07 PROCEDURE — 80048 BASIC METABOLIC PNL TOTAL CA: CPT

## 2022-01-07 PROCEDURE — 85610 PROTHROMBIN TIME: CPT

## 2022-01-07 PROCEDURE — 97166 OT EVAL MOD COMPLEX 45 MIN: CPT

## 2022-01-07 PROCEDURE — 83735 ASSAY OF MAGNESIUM: CPT

## 2022-01-07 PROCEDURE — 87205 SMEAR GRAM STAIN: CPT

## 2022-01-07 PROCEDURE — 97163 PT EVAL HIGH COMPLEX 45 MIN: CPT

## 2022-01-07 RX ORDER — OXYCODONE HYDROCHLORIDE 10 MG/1
10 TABLET ORAL EVERY 4 HOURS PRN
Status: DISCONTINUED | OUTPATIENT
Start: 2022-01-07 | End: 2022-01-11 | Stop reason: HOSPADM

## 2022-01-07 RX ORDER — POTASSIUM CHLORIDE 20 MEQ/1
40 TABLET, EXTENDED RELEASE ORAL PRN
Status: DISCONTINUED | OUTPATIENT
Start: 2022-01-07 | End: 2022-01-11 | Stop reason: HOSPADM

## 2022-01-07 RX ORDER — MAGNESIUM SULFATE 1 G/100ML
1000 INJECTION INTRAVENOUS PRN
Status: DISCONTINUED | OUTPATIENT
Start: 2022-01-07 | End: 2022-01-11 | Stop reason: HOSPADM

## 2022-01-07 RX ORDER — OXYCODONE HYDROCHLORIDE 5 MG/1
5 TABLET ORAL EVERY 4 HOURS PRN
Status: DISCONTINUED | OUTPATIENT
Start: 2022-01-07 | End: 2022-01-11 | Stop reason: HOSPADM

## 2022-01-07 RX ORDER — POTASSIUM CHLORIDE 7.45 MG/ML
10 INJECTION INTRAVENOUS PRN
Status: DISCONTINUED | OUTPATIENT
Start: 2022-01-07 | End: 2022-01-11 | Stop reason: HOSPADM

## 2022-01-07 RX ADMIN — SODIUM CHLORIDE 25 ML: 9 INJECTION, SOLUTION INTRAVENOUS at 08:42

## 2022-01-07 RX ADMIN — SODIUM CHLORIDE, PRESERVATIVE FREE 10 ML: 5 INJECTION INTRAVENOUS at 08:41

## 2022-01-07 RX ADMIN — VANCOMYCIN HYDROCHLORIDE 1000 MG: 1 INJECTION, POWDER, LYOPHILIZED, FOR SOLUTION INTRAVENOUS at 21:28

## 2022-01-07 RX ADMIN — VANCOMYCIN HYDROCHLORIDE 1000 MG: 1 INJECTION, POWDER, LYOPHILIZED, FOR SOLUTION INTRAVENOUS at 08:43

## 2022-01-07 RX ADMIN — OXYCODONE 5 MG: 5 TABLET ORAL at 02:08

## 2022-01-07 RX ADMIN — CEFEPIME HYDROCHLORIDE 2000 MG: 2 INJECTION, POWDER, FOR SOLUTION INTRAVENOUS at 10:42

## 2022-01-07 RX ADMIN — CEFEPIME HYDROCHLORIDE 2000 MG: 2 INJECTION, POWDER, FOR SOLUTION INTRAVENOUS at 20:35

## 2022-01-07 RX ADMIN — ENOXAPARIN SODIUM 40 MG: 100 INJECTION SUBCUTANEOUS at 08:43

## 2022-01-07 RX ADMIN — SODIUM CHLORIDE 25 ML: 9 INJECTION, SOLUTION INTRAVENOUS at 10:41

## 2022-01-07 RX ADMIN — ASPIRIN 81 MG: 81 TABLET, COATED ORAL at 20:35

## 2022-01-07 RX ADMIN — SODIUM CHLORIDE, PRESERVATIVE FREE 10 ML: 5 INJECTION INTRAVENOUS at 20:35

## 2022-01-07 ASSESSMENT — PAIN SCALES - WONG BAKER
WONGBAKER_NUMERICALRESPONSE: 0

## 2022-01-07 ASSESSMENT — PAIN SCALES - GENERAL
PAINLEVEL_OUTOF10: 6
PAINLEVEL_OUTOF10: 0

## 2022-01-07 NOTE — CARE COORDINATION
Wound care consulted for R heel wound. Reviewed chart and media. Recommending podiatry consult. Notified MD. Please refer to podiatry for all wound care orders and management. Please re-consult if needed.   Electronically signed by Yusuf Plascencia RN on 1/7/2022 at 10:18 AM

## 2022-01-07 NOTE — PROGRESS NOTES
Pt admitted from the ED to room 4267 via stretcher. Transferred x3 to the bed. Right upper and lower extremities contracted. Bilateral heels open to air with necrotic heel wounds present with scant bloody drainage noted. Soaked brief changed with zinc cream applied to reddened fozia area and buttocks. Vital signs stable. Denies nausea, vomiting, headache, dizziness, blurred vision, shortness of breath or cheat pain. Oriented to room and call light. Fall precaution initiated. Will continue to monitor.

## 2022-01-07 NOTE — PROGRESS NOTES
Occupational Therapy   Occupational Therapy Initial Assessment and Discharge  Date: 2022   Patient Name: Julia Santos  MRN: [id number]     : 1955    Date of Service: 2022    Discharge Recommendations:  ECF without OT     Julia Santos scored a 10/24 on the [facility] ADL Inpatient form. At this time, no further OT is recommended upon discharge due to level of requiring total care for ADLs. Recommend patient returns to prior setting with prior services. Assessment   Performance deficits / Impairments: Decreased functional mobility ; Decreased ADL status; Decreased balance;Decreased high-level IADLs;Decreased endurance;Decreased cognition;Decreased strength;Decreased ROM  Assessment: Pt is a 78 y.o. male admitted with Non Healing wounds of both heels Right worse than left, hypokalemia, Hypomagnesemia. At baseline, pt resident at [facility]. Pt non-ambulatory/bedbound requiring yarelis lift for fxl transfers and assist for majority of ADLs (able to feed himself). Pt currently functioning at baseline, and does not require any additional acute OT. Pt was total A x2 for rolling in bed for toileting/changing depends, and anticipate pt would require max A/total A for ADLs, which pt required at baseline. No acute OT services indicated, will discharge. Prognosis: Poor;Guarded  Decision Making: Medium Complexity  OT Education: OT Role  No Skilled OT: At baseline function  REQUIRES OT FOLLOW UP: No  Activity Tolerance  Activity Tolerance: Patient limited by pain  Safety Devices  Safety Devices in place: Yes  Type of devices: Call light within reach; Bed alarm in place; Left in bed           Patient Diagnosis(es): The primary encounter diagnosis was Non-healing open wound of heel, right, initial encounter. Diagnoses of Nonhealing wound of heel and Open wound of left heel, initial encounter were also pertinent to this visit.      has a past medical history of Acute MI ([facility] [address] Anxiety, Arthritis, CAD (coronary artery disease), COVID-19, Hypertension, and Influenza A.   has a past surgical history that includes back surgery (N/A, 1989) and Coronary angioplasty with stent (9/13). Restrictions  Restrictions/Precautions  Restrictions/Precautions: Fall Risk  Position Activity Restriction  Other position/activity restrictions: Droplet Plus, COVID r/o, hx of CVA with R hemiparesis    Subjective   General  Chart Reviewed: Yes  Patient assessed for rehabilitation services?: Yes  Additional Pertinent Hx: Per MARSHALL Tamez CNP's H&P: \"89 y.o. male with PMHx of CAD, HTN and CVA with right side deficit presented to [facility] for evaluation of non healing right heel wound. Patient has a nonhealing wound to the bottom of his right heel which is been present for months. He resides at the nursing home because of a stroke which has left him with right side deficit. There is eschar present. There is a developing pressure ulcer noted to the left heel. Patient was on antibiotics for this right heel wound which he completed yesterday but wound has not improved. Patient reports he was diagnosed with Covid a few days ago. Rapid Covid is negative PCR is pending\"  Family / Caregiver Present: No  Referring Practitioner: MARSHALL Tamez CNP  Diagnosis: Non Healing wounds of both heels Right worse than left, hypokalemia, Hypomagnesemia  Subjective  Subjective: Pt met b/s for OT eval/cotx with PT. Pt in bed on arrival, agreeable to participate in therapy. Pt c/o R-sided pain, did not rate. Social/Functional History  Social/Functional History  Type of Home: Facility ([facility])  ADL Assistance: Needs assistance (staff assists with showers, dressing, and toileting (wears depends and is changed in bed).  Pt able to feed himself)  Ambulation Assistance: Needs assistance (non-ambulatory)  Transfer Assistance: Needs assistance (yarelis lift)  Active : No  Occupation: Retired  Type of occupation: sales       Objective   Vision: Impaired  Vision Exceptions: Wears glasses for reading  Hearing: Within functional limits      Orientation  Overall Orientation Status: Within Functional Limits  Orientation Level: Oriented to person;Oriented to time;Oriented to place;Oriented to situation     Balance  Sitting Balance: Unable to assess(comment) (pt declining to attempt)  Standing Balance: Unable to assess(comment) (pt non-ambulatory at baseline)     ADL  LE Dressing: Dependent/Total (to change depends rolling in bed)  Toileting: Dependent/Total (pt's depends saturated with urine, total A to change depends/aureliano pad and provide hygiene rolling in bed)  Additional Comments: Anticipate pt is total A bathing and dressing, min A grooming, set-up/SBA feeding based on ROM/strength, balance, endurance. Tone RUE  RUE Tone: Hypertonic  Coordination  Movements Are Fluid And Coordinated: No     Bed mobility  Rolling to Left: Dependent/Total;2 Person assistance  Rolling to Right: Dependent/Total;2 Person assistance  Supine to Sit: Unable to assess  Sit to Supine: Unable to assess  Scooting: Dependent/Total;2 Person assistance (to scoot up in bed)     Transfers  Transfer Comments: pt yarelis lift at baseline     Cognition  Overall Cognitive Status: Exceptions  Arousal/Alertness: Delayed responses to stimuli  Following Commands: Follows one step commands with increased time; Follows one step commands with repetition  Problem Solving: Decreased awareness of errors;Assistance required to identify errors made;Assistance required to generate solutions;Assistance required to implement solutions;Assistance required to correct errors made  Initiation: Requires cues for all  Sequencing: Requires cues for all     LUE AROM (degrees)  LUE AROM : WFL  RUE AROM (degrees)  RUE AROM : Exceptions  RUE General AROM: hx of CVA with R-sided weakness, unremarkable AROM at shoulder, elbow flexion WFL, impaired elbow and wrist extension, pt holds digits in flexed positioning but able to extend with increased time  LUE Strength  Gross LUE Strength: WFL  RUE Strength  Gross RUE Strength: Exceptions to [facility]  RUE Strength Comment: chronic R UE weakness residual from previous CVA                   Plan   Plan  Times per week: d/c acute OT             AM-PAC Score        AM-PAC Inpatient Daily Activity Raw Score: 10 (01/07/22 0928)  AM-PAC Inpatient ADL T-Scale Score : 27.31 (01/07/22 0928)  ADL Inpatient CMS 0-100% Score: 74.7 (01/07/22 0928)  ADL Inpatient CMS G-Code Modifier : CL (01/07/22 6181)    Goals  Short term goals  Time Frame for Short term goals: no acute OT goals identified.        Therapy Time   Individual Concurrent Group Co-treatment   Time In 1881         Time Out 0915         Minutes 39         Timed Code Treatment Minutes: [address] [facility], OTR/L 3441

## 2022-01-07 NOTE — CARE COORDINATION
Patient came from [facility] prior to arrival.    Call to Jacob Spear, at [phone] who confirmed the patient is:  [] [address], no Precert required for return.  [] [address] will be required for return. [x] Skilled Nursing Care, no Precert required for return. [] [address] required for return. [x] Is fully vaccinated for Covid. [] Has started Covid vaccination, but is not complete. [] Is not vaccinated for Covid. [] No Covid Test needed for return.  [] Rapid Covid test needed before return within: [] 48 hours, [] 72 hours, [] 5 days, [] other  [] PCR Covid test needed before return. [x] Confirmed with the patient or family that the plan is to return to this facility at D/C. [] Patient and/or designated decision maker does not plan for the patient to return to this facility at D/C.      Electronically signed by Esmer Soria RN on 1/7/2022 at 10:09 AM

## 2022-01-07 NOTE — H&P
Hospital Medicine History & Physical      PCP: Cori Schwab, MD    Date of Admission: 1/6/2022    Date of Service: Pt seen/examined on 1/6/2022 and Admitted to Inpatient with expected LOS greater than two midnights due to medical therapy. Chief Complaint:  Bilateral heel wounds      History Of Present Illness:      78 y.o. male with PMHx of CAD, HTN and CVA with right side deficit presented to [facility] for evaluation of non healing right heel wound. Patient has a nonhealing wound to the bottom of his right heel which is been present for months. He resides at the nursing home because of a stroke which has left him with right side deficit. There is eschar present. There is a developing pressure ulcer noted to the left heel. Patient was on antibiotics for this right heel wound which he completed yesterday but wound has not improved. Patient reports he was diagnosed with Covid a few days ago. Rapid Covid is negative PCR is pending    Past Medical History:          Diagnosis Date    Acute MI ([address] 09/2013    Anxiety     Arthritis     CAD (coronary artery disease)     COVID-19     Hypertension     Influenza A 01/15/2017       Past Surgical History:          Procedure Laterality Date    BACK SURGERY N/A 1989    herniated disc    CORONARY ANGIOPLASTY WITH STENT PLACEMENT  9/13       Medications Prior to Admission:      Prior to Admission medications    Medication Sig Start Date End Date Taking?  Authorizing Provider   tamsulosin (FLOMAX) 0.4 MG capsule TAKE ONE CAPSULE BY MOUTH DAILY 6/21/21   MARSHALL Renee - CNP   atenolol (TENORMIN) 50 MG tablet TAKE ONE TABLET BY MOUTH DAILY 6/14/21   Cori Schwab, MD   lisinopril (PRINIVIL;ZESTRIL) 20 MG tablet TAKE 1/2 TABLET BY MOUTH DAILY 6/14/21   Cori Schwab, MD   hydroCHLOROthiazide (HYDRODIURIL) 25 MG tablet TAKE ONE TABLET BY MOUTH DAILY 5/17/21   Cori Schwab, MD   atenolol (TENORMIN) 25 MG tablet Take 1 tablet by mouth daily 5/12/21 Turner Walsh MD   potassium bicarb-citric acid (EFFER-K) 20 MEQ TBEF effervescent tablet Take 1 tablet by mouth daily 5/13/21   Turner Walsh MD   clopidogrel (PLAVIX) 75 MG tablet Take 1 tablet by mouth daily for 10 days Take for 10 more days to complete 30 days course, then take ASA alone 5/12/21 5/22/21  Turner Walsh MD   baclofen (LIORESAL) 5 MG tablet Take 1 tablet by mouth 3 times daily 5/12/21   Turner Walsh MD   atorvastatin (LIPITOR) 80 MG tablet Take 1 tablet by mouth nightly 4/23/21   Kathrine Duke MD   aspirin 81 MG EC tablet Take 81 mg by mouth daily    Historical Provider, MD   sertraline (ZOLOFT) 100 MG tablet TAKE 1 AND 1/2 TABLET BY MOUTH DAILY 4/12/21   Renata Viramontes MD   MYRBETRIQ 50 MG TB24 Take 1 tablet by mouth daily 8/15/20   Historical Provider, MD   Multiple Vitamins-Minerals (THERAPEUTIC MULTIVITAMIN-MINERALS) tablet Take 1 tablet by mouth daily    Historical Provider, MD   Ascorbic Acid (VITAMIN C) 500 MG tablet Take 500 mg by mouth daily    Historical Provider, MD   nitroGLYCERIN (NITROSTAT) 0.4 MG SL tablet Place 1 tablet under the tongue every 5 minutes as needed for Chest pain. 9/11/13   MARSHALL Varghese - CNP       Allergies:  Patient has no known allergies. Social History:      The patient currently lives at nursing home    TOBACCO:   reports that he quit smoking about 2 years ago. His smoking use included cigarettes. He has a 20.00 pack-year smoking history. He has quit using smokeless tobacco.  ETOH:   reports current alcohol use of about 2.0 standard drinks of alcohol per week. Family History:      Reviewed in detail positive as follows:        Problem Relation Age of Onset    Hypertension Mother     Hypertension Father     Heart Failure Father     Stroke Maternal Grandfather     Cancer Paternal Uncle         throat       REVIEW OF SYSTEMS:   Pertinent positives as noted in the HPI.  All other systems reviewed and negative. PHYSICAL EXAM PERFORMED:    BP (!) 159/71   Pulse 71   Temp 97.8 °F (36.6 °C) (Oral)   Resp 18   Ht 6' 3\" (1.905 m)   Wt 200 lb (90.7 kg)   SpO2 95%   BMI 25.00 kg/m²     General appearance:  Well developed, well nourished,  male lying on hospital bed in no apparent distress, right leg with contracture and pressure ulcer to right heel with eschar present. Patient appears stated age and cooperative. HEENT:  Normal cephalic, atraumatic without obvious deformity. Pupils equal, round, and reactive to light. Conjunctivae/corneas clear. Neck: Supple, with full range of motion. No jugular venous distention. Trachea midline. Respiratory:  Normal respiratory effort. Clear to auscultation, bilaterally without accessory muscle use. Cardiovascular:  Regular rate and rhythm without murmurs, no lower extremity edema. Abdomen: Soft, non-tender, non-distended, without rebound or guarding. Normal bowel sounds. Musculoskeletal: Able to move left side, right side deficit from prior stroke. Right leg contracted approximately 90 degrees at the knee, no other deformity noted. Eschar present to the heel of the right foot. Early pressure ulcer noted to the left heel no surrounding erythema noted. Full range of motion of upper extremities without deformity. Skin: Skin warm, dry and intact. No rashes or lesions. Neurologic:  Neurovascularly intact without any focal sensory/motor deficits. Cranial nerves: II-XII intact, grossly non-focal.  Psychiatric:  Alert and oriented, thought content appropriate, normal insight  Capillary Refill: Brisk,< 3 seconds   Peripheral Pulses: +2 palpable, equal bilaterally       Labs:     Recent Labs     01/06/22  1735   WBC 8.4   HGB 10.3*   HCT 30.4*        Recent Labs     01/06/22  1735      K 3.4*      CO2 26   BUN 24*   CREATININE 0.9   CALCIUM 9.4     No results for input(s): AST, ALT, BILIDIR, BILITOT, ALKPHOS in the last 72 hours.   No results for input(s): INR in the last 72 hours. No results for input(s): Kerry Dsouza in the last 72 hours. Urinalysis:      Lab Results   Component Value Date    NITRU Negative 05/28/2021    WBCUA 1 04/26/2021    RBCUA 1 04/26/2021    BLOODU Negative 05/28/2021    SPECGRAV 1.021 05/28/2021    GLUCOSEU Negative 05/28/2021       Radiology:     XR FOOT RIGHT (MIN 3 VIEWS)   Final Result   Soft tissue ulceration overlying the calcaneus, no destructive bony   abnormality         XR FOOT LEFT (MIN 3 VIEWS)   Final Result   No destructive bony lesion identified             ASSESSMENT:    Active Hospital Problems    Diagnosis Date Noted    Non-healing open wound of right heel [S91.301A] 01/06/2022         PLAN:    Non Healing wounds of both heels Right worse than left  - abx; continue cefepime and vancomycin  - consult Podiatry, ED spoke with Dr Torrey Khalil, will keep n.p.o. after midnight  - xray right foot: Soft tissue ulceration overlying the calcaneus no destructive bony abnormality  - xray left foot: No destructive bony lesion identified  - failed outpt treat with Levaquin completed 7 day course 1/5/2022  - wound consult    Hypokalemia  - K 3.4, will replace    Hypomagnesemia  - Mg 1.6, will replace    HTN  - monitor blood pressure  - resume home meds    CVA  - with right side deficit  - continue home meds  - plavix, statin, asa    DVT Prophylaxis: Lovenox  Diet: Diet NPO  Code Status: Full Code    PT/OT Eval Status: pending    [address] [facility], [facility] - [facility]    Thank you Kalyan Mcnally MD for the opportunity to be involved in this patient's care. If you have any questions or concerns please feel free to contact me at [phone].

## 2022-01-07 NOTE — CONSULTS
Department of Podiatry Consult Note  Resident       Reason for Consult: Bilateral heel wounds, worsening, unstageable 2/2 eschar  Requesting Physician:  Veronique Grullon MD    CHIEF COMPLAINT: Heel wounds    HISTORY OF PRESENT ILLNESS:                The patient is a 78 y.o. male with significant past medical history as listed below. Podiatry was consulted for bilateral heel wounds. Patient appears to be a poor historian. Per chart review, patient has had levofloxacin 750 daily x7 days at [facility]. Patient presented with worsening heel ulceration bilateral. Patient unsure of length of ulcerations. Patient unsure of dressing being applied at [facility]. Patient reports pain to bilateral heel with right heel being worst and rates it as 7/10. Patient denies fever, chills, nausea, vomiting, shortness of breath, chest pain. Patient has no other pedal complaints at this time. Past Medical History:        Diagnosis Date    Acute MI ([facility] [address] 09/2013    Anxiety     Arthritis     CAD (coronary artery disease)     COVID-19     Hypertension     Influenza A 01/15/2017       Past Surgical History:        Procedure Laterality Date    BACK SURGERY N/A 1989    herniated disc    CORONARY ANGIOPLASTY WITH STENT PLACEMENT  9/13       Allergies:   Patient has no known allergies. Medications:   Home Meds  No current facility-administered medications on file prior to encounter.      Current Outpatient Medications on File Prior to Encounter   Medication Sig Dispense Refill    tamsulosin (FLOMAX) 0.4 MG capsule TAKE ONE CAPSULE BY MOUTH DAILY 90 capsule 0    atenolol (TENORMIN) 50 MG tablet TAKE ONE TABLET BY MOUTH DAILY 90 tablet 0    lisinopril (PRINIVIL;ZESTRIL) 20 MG tablet TAKE 1/2 TABLET BY MOUTH DAILY 45 tablet 0    hydroCHLOROthiazide (HYDRODIURIL) 25 MG tablet TAKE ONE TABLET BY MOUTH DAILY 90 tablet 0    atenolol (TENORMIN) 25 MG tablet Take 1 tablet by mouth daily 30 tablet 0    potassium bicarb-citric acid (EFFER-K) 20 MEQ TBEF effervescent tablet Take 1 tablet by mouth daily 30 tablet 0    clopidogrel (PLAVIX) 75 MG tablet Take 1 tablet by mouth daily for 10 days Take for 10 more days to complete 30 days course, then take ASA alone 10 tablet 0    baclofen (LIORESAL) 5 MG tablet Take 1 tablet by mouth 3 times daily 90 tablet 0    atorvastatin (LIPITOR) 80 MG tablet Take 1 tablet by mouth nightly 30 tablet 3    aspirin 81 MG EC tablet Take 81 mg by mouth daily      sertraline (ZOLOFT) 100 MG tablet TAKE 1 AND 1/2 TABLET BY MOUTH DAILY 45 tablet 2    MYRBETRIQ 50 MG TB24 Take 1 tablet by mouth daily      Multiple Vitamins-Minerals (THERAPEUTIC MULTIVITAMIN-MINERALS) tablet Take 1 tablet by mouth daily      Ascorbic Acid (VITAMIN C) 500 MG tablet Take 500 mg by mouth daily      nitroGLYCERIN (NITROSTAT) 0.4 MG SL tablet Place 1 tablet under the tongue every 5 minutes as needed for Chest pain.  25 tablet 2       Current Meds  oxyCODONE (ROXICODONE) immediate release tablet 5 mg, Q4H PRN   Or  oxyCODONE HCl (OXY-IR) immediate release tablet 10 mg, Q4H PRN  magnesium sulfate 1000 mg in dextrose 5% 100 mL IVPB, PRN  potassium chloride (KLOR-CON M) extended release tablet 40 mEq, PRN   Or  potassium bicarb-citric acid (EFFER-K) effervescent tablet 40 mEq, PRN   Or  potassium chloride 10 mEq/100 mL IVPB (Peripheral Line), PRN  aspirin EC tablet 81 mg, Daily  sodium chloride flush 0.9 % injection 5-40 mL, 2 times per day  sodium chloride flush 0.9 % injection 5-40 mL, PRN  0.9 % sodium chloride infusion, PRN  enoxaparin (LOVENOX) injection 40 mg, Daily  ondansetron (ZOFRAN-ODT) disintegrating tablet 4 mg, Q8H PRN   Or  ondansetron (ZOFRAN) injection 4 mg, Q6H PRN  polyethylene glycol (GLYCOLAX) packet 17 g, Daily PRN  acetaminophen (TYLENOL) tablet 650 mg, Q6H PRN   Or  acetaminophen (TYLENOL) suppository 650 mg, Q6H PRN  cefepime (MAXIPIME) 2000 mg IVPB minibag, Q12H  vancomycin 1000 mg IVPB in 250 mL D5W addavial, Q12H  vancomycin (VANCOCIN) intermittent dosing (placeholder), RX Placeholder        Family History:   Family History   Problem Relation Age of Onset    Hypertension Mother     Hypertension Father     Heart Failure Father     Stroke Maternal Grandfather     Cancer Paternal Uncle         throat       Social History:   TOBACCO:   reports that he quit smoking about 2 years ago. His smoking use included cigarettes. He has a 20.00 pack-year smoking history. He has quit using smokeless tobacco.  ETOH:   reports current alcohol use of about 2.0 standard drinks of alcohol per week. DRUGS:   reports no history of drug use. REVIEW OF SYSTEMS:    As above. PHYSICAL EXAM:      Vitals:    BP (!) 159/71   Pulse 71   Temp 97.8 °F (36.6 °C) (Oral)   Resp 18   Ht 6' 3\" (1.905 m)   Wt 186 lb 8.2 oz (84.6 kg)   SpO2 95%   BMI 23.31 kg/m²     LABS:   Recent Labs     01/06/22  1735 01/07/22  0718   WBC 8.4 7.8   HGB 10.3* 10.0*   HCT 30.4* 29.5*    193     Recent Labs     01/07/22  0718      K 3.2*      CO2 22   BUN 20   CREATININE 0.7*     Recent Labs     01/07/22  0718   INR 1.61*         VASCULAR: CFT is brisk to the digits of the foot b/l. Skin temperature is warm to cool from proximal to distal with no focal calor noted. No edema noted. No pain with calf compression b/l. NEUROLOGIC: Gross and epicritic sensation is diminished b/l. Protective sensation is diminished at all pedal sites b/l. DERMATOLOGIC:  Right lower extremity: Full-thickness ulceration noted to right plantar and posterior heel with eschar base and slightly macerated periwound, measures approximately 6 cm x 4 cm x 0.3 cm. Scant malodor noted. No fluctuance, crepitus, erythema, or drainage noted. Left lower extremity: Full-thickness ulceration noted to posterior heel with fibrotic base and slightly macerated periwound, measures approximately 2 cm x 1 cm x 0.2 cm. No malodor noted.  No fluctuance, crepitus, erythema, or drainage noted.    MUSCULOSKELETAL: Muscle strength is 5/5 for all pedal groups tested. No pain with palpation of the foot or ankle b/l. Contracture noted to right hip and knee. IMAGING:  X-ray right foot 1/6/2023  Impression   Soft tissue ulceration overlying the calcaneus, no destructive bony   abnormality       X-ray left foot 1/6/2022  Impression   No destructive bony lesion identified       IMPRESSION/RECOMMENDATIONS:      -Decubitus ulcerations, bilateral lower extremity  -Cellulitis, right heel     -Patient examined and evaluated at the bedside   -Hypertensive, otherwise VSS. No leukocytosis noted. -X-rays reviewed, noted above  -Deferred debridement at this time.  -Dressing applied today consisting of Xeroform, gauze, Kerlix, tape. -Dressing to be applied consisting of Santyl, gauze, Kerlix, and tape  -Continue antibiotics at this time      DISPO: Decubitus ulcerations, bilateral lower extremity, with superficial infection right heel. Continue broad-spectrum IV antibiotics at this time. Dressing changes daily consisting as of above. Patient examined and evaluated the bedside with HALEY Strickland.[email or web address].     Tamar Mcnulty DPM  01/07/22  12:03 PM

## 2022-01-07 NOTE — PLAN OF CARE
Problem: Falls - Risk of:  Goal: Will remain free from falls  Description: Will remain free from falls  Outcome: Ongoing  Goal: Absence of physical injury  Description: Absence of physical injury  Outcome: Ongoing  Fall risk assessed. Precautions in place. Bed low and locked with side rails up x2-3. Nonskid socks on when OOB. Bed/chair alarm utilized. Call light within reach. Frequent checks performed. No falls at this time. Problem: Skin Integrity:  Goal: Will show no infection signs and symptoms  Description: Will show no infection signs and symptoms  Outcome: Ongoing  Goal: Absence of new skin breakdown  Description: Absence of new skin breakdown  Outcome: Ongoing  Skin assessed per protocol. Ilia score obtained. Skin kept clean, dry and warm. Assisted with repositioning to reduce the risk of PUs. Pillow support in place. Will continue to monitor. Problem: Pain:  Description: Pain management should include both nonpharmacologic and pharmacologic interventions. Goal: Pain level will decrease  Description: Pain level will decrease  Outcome: Ongoing  Goal: Control of acute pain  Description: Control of acute pain  Outcome: Ongoing  Goal: Control of chronic pain  Description: Control of chronic pain  Outcome: Ongoing  Pain level assessed. Pt able to rate pain on a scale of 0-10. Administered PRN analgesics per order. Reassessed for effectiveness. Will continue to monitor.

## 2022-01-07 NOTE — PROGRESS NOTES
Hospitalist Progress Note      PCP: Sandra Tran MD    Date of Admission: 1/6/2022    Chief Complaint: Heel wounds    Hospital Course: Patient is a 41-year-old male with a history of stroke with right-sided deficit 4/20/2021 who presents to the hospital with nonhealing ulcer of the right heel and concern for possible infection. Patient started on broad-spectrum antibiotics. Seen by podiatry this morning    Subjective: Patient denies complaints. He has some expressive aphasia but is able to tell me that he has had a stroke. He is not able to tell me why he is here in the hospital for this admission  Denies chest pain or shortness of breath      Medications:  Reviewed    Infusion Medications    sodium chloride Stopped (01/07/22 1209)     Scheduled Medications    aspirin  81 mg Oral Daily    sodium chloride flush  5-40 mL IntraVENous 2 times per day    enoxaparin  40 mg SubCUTAneous Daily    cefepime  2,000 mg IntraVENous Q12H    vancomycin  1,000 mg IntraVENous Q12H    vancomycin (VANCOCIN) intermittent dosing (placeholder)   Other RX Placeholder     PRN Meds: oxyCODONE **OR** oxyCODONE, magnesium sulfate, potassium chloride **OR** potassium alternative oral replacement **OR** potassium chloride, sodium chloride flush, sodium chloride, ondansetron **OR** ondansetron, polyethylene glycol, acetaminophen **OR** acetaminophen      Intake/Output Summary (Last 24 hours) at 1/7/2022 1535  Last data filed at 1/7/2022 0600  Gross per 24 hour   Intake 1610 ml   Output    Net 1610 ml       Physical Exam Performed:    /64   Pulse 69   Temp 98.1 °F (36.7 °C) (Oral)   Resp 15   Ht 6' 3\" (1.905 m)   Wt 186 lb 8.2 oz (84.6 kg)   SpO2 93%   BMI 23.31 kg/m²     General appearance:   HEENT: Pupils equal, round, and reactive to light. Conjunctivae/corneas clear. Neck: Supple, with full range of motion. No jugular venous distention. Trachea midline. Respiratory:  Normal respiratory effort.  Clear to auscultation, bilaterally without Rales/Wheezes/Rhonchi. Cardiovascular: Regular rate and rhythm with normal S1/S2 without murmurs, rubs or gallops. Abdomen: Soft, non-tender, non-distended with normal bowel sounds. Musculoskeletal: No clubbing, cyanosis or edema bilaterally. Full range of motion without deformity. Skin: Right lower extremity: Ulcer of right plantar and posterior heel with eschar 6 x 4 x 0.3 cm   Left leg ulcer of the posterior heel full-thickness with fibrotic base and 2 x 1 x 0.2 cm     neurologic:  R sided weakness and facial droop  Psychiatric: Alert and oriented, thought content appropriate, normal insight  Capillary Refill: Brisk,3 seconds, normal   Peripheral Pulses: +2 palpable, equal bilaterally       Labs:   Recent Labs     01/06/22 1735 01/07/22  0718   WBC 8.4 7.8   HGB 10.3* 10.0*   HCT 30.4* 29.5*    193     Recent Labs     01/06/22  1735 01/07/22  0718    139   K 3.4* 3.2*    104   CO2 26 22   BUN 24* 20   CREATININE 0.9 0.7*   CALCIUM 9.4 8.9     No results for input(s): AST, ALT, BILIDIR, BILITOT, ALKPHOS in the last 72 hours. Recent Labs     01/07/22 0718   INR 1.61*     No results for input(s): Esmer Divine in the last 72 hours.     Urinalysis:      Lab Results   Component Value Date    NITRU Negative 05/28/2021    WBCUA 1 04/26/2021    RBCUA 1 04/26/2021    BLOODU Negative 05/28/2021    SPECGRAV 1.021 05/28/2021    GLUCOSEU Negative 05/28/2021       Radiology:  XR FOOT RIGHT (MIN 3 VIEWS)   Final Result   Soft tissue ulceration overlying the calcaneus, no destructive bony   abnormality         XR FOOT LEFT (MIN 3 VIEWS)   Final Result   No destructive bony lesion identified                 Assessment/Plan:    1 decubitus ulcers present on admission of lower extremity-  Continue wound care as per podiatry consult greatly appreciated    2 right heel cellulitis-continue IV cefepime and vancomycin for 24 to 48 hours and reassess    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 I had a long conversation with patient's family members he sustained a stroke in April 2021 and went to a nursing home and was subsequently transferred to another nursing home. They feel that the patient has potential to improve to his premorbid level of functioning and were told by [facility] that they could rehab him. I will order a consult from [facility] as the family would like to possibly transition him therefore some intensive inpatient rehab before returning to the skilled facility. I spoke to the family at length time of conversation was about 30 minutes  DVT Prophylaxis: Lovenox diet: ADULT DIET;  Regular  Code Status: Full Code  PT OT: Ordered    Dispo -discharge in the next 48 to 72 hours    Grayson Ortega MD

## 2022-01-07 NOTE — PROGRESS NOTES
Physical Therapy    Facility/Department: [address] MED SURG  Initial Assessment and Discharge    NAME: Ross Ledesma  : 1955  MRN: [id number]    Date of Service: 2022    Discharge Recommendations:  ECF without PT        Assessment   Assessment: 78 y.o. male with PMHx of CAD, HTN and CVA with right side deficit presented to [facility] on 22 for evaluation of non healing right heel wound. Pt is also COVID r/o. Pt currently functioning at his dependent baseline. Anticipate return to [facility] without PT. Prognosis: Guarded  Decision Making: High Complexity  History: see below  Exam: see below  Clinical Presentation: unpredictable  PT Education: PT Role;Plan of Care; Functional Mobility Training  No Skilled PT: At baseline function  REQUIRES PT FOLLOW UP: No  Activity Tolerance  Activity Tolerance: Patient Tolerated treatment well       Patient Diagnosis(es): The primary encounter diagnosis was Non-healing open wound of heel, right, initial encounter. Diagnoses of Nonhealing wound of heel and Open wound of left heel, initial encounter were also pertinent to this visit. has a past medical history of Acute MI ([address] Anxiety, Arthritis, CAD (coronary artery disease), COVID-19, Hypertension, and Influenza A.   has a past surgical history that includes back surgery (N/A, ) and Coronary angioplasty with stent (). Restrictions  Restrictions/Precautions  Restrictions/Precautions: Fall Risk  Position Activity Restriction  Other position/activity restrictions: Droplet Plus, COVID r/o, hx of CVA with R hemiparesis     Vision/Hearing  Vision: Impaired  Vision Exceptions: Wears glasses for reading  Hearing: Within functional limits       Subjective  General  Chart Reviewed: Yes  Patient assessed for rehabilitation services?: Yes  Additional Pertinent Hx: 78 y.o. male with PMHx of CAD, HTN and CVA with right side deficit presented to [facility] on 22 for evaluation of non healing right heel wound.  Pt is also COVID r/o. Response To Previous Treatment: Not applicable  Family / Caregiver Present: No  Referring Practitioner: MARSHALL Marie CNP  Referral Date : 01/06/22  Diagnosis: non-healing bilateral heel wounds  Follows Commands: Within Functional Limits  Subjective  Subjective: Pt is agreeable to PT          Orientation  Orientation  Overall Orientation Status: Within Functional Limits     Social/Functional History  Social/Functional History  Type of Home: Facility ([facility])  ADL Assistance: Needs assistance (staff assists with showers, dressing, and toileting (wears depends and is changed in bed). Pt able to feed himself)  Ambulation Assistance: Needs assistance (non-ambulatory)  Transfer Assistance: Needs assistance (yarelis lift)  Active : No  Occupation: Retired  Type of occupation: [employer]     Cognition   Cognition  Overall Cognitive Status: Exceptions  Arousal/Alertness: Delayed responses to stimuli  Following Commands: Follows one step commands with increased time; Follows one step commands with repetition  Problem Solving: Decreased awareness of errors;Assistance required to identify errors made;Assistance required to generate solutions;Assistance required to implement solutions;Assistance required to correct errors made  Initiation: Requires cues for all  Sequencing: Requires cues for all    Objective  PROM RLE (degrees)  RLE PROM: Exceptions  RLE General PROM: knee flexion > 100*  Strength RLE  Strength RLE: Exception  Comment: not formally assessed - severe hip flexion and knee flexion contractures  Strength LLE  Strength LLE: Exception  Comment: not formally assessed  Tone RLE  RLE Tone: Hypertonic     Bed mobility  Rolling to Left: Dependent/Total;2 Person assistance  Rolling to Right: Dependent/Total;2 Person assistance  Supine to Sit: Unable to assess  Sit to Supine: Unable to assess  Scooting: Dependent/Total;2 Person assistance (to scoot up in bed)  Comment: roll left/right for dependent pericare and brief change  Transfers  Sit to Stand: Unable to assess  Stand to sit: Unable to assess  Ambulation  Ambulation?: No              Plan   Safety Devices  Type of devices: Call light within reach,Left in bed,Bed alarm in place,Nurse notified      AM-PAC Score  AM-PAC Inpatient Mobility Raw Score : 6 (01/07/22 0934)  AM-PAC Inpatient T-Scale Score : 23.55 (01/07/22 0934)  Mobility Inpatient CMS 0-100% Score: 100 (01/07/22 0934)  Mobility Inpatient CMS G-Code Modifier : CN (01/07/22 0934)            Therapy Time   Individual Concurrent Group Co-treatment   Time In 0836         Time Out 0915         Minutes 39         Timed Code Treatment Minutes: 25 Minutes       Socrates Schulz, PT

## 2022-01-07 NOTE — CONSULTS
Clinical Pharmacy Note  Vancomycin Consult    Zhane Hampton is a 78 y.o. male ordered Vancomycin for foot infection; consult received from Yarelis Miller CNP to manage therapy. Also receiving cefepime. Patient Active Problem List   Diagnosis    Chest pain    MI, acute, non ST segment elevation (HCC)    Tobacco abuse    HTN (hypertension)    [facility] [address]    Acute CVA (cerebrovascular accident) [address] [facility]    Right leg weakness    Acute ischemic left [place] stroke (HCC)    Non-healing open wound of right heel       Allergies:  Patient has no known allergies. Temp max:  Temp (24hrs), Av.6 °F (36.4 °C), Min:97.4 °F (36.3 °C), Max:97.8 °F (36.6 °C)      Recent Labs     22  1735   WBC 8.4       Recent Labs     22  1735   BUN 24*   CREATININE 0.9       No intake or output data in the 24 hours ending 22 2228      Ht Readings from Last 1 Encounters:   22 6' 3\" (1.905 m)        Wt Readings from Last 1 Encounters:   22 200 lb (90.7 kg)         Estimated Creatinine Clearance: 95 mL/min (based on SCr of 0.9 mg/dL). Assessment/Plan:  Vancomycin 1000 mg IV every 12 hours ordered. Regimen projects a trough level of 15-20 mg/L. Level ordered for 22 @0800. Thank you for the consult.    Elian MeehanD

## 2022-01-08 LAB
ALBUMIN SERPL-MCNC: 3.1 G/DL (ref 3.4–5)
ANION GAP SERPL CALCULATED.3IONS-SCNC: 14 MMOL/L (ref 3–16)
BUN BLDV-MCNC: 17 MG/DL (ref 7–20)
CALCIUM SERPL-MCNC: 8.9 MG/DL (ref 8.3–10.6)
CHLORIDE BLD-SCNC: 95 MMOL/L (ref 99–110)
CO2: 21 MMOL/L (ref 21–32)
CREAT SERPL-MCNC: 0.7 MG/DL (ref 0.8–1.3)
GFR AFRICAN AMERICAN: >60
GFR NON-AFRICAN AMERICAN: >60
GLUCOSE BLD-MCNC: 100 MG/DL (ref 70–99)
MAGNESIUM: 1.5 MG/DL (ref 1.8–2.4)
PHOSPHORUS: 3.3 MG/DL (ref 2.5–4.9)
POTASSIUM SERPL-SCNC: 3.3 MMOL/L (ref 3.5–5.1)
SODIUM BLD-SCNC: 130 MMOL/L (ref 136–145)
VANCOMYCIN TROUGH: 10.8 UG/ML (ref 10–20)

## 2022-01-08 PROCEDURE — 6370000000 HC RX 637 (ALT 250 FOR IP): Performed by: NURSE PRACTITIONER

## 2022-01-08 PROCEDURE — 80069 RENAL FUNCTION PANEL: CPT

## 2022-01-08 PROCEDURE — 83735 ASSAY OF MAGNESIUM: CPT

## 2022-01-08 PROCEDURE — 6360000002 HC RX W HCPCS: Performed by: NURSE PRACTITIONER

## 2022-01-08 PROCEDURE — 2580000003 HC RX 258: Performed by: NURSE PRACTITIONER

## 2022-01-08 PROCEDURE — 80202 ASSAY OF VANCOMYCIN: CPT

## 2022-01-08 PROCEDURE — 6370000000 HC RX 637 (ALT 250 FOR IP): Performed by: PODIATRIST

## 2022-01-08 PROCEDURE — 2060000000 HC ICU INTERMEDIATE R&B

## 2022-01-08 PROCEDURE — 36415 COLL VENOUS BLD VENIPUNCTURE: CPT

## 2022-01-08 RX ADMIN — SODIUM CHLORIDE, PRESERVATIVE FREE 10 ML: 5 INJECTION INTRAVENOUS at 08:01

## 2022-01-08 RX ADMIN — ASPIRIN 81 MG: 81 TABLET, COATED ORAL at 08:06

## 2022-01-08 RX ADMIN — Medication: at 03:41

## 2022-01-08 RX ADMIN — CEFEPIME HYDROCHLORIDE 2000 MG: 2 INJECTION, POWDER, FOR SOLUTION INTRAVENOUS at 20:58

## 2022-01-08 RX ADMIN — SODIUM CHLORIDE, PRESERVATIVE FREE 10 ML: 5 INJECTION INTRAVENOUS at 20:58

## 2022-01-08 RX ADMIN — VANCOMYCIN HYDROCHLORIDE 1000 MG: 1 INJECTION, POWDER, LYOPHILIZED, FOR SOLUTION INTRAVENOUS at 13:23

## 2022-01-08 RX ADMIN — ACETAMINOPHEN 650 MG: 325 TABLET ORAL at 17:50

## 2022-01-08 RX ADMIN — ENOXAPARIN SODIUM 40 MG: 100 INJECTION SUBCUTANEOUS at 08:06

## 2022-01-08 RX ADMIN — VANCOMYCIN HYDROCHLORIDE 1000 MG: 1 INJECTION, POWDER, LYOPHILIZED, FOR SOLUTION INTRAVENOUS at 22:37

## 2022-01-08 RX ADMIN — CEFEPIME HYDROCHLORIDE 2000 MG: 2 INJECTION, POWDER, FOR SOLUTION INTRAVENOUS at 08:03

## 2022-01-08 RX ADMIN — Medication: at 08:07

## 2022-01-08 RX ADMIN — POTASSIUM CHLORIDE 40 MEQ: 20 TABLET, EXTENDED RELEASE ORAL at 17:50

## 2022-01-08 ASSESSMENT — PAIN SCALES - WONG BAKER
WONGBAKER_NUMERICALRESPONSE: 0

## 2022-01-08 ASSESSMENT — PAIN DESCRIPTION - FREQUENCY: FREQUENCY: CONTINUOUS

## 2022-01-08 ASSESSMENT — PAIN SCALES - GENERAL
PAINLEVEL_OUTOF10: 0
PAINLEVEL_OUTOF10: 5

## 2022-01-08 ASSESSMENT — PAIN DESCRIPTION - PROGRESSION: CLINICAL_PROGRESSION: GRADUALLY WORSENING

## 2022-01-08 ASSESSMENT — PAIN DESCRIPTION - LOCATION: LOCATION: HIP

## 2022-01-08 ASSESSMENT — PAIN DESCRIPTION - ORIENTATION: ORIENTATION: RIGHT

## 2022-01-08 ASSESSMENT — PAIN DESCRIPTION - PAIN TYPE: TYPE: ACUTE PAIN

## 2022-01-08 ASSESSMENT — PAIN - FUNCTIONAL ASSESSMENT: PAIN_FUNCTIONAL_ASSESSMENT: PREVENTS OR INTERFERES WITH MANY ACTIVE NOT PASSIVE ACTIVITIES

## 2022-01-08 ASSESSMENT — PAIN DESCRIPTION - DESCRIPTORS: DESCRIPTORS: ACHING

## 2022-01-08 ASSESSMENT — PAIN DESCRIPTION - ONSET: ONSET: ON-GOING

## 2022-01-08 NOTE — PROGRESS NOTES
This RN noted that the potassium and magnesium were low yesterday. If electrolytes were not replaced yesterday, this RN to replace today.

## 2022-01-08 NOTE — PROGRESS NOTES
Dressing changed as ordered by podiatry. Coby, moist to dry and curlex. Will continue to monitor.  Pt tolerated

## 2022-01-08 NOTE — PROGRESS NOTES
Department of Podiatry Progress Note        Reason for Consult: Bilateral heel wounds, worsening, unstageable 2/2 eschar  Requesting Physician:  Brooke Oh MD    CHIEF COMPLAINT: Heel wounds    HISTORY OF PRESENT ILLNESS:                The patient is a 78 y.o. male with significant past medical history as listed below. Podiatry was consulted for bilateral heel wounds. Patient appears to be a poor historian. Per chart review, patient has had levofloxacin 750 daily x7 days at [facility]. Patient presented with worsening heel ulceration bilateral. Patient unsure of length of ulcerations. Patient unsure of dressing being applied at [facility]. Patient reports pain to bilateral heel with right heel being worst and rates it as 7/10. Patient denies fever, chills, nausea, vomiting, shortness of breath, chest pain. Patient has no other pedal complaints at this time. Patient seen at bedside this PM.  Relates difficulty sleeping. Past Medical History:        Diagnosis Date    Acute MI ([address] 09/2013    Anxiety     Arthritis     CAD (coronary artery disease)     COVID-19     Hypertension     Influenza A 01/15/2017       Past Surgical History:        Procedure Laterality Date    BACK SURGERY N/A 1989    herniated disc    CORONARY ANGIOPLASTY WITH STENT PLACEMENT  9/13       Allergies:   Patient has no known allergies. Medications:   Home Meds  No current facility-administered medications on file prior to encounter.      Current Outpatient Medications on File Prior to Encounter   Medication Sig Dispense Refill    tamsulosin (FLOMAX) 0.4 MG capsule TAKE ONE CAPSULE BY MOUTH DAILY 90 capsule 0    atenolol (TENORMIN) 50 MG tablet TAKE ONE TABLET BY MOUTH DAILY 90 tablet 0    lisinopril (PRINIVIL;ZESTRIL) 20 MG tablet TAKE 1/2 TABLET BY MOUTH DAILY 45 tablet 0    hydroCHLOROthiazide (HYDRODIURIL) 25 MG tablet TAKE ONE TABLET BY MOUTH DAILY 90 tablet 0    atenolol (TENORMIN) 25 MG tablet Take 1 tablet by mouth daily 30 tablet 0  potassium bicarb-citric acid (EFFER-K) 20 MEQ TBEF effervescent tablet Take 1 tablet by mouth daily 30 tablet 0    clopidogrel (PLAVIX) 75 MG tablet Take 1 tablet by mouth daily for 10 days Take for 10 more days to complete 30 days course, then take ASA alone 10 tablet 0    baclofen (LIORESAL) 5 MG tablet Take 1 tablet by mouth 3 times daily 90 tablet 0    atorvastatin (LIPITOR) 80 MG tablet Take 1 tablet by mouth nightly 30 tablet 3    aspirin 81 MG EC tablet Take 81 mg by mouth daily      sertraline (ZOLOFT) 100 MG tablet TAKE 1 AND 1/2 TABLET BY MOUTH DAILY 45 tablet 2    MYRBETRIQ 50 MG TB24 Take 1 tablet by mouth daily      Multiple Vitamins-Minerals (THERAPEUTIC MULTIVITAMIN-MINERALS) tablet Take 1 tablet by mouth daily      Ascorbic Acid (VITAMIN C) 500 MG tablet Take 500 mg by mouth daily      nitroGLYCERIN (NITROSTAT) 0.4 MG SL tablet Place 1 tablet under the tongue every 5 minutes as needed for Chest pain.  25 tablet 2       Current Meds  oxyCODONE (ROXICODONE) immediate release tablet 5 mg, Q4H PRN   Or  oxyCODONE HCl (OXY-IR) immediate release tablet 10 mg, Q4H PRN  magnesium sulfate 1000 mg in dextrose 5% 100 mL IVPB, PRN  potassium chloride (KLOR-CON M) extended release tablet 40 mEq, PRN   Or  potassium bicarb-citric acid (EFFER-K) effervescent tablet 40 mEq, PRN   Or  potassium chloride 10 mEq/100 mL IVPB (Peripheral Line), PRN  collagenase ointment, BID  aspirin EC tablet 81 mg, Daily  sodium chloride flush 0.9 % injection 5-40 mL, 2 times per day  sodium chloride flush 0.9 % injection 5-40 mL, PRN  0.9 % sodium chloride infusion, PRN  enoxaparin (LOVENOX) injection 40 mg, Daily  ondansetron (ZOFRAN-ODT) disintegrating tablet 4 mg, Q8H PRN   Or  ondansetron (ZOFRAN) injection 4 mg, Q6H PRN  polyethylene glycol (GLYCOLAX) packet 17 g, Daily PRN  acetaminophen (TYLENOL) tablet 650 mg, Q6H PRN   Or  acetaminophen (TYLENOL) suppository 650 mg, Q6H PRN  cefepime (MAXIPIME) 2000 mg IVPB minibag, Q12H  vancomycin 1000 mg IVPB in 250 mL D5W addavial, Q12H  vancomycin (VANCOCIN) intermittent dosing (placeholder), RX Placeholder        Family History:   Family History   Problem Relation Age of Onset    Hypertension Mother     Hypertension Father     Heart Failure Father     Stroke Maternal Grandfather     Cancer Paternal Uncle         throat       Social History:   TOBACCO:   reports that he quit smoking about 2 years ago. His smoking use included cigarettes. He has a 20.00 pack-year smoking history. He has quit using smokeless tobacco.  ETOH:   reports current alcohol use of about 2.0 standard drinks of alcohol per week. DRUGS:   reports no history of drug use. REVIEW OF SYSTEMS:    As above. PHYSICAL EXAM:      Vitals:    /71   Pulse 65   Temp 98.5 °F (36.9 °C) (Oral)   Resp 17   Ht 6' 3\" (1.905 m)   Wt 193 lb 12.6 oz (87.9 kg)   SpO2 93%   BMI 24.22 kg/m²     LABS:   Recent Labs     01/06/22  1735 01/07/22  0718   WBC 8.4 7.8   HGB 10.3* 10.0*   HCT 30.4* 29.5*    193     Recent Labs     01/08/22  1509   *   K 3.3*   CL 95*   CO2 21   PHOS 3.3   BUN 17   CREATININE 0.7*     Recent Labs     01/07/22  0718   INR 1.61*         VASCULAR: CFT is brisk to the digits of the foot b/l. Skin temperature is warm to cool from proximal to distal with no focal calor noted. No edema noted. No pain with calf compression b/l. NEUROLOGIC: Gross and epicritic sensation is diminished b/l. Protective sensation is diminished at all pedal sites b/l. DERMATOLOGIC:  Right lower extremity: Full-thickness ulceration noted to right plantar and posterior heel with eschar base and slightly macerated periwound, measures approximately 6 cm x 4 cm x 0.3 cm. No malodor noted. No fluctuance, crepitus, erythema, or drainage noted.     Left lower extremity: Full-thickness ulceration noted to posterior heel with fibrotic base and slightly macerated periwound, measures approximately 2 cm x 1 cm x 0.2 cm. No malodor noted. No fluctuance, crepitus, erythema, or drainage noted. MUSCULOSKELETAL: Muscle strength is 5/5 for all pedal groups tested. No pain with palpation of the foot or ankle b/l. Contracture noted to right hip and knee. IMAGING:  X-ray right foot 1/6/2023  Impression   Soft tissue ulceration overlying the calcaneus, no destructive bony   abnormality       X-ray left foot 1/6/2022  Impression   No destructive bony lesion identified       IMPRESSION/RECOMMENDATIONS:      -Decubitus ulcerations, bilateral lower extremity  -Cellulitis, right heel - improved    -Patient examined and evaluated at the bedside   -Hypertensive, otherwise VSS. No leukocytosis noted. -X-rays reviewed, noted above  -Deferred debridement at this time.  -orders written for daily dressing changes with Santyl  -arterial duplex ordered to insure patient has adequate peripheral circulation for healing.   -Prealbumin ordered with morning labs to insure nutrition is adequate for healing  -Continue antibiotics at this time      DISPO: Decubitus ulcerations, bilateral lower extremity, with superficial infection right heel. Continue broad-spectrum IV antibiotics at this time. Further recommendations pending the results of arterial duplex.       VALERIO [place]   [phone]  01/08/22  5:59 PM

## 2022-01-08 NOTE — PROGRESS NOTES
Patient resting in bed , no complains or distress noted at this time. Patient taken his ABX, night time medication, able to express needs and call out appropriately. All fall precautions in place. Will continue to monitor.

## 2022-01-08 NOTE — PROGRESS NOTES
Hospitalist Progress Note      PCP: Donnie Marin MD    Date of Admission: 1/6/2022    Chief Complaint: Heel wounds    Hospital Course: Patient is a 71-year-old male with a history of stroke with right-sided deficit 4/20/2021 who presents to the hospital with nonhealing ulcer of the right heel and concern for possible infection. Patient started on broad-spectrum antibiotics. Seen by podiatry this morning    Subjective: Patient denies complaints. He has some expressive aphasia but is able to tell me that he has had a stroke. He is not able to tell me why he is here in the hospital for this admission  Denies chest pain or shortness of breath      Medications:  Reviewed    Infusion Medications    sodium chloride Stopped (01/07/22 1209)     Scheduled Medications    collagenase   Topical BID    aspirin  81 mg Oral Daily    sodium chloride flush  5-40 mL IntraVENous 2 times per day    enoxaparin  40 mg SubCUTAneous Daily    cefepime  2,000 mg IntraVENous Q12H    vancomycin  1,000 mg IntraVENous Q12H    vancomycin (VANCOCIN) intermittent dosing (placeholder)   Other RX Placeholder     PRN Meds: oxyCODONE **OR** oxyCODONE, magnesium sulfate, potassium chloride **OR** potassium alternative oral replacement **OR** potassium chloride, sodium chloride flush, sodium chloride, ondansetron **OR** ondansetron, polyethylene glycol, acetaminophen **OR** acetaminophen      Intake/Output Summary (Last 24 hours) at 1/8/2022 1521  Last data filed at 1/8/2022 1512  Gross per 24 hour   Intake 420 ml   Output    Net 420 ml       Physical Exam Performed:    /71   Pulse 65   Temp 98.5 °F (36.9 °C) (Oral)   Resp 17   Ht 6' 3\" (1.905 m)   Wt 193 lb 12.6 oz (87.9 kg)   SpO2 93%   BMI 24.22 kg/m²     General appearance:   HEENT: Pupils equal, round, and reactive to light. Conjunctivae/corneas clear. Neck: Supple, with full range of motion. No jugular venous distention. Trachea midline.   Respiratory:  Normal respiratory effort. Clear to auscultation, bilaterally without Rales/Wheezes/Rhonchi. Cardiovascular: Regular rate and rhythm with normal S1/S2 without murmurs, rubs or gallops. Abdomen: Soft, non-tender, non-distended with normal bowel sounds. Musculoskeletal: No clubbing, cyanosis or edema bilaterally. Full range of motion without deformity. Skin: Right lower extremity: Ulcer of right plantar and posterior heel with eschar 6 x 4 x 0.3 cm   Left leg ulcer of the posterior heel full-thickness with fibrotic base and 2 x 1 x 0.2 cm     neurologic:  R sided weakness and facial droop  Psychiatric: Alert and oriented, thought content appropriate, normal insight  Capillary Refill: Brisk,3 seconds, normal   Peripheral Pulses: +2 palpable, equal bilaterally       Labs:   Recent Labs     01/06/22  1735 01/07/22 0718   WBC 8.4 7.8   HGB 10.3* 10.0*   HCT 30.4* 29.5*    193     Recent Labs     01/06/22  1735 01/07/22 0718    139   K 3.4* 3.2*    104   CO2 26 22   BUN 24* 20   CREATININE 0.9 0.7*   CALCIUM 9.4 8.9     No results for input(s): AST, ALT, BILIDIR, BILITOT, ALKPHOS in the last 72 hours. Recent Labs     01/07/22 0718   INR 1.61*     No results for input(s): Mary Anchors in the last 72 hours.     Urinalysis:      Lab Results   Component Value Date    NITRU Negative 05/28/2021    WBCUA 1 04/26/2021    RBCUA 1 04/26/2021    BLOODU Negative 05/28/2021    SPECGRAV 1.021 05/28/2021    GLUCOSEU Negative 05/28/2021       Radiology:  XR FOOT RIGHT (MIN 3 VIEWS)   Final Result   Soft tissue ulceration overlying the calcaneus, no destructive bony   abnormality         XR FOOT LEFT (MIN 3 VIEWS)   Final Result   No destructive bony lesion identified                 Assessment/Plan:    1 decubitus ulcers present on admission of lower extremity-  Continue wound care as per podiatry consult greatly appreciated    2 right heel cellulitis-continue IV cefepime and vancomycin     3 late effect

## 2022-01-08 NOTE — PROGRESS NOTES
This RN called lab and asked if they can process the renal function panel.  said that she will collect it now and ask for them to process it. This RN to recheck labs in one hour to see if pt's labwork is in.

## 2022-01-08 NOTE — PROGRESS NOTES
vanc trough came back and pharmacy ordered to give 0900 dose now. This RN administered medication as ordered.

## 2022-01-08 NOTE — PROGRESS NOTES
Dressing changed on Right heel. 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 This RN asked pt if he knew why he was prescribed these boots, he said \"no. \" pt educated on reason for wearing boots and agreed to have boots placed back on heels bilaterally.

## 2022-01-08 NOTE — PLAN OF CARE
Problem: Falls - Risk of:  Goal: Will remain free from falls  Description: Will remain free from falls  1/8/2022 0222 by Sada Luis RN  Outcome: Ongoing  1/7/2022 1819 by Rory Moralez RN  Outcome: Ongoing  Goal: Absence of physical injury  Description: Absence of physical injury  1/8/2022 0222 by Sada Luis RN  Outcome: Ongoing  1/7/2022 1819 by Rory Moralez RN  Outcome: Ongoing     Problem: Skin Integrity:  Goal: Will show no infection signs and symptoms  Description: Will show no infection signs and symptoms  1/8/2022 0222 by Sada Luis RN  Outcome: Ongoing  1/7/2022 1819 by Rory Moralez RN  Outcome: Ongoing  Goal: Absence of new skin breakdown  Description: Absence of new skin breakdown  1/8/2022 0222 by Sada Luis RN  Outcome: Ongoing  1/7/2022 1819 by Rory Moralez RN  Outcome: Ongoing     Problem: Pain:  Goal: Pain level will decrease  Description: Pain level will decrease  1/8/2022 0222 by Sada Luis RN  Outcome: Ongoing  1/7/2022 1819 by Rory Moralez RN  Outcome: Ongoing  Goal: Control of acute pain  Description: Control of acute pain  1/8/2022 0222 by Sada Luis RN  Outcome: Ongoing  1/7/2022 1819 by Rory Moralez RN  Outcome: Ongoing  Goal: Control of chronic pain  Description: Control of chronic pain  1/8/2022 0222 by Sada Luis RN  Outcome: Ongoing  1/7/2022 1819 by Rory Moralez RN  Outcome: Ongoing

## 2022-01-09 LAB — VANCOMYCIN TROUGH: 16.2 UG/ML (ref 10–20)

## 2022-01-09 PROCEDURE — 2580000003 HC RX 258: Performed by: NURSE PRACTITIONER

## 2022-01-09 PROCEDURE — 94760 N-INVAS EAR/PLS OXIMETRY 1: CPT

## 2022-01-09 PROCEDURE — 6360000002 HC RX W HCPCS: Performed by: NURSE PRACTITIONER

## 2022-01-09 PROCEDURE — 80202 ASSAY OF VANCOMYCIN: CPT

## 2022-01-09 PROCEDURE — 36415 COLL VENOUS BLD VENIPUNCTURE: CPT

## 2022-01-09 PROCEDURE — 2060000000 HC ICU INTERMEDIATE R&B

## 2022-01-09 PROCEDURE — 6370000000 HC RX 637 (ALT 250 FOR IP): Performed by: PODIATRIST

## 2022-01-09 PROCEDURE — 6370000000 HC RX 637 (ALT 250 FOR IP): Performed by: NURSE PRACTITIONER

## 2022-01-09 RX ADMIN — VANCOMYCIN HYDROCHLORIDE 1000 MG: 1 INJECTION, POWDER, LYOPHILIZED, FOR SOLUTION INTRAVENOUS at 12:08

## 2022-01-09 RX ADMIN — MAGNESIUM SULFATE HEPTAHYDRATE 1000 MG: 1 INJECTION, SOLUTION INTRAVENOUS at 02:33

## 2022-01-09 RX ADMIN — MAGNESIUM SULFATE HEPTAHYDRATE 1000 MG: 1 INJECTION, SOLUTION INTRAVENOUS at 04:06

## 2022-01-09 RX ADMIN — Medication: at 12:00

## 2022-01-09 RX ADMIN — SODIUM CHLORIDE, PRESERVATIVE FREE 10 ML: 5 INJECTION INTRAVENOUS at 13:28

## 2022-01-09 RX ADMIN — Medication: at 20:16

## 2022-01-09 RX ADMIN — SODIUM CHLORIDE 25 ML: 9 INJECTION, SOLUTION INTRAVENOUS at 08:51

## 2022-01-09 RX ADMIN — ASPIRIN 81 MG: 81 TABLET, COATED ORAL at 08:43

## 2022-01-09 RX ADMIN — CEFEPIME HYDROCHLORIDE 2000 MG: 2 INJECTION, POWDER, FOR SOLUTION INTRAVENOUS at 20:16

## 2022-01-09 RX ADMIN — Medication: at 04:07

## 2022-01-09 RX ADMIN — ENOXAPARIN SODIUM 40 MG: 100 INJECTION SUBCUTANEOUS at 08:43

## 2022-01-09 RX ADMIN — CEFEPIME HYDROCHLORIDE 2000 MG: 2 INJECTION, POWDER, FOR SOLUTION INTRAVENOUS at 08:52

## 2022-01-09 RX ADMIN — SODIUM CHLORIDE, PRESERVATIVE FREE 10 ML: 5 INJECTION INTRAVENOUS at 20:16

## 2022-01-09 RX ADMIN — SODIUM CHLORIDE 25 ML: 9 INJECTION, SOLUTION INTRAVENOUS at 12:08

## 2022-01-09 ASSESSMENT — PAIN SCALES - GENERAL
PAINLEVEL_OUTOF10: 0
PAINLEVEL_OUTOF10: 0

## 2022-01-09 ASSESSMENT — PAIN SCALES - WONG BAKER: WONGBAKER_NUMERICALRESPONSE: 0

## 2022-01-09 NOTE — PROGRESS NOTES
Department of Podiatry Progress Note        Reason for Consult: Bilateral heel wounds, worsening, unstageable 2/2 eschar  Requesting Physician:  Warren Carlson MD    CHIEF COMPLAINT: Heel wounds    HISTORY OF PRESENT ILLNESS:                The patient is a 78 y.o. male with significant past medical history as listed below. Podiatry was consulted for bilateral heel wounds. Patient appears to be a poor historian. Per chart review, patient has had levofloxacin 750 daily x7 days at [facility]. Patient presented with worsening heel ulceration bilateral. Patient unsure of length of ulcerations. Patient unsure of dressing being applied at [facility]. Patient reports pain to bilateral heel with right heel being worst and rates it as 7/10. Patient denies fever, chills, nausea, vomiting, shortness of breath, chest pain. Patient has no other pedal complaints at this time. Patient seen at bedside this AM.  Relates difficulty sleeping. No other complaints today    Past Medical History:        Diagnosis Date    Acute MI ([address] 09/2013    Anxiety     Arthritis     CAD (coronary artery disease)     COVID-19     Hypertension     Influenza A 01/15/2017       Past Surgical History:        Procedure Laterality Date    BACK SURGERY N/A 1989    herniated disc    CORONARY ANGIOPLASTY WITH STENT PLACEMENT  9/13       Allergies:   Patient has no known allergies. Medications:   Home Meds  No current facility-administered medications on file prior to encounter.      Current Outpatient Medications on File Prior to Encounter   Medication Sig Dispense Refill    tamsulosin (FLOMAX) 0.4 MG capsule TAKE ONE CAPSULE BY MOUTH DAILY 90 capsule 0    atenolol (TENORMIN) 50 MG tablet TAKE ONE TABLET BY MOUTH DAILY 90 tablet 0    lisinopril (PRINIVIL;ZESTRIL) 20 MG tablet TAKE 1/2 TABLET BY MOUTH DAILY 45 tablet 0    hydroCHLOROthiazide (HYDRODIURIL) 25 MG tablet TAKE ONE TABLET BY MOUTH DAILY 90 tablet 0    atenolol (TENORMIN) 25 MG tablet Take 1 tablet by mouth daily 30 tablet 0    potassium bicarb-citric acid (EFFER-K) 20 MEQ TBEF effervescent tablet Take 1 tablet by mouth daily 30 tablet 0    clopidogrel (PLAVIX) 75 MG tablet Take 1 tablet by mouth daily for 10 days Take for 10 more days to complete 30 days course, then take ASA alone 10 tablet 0    baclofen (LIORESAL) 5 MG tablet Take 1 tablet by mouth 3 times daily 90 tablet 0    atorvastatin (LIPITOR) 80 MG tablet Take 1 tablet by mouth nightly 30 tablet 3    aspirin 81 MG EC tablet Take 81 mg by mouth daily      sertraline (ZOLOFT) 100 MG tablet TAKE 1 AND 1/2 TABLET BY MOUTH DAILY 45 tablet 2    MYRBETRIQ 50 MG TB24 Take 1 tablet by mouth daily      Multiple Vitamins-Minerals (THERAPEUTIC MULTIVITAMIN-MINERALS) tablet Take 1 tablet by mouth daily      Ascorbic Acid (VITAMIN C) 500 MG tablet Take 500 mg by mouth daily      nitroGLYCERIN (NITROSTAT) 0.4 MG SL tablet Place 1 tablet under the tongue every 5 minutes as needed for Chest pain.  25 tablet 2       Current Meds  oxyCODONE (ROXICODONE) immediate release tablet 5 mg, Q4H PRN   Or  oxyCODONE HCl (OXY-IR) immediate release tablet 10 mg, Q4H PRN  magnesium sulfate 1000 mg in dextrose 5% 100 mL IVPB, PRN  potassium chloride (KLOR-CON M) extended release tablet 40 mEq, PRN   Or  potassium bicarb-citric acid (EFFER-K) effervescent tablet 40 mEq, PRN   Or  potassium chloride 10 mEq/100 mL IVPB (Peripheral Line), PRN  collagenase ointment, BID  aspirin EC tablet 81 mg, Daily  sodium chloride flush 0.9 % injection 5-40 mL, 2 times per day  sodium chloride flush 0.9 % injection 5-40 mL, PRN  0.9 % sodium chloride infusion, PRN  enoxaparin (LOVENOX) injection 40 mg, Daily  ondansetron (ZOFRAN-ODT) disintegrating tablet 4 mg, Q8H PRN   Or  ondansetron (ZOFRAN) injection 4 mg, Q6H PRN  polyethylene glycol (GLYCOLAX) packet 17 g, Daily PRN  acetaminophen (TYLENOL) tablet 650 mg, Q6H PRN   Or  acetaminophen (TYLENOL) suppository 650 mg, Q6H PRN  cefepime (MAXIPIME) 2000 mg IVPB minibag, Q12H  vancomycin 1000 mg IVPB in 250 mL D5W addavial, Q12H  vancomycin (VANCOCIN) intermittent dosing (placeholder), RX Placeholder        Family History:   Family History   Problem Relation Age of Onset    Hypertension Mother     Hypertension Father     Heart Failure Father     Stroke Maternal Grandfather     Cancer Paternal Uncle         throat       Social History:   TOBACCO:   reports that he quit smoking about 2 years ago. His smoking use included cigarettes. He has a 20.00 pack-year smoking history. He has quit using smokeless tobacco.  ETOH:   reports current alcohol use of about 2.0 standard drinks of alcohol per week. DRUGS:   reports no history of drug use. REVIEW OF SYSTEMS:    As above. PHYSICAL EXAM:      Vitals:    /77   Pulse 65   Temp 97.5 °F (36.4 °C) (Oral)   Resp 16   Ht 6' 3\" (1.905 m)   Wt 195 lb 8.8 oz (88.7 kg)   SpO2 94%   BMI 24.44 kg/m²     LABS:   Recent Labs     01/06/22  1735 01/07/22  0718   WBC 8.4 7.8   HGB 10.3* 10.0*   HCT 30.4* 29.5*    193     Recent Labs     01/08/22  1509   *   K 3.3*   CL 95*   CO2 21   PHOS 3.3   BUN 17   CREATININE 0.7*     Recent Labs     01/07/22  0718   INR 1.61*         VASCULAR: CFT is brisk to the digits of the foot b/l. Skin temperature is warm to cool from proximal to distal with no focal calor noted. No edema noted. No pain with calf compression b/l. NEUROLOGIC: Gross and epicritic sensation is diminished b/l. Protective sensation is diminished at all pedal sites b/l. DERMATOLOGIC:  Right lower extremity: Full-thickness ulceration noted to right plantar and posterior heel with eschar base and slightly macerated periwound, measures approximately 6 cm x 4 cm x 0.3 cm. No malodor noted. No fluctuance, crepitus, erythema, or drainage noted. It is essentially unchaged from previous examination.     Left lower extremity: Full-thickness ulceration noted to posterior heel with fibrotic base and slightly macerated periwound, measures approximately 2 cm x 1 cm x 0.2 cm. No malodor noted. No fluctuance, crepitus, erythema, or drainage noted. MUSCULOSKELETAL: Muscle strength is 5/5 for all pedal groups tested. No pain with palpation of the foot or ankle b/l. Contracture noted to right hip and knee. IMAGING:  X-ray right foot 1/6/2023  Impression   Soft tissue ulceration overlying the calcaneus, no destructive bony   abnormality       X-ray left foot 1/6/2022  Impression   No destructive bony lesion identified       IMPRESSION/RECOMMENDATIONS:      -Decubitus ulcerations, bilateral lower extremity  -Cellulitis, right heel - improved    -Patient examined and evaluated at the bedside   -Hypertensive, otherwise VSS. No leukocytosis noted. -X-rays reviewed, noted above  -Deferred debridement at this time.  -orders written for daily dressing changes with Santyl  -arterial duplex ordered to insure patient has adequate peripheral circulation for healing.   -Prealbumin ordered with morning labs to insure nutrition is adequate for healing  -Continue antibiotics at this time      DISPO: Decubitus ulcerations, bilateral lower extremity, with superficial infection right heel. Continue broad-spectrum IV antibiotics at this time. Further recommendations pending the results of arterial duplex.       [facility], [place]   [phone]  01/09/22  9:44 AM

## 2022-01-09 NOTE — PROGRESS NOTES
Clinical Pharmacy Note  Vancomycin Consult    Kurt Spence is a 78 y.o. male ordered Vancomycin for cellulitis; consult received from Tunde Mcclure CNP  to manage therapy. Also receiving cefepime. Patient Active Problem List   Diagnosis    Chest pain    MI, acute, non ST segment elevation (HCC)    Tobacco abuse    HTN (hypertension)    [address]    Acute CVA (cerebrovascular [address]) [address]    Right leg weakness    Acute ischemic left [place] stroke (HCC)    Non-healing open wound of right heel       Allergies:  Patient has no known allergies. Temp max:  Temp (24hrs), Av °F (36.7 °C), Min:97.5 °F (36.4 °C), Max:98.3 °F (36.8 °C)      Recent Labs     22  1735 22  0718   WBC 8.4 7.8       Recent Labs     22  1735 22  0718 22  1509   BUN 24* 20 17   CREATININE 0.9 0.7* 0.7*         Intake/Output Summary (Last 24 hours) at 2022 1449  Last data filed at 2022 1902  Gross per 24 hour   Intake 480 ml   Output    Net 480 ml         Ht Readings from Last 1 Encounters:   22 6' 3\" (1.905 m)        Wt Readings from Last 1 Encounters:   22 195 lb 8.8 oz (88.7 kg)         Estimated Creatinine Clearance: 122 mL/min (A) (based on SCr of 0.7 mg/dL (L)). Assessment/Plan:    Vancomycin level today = 16.2 mg/L  Will decrease dose to Vancomycin 750 mg IV every 12 hours. Vancomycin level and serum creatinine ordered for 1/10/22 at 1100. Thank you for the consult.      Leeanne Lacey, PharmD, BCPS  2022  2:51 PM

## 2022-01-09 NOTE — PLAN OF CARE
Problem: Falls - Risk of:  Goal: Will remain free from falls  Description: Will remain free from falls  1/9/2022 1143 by Chrissy Silveira RN  Outcome: Ongoing  1/9/2022 0057 by Marcella Berg RN  Outcome: Ongoing     Problem: Falls - Risk of:  Goal: Absence of physical injury  Description: Absence of physical injury  1/9/2022 1143 by Chrissy Silveira RN  Outcome: Ongoing  1/9/2022 0057 by Marcella Berg RN  Outcome: Ongoing

## 2022-01-09 NOTE — PROGRESS NOTES
Hospitalist Progress Note      PCP: Alfred Culver MD    Date of Admission: 1/6/2022    Chief Complaint: Heel wounds    Hospital Course: Patient is a 79-year-old male with a history of stroke with right-sided deficit 4/20/2021 who presents to the hospital with nonhealing ulcer of the right heel and concern for possible infection. Patient started on broad-spectrum antibiotics. Subjective: Patient seems much more alert today. He remembers my conversation from yesterday and even said tiffanie Perales when I walked in      Medications:  Reviewed    Infusion Medications    sodium chloride 25 mL (01/09/22 1208)     Scheduled Medications    [START ON 1/10/2022] vancomycin  750 mg IntraVENous Q12H    collagenase   Topical BID    aspirin  81 mg Oral Daily    sodium chloride flush  5-40 mL IntraVENous 2 times per day    enoxaparin  40 mg SubCUTAneous Daily    cefepime  2,000 mg IntraVENous Q12H    vancomycin (VANCOCIN) intermittent dosing (placeholder)   Other RX Placeholder     PRN Meds: oxyCODONE **OR** oxyCODONE, magnesium sulfate, potassium chloride **OR** potassium alternative oral replacement **OR** potassium chloride, sodium chloride flush, sodium chloride, ondansetron **OR** ondansetron, polyethylene glycol, acetaminophen **OR** acetaminophen      Intake/Output Summary (Last 24 hours) at 1/9/2022 1538  Last data filed at 1/8/2022 1902  Gross per 24 hour   Intake 240 ml   Output    Net 240 ml       Physical Exam Performed:    /75   Pulse 70   Temp 98.1 °F (36.7 °C) (Oral)   Resp 17   Ht 6' 3\" (1.905 m)   Wt 195 lb 8.8 oz (88.7 kg)   SpO2 94%   BMI 24.44 kg/m²     General appearance:   HEENT: Pupils equal, round, and reactive to light. Conjunctivae/corneas clear. Neck: Supple, with full range of motion. No jugular venous distention. Trachea midline. Respiratory:  Normal respiratory effort. Clear to auscultation, bilaterally without Rales/Wheezes/Rhonchi.   Cardiovascular: Regular rate and rhythm with normal S1/S2 without murmurs, rubs or gallops. Abdomen: Soft, non-tender, non-distended with normal bowel sounds. Musculoskeletal: No clubbing, cyanosis or edema bilaterally. Full range of motion without deformity. Skin: Right lower extremity: Ulcer of right plantar and posterior heel with eschar 6 x 4 x 0.3 cm   Left leg ulcer of the posterior heel full-thickness with fibrotic base and 2 x 1 x 0.2 cm     neurologic:  R sided weakness and facial droop. Psychiatric: Alert and oriented, thought content appropriate, normal insight  Capillary Refill: Brisk,3 seconds, normal   Peripheral Pulses: +2 palpable, equal bilaterally       Labs:   Recent Labs     01/06/22 1735 01/07/22 0718   WBC 8.4 7.8   HGB 10.3* 10.0*   HCT 30.4* 29.5*    193     Recent Labs     01/06/22 1735 01/07/22 0718 01/08/22  1509    139 130*   K 3.4* 3.2* 3.3*    104 95*   CO2 26 22 21   BUN 24* 20 17   CREATININE 0.9 0.7* 0.7*   CALCIUM 9.4 8.9 8.9   PHOS  --   --  3.3     No results for input(s): AST, ALT, BILIDIR, BILITOT, ALKPHOS in the last 72 hours. Recent Labs     01/07/22 0718   INR 1.61*     No results for input(s): Daisy Schaeffer in the last 72 hours.     Urinalysis:      Lab Results   Component Value Date    NITRU Negative 05/28/2021    WBCUA 1 04/26/2021    RBCUA 1 04/26/2021    BLOODU Negative 05/28/2021    SPECGRAV 1.021 05/28/2021    GLUCOSEU Negative 05/28/2021       Radiology:  XR FOOT RIGHT (MIN 3 VIEWS)   Final Result   Soft tissue ulceration overlying the calcaneus, no destructive bony   abnormality         XR FOOT LEFT (MIN 3 VIEWS)   Final Result   No destructive bony lesion identified         VL DUP LOWER EXTREMITY ARTERIES BILATERAL    (Results Pending)           Assessment/Plan:    1 decubitus ulcers present on admission of lower extremity-  Continue wound care as per podiatry consult greatly appreciated    2 right heel cellulitis-continue IV cefepime and vancomycin for another 24 hours we will transition to oral antibiotics in the next day    3 late effect CVA with right-sided weakness and aphasia-. Seems to be more conversant and lucid and less confused today. I still have some reservations about his weakness on the right side but I think it may not be unreasonable for him to go [facility] of [facility] re[facility]. He seems pretty motivated by this  I will talk to the [facility] tomorrow  He has been followed closely by the [facility] team since his discharge from this hospital and they seem excited about working with him at discharge based on the prior conversation with his wife      I spoke to the family at length time of conversation was about 30 minutes  DVT Prophylaxis: Lovenox diet: ADULT DIET;  Regular  Code Status: Full Code  PT OT: Ordered    Dispo -discharge in the next 48 to 72 hours    Grayson Ortega MD

## 2022-01-09 NOTE — PLAN OF CARE
Problem: Falls - Risk of:  Goal: Will remain free from falls  Description: Will remain free from falls  1/9/2022 0057 by Kayce Lehman RN  Outcome: Ongoing  1/8/2022 2018 by Aimee Quintero RN  Outcome: Ongoing  Goal: Absence of physical injury  Description: Absence of physical injury  1/9/2022 0057 by Kayce Lehman RN  Outcome: Ongoing  1/8/2022 2018 by Aimee Quintero RN  Outcome: Ongoing     Problem: Skin Integrity:  Goal: Will show no infection signs and symptoms  Description: Will show no infection signs and symptoms  1/9/2022 0057 by Kayce Lehman RN  Outcome: Ongoing  1/8/2022 2018 by Aimee Quintero RN  Outcome: Ongoing  Goal: Absence of new skin breakdown  Description: Absence of new skin breakdown  1/9/2022 0057 by Kayce Lehman RN  Outcome: Ongoing  1/8/2022 2018 by Aimee Quintero RN  Outcome: Ongoing

## 2022-01-10 LAB
ANION GAP SERPL CALCULATED.3IONS-SCNC: 9 MMOL/L (ref 3–16)
BUN BLDV-MCNC: 14 MG/DL (ref 7–20)
CALCIUM SERPL-MCNC: 8.9 MG/DL (ref 8.3–10.6)
CHLORIDE BLD-SCNC: 101 MMOL/L (ref 99–110)
CO2: 22 MMOL/L (ref 21–32)
CREAT SERPL-MCNC: 0.5 MG/DL (ref 0.8–1.3)
GFR AFRICAN AMERICAN: >60
GFR NON-AFRICAN AMERICAN: >60
GLUCOSE BLD-MCNC: 118 MG/DL (ref 70–99)
POTASSIUM SERPL-SCNC: 3.7 MMOL/L (ref 3.5–5.1)
SODIUM BLD-SCNC: 132 MMOL/L (ref 136–145)

## 2022-01-10 PROCEDURE — 6360000002 HC RX W HCPCS: Performed by: NURSE PRACTITIONER

## 2022-01-10 PROCEDURE — 80048 BASIC METABOLIC PNL TOTAL CA: CPT

## 2022-01-10 PROCEDURE — 6370000000 HC RX 637 (ALT 250 FOR IP): Performed by: NURSE PRACTITIONER

## 2022-01-10 PROCEDURE — 2060000000 HC ICU INTERMEDIATE R&B

## 2022-01-10 PROCEDURE — 6370000000 HC RX 637 (ALT 250 FOR IP): Performed by: INTERNAL MEDICINE

## 2022-01-10 PROCEDURE — 2580000003 HC RX 258: Performed by: PODIATRIST

## 2022-01-10 PROCEDURE — 6360000002 HC RX W HCPCS: Performed by: PODIATRIST

## 2022-01-10 PROCEDURE — 36415 COLL VENOUS BLD VENIPUNCTURE: CPT

## 2022-01-10 PROCEDURE — 93925 LOWER EXTREMITY STUDY: CPT

## 2022-01-10 PROCEDURE — 2580000003 HC RX 258: Performed by: NURSE PRACTITIONER

## 2022-01-10 RX ORDER — LISINOPRIL 20 MG/1
20 TABLET ORAL DAILY
Status: DISCONTINUED | OUTPATIENT
Start: 2022-01-11 | End: 2022-01-11 | Stop reason: HOSPADM

## 2022-01-10 RX ORDER — TROSPIUM CHLORIDE 20 MG/1
20 TABLET, FILM COATED ORAL NIGHTLY
Status: DISCONTINUED | OUTPATIENT
Start: 2022-01-10 | End: 2022-01-11 | Stop reason: HOSPADM

## 2022-01-10 RX ORDER — NITROGLYCERIN 0.4 MG/1
0.4 TABLET SUBLINGUAL EVERY 5 MIN PRN
Status: DISCONTINUED | OUTPATIENT
Start: 2022-01-10 | End: 2022-01-11 | Stop reason: HOSPADM

## 2022-01-10 RX ORDER — MIRTAZAPINE 15 MG/1
15 TABLET, FILM COATED ORAL NIGHTLY
COMMUNITY

## 2022-01-10 RX ORDER — TAMSULOSIN HYDROCHLORIDE 0.4 MG/1
0.4 CAPSULE ORAL DAILY
Status: DISCONTINUED | OUTPATIENT
Start: 2022-01-10 | End: 2022-01-11 | Stop reason: HOSPADM

## 2022-01-10 RX ORDER — PANTOPRAZOLE SODIUM 40 MG/1
40 TABLET, DELAYED RELEASE ORAL DAILY
COMMUNITY

## 2022-01-10 RX ORDER — ATENOLOL 25 MG/1
25 TABLET ORAL DAILY
Status: DISCONTINUED | OUTPATIENT
Start: 2022-01-10 | End: 2022-01-11 | Stop reason: HOSPADM

## 2022-01-10 RX ORDER — MIRTAZAPINE 15 MG/1
15 TABLET, ORALLY DISINTEGRATING ORAL NIGHTLY
Status: DISCONTINUED | OUTPATIENT
Start: 2022-01-10 | End: 2022-01-11 | Stop reason: HOSPADM

## 2022-01-10 RX ORDER — FERROUS SULFATE 325(65) MG
325 TABLET ORAL
COMMUNITY

## 2022-01-10 RX ORDER — BACLOFEN 10 MG/1
5 TABLET ORAL 3 TIMES DAILY
Status: DISCONTINUED | OUTPATIENT
Start: 2022-01-10 | End: 2022-01-10

## 2022-01-10 RX ORDER — PANTOPRAZOLE SODIUM 40 MG/1
40 TABLET, DELAYED RELEASE ORAL
Status: DISCONTINUED | OUTPATIENT
Start: 2022-01-11 | End: 2022-01-11 | Stop reason: HOSPADM

## 2022-01-10 RX ORDER — BACLOFEN 10 MG/1
20 TABLET ORAL 2 TIMES DAILY
Status: DISCONTINUED | OUTPATIENT
Start: 2022-01-10 | End: 2022-01-11 | Stop reason: HOSPADM

## 2022-01-10 RX ORDER — LISINOPRIL 10 MG/1
10 TABLET ORAL DAILY
Status: DISCONTINUED | OUTPATIENT
Start: 2022-01-10 | End: 2022-01-10

## 2022-01-10 RX ORDER — ATORVASTATIN CALCIUM 80 MG/1
80 TABLET, FILM COATED ORAL NIGHTLY
Status: DISCONTINUED | OUTPATIENT
Start: 2022-01-10 | End: 2022-01-11 | Stop reason: HOSPADM

## 2022-01-10 RX ORDER — ASCORBIC ACID 500 MG
500 TABLET ORAL DAILY
Status: DISCONTINUED | OUTPATIENT
Start: 2022-01-10 | End: 2022-01-11 | Stop reason: HOSPADM

## 2022-01-10 RX ORDER — BACLOFEN 20 MG/1
20 TABLET ORAL 2 TIMES DAILY
COMMUNITY

## 2022-01-10 RX ADMIN — LISINOPRIL 10 MG: 10 TABLET ORAL at 10:02

## 2022-01-10 RX ADMIN — CEFEPIME HYDROCHLORIDE 2000 MG: 2 INJECTION, POWDER, FOR SOLUTION INTRAVENOUS at 22:06

## 2022-01-10 RX ADMIN — SERTRALINE 150 MG: 100 TABLET, FILM COATED ORAL at 10:00

## 2022-01-10 RX ADMIN — ASPIRIN 81 MG: 81 TABLET, COATED ORAL at 10:01

## 2022-01-10 RX ADMIN — OXYCODONE HYDROCHLORIDE AND ACETAMINOPHEN 500 MG: 500 TABLET ORAL at 10:01

## 2022-01-10 RX ADMIN — ENOXAPARIN SODIUM 40 MG: 100 INJECTION SUBCUTANEOUS at 10:00

## 2022-01-10 RX ADMIN — Medication: at 21:47

## 2022-01-10 RX ADMIN — BACLOFEN 5 MG: 10 TABLET ORAL at 14:30

## 2022-01-10 RX ADMIN — BACLOFEN 5 MG: 10 TABLET ORAL at 10:00

## 2022-01-10 RX ADMIN — TROSPIUM CHLORIDE 20 MG: 20 TABLET, FILM COATED ORAL at 21:40

## 2022-01-10 RX ADMIN — SODIUM CHLORIDE, PRESERVATIVE FREE 10 ML: 5 INJECTION INTRAVENOUS at 21:41

## 2022-01-10 RX ADMIN — ATENOLOL 25 MG: 25 TABLET ORAL at 10:01

## 2022-01-10 RX ADMIN — Medication: at 10:12

## 2022-01-10 RX ADMIN — VANCOMYCIN HYDROCHLORIDE 750 MG: 750 INJECTION, POWDER, LYOPHILIZED, FOR SOLUTION INTRAVENOUS at 00:22

## 2022-01-10 RX ADMIN — ATORVASTATIN CALCIUM 80 MG: 80 TABLET, FILM COATED ORAL at 21:40

## 2022-01-10 RX ADMIN — BACLOFEN 20 MG: 10 TABLET ORAL at 21:40

## 2022-01-10 RX ADMIN — VANCOMYCIN HYDROCHLORIDE 750 MG: 750 INJECTION, POWDER, LYOPHILIZED, FOR SOLUTION INTRAVENOUS at 12:50

## 2022-01-10 RX ADMIN — CEFEPIME HYDROCHLORIDE 2000 MG: 2 INJECTION, POWDER, FOR SOLUTION INTRAVENOUS at 10:02

## 2022-01-10 RX ADMIN — SODIUM CHLORIDE, PRESERVATIVE FREE 10 ML: 5 INJECTION INTRAVENOUS at 10:13

## 2022-01-10 RX ADMIN — TAMSULOSIN HYDROCHLORIDE 0.4 MG: 0.4 CAPSULE ORAL at 10:01

## 2022-01-10 ASSESSMENT — PAIN SCALES - GENERAL
PAINLEVEL_OUTOF10: 0

## 2022-01-10 ASSESSMENT — PAIN SCALES - WONG BAKER: WONGBAKER_NUMERICALRESPONSE: 0

## 2022-01-10 NOTE — CARE COORDINATION
Called [facility] to see if they have been looking at accepting this pt from the SNF, Carmelina will check into it & get back with me. [address] [place] [address] spoke with Arun Cotter for an update from their standpoint. Chika Daley RN, BSN,   [phone]  Electronically signed by Chika Daley RN on 1/10/2022 at 1:15 PM     Carmelnia from [facility] called back, they did try to admit pt in November 2021 & it was denied by insurance, they appealed & it was still denied. She does not feel he would get approved this time either. Attempted to call Lorenzo Das, Domestic Partner, & let her know; left a HIPPA compliant vm & asked for a return call. Chika Daley RN, BSN,   [phone]  Electronically signed by Chika Daley RN on 1/10/2022 at 1:28 PM     LATE ENTRY:  Lorenzo Das DP, called me back on 1/10 @ 1430 & updated on above. Stated he will go back to [facility] then when dc.     Chika Daley RN, BSN,   [phone]  Electronically signed by Chika Daley RN on 1/11/2022 at 12:34 PM

## 2022-01-10 NOTE — PROGRESS NOTES
Hospitalist Progress Note      PCP: Megan Lim MD    Date of Admission: 1/6/2022    Chief Complaint: Heel wounds    Hospital Course: Patient is a 65-year-old male with a history of stroke with right-sided deficit 4/20/2021 who presents to the hospital with nonhealing ulcer of the right heel and concern for possible infection. Patient started on broad-spectrum antibiotics. Subjective: good spirits  No cp or sob  Vascular studies done today ordered by pod sx        Medications:  Reviewed    Infusion Medications    sodium chloride 25 mL (01/09/22 1208)     Scheduled Medications    vitamin C  500 mg Oral Daily    atenolol  25 mg Oral Daily    atorvastatin  80 mg Oral Nightly    Baclofen  5 mg Oral TID    trospium  20 mg Oral Nightly    sertraline  150 mg Oral Daily    tamsulosin  0.4 mg Oral Daily    lisinopril  10 mg Oral Daily    vancomycin  750 mg IntraVENous Q12H    collagenase   Topical BID    aspirin  81 mg Oral Daily    sodium chloride flush  5-40 mL IntraVENous 2 times per day    enoxaparin  40 mg SubCUTAneous Daily    cefepime  2,000 mg IntraVENous Q12H    vancomycin (VANCOCIN) intermittent dosing (placeholder)   Other RX Placeholder     PRN Meds: nitroGLYCERIN, oxyCODONE **OR** oxyCODONE, magnesium sulfate, potassium chloride **OR** potassium alternative oral replacement **OR** potassium chloride, sodium chloride flush, sodium chloride, ondansetron **OR** ondansetron, polyethylene glycol, acetaminophen **OR** acetaminophen      Intake/Output Summary (Last 24 hours) at 1/10/2022 1424  Last data filed at 1/10/2022 0945  Gross per 24 hour   Intake 180 ml   Output 800 ml   Net -620 ml       Physical Exam Performed:    BP (!) 162/88   Pulse 78   Temp 98 °F (36.7 °C) (Oral)   Resp 16   Ht 6' 3\" (1.905 m)   Wt 186 lb 4.6 oz (84.5 kg)   SpO2 95%   BMI 23.28 kg/m²     General appearance:   HEENT: Pupils equal, round, and reactive to light. Conjunctivae/corneas clear.   Neck: Supple, not available due to patient in COVID 19 precautions and unable   to properly position for exam.   Elevated velocity of the mid posterior tibial and mid anterior tibial arteries   suggests a greater than 50% stenosis. Ultrasound images of the right lower extremity reveal severe calcific plaque   formation throughout. The right popliteal artery was not visualized due to patient positioning. Left   Technically limited exam due to patient scanned in COVID 19 precautions.       Left OMKAR was not available due to patient in COVID 19 precautions and unable   to properly position for exam.   Elevated velocity of the common femoral artery suggests a less than 50%   stenosis. Occlusion of the mid anterior tibial artery. This flow reconstitutes at the   distal anterior tibial artery via multiple collaterals. Ultrasound images of the left lower extremity reveal severe calcific plaque   formation throughout. The left popliteal artery was not visualized due to patient positioning. Assessment/Plan:    1 decubitus ulcers present on admission of lower extremity-  Continue wound care as per podiatry consult greatly appreciated  Will discuss findings of doppler with pod sx  Given findings of moderate PVD im inclined to switch him to Plavix 75 mg over an aspirin at ID  Will place to full strength lipitor 80 mg    2 right heel cellulitis-continue IV cefepime and vancomycin for another 24 hours we will transition to oral antibiotics in the next day    3 late effect CVA with right-sided weakness and aphasia-. Seems to be more conversant still with fairly contractured and dense weakness of right side adam RUE. Per d/w case management pt DID complete inpatient rehab here during April admission. Not very optimistic that he will be accepted for inpatient rehab again  If not will likely return to his snf in next day          DVT Prophylaxis: Lovenox diet: ADULT DIET;  Regular  Code Status: Full Code  PT OT: Ordered    Dispo -discharge in the next 48 to 72 hours    Romualdo Osler, MD

## 2022-01-10 NOTE — PROGRESS NOTES
Dressing changed R heel. Santyl, wet to dry, curlex and ace wrap. Meplex on Left heel ulcer . Pt tolerated well. No complains at this time. Will continue to monitor. No distress noted.

## 2022-01-10 NOTE — PROGRESS NOTES
Pharmacy Medication Reconciliation Note     List of medications patient is currently taking is complete. Source of information:   1. Called [facility]    No Known Allergies    Notes regarding home medications:   1. Confirmed: Lisinopril 20 mg daily, Atenolol 25 mg daily, Baclofen 20 mg BID  2. Added Mirtazapine 15 mg HS, Protonix 40 mg ,Iron.   3. Removed Flomax, [facility], [address] [place] [address]  1/10/2022  3:05 PM

## 2022-01-11 VITALS
OXYGEN SATURATION: 95 % | TEMPERATURE: 98 F | HEART RATE: 65 BPM | HEIGHT: 75 IN | SYSTOLIC BLOOD PRESSURE: 104 MMHG | WEIGHT: 187.83 LBS | BODY MASS INDEX: 23.35 KG/M2 | DIASTOLIC BLOOD PRESSURE: 55 MMHG | RESPIRATION RATE: 18 BRPM

## 2022-01-11 LAB
ANION GAP SERPL CALCULATED.3IONS-SCNC: 12 MMOL/L (ref 3–16)
BUN BLDV-MCNC: 13 MG/DL (ref 7–20)
CALCIUM SERPL-MCNC: 9.5 MG/DL (ref 8.3–10.6)
CHLORIDE BLD-SCNC: 97 MMOL/L (ref 99–110)
CO2: 23 MMOL/L (ref 21–32)
CREAT SERPL-MCNC: 0.6 MG/DL (ref 0.8–1.3)
GFR AFRICAN AMERICAN: >60
GFR NON-AFRICAN AMERICAN: >60
GLUCOSE BLD-MCNC: 99 MG/DL (ref 70–99)
POTASSIUM SERPL-SCNC: 3.4 MMOL/L (ref 3.5–5.1)
SODIUM BLD-SCNC: 132 MMOL/L (ref 136–145)
VANCOMYCIN TROUGH: 16.5 UG/ML (ref 10–20)

## 2022-01-11 PROCEDURE — 6370000000 HC RX 637 (ALT 250 FOR IP): Performed by: NURSE PRACTITIONER

## 2022-01-11 PROCEDURE — 80048 BASIC METABOLIC PNL TOTAL CA: CPT

## 2022-01-11 PROCEDURE — 6360000002 HC RX W HCPCS: Performed by: NURSE PRACTITIONER

## 2022-01-11 PROCEDURE — 36415 COLL VENOUS BLD VENIPUNCTURE: CPT

## 2022-01-11 PROCEDURE — 6360000002 HC RX W HCPCS: Performed by: INTERNAL MEDICINE

## 2022-01-11 PROCEDURE — 6360000002 HC RX W HCPCS: Performed by: PODIATRIST

## 2022-01-11 PROCEDURE — 80202 ASSAY OF VANCOMYCIN: CPT

## 2022-01-11 PROCEDURE — 2580000003 HC RX 258: Performed by: PODIATRIST

## 2022-01-11 PROCEDURE — 2580000003 HC RX 258: Performed by: INTERNAL MEDICINE

## 2022-01-11 PROCEDURE — 6370000000 HC RX 637 (ALT 250 FOR IP): Performed by: INTERNAL MEDICINE

## 2022-01-11 PROCEDURE — 2580000003 HC RX 258: Performed by: NURSE PRACTITIONER

## 2022-01-11 RX ORDER — CLOPIDOGREL BISULFATE 75 MG/1
75 TABLET ORAL DAILY
Qty: 30 TABLET | Refills: 3 | Status: SHIPPED | OUTPATIENT
Start: 2022-01-11

## 2022-01-11 RX ORDER — CLINDAMYCIN HYDROCHLORIDE 300 MG/1
300 CAPSULE ORAL 2 TIMES DAILY
Qty: 14 CAPSULE | Refills: 0 | Status: SHIPPED | OUTPATIENT
Start: 2022-01-11 | End: 2022-01-18

## 2022-01-11 RX ADMIN — ATENOLOL 25 MG: 25 TABLET ORAL at 09:45

## 2022-01-11 RX ADMIN — VANCOMYCIN HYDROCHLORIDE 750 MG: 750 INJECTION, POWDER, LYOPHILIZED, FOR SOLUTION INTRAVENOUS at 00:33

## 2022-01-11 RX ADMIN — TAMSULOSIN HYDROCHLORIDE 0.4 MG: 0.4 CAPSULE ORAL at 09:45

## 2022-01-11 RX ADMIN — BACLOFEN 20 MG: 10 TABLET ORAL at 09:45

## 2022-01-11 RX ADMIN — Medication: at 12:56

## 2022-01-11 RX ADMIN — SERTRALINE 150 MG: 100 TABLET, FILM COATED ORAL at 09:45

## 2022-01-11 RX ADMIN — ASPIRIN 81 MG: 81 TABLET, COATED ORAL at 09:45

## 2022-01-11 RX ADMIN — VANCOMYCIN HYDROCHLORIDE 750 MG: 750 INJECTION, POWDER, LYOPHILIZED, FOR SOLUTION INTRAVENOUS at 12:58

## 2022-01-11 RX ADMIN — PANTOPRAZOLE SODIUM 40 MG: 40 TABLET, DELAYED RELEASE ORAL at 06:37

## 2022-01-11 RX ADMIN — MIRTAZAPINE 15 MG: 15 TABLET, ORALLY DISINTEGRATING ORAL at 00:33

## 2022-01-11 RX ADMIN — OXYCODONE HYDROCHLORIDE AND ACETAMINOPHEN 500 MG: 500 TABLET ORAL at 09:46

## 2022-01-11 RX ADMIN — CEFEPIME HYDROCHLORIDE 2000 MG: 2 INJECTION, POWDER, FOR SOLUTION INTRAVENOUS at 09:52

## 2022-01-11 RX ADMIN — ENOXAPARIN SODIUM 40 MG: 100 INJECTION SUBCUTANEOUS at 09:46

## 2022-01-11 RX ADMIN — SODIUM CHLORIDE, PRESERVATIVE FREE 10 ML: 5 INJECTION INTRAVENOUS at 13:03

## 2022-01-11 RX ADMIN — VANCOMYCIN HYDROCHLORIDE 500 MG: 500 INJECTION, POWDER, LYOPHILIZED, FOR SOLUTION INTRAVENOUS at 15:25

## 2022-01-11 ASSESSMENT — PAIN SCALES - GENERAL
PAINLEVEL_OUTOF10: 0

## 2022-01-11 NOTE — ADT AUTH CERT
Wound and Skin Management GRG - Care Day 3 (1/8/2022) by Mau Garza RN       Review Status Review Entered   Completed 1/11/2022 11:44      Criteria Review      Care Day: 3 Care Date: 1/8/2022 Level of Care: Inpatient Floor    Guideline Day 3    Level Of Care    ( ) * Activity level acceptable    ( ) * Complete discharge planning    Clinical Status    (X) * Temperature status acceptable    ( ) * No infection, or status acceptable    1/11/2022 11:44 AM EST by Keya Obrien Per Podiatry  Decubitus ulcerations, bilateral lower extremity, with superficial infection right heel. Continue broad-spectrum IV antibiotics at this time. Further recommendations pending the results of arterial duplex. (X) * Pain and nausea absent or adequately managed    ( ) * Wound and ulcer problems absent or status acceptable    (X) * No burn injury, or status acceptable    (X) * Traumatic injury absent or status appropriate    (X) * Skin disease absent or status appropriate    ( ) * General Discharge Criteria met    Interventions    (X) * Intake acceptable    ( ) * No inpatient interventions needed    1/11/2022 11:44 AM EST by Gloria Thomas      daily dressing changes with Santyl  -arterial duplex ordered to insure patient has adequate peripheral circulation for healing.   -Prealbumin ordered with morning labs to insure nutrition is adequate for healing  -Continue antibiotics at this time    * Milestone   Additional Notes   DATE: 1/8/22      Vitals:   /71   Pulse 65   Temp 98.5 °F (36.9 °C) (Oral)   Resp 17  SpO2 93%          Abnl/Pertinent Labs/Radiology/Diagnostic Studies:      Sodium: 130 (L)   Potassium: 3.3 (L)   Chloride: 95 (L)   Creatinine: 0.7 (L)      Glucose: 100 (H)   CALCIUM, SERUM, 159524: 8.9   Phosphorus: 3.3   Albumin: 3.1 (L)      Magnesium: 1.50 (L)      Vancomycin Tr: 16.2      Duplex scan   Summary        Limited evaluation of both extremities.  Evidence of calcific plaque    throughout both extremities. OMKAR not obtained. Popliteal arteries unable to    be visualized due to positioning. Multiphasic waveforms throughout SFA    bilaterally with evidence of distal monophasic waveforms therefore cannot    exclude a popliteal lesion bilaterally. The left anterior tibial artery    appears to be occluded. Medications:   collagenase Topical BID    · aspirin 81 mg Oral Daily      · enoxaparin 40 mg SubCUTAneous Daily   · cefepime 2,000 mg IntraVENous Q12H   · vancomycin 1,000 mg IntraVENous Q12H      potassium chloride (KLOR-CON M) extended release tablet 40 mEq po x1      acetaminophen (TYLENOL) tablet 650 mg   Dose: 650 mg   Freq: EVERY 6 HOURS PRN Route: PO x1      Orders:   Wound care  2 TIMES DAILY      Regular diet   Droplet plus isolation      MD Consults/Assessments & Plans:      Per IM      Assessment/Plan:       1 decubitus ulcers present on admission of lower extremity-   Continue wound care as per podiatry consult greatly appreciated       2 right heel cellulitis-continue IV cefepime and vancomycin        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  I had a long conversation with patient's family members he sustained a stroke in April 2021 and went to a nursing home and was subsequently transferred to another nursing home. Martín Ocasio feel that the patient has potential to improve to his premorbid level of functioning and were told by [facility] that they could rehab him. Sean Prasad will order a consult from [facility] as the family would like to possibly transition him therefore some intensive inpatient rehab before returning to the skilled facility.        I spoke to the family at length time of conversation was about 30 minutes   DVT Prophylaxis: Lovenox diet: ADULT DIET;  Regular   Code Status: Full Code   PT OT: Ordered      Per Podiatry   IMPRESSION/RECOMMENDATIONS:         -Decubitus ulcerations, bilateral lower extremity   -Cellulitis, right heel - improved       -Patient examined and evaluated at the bedside    -Hypertensive, otherwise VSS. No leukocytosis noted.     -X-rays reviewed, noted above   -Deferred debridement at this time.   -orders written for daily dressing changes with Santyl   -arterial duplex ordered to insure patient has adequate peripheral circulation for healing.    -Prealbumin ordered with morning labs to insure nutrition is adequate for healing   -Continue antibiotics at this time                               Wound and Skin Management GRG - Care Day 2 (1/7/2022) by Zhanna Coughlin RN       Review Status Review Entered   Completed 1/7/2022 18:48      Criteria Review      Care Day: 2 Care Date: 1/7/2022 Level of Care: Inpatient Floor    Guideline Day 3    Level Of Care    ( ) * Activity level acceptable    ( ) * Complete discharge planning    Clinical Status    (X) * Temperature status acceptable    ( ) * No infection, or status acceptable    1/7/2022 6:47 PM EST by Hilary Antony      right heel cellulitis-continue IV cefepime and vancomycin for 24 to 48 hours and reassess    (X) * Pain and nausea absent or adequately managed    ( ) * Wound and ulcer problems absent or status acceptable    1/7/2022 6:47 PM EST by Hilary Antony      decubitus ulcers present on admission of lower extremity-  Continue wound care as per podiatry consult    (X) * No burn injury, or status acceptable    (X) * Traumatic injury absent or status appropriate    (X) * Skin disease absent or status appropriate    ( ) * General Discharge Criteria met    Interventions    (X) * Intake acceptable    ( ) * No inpatient interventions needed    1/7/2022 6:47 PM EST by Hilary Antony      Podiatry consult     cefepime (MAXIPIME) 2000 mg IVPB minibag  Dose: 2,000 mg  Freq: EVERY 12 HOURS Route: IV    vancomycin 1000 mg IVPB in 250 mL D5W addavial  Dose: 1,000 mg  Freq: EVERY 12 HOURS Route: IV    * Milestone   Additional Notes   DATE: 1/7/22         Vitals: BP (!) 159/71   Pulse 71   Temp 97.8 °F (36.6 °C) (Oral)   Resp 18    SpO2 95%       Physical Exam:   Skin: Right lower extremity: Ulcer of right plantar and posterior heel with eschar 6 x 4 x 0.3 cm    Left leg ulcer of the posterior heel full-thickness with fibrotic base and 2 x 1 x 0.2 cm       Abnl/Pertinent Labs/Radiology/Diagnostic Studies:      WBC: 7.8   RBC: 3.74 (L)   Hemoglobin Quant: 10.0 (L)   Hematocrit: 29.5 (L)      Medications:   aspirin 81 mg Oral Daily       · enoxaparin 40 mg SubCUTAneous Daily   · cefepime 2,000 mg IntraVENous Q12H   · vancomycin 1,000 mg IntraVENous Q12H      collagenase ointment   Freq: 2 TIMES DAILY Route: TOP      Orders:   Regular diet   Droplet plus isolation   Wound care  2 TIMES DAILY     consult to Podiatry   Elevate affected limb         MD Consults/Assessments & Plans:      Per Podiatry   IMPRESSION/RECOMMENDATIONS:         -Decubitus ulcerations, bilateral lower extremity   -Cellulitis, right heel        -Patient examined and evaluated at the bedside    -Hypertensive, otherwise VSS. No leukocytosis noted. -X-rays reviewed, noted above   -Deferred debridement at this time.   -Dressing applied today consisting of Xeroform, gauze, Kerlix, tape. -Dressing to be applied consisting of Santyl, gauze, Kerlix, and tape   -Continue antibiotics at this time           DISPO: Decubitus ulcerations, bilateral lower extremity, with superficial infection right heel. Continue broad-spectrum IV antibiotics at this time. Dressing changes daily consisting as of above.       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  I had a long conversation with patient's family members he sustained a stroke in April 2021 and went to a nursing home and was subsequently transferred to another nursing home. Yayo Kinney feel that the patient has potential to improve to his premorbid level of functioning and were told by [facility] that they could rehab him. Abdoul Jacob will order a consult from [facility] as the family would like to possibly transition him therefore some intensive inpatient rehab before returning to the skilled facility.

## 2022-01-11 NOTE — DISCHARGE INSTR - COC
Continuity of Care Form    Patient Name: Ray Mckeon   :  1955  MRN:  [id number]    [facility] date:  2022  Discharge date:  22      Code Status Order: Full Code   Advance Directives:      Admitting Physician:  Franck Mckeon MD  PCP: Wally Young MD    Discharging Nurse: [facility] Unit/Room#: H4P-7373/0631-61  Discharging Unit Phone Number: [phone]    Emergency Contact:   Extended Emergency Contact Information  Primary Emergency Contact: CumminsBecca  Address: [address] Phone: [phone]  Mobile Phone: [phone]  Relation: Domestic Partner    Past Surgical History:  Past Surgical History:   Procedure Laterality Date    BACK SURGERY N/A     herniated disc    CORONARY ANGIOPLASTY WITH STENT PLACEMENT         Immunization History:   Immunization History   Administered Date(s) Administered    COVID-19, Pfizer, PF, 30mcg/0.3mL 2021, 2021    Influenza, High Dose (Fluzone 65 yrs and older) 2019    Influenza, High-dose, Quadv, 72 yrs +, IM (Fluzone) 10/22/2020    Pneumococcal Conjugate 13-valent (Clemon Anes) 2019    Pneumococcal Polysaccharide ([id number]) 10/22/2020    Zoster Recombinant (Shingrix) 10/22/2020, 2021       Active Problems:  Patient Active Problem List   Diagnosis Code    Chest pain R07.9    MI, acute, non ST segment elevation (HCC) I21.4    Tobacco abuse Z72.0    HTN (hypertension) I10    Acute MI ([address] I21.9    Acute CVA (cerebrovascular accident) ([address] I63.9    Right leg weakness R29.898    Acute ischemic left LEXA stroke ([facility] H20.370    Non-healing open wound of right heel S91.301A       Isolation/Infection:   Isolation            Droplet Plus          Patient Infection Status       Infection Onset Added Last Indicated Last Indicated By Review Planned Expiration Resolved Resolved By    COVID-19 22 COVID-19 22 Resolved    COVID-19 (Rule Out) 01/06/22 01/06/22 01/06/22 COVID-19 (Ordered)   01/07/22 Rule-Out Test Resulted    COVID-19 (Rule Out) 01/06/22 01/06/22 01/06/22 COVID-19, Rapid (Ordered)   01/06/22 Rule-Out Test Resulted    COVID-19 (Rule Out) 05/13/21 05/13/21 05/13/21 COVID-19, Rapid (Ordered)   05/13/21 Rule-Out Test Resulted            Nurse Assessment:  Last Vital Signs: /64   Pulse 67   Temp 98.8 °F (37.1 °C) (Oral)   Resp 18   Ht 6' 3\" (1.905 m)   Wt 187 lb 13.3 oz (85.2 kg)   SpO2 94%   BMI 23.48 kg/m²     Last documented pain score (0-10 scale): Pain Level: 0  Last Weight:   Wt Readings from Last 1 Encounters:   01/11/22 187 lb 13.3 oz (85.2 kg)     Mental Status:  oriented and alert, somewhat forgetful    IV Access:  - None    Nursing Mobility/ADLs:  Walking   Dependent  Transfer  Dependent  Bathing  Dependent  Dressing  Dependent  Toileting  Dependent  Feeding  Independent  Med Admin  Assisted  Med Delivery   whole    Wound Care Documentation and Therapy:  Wound 01/06/22 Heel Right;Left rt heel is black, draining yellow fluid, left has open area with scant bleeding and yellow discharghe. (Active)   Wound Etiology Pressure Unstageable 01/10/22 2039   Dressing Status Dry; Intact 01/10/22 2039   Wound Cleansed Cleansed with saline 01/08/22 1817   Dressing/Treatment Ace wrap;Gauze dressing/dressing sponge 01/10/22 2039   Offloading for Diabetic Foot Ulcers Other (comment) 01/10/22 2039   Dressing Change Due 01/10/22 01/10/22 0437   Wound Length (cm) 4 cm 01/08/22 1817   Wound Width (cm) 4 cm 01/08/22 1817   Wound Surface Area (cm^2) 16 cm^2 01/08/22 1817   Wound Assessment Bleeding 01/10/22 0437   Drainage Amount Small 01/10/22 0437   Drainage Description Serosanguinous 01/10/22 0437   Odor Malodorous/putrid 01/10/22 0437   Number of days: 4       Wound 01/10/22 Heel Right Dark purple/black base (Active)   Wound Etiology Pressure Unstageable 01/10/22 2039   Dressing Status Clean;Dry 01/10/22 2039   Wound Cleansed Cleansed with saline 01/10/22 1530   Offloading for Diabetic Foot Ulcers Refused 01/10/22 2039   Drainage Amount None 01/10/22 1530   Odor Mild 01/10/22 1530   Margins Epibole (rolled edges) 01/10/22 1530   Number of days: 0       Wound 01/10/22 Heel Left Red base w/ yellow slough (Active)   Wound Etiology Pressure Stage  2 01/10/22 2039   Dressing Status Clean;Dry 01/10/22 2039   Wound Cleansed Cleansed with saline 01/10/22 1530   Offloading for Diabetic Foot Ulcers Refused 01/10/22 2039   Drainage Amount Scant 01/10/22 1530   Drainage Description Yellow 01/10/22 1530   Odor None 01/10/22 1530   Margins Epibole (rolled edges) 01/10/22 1530   Number of days: 0        Elimination:  Continence: Bowel: Yes  Bladder: Yes  Urinary Catheter: None   Colostomy/Ileostomy/Ileal Conduit: No       Date of Last BM: 1/9/22    Intake/Output Summary (Last 24 hours) at 1/11/2022 1307  Last data filed at 1/10/2022 1632  Gross per 24 hour   Intake 400 ml   Output 501 ml   Net -101 ml     I/O last 3 completed shifts: In: 0 [P.O.:580; IV Piggyback:300]  Out: 1301 [Urine:1300; Stool:1]    Safety Concerns: At Risk for Falls    Impairments/Disabilities:      None    Nutrition Therapy:  Current Nutrition Therapy:   - Oral Diet:  General    Routes of Feeding: Oral  Liquids: No Restrictions  Daily Fluid Restriction: no  Last Modified Barium Swallow with Video (Video Swallowing Test): not done    Treatments at the Time of Hospital Discharge:   Respiratory Treatments: none  Oxygen Therapy:  is not on home oxygen therapy.   Ventilator:    - No ventilator support    Rehab Therapies: Physical Therapy, Occupational Therapy, and Speech/Language Therapy  Weight Bearing Status/Restrictions: No weight bearing restirctions  Other Medical Equipment (for information only, NOT a DME order):  wheelchair and hospital bed  Other Treatments:     Patient's personal belongings (please select all that are sent with patient):  Glasses    RN SIGNATURE:  Electronically signed by Fidelina Hernández RN on 1/11/22 at 3:29 PM EST    CASE MANAGEMENT/SOCIAL WORK SECTION    Inpatient Status Date: ***    Readmission Risk Assessment Score:  Readmission Risk              Risk of Unplanned Readmission:  17           Discharging to Facility/ Agency   Name: [facility]  [facility]  [address] [facility]  Phone:  [phone]  Fax:  [phone]     / signature: Electronically signed by Claudia Delgado RN on 1/11/22 at 1:07 PM EST    PHYSICIAN SECTION    Prognosis: Fair    Condition at Discharge: Stable    Rehab Potential (if transferring to Rehab): Good    Recommended Labs or Other Treatments After Discharge:     Physician Certification: I certify the above information and transfer of Timothy Spaulding  is necessary for the continuing treatment of the diagnosis listed and that he requires [facility] for less 30 days.      Update Admission H&P: Changes in H&P as follows - see dc summary    PHYSICIAN SIGNATURE:  Electronically signed by Chon Rutherford MD on 1/11/22 at 3:14 PM EST

## 2022-01-11 NOTE — PROGRESS NOTES
Department of Podiatry Progress Note        Reason for Consult: Bilateral heel wounds, worsening, unstageable 2/2 eschar  Requesting Physician:  Kristen Herrera MD    CHIEF COMPLAINT: Heel wounds    HISTORY OF PRESENT ILLNESS:                The patient is a 78 y.o. male with significant past medical history as listed below. Podiatry was consulted for bilateral heel wounds. Patient appears to be a poor historian. Per chart review, patient has had levofloxacin 750 daily x7 days at [facility]. Patient presented with worsening heel ulceration bilateral. Patient unsure of length of ulcerations. Patient unsure of dressing being applied at [facility]. Patient reports pain to bilateral heel with right heel being worst and rates it as 7/10. Patient denies fever, chills, nausea, vomiting, shortness of breath, chest pain. Patient has no other pedal complaints at this time. Patient seen at bedside this PM.     Past Medical History:        Diagnosis Date    Acute MI ([address] 09/2013    Anxiety     Arthritis     CAD (coronary artery disease)     COVID-19     Hypertension     Influenza A 01/15/2017       Past Surgical History:        Procedure Laterality Date    BACK SURGERY N/A 1989    herniated disc    CORONARY ANGIOPLASTY WITH STENT PLACEMENT  9/13       Allergies:   Patient has no known allergies. Medications:   Home Meds  No current facility-administered medications on file prior to encounter.      Current Outpatient Medications on File Prior to Encounter   Medication Sig Dispense Refill    ferrous sulfate (IRON 325) 325 (65 Fe) MG tablet Take 325 mg by mouth daily (with breakfast)      mirtazapine (REMERON) 15 MG tablet Take 15 mg by mouth nightly      pantoprazole (PROTONIX) 40 MG tablet Take 40 mg by mouth daily      mirabegron (MYRBETRIQ) 50 MG TB24 Take 50 mg by mouth daily      baclofen (LIORESAL) 20 MG tablet Take 20 mg by mouth 2 times daily      lisinopril (PRINIVIL;ZESTRIL) 20 MG tablet TAKE 1/2 TABLET BY MOUTH DAILY (Patient taking differently: 20 mg ) 45 tablet 0    hydroCHLOROthiazide (HYDRODIURIL) 25 MG tablet TAKE ONE TABLET BY MOUTH DAILY 90 tablet 0    atenolol (TENORMIN) 25 MG tablet Take 1 tablet by mouth daily 30 tablet 0    atorvastatin (LIPITOR) 80 MG tablet Take 1 tablet by mouth nightly 30 tablet 3    aspirin 81 MG chewable tablet Take 81 mg by mouth daily       sertraline (ZOLOFT) 100 MG tablet TAKE 1 AND 1/2 TABLET BY MOUTH DAILY 45 tablet 2    Multiple Vitamins-Minerals (THERAPEUTIC MULTIVITAMIN-MINERALS) tablet Take 1 tablet by mouth daily      Ascorbic Acid (VITAMIN C) 500 MG tablet Take 500 mg by mouth daily      nitroGLYCERIN (NITROSTAT) 0.4 MG SL tablet Place 1 tablet under the tongue every 5 minutes as needed for Chest pain.  25 tablet 2       Current Meds  ascorbic acid (VITAMIN C) tablet 500 mg, Daily  atenolol (TENORMIN) tablet 25 mg, Daily  atorvastatin (LIPITOR) tablet 80 mg, Nightly  trospium (SANCTURA) tablet 20 mg, Nightly  sertraline (ZOLOFT) tablet 150 mg, Daily  tamsulosin (FLOMAX) capsule 0.4 mg, Daily  nitroGLYCERIN (NITROSTAT) SL tablet 0.4 mg, Q5 Min PRN  baclofen (LIORESAL) tablet 20 mg, BID  mirtazapine (REMERON SOL-TAB) disintegrating tablet 15 mg, Nightly  [START ON 1/11/2022] lisinopril (PRINIVIL;ZESTRIL) tablet 20 mg, Daily  [START ON 1/11/2022] pantoprazole (PROTONIX) tablet 40 mg, QAM AC  vancomycin (VANCOCIN) 750 mg in dextrose 5 % 250 mL IVPB, Q12H  oxyCODONE (ROXICODONE) immediate release tablet 5 mg, Q4H PRN   Or  oxyCODONE HCl (OXY-IR) immediate release tablet 10 mg, Q4H PRN  magnesium sulfate 1000 mg in dextrose 5% 100 mL IVPB, PRN  potassium chloride (KLOR-CON M) extended release tablet 40 mEq, PRN   Or  potassium bicarb-citric acid (EFFER-K) effervescent tablet 40 mEq, PRN   Or  potassium chloride 10 mEq/100 mL IVPB (Peripheral Line), PRN  collagenase ointment, BID  aspirin EC tablet 81 mg, Daily  sodium chloride flush 0.9 % injection 5-40 mL, 2 times per day  sodium chloride flush 0.9 % injection 5-40 mL, PRN  0.9 % sodium chloride infusion, PRN  enoxaparin (LOVENOX) injection 40 mg, Daily  ondansetron (ZOFRAN-ODT) disintegrating tablet 4 mg, Q8H PRN   Or  ondansetron (ZOFRAN) injection 4 mg, Q6H PRN  polyethylene glycol (GLYCOLAX) packet 17 g, Daily PRN  acetaminophen (TYLENOL) tablet 650 mg, Q6H PRN   Or  acetaminophen (TYLENOL) suppository 650 mg, Q6H PRN  cefepime (MAXIPIME) 2000 mg IVPB minibag, Q12H  vancomycin (VANCOCIN) intermittent dosing (placeholder), RX Placeholder        Family History:   Family History   Problem Relation Age of Onset    Hypertension Mother     Hypertension Father     Heart Failure Father     Stroke Maternal Grandfather     Cancer Paternal Uncle         throat       Social History:   TOBACCO:   reports that he quit smoking about 2 years ago. His smoking use included cigarettes. He has a 20.00 pack-year smoking history. He has quit using smokeless tobacco.  ETOH:   reports current alcohol use of about 2.0 standard drinks of alcohol per week. DRUGS:   reports no history of drug use. REVIEW OF SYSTEMS:    As above. PHYSICAL EXAM:      Vitals:    /74   Pulse 64   Temp 98.4 °F (36.9 °C) (Axillary)   Resp 18   Ht 6' 3\" (1.905 m)   Wt 186 lb 4.6 oz (84.5 kg)   SpO2 95%   BMI 23.28 kg/m²     LABS:   No results for input(s): WBC, HGB, HCT, PLT in the last 72 hours. Recent Labs     01/08/22  1509 01/08/22  1509 01/10/22  1516   *   < > 132*   K 3.3*   < > 3.7   CL 95*   < > 101   CO2 21   < > 22   PHOS 3.3  --   --    BUN 17   < > 14   CREATININE 0.7*   < > 0.5*    < > = values in this interval not displayed. No results for input(s): PROT, INR, APTT in the last 72 hours. VASCULAR: CFT is brisk to the digits of the foot b/l. Skin temperature is warm to cool from proximal to distal with no focal calor noted. No edema noted. No pain with calf compression b/l.     NEUROLOGIC: Gross and epicritic sensation is diminished

## 2022-01-11 NOTE — PLAN OF CARE
Problem: Falls - Risk of:  Goal: Will remain free from falls  Description: Will remain free from falls  1/11/2022 0250 by Karly Beltran RN  Outcome: Ongoing  1/10/2022 1358 by Hugh Puente RN  Outcome: Ongoing  Goal: Absence of physical injury  Description: Absence of physical injury  1/11/2022 0250 by Karly Beltran RN  Outcome: Ongoing  1/10/2022 1358 by Hugh Puente RN  Outcome: Ongoing     Problem: Skin Integrity:  Goal: Will show no infection signs and symptoms  Description: Will show no infection signs and symptoms  1/11/2022 0250 by Karly Beltran RN  Outcome: Ongoing  1/10/2022 1358 by Hugh Puente RN  Outcome: Ongoing  Goal: Absence of new skin breakdown  Description: Absence of new skin breakdown  1/11/2022 0250 by Karly Beltran RN  Outcome: Ongoing  1/10/2022 1358 by Hugh Puente RN  Outcome: Ongoing     Problem: Pain:  Goal: Pain level will decrease  Description: Pain level will decrease  1/11/2022 0250 by Karly Beltran RN  Outcome: Ongoing  1/10/2022 1358 by Hugh Puente RN  Outcome: Ongoing  Goal: Control of acute pain  Description: Control of acute pain  1/11/2022 0250 by Karly Beltran RN  Outcome: Ongoing  1/10/2022 1358 by Hugh Puente RN  Outcome: Ongoing  Goal: Control of chronic pain  Description: Control of chronic pain  1/11/2022 0250 by Karly Beltran RN  Outcome: Ongoing  1/10/2022 1358 by Hugh Puente RN  Outcome: Ongoing     Problem: Airway Clearance - Ineffective  Goal: Achieve or maintain patent airway  1/11/2022 0250 by Karly Beltran RN  Outcome: Ongoing  1/10/2022 1358 by Hugh Puente RN  Outcome: Ongoing     Problem: Gas Exchange - Impaired  Goal: Absence of hypoxia  1/11/2022 0250 by Karly Beltran RN  Outcome: Ongoing  1/10/2022 1358 by Hugh Puente RN  Outcome: Ongoing  Goal: Promote optimal lung function  1/11/2022 0250 by Karly Beltran RN  Outcome: Ongoing  1/10/2022 1358 by Hugh Puente RN  Outcome: Ongoing     Problem: Breathing Pattern - Ineffective  Goal: Ability to achieve and maintain a regular respiratory rate  1/11/2022 0250 by Laura Roth RN  Outcome: Ongoing  1/10/2022 1358 by Nicole Gray RN  Outcome: Ongoing     Problem:  Body Temperature -  Risk of, Imbalanced  Goal: Ability to maintain a body temperature within defined limits  1/11/2022 0250 by Laura Roth RN  Outcome: Ongoing  1/10/2022 1358 by Nicole Gray RN  Outcome: Ongoing  Goal: Will regain or maintain usual level of consciousness  1/11/2022 0250 by Laura Roth RN  Outcome: Ongoing  1/10/2022 1358 by Nicole Gray RN  Outcome: Ongoing  Goal: Complications related to the disease process, condition or treatment will be avoided or minimized  1/11/2022 0250 by Laura Roth RN  Outcome: Ongoing  1/10/2022 1358 by Nicole Gray RN  Outcome: Ongoing     Problem: Isolation Precautions - Risk of Spread of Infection  Goal: Prevent transmission of infection  1/11/2022 0250 by Laura Roth RN  Outcome: Ongoing  1/10/2022 1358 by Nicole Gray RN  Outcome: Ongoing     Problem: Nutrition Deficits  Goal: Optimize nutritional status  1/11/2022 0250 by Laura Roth RN  Outcome: Ongoing  1/10/2022 1358 by Nicole Gray RN  Outcome: Ongoing     Problem: Risk for Fluid Volume Deficit  Goal: Maintain normal heart rhythm  1/11/2022 0250 by Laura Roth RN  Outcome: Ongoing  1/10/2022 1358 by Nicole Gray RN  Outcome: Ongoing  Goal: Maintain absence of muscle cramping  1/11/2022 0250 by Laura Roth RN  Outcome: Ongoing  1/10/2022 1358 by Nicole Gray RN  Outcome: Ongoing  Goal: Maintain normal serum potassium, sodium, calcium, phosphorus, and pH  1/11/2022 0250 by Laura Roth RN  Outcome: Ongoing  1/10/2022 1358 by Nicole Gray RN  Outcome: Ongoing     Problem: Loneliness or Risk for Loneliness  Goal: Demonstrate positive use of time alone when socialization is not possible  1/11/2022 0250 by Laura Roth RN  Outcome: Ongoing  1/10/2022 1358 by Rebeca Borja RN  Outcome: Ongoing     Problem: Fatigue  Goal: Verbalize increase energy and improved vitality  1/11/2022 0250 by Eva Hanson RN  Outcome: Ongoing  1/10/2022 1358 by Rebeca Borja RN  Outcome: Ongoing     Problem: Patient Education: Go to Patient Education Activity  Goal: Patient/Family Education  1/11/2022 0250 by Eva Hanson RN  Outcome: Ongoing  1/10/2022 1358 by Rebeca Borja RN  Outcome: Ongoing

## 2022-01-11 NOTE — PROGRESS NOTES
Dressings to bilateral heels/ft changed . Left one with dry open ulcerated area. Right one with black eschar and an area  That is raw and open with moderate amt yellow /bloody drainage with foul odor .  Continues to receive ATB

## 2022-01-11 NOTE — PROGRESS NOTES
Department of Podiatry Progress Note      CHIEF COMPLAINT: Heel wounds, with concurrent Covid 19    HISTORY OF PRESENT ILLNESS:                The patient is a 78 y.o. male with significant past medical history as listed below. Podiatry was consulted for bilateral heel wounds. Patient appears to be a poor historian. Per chart review, patient has had levofloxacin 750 daily x7 days at [place]. Patient presented with worsening heel ulceration bilateral. Patient unsure of length of ulcerations. Patient unsure of dressing being applied at [facility]. Patient reports pain to bilateral heel with right heel being worst and rates it as 7/10. Patient denies fever, chills, nausea, vomiting, shortness of breath, chest pain. Patient has no other pedal complaints at this time. Patient seen at bedside this PM.       Allergies:   Patient has no known allergies. Social History:   TOBACCO:   reports that he quit smoking about 2 years ago. His smoking use included cigarettes. He has a 20.00 pack-year smoking history. He has quit using smokeless tobacco.  ETOH:   reports current alcohol use of about 2.0 standard drinks of alcohol per week. DRUGS:   reports no history of drug use. REVIEW OF SYSTEMS:    As above. PHYSICAL EXAM:      Vitals:    /64   Pulse 67   Temp 98.8 °F (37.1 °C) (Oral)   Resp 18   Ht 6' 3\" (1.905 m)   Wt 187 lb 13.3 oz (85.2 kg)   SpO2 94%   BMI 23.48 kg/m²     LABS:   No results for input(s): WBC, HGB, HCT, PLT in the last 72 hours. Recent Labs     01/08/22  1509 01/10/22  1516 01/11/22  0805   *   < > 132*   K 3.3*   < > 3.4*   CL 95*   < > 97*   CO2 21   < > 23   PHOS 3.3  --   --    BUN 17   < > 13   CREATININE 0.7*   < > 0.6*    < > = values in this interval not displayed. No results for input(s): PROT, INR, APTT in the last 72 hours. VASCULAR: CFT is brisk to the digits of the foot b/l. Skin temperature is warm to cool from proximal to distal with no focal calor noted.  No edema noted. No pain with calf compression b/l. NEUROLOGIC: Gross and epicritic sensation is diminished b/l. Protective sensation is diminished at all pedal sites b/l. DERMATOLOGIC:  Right lower extremity: Full-thickness ulceration noted to right plantar and posterior heel with eschar base and slightly macerated periwound, measures approximately 6 cm x 4 cm x 0.3 cm. No malodor noted. No fluctuance, crepitus, erythema, or drainage noted. It is essentially unchaged from previous examination. Left lower extremity: Full-thickness ulceration noted to posterior heel with fibrotic base and slightly macerated periwound, measures approximately 2 cm x 1 cm x 0.2 cm. No malodor noted. No fluctuance, crepitus, erythema, or drainage noted. MUSCULOSKELETAL: Muscle strength is 5/5 for all pedal groups tested. No pain with palpation of the foot or ankle b/l. Contracture noted to right hip and knee. IMAGING:  X-ray right foot 1/6/2023  Impression   Soft tissue ulceration overlying the calcaneus, no destructive bony   abnormality       X-ray left foot 1/6/2022  Impression   No destructive bony lesion identified       IMPRESSION/RECOMMENDATIONS:      -Decubitus ulcerations, bilateral lower extremity  -Cellulitis, right heel - Resolvig      -Hypertension reesolving, VSS. No leukocytosis noted.    -X-rays reviewed, noted above  -Deferred debridement at this time.  -orders written for daily dressing changes with Santyl  -arterial duplex     Non-diagnostic study  Ultrasound images of the left lower extremity reveal severe calcific plaque   formation throughout         DISPO: Decubitus ulcerations, bilateral lower extremity, with superficial infection right heel, improved  Concur with discharge planning as patient has reached maximum medical benefit      Ursula Cat, [address] [place] [address] [address]  01/11/22  2:36 PM

## 2022-01-11 NOTE — PROGRESS NOTES
Clinical Pharmacy Note  Vancomycin Consult    Hannah Vu is a 78 y.o. male ordered Vancomycin for cellulitis; consult received from Portia Barahona CNP  to manage therapy. Also receiving cefepime. Patient Active Problem List   Diagnosis    Chest pain    MI, acute, non ST segment elevation (HCC)    Tobacco abuse    HTN (hypertension)    [facility]    Acute CVA (cerebrovascular accident) ([facility]    Right leg weakness    Acute ischemic left LEXA stroke (HCC)    Non-healing open wound of right heel       Allergies:  Patient has no known allergies. Temp max:  Temp (24hrs), Av.2 °F (36.8 °C), Min:97.4 °F (36.3 °C), Max:98.8 °F (37.1 °C)      No results for input(s): WBC in the last 72 hours. Recent Labs     22  1509 01/10/22  1516 22  0805   BUN 17 14 13   CREATININE 0.7* 0.5* 0.6*         Intake/Output Summary (Last 24 hours) at 2022 1319  Last data filed at 1/10/2022 1632  Gross per 24 hour   Intake 400 ml   Output 501 ml   Net -101 ml         Ht Readings from Last 1 Encounters:   22 6' 3\" (1.905 m)        Wt Readings from Last 1 Encounters:   22 187 lb 13.3 oz (85.2 kg)         Estimated Creatinine Clearance: 143 mL/min (A) (based on SCr of 0.6 mg/dL (L)). Assessment/Plan:    Vancomycin level today = 16.5 mg/L  Serum creatinine stable   No surgical plans per Podiatry. Plan would be to keep trough in the 12-15 range. Adjust Vancomycin to 1250 mg IV every 18 hours. Vancomycin level and serum creatinine ordered for 22 at 0000. Dosing calculations as follows:  Loading dose: N/A  Regimen: 1250 mg IV every 18 hours. Start time: 13:11 on 2022  Exposure target: AUC24 (range)400-600 mg/L.hr   AUC24,ss: 426 mg/L.hr  Probability of AUC24 > 400: 69 %  Ctrough,ss: 12.9 mg/L  Probability of Ctrough,ss > 20: 1 %  Probability of nephrotoxicity (Lodise NERY ): 8 %  or the consult.        Thank you   Zunilda Caceres [facility]  2022  1:19 PM

## 2022-01-11 NOTE — PROGRESS NOTES
Arterial dopplers reviewed  Case d/w Dr Tonia Laughlin  At this point no plan for surgical intervention for ulcers  He suggested vascular consult for pts PVD  Case d/w Dr Godwin Yao he will review studies at get back to me shortly    Electronically signed by Frank Alonso MD on 1/11/2022 at 1:10 PM

## 2022-01-11 NOTE — CARE COORDINATION
CASE MANAGEMENT DISCHARGE SUMMARY:    DISCHARGE DATE: 01/11/22    Discharging to Facility/ Agency   · [facility]  [facility]  · Phone:  [phone]  · Fax:  [phone]      Covid test date: 1/6/22   Results: positive    TRANSPORTATION: [facility]             TIME: [address] (Domestic Partner) on pending dc & pickup time.     Kirstin Villagran RN, BSN, Case Management  [phone]    Electronically signed by Kirstin Villagran RN on 1/11/2022 at 1:05 PM

## 2022-01-11 NOTE — DISCHARGE SUMMARY
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 Hospital Course: Patient is a 70-year-old male who was admitted to the hospital from a nursing home in [place] with leg wounds. Patient was found to have cellulitis along with decubitus ulcers of the heels. Cellulitis was primarily in the right heel. Patient was seen by Dr. Amrit Christy from podiatry surgery  Patient received application of dressings including Xeroform gauze Curlex along with antibiotics. Daily dressing changes. Vascular work-up did show some peripheral vascular disease. Patient's antiplatelet therapy was transition from aspirin to full dose Plavix along with high-dose statin given the findings of peripheral vascular disease  He was not felt to be a candidate for inpatient rehab and will return to his skilled nursing facility for rehabilitative restorative care  He can follow-up with Dr. Tee Lake as an outpatient for vascular issues. Physical Exam Performed:     BP (!) 104/55   Pulse 65   Temp 98 °F (36.7 °C) (Oral)   Resp 18   Ht 6' 3\" (1.905 m)   Wt 187 lb 13.3 oz (85.2 kg)   SpO2 95%   BMI 23.48 kg/m²     General appearance:   HEENT: Pupils equal, round, and reactive to light. Conjunctivae/corneas clear. Neck: Supple, with full range of motion. No jugular venous distention. Trachea midline. Respiratory:  Normal respiratory effort. Clear to auscultation, bilaterally without Rales/Wheezes/Rhonchi. Cardiovascular: Regular rate and rhythm with normal S1/S2 without murmurs, rubs or gallops. Abdomen: Soft, non-tender, non-distended with normal bowel sounds. Musculoskeletal: No clubbing, cyanosis or edema bilaterally. Full range of motion without deformity. Skin: Right lower extremity: Ulcer of right plantar and posterior heel with eschar 6 x 4 x 0.3 cm   Left leg ulcer of the posterior heel full-thickness with fibrotic base and 2 x 1 x 0.2 cm      neurologic:  R sided weakness and facial droop. Psychiatric: Alert and oriented, thought content appropriate, normal insight  Capillary Refill: Brisk,3 seconds, normal   Peripheral Pulses: +2 palpable, equal bilaterally     Labs:  For convenience and continuity at follow-up the following most recent labs are provided:      CBC:    Lab Results   Component Value Date    WBC 7.8 01/07/2022    HGB 10.0 01/07/2022    HCT 29.5 01/07/2022     01/07/2022       Renal:    Lab Results   Component Value Date     01/11/2022    K 3.4 01/11/2022    K 3.2 01/07/2022    CL 97 01/11/2022    CO2 23 01/11/2022    BUN 13 01/11/2022    CREATININE 0.6 01/11/2022    CALCIUM 9.5 01/11/2022    PHOS 3.3 01/08/2022         Significant Diagnostic Studies    Radiology:   VL DUP LOWER EXTREMITY ARTERIES BILATERAL   Final Result      XR FOOT RIGHT (MIN 3 VIEWS)   Final Result   Soft tissue ulceration overlying the calcaneus, no destructive bony   abnormality         XR FOOT LEFT (MIN 3 VIEWS)   Final Result   No destructive bony lesion identified                Consults:     IP CONSULT TO PODIATRY  PHARMACY TO DOSE VANCOMYCIN  PHARMACY TO DOSE VANCOMYCIN  IP CONSULT TO PODIATRY    Disposition: Skilled nursing facility    Condition at Discharge: Stable    Discharge Instructions/Follow-up:  Reinaldo Mike MD      Code Status:  Full Code     Activity: activity as tolerated    Diet: cardiac diet      Discharge Medications:     Discharge Medication List as of 1/11/2022  3:50 PM           Details   collagenase 250 UNIT/GM ointment Apply topically daily. , Disp-90 g, R-0, Normal      clindamycin (CLEOCIN) 300 MG capsule Take 1 capsule by mouth 2 times daily for 7 days, Disp-14 capsule, R-0Normal              Details   clopidogrel (PLAVIX) 75 MG tablet Take 1 tablet by mouth daily, Disp-30 tablet, R-3Normal              Details   ferrous sulfate (IRON 325) 325 (65 Fe) MG tablet Take 325 mg by mouth daily (with breakfast)Historical Med      mirtazapine (REMERON) 15 MG tablet Take 15 mg by mouth nightlyHistorical Med      pantoprazole (PROTONIX) 40 MG tablet Take 40 mg by mouth dailyHistorical Med      mirabegron (MYRBETRIQ) 50 MG TB24 Take 50 mg by mouth dailyHistorical Med      baclofen (LIORESAL) 20 MG tablet Take 20 mg by mouth 2 times dailyHistorical Med      lisinopril (PRINIVIL;ZESTRIL) 20 MG tablet TAKE 1/2 TABLET BY MOUTH DAILY, Disp-45 tablet, R-0Normal      hydroCHLOROthiazide (HYDRODIURIL) 25 MG tablet TAKE ONE TABLET BY MOUTH DAILY, Disp-90 tablet, R-0Normal      atenolol (TENORMIN) 25 MG tablet Take 1 tablet by mouth daily, Disp-30 tablet, R-0DC to SNF      atorvastatin (LIPITOR) 80 MG tablet Take 1 tablet by mouth nightly, Disp-30 tablet, R-3NO PRINT      sertraline (ZOLOFT) 100 MG tablet TAKE 1 AND 1/2 TABLET BY MOUTH DAILY, Disp-45 tablet, R-2Normal      Multiple Vitamins-Minerals (THERAPEUTIC MULTIVITAMIN-MINERALS) tablet Take 1 tablet by mouth dailyHistorical Med      Ascorbic Acid (VITAMIN C) 500 MG tablet Take 500 mg by mouth dailyHistorical Med      nitroGLYCERIN (NITROSTAT) 0.4 MG SL tablet Place 1 tablet under the tongue every 5 minutes as needed for Chest pain., Disp-25 tablet, R-2             Time Spent on discharge is more than 45 minutes in the examination, evaluation, counseling and review of medications and discharge plan. Signed:    Likn Box MD   1/11/2022      Thank you Gonzalo Barrett MD for the opportunity to be involved in this patient's care. If you have any questions or concerns please feel free to contact me at [phone].

## 2022-01-12 NOTE — PROGRESS NOTES
Confirmed with Dr. Clair Kruger and Dr. Hanna Berumen' office that pt okay to MD. Pt refused wound care for R heel this afternoon. Pt also repeatedly refused offloading boots and repositioning throughout shift. RUSSELL completed by fellow RN; AVS printed with follow-ups per Dr. Clair Kruger. Pt IV removed, tip intact, pressure held. Not on tele. Stretcher transport arrived, pt transferred safely. Pt sent with offloading boots, positioning wedge, and green sweatshirt in bag.

## 2022-01-13 ENCOUNTER — TELEPHONE (OUTPATIENT)
Dept: ORTHOPEDIC SURGERY | Age: 67
End: 2022-01-13

## 2022-02-03 ENCOUNTER — APPOINTMENT (OUTPATIENT)
Dept: MRI IMAGING | Age: 67
DRG: 500 | End: 2022-02-03
Payer: MEDICARE

## 2022-02-03 ENCOUNTER — APPOINTMENT (OUTPATIENT)
Dept: GENERAL RADIOLOGY | Age: 67
DRG: 500 | End: 2022-02-03
Payer: MEDICARE

## 2022-02-03 ENCOUNTER — HOSPITAL ENCOUNTER (INPATIENT)
Age: 67
LOS: 8 days | Discharge: NURSING, REHAB OR LONG-TERM CARE | DRG: 500 | End: 2022-02-11
Attending: EMERGENCY MEDICINE | Admitting: INTERNAL MEDICINE
Payer: MEDICARE

## 2022-02-03 ENCOUNTER — HOSPITAL ENCOUNTER (OUTPATIENT)
Dept: WOUND CARE | Age: 67
Discharge: HOME | End: 2022-02-03

## 2022-02-03 DIAGNOSIS — T14.8XXA WOUND INFECTION: Primary | ICD-10-CM

## 2022-02-03 DIAGNOSIS — M86.00 ACUTE HEMATOGENOUS OSTEOMYELITIS, UNSPECIFIED SITE (HCC): ICD-10-CM

## 2022-02-03 DIAGNOSIS — L08.9 WOUND INFECTION: Primary | ICD-10-CM

## 2022-02-03 PROBLEM — I25.10 CAD (CORONARY ARTERY DISEASE): Status: ACTIVE | Noted: 2022-02-03

## 2022-02-03 PROBLEM — E78.5 HYPERLIPIDEMIA: Status: ACTIVE | Noted: 2022-02-03

## 2022-02-03 PROBLEM — L03.116 CELLULITIS OF LEFT HEEL: Status: ACTIVE | Noted: 2022-02-03

## 2022-02-03 LAB
ANION GAP SERPL CALCULATED.3IONS-SCNC: 12 MMOL/L (ref 3–16)
ANION GAP SERPL CALCULATED.3IONS-SCNC: 14 MMOL/L (ref 3–16)
BASOPHILS ABSOLUTE: 0.1 K/UL (ref 0–0.2)
BASOPHILS ABSOLUTE: 0.1 K/UL (ref 0–0.2)
BASOPHILS RELATIVE PERCENT: 0.8 %
BASOPHILS RELATIVE PERCENT: 1.4 %
BUN BLDV-MCNC: 30 MG/DL (ref 7–20)
BUN BLDV-MCNC: 33 MG/DL (ref 7–20)
CALCIUM SERPL-MCNC: 9 MG/DL (ref 8.3–10.6)
CALCIUM SERPL-MCNC: 9.5 MG/DL (ref 8.3–10.6)
CHLORIDE BLD-SCNC: 103 MMOL/L (ref 99–110)
CHLORIDE BLD-SCNC: 98 MMOL/L (ref 99–110)
CO2: 23 MMOL/L (ref 21–32)
CO2: 26 MMOL/L (ref 21–32)
CREAT SERPL-MCNC: 0.8 MG/DL (ref 0.8–1.3)
CREAT SERPL-MCNC: 0.9 MG/DL (ref 0.8–1.3)
EOSINOPHILS ABSOLUTE: 0.1 K/UL (ref 0–0.6)
EOSINOPHILS ABSOLUTE: 0.3 K/UL (ref 0–0.6)
EOSINOPHILS RELATIVE PERCENT: 1.6 %
EOSINOPHILS RELATIVE PERCENT: 2.9 %
GFR AFRICAN AMERICAN: >60
GFR AFRICAN AMERICAN: >60
GFR NON-AFRICAN AMERICAN: >60
GFR NON-AFRICAN AMERICAN: >60
GLUCOSE BLD-MCNC: 106 MG/DL (ref 70–99)
GLUCOSE BLD-MCNC: 119 MG/DL (ref 70–99)
HCT VFR BLD CALC: 27.2 % (ref 40.5–52.5)
HCT VFR BLD CALC: 28.4 % (ref 40.5–52.5)
HEMOGLOBIN: 8.9 G/DL (ref 13.5–17.5)
HEMOGLOBIN: 9.5 G/DL (ref 13.5–17.5)
LACTIC ACID: 0.9 MMOL/L (ref 0.4–2)
LYMPHOCYTES ABSOLUTE: 0.8 K/UL (ref 1–5.1)
LYMPHOCYTES ABSOLUTE: 2.1 K/UL (ref 1–5.1)
LYMPHOCYTES RELATIVE PERCENT: 20.4 %
LYMPHOCYTES RELATIVE PERCENT: 8.8 %
MCH RBC QN AUTO: 26.5 PG (ref 26–34)
MCH RBC QN AUTO: 27.2 PG (ref 26–34)
MCHC RBC AUTO-ENTMCNC: 32.6 G/DL (ref 31–36)
MCHC RBC AUTO-ENTMCNC: 33.5 G/DL (ref 31–36)
MCV RBC AUTO: 81 FL (ref 80–100)
MCV RBC AUTO: 81.4 FL (ref 80–100)
MONOCYTES ABSOLUTE: 0.1 K/UL (ref 0–1.3)
MONOCYTES ABSOLUTE: 0.7 K/UL (ref 0–1.3)
MONOCYTES RELATIVE PERCENT: 1.4 %
MONOCYTES RELATIVE PERCENT: 6.8 %
NEUTROPHILS ABSOLUTE: 7.3 K/UL (ref 1.7–7.7)
NEUTROPHILS ABSOLUTE: 7.8 K/UL (ref 1.7–7.7)
NEUTROPHILS RELATIVE PERCENT: 69.1 %
NEUTROPHILS RELATIVE PERCENT: 86.8 %
PDW BLD-RTO: 17.6 % (ref 12.4–15.4)
PDW BLD-RTO: 17.7 % (ref 12.4–15.4)
PLATELET # BLD: 280 K/UL (ref 135–450)
PLATELET # BLD: 298 K/UL (ref 135–450)
PMV BLD AUTO: 6.3 FL (ref 5–10.5)
PMV BLD AUTO: 6.7 FL (ref 5–10.5)
POTASSIUM REFLEX MAGNESIUM: 4.1 MMOL/L (ref 3.5–5.1)
POTASSIUM REFLEX MAGNESIUM: 4.6 MMOL/L (ref 3.5–5.1)
RBC # BLD: 3.34 M/UL (ref 4.2–5.9)
RBC # BLD: 3.51 M/UL (ref 4.2–5.9)
SODIUM BLD-SCNC: 136 MMOL/L (ref 136–145)
SODIUM BLD-SCNC: 140 MMOL/L (ref 136–145)
WBC # BLD: 10.5 K/UL (ref 4–11)
WBC # BLD: 8.9 K/UL (ref 4–11)

## 2022-02-03 PROCEDURE — 99284 EMERGENCY DEPT VISIT MOD MDM: CPT

## 2022-02-03 PROCEDURE — 87076 CULTURE ANAEROBE IDENT EACH: CPT

## 2022-02-03 PROCEDURE — 87186 SC STD MICRODIL/AGAR DIL: CPT

## 2022-02-03 PROCEDURE — 96375 TX/PRO/DX INJ NEW DRUG ADDON: CPT

## 2022-02-03 PROCEDURE — 80048 BASIC METABOLIC PNL TOTAL CA: CPT

## 2022-02-03 PROCEDURE — 94761 N-INVAS EAR/PLS OXIMETRY MLT: CPT

## 2022-02-03 PROCEDURE — 87185 SC STD ENZYME DETCJ PER NZM: CPT

## 2022-02-03 PROCEDURE — 73630 X-RAY EXAM OF FOOT: CPT

## 2022-02-03 PROCEDURE — 6370000000 HC RX 637 (ALT 250 FOR IP): Performed by: INTERNAL MEDICINE

## 2022-02-03 PROCEDURE — 6360000002 HC RX W HCPCS: Performed by: EMERGENCY MEDICINE

## 2022-02-03 PROCEDURE — 96365 THER/PROPH/DIAG IV INF INIT: CPT

## 2022-02-03 PROCEDURE — 2580000003 HC RX 258: Performed by: INTERNAL MEDICINE

## 2022-02-03 PROCEDURE — 1200000000 HC SEMI PRIVATE

## 2022-02-03 PROCEDURE — 87150 DNA/RNA AMPLIFIED PROBE: CPT

## 2022-02-03 PROCEDURE — 83605 ASSAY OF LACTIC ACID: CPT

## 2022-02-03 PROCEDURE — 2580000003 HC RX 258: Performed by: EMERGENCY MEDICINE

## 2022-02-03 PROCEDURE — 85025 COMPLETE CBC W/AUTO DIFF WBC: CPT

## 2022-02-03 PROCEDURE — 36415 COLL VENOUS BLD VENIPUNCTURE: CPT

## 2022-02-03 PROCEDURE — 6360000002 HC RX W HCPCS: Performed by: INTERNAL MEDICINE

## 2022-02-03 PROCEDURE — 87040 BLOOD CULTURE FOR BACTERIA: CPT

## 2022-02-03 RX ORDER — CLOPIDOGREL BISULFATE 75 MG/1
75 TABLET ORAL DAILY
Status: DISCONTINUED | OUTPATIENT
Start: 2022-02-03 | End: 2022-02-11 | Stop reason: HOSPADM

## 2022-02-03 RX ORDER — ACETAMINOPHEN 650 MG/1
650 SUPPOSITORY RECTAL EVERY 4 HOURS PRN
Status: DISCONTINUED | OUTPATIENT
Start: 2022-02-03 | End: 2022-02-11 | Stop reason: HOSPADM

## 2022-02-03 RX ORDER — SENNA PLUS 8.6 MG/1
1 TABLET ORAL DAILY
COMMUNITY

## 2022-02-03 RX ORDER — SODIUM CHLORIDE 0.9 % (FLUSH) 0.9 %
10 SYRINGE (ML) INJECTION EVERY 12 HOURS SCHEDULED
Status: DISCONTINUED | OUTPATIENT
Start: 2022-02-03 | End: 2022-02-11 | Stop reason: HOSPADM

## 2022-02-03 RX ORDER — ACETAMINOPHEN 325 MG/1
650 TABLET ORAL EVERY 4 HOURS PRN
Status: DISCONTINUED | OUTPATIENT
Start: 2022-02-03 | End: 2022-02-11 | Stop reason: HOSPADM

## 2022-02-03 RX ORDER — ONDANSETRON 2 MG/ML
4 INJECTION INTRAMUSCULAR; INTRAVENOUS EVERY 4 HOURS PRN
Status: DISCONTINUED | OUTPATIENT
Start: 2022-02-03 | End: 2022-02-11 | Stop reason: HOSPADM

## 2022-02-03 RX ORDER — HYDROXYZINE HYDROCHLORIDE 25 MG/1
25 TABLET, FILM COATED ORAL EVERY 6 HOURS PRN
Status: ON HOLD | COMMUNITY
End: 2022-02-10 | Stop reason: HOSPADM

## 2022-02-03 RX ORDER — ATENOLOL 25 MG/1
25 TABLET ORAL DAILY
Status: DISCONTINUED | OUTPATIENT
Start: 2022-02-03 | End: 2022-02-11 | Stop reason: HOSPADM

## 2022-02-03 RX ORDER — ATORVASTATIN CALCIUM 80 MG/1
80 TABLET, FILM COATED ORAL NIGHTLY
Status: DISCONTINUED | OUTPATIENT
Start: 2022-02-03 | End: 2022-02-11 | Stop reason: HOSPADM

## 2022-02-03 RX ORDER — HYDROCHLOROTHIAZIDE 25 MG/1
25 TABLET ORAL DAILY
Status: DISCONTINUED | OUTPATIENT
Start: 2022-02-03 | End: 2022-02-11 | Stop reason: HOSPADM

## 2022-02-03 RX ORDER — SODIUM CHLORIDE 9 MG/ML
25 INJECTION, SOLUTION INTRAVENOUS PRN
Status: DISCONTINUED | OUTPATIENT
Start: 2022-02-03 | End: 2022-02-11 | Stop reason: HOSPADM

## 2022-02-03 RX ORDER — POLYETHYLENE GLYCOL 3350 17 G/17G
17 POWDER, FOR SOLUTION ORAL DAILY PRN
Status: DISCONTINUED | OUTPATIENT
Start: 2022-02-03 | End: 2022-02-11 | Stop reason: HOSPADM

## 2022-02-03 RX ORDER — 0.9 % SODIUM CHLORIDE 0.9 %
1000 INTRAVENOUS SOLUTION INTRAVENOUS ONCE
Status: COMPLETED | OUTPATIENT
Start: 2022-02-03 | End: 2022-02-03

## 2022-02-03 RX ORDER — BACLOFEN 10 MG/1
20 TABLET ORAL 2 TIMES DAILY
Status: DISCONTINUED | OUTPATIENT
Start: 2022-02-03 | End: 2022-02-11 | Stop reason: HOSPADM

## 2022-02-03 RX ORDER — BUSPIRONE HYDROCHLORIDE 5 MG/1
5 TABLET ORAL 2 TIMES DAILY
COMMUNITY

## 2022-02-03 RX ORDER — CLINDAMYCIN HYDROCHLORIDE 300 MG/1
300 CAPSULE ORAL 2 TIMES DAILY
Status: ON HOLD | COMMUNITY
Start: 2022-02-01 | End: 2022-02-10 | Stop reason: HOSPADM

## 2022-02-03 RX ORDER — TROSPIUM CHLORIDE 20 MG/1
20 TABLET, FILM COATED ORAL
Status: DISCONTINUED | OUTPATIENT
Start: 2022-02-03 | End: 2022-02-11 | Stop reason: HOSPADM

## 2022-02-03 RX ORDER — LISINOPRIL 20 MG/1
20 TABLET ORAL DAILY
Status: DISCONTINUED | OUTPATIENT
Start: 2022-02-03 | End: 2022-02-11 | Stop reason: HOSPADM

## 2022-02-03 RX ORDER — MIRTAZAPINE 15 MG/1
15 TABLET, FILM COATED ORAL NIGHTLY
Status: DISCONTINUED | OUTPATIENT
Start: 2022-02-03 | End: 2022-02-11 | Stop reason: HOSPADM

## 2022-02-03 RX ORDER — SODIUM CHLORIDE 0.9 % (FLUSH) 0.9 %
10 SYRINGE (ML) INJECTION PRN
Status: DISCONTINUED | OUTPATIENT
Start: 2022-02-03 | End: 2022-02-11 | Stop reason: HOSPADM

## 2022-02-03 RX ORDER — MORPHINE SULFATE 2 MG/ML
2 INJECTION, SOLUTION INTRAMUSCULAR; INTRAVENOUS EVERY 4 HOURS PRN
Status: DISCONTINUED | OUTPATIENT
Start: 2022-02-03 | End: 2022-02-11 | Stop reason: HOSPADM

## 2022-02-03 RX ORDER — PANTOPRAZOLE SODIUM 40 MG/1
40 TABLET, DELAYED RELEASE ORAL DAILY
Status: DISCONTINUED | OUTPATIENT
Start: 2022-02-03 | End: 2022-02-11 | Stop reason: HOSPADM

## 2022-02-03 RX ADMIN — ATORVASTATIN CALCIUM 80 MG: 80 TABLET, FILM COATED ORAL at 22:08

## 2022-02-03 RX ADMIN — ATENOLOL 25 MG: 25 TABLET ORAL at 09:47

## 2022-02-03 RX ADMIN — MIRTAZAPINE 15 MG: 15 TABLET, FILM COATED ORAL at 22:08

## 2022-02-03 RX ADMIN — CEFEPIME HYDROCHLORIDE 2000 MG: 2 INJECTION, POWDER, FOR SOLUTION INTRAVENOUS at 14:12

## 2022-02-03 RX ADMIN — TROSPIUM CHLORIDE 20 MG: 20 TABLET, FILM COATED ORAL at 15:16

## 2022-02-03 RX ADMIN — CLOPIDOGREL BISULFATE 75 MG: 75 TABLET ORAL at 09:46

## 2022-02-03 RX ADMIN — SERTRALINE 150 MG: 100 TABLET, FILM COATED ORAL at 09:47

## 2022-02-03 RX ADMIN — SODIUM CHLORIDE, PRESERVATIVE FREE 10 ML: 5 INJECTION INTRAVENOUS at 09:45

## 2022-02-03 RX ADMIN — TROSPIUM CHLORIDE 20 MG: 20 TABLET, FILM COATED ORAL at 09:46

## 2022-02-03 RX ADMIN — BACLOFEN 20 MG: 10 TABLET ORAL at 22:08

## 2022-02-03 RX ADMIN — MORPHINE SULFATE 2 MG: 2 INJECTION, SOLUTION INTRAMUSCULAR; INTRAVENOUS at 14:08

## 2022-02-03 RX ADMIN — SODIUM CHLORIDE 1000 ML: 9 INJECTION, SOLUTION INTRAVENOUS at 02:05

## 2022-02-03 RX ADMIN — HYDROCHLOROTHIAZIDE 25 MG: 25 TABLET ORAL at 09:46

## 2022-02-03 RX ADMIN — SODIUM CHLORIDE, PRESERVATIVE FREE 10 ML: 5 INJECTION INTRAVENOUS at 22:12

## 2022-02-03 RX ADMIN — VANCOMYCIN HYDROCHLORIDE 1000 MG: 1 INJECTION, POWDER, LYOPHILIZED, FOR SOLUTION INTRAVENOUS at 02:49

## 2022-02-03 RX ADMIN — PANTOPRAZOLE SODIUM 40 MG: 40 TABLET, DELAYED RELEASE ORAL at 09:46

## 2022-02-03 RX ADMIN — CEFEPIME HYDROCHLORIDE 2000 MG: 2 INJECTION, POWDER, FOR SOLUTION INTRAVENOUS at 02:10

## 2022-02-03 RX ADMIN — LISINOPRIL 20 MG: 20 TABLET ORAL at 09:47

## 2022-02-03 RX ADMIN — BACLOFEN 20 MG: 10 TABLET ORAL at 09:47

## 2022-02-03 RX ADMIN — VANCOMYCIN HYDROCHLORIDE 750 MG: 750 INJECTION, POWDER, LYOPHILIZED, FOR SOLUTION INTRAVENOUS at 15:17

## 2022-02-03 ASSESSMENT — PAIN DESCRIPTION - DESCRIPTORS: DESCRIPTORS: SHARP

## 2022-02-03 ASSESSMENT — PAIN DESCRIPTION - FREQUENCY: FREQUENCY: INTERMITTENT

## 2022-02-03 ASSESSMENT — PAIN DESCRIPTION - ORIENTATION: ORIENTATION: RIGHT

## 2022-02-03 ASSESSMENT — PAIN DESCRIPTION - PAIN TYPE: TYPE: ACUTE PAIN

## 2022-02-03 ASSESSMENT — PAIN SCALES - GENERAL
PAINLEVEL_OUTOF10: 4
PAINLEVEL_OUTOF10: 8

## 2022-02-03 ASSESSMENT — PAIN DESCRIPTION - LOCATION: LOCATION: ARM;FOOT;LEG

## 2022-02-03 NOTE — PROGRESS NOTES
Pharmacy Medication Reconciliation Note     List of medications patient is currently taking is complete. Source of information:   1. Med list reviewed from Pending sale to [facility]     No Known Allergies    Notes regarding home medications:   1. Added Buspar 5 mg BID, Cleocin 300 mg BID x 7 days and Hydroxyzine  2.   Santyl applied to right heel  Daily     LEXIS Paige [facility]  2/3/2022  10:27 AM

## 2022-02-03 NOTE — H&P
Hospital Medicine  History and Physical    PCP: Jamal Fuentes MD  Patient Name: Martha Irwin    Date of Service: Pt seen/examined on 2/3/22 and admitted to Inpatient with expected LOS greater than two midnights due to medical therapy    CHIEF COMPLAINT:  Pt c/o a worsening infection of the heel of his right foot  HISTORY OF PRESENT ILLNESS: Pt is an 78y.o. year-old male with a history of hypertension, hyperlipidemia, coronary artery disease and a nonhealing open wound of his right heel. He is currently staying in a nursing home and they noted that the right heel wound has become malodorous and black. The patient noted that the area is painful, but only when moved. In the emergency room it was noted that his right foot is larger than his left foot. It is malodorous and thought to be infected. He is being admitted for further evaluation and treatment. Associated signs and symptoms do not include fever or chills, nausea, vomiting, abdominal pain, hemoptysis, hematochezia, diarrhea, constipation or urinary symptoms. Past Medical History:        Diagnosis Date    Acute MI ([address] 09/2013    Anxiety     Arthritis     CAD (coronary artery disease)     COVID-19     Hyperlipidemia 2/3/2022    Hypertension     Influenza A 01/15/2017       Past Surgical History:        Procedure Laterality Date    BACK SURGERY N/A 1989    herniated disc    CORONARY ANGIOPLASTY WITH STENT PLACEMENT  9/13       Allergies:  Patient has no known allergies. Medications Prior to Admission:    Prior to Admission medications    Medication Sig Start Date End Date Taking?  Authorizing Provider   clopidogrel (PLAVIX) 75 MG tablet Take 1 tablet by mouth daily 1/11/22   Frank Alonso MD   ferrous sulfate (IRON 325) 325 (65 Fe) MG tablet Take 325 mg by mouth daily (with breakfast)    Historical Provider, MD   mirtazapine (REMERON) 15 MG tablet Take 15 mg by mouth nightly    Historical Provider, MD   pantoprazole (PROTONIX) 40 MG tablet Take 40 mg by mouth daily    Historical Provider, MD   mirabegron (MYRBETRIQ) 50 MG TB24 Take 50 mg by mouth daily    Historical Provider, MD   baclofen (LIORESAL) 20 MG tablet Take 20 mg by mouth 2 times daily    Historical Provider, MD   lisinopril (PRINIVIL;ZESTRIL) 20 MG tablet TAKE 1/2 TABLET BY MOUTH DAILY  Patient taking differently: 20 mg  6/14/21   Kalyan Mcnally MD   hydroCHLOROthiazide (HYDRODIURIL) 25 MG tablet TAKE ONE TABLET BY MOUTH DAILY 5/17/21   Kalyan Mcnally MD   atenolol (TENORMIN) 25 MG tablet Take 1 tablet by mouth daily 5/12/21   Travis Fletcher MD   atorvastatin (LIPITOR) 80 MG tablet Take 1 tablet by mouth nightly 4/23/21   Cheri Nelson MD   sertraline (ZOLOFT) 100 MG tablet TAKE 1 AND 1/2 TABLET BY MOUTH DAILY 4/12/21   Kalyan Mcnally MD   Multiple Vitamins-Minerals (THERAPEUTIC MULTIVITAMIN-MINERALS) tablet Take 1 tablet by mouth daily    Historical Provider, MD   Ascorbic Acid (VITAMIN C) 500 MG tablet Take 500 mg by mouth daily    Historical Provider, MD   nitroGLYCERIN (NITROSTAT) 0.4 MG SL tablet Place 1 tablet under the tongue every 5 minutes as needed for Chest pain. 9/11/13   MARSHALL Yang - CNP       Family History:       Problem Relation Age of Onset    Hypertension Mother     Hypertension Father     Heart Failure Father     Stroke Maternal Grandfather     Cancer Paternal Uncle         throat     Social History:   TOBACCO:   reports that he quit smoking about 3 years ago. His smoking use included cigarettes. He has a 20.00 pack-year smoking history. He has quit using smokeless tobacco.  ETOH:   reports current alcohol use of about 2.0 standard drinks of alcohol per week.   OCCUPATION:      REVIEW OF SYSTEMS:  A full review of systems was performed and is negative except for that which appears in the HPI    Physical Exam:    Vitals: BP (!) 165/62   Pulse 84   Temp 98.1 °F (36.7 °C) (Oral)   Resp 18   Wt 181 lb (82.1 kg)   SpO2 99%   BMI 22.62 kg/m² General appearance: Chronically ill appearing 78y.o. year-old male who is alert, appears stated age and is cooperative  HEENT: Head: Normocephalic, no lesions, without obvious abnormality. Eye: Normal external eye, conjunctiva, lids cornea, PEERL. Ears: Normal external ears. Non-tender. Nose: Normal external nose, mucus membranes and septum. Pharynx: Dental Hygiene adequate. Normal buccal mucosa. Normal pharynx. Neck: no adenopathy, no carotid bruit, no JVD, supple, symmetrical, trachea midline and thyroid not enlarged, symmetric, no tenderness/mass/nodules  Lungs: clear to auscultation bilaterally and no use of accessory muscles  Heart: regular rate and rhythm, S1, S2 normal, no murmur, click, rub or gallop and normal apical impulse  Abdomen: soft, non-tender; bowel sounds normal; no masses, no organomegaly  Extremities: Right foot is swollen. There is black eschar at the right heel. His left heel appears to have a mild infection. Capillary Refill: Acceptable < 3 seconds   Peripheral Pulses: +3 easily felt, not easily obliterated with pressures   Skin: Skin color, texture, turgor normal. No rashes or lesions on exposed skin  Neurologic: Neurovascularly intact without any focal sensory/motor deficits. Cranial nerves: II-XII intact, grossly non-focal. Gait was not tested. Mental Status: Alert and oriented, thought content appropriate, normal insight    CBC:   Recent Labs     02/03/22  0139   WBC 10.5   HGB 9.5*        BMP:    Recent Labs     02/03/22  0139      K 4.1   CL 98*   CO2 26   BUN 33*   CREATININE 0.9   GLUCOSE 119*     Troponin: No results for input(s): TROPONINI in the last 72 hours.   PT/INR:  No results found for: PTINR  U/A:    Lab Results   Component Value Date    LEUKOCYTESUR Negative 05/28/2021    NITRITE NEG 01/16/2020    RBCUA 1 04/26/2021    SPECGRAV 1.021 05/28/2021    UROBILINOGEN 2.0 05/28/2021    BILIRUBINUR Negative 05/28/2021    BILIRUBINUR NEG 01/16/2020    BLOODU Negative 05/28/2021    GLUCOSEU Negative 05/28/2021    PROTEINU Negative 05/28/2021         RAD:   I have independently reviewed and interpreted the imaging studies below and based my recommendations to the patient on those findings. XR FOOT LEFT (MIN 3 VIEWS)    Result Date: 1/6/2022  EXAMINATION: THREE XRAY VIEWS OF THE LEFT FOOT 1/6/2022 6:04 pm COMPARISON: None. HISTORY: ORDERING SYSTEM PROVIDED HISTORY: left heel wound r/o osteo TECHNOLOGIST PROVIDED HISTORY: Reason for exam:->left heel wound r/o osteo Reason for Exam: wound FINDINGS: Degenerative changes 1st MTP joint. No acute fracture. No dislocation. Soft tissue ulceration adjacent to the calcaneus. No destructive bony lesion identified. No destructive bony lesion identified     XR FOOT RIGHT (MIN 3 VIEWS)    Soft tissue ulceration of the hindfoot with possible lucencies in the calcaneus that may represent erosive changes from osteomyelitis versus overlying soft tissue artifact. Severe diffuse osteopenia. Subacute healing fracture of the 2nd metatarsal.     XR FOOT RIGHT (MIN 3 VIEWS)    Result Date: 1/6/2022  EXAMINATION: 3 XRAY VIEWS OF THE RIGHT FOOT 1/6/2022 6:04 pm COMPARISON: None. HISTORY: ORDERING SYSTEM PROVIDED HISTORY: right heel wound r/o osteo, contractured TECHNOLOGIST PROVIDED HISTORY: Reason for exam:->right heel wound r/o osteo, contractured Reason for Exam: wound FINDINGS: Healed fracture 2nd metatarsal.  No acute fracture or dislocation. Soft tissue ulceration seen overlying the calcaneus. No destructive bony abnormality identified.      Soft tissue ulceration overlying the calcaneus, no destructive bony abnormality     VL DUP LOWER EXTREMITY ARTERIES BILATERAL    Result Date: 1/10/2022  Lower Extremities Arterial Duplex  Demographics   Patient Name       Florencio Parrish   Date of Study      01/10/2022         Gender              Male   Patient Number     [id number]         Date of Birth       1955   Visit Number [id number]          Age                 78 year(s)   Accession Number   [id number]         Room Number         9561   Corporate ID       [id number]            Sonographer         Yamile Truong, CHIQUI                                                            CCT   Ordering Physician Eleazar Ortiz,    Interpreting        Kristie Lugo 238                     DPM                Physician           Surg                                                            Linn Graham DO  Procedure Type of Study:   Extremities Arteries:Lower Extremities Arterial Duplex, VL LOWER EXTREMITY  ARTERIES DUPLEX BILATERAL. Vascular Sonographer Report  Indications for Study:Ulcer/gangrene. Impressions Right Impression Technically limited exam due to patient scanned in COVID 19 precautions and right lower extremity is contracted. Right OMKAR was not available due to patient in COVID 19 precautions and unable to properly position for exam. Elevated velocity of the mid posterior tibial and mid anterior tibial arteries suggests a greater than 50% stenosis. Ultrasound images of the right lower extremity reveal severe calcific plaque formation throughout. The right popliteal artery was not visualized due to patient positioning. Left Impression Technically limited exam due to patient scanned in COVID 19 precautions. Left OMKAR was not available due to patient in COVID 19 precautions and unable to properly position for exam. Elevated velocity of the common femoral artery suggests a less than 50% stenosis. Occlusion of the mid anterior tibial artery. This flow reconstitutes at the distal anterior tibial artery via multiple collaterals. Ultrasound images of the left lower extremity reveal severe calcific plaque formation throughout. The left popliteal artery was not visualized due to patient positioning. Conclusions   Summary   Limited evaluation of both extremities. Evidence of calcific plaque  throughout both extremities. OMKAR not obtained.  Popliteal arteries unable to  be visualized due to positioning. Multiphasic waveforms throughout SFA  bilaterally with evidence of distal monophasic waveforms therefore cannot  exclude a popliteal lesion bilaterally. The left anterior tibial artery  appears to be occluded. Signature   ------------------------------------------------------------------  Electronically signed by Chloe Langford DO (Interpreting  physician) on 01/10/2022 at 03:18 PM  ------------------------------------------------------------------  Patient Status:Routine. Study [place] [id number] - Vascular Lab. Technical Quality:Adequate visualization. Risk Factors   - The patient's risk factor(s) include: arterial hypertension and prior MI .   - The patient has a former tobacco history. Velocities are measured in cm/s ; Diameters are measured in mm LE Duplex Measurements +-------------------++-----+-----+-----------++----+-----+-----------+ ! ! !Right! ! Left       !!    !     !           ! +-------------------++-----+-----+-----------++----+-----+-----------+ ! Location           ! !PSV  ! Ratio! Wave Desc. !!PSV ! Ratio! Wave Desc. ! +-------------------++-----+-----+-----------++----+-----+-----------+ ! Prox Common Femoral!!104  ! ! Multiphasic! !187 ! ! Multiphasic! +-------------------++-----+-----+-----------++----+-----+-----------+ ! Dist Common Femoral!!109  ! ! Multiphasic! !139 ! ! Multiphasic! +-------------------++-----+-----+-----------++----+-----+-----------+ ! PFA                !!172  ! ! Multiphasic!!96  ! ! Multiphasic! +-------------------++-----+-----+-----------++----+-----+-----------+ ! Prox SFA           !!120  !1.1  ! Multiphasic! !137 !0.99 ! Multiphasic! +-------------------++-----+-----+-----------++----+-----+-----------+ ! Mid SFA            !!133  !1.11 ! Multiphasic!!125 !0.91 ! Multiphasic! +-------------------++-----+-----+-----------++----+-----+-----------+ ! Dist SFA           !!159  !1.2 !Multiphasic! !112 !0.9  ! Multiphasic! +-------------------++-----+-----+-----------++----+-----+-----------+ ! Mid PTA            !!137  !0.86 ! Monophasic !!27.4!0.24 ! Monophasic ! +-------------------++-----+-----+-----------++----+-----+-----------+ ! Mid SAM            !!209  !1.31 ! Monophasic !!0   !0    ! Monophasic ! +-------------------++-----+-----+-----------++----+-----+-----------+ ! Dist SAM           !!     !     !           !!116 ! ! Monophasic ! +-------------------++-----+-----+-----------++----+-----+-----------+ ! Mid Peroneal       !!62.9 !0.4  ! Monophasic !!85.4!0.76 ! Monophasic ! +-------------------++-----+-----+-----------++----+-----+-----------+        Assessment:   Principal Problem:    Right heel infection  Active Problems:    Essential hypertension    Hyperlipidemia    CAD (coronary artery disease)    Cellulitis of left heel  Resolved Problems:    * No resolved hospital problems. *      Plan:       Right heel infection -right foot is swollen and malodorous. There is black eschar on the right heel. X-ray with possibility of underlying osteomyelitis. We will check an MRI, consult podiatry and start broad-spectrum antibiotics with vancomycin and cefepime. Left heel infection -we are starting vancomycin and cefepime. Podiatry has been consulted    CAD (coronary artery disease) - currently stable. Pt denies chest pain. Continue beta blocker, statin and plavix    Essential (primary) hypertension - continue home meds and monitor blood pressure    Hyperlipidemia - No current evidence of Rhabdomyolysis or other adverse effects. Continue statin therapy while in the hospital        DVT Prophylaxis: SCDs  Diet: ADULT DIET; Regular  Code Status: Full Code  (Advanced care planning has been discussed with patient and/or responsible family member and is reflected in the code status.  Further orders associated with this have been entered if appropriate)    Disposition: Anticipate that patient will remain in the hospital for 2 to 3+ days depending on further evaluation and clinical course    Please note that over 50 minutes was spent in evaluating the patient, review of records and results, discussion with staff/family, etc.    Sindhu Rosales MD

## 2022-02-03 NOTE — ED PROVIDER NOTES
Female   Other Topics Concern    None   Social History Narrative    None     Social Determinants of Health     Financial Resource Strain: Low Risk     Difficulty of Paying Living Expenses: Not hard at all   Food Insecurity: No Food Insecurity    Worried About Running Out of Food in the Last Year: Never true    Raven of Food in the Last Year: Never true   Transportation Needs: No Transportation Needs    Lack of Transportation (Medical): No    Lack of Transportation (Non-Medical): No   Physical Activity:     Days of Exercise per Week: Not on file    Minutes of Exercise per Session: Not on file   Stress:     Feeling of Stress : Not on file   Social Connections:     Frequency of Communication with Friends and Family: Not on file    Frequency of Social Gatherings with Friends and Family: Not on file    Attends Worship Services: Not on file    Active Member of [address] [place] [address] or Organizations: Not on file    Attends Club or Organization Meetings: Not on file    Marital Status: Not on file   Intimate Partner Violence:     Fear of Current or Ex-Partner: Not on file    Emotionally Abused: Not on file    Physically Abused: Not on file    Sexually Abused: Not on file   Housing Stability:     Unable to Pay for Housing in the Last Year: Not on file    Number of [place] in the Last Year: Not on file    Unstable Housing in the Last Year: Not on file       PHYSICAL EXAM       ED Triage Vitals [02/03/22 0015]   BP Temp Temp Source Pulse Resp SpO2 Height Weight   118/74 98.1 °F (36.7 °C) Oral 71 18 100 % -- 199 lb 1.2 oz (90.3 kg)       Physical Exam  Vitals and nursing note reviewed. Constitutional:       General: He is not in acute distress. Appearance: He is well-developed. He is not diaphoretic. HENT:      Head: Normocephalic and atraumatic. Eyes:      General:         Right eye: No discharge. Left eye: No discharge. Pupils: Pupils are equal, round, and reactive to light.    Neck: Thyroid: No thyromegaly. Trachea: No tracheal deviation. Cardiovascular:      Rate and Rhythm: Normal rate and regular rhythm. Heart sounds: No murmur heard. Pulmonary:      Breath sounds: No wheezing or rales. Chest:      Chest wall: No tenderness. Abdominal:      General: There is no distension. Palpations: Abdomen is soft. There is no mass. Tenderness: There is no abdominal tenderness. There is no guarding or rebound. Musculoskeletal:         General: No tenderness or deformity. Normal range of motion. Cervical back: Normal range of motion. Skin:     General: Skin is warm and dry. Comments: Black necrotic wound of bottom right heel. Mild erythema noted. No pus. Appears to be dry gangrene. Neurological:      Mental Status: He is alert. Cranial Nerves: No cranial nerve deficit. Motor: No abnormal muscle tone.       Coordination: Coordination normal.         DIAGNOSTIC RESULTS     RADIOLOGY:   Non-plain film images such as CT, Ultrasoundand MRI are read by the radiologist. [facility] radiographic images are visualized and preliminarily interpreted by the emergency physician with the below findings:    Impression   Soft tissue ulceration of the hindfoot with possible lucencies in the   calcaneus that may represent erosive changes from osteomyelitis versus   overlying soft tissue artifact.       Severe diffuse osteopenia.       Subacute healing fracture of the 2nd metatarsal.     ED BEDSIDE ULTRASOUND:   Performed by ED Physician - none    LABS:  Labs Reviewed   CBC WITH AUTO DIFFERENTIAL - Abnormal; Notable for the following components:       Result Value    RBC 3.51 (*)     Hemoglobin 9.5 (*)     Hematocrit 28.4 (*)     RDW 17.6 (*)     All other components within normal limits    Narrative:     Performed at:  [place] [address]   Phone [phone]   BASIC METABOLIC PANEL W/ REFLEX TO MG FOR MD  02/03/22 4998

## 2022-02-03 NOTE — PLAN OF CARE
Problem: Falls - Risk of:  Goal: Will remain free from falls  Description: Will remain free from falls  Outcome: Ongoing  Goal: Absence of physical injury  Description: Absence of physical injury  Outcome: Ongoing     Problem: Infection:  Goal: Will remain free from infection  Description: Will remain free from infection  Outcome: Ongoing     Problem: Safety:  Goal: Free from accidental physical injury  Description: Free from accidental physical injury  Outcome: Ongoing  Goal: Free from intentional harm  Description: Free from intentional harm  Outcome: Ongoing     Problem: Daily Care:  Goal: Daily care needs are met  Description: Daily care needs are met  Outcome: Ongoing     Problem: Pain:  Goal: Patient's pain/discomfort is manageable  Description: Patient's pain/discomfort is manageable  Outcome: Ongoing     Problem: Skin Integrity:  Goal: Skin integrity will stabilize  Description: Skin integrity will stabilize  Outcome: Ongoing     Problem: Skin Integrity:  Goal: Will show no infection signs and symptoms  Description: Will show no infection signs and symptoms  Outcome: Ongoing  Goal: Absence of new skin breakdown  Description: Absence of new skin breakdown  Outcome: Ongoing

## 2022-02-03 NOTE — CARE COORDINATION
Discharge Planning:  SW attempted to reach pt in his room. Unable to reach pt. SW will attempt later as time permits.    Carolyn Milligan, [place]  [phone]  Electronically signed by Christopher Brown on 2/3/2022 at 11:49 AM

## 2022-02-03 NOTE — PROGRESS NOTES
Report called to this nurse by Alana TAO RN on patient. Antibiotics and wound assessment done in e.d. by Alana and physician. See notes. Patient arrived to floor per bed accompanied by E.D. staff. Assisted to transfer to room bed per floor nursing staff and E.D. staff using steady transfer mat without incident. Oriented to room and facility. Admission assessment etc.completed. Patient recently a patient last month for related issues. IV with fluid from E.D. infusing which is prn. Site made into heparin/saline lock. Flushes easily with good blood return noted. All tolerated well. Patient with calm, cooperative affect. Will continue to assess.

## 2022-02-03 NOTE — ED NOTES
Bed: [facility]  Expected date:   Expected time:   Means of arrival:   Comments:  Juan Jose foot wounds swelling     Sreedhar Lomeli RN  02/03/22 1914

## 2022-02-03 NOTE — CONSULTS
Clinical Pharmacy Note  Vancomycin Consult    Marjan Barclay is a 78 y.o. male ordered Vancomycin for cellulitis; consult received from Dr. Sarwat Kelly to manage therapy. Also receiving cefepime. Patient Active Problem List   Diagnosis    Chest pain    MI, acute, non ST segment elevation ([address]    Tobacco abuse    Essential hypertension    [facility]    Acute CVA (cerebrovascular [facility]) [facility]    Right leg weakness    [facility] ischemic left LEXA stroke ([facility])    Non-healing open wound of right heel    Right heel infection    Hyperlipidemia    CAD (coronary artery disease)    Cellulitis of left heel       Allergies:  Patient has no known allergies. Temp max:  Temp (24hrs), Av.1 °F (36.7 °C), Min:98.1 °F (36.7 °C), Max:98.1 °F (36.7 °C)      Recent Labs     22  0139   WBC 10.5       Recent Labs     22  0139   BUN 33*   CREATININE 0.9       No intake or output data in the 24 hours ending 22 0537    Culture Results:  Pending    Ht Readings from Last 1 Encounters:   22 6' 3\" (1.905 m)        Wt Readings from Last 1 Encounters:   22 181 lb (82.1 kg)         Estimated Creatinine Clearance: 92 mL/min (based on SCr of 0.9 mg/dL). Assessment/Plan:  Vancomycin 750 mg IV every 12 hours ordered. Exposure target: AUC24 (range)400-600 mg/L.hr   AUC24,ss: 403 mg/L.hr  Probability of AUC24 > 400: 51 %  Ctrough,ss: 13 mg/L  Probability of Ctrough,ss > 20: 15 %  Probability of nephrotoxicity (Lodise NERY ): 8 %    Trough level ordered for 1400 on 22. Thank you for the consult.      Petar Mccormack [facility]  2/3/2022 5:38 AM

## 2022-02-04 LAB
ANION GAP SERPL CALCULATED.3IONS-SCNC: 13 MMOL/L (ref 3–16)
BASOPHILS ABSOLUTE: 0.1 K/UL (ref 0–0.2)
BASOPHILS RELATIVE PERCENT: 0.7 %
BUN BLDV-MCNC: 25 MG/DL (ref 7–20)
CALCIUM SERPL-MCNC: 9.2 MG/DL (ref 8.3–10.6)
CHLORIDE BLD-SCNC: 99 MMOL/L (ref 99–110)
CO2: 24 MMOL/L (ref 21–32)
CREAT SERPL-MCNC: 0.8 MG/DL (ref 0.8–1.3)
EOSINOPHILS ABSOLUTE: 0.2 K/UL (ref 0–0.6)
EOSINOPHILS RELATIVE PERCENT: 1.9 %
GFR AFRICAN AMERICAN: >60
GFR NON-AFRICAN AMERICAN: >60
GLUCOSE BLD-MCNC: 133 MG/DL (ref 70–99)
HCT VFR BLD CALC: 27.8 % (ref 40.5–52.5)
HEMOGLOBIN: 9.3 G/DL (ref 13.5–17.5)
LYMPHOCYTES ABSOLUTE: 1.5 K/UL (ref 1–5.1)
LYMPHOCYTES RELATIVE PERCENT: 16.6 %
MCH RBC QN AUTO: 27 PG (ref 26–34)
MCHC RBC AUTO-ENTMCNC: 33.5 G/DL (ref 31–36)
MCV RBC AUTO: 80.5 FL (ref 80–100)
MONOCYTES ABSOLUTE: 0.5 K/UL (ref 0–1.3)
MONOCYTES RELATIVE PERCENT: 5.4 %
NEUTROPHILS ABSOLUTE: 6.8 K/UL (ref 1.7–7.7)
NEUTROPHILS RELATIVE PERCENT: 75.4 %
PDW BLD-RTO: 17.6 % (ref 12.4–15.4)
PLATELET # BLD: 272 K/UL (ref 135–450)
PMV BLD AUTO: 6.5 FL (ref 5–10.5)
POTASSIUM REFLEX MAGNESIUM: 3.8 MMOL/L (ref 3.5–5.1)
RBC # BLD: 3.45 M/UL (ref 4.2–5.9)
SODIUM BLD-SCNC: 136 MMOL/L (ref 136–145)
WBC # BLD: 9 K/UL (ref 4–11)

## 2022-02-04 PROCEDURE — 0LBN0ZZ EXCISION OF RIGHT LOWER LEG TENDON, OPEN APPROACH: ICD-10-PCS | Performed by: PODIATRIST

## 2022-02-04 PROCEDURE — 6360000002 HC RX W HCPCS: Performed by: INTERNAL MEDICINE

## 2022-02-04 PROCEDURE — 2580000003 HC RX 258: Performed by: INTERNAL MEDICINE

## 2022-02-04 PROCEDURE — 6370000000 HC RX 637 (ALT 250 FOR IP): Performed by: INTERNAL MEDICINE

## 2022-02-04 PROCEDURE — 80048 BASIC METABOLIC PNL TOTAL CA: CPT

## 2022-02-04 PROCEDURE — 85025 COMPLETE CBC W/AUTO DIFF WBC: CPT

## 2022-02-04 PROCEDURE — 36415 COLL VENOUS BLD VENIPUNCTURE: CPT

## 2022-02-04 PROCEDURE — 1200000000 HC SEMI PRIVATE

## 2022-02-04 PROCEDURE — 99223 1ST HOSP IP/OBS HIGH 75: CPT | Performed by: INTERNAL MEDICINE

## 2022-02-04 RX ORDER — SODIUM CHLORIDE 0.9 % (FLUSH) 0.9 %
5-40 SYRINGE (ML) INJECTION PRN
Status: DISCONTINUED | OUTPATIENT
Start: 2022-02-04 | End: 2022-02-11 | Stop reason: HOSPADM

## 2022-02-04 RX ORDER — SODIUM CHLORIDE 0.9 % (FLUSH) 0.9 %
5-40 SYRINGE (ML) INJECTION EVERY 12 HOURS SCHEDULED
Status: DISCONTINUED | OUTPATIENT
Start: 2022-02-04 | End: 2022-02-11 | Stop reason: HOSPADM

## 2022-02-04 RX ORDER — SODIUM CHLORIDE 9 MG/ML
25 INJECTION, SOLUTION INTRAVENOUS PRN
Status: DISCONTINUED | OUTPATIENT
Start: 2022-02-04 | End: 2022-02-11 | Stop reason: HOSPADM

## 2022-02-04 RX ORDER — LIDOCAINE HYDROCHLORIDE 10 MG/ML
5 INJECTION, SOLUTION EPIDURAL; INFILTRATION; INTRACAUDAL; PERINEURAL ONCE
Status: DISCONTINUED | OUTPATIENT
Start: 2022-02-04 | End: 2022-02-11 | Stop reason: HOSPADM

## 2022-02-04 RX ADMIN — ATORVASTATIN CALCIUM 80 MG: 80 TABLET, FILM COATED ORAL at 21:40

## 2022-02-04 RX ADMIN — LISINOPRIL 20 MG: 20 TABLET ORAL at 08:23

## 2022-02-04 RX ADMIN — TROSPIUM CHLORIDE 20 MG: 20 TABLET, FILM COATED ORAL at 16:53

## 2022-02-04 RX ADMIN — HYDROCHLOROTHIAZIDE 25 MG: 25 TABLET ORAL at 08:23

## 2022-02-04 RX ADMIN — VANCOMYCIN HYDROCHLORIDE 750 MG: 750 INJECTION, POWDER, LYOPHILIZED, FOR SOLUTION INTRAVENOUS at 04:26

## 2022-02-04 RX ADMIN — SERTRALINE 150 MG: 100 TABLET, FILM COATED ORAL at 08:22

## 2022-02-04 RX ADMIN — BACLOFEN 20 MG: 10 TABLET ORAL at 21:39

## 2022-02-04 RX ADMIN — SODIUM CHLORIDE, PRESERVATIVE FREE 10 ML: 5 INJECTION INTRAVENOUS at 22:02

## 2022-02-04 RX ADMIN — PANTOPRAZOLE SODIUM 40 MG: 40 TABLET, DELAYED RELEASE ORAL at 08:23

## 2022-02-04 RX ADMIN — SODIUM CHLORIDE, PRESERVATIVE FREE 10 ML: 5 INJECTION INTRAVENOUS at 08:25

## 2022-02-04 RX ADMIN — SODIUM CHLORIDE, PRESERVATIVE FREE 10 ML: 5 INJECTION INTRAVENOUS at 21:39

## 2022-02-04 RX ADMIN — CLOPIDOGREL BISULFATE 75 MG: 75 TABLET ORAL at 08:23

## 2022-02-04 RX ADMIN — BACLOFEN 20 MG: 10 TABLET ORAL at 08:23

## 2022-02-04 RX ADMIN — PIPERACILLIN AND TAZOBACTAM 3375 MG: 3; .375 INJECTION, POWDER, LYOPHILIZED, FOR SOLUTION INTRAVENOUS at 21:41

## 2022-02-04 RX ADMIN — CEFEPIME HYDROCHLORIDE 2000 MG: 2 INJECTION, POWDER, FOR SOLUTION INTRAVENOUS at 03:08

## 2022-02-04 RX ADMIN — ATENOLOL 25 MG: 25 TABLET ORAL at 08:23

## 2022-02-04 RX ADMIN — MIRTAZAPINE 15 MG: 15 TABLET, FILM COATED ORAL at 21:39

## 2022-02-04 RX ADMIN — TROSPIUM CHLORIDE 20 MG: 20 TABLET, FILM COATED ORAL at 06:26

## 2022-02-04 ASSESSMENT — PAIN SCALES - GENERAL: PAINLEVEL_OUTOF10: 0

## 2022-02-04 ASSESSMENT — PAIN SCALES - WONG BAKER: WONGBAKER_NUMERICALRESPONSE: 0

## 2022-02-04 NOTE — CONSULTS
Infectious Diseases Inpatient Consult Note      Reason for Consult:  Enterococcus Bacteremia and Rt heel ulcer with infection and Bi lateral foot wounds    Requesting Physician:  Kristen Downey    Primary Care Physician:  Donny Rodarte MD    History Obtained From:  Epic     CHIEF COMPLAINT:     Chief Complaint   Patient presents with    Wound Infection     pt with bilateral foot wounds, pt wounds are malodorus. Per EMS right foot had no pulses. Doppler pulses easily obtained by this RN,         HISTORY OF PRESENT ILLNESS:  78 y.o. man with CVA and Rt side weakness now with Rt heel ulcer secondary infection drainage, malodor at presentation on going cellulitis with worsening wound infections he has bi lateral foot wounds,images reviewed. Has PVD. And he was hospitalized recently from 1/6 to 1/12/22 was treated with oral abx for the ulcer and was seen by Podiatry. Blood cx with Enterococcus noted, XRAY with Rt calcaneus osteomyelitis ? MRI Rt foot pending.   Location :  Rt heel pain+ swelling, odor+ drainage+      Quality :   aching   Severity : 10/10    Duration :  weeks    Timing : intermittent   Context : Rt heel ulcer    Modifying factors : none  Associated signs and symptoms: Rt heel ulcer+ drainage+         Past Medical History:    Past Medical History:   Diagnosis Date    Acute MI ([facility] [address] 09/2013    Anxiety     Arthritis     CAD (coronary artery disease)     COVID-19     Hyperlipidemia 2/3/2022    Hypertension     Influenza A 01/15/2017       Past Surgical History:    Past Surgical History:   Procedure Laterality Date    BACK SURGERY N/A 1989    herniated disc    CORONARY ANGIOPLASTY WITH STENT PLACEMENT  9/13    EYE SURGERY      VASCULAR SURGERY         Current Medications:    Outpatient Medications Marked as Taking for the 2/3/22 encounter [facility] Encounter)   Medication Sig Dispense Refill    busPIRone (BUSPAR) 5 MG tablet Take 5 mg by mouth 2 times daily      clindamycin (CLEOCIN) 300 MG capsule Take 300 mg by mouth 2 times daily      hydrOXYzine (ATARAX) 25 MG tablet Take 25 mg by mouth every 6 hours as needed for Anxiety      senna (SENOKOT) 8.6 MG tablet Take 1 tablet by mouth daily      collagenase 250 UNIT/GM ointment Apply 1 each topically daily Apply topically to right heel daily.  clopidogrel (PLAVIX) 75 MG tablet Take 1 tablet by mouth daily 30 tablet 3    ferrous sulfate (IRON 325) 325 (65 Fe) MG tablet Take 325 mg by mouth daily (with breakfast)      mirtazapine (REMERON) 15 MG tablet Take 15 mg by mouth nightly      pantoprazole (PROTONIX) 40 MG tablet Take 40 mg by mouth daily      mirabegron (MYRBETRIQ) 50 MG TB24 Take 50 mg by mouth daily      baclofen (LIORESAL) 20 MG tablet Take 20 mg by mouth 2 times daily      lisinopril (PRINIVIL;ZESTRIL) 20 MG tablet TAKE 1/2 TABLET BY MOUTH DAILY (Patient taking differently: Take 10 mg by mouth daily ) 45 tablet 0    hydroCHLOROthiazide (HYDRODIURIL) 25 MG tablet TAKE ONE TABLET BY MOUTH DAILY 90 tablet 0    atenolol (TENORMIN) 25 MG tablet Take 1 tablet by mouth daily 30 tablet 0    atorvastatin (LIPITOR) 80 MG tablet Take 1 tablet by mouth nightly 30 tablet 3    sertraline (ZOLOFT) 100 MG tablet TAKE 1 AND 1/2 TABLET BY MOUTH DAILY 45 tablet 2    Multiple Vitamins-Minerals (THERAPEUTIC MULTIVITAMIN-MINERALS) tablet Take 1 tablet by mouth daily      Ascorbic Acid (VITAMIN C) 500 MG tablet Take 500 mg by mouth daily         Allergies:  Patient has no known allergies.     Immunizations :   Immunization History   Administered Date(s) Administered    COVID-19, Pfizer Purple top, DILUTE for use, 12+ yrs, 30mcg/0.3mL dose 02/26/2021, 03/19/2021    Influenza, High Dose (Fluzone 65 yrs and older) 11/25/2019    Influenza, High-dose, Quadv, 65 yrs +, IM (Fluzone) 10/22/2020    Pneumococcal Conjugate 13-valent ([id number]) 06/04/2019    Pneumococcal Polysaccharide ([id number]) 10/22/2020    Zoster Recombinant (Shingrix) 10/22/2020, 02/20/2021         Social History:    Social History     Tobacco Use    Smoking status: Former Smoker     Packs/day: 0.50     Years: 40.00     Pack years: 20.00     Types: Cigarettes     Quit date: 1/14/2019     Years since quitting: 3.0    Smokeless tobacco: Never Used   Vaping Use    Vaping Use: Never used   Substance Use Topics    Alcohol use:  Yes     Alcohol/week: 2.0 standard drinks     Types: 2 Cans of beer per week     Comment: states used to drink 1 pint/vodka a day / now socially drinks    Drug use: Not on file     Social History     Tobacco Use   Smoking Status Former Smoker    Packs/day: 0.50    Years: 40.00    Pack years: 20.00    Types: Cigarettes    Quit date: 1/14/2019    Years since quitting: 3.0   Smokeless Tobacco Never Used      Family History   Problem Relation Age of Onset    Hypertension Mother     Hypertension Father     Heart Failure Father     Stroke Maternal Grandfather     Cancer Paternal Uncle         throat         REVIEW OF SYSTEMS:     Not possible due to change in mentation     PHYSICAL EXAM:      Vitals:    /82   Pulse 60   Temp 97.6 °F (36.4 °C) (Oral)   Resp 18   Wt 171 lb 15.3 oz (78 kg)   SpO2 94%   BMI 21.49 kg/m²     General Appearance: awake and in some  acute distress, ++ pallor, no icterus   Skin: warm and dry, no rash or erythema  Head: normocephalic and atraumatic  Eyes: pupils equal, round, and reactive to light, conjunctivae normal  ENT: tympanic membrane, external ear and ear canal normal bilaterally, nose without deformity, nasal mucosa and turbinates normal without polyps  Neck: supple and non-tender without mass, no thyromegaly  no cervical lymphadenopathy  Pulmonary/Chest: clear to auscultation bilaterally- no wheezes, rales or rhonchi, normal air movement, no respiratory distress  Cardiovascular: normal rate, regular rhythm, normal S1 and S2, no murmurs, rubs, clicks, or gallops, no carotid bruits  Abdomen: soft, non-tender, cultures reviewed and updated by me          Micro results (current encounter only)    Procedure Component Value Units Date/Time   Culture, Blood 1 [[id number]] Collected: 02/03/22 4389   Order Status: Completed Specimen: Blood Updated: 02/04/22 0815    Blood Culture, Routine No Growth to date.  Any change in status will be called. Narrative:     ORDER#: [id number]                          ORDERED BY: Latoya Jefferson   SOURCE: Blood                              COLLECTED:  02/03/22 06:07   ANTIBIOTICS AT [place]. :                      RECEIVED :  02/03/22 08:14   If child <=2 yrs old please draw pediatric bottle. ~Blood Culture 1   Performed at:   [facility]   [address] [facility] [facility]   Phone [phone]   Culture, Blood 1 [[id number]] (Abnormal) Collected: 02/03/22 0140   Order Status: Completed Specimen: Blood Updated: 02/03/22 2302    Blood Culture, Routine -- Abnormal     Gram stain Anaerobic bottle:   Gram positive cocci in chains and/or pairs   resembling Streptococcus   Information to follow   Gram stain Aerobic bottle:   Gram positive cocci in chains and/or pairs   resembling Streptococcus   Information to follow    Abnormal     Organism Enterococcus faecalis DNA Detected Abnormal     Blood Culture, Routine --    Jorgito/B gene not detected. See additional report for complete BCID panel. Negative results for this marker does not indicate susceptibility,   as multiple mechanisms of resistance to Vancomycin exist.   Please see full susceptibility report. Narrative:     ORDER#: [id number]                          ORDERED BY: Latoya Jefferson   SOURCE: Blood                              COLLECTED:  02/03/22 01:40   ANTIBIOTICS AT [place]. :                      RECEIVED :  02/03/22 01:40   CALL  [address] tel. [phone],   Microbiology results called to and read back by Harmeet Bowen, 02/03/2022   23:02, by [facility]   Microbiology results called to and read back by ALENA Pisano, 02/03/2022   23:02, by Roberta Summers   If child <=2 yrs old please draw pediatric bottle. ~Blood Culture 1   Performed at:   [place] [address] [address]   Phone [phone]   Culture, Blood 2 [[id number]] Collected: 02/03/22 0000   Order Status: Canceled Specimen: Blood        SARS-CoV-2, PCR  COVID-19  Collected: 01/06/22 1754   Result status: Final   Resulting lab: [facility]   Reference range: Not Detected   Value: DETECTED Abnormal     Comment: This test has been authorized by FDA under an   Emergency Use Authorization (EUA). This test is only authorized for the duration of the   time of declaration that circumstances exist justifying the   authorization of the emergency use of in vitro diagnostic   testing for detection of the SARS-CoV-2 virus   and/or diagnosis of COVID-19 infection under   section 564 (b)(1) of the Act, 21 U. S.C. 369UIJ-1 (b) (       Blood Culture:   Lab Results   Component Value Date    [facility]  02/03/2022     No Growth to date. Any change in status will be called. BLOODCULT2 No growth after 5 days of incubation. 01/15/2017       Viral Culture:    Lab Results   Component Value Date    COVID19 DETECTED 01/06/2022    COVID19 Not Detected 01/06/2022     Urine Culture: No results for input(s): Alexandria Clement in the last 72 hours.     Scheduled Meds:   cefepime  2,000 mg IntraVENous Q12H    sertraline  150 mg Oral Daily    pantoprazole  40 mg Oral Daily    mirtazapine  15 mg Oral Nightly    trospium  20 mg Oral BID AC    lisinopril  20 mg Oral Daily    hydroCHLOROthiazide  25 mg Oral Daily    clopidogrel  75 mg Oral Daily    baclofen  20 mg Oral BID    atorvastatin  80 mg Oral Nightly    atenolol  25 mg Oral Daily    sodium chloride flush  10 mL IntraVENous 2 times per day    vancomycin (VANCOCIN) intermittent dosing (placeholder)   Other RX Placeholder    vancomycin  750 mg IntraVENous Q12H Continuous Infusions:   sodium chloride         PRN Meds:  sodium chloride flush, sodium chloride, ondansetron, polyethylene glycol, acetaminophen **OR** acetaminophen, morphine    Imaging:   XR FOOT RIGHT (MIN 3 VIEWS)   Final Result   Soft tissue ulceration of the hindfoot with possible lucencies in the   calcaneus that may represent erosive changes from osteomyelitis versus   overlying soft tissue artifact. Severe diffuse osteopenia. Subacute healing stress fracture of the 2nd metatarsal.         MRI FOOT RIGHT WO CONTRAST    (Results Pending)         Summary        Limited evaluation of both extremities. Evidence of calcific plaque    throughout both extremities. OMKAR not obtained. Popliteal arteries unable to    be visualized due to positioning. Multiphasic waveforms throughout SFA    bilaterally with evidence of distal monophasic waveforms therefore cannot    exclude a popliteal lesion bilaterally. The left anterior tibial artery    appears to be occluded.        Signature        ------------------------------------------------------------------    Electronically signed by Jennifer Navarro DO (Interpreting    physician) on 01/10/2022 at 03:18 PM    ------------------------------------------------------------------       All pertinent images and reports for the current Hospitalization were reviewed by me.     IMPRESSION:    Patient Active Problem List   Diagnosis    Chest pain    MI, acute, non ST segment elevation ([address]    Tobacco abuse    Essential hypertension    [facility] [address]    Acute CVA [facility]cerebrovascular accident) ([facility] [address]    Right leg weakness    Acute ischemic left LEXA stroke (HCC)    Non-healing open wound of right heel    Right heel infection    Hyperlipidemia    CAD (coronary artery disease)    Cellulitis of left heel   \  Rt heel non healing ulcer  Rt heel osteomyelitis  Rt heel cellulitis   Enterococcus bacteremia+  H/o CVA  Rt side weakness  Rt leg contracture  Noted may need surgery when possible  NH resident        Enterococcus bacteremia could be from the infected non healing ulcers Rt heel +        Labs, Microbiology, Radiology and pertinent results from current hospitalization and care every where were reviewed by me as a part of the consultation. PLAN :  1/ D/C Cefepime  2. D/C IV Vancomycin   3. IV Zosyn x 3.375 mg Q 8 HRS  4. Esr, crp  5. wOUND CX  6. Cont Local care    7. MRI Rt foot pending     Discussed with patient/Family and Nursing   Risk of Complications/Morbidity: High      · Illness(es)/ Infection present that pose threat to bodily function. · There is potential for severe exacerbation of infection/side effects of treatment. · Therapy requires intensive monitoring for antimicrobial agent toxicity. Thanks for allowing me to participate in your patient's care please call me with any questions or concerns.     Dr. Gagan Childs MD  [address] [place] [address] Physician  Phone: [phone]   Fax : [phone]

## 2022-02-04 NOTE — PROGRESS NOTES
Hospitalist  Progress Note       Dahiana Bhat is a 78 y.o. male   1955     SUBJECTIVE:   Pt is an 78y.o. year-old male with a history of hypertension, hyperlipidemia, coronary artery disease and a nonhealing open wound of his right heel. He is currently staying in a nursing home and they noted that the right heel wound has become malodorous and black. The patient noted that the area is painful, but only when moved. In the emergency room it was noted that his right foot is larger than his left foot. It is malodorous and thought to be infected. He is being admitted for further evaluation and treatment. Associated signs and symptoms do not include fever or chills, nausea, vomiting, abdominal pain, hemoptysis, hematochezia, diarrhea, constipation or urinary symptoms. 2/4  Patient seen and examined and discussed with nurse podiatric evaluation noted  Blood culture came back positive , patient said he is doing okay appears lethargic but awake able to answer questions appropriately      OBJECTIVE:    Review of Systems:  General appearance: alert, appears stated age and cooperative  Skin: Skin color, texture, normal. No rashes or lesions  HEENT: No nose bleed, headache, vision problems  CV: C/O chest pain, tightness, pressure,   Respiratory: C/o no SOB, SMITH, Orthopnea, PND  GI: No abdominal pain, black stool, bloating  Limbs: No c/o edema, pain, swelling, intermittent claudication, joint pains  Neuro: No dizziness, lightheadedness, syncope, gait problems, memory problems  Psych: grossly normal. No SI/depression. Vitals:   Blood pressure 117/82, pulse 60, temperature 97.6 °F (36.4 °C), temperature source Oral, resp. rate 18, weight 171 lb 15.3 oz (78 kg), SpO2 94 %.     HEENT: AT, NC, PERRLA  Neck: No JVD  Heart: S1 S2 audible, no murmur   Lungs: CTA   Abdomen: Nontender   Limbs: Lower extremity wounds are wrapped  CNS: no focal deficit      Past Medical History:   Diagnosis Date    [facility] [address] 09/2013  Anxiety     Arthritis     CAD (coronary artery disease)     COVID-19     Hyperlipidemia 2/3/2022    Hypertension     Influenza A 01/15/2017        Patient Active Problem List   Diagnosis    Chest pain    MI, acute, non ST segment elevation ([facility] [address]    Tobacco abuse    Essential hypertension    Acute MI ([facility] [address]    Acute CVA [facility]cerebrovascular accident) ([facility] [address]    Right leg weakness    Acute ischemic left LEXA stroke (HCC)    Non-healing open wound of right heel    Right heel infection    Hyperlipidemia    CAD (coronary artery disease)    Cellulitis of left heel        No Known Allergies     Current Inpatient Medications:    Current Facility-Administered Medications   Medication Dose Route Frequency Provider Last Rate Last Admin    cefepime (MAXIPIME) 2000 mg IVPB minibag  2,000 mg IntraVENous Q12H Shun Enriquez MD   Stopped at 02/04/22 0338    sertraline (ZOLOFT) tablet 150 mg  150 mg Oral Daily Shun Enriquez MD   150 mg at 02/04/22 3829    pantoprazole (PROTONIX) tablet 40 mg  40 mg Oral Daily Shun Enriquez MD   40 mg at 02/04/22 9385    mirtazapine (REMERON) tablet 15 mg  15 mg Oral Nightly Shun Enriquez MD   15 mg at 02/03/22 2208    trospium (SANCTURA) tablet 20 mg  20 mg Oral BID AC Shun Enriquez MD   20 mg at 02/04/22 0626    lisinopril (PRINIVIL;ZESTRIL) tablet 20 mg  20 mg Oral Daily Shun Enriquez MD   20 mg at 02/04/22 1527    hydroCHLOROthiazide (HYDRODIURIL) tablet 25 mg  25 mg Oral Daily Shun Enriquez MD   25 mg at 02/04/22 3723    clopidogrel (PLAVIX) tablet 75 mg  75 mg Oral Daily Shun Enriquez MD   75 mg at 02/04/22 4158    baclofen (LIORESAL) tablet 20 mg  20 mg Oral BID Shun Enriquez MD   20 mg at 02/04/22 5000    atorvastatin (LIPITOR) tablet 80 mg  80 mg Oral Nightly Shun Enriquez MD   80 mg at 02/03/22 2208    atenolol (TENORMIN) tablet 25 mg  25 mg Oral Daily Shun Enriquez MD   25 mg at 02/04/22 0823    sodium chloride flush 0.9 % injection 10 mL  10 mL IntraVENous 2 times per day Roland Ruiz MD   10 mL at 02/04/22 0825    sodium chloride flush 0.9 % injection 10 mL  10 mL IntraVENous PRN Roland Ruiz MD        0.9 % sodium chloride infusion  25 mL IntraVENous PRN Roland Ruiz MD        [facility]ansetron [facility]) injection 4 mg  4 mg IntraVENous Q4H PRN Roland Ruiz MD        polyethylene glycol [facility]) packet 17 g  17 g Oral Daily PRN Roland Ruiz MD        acetaminophen (TYLENOL) tablet 650 mg  650 mg Oral Q4H PRN Roland Ruiz MD        Or    acetaminophen (TYLENOL) suppository 650 mg  650 mg Rectal Q4H PRN Roland Ruiz MD        morphine (PF) injection 2 mg  2 mg IntraVENous Q4H PRN Roland Ruiz MD   2 mg at 02/03/22 1408    vancomycin (VANCOCIN) intermittent dosing (placeholder)   Other RX Matilde Hinkle MD        vancomycin (VANCOCIN) 750 mg in dextrose 5 % 250 mL IVPB  750 mg IntraVENous Q12H Roland Ruiz MD   Stopped at 02/04/22 0526           Labs:  CBC with Differential:    Lab Results   Component Value Date    WBC 9.0 02/04/2022    RBC 3.45 02/04/2022    HGB 9.3 02/04/2022    HCT 27.8 02/04/2022     02/04/2022    MCV 80.5 02/04/2022    MCH 27.0 02/04/2022    MCHC 33.5 02/04/2022    RDW 17.6 02/04/2022    SEGSPCT 75.4 02/21/2011    LYMPHOPCT 16.6 02/04/2022    MONOPCT 5.4 02/04/2022    EOSPCT 0.5 02/21/2011    BASOPCT 0.7 02/04/2022    MONOSABS 0.5 02/04/2022    LYMPHSABS 1.5 02/04/2022    EOSABS 0.2 02/04/2022    BASOSABS 0.1 02/04/2022    DIFFTYPE Auto-K 02/21/2011     CMP:    Lab Results   Component Value Date     02/04/2022    K 3.8 02/04/2022    CL 99 02/04/2022    CO2 24 02/04/2022    BUN 25 02/04/2022    CREATININE 0.8 02/04/2022    GFRAA >60 02/04/2022    GFRAA >60 02/21/2011    AGRATIO 0.8 05/28/2021    LABGLOM >60 02/04/2022    GLUCOSE 133 02/04/2022    PROT 6.8 05/28/2021    LABALBU 3.1 01/08/2022    CALCIUM 9.2 02/04/2022    BILITOT 0.9 05/28/2021 ALKPHOS 81 05/28/2021    AST 24 05/28/2021    ALT 20 05/28/2021     Hepatic Function Panel:    Lab Results   Component Value Date    ALKPHOS 81 05/28/2021    ALT 20 05/28/2021    AST 24 05/28/2021    PROT 6.8 05/28/2021    BILITOT 0.9 05/28/2021    LABALBU 3.1 01/08/2022     Magnesium:    Lab Results   Component Value Date    MG 1.50 01/08/2022     PT/INR:    Lab Results   Component Value Date    PROTIME 18.6 01/07/2022    INR 1.61 01/07/2022     Last 3 Troponin:    Lab Results   Component Value Date    TROPONINI <0.01 05/28/2021    TROPONINI 0.03 01/16/2017    TROPONINI 0.13 01/15/2017     U/A:    Lab Results   Component Value Date    NITRITE NEG 01/16/2020    COLORU YELLOW 05/28/2021    PHUR 6.0 05/28/2021    WBCUA 1 04/26/2021    RBCUA 1 04/26/2021    CLARITYU Clear 05/28/2021    SPECGRAV 1.021 05/28/2021    LEUKOCYTESUR Negative 05/28/2021    UROBILINOGEN 2.0 05/28/2021    BILIRUBINUR Negative 05/28/2021    BILIRUBINUR NEG 01/16/2020    BLOODU Negative 05/28/2021    GLUCOSEU Negative 05/28/2021     ABG:  No results found for: PHART, DBE4FUJ, PO2ART, OQP3YGQ, BEART, THGBART, HAC5OWV, Q7UQBRUE  FLP:    Lab Results   Component Value Date    TRIG 115 04/22/2021    HDL 38 04/22/2021    HDL 60 02/22/2011    LDLCALC 100 04/22/2021    LABVLDL 23 04/22/2021     TSH:  No results found for: TSH   DATA:   ECG: Sinus Rhythm       ASSESSMENT:   1 right heel wound infection MRI is pending to be done  Patient on vancomycin and cefepime  2 bacteremia consult ID for input and duration of IV antibiotic  3 CAD no chest pain  4 hypertension  Well controlled  5 high cholesterol  Check lipid profile in a.m.   PLAN   Patient on vancomycin and cefepime  Infectious disease consult, MRI pending

## 2022-02-04 NOTE — PLAN OF CARE
Problem: Falls - Risk of:  Goal: Will remain free from falls  Description: Will remain free from falls  Outcome: Ongoing  Goal: Absence of physical injury  Description: Absence of physical injury  Outcome: Ongoing     Problem: Safety:  Goal: Free from accidental physical injury  Description: Free from accidental physical injury  Outcome: Ongoing  Goal: Free from intentional harm  Description: Free from intentional harm  Outcome: Ongoing     Problem: Daily Care:  Goal: Daily care needs are met  Description: Daily care needs are met  Outcome: Ongoing     Problem: Pain:  Goal: Patient's pain/discomfort is manageable  Description: Patient's pain/discomfort is manageable  Outcome: Ongoing     Problem: Skin Integrity:  Goal: Skin integrity will stabilize  Description: Skin integrity will stabilize  Outcome: Ongoing     Problem: Skin Integrity:  Goal: Will show no infection signs and symptoms  Description: Will show no infection signs and symptoms  Outcome: Ongoing  Goal: Absence of new skin breakdown  Description: Absence of new skin breakdown  Outcome: Ongoing   Electronically signed by Charley Garcia RN on 2/4/2022 at 5:03 AM

## 2022-02-04 NOTE — CONSULTS
Department of Podiatry Consult Note  Yareli Fuentes. Jing Morrison DPM Attending       Reason for Consult:  RIGHT heel wound to bone  Requesting Physician:  Jose Trejo MD    CHIEF COMPLAINT:  Pain to wound of RIGHT heel secondary to pressure of contracted lower extremities    HISTORY OF PRESENT ILLNESS:                The patient is a 78 y.o. male with significant past medical history of Hypertension, Coronary Artery Disease and on-going heel ulcer with failure to heal who is consulted for continued care of RIGHT postero-plantar heel with exposed Zortman's tendon and calcaneal wall laterally with large eschar and foul under drainage to proximal portions.  Patient relates pain, but otherwise one word answers to questions    Past Medical History:        Diagnosis Date    Acute MI ([address] 09/2013    Anxiety     Arthritis     CAD (coronary artery disease)     COVID-19     Hyperlipidemia 2/3/2022    Hypertension     Influenza A 01/15/2017     Past Surgical History:        Procedure Laterality Date    BACK SURGERY N/A 1989    herniated disc    CORONARY ANGIOPLASTY WITH STENT PLACEMENT  9/13    EYE SURGERY      VASCULAR SURGERY       Current Medications:    Current Facility-Administered Medications: cefepime (MAXIPIME) 2000 mg IVPB minibag, 2,000 mg, IntraVENous, Q12H  sertraline (ZOLOFT) tablet 150 mg, 150 mg, Oral, Daily  pantoprazole (PROTONIX) tablet 40 mg, 40 mg, Oral, Daily  mirtazapine (REMERON) tablet 15 mg, 15 mg, Oral, Nightly  trospium (SANCTURA) tablet 20 mg, 20 mg, Oral, BID AC  lisinopril (PRINIVIL;ZESTRIL) tablet 20 mg, 20 mg, Oral, Daily  hydroCHLOROthiazide (HYDRODIURIL) tablet 25 mg, 25 mg, Oral, Daily  clopidogrel (PLAVIX) tablet 75 mg, 75 mg, Oral, Daily  baclofen (LIORESAL) tablet 20 mg, 20 mg, Oral, BID  atorvastatin (LIPITOR) tablet 80 mg, 80 mg, Oral, Nightly  atenolol (TENORMIN) tablet 25 mg, 25 mg, Oral, Daily  sodium chloride flush 0.9 % injection 10 mL, 10 mL, IntraVENous, 2 times per day  sodium chloride flush 0.9 % injection 10 mL, 10 mL, IntraVENous, PRN  0.9 % sodium chloride infusion, 25 mL, IntraVENous, PRN  ondansetron (ZOFRAN) injection 4 mg, 4 mg, IntraVENous, Q4H PRN  polyethylene glycol (GLYCOLAX) packet 17 g, 17 g, Oral, Daily PRN  acetaminophen (TYLENOL) tablet 650 mg, 650 mg, Oral, Q4H PRN **OR** acetaminophen (TYLENOL) suppository 650 mg, 650 mg, Rectal, Q4H PRN  morphine (PF) injection 2 mg, 2 mg, IntraVENous, Q4H PRN  vancomycin (VANCOCIN) intermittent dosing (placeholder), , Other, RX Placeholder  vancomycin (VANCOCIN) 750 mg in dextrose 5 % 250 mL IVPB, 750 mg, IntraVENous, Q12H  Allergies:   Patient has no known allergies. Social History:    TOBACCO:  Former smoker. Type of tobacco used:  Cigarettes. Quantity per day:  1 pack(s). Duration of tobacco use:  20+ years. Approximate Quit Date:  Quit 3 years ago. ETOH:  Current alcohol usage:  Type of Drink(s):  Bbeer. Frequency of use:  Weekly. Duration of alcohol use:  25+ years. Approximate date of last drink:  Last week.   Family History:       Problem Relation Age of Onset    Hypertension Mother     Hypertension Father     Heart Failure Father     Stroke Maternal Grandfather     Cancer Paternal Uncle         throat     REVIEW OF SYSTEMS:    CONSTITUTIONAL:  negative  INTEGUMENT/BREAST:  positive for skin lesion(s), dryness, changes in lesion, changes in hair and changes in nails  MUSCULOSKELETAL:  positive for  decreased range of motion and joint contracture bilateral lower extremity  NEUROLOGICAL:  positive for gait problems, weakness and pain  BEHAVIOR/PSYCH:  Depression    PHYSICAL EXAM:      Vitals:    /82   Pulse 60   Temp 97.6 °F (36.4 °C) (Oral)   Resp 18   Wt 171 lb 15.3 oz (78 kg)   SpO2 94%   BMI 21.49 kg/m²     LABS:   Recent Labs     02/03/22  0607 02/04/22  0607   WBC 8.9 9.0   HGB 8.9* 9.3*   HCT 27.2* 27.8*    272     Recent Labs     02/04/22  0607      K 3.8   CL 99   CO2 24   BUN 25* CREATININE 0.8     No results for input(s): PROT, INR, APTT in the last 72 hours. LOWER EXTREMITY EXAMINATION   SKIN:    RIGHT postero-plantar heel with 8.0 x 5.2 cm wound with dark, necrotic eschar and foul drainage noted to Desmond's tendon area    NEUROLOGIC:    Pain sensation isnot significantly changed Bilateral.  Light touch is decreasedBilateral. positive history of paresthesia Bilateral. negativehistory of burning Bilateral. Deep tendon reflexes are absent to include patellar and achilles Bilateral. [place]--Herman 5.07 monofilament sensitivity is abnormal 4 to 5 spots tested Bilateral.    VASCULAR:    bilaterally Dorsalis pedis is absent. bilaterally Posterior tibial pulse is absent. 2+ edema right. mild Varicosities right. MUSCULOSKELETAL:  Kendall Sera is untested due to paraplegia. Muscle strength is 1/5 to all groups tested to include dorsiflexion, plantarflexion, inversion, eversion, and digital Bilateral . The ranges of motion of all joints tested from ankle distal is decreased there is crepitus noted Right. Radiology 2/3/2022  Mirza Leeks is severe diffuse osteopenia. Magdalena Leeks is soft tissue   ulceration of the hindfoot.  Subtle lucencies noted in the calcaneus may   represent erosive changes from osteomyelitis.  No evidence of an acute   traumatic displaced fracture.           Impression   Soft tissue ulceration of the hindfoot with possible lucencies in the   calcaneus that may represent erosive changes from osteomyelitis versus   overlying soft tissue artifact.       Severe diffuse osteopenia           IMPRESSION/RECOMMENDATIONS    1. Deeply penetrating RIGHT heel wound with exposed calcaneus / [place]'s tendon    Upon Verbal consent, the RIGHT heel was painted with Iodine, and the dark eschar of 5.2 x 8.0 cm was EXCISIONALLY Debrided of muscle and tendon with exposure of foul drainage of 1.5-2 ml. Area was rinsed with sterile saline, and the wound then packed with Iodine soaked Iodoform gauze    2. Osteomyelitis of RIGHT calcaneus    3. Severe peripheral vascular disease      4. Paraplegia with joint contractures about both knees, contributing to the pressure ulcers to both heels. Patient will require a more formal I and D of RIGHT heel, but with exposure of calcaneus and tendon, there would not be a salvageable foot, as patient is non-ambulatory. Will discuss options of amputation with patient. Thank you for the opportunity to take part in the patient's care.     Sarah Jackson DPLETTY  [facility]  [phone]

## 2022-02-04 NOTE — CARE COORDINATION
Patient came from [facility] to arrival.  Call to [facility], at [phone] who confirmed the patient is:  [] [address], no Precert required for return.  [] [address] will be required for return. [] Skilled Nursing Care, no Precert required for return. [x] [address] required for return. [x] Is fully vaccinated for Covid. [] Has started Covid vaccination, but is not complete. [] Is not vaccinated for Covid. [x] No Covid Test needed for return.  [] Rapid Covid test needed before return within: [] 48 hours, [] 72 hours, [] 5 days, [] other   [] PCR Covid test needed before return. [x] Confirmed with the patient or family that the plan is to return to this facility at D/C. [] Patient and/or designated decision maker does not plan for the patient to return to this facility at D/C.

## 2022-02-05 LAB
ANION GAP SERPL CALCULATED.3IONS-SCNC: 14 MMOL/L (ref 3–16)
BASOPHILS ABSOLUTE: 0.1 K/UL (ref 0–0.2)
BASOPHILS RELATIVE PERCENT: 0.6 %
BUN BLDV-MCNC: 28 MG/DL (ref 7–20)
C-REACTIVE PROTEIN: 102.7 MG/L (ref 0–5.1)
CALCIUM SERPL-MCNC: 9 MG/DL (ref 8.3–10.6)
CHLORIDE BLD-SCNC: 100 MMOL/L (ref 99–110)
CO2: 22 MMOL/L (ref 21–32)
CREAT SERPL-MCNC: 1 MG/DL (ref 0.8–1.3)
EOSINOPHILS ABSOLUTE: 0.2 K/UL (ref 0–0.6)
EOSINOPHILS RELATIVE PERCENT: 2.1 %
GFR AFRICAN AMERICAN: >60
GFR NON-AFRICAN AMERICAN: >60
GLUCOSE BLD-MCNC: 130 MG/DL (ref 70–99)
HCT VFR BLD CALC: 28.1 % (ref 40.5–52.5)
HEMOGLOBIN: 9.4 G/DL (ref 13.5–17.5)
LYMPHOCYTES ABSOLUTE: 1.5 K/UL (ref 1–5.1)
LYMPHOCYTES RELATIVE PERCENT: 16.8 %
MCH RBC QN AUTO: 26.8 PG (ref 26–34)
MCHC RBC AUTO-ENTMCNC: 33.5 G/DL (ref 31–36)
MCV RBC AUTO: 80.1 FL (ref 80–100)
MONOCYTES ABSOLUTE: 0.6 K/UL (ref 0–1.3)
MONOCYTES RELATIVE PERCENT: 6.3 %
NEUTROPHILS ABSOLUTE: 6.8 K/UL (ref 1.7–7.7)
NEUTROPHILS RELATIVE PERCENT: 74.2 %
PDW BLD-RTO: 17.4 % (ref 12.4–15.4)
PLATELET # BLD: 282 K/UL (ref 135–450)
PMV BLD AUTO: 6.7 FL (ref 5–10.5)
POTASSIUM REFLEX MAGNESIUM: 4.1 MMOL/L (ref 3.5–5.1)
RBC # BLD: 3.5 M/UL (ref 4.2–5.9)
REPORT: NORMAL
SEDIMENTATION RATE, ERYTHROCYTE: 87 MM/HR (ref 0–20)
SODIUM BLD-SCNC: 136 MMOL/L (ref 136–145)
WBC # BLD: 9.1 K/UL (ref 4–11)

## 2022-02-05 PROCEDURE — 6360000002 HC RX W HCPCS: Performed by: INTERNAL MEDICINE

## 2022-02-05 PROCEDURE — 87070 CULTURE OTHR SPECIMN AEROBIC: CPT

## 2022-02-05 PROCEDURE — 76937 US GUIDE VASCULAR ACCESS: CPT

## 2022-02-05 PROCEDURE — 2580000003 HC RX 258: Performed by: INTERNAL MEDICINE

## 2022-02-05 PROCEDURE — 87186 SC STD MICRODIL/AGAR DIL: CPT

## 2022-02-05 PROCEDURE — 80048 BASIC METABOLIC PNL TOTAL CA: CPT

## 2022-02-05 PROCEDURE — 87181 SC STD AGAR DILUTION PER AGT: CPT

## 2022-02-05 PROCEDURE — 6370000000 HC RX 637 (ALT 250 FOR IP): Performed by: INTERNAL MEDICINE

## 2022-02-05 PROCEDURE — 87040 BLOOD CULTURE FOR BACTERIA: CPT

## 2022-02-05 PROCEDURE — 85025 COMPLETE CBC W/AUTO DIFF WBC: CPT

## 2022-02-05 PROCEDURE — C1751 CATH, INF, PER/CENT/MIDLINE: HCPCS

## 2022-02-05 PROCEDURE — 36569 INSJ PICC 5 YR+ W/O IMAGING: CPT

## 2022-02-05 PROCEDURE — 86140 C-REACTIVE PROTEIN: CPT

## 2022-02-05 PROCEDURE — 87205 SMEAR GRAM STAIN: CPT

## 2022-02-05 PROCEDURE — 87184 SC STD DISK METHOD PER PLATE: CPT

## 2022-02-05 PROCEDURE — 1200000000 HC SEMI PRIVATE

## 2022-02-05 PROCEDURE — 36415 COLL VENOUS BLD VENIPUNCTURE: CPT

## 2022-02-05 PROCEDURE — 87077 CULTURE AEROBIC IDENTIFY: CPT

## 2022-02-05 PROCEDURE — 85652 RBC SED RATE AUTOMATED: CPT

## 2022-02-05 RX ORDER — SODIUM HYPOCHLORITE 5 MG/ML
SOLUTION TOPICAL DAILY
Status: DISCONTINUED | OUTPATIENT
Start: 2022-02-05 | End: 2022-02-11 | Stop reason: HOSPADM

## 2022-02-05 RX ADMIN — BACLOFEN 20 MG: 10 TABLET ORAL at 08:23

## 2022-02-05 RX ADMIN — ATORVASTATIN CALCIUM 80 MG: 80 TABLET, FILM COATED ORAL at 21:42

## 2022-02-05 RX ADMIN — PIPERACILLIN AND TAZOBACTAM 3375 MG: 3; .375 INJECTION, POWDER, LYOPHILIZED, FOR SOLUTION INTRAVENOUS at 14:22

## 2022-02-05 RX ADMIN — CLOPIDOGREL BISULFATE 75 MG: 75 TABLET ORAL at 08:22

## 2022-02-05 RX ADMIN — SODIUM CHLORIDE, PRESERVATIVE FREE 10 ML: 5 INJECTION INTRAVENOUS at 10:13

## 2022-02-05 RX ADMIN — TROSPIUM CHLORIDE 20 MG: 20 TABLET, FILM COATED ORAL at 14:19

## 2022-02-05 RX ADMIN — PIPERACILLIN AND TAZOBACTAM 3375 MG: 3; .375 INJECTION, POWDER, LYOPHILIZED, FOR SOLUTION INTRAVENOUS at 21:46

## 2022-02-05 RX ADMIN — PIPERACILLIN AND TAZOBACTAM 3375 MG: 3; .375 INJECTION, POWDER, LYOPHILIZED, FOR SOLUTION INTRAVENOUS at 04:47

## 2022-02-05 RX ADMIN — BACLOFEN 20 MG: 10 TABLET ORAL at 21:42

## 2022-02-05 RX ADMIN — SODIUM CHLORIDE, PRESERVATIVE FREE 10 ML: 5 INJECTION INTRAVENOUS at 21:50

## 2022-02-05 RX ADMIN — HYDROCHLOROTHIAZIDE 25 MG: 25 TABLET ORAL at 08:22

## 2022-02-05 RX ADMIN — TROSPIUM CHLORIDE 20 MG: 20 TABLET, FILM COATED ORAL at 06:31

## 2022-02-05 RX ADMIN — ATENOLOL 25 MG: 25 TABLET ORAL at 08:22

## 2022-02-05 RX ADMIN — SERTRALINE 150 MG: 100 TABLET, FILM COATED ORAL at 08:22

## 2022-02-05 RX ADMIN — PANTOPRAZOLE SODIUM 40 MG: 40 TABLET, DELAYED RELEASE ORAL at 08:22

## 2022-02-05 RX ADMIN — MIRTAZAPINE 15 MG: 15 TABLET, FILM COATED ORAL at 21:42

## 2022-02-05 RX ADMIN — LISINOPRIL 20 MG: 20 TABLET ORAL at 08:22

## 2022-02-05 NOTE — PROGRESS NOTES
Upon arrival to place PICC line assessed chart for issues related to picc placement, check for consent, and did time out with RN Kimo Mccormack. Pt. Tolerated PICC placement well, no difficulty accessing basilic vein and 3CG technology used to verify PICC tip placement. Positive P wave with no negative deflection. Printed wave form and placed In chart.  Reported off to Lorenzo Ortiz

## 2022-02-05 NOTE — PROGRESS NOTES
Department of Podiatry Progress Note  2/5/2022  Kurt Spence          HISTORY OF PRESENT ILLNESS:                The patient is a 78 y.o. male with Right heel ulceration that has exposed calcaneus and possibly the achilles tendon insertion. The patinet has no pain in the heel unless it is manipulated. He has knee contracture, bilaterally. He does not walk.     Past Medical History:        Diagnosis Date    Acute MI ([address] 09/2013    Anxiety     Arthritis     CAD (coronary artery disease)     COVID-19     Hyperlipidemia 2/3/2022    Hypertension     Influenza A 01/15/2017     Past Surgical History:        Procedure Laterality Date    BACK SURGERY N/A 1989    herniated disc    CORONARY ANGIOPLASTY WITH STENT PLACEMENT  9/13    EYE SURGERY      VASCULAR SURGERY       Current Medications:    Current Facility-Administered Medications: sodium hypochlorite (DAKINS) external solution, , Irrigation, Daily  piperacillin-tazobactam (ZOSYN) 3,375 mg in dextrose 5 % 100 mL IVPB extended infusion (mini-bag), 3,375 mg, IntraVENous, Q8H  lidocaine PF 1 % injection 5 mL, 5 mL, IntraDERmal, Once  sodium chloride flush 0.9 % injection 5-40 mL, 5-40 mL, IntraVENous, 2 times per day  sodium chloride flush 0.9 % injection 5-40 mL, 5-40 mL, IntraVENous, PRN  0.9 % sodium chloride infusion, 25 mL, IntraVENous, PRN  sertraline (ZOLOFT) tablet 150 mg, 150 mg, Oral, Daily  pantoprazole (PROTONIX) tablet 40 mg, 40 mg, Oral, Daily  mirtazapine (REMERON) tablet 15 mg, 15 mg, Oral, Nightly  trospium (SANCTURA) tablet 20 mg, 20 mg, Oral, BID AC  lisinopril (PRINIVIL;ZESTRIL) tablet 20 mg, 20 mg, Oral, Daily  hydroCHLOROthiazide (HYDRODIURIL) tablet 25 mg, 25 mg, Oral, Daily  clopidogrel (PLAVIX) tablet 75 mg, 75 mg, Oral, Daily  baclofen (LIORESAL) tablet 20 mg, 20 mg, Oral, BID  atorvastatin (LIPITOR) tablet 80 mg, 80 mg, Oral, Nightly  atenolol (TENORMIN) tablet 25 mg, 25 mg, Oral, Daily  sodium chloride flush 0.9 % injection 10 mL, 10 mL, IntraVENous, 2 times per day  sodium chloride flush 0.9 % injection 10 mL, 10 mL, IntraVENous, PRN  0.9 % sodium chloride infusion, 25 mL, IntraVENous, PRN  ondansetron (ZOFRAN) injection 4 mg, 4 mg, IntraVENous, Q4H PRN  polyethylene glycol (GLYCOLAX) packet 17 g, 17 g, Oral, Daily PRN  acetaminophen (TYLENOL) tablet 650 mg, 650 mg, Oral, Q4H PRN **OR** acetaminophen (TYLENOL) suppository 650 mg, 650 mg, Rectal, Q4H PRN  morphine (PF) injection 2 mg, 2 mg, IntraVENous, Q4H PRN  Allergies:   Patient has no known allergies. Social History:    TOBACCO:  Former smoker. Type of tobacco used:  Cigarettes. Quantity per day:  1 pack(s). Duration of tobacco use:  20+ years. Approximate Quit Date:  Quit 3 years ago. ETOH:  Current alcohol usage:  Type of Drink(s):  Bbeer. Frequency of use:  Weekly. Duration of alcohol use:  25+ years. Approximate date of last drink:  Last week. Family History:       Problem Relation Age of Onset    Hypertension Mother     Hypertension Father     Heart Failure Father     Stroke Maternal Grandfather     Cancer Paternal Uncle         throat     REVIEW OF SYSTEMS:    CONSTITUTIONAL:  negative  INTEGUMENT/BREAST:  positive for skin lesion(s), dryness, changes in lesion, changes in hair and changes in nails  MUSCULOSKELETAL:  positive for  decreased range of motion and joint contracture bilateral lower extremity  NEUROLOGICAL:  positive for gait problems, weakness and pain  BEHAVIOR/PSYCH:  Depression    PHYSICAL EXAM:      Vitals:    BP 96/63   Pulse 92   Temp 97.8 °F (36.6 °C) (Oral)   Resp 18   Wt 176 lb 9.4 oz (80.1 kg)   SpO2 96%   BMI 22.07 kg/m²     LABS:   Recent Labs     02/04/22  0607 02/05/22  0536   WBC 9.0 9.1   HGB 9.3* 9.4*   HCT 27.8* 28.1*    282     Recent Labs     02/05/22  0536      K 4.1      CO2 22   BUN 28*   CREATININE 1.0     No results for input(s): PROT, INR, APTT in the last 72 hours.     LOWER EXTREMITY EXAMINATION   SKIN: RIGHT postero-plantar heel with 8.0 x 5.2 cm wound with necrotic malodorous presentation  The left heel has a very small ulceration to the plantar fat pad. NEUROLOGIC:    Pain sensation isnot significantly changed Bilateral.  Light touch is decreasedBilateral. positive history of paresthesia Bilateral. negativehistory of burning Bilateral. Deep tendon reflexes are absent to include patellar and achilles Bilateral. [place]--Herman 5.07 monofilament sensitivity is abnormal 4 to 5 spots tested Bilateral.    VASCULAR:    bilaterally Dorsalis pedis is absent. bilaterally Posterior tibial pulse is absent. 2+ edema right. mild Varicosities right. MUSCULOSKELETAL:  Charly Hernandez is untested due to paraplegia. Muscle strength is 1/5 to all groups tested to include dorsiflexion, plantarflexion, inversion, eversion, and digital Bilateral . The ranges of motion of all joints tested from ankle distal is decreased there is crepitus noted Right. Radiology 2/3/2022  Ezra Letters is severe diffuse osteopenia. Ezra Letters is soft tissue   ulceration of the hindfoot.  Subtle lucencies noted in the calcaneus may   represent erosive changes from osteomyelitis.  No evidence of an acute   traumatic displaced fracture.           Impression   Soft tissue ulceration of the hindfoot with possible lucencies in the   calcaneus that may represent erosive changes from osteomyelitis versus   overlying soft tissue artifact.       Severe diffuse osteopenia           IMPRESSION/RECOMMENDATIONS    1. Deeply penetrating RIGHT heel wound with exposed calcaneus / Lynn's tendon    The right heel wound then packed with Iodine soaked Iodoform gauze  Dakins ordered for daily application to treat Enterococcus species. 2. Osteomyelitis of RIGHT calcaneus    3. Left heel wound appears small and granular. 4. Severe peripheral vascular disease    5.   Paraplegia with joint contractures about both knees, contributing to the pressure ulcers to both heels.    DISPO: MRI not possible due to positioning. The patient may benefit from removal of the foot and a Symmes amputation rather than a BKA. He is already contracted and avoiding the AKA would likely make his longterm positioning easier. Thank you for the opportunity to take part in the patient's care.     Deep Anderson DPM  [facility]  Pager: [phone]  Office: [phone]  Fax: [phone]

## 2022-02-05 NOTE — PLAN OF CARE
Touched base with RN about the pending MRI and the plan. Patient has refused MRI couple days ago, he can't straighten leg and is in too much pain. Per RN at this point, leg is way too contracted for patient to undergo an MRI.

## 2022-02-05 NOTE — PROGRESS NOTES
Hospitalist  Progress Note       Clara Ortega is a 78 y.o. male   1955     SUBJECTIVE:   Pt S/E. No acute events. Blood cultures with enterococcus. He says his feet only hurts when he moves    OBJECTIVE:    Review of Systems:  General appearance: alert, appears stated age and cooperative  Skin: Skin color, texture, normal. No rashes or lesions  HEENT: No nose bleed, headache, vision problems  CV: C/O chest pain, tightness, pressure,   Respiratory: C/o no SOB, SMITH, Orthopnea, PND  GI: No abdominal pain, black stool, bloating  Limbs: No c/o edema, pain, swelling, intermittent claudication, joint pains  Neuro: No dizziness, lightheadedness, syncope, gait problems, memory problems  Psych: grossly normal. No SI/depression. Vitals:   Blood pressure 122/74, pulse 84, temperature 98 °F (36.7 °C), temperature source Oral, resp. rate 16, weight 176 lb 9.4 oz (80.1 kg), SpO2 95 %.     General: appears acutely ill, nad  HEENT: AT, NC, PERRLA  Neck: No JVD  Heart: S1 S2 audible, no murmur   Lungs: CTA, no wheezing or rales   Abdomen: Nontender, + bs  Limbs: Lower extremity wounds are wrapped,   CNS: A&O, no focal deficits, moves all extremities spontaneously       Past Medical History:   Diagnosis Date    Acute MI ([address] 09/2013    Anxiety     Arthritis     CAD (coronary artery disease)     COVID-19     Hyperlipidemia 2/3/2022    Hypertension     Influenza A 01/15/2017        Patient Active Problem List   Diagnosis    Chest pain    MI, acute, non ST segment elevation ([address]    Tobacco abuse    Essential hypertension    Acute MI ([address]    Acute CVA (cerebrovascular accident) ([address]    Right leg weakness    Acute ischemic left [place] stroke (HCC)    Non-healing open wound of right heel    Right heel infection    Hyperlipidemia    CAD (coronary artery disease)    Cellulitis of left heel        No Known Allergies     Current Inpatient Medications:    Current Facility-Administered Medications   Medication Dose Route Frequency Provider Last Rate Last Admin    piperacillin-tazobactam (ZOSYN) 3,375 mg in dextrose 5 % 100 mL IVPB extended infusion (mini-bag)  3,375 mg IntraVENous Q8H Aruna Lott MD 25 mL/hr at 02/05/22 0447 3,375 mg at 02/05/22 0447    lidocaine PF 1 % injection 5 mL  5 mL IntraDERmal Once Aruna Lott MD        sodium chloride flush 0.9 % injection 5-40 mL  5-40 mL IntraVENous 2 times per day Aruna Lott MD   10 mL at 02/04/22 2202    sodium chloride flush 0.9 % injection 5-40 mL  5-40 mL IntraVENous PRN Aruna Lott MD        0.9 % sodium chloride infusion  25 mL IntraVENous PRN Aruna Lott MD        sertraline (ZOLOFT) tablet 150 mg  150 mg Oral Daily Sofie Montalvo MD   150 mg at 02/05/22 0177    pantoprazole (PROTONIX) tablet 40 mg  40 mg Oral Daily Sofie Montalvo MD   40 mg at 02/05/22 6880    mirtazapine (REMERON) tablet 15 mg  15 mg Oral Nightly Sofie Montalvo MD   15 mg at 02/04/22 2139    trospium (SANCTURA) tablet 20 mg  20 mg Oral BID AC Sofie Montalvo MD   20 mg at 02/05/22 0631    lisinopril (PRINIVIL;ZESTRIL) tablet 20 mg  20 mg Oral Daily Sofie Montalvo MD   20 mg at 02/05/22 8078    hydroCHLOROthiazide (HYDRODIURIL) tablet 25 mg  25 mg Oral Daily Sofie Montalvo MD   25 mg at 02/05/22 3677    clopidogrel (PLAVIX) tablet 75 mg  75 mg Oral Daily Sofie Montalvo MD   75 mg at 02/05/22 9395    baclofen (LIORESAL) tablet 20 mg  20 mg Oral BID Sofie Montalvo MD   20 mg at 02/05/22 8133    atorvastatin (LIPITOR) tablet 80 mg  80 mg Oral Nightly Sofie Montalvo MD   80 mg at 02/04/22 2140    atenolol (TENORMIN) tablet 25 mg  25 mg Oral Daily Sofie Montalvo MD   25 mg at 02/05/22 4375    sodium chloride flush 0.9 % injection 10 mL  10 mL IntraVENous 2 times per day Sofie Montalvo MD   10 mL at 02/04/22 2139    sodium chloride flush 0.9 % injection 10 mL  10 mL IntraVENous PRN Sofie Montalvo MD        0.9 % sodium chloride infusion 25 mL IntraVENous PRN Sindhu Rosales MD        ondansetron [facility]) injection 4 mg  4 mg IntraVENous Q4H PRN Sindhu Rosales MD        polyethylene glycol [facility]) packet 17 g  17 g Oral Daily PRN Sindhu Rosales MD        acetaminophen (TYLENOL) tablet 650 mg  650 mg Oral Q4H PRN Sindhu Rosales MD        Or    acetaminophen (TYLENOL) suppository 650 mg  650 mg Rectal Q4H PRN Sindhu Rosales MD        morphine (PF) injection 2 mg  2 mg IntraVENous Q4H PRN Sindhu Rosales MD   2 mg at 02/03/22 1408           Labs:  CBC with Differential:    Lab Results   Component Value Date    WBC 9.1 02/05/2022    RBC 3.50 02/05/2022    HGB 9.4 02/05/2022    HCT 28.1 02/05/2022     02/05/2022    MCV 80.1 02/05/2022    MCH 26.8 02/05/2022    MCHC 33.5 02/05/2022    RDW 17.4 02/05/2022    SEGSPCT 75.4 02/21/2011    LYMPHOPCT 16.8 02/05/2022    MONOPCT 6.3 02/05/2022    EOSPCT 0.5 02/21/2011    BASOPCT 0.6 02/05/2022    MONOSABS 0.6 02/05/2022    LYMPHSABS 1.5 02/05/2022    EOSABS 0.2 02/05/2022    BASOSABS 0.1 02/05/2022    DIFFTYPE Auto-K 02/21/2011     CMP:    Lab Results   Component Value Date     02/05/2022    K 4.1 02/05/2022     02/05/2022    CO2 22 02/05/2022    BUN 28 02/05/2022    CREATININE 1.0 02/05/2022    GFRAA >60 02/05/2022    GFRAA >60 02/21/2011    AGRATIO 0.8 05/28/2021    LABGLOM >60 02/05/2022    GLUCOSE 130 02/05/2022    PROT 6.8 05/28/2021    LABALBU 3.1 01/08/2022    CALCIUM 9.0 02/05/2022    BILITOT 0.9 05/28/2021    ALKPHOS 81 05/28/2021    AST 24 05/28/2021    ALT 20 05/28/2021     Hepatic Function Panel:    Lab Results   Component Value Date    ALKPHOS 81 05/28/2021    ALT 20 05/28/2021    AST 24 05/28/2021    PROT 6.8 05/28/2021    BILITOT 0.9 05/28/2021    LABALBU 3.1 01/08/2022     Magnesium:    Lab Results   Component Value Date    MG 1.50 01/08/2022     PT/INR:    Lab Results   Component Value Date    PROTIME 18.6 01/07/2022    INR 1.61 01/07/2022     Last 3 Troponin: Lab Results   Component Value Date    TROPONINI <0.01 05/28/2021    TROPONINI 0.03 01/16/2017    TROPONINI 0.13 01/15/2017     U/A:    Lab Results   Component Value Date    NITRITE NEG 01/16/2020    COLORU YELLOW 05/28/2021    PHUR 6.0 05/28/2021    WBCUA 1 04/26/2021    RBCUA 1 04/26/2021    CLARITYU Clear 05/28/2021    SPECGRAV 1.021 05/28/2021    LEUKOCYTESUR Negative 05/28/2021    UROBILINOGEN 2.0 05/28/2021    BILIRUBINUR Negative 05/28/2021    BILIRUBINUR NEG 01/16/2020    BLOODU Negative 05/28/2021    GLUCOSEU Negative 05/28/2021     ABG:  No results found for: PHART, ASD5JMF, PO2ART, CKE7CGR, BEART, THGBART, FMK7MKR, F0NQMXEM  FLP:    Lab Results   Component Value Date    TRIG 115 04/22/2021    HDL 38 04/22/2021    HDL 60 02/22/2011    LDLCALC 100 04/22/2021    LABVLDL 23 04/22/2021     TSH:  No results found for: TSH   DATA:   ECG: Sinus Rhythm       ASSESSMENT/Plan:    right heel wound infection   - crp 102, esr 87  - wound culture pending  - MRI is unable to be done due to contractures  - vancomycin and cefepime -> zosyn   - d/w podiatry    bacteremia   - blood cx:   Enterococcus faecalis DNA Detected Abnormal  P     Blood Culture, Routine Jorgito/B gene not detected      - consult ID     Chronic conditions - cont home meds unless otherwise stated     CAD no chest pain  Hypertension    Dispo: in pt

## 2022-02-06 LAB
ANION GAP SERPL CALCULATED.3IONS-SCNC: 12 MMOL/L (ref 3–16)
BASOPHILS ABSOLUTE: 0.1 K/UL (ref 0–0.2)
BASOPHILS RELATIVE PERCENT: 0.8 %
BLOOD CULTURE, ROUTINE: ABNORMAL
BUN BLDV-MCNC: 26 MG/DL (ref 7–20)
CALCIUM SERPL-MCNC: 8.9 MG/DL (ref 8.3–10.6)
CHLORIDE BLD-SCNC: 99 MMOL/L (ref 99–110)
CO2: 23 MMOL/L (ref 21–32)
CREAT SERPL-MCNC: 0.9 MG/DL (ref 0.8–1.3)
EOSINOPHILS ABSOLUTE: 0.2 K/UL (ref 0–0.6)
EOSINOPHILS RELATIVE PERCENT: 2 %
GFR AFRICAN AMERICAN: >60
GFR NON-AFRICAN AMERICAN: >60
GLUCOSE BLD-MCNC: 123 MG/DL (ref 70–99)
HCT VFR BLD CALC: 26.7 % (ref 40.5–52.5)
HEMOGLOBIN: 8.7 G/DL (ref 13.5–17.5)
LYMPHOCYTES ABSOLUTE: 1.7 K/UL (ref 1–5.1)
LYMPHOCYTES RELATIVE PERCENT: 17.2 %
MCH RBC QN AUTO: 26.3 PG (ref 26–34)
MCHC RBC AUTO-ENTMCNC: 32.6 G/DL (ref 31–36)
MCV RBC AUTO: 80.6 FL (ref 80–100)
MONOCYTES ABSOLUTE: 0.5 K/UL (ref 0–1.3)
MONOCYTES RELATIVE PERCENT: 5.3 %
NEUTROPHILS ABSOLUTE: 7.2 K/UL (ref 1.7–7.7)
NEUTROPHILS RELATIVE PERCENT: 74.7 %
ORGANISM: ABNORMAL
PDW BLD-RTO: 17.7 % (ref 12.4–15.4)
PLATELET # BLD: 253 K/UL (ref 135–450)
PMV BLD AUTO: 6.3 FL (ref 5–10.5)
POTASSIUM REFLEX MAGNESIUM: 3.9 MMOL/L (ref 3.5–5.1)
RBC # BLD: 3.31 M/UL (ref 4.2–5.9)
SODIUM BLD-SCNC: 134 MMOL/L (ref 136–145)
WBC # BLD: 9.6 K/UL (ref 4–11)

## 2022-02-06 PROCEDURE — 6360000002 HC RX W HCPCS: Performed by: INTERNAL MEDICINE

## 2022-02-06 PROCEDURE — 2580000003 HC RX 258: Performed by: INTERNAL MEDICINE

## 2022-02-06 PROCEDURE — 80048 BASIC METABOLIC PNL TOTAL CA: CPT

## 2022-02-06 PROCEDURE — 6370000000 HC RX 637 (ALT 250 FOR IP): Performed by: INTERNAL MEDICINE

## 2022-02-06 PROCEDURE — 6370000000 HC RX 637 (ALT 250 FOR IP): Performed by: PODIATRIST

## 2022-02-06 PROCEDURE — 85025 COMPLETE CBC W/AUTO DIFF WBC: CPT

## 2022-02-06 PROCEDURE — 1200000000 HC SEMI PRIVATE

## 2022-02-06 RX ADMIN — BACLOFEN 20 MG: 10 TABLET ORAL at 21:29

## 2022-02-06 RX ADMIN — TROSPIUM CHLORIDE 20 MG: 20 TABLET, FILM COATED ORAL at 17:00

## 2022-02-06 RX ADMIN — SODIUM HYPOCHLORITE: 5 SOLUTION TOPICAL at 11:40

## 2022-02-06 RX ADMIN — LISINOPRIL 20 MG: 20 TABLET ORAL at 08:00

## 2022-02-06 RX ADMIN — PIPERACILLIN AND TAZOBACTAM 3375 MG: 3; .375 INJECTION, POWDER, LYOPHILIZED, FOR SOLUTION INTRAVENOUS at 13:06

## 2022-02-06 RX ADMIN — BACLOFEN 20 MG: 10 TABLET ORAL at 08:00

## 2022-02-06 RX ADMIN — SODIUM CHLORIDE, PRESERVATIVE FREE 10 ML: 5 INJECTION INTRAVENOUS at 21:26

## 2022-02-06 RX ADMIN — ATORVASTATIN CALCIUM 80 MG: 80 TABLET, FILM COATED ORAL at 21:29

## 2022-02-06 RX ADMIN — SERTRALINE 150 MG: 100 TABLET, FILM COATED ORAL at 08:00

## 2022-02-06 RX ADMIN — HYDROCHLOROTHIAZIDE 25 MG: 25 TABLET ORAL at 08:00

## 2022-02-06 RX ADMIN — CLOPIDOGREL BISULFATE 75 MG: 75 TABLET ORAL at 08:00

## 2022-02-06 RX ADMIN — SODIUM CHLORIDE, PRESERVATIVE FREE 10 ML: 5 INJECTION INTRAVENOUS at 08:00

## 2022-02-06 RX ADMIN — PANTOPRAZOLE SODIUM 40 MG: 40 TABLET, DELAYED RELEASE ORAL at 08:00

## 2022-02-06 RX ADMIN — SODIUM CHLORIDE, PRESERVATIVE FREE 10 ML: 5 INJECTION INTRAVENOUS at 21:27

## 2022-02-06 RX ADMIN — TROSPIUM CHLORIDE 20 MG: 20 TABLET, FILM COATED ORAL at 05:54

## 2022-02-06 RX ADMIN — MIRTAZAPINE 15 MG: 15 TABLET, FILM COATED ORAL at 21:29

## 2022-02-06 RX ADMIN — PIPERACILLIN AND TAZOBACTAM 3375 MG: 3; .375 INJECTION, POWDER, LYOPHILIZED, FOR SOLUTION INTRAVENOUS at 21:21

## 2022-02-06 RX ADMIN — ATENOLOL 25 MG: 25 TABLET ORAL at 08:00

## 2022-02-06 RX ADMIN — PIPERACILLIN AND TAZOBACTAM 3375 MG: 3; .375 INJECTION, POWDER, LYOPHILIZED, FOR SOLUTION INTRAVENOUS at 05:12

## 2022-02-06 ASSESSMENT — PAIN SCALES - GENERAL
PAINLEVEL_OUTOF10: 0

## 2022-02-06 ASSESSMENT — PAIN SCALES - WONG BAKER: WONGBAKER_NUMERICALRESPONSE: 0

## 2022-02-06 NOTE — PROGRESS NOTES
2/6/22 78 y.o. man with CVA and Rt side weakness now with Rt heel ulcer secondary infection drainage, malodor at presentation on going cellulitis with worsening wound infections he has bi lateral foot wounds,images reviewed. Has PVD. And he was hospitalized recently from 1/6 to 1/12/22 was treated with oral abx for the ulcer and was seen by Podiatry. Pt has osteomyelitis of the right heel and severe PVD. Podiatry on 2/5 did and I and D and is planning  an amputation. Pt is  From a NH and has LE extremity contractures and is non-ambulatory. Will need a WB status for his right LE d/t recent surgery. Possibly more appropriate to see pt after his next procedure.  Will hold for today

## 2022-02-06 NOTE — PROGRESS NOTES
Department of Podiatry Progress Note  2/6/2022  Owen Saha          HISTORY OF PRESENT ILLNESS:                The patient is a 78 y.o. male with Right heel ulceration that has exposed calcaneus and possibly the achilles tendon insertion. The patinet has no pain in the heel unless it is manipulated. He has knee contracture, bilaterally. He does not walk. Today he is rigidly driving that heel down into the bed due to spasms and pain in the right thigh and hip. He refused to straighten out the leg or internally rotate his hip. I ended up placing the prevalon boot under the ankle to get his heel up off the bed enough to change the dressing. He reported that the foot and leg felt much better with this style of off loading and it was left in this position following dressing change.        Past Medical History:        Diagnosis Date    Acute MI ([address] 09/2013    Anxiety     Arthritis     CAD (coronary artery disease)     COVID-19     Hyperlipidemia 2/3/2022    Hypertension     Influenza A 01/15/2017     Past Surgical History:        Procedure Laterality Date    BACK SURGERY N/A 1989    herniated disc    CORONARY ANGIOPLASTY WITH STENT PLACEMENT  9/13    EYE SURGERY      VASCULAR SURGERY       Current Medications:    Current Facility-Administered Medications: sodium hypochlorite (DAKINS) external solution, , Irrigation, Daily  piperacillin-tazobactam (ZOSYN) 3,375 mg in dextrose 5 % 100 mL IVPB extended infusion (mini-bag), 3,375 mg, IntraVENous, Q8H  lidocaine PF 1 % injection 5 mL, 5 mL, IntraDERmal, Once  sodium chloride flush 0.9 % injection 5-40 mL, 5-40 mL, IntraVENous, 2 times per day  sodium chloride flush 0.9 % injection 5-40 mL, 5-40 mL, IntraVENous, PRN  0.9 % sodium chloride infusion, 25 mL, IntraVENous, PRN  sertraline (ZOLOFT) tablet 150 mg, 150 mg, Oral, Daily  pantoprazole (PROTONIX) tablet 40 mg, 40 mg, Oral, Daily  mirtazapine (REMERON) tablet 15 mg, 15 mg, Oral, Nightly  trospium (SANCTURA) tablet 20 mg, 20 mg, Oral, BID AC  lisinopril (PRINIVIL;ZESTRIL) tablet 20 mg, 20 mg, Oral, Daily  hydroCHLOROthiazide (HYDRODIURIL) tablet 25 mg, 25 mg, Oral, Daily  clopidogrel (PLAVIX) tablet 75 mg, 75 mg, Oral, Daily  baclofen (LIORESAL) tablet 20 mg, 20 mg, Oral, BID  atorvastatin (LIPITOR) tablet 80 mg, 80 mg, Oral, Nightly  atenolol (TENORMIN) tablet 25 mg, 25 mg, Oral, Daily  sodium chloride flush 0.9 % injection 10 mL, 10 mL, IntraVENous, 2 times per day  sodium chloride flush 0.9 % injection 10 mL, 10 mL, IntraVENous, PRN  0.9 % sodium chloride infusion, 25 mL, IntraVENous, PRN  ondansetron (ZOFRAN) injection 4 mg, 4 mg, IntraVENous, Q4H PRN  polyethylene glycol (GLYCOLAX) packet 17 g, 17 g, Oral, Daily PRN  acetaminophen (TYLENOL) tablet 650 mg, 650 mg, Oral, Q4H PRN **OR** acetaminophen (TYLENOL) suppository 650 mg, 650 mg, Rectal, Q4H PRN  morphine (PF) injection 2 mg, 2 mg, IntraVENous, Q4H PRN  Allergies:   Patient has no known allergies. Social History:    TOBACCO:  Former smoker. Type of tobacco used:  Cigarettes. Quantity per day:  1 pack(s). Duration of tobacco use:  20+ years. Approximate Quit Date:  Quit 3 years ago. ETOH:  Current alcohol usage:  Type of Drink(s):  Bbeer. Frequency of use:  Weekly. Duration of alcohol use:  25+ years. Approximate date of last drink:  Last week.   Family History:       Problem Relation Age of Onset    Hypertension Mother     Hypertension Father     Heart Failure Father     Stroke Maternal Grandfather     Cancer Paternal Uncle         throat     REVIEW OF SYSTEMS:    CONSTITUTIONAL:  negative  INTEGUMENT/BREAST:  positive for skin lesion(s), dryness, changes in lesion, changes in hair and changes in nails  MUSCULOSKELETAL:  positive for  decreased range of motion and joint contracture bilateral lower extremity  NEUROLOGICAL:  positive for gait problems, weakness and pain  BEHAVIOR/PSYCH:  Depression    PHYSICAL EXAM:      Vitals:    BP 106/67   Pulse 74   Temp 97.8 °F (36.6 °C) (Oral)   Resp 16   Wt 173 lb 1 oz (78.5 kg)   SpO2 98%   BMI 21.63 kg/m²     LABS:   Recent Labs     02/05/22  0536 02/06/22  0546   WBC 9.1 9.6   HGB 9.4* 8.7*   HCT 28.1* 26.7*    253     Recent Labs     02/06/22  0546   *   K 3.9   CL 99   CO2 23   BUN 26*   CREATININE 0.9     No results for input(s): PROT, INR, APTT in the last 72 hours. LOWER EXTREMITY EXAMINATION   SKIN:    RIGHT postero-plantar heel with 8.0 x 5.2 cm wound with necrotic malodorous presentation  The left heel has a very small ulceration to the plantar fat pad. NEUROLOGIC:    Pain sensation isnot significantly changed Bilateral.  Light touch is decreasedBilateral. positive history of paresthesia Bilateral. negativehistory of burning Bilateral. Deep tendon reflexes are absent to include patellar and achilles Bilateral. Jay--Herman 5.07 monofilament sensitivity is abnormal 4 to 5 spots tested Bilateral.    VASCULAR:    bilaterally Dorsalis pedis is absent. bilaterally Posterior tibial pulse is absent. 2+ edema right. mild Varicosities right. MUSCULOSKELETAL:  Kimberlee Mancilla is untested due to paraplegia. Muscle strength is 1/5 to all groups tested to include dorsiflexion, plantarflexion, inversion, eversion, and digital Bilateral . The ranges of motion of all joints tested from ankle distal is decreased there is crepitus noted Right. Radiology 2/3/2022  Lilliana Falkner is severe diffuse osteopenia. Lilliana Falkner is soft tissue   ulceration of the hindfoot.  Subtle lucencies noted in the calcaneus may   represent erosive changes from osteomyelitis.  No evidence of an acute   traumatic displaced fracture.           Impression   Soft tissue ulceration of the hindfoot with possible lucencies in the   calcaneus that may represent erosive changes from osteomyelitis versus   overlying soft tissue artifact.       Severe diffuse osteopenia           IMPRESSION/RECOMMENDATIONS    1.  Deeply penetrating RIGHT heel wound with exposed calcaneus / [place]'s tendon    The right heel wound then packed with Iodine soaked Iodoform gauze  Dakins ordered for daily application to treat Enterococcus species. 2. Osteomyelitis of RIGHT calcaneus    3. Left heel wound appears small and granular. 4. Severe peripheral vascular disease    5. Paraplegia with joint contractures about both knees, contributing to the pressure ulcers to both heels. DISPO: MRI not possible due to positioning. The patient may benefit from removal of the foot and a Symmes amputation rather than a BKA. He is already contracted and avoiding the AKA would likely make his longterm positioning easier. Thank you for the opportunity to take part in the patient's care.     Khoa Garcia DPM  [facility]  Pager: [phone]  Office: [phone]  Fax: [phone]

## 2022-02-06 NOTE — PROGRESS NOTES
Hospitalist  Progress Note       Beryl Galeas is a 78 y.o. male   1955     SUBJECTIVE:   Pt S/E. No acute events. Blood cultures with enterococcus, bacterioides. Pain is controlled. OBJECTIVE:      Vitals:   Blood pressure 119/70, pulse 98, temperature 98.5 °F (36.9 °C), temperature source Oral, resp. rate 20, weight 173 lb 1 oz (78.5 kg), SpO2 95 %. General:  nad  HEENT: AT, NC, PERRLA  Neck: No JVD, trachea midline  Heart: S1 S2 audible, no murmur   Lungs: CTA, no wheezing or rales   Abdomen: Nontender, + bs  Limbs: Lower extremity wounds are wrapped, c/d/i, bl feet in boots  CNS: A&O x3, no focal deficits.  Chronic bl muscle contractures      Past Medical History:   Diagnosis Date    Acute MI ([address] 09/2013    Anxiety     Arthritis     CAD (coronary artery disease)     COVID-19     Hyperlipidemia 2/3/2022    Hypertension     Influenza A 01/15/2017        Patient Active Problem List   Diagnosis    Chest pain    MI, acute, non ST segment elevation ([address]    Tobacco abuse    Essential hypertension    Acute MI ([address]    Acute CVA (cerebrovascular accident) ([address]    Right leg weakness    Acute ischemic left [place] stroke (HCC)    Non-healing open wound of right heel    Right heel infection    Hyperlipidemia    CAD (coronary artery disease)    Cellulitis of left heel        No Known Allergies     Current Inpatient Medications:    Current Facility-Administered Medications   Medication Dose Route Frequency Provider Last Rate Last Admin    sodium hypochlorite (DAKINS) external solution   Irrigation Daily Roseanne Coates DPM        piperacillin-tazobactam (ZOSYN) 3,375 mg in dextrose 5 % 100 mL IVPB extended infusion (mini-bag)  3,375 mg IntraVENous Q8H Josy Diop MD 25 mL/hr at 02/06/22 0512 3,375 mg at 02/06/22 0512    lidocaine PF 1 % injection 5 mL  5 mL IntraDERmal Once Josy Diop MD        sodium chloride flush 0.9 % injection 5-40 mL  5-40 mL IntraVENous 2 times per day Martha Thompson MD   10 mL at 02/05/22 2150    sodium chloride flush 0.9 % injection 5-40 mL  5-40 mL IntraVENous PRN Martha Thompson MD        0.9 % sodium chloride infusion  25 mL IntraVENous PRN Martha Thompson MD        sertraline (ZOLOFT) tablet 150 mg  150 mg Oral Daily Adina Miranda MD   150 mg at 02/05/22 2076    pantoprazole (PROTONIX) tablet 40 mg  40 mg Oral Daily Adina Miranda MD   40 mg at 02/05/22 5832    mirtazapine (REMERON) tablet 15 mg  15 mg Oral Nightly Adina Miranda MD   15 mg at 02/05/22 2142    trospium (SANCTURA) tablet 20 mg  20 mg Oral BID AC Adina Miranda MD   20 mg at 02/06/22 0554    lisinopril (PRINIVIL;ZESTRIL) tablet 20 mg  20 mg Oral Daily Adina Miranda MD   20 mg at 02/05/22 1786    hydroCHLOROthiazide (HYDRODIURIL) tablet 25 mg  25 mg Oral Daily Adina Miranda MD   25 mg at 02/05/22 0404    clopidogrel (PLAVIX) tablet 75 mg  75 mg Oral Daily Adina Miranda MD   75 mg at 02/05/22 9034    baclofen (LIORESAL) tablet 20 mg  20 mg Oral BID Adina Miranda MD   20 mg at 02/05/22 2142    atorvastatin (LIPITOR) tablet 80 mg  80 mg Oral Nightly Adina Miranda MD   80 mg at 02/05/22 2142    atenolol (TENORMIN) tablet 25 mg  25 mg Oral Daily Adina Miranda MD   25 mg at 02/05/22 4404    sodium chloride flush 0.9 % injection 10 mL  10 mL IntraVENous 2 times per day Adina Miranda MD   10 mL at 02/05/22 2150    sodium chloride flush 0.9 % injection 10 mL  10 mL IntraVENous PRN Adina Miranda MD        0.9 % sodium chloride infusion  25 mL IntraVENous PRN Adina Miranda MD        ondansetron [facility]) injection 4 mg  4 mg IntraVENous Q4H PRN Adina Miranda MD        polyethylene glycol [facility]) packet 17 g  17 g Oral Daily PRN Adina Miranda MD        acetaminophen (TYLENOL) tablet 650 mg  650 mg Oral Q4H PRN Adina Miranda MD        Or    acetaminophen (TYLENOL) suppository 650 mg  650 mg Rectal Q4H PRN Adina Miranda MD       [facility] morphine (PF) injection 2 mg  2 mg IntraVENous Q4H PRN Aiden Jerez MD   2 mg at 02/03/22 1408           Labs:  CBC with Differential:    Lab Results   Component Value Date    WBC 9.6 02/06/2022    RBC 3.31 02/06/2022    HGB 8.7 02/06/2022    HCT 26.7 02/06/2022     02/06/2022    MCV 80.6 02/06/2022    MCH 26.3 02/06/2022    MCHC 32.6 02/06/2022    RDW 17.7 02/06/2022    SEGSPCT 75.4 02/21/2011    LYMPHOPCT 17.2 02/06/2022    MONOPCT 5.3 02/06/2022    EOSPCT 0.5 02/21/2011    BASOPCT 0.8 02/06/2022    MONOSABS 0.5 02/06/2022    LYMPHSABS 1.7 02/06/2022    EOSABS 0.2 02/06/2022    BASOSABS 0.1 02/06/2022    DIFFTYPE Auto-K 02/21/2011     CMP:    Lab Results   Component Value Date     02/06/2022    K 3.9 02/06/2022    CL 99 02/06/2022    CO2 23 02/06/2022    BUN 26 02/06/2022    CREATININE 0.9 02/06/2022    GFRAA >60 02/06/2022    GFRAA >60 02/21/2011    AGRATIO 0.8 05/28/2021    LABGLOM >60 02/06/2022    GLUCOSE 123 02/06/2022    PROT 6.8 05/28/2021    LABALBU 3.1 01/08/2022    CALCIUM 8.9 02/06/2022    BILITOT 0.9 05/28/2021    ALKPHOS 81 05/28/2021    AST 24 05/28/2021    ALT 20 05/28/2021     Hepatic Function Panel:    Lab Results   Component Value Date    ALKPHOS 81 05/28/2021    ALT 20 05/28/2021    AST 24 05/28/2021    PROT 6.8 05/28/2021    BILITOT 0.9 05/28/2021    LABALBU 3.1 01/08/2022     Magnesium:    Lab Results   Component Value Date    MG 1.50 01/08/2022     PT/INR:    Lab Results   Component Value Date    PROTIME 18.6 01/07/2022    INR 1.61 01/07/2022     Last 3 Troponin:    Lab Results   Component Value Date    TROPONINI <0.01 05/28/2021    TROPONINI 0.03 01/16/2017    TROPONINI 0.13 01/15/2017     U/A:    Lab Results   Component Value Date    NITRITE NEG 01/16/2020    COLORU YELLOW 05/28/2021    PHUR 6.0 05/28/2021    WBCUA 1 04/26/2021    RBCUA 1 04/26/2021    CLARITYU Clear 05/28/2021    SPECGRAV 1.021 05/28/2021    LEUKOCYTESUR Negative 05/28/2021    UROBILINOGEN 2.0 05/28/2021 BILIRUBINUR Negative 05/28/2021    BILIRUBINUR NEG 01/16/2020    BLOODU Negative 05/28/2021    GLUCOSEU Negative 05/28/2021     ABG:  No results found for: PHART, JCN4XJO, PO2ART, PTY3ZUL, BEART, THGBART, VON4AIZ, N1BZVQDV  FLP:    Lab Results   Component Value Date    TRIG 115 04/22/2021    HDL 38 04/22/2021    HDL 60 02/22/2011    LDLCALC 100 04/22/2021    LABVLDL 23 04/22/2021     TSH:  No results found for: TSH   DATA:   ECG: Sinus Rhythm     ASSESSMENT/Plan:    right heel osteomyelitis   - pressure wounds from praplegia  - penetrates down to the achilles tendon, calcaneus bone  - crp 102, esr 87  - MRI is unable to be done due to contractures  - vancomycin and cefepime -> zosyn   - d/w podiatry - would benefit from amputation   - wound culture  Enterococcus faecalis DNA Detected Abnormal      Blood Culture, Routine Jorgito/B gene not detected.     Bacteroides fragilis Abnormal     bacteremia   - blood cx:   Enterococcus faecalis DNA Detected Abnormal  P     Blood Culture, Routine Jorgito/B gene not detected      - repeat blood cultures 2/5 ngtd  - consulted ID     Chronic conditions - cont home meds unless otherwise stated   Paraplegia   pvd   CAD no chest pain  Hypertension    Dispo: in pt

## 2022-02-07 LAB
ANION GAP SERPL CALCULATED.3IONS-SCNC: 14 MMOL/L (ref 3–16)
BASOPHILS ABSOLUTE: 0 K/UL (ref 0–0.2)
BASOPHILS RELATIVE PERCENT: 0.5 %
BLOOD CULTURE, ROUTINE: NORMAL
BUN BLDV-MCNC: 23 MG/DL (ref 7–20)
CALCIUM SERPL-MCNC: 9 MG/DL (ref 8.3–10.6)
CHLORIDE BLD-SCNC: 99 MMOL/L (ref 99–110)
CO2: 21 MMOL/L (ref 21–32)
CREAT SERPL-MCNC: 0.9 MG/DL (ref 0.8–1.3)
EOSINOPHILS ABSOLUTE: 0.2 K/UL (ref 0–0.6)
EOSINOPHILS RELATIVE PERCENT: 2.2 %
GFR AFRICAN AMERICAN: >60
GFR NON-AFRICAN AMERICAN: >60
GLUCOSE BLD-MCNC: 135 MG/DL (ref 70–99)
HCT VFR BLD CALC: 25.8 % (ref 40.5–52.5)
HEMOGLOBIN: 8.7 G/DL (ref 13.5–17.5)
LYMPHOCYTES ABSOLUTE: 1.9 K/UL (ref 1–5.1)
LYMPHOCYTES RELATIVE PERCENT: 19.1 %
MCH RBC QN AUTO: 26.9 PG (ref 26–34)
MCHC RBC AUTO-ENTMCNC: 33.7 G/DL (ref 31–36)
MCV RBC AUTO: 79.8 FL (ref 80–100)
MONOCYTES ABSOLUTE: 0.4 K/UL (ref 0–1.3)
MONOCYTES RELATIVE PERCENT: 3.8 %
NEUTROPHILS ABSOLUTE: 7.3 K/UL (ref 1.7–7.7)
NEUTROPHILS RELATIVE PERCENT: 74.4 %
PDW BLD-RTO: 17.9 % (ref 12.4–15.4)
PLATELET # BLD: 240 K/UL (ref 135–450)
PMV BLD AUTO: 6.7 FL (ref 5–10.5)
POTASSIUM REFLEX MAGNESIUM: 4 MMOL/L (ref 3.5–5.1)
RBC # BLD: 3.23 M/UL (ref 4.2–5.9)
SODIUM BLD-SCNC: 134 MMOL/L (ref 136–145)
WBC # BLD: 9.7 K/UL (ref 4–11)

## 2022-02-07 PROCEDURE — 1200000000 HC SEMI PRIVATE

## 2022-02-07 PROCEDURE — 85025 COMPLETE CBC W/AUTO DIFF WBC: CPT

## 2022-02-07 PROCEDURE — 6370000000 HC RX 637 (ALT 250 FOR IP): Performed by: INTERNAL MEDICINE

## 2022-02-07 PROCEDURE — 36415 COLL VENOUS BLD VENIPUNCTURE: CPT

## 2022-02-07 PROCEDURE — 6360000002 HC RX W HCPCS: Performed by: INTERNAL MEDICINE

## 2022-02-07 PROCEDURE — 99233 SBSQ HOSP IP/OBS HIGH 50: CPT | Performed by: INTERNAL MEDICINE

## 2022-02-07 PROCEDURE — 2580000003 HC RX 258: Performed by: INTERNAL MEDICINE

## 2022-02-07 PROCEDURE — 80048 BASIC METABOLIC PNL TOTAL CA: CPT

## 2022-02-07 RX ADMIN — PIPERACILLIN AND TAZOBACTAM 3375 MG: 3; .375 INJECTION, POWDER, LYOPHILIZED, FOR SOLUTION INTRAVENOUS at 06:13

## 2022-02-07 RX ADMIN — PANTOPRAZOLE SODIUM 40 MG: 40 TABLET, DELAYED RELEASE ORAL at 10:59

## 2022-02-07 RX ADMIN — SODIUM CHLORIDE, PRESERVATIVE FREE 10 ML: 5 INJECTION INTRAVENOUS at 20:43

## 2022-02-07 RX ADMIN — PIPERACILLIN AND TAZOBACTAM 3375 MG: 3; .375 INJECTION, POWDER, LYOPHILIZED, FOR SOLUTION INTRAVENOUS at 13:25

## 2022-02-07 RX ADMIN — BACLOFEN 20 MG: 10 TABLET ORAL at 20:46

## 2022-02-07 RX ADMIN — ATORVASTATIN CALCIUM 80 MG: 80 TABLET, FILM COATED ORAL at 20:46

## 2022-02-07 RX ADMIN — MIRTAZAPINE 15 MG: 15 TABLET, FILM COATED ORAL at 20:46

## 2022-02-07 RX ADMIN — SODIUM CHLORIDE, PRESERVATIVE FREE 10 ML: 5 INJECTION INTRAVENOUS at 20:42

## 2022-02-07 RX ADMIN — HYDROCHLOROTHIAZIDE 25 MG: 25 TABLET ORAL at 10:59

## 2022-02-07 RX ADMIN — SODIUM CHLORIDE, PRESERVATIVE FREE 10 ML: 5 INJECTION INTRAVENOUS at 11:15

## 2022-02-07 RX ADMIN — TROSPIUM CHLORIDE 20 MG: 20 TABLET, FILM COATED ORAL at 06:23

## 2022-02-07 RX ADMIN — BACLOFEN 20 MG: 10 TABLET ORAL at 11:00

## 2022-02-07 RX ADMIN — PIPERACILLIN AND TAZOBACTAM 3375 MG: 3; .375 INJECTION, POWDER, LYOPHILIZED, FOR SOLUTION INTRAVENOUS at 20:45

## 2022-02-07 RX ADMIN — TROSPIUM CHLORIDE 20 MG: 20 TABLET, FILM COATED ORAL at 16:25

## 2022-02-07 RX ADMIN — CLOPIDOGREL BISULFATE 75 MG: 75 TABLET ORAL at 10:59

## 2022-02-07 RX ADMIN — SERTRALINE 150 MG: 100 TABLET, FILM COATED ORAL at 10:59

## 2022-02-07 ASSESSMENT — PAIN SCALES - WONG BAKER: WONGBAKER_NUMERICALRESPONSE: 0

## 2022-02-07 ASSESSMENT — PAIN SCALES - GENERAL
PAINLEVEL_OUTOF10: 0
PAINLEVEL_OUTOF10: 0

## 2022-02-07 NOTE — PLAN OF CARE
Problem: Falls - Risk of:  Goal: Will remain free from falls  Description: Will remain free from falls  2/7/2022 0403 by Stephanie Tierney RN  Outcome: Ongoing    Goal: Absence of physical injury  Description: Absence of physical injury  2/7/2022 0403 by Stephanie Tierney RN  Outcome: Ongoing       Problem: Infection:  Goal: Will remain free from infection  Description: Will remain free from infection  2/7/2022 0403 by Stephanie Tierney RN  Outcome: Ongoing       Problem: Safety:  Goal: Free from accidental physical injury  Description: Free from accidental physical injury  2/7/2022 0403 by Stephanie Tierney RN  Outcome: Ongoing    Goal: Free from intentional harm  Description: Free from intentional harm  2/7/2022 0403 by Stephanie Tierney RN  Outcome: Ongoing       Problem: Daily Care:  Goal: Daily care needs are met  Description: Daily care needs are met  2/7/2022 0403 by Stephanie Tierney RN  Outcome: Ongoing       Problem: Pain:  Goal: Patient's pain/discomfort is manageable  Description: Patient's pain/discomfort is manageable  2/7/2022 0403 by Stephanie Tierney RN  Outcome: Ongoing       Problem: Skin Integrity:  Goal: Skin integrity will stabilize  Description: Skin integrity will stabilize  2/7/2022 0403 by Stephanie Tierney RN  Outcome: Ongoing    Problem: Skin Integrity:  Goal: Will show no infection signs and symptoms  Description: Will show no infection signs and symptoms  2/7/2022 0403 by Stephanie Tierney RN  Outcome: Ongoing    Goal: Absence of new skin breakdown  Description: Absence of new skin breakdown  2/7/2022 0403 by Stephanie Tierney RN  Outcome: Ongoing

## 2022-02-07 NOTE — PLAN OF CARE
Problem: Falls - Risk of:  Goal: Will remain free from falls  Description: Will remain free from falls  2/7/2022 1702 by Chidi Cevallos RN  Outcome: Met This Shift     Problem: Falls - Risk of:  Goal: Absence of physical injury  Description: Absence of physical injury  2/7/2022 1702 by Chidi Cevallos RN  Outcome: Met This Shift     Problem: Infection:  Goal: Will remain free from infection  Description: Will remain free from infection  2/7/2022 1702 by Chidi Cevallos RN  Outcome: Ongoing     Problem: Safety:  Goal: Free from accidental physical injury  Description: Free from accidental physical injury  2/7/2022 1702 by Chidi Cevallos RN  Outcome: Met This Shift     Problem: Safety:  Goal: Free from intentional harm  Description: Free from intentional harm  2/7/2022 1702 by Chidi Cevallos RN  Outcome: Met This Shift     Problem: Daily Care:  Goal: Daily care needs are met  Description: Daily care needs are met  2/7/2022 1702 by Chidi Cevallos RN  Outcome: Met This Shift     Problem: Pain:  Goal: Patient's pain/discomfort is manageable  Description: Patient's pain/discomfort is manageable  2/7/2022 1702 by Chidi Cevallos RN  Outcome: Met This Shift     Problem: Skin Integrity:  Goal: Skin integrity will stabilize  Description: Skin integrity will stabilize  2/7/2022 1702 by Chidi Cevallos RN  Outcome: Ongoing     Problem: Skin Integrity:  Goal: Will show no infection signs and symptoms  Description: Will show no infection signs and symptoms  2/7/2022 1702 by Chidi Cevallos RN  Outcome: Ongoing     Problem: Skin Integrity:  Goal: Absence of new skin breakdown  Description: Absence of new skin breakdown  2/7/2022 1702 by Chidi Cevallos RN  Outcome: Ongoing

## 2022-02-07 NOTE — PROGRESS NOTES
Pt significant other Clemetine  called this morning to request a transfer to [facility] for a second opinion. RN made Dr. Luis Eduardo Travis aware, waiting on response.

## 2022-02-07 NOTE — ACP (ADVANCE CARE PLANNING)
Advance Care Planning     Advance Care Planning Activator (Inpatient)  Conversation Note      Date of ACP Conversation: 2/7/2022     Conversation Conducted with: Patient with Decision Making Capacity    ACP Activator: Sindy Stearns RN    Health Care Decision Maker:     Current Designated Health Care Decision Maker:     Primary Decision Maker: Jeronimo Martinez Domestic Partner - [phone]    Care Preferences    Ventilation: \"If you were in your present state of health and suddenly became very ill and were unable to breathe on your own, what would your preference be about the use of a ventilator (breathing machine) if it were available to you? \"      Would the patient desire the use of ventilator (breathing machine)?: yes    \"If your health worsens and it becomes clear that your chance of recovery is unlikely, what would your preference be about the use of a ventilator (breathing machine) if it were available to you? \"     Would the patient desire the use of ventilator (breathing machine)?: Yes      Resuscitation  \"CPR works best to restart the heart when there is a sudden event, like a heart attack, in someone who is otherwise healthy. Unfortunately, CPR does not typically restart the heart for people who have serious health conditions or who are very sick. \"    \"In the event your heart stopped as a result of an underlying serious health condition, would you want attempts to be made to restart your heart (answer \"yes\" for attempt to resuscitate) or would you prefer a natural death (answer \"no\" for do not attempt to resuscitate)? \" yes       [] Yes   [x] No   Educated Patient / Natalie Dial regarding differences between Advance Directives and portable DNR orders.     Length of ACP Conversation in minutes:   3    Conversation Outcomes:  [x] ACP discussion completed  [] Existing advance directive reviewed with patient; no changes to patient's previously recorded wishes  [] New Advance Directive completed  [] Portable Do Not Rescitate prepared for Provider review and signature  [] POLST/POST/MOLST/MOST prepared for Provider review and signature      Follow-up plan:    [] Schedule follow-up conversation to continue planning  [] Referred individual to Provider for additional questions/concerns   [] Advised patient/agent/surrogate to review completed ACP document and update if needed with changes in condition, patient preferences or care setting    [] This note routed to one or more involved healthcare providers

## 2022-02-07 NOTE — PROGRESS NOTES
Infectious Disease Follow up Notes  Admit Date: 2/3/2022  Hospital Day: 5    Antibiotics :   IV Zosyn     CHIEF COMPLAINT:       Rt heel ulcer  Enterococcus bacteremia  Pseudomonas infection   Rt heel osteomyelitis          Subjective interval History :  78 y.o. man with CVA and Rt side weakness now with Rt heel ulcer secondary infection drainage, malodor at presentation on going cellulitis with worsening wound infections he has bi lateral foot wounds,images reviewed. Has PVD. And he was hospitalized recently from 1/6 to 1/12/22 was treated with oral abx for the ulcer and was seen by Podiatry. Blood cx with Enterococcus noted, XRAY with Rt calcaneus osteomyelitis ? MRI Rt foot pending.   Location :  Rt heel pain+ swelling, odor+ drainage+      Quality :   aching   Severity : 10/10    Duration :  weeks    Timing : intermittent   Context : Rt heel ulcer    Modifying factors : none  Associated signs and symptoms: Rt heel ulcer+ drainage+         Interval History : Mentation a bit better and Rt heel ulcer dressing+ blood cx follow up in process tolerating IV abx ok no nausea no vomiting        Past Medical History:    Past Medical History:   Diagnosis Date    Acute MI ([address] 09/2013    Anxiety     Arthritis     CAD (coronary artery disease)     COVID-19     Hyperlipidemia 2/3/2022    Hypertension     Influenza A 01/15/2017       Past Surgical History:    Past Surgical History:   Procedure Laterality Date    BACK SURGERY N/A 1989    herniated disc    CORONARY ANGIOPLASTY WITH STENT PLACEMENT  9/13    EYE SURGERY      VASCULAR SURGERY         Current Medications:    Outpatient Medications Marked as Taking for the 2/3/22 encounter [facility] Encounter)   Medication Sig Dispense Refill    busPIRone (BUSPAR) 5 MG tablet Take 5 mg by mouth 2 times daily      clindamycin (CLEOCIN) 300 MG capsule Take 300 mg by mouth 2 times daily      hydrOXYzine (ATARAX) 25 MG tablet Take 25 mg by mouth every 6 hours as needed for Anxiety      senna (SENOKOT) 8.6 MG tablet Take 1 tablet by mouth daily      collagenase 250 UNIT/GM ointment Apply 1 each topically daily Apply topically to right heel daily.  clopidogrel (PLAVIX) 75 MG tablet Take 1 tablet by mouth daily 30 tablet 3    ferrous sulfate (IRON 325) 325 (65 Fe) MG tablet Take 325 mg by mouth daily (with breakfast)      mirtazapine (REMERON) 15 MG tablet Take 15 mg by mouth nightly      pantoprazole (PROTONIX) 40 MG tablet Take 40 mg by mouth daily      mirabegron (MYRBETRIQ) 50 MG TB24 Take 50 mg by mouth daily      baclofen (LIORESAL) 20 MG tablet Take 20 mg by mouth 2 times daily      lisinopril (PRINIVIL;ZESTRIL) 20 MG tablet TAKE 1/2 TABLET BY MOUTH DAILY (Patient taking differently: Take 10 mg by mouth daily ) 45 tablet 0    hydroCHLOROthiazide (HYDRODIURIL) 25 MG tablet TAKE ONE TABLET BY MOUTH DAILY 90 tablet 0    atenolol (TENORMIN) 25 MG tablet Take 1 tablet by mouth daily 30 tablet 0    atorvastatin (LIPITOR) 80 MG tablet Take 1 tablet by mouth nightly 30 tablet 3    sertraline (ZOLOFT) 100 MG tablet TAKE 1 AND 1/2 TABLET BY MOUTH DAILY 45 tablet 2    Multiple Vitamins-Minerals (THERAPEUTIC MULTIVITAMIN-MINERALS) tablet Take 1 tablet by mouth daily      Ascorbic Acid (VITAMIN C) 500 MG tablet Take 500 mg by mouth daily         Allergies:  Patient has no known allergies.     Immunizations :   Immunization History   Administered Date(s) Administered    COVID-19, Pfizer Purple top, DILUTE for use, 12+ yrs, 30mcg/0.3mL dose 02/26/2021, 03/19/2021    Influenza, High Dose (Fluzone 65 yrs and older) 11/25/2019    Influenza, High-dose, Quadv, 65 yrs +, IM (Fluzone) 10/22/2020    Pneumococcal Conjugate 13-valent ([id number]) 06/04/2019    Pneumococcal Polysaccharide ([id number]) 10/22/2020    Zoster Recombinant (Shingrix) 10/22/2020, 02/20/2021       Social History:   Social History     Tobacco Use    Smoking status: Former Smoker     Packs/day: 0.50     Years: 40.00     Pack years: 20.00     Types: Cigarettes     Quit date: 1/14/2019     Years since quitting: 3.0    Smokeless tobacco: Never Used   Vaping Use    Vaping Use: Never used   Substance Use Topics    Alcohol use:  Yes     Alcohol/week: 2.0 standard drinks     Types: 2 Cans of beer per week     Comment: states used to drink 1 pint/vodka a day / now socially drinks    Drug use: Not on file     Social History     Tobacco Use   Smoking Status Former Smoker    Packs/day: 0.50    Years: 40.00    Pack years: 20.00    Types: Cigarettes    Quit date: 1/14/2019    Years since quitting: 3.0   Smokeless Tobacco Never Used      Family History   Problem Relation Age of Onset    Hypertension Mother     Hypertension Father     Heart Failure Father     Stroke Maternal Grandfather     Cancer Paternal Uncle         throat       REVIEW OF SYSTEMS:    NOT possible due to confusion                 PHYSICAL EXAM:      Vitals:    /67   Pulse 82   Temp 97.7 °F (36.5 °C) (Oral)   Resp 16   Wt 173 lb 1 oz (78.5 kg)   SpO2 91%   BMI 21.63 kg/m²      General Appearance: awake and able to converse and no distress  acute distress, ++ pallor, no icterus   Skin: warm and dry, no rash or erythema  Head: normocephalic and atraumatic  Eyes: pupils equal, round, and reactive to light, conjunctivae normal  ENT: tympanic membrane, external ear and ear canal normal bilaterally, nose without deformity, nasal mucosa and turbinates normal without polyps  Neck: supple and non-tender without mass, no thyromegaly  no cervical lymphadenopathy  Pulmonary/Chest: clear to auscultation bilaterally- no wheezes, rales or rhonchi, normal air movement, no respiratory distress  Cardiovascular: normal rate, regular rhythm, normal S1 and S2, no murmurs, rubs, clicks, or gallops, no carotid bruits  Abdomen: soft, non-tender, non-distended, normal bowel sounds, no masses or organomegaly  Extremities: no cyanosis, clubbing or edema  Musculoskeletal: normal range of motion, no joint swelling, deformity or tenderness  Integumentary: No rashes, no abnormal skin lesions, no petechiae  Neurologic: reflexes normal and symmetric, no cranial nerve deficit  Rt side weakness+ and heel ulcer+      Data Review:    CBC:   Lab Results   Component Value Date    WBC 9.7 02/07/2022    HGB 8.7 (L) 02/07/2022    HCT 25.8 (L) 02/07/2022    MCV 79.8 (L) 02/07/2022     02/07/2022     RENAL:   Lab Results   Component Value Date    CREATININE 0.9 02/07/2022    BUN 23 (H) 02/07/2022     (L) 02/07/2022    K 4.0 02/07/2022    CL 99 02/07/2022    CO2 21 02/07/2022     SED RATE:   Lab Results   Component Value Date    SEDRATE 87 02/05/2022     CK:   Lab Results   Component Value Date    CKTOTAL 63 04/23/2021     CRP:   Lab Results   Component Value Date    .7 02/05/2022     Hepatic Function Panel:   Lab Results   Component Value Date    ALKPHOS 81 05/28/2021    ALT 20 05/28/2021    AST 24 05/28/2021    PROT 6.8 05/28/2021    BILITOT 0.9 05/28/2021    LABALBU 3.1 01/08/2022     UA:  Lab Results   Component Value Date    COLORU YELLOW 05/28/2021    CLARITYU Clear 05/28/2021    GLUCOSEU Negative 05/28/2021    BILIRUBINUR Negative 05/28/2021    BILIRUBINUR NEG 01/16/2020    KETUA Negative 05/28/2021    SPECGRAV 1.021 05/28/2021    BLOODU Negative 05/28/2021    PHUR 6.0 05/28/2021    PROTEINU Negative 05/28/2021    UROBILINOGEN 2.0 05/28/2021    NITRU Negative 05/28/2021    LEUKOCYTESUR Negative 05/28/2021    LABMICR Not Indicated 05/28/2021    URINETYPE NotGiven 05/28/2021      Urine Microscopic:   Lab Results   Component Value Date    COMU see below 04/26/2021    HYALCAST 0 04/26/2021    WBCUA 1 04/26/2021    RBCUA 1 04/26/2021    EPIU 0 04/26/2021     Urine Reflex to Culture:   Lab Results   Component Value Date    URRFLXCULT Not Indicated 05/28/2021         MICRO: cultures reviewed and updated by me   Blood Culture:   Lab Results   Component Value Date    [facility]  02/05/2022     No Growth to date. Any change in status will be called. BLOODCULT2  02/05/2022     No Growth to date. Any change in status will be called. Respiratory Culture:  Lab Results   Component Value Date    CULTRESP Moderate growth normal respiratory orlin with 05/09/2021    CULTRESP Heavy growth 05/09/2021    LABGRAM  02/05/2022     3+ WBC's (Polymorphonuclear)  2+ Epithelial Cells  2+ Gram positive rods resembling Diphtheroids  2+ Gram positive cocci  1+ Gram negative rods       AFB:No results found for: AFBSMEAR  Viral Culture:  Lab Results   Component Value Date    COVID19 DETECTED 01/06/2022    COVID19 Not Detected 01/06/2022     Urine Culture: No results for input(s): Camachorinelly Marleen in the last 72 hours. IMAGING:    XR FOOT RIGHT (MIN 3 VIEWS)   Final Result   Soft tissue ulceration of the hindfoot with possible lucencies in the   calcaneus that may represent erosive changes from osteomyelitis versus   overlying soft tissue artifact. Severe diffuse osteopenia. Subacute healing stress fracture of the 2nd metatarsal.                   MRI FOOT RIGHT WO CONTRAST    (Results Pending)           Summary        Limited evaluation of both extremities. Evidence of calcific plaque    throughout both extremities. OMKAR not obtained. Popliteal arteries unable to    be visualized due to positioning. Multiphasic waveforms throughout SFA    bilaterally with evidence of distal monophasic waveforms therefore cannot    exclude a popliteal lesion bilaterally.  The left anterior tibial artery    appears to be occluded.        Signature        ------------------------------------------------------------------    Electronically signed by Sekou Roe DO (Interpreting   [address] [place] [address]) on 01/10/2022 at 03:18 PM         All the pertinent images and reports for the current Hospitalization were reviewed by me     Scheduled Meds:   sodium hypochlorite   Irrigation Daily    piperacillin-tazobactam  3,375 mg IntraVENous Q8H    lidocaine 1 % injection  5 mL IntraDERmal Once    sodium chloride flush  5-40 mL IntraVENous 2 times per day    sertraline  150 mg Oral Daily    pantoprazole  40 mg Oral Daily    mirtazapine  15 mg Oral Nightly    trospium  20 mg Oral BID AC    lisinopril  20 mg Oral Daily    hydroCHLOROthiazide  25 mg Oral Daily    clopidogrel  75 mg Oral Daily    baclofen  20 mg Oral BID    atorvastatin  80 mg Oral Nightly    atenolol  25 mg Oral Daily    sodium chloride flush  10 mL IntraVENous 2 times per day       Continuous Infusions:   sodium chloride      sodium chloride         PRN Meds:  sodium chloride flush, sodium chloride, sodium chloride flush, sodium chloride, ondansetron, polyethylene glycol, acetaminophen **OR** acetaminophen, morphine      Assessment:     Patient Active Problem List   Diagnosis    Chest pain    MI, acute, non ST segment elevation (HCC)    Tobacco abuse    Essential hypertension    [facility] [address] [address] [facility]    Acute CVA (cerebrovascular accident) ([facility] [address] [facility]    Right leg weakness    Acute ischemic left LEXA stroke (HCC)    Non-healing open wound of right heel    Right heel infection    Hyperlipidemia    CAD (coronary artery disease)    Cellulitis of left heel       \  Rt heel non healing ulcer  Rt heel osteomyelitis  Rt heel cellulitis   Enterococcus bacteremia+  H/o CVA  Rt side weakness  Rt leg contracture  Noted may need surgery when possible  [facility] resident  Pseudomonas heel infection   Bacteroides Bacteremia from the Rt heel ulcer+    He is contracted in Rt leg with pressure ulcer and non ambulatory will likely need surgery and Podiatry following will need IV abx for bacteremia and infection    Enterococcus bacteremia could be from the infected non healing ulcers Rt heel +    Blood cx positive for Bacteroides as well and covered by IV Zosyn     Rt heel ulcer with Pseudomonas sensitivities pending              Labs, Microbiology, Radiology and all the pertinent results from current hospitalization and  care every where were reviewed  by me as a part of the evaluation   Plan:   1. Cont  IV Zosyn x 3.375 mg Q 8 HRS  2. Rt heel local care  3. Podiatry notes reviewed   4. [address] , crp 102.7   5. wOUND CX - Pseudomonas noted  6. Cont Local care    7. X ray with early osteomyelitis of rt calcaneus     Discussed with patient/Family and Nursing   Risk of Complications/Morbidity: High      · Illness(es)/ Infection present that pose threat to bodily function. · There is potential for severe exacerbation of infection/side effects of treatment. · Therapy requires intensive monitoring for antimicrobial agent toxicity. Discussed with patient/Family and Nursing staff     Thanks for allowing me to participate in your patient's care and please call me with any questions or concerns.     Hal Olea MD  Infectious Disease  [facility] ([facility]) Physician  Phone: [phone]   Fax : [phone]

## 2022-02-07 NOTE — PROGRESS NOTES
Department of Podiatry Progress Note  2/7/2022  Anthony Reyes          HISTORY OF PRESENT ILLNESS:                The patient is a 78 y.o. male with Right heel ulceration that has exposed calcaneus and possibly the achilles tendon insertion. The patinet has no pain in the heel unless it is manipulated. He has knee contracture, bilaterally. He does not walk. Today he is rigidly driving that heel down into the bed due to spasms and pain in the right thigh and hip. He refused to straighten out the leg or internally rotate his hip. I ended up placing the prevalon boot under the ankle to get his heel up off the bed enough to change the dressing. He reported that the foot and leg felt much better with this style of off loading and it was left in this position following dressing change. 2/7/22: The patient is much more conversant today. We discussed etiology of the wound and the severity of the wound. Carmelo was able to express that he is not interested in any form of surgery or amputation. I explained that he may have less risk of infection with removal of the foot or at least the bone. He has severe contractures and reports that botox injections in the posterior muscles did not help when he tried that previously. He is not interested in surgery to release contractures. He would like wound care only.      Past Medical History:        Diagnosis Date    Acute MI ([address] 09/2013    Anxiety     Arthritis     CAD (coronary artery disease)     COVID-19     Hyperlipidemia 2/3/2022    Hypertension     Influenza A 01/15/2017     Past Surgical History:        Procedure Laterality Date    BACK SURGERY N/A 1989    herniated disc    CORONARY ANGIOPLASTY WITH STENT PLACEMENT  9/13    EYE SURGERY      VASCULAR SURGERY       Current Medications:    Current Facility-Administered Medications: sodium hypochlorite (DAKINS) external solution, , Irrigation, Daily  piperacillin-tazobactam (ZOSYN) 3,375 mg in dextrose 5 % 100 mL IVPB extended infusion (mini-bag), 3,375 mg, IntraVENous, Q8H  lidocaine PF 1 % injection 5 mL, 5 mL, IntraDERmal, Once  sodium chloride flush 0.9 % injection 5-40 mL, 5-40 mL, IntraVENous, 2 times per day  sodium chloride flush 0.9 % injection 5-40 mL, 5-40 mL, IntraVENous, PRN  0.9 % sodium chloride infusion, 25 mL, IntraVENous, PRN  sertraline (ZOLOFT) tablet 150 mg, 150 mg, Oral, Daily  pantoprazole (PROTONIX) tablet 40 mg, 40 mg, Oral, Daily  mirtazapine (REMERON) tablet 15 mg, 15 mg, Oral, Nightly  trospium (SANCTURA) tablet 20 mg, 20 mg, Oral, BID AC  lisinopril (PRINIVIL;ZESTRIL) tablet 20 mg, 20 mg, Oral, Daily  hydroCHLOROthiazide (HYDRODIURIL) tablet 25 mg, 25 mg, Oral, Daily  clopidogrel (PLAVIX) tablet 75 mg, 75 mg, Oral, Daily  baclofen (LIORESAL) tablet 20 mg, 20 mg, Oral, BID  atorvastatin (LIPITOR) tablet 80 mg, 80 mg, Oral, Nightly  atenolol (TENORMIN) tablet 25 mg, 25 mg, Oral, Daily  sodium chloride flush 0.9 % injection 10 mL, 10 mL, IntraVENous, 2 times per day  sodium chloride flush 0.9 % injection 10 mL, 10 mL, IntraVENous, PRN  0.9 % sodium chloride infusion, 25 mL, IntraVENous, PRN  ondansetron (ZOFRAN) injection 4 mg, 4 mg, IntraVENous, Q4H PRN  polyethylene glycol (GLYCOLAX) packet 17 g, 17 g, Oral, Daily PRN  acetaminophen (TYLENOL) tablet 650 mg, 650 mg, Oral, Q4H PRN **OR** acetaminophen (TYLENOL) suppository 650 mg, 650 mg, Rectal, Q4H PRN  morphine (PF) injection 2 mg, 2 mg, IntraVENous, Q4H PRN  Allergies:   Patient has no known allergies. Social History:    TOBACCO:  Former smoker. Type of tobacco used:  Cigarettes. Quantity per day:  1 pack(s). Duration of tobacco use:  20+ years. Approximate Quit Date:  Quit 3 years ago. ETOH:  Current alcohol usage:  Type of Drink(s):  Bbeer. Frequency of use:  Weekly. Duration of alcohol use:  25+ years. Approximate date of last drink:  Last week.   Family History:       Problem Relation Age of Onset    Hypertension Mother    Donta Pantoja Hypertension Father     Heart Failure Father     Stroke Maternal Grandfather     Cancer Paternal Uncle         throat     REVIEW OF SYSTEMS:    CONSTITUTIONAL:  negative  INTEGUMENT/BREAST:  positive for skin lesion(s), dryness, changes in lesion, changes in hair and changes in nails  MUSCULOSKELETAL:  positive for  decreased range of motion and joint contracture bilateral lower extremity  NEUROLOGICAL:  positive for gait problems, weakness and pain  BEHAVIOR/PSYCH:  Depression    PHYSICAL EXAM:      Vitals:    /67   Pulse 82   Temp 97.7 °F (36.5 °C) (Oral)   Resp 16   Wt 173 lb 1 oz (78.5 kg)   SpO2 91%   BMI 21.63 kg/m²     LABS:   Recent Labs     02/06/22  0546 02/07/22  0532   WBC 9.6 9.7   HGB 8.7* 8.7*   HCT 26.7* 25.8*    240     Recent Labs     02/07/22  0532   *   K 4.0   CL 99   CO2 21   BUN 23*   CREATININE 0.9     No results for input(s): PROT, INR, APTT in the last 72 hours. LOWER EXTREMITY EXAMINATION   SKIN:    RIGHT postero-plantar heel with 8.0 x 5.2 cm wound with necrotic malodorous presentation  The left heel has a very small ulceration to the plantar fat pad. NEUROLOGIC:    Pain sensation isnot significantly changed Bilateral.  Light touch is decreasedBilateral. positive history of paresthesia Bilateral. negativehistory of burning Bilateral. Deep tendon reflexes are absent to include patellar and achilles Bilateral. Laguna--Herman 5.07 monofilament sensitivity is abnormal 4 to 5 spots tested Bilateral.    VASCULAR:    bilaterally Dorsalis pedis is absent. bilaterally Posterior tibial pulse is absent. 2+ edema right. mild Varicosities right. MUSCULOSKELETAL:  Beni Portillo is untested due to paraplegia. Muscle strength is 1/5 to all groups tested to include dorsiflexion, plantarflexion, inversion, eversion, and digital Bilateral . The ranges of motion of all joints tested from ankle distal is decreased there is crepitus noted Right.     Radiology 2/3/2022  Eliazar Osorio is

## 2022-02-07 NOTE — PROGRESS NOTES
Physical Therapy  No eval/Discharge  Ross Ledesma  [id number]    PT orders received for this pt per Dr. Marielle Heredia. The pt was in the hospital in January of 2022 for a non-healing R heel wound. The pt was evaluated by PT at that time and he was found to be dependent care and non-ambulatory at his [facility] and was d/c'd from acute care PT services due to no skilled PT needs. The pt's PLOF is unchanged at this time and the pt con't to have no acute care skilled PT needs. Anticipate that the pt will be returning to his LTC facility at d/c. Will d/c from our services.  Thank you for the referral.   Electronically signed by Suresh Geller, PT [id number] on 2/7/2022 at 9:27 AM

## 2022-02-07 NOTE — PROGRESS NOTES
Hospitalist  Progress Note       Camden Mai is a 78 y.o. male   1955     SUBJECTIVE:   Pt S/E. No acute events. Wound cultures with pseudomonas. Pt and his SO want to transfer to [facility] for a 2nd opinion. OBJECTIVE:      Vitals:   Blood pressure 109/67, pulse 82, temperature 97.7 °F (36.5 °C), temperature source Oral, resp. rate 16, weight 173 lb 1 oz (78.5 kg), SpO2 91 %. General: Nad, appears comfortable   HEENT: AT, NC, PERRLA, sclera anicteric  Neck: No JVD, trachea midline, supple  Heart: S1 S2 audible, no murmur   Lungs: CTA, no wheezing or rales, no accessory muscle use  Abdomen: Nontender, + bs  Limbs: Lower extremity wounds are wrapped, c/d/i, bl feet in boots  CNS: A&O x3, no focal deficits.  Chronic bl muscle contractures      Past Medical History:   Diagnosis Date    Acute MI ([address] 09/2013    Anxiety     Arthritis     CAD (coronary artery disease)     COVID-19     Hyperlipidemia 2/3/2022    Hypertension     Influenza A 01/15/2017        Patient Active Problem List   Diagnosis    Chest pain    MI, acute, non ST segment elevation ([address]    Tobacco abuse    Essential hypertension    Acute MI ([address]    Acute CVA (cerebrovascular accident) ([address]    Right leg weakness    Acute ischemic left [place] stroke (HCC)    Non-healing open wound of right heel    Right heel infection    Hyperlipidemia    CAD (coronary artery disease)    Cellulitis of left heel        No Known Allergies     Current Inpatient Medications:    Current Facility-Administered Medications   Medication Dose Route Frequency Provider Last Rate Last Admin    sodium hypochlorite (DAKINS) external solution   Irrigation Daily Rajesh Alicia DPM   Given at 02/06/22 1140    piperacillin-tazobactam (ZOSYN) 3,375 mg in dextrose 5 % 100 mL IVPB extended infusion (mini-bag)  3,375 mg IntraVENous Q8H Rick García MD 25 mL/hr at 02/07/22 1325 3,375 mg at 02/07/22 1325    lidocaine PF 1 % injection 5 mL  5 mL Aiden Jerez MD        Or    acetaminophen (TYLENOL) suppository 650 mg  650 mg Rectal Q4H PRN Aiden Jerez MD        morphine (PF) injection 2 mg  2 mg IntraVENous Q4H PRN Aiden Jerez MD   2 mg at 02/03/22 1408           Labs:  CBC with Differential:    Lab Results   Component Value Date    WBC 9.7 02/07/2022    RBC 3.23 02/07/2022    HGB 8.7 02/07/2022    HCT 25.8 02/07/2022     02/07/2022    MCV 79.8 02/07/2022    MCH 26.9 02/07/2022    MCHC 33.7 02/07/2022    RDW 17.9 02/07/2022    SEGSPCT 75.4 02/21/2011    LYMPHOPCT 19.1 02/07/2022    MONOPCT 3.8 02/07/2022    EOSPCT 0.5 02/21/2011    BASOPCT 0.5 02/07/2022    MONOSABS 0.4 02/07/2022    LYMPHSABS 1.9 02/07/2022    EOSABS 0.2 02/07/2022    BASOSABS 0.0 02/07/2022    DIFFTYPE Auto-K 02/21/2011     CMP:    Lab Results   Component Value Date     02/07/2022    K 4.0 02/07/2022    CL 99 02/07/2022    CO2 21 02/07/2022    BUN 23 02/07/2022    CREATININE 0.9 02/07/2022    GFRAA >60 02/07/2022    GFRAA >60 02/21/2011    AGRATIO 0.8 05/28/2021    LABGLOM >60 02/07/2022    GLUCOSE 135 02/07/2022    PROT 6.8 05/28/2021    LABALBU 3.1 01/08/2022    CALCIUM 9.0 02/07/2022    BILITOT 0.9 05/28/2021    ALKPHOS 81 05/28/2021    AST 24 05/28/2021    ALT 20 05/28/2021     Hepatic Function Panel:    Lab Results   Component Value Date    ALKPHOS 81 05/28/2021    ALT 20 05/28/2021    AST 24 05/28/2021    PROT 6.8 05/28/2021    BILITOT 0.9 05/28/2021    LABALBU 3.1 01/08/2022     Magnesium:    Lab Results   Component Value Date    MG 1.50 01/08/2022     PT/INR:    Lab Results   Component Value Date    PROTIME 18.6 01/07/2022    INR 1.61 01/07/2022     Last 3 Troponin:    Lab Results   Component Value Date    TROPONINI <0.01 05/28/2021    TROPONINI 0.03 01/16/2017    TROPONINI 0.13 01/15/2017     U/A:    Lab Results   Component Value Date    NITRITE NEG 01/16/2020    COLORU YELLOW 05/28/2021    PHUR 6.0 05/28/2021    WBCUA 1 04/26/2021    RBCUA 1 04/26/2021    CLARITYU Clear 05/28/2021    SPECGRAV 1.021 05/28/2021    LEUKOCYTESUR Negative 05/28/2021    UROBILINOGEN 2.0 05/28/2021    BILIRUBINUR Negative 05/28/2021    BILIRUBINUR NEG 01/16/2020    BLOODU Negative 05/28/2021    GLUCOSEU Negative 05/28/2021     ABG:  No results found for: PHART, VSQ6DRB, PO2ART, ZCE7JHJ, BEART, THGBART, OGK0UNC, Z4QSUJMM  FLP:    Lab Results   Component Value Date    TRIG 115 04/22/2021    HDL 38 04/22/2021    HDL 60 02/22/2011    LDLCALC 100 04/22/2021    LABVLDL 23 04/22/2021     TSH:  No results found for: TSH   DATA:   ECG: Sinus Rhythm     ASSESSMENT/Plan:    right heel osteomyelitis   - pressure wounds from praplegia  - penetrates down to the achilles tendon, calcaneus bone  - crp 102, esr 87  - MRI is unable to be done due to contractures  - vancomycin and cefepime -> zosyn   - d/w podiatry - would benefit from amputation   - ID following   - wound culture  Enterococcus faecalis DNA Detected Abnormal      Blood Culture, Routine Jorgito/B gene not detected. Bacteroides fragilis Abnormal   Pseudomonas aeruginosa Abnormal  P     WOUND/ABSCESS Moderate growth   sensitivity to follow         bacteremia   - blood cx:   Enterococcus faecalis DNA Detected Abnormal  P     Blood Culture, Routine Jorgito/B gene not detected      - repeat blood cultures 2/5 ngtd  - consulted ID     Chronic conditions - cont home meds unless otherwise stated   Paraplegia   pvd   CAD no chest pain  Hypertension    Dispo: in pt, pt wants to transfer to [facility] but they have refused.  I called his partner but no answer, left a message

## 2022-02-07 NOTE — PROGRESS NOTES
Occupational Therapy  Attempt Note    Jennifer Lerner  2/7/2022    OT orders received for this pt per Dr. Mynor Mendez. The pt was in the hospital in January of 2022 for a non-healing R heel wound. The pt was evaluated by OT at that time and he was found to be dependent care and non-ambulatory at his ECF, [facility] and was d/c'd from acute care OT services due to no skilled OT needs. The pt's PLOF is unchanged at this time and the pt con't to have no acute care skilled OT needs. Anticipate that the pt will be returning to his LTC facility at d/c. Will d/c from our services.  Thank you for the referral.     Luh Ramos, OTR/L [id number]

## 2022-02-08 LAB
ANION GAP SERPL CALCULATED.3IONS-SCNC: 13 MMOL/L (ref 3–16)
BASOPHILS ABSOLUTE: 0.1 K/UL (ref 0–0.2)
BASOPHILS RELATIVE PERCENT: 1 %
BUN BLDV-MCNC: 20 MG/DL (ref 7–20)
CALCIUM SERPL-MCNC: 9.2 MG/DL (ref 8.3–10.6)
CHLORIDE BLD-SCNC: 101 MMOL/L (ref 99–110)
CO2: 23 MMOL/L (ref 21–32)
CREAT SERPL-MCNC: 1 MG/DL (ref 0.8–1.3)
EOSINOPHILS ABSOLUTE: 0.2 K/UL (ref 0–0.6)
EOSINOPHILS RELATIVE PERCENT: 2.2 %
GFR AFRICAN AMERICAN: >60
GFR NON-AFRICAN AMERICAN: >60
GLUCOSE BLD-MCNC: 97 MG/DL (ref 70–99)
HCT VFR BLD CALC: 25 % (ref 40.5–52.5)
HEMOGLOBIN: 8.5 G/DL (ref 13.5–17.5)
LYMPHOCYTES ABSOLUTE: 1.3 K/UL (ref 1–5.1)
LYMPHOCYTES RELATIVE PERCENT: 17.2 %
MCH RBC QN AUTO: 27.3 PG (ref 26–34)
MCHC RBC AUTO-ENTMCNC: 34.2 G/DL (ref 31–36)
MCV RBC AUTO: 80 FL (ref 80–100)
MONOCYTES ABSOLUTE: 0.3 K/UL (ref 0–1.3)
MONOCYTES RELATIVE PERCENT: 4.3 %
NEUTROPHILS ABSOLUTE: 5.5 K/UL (ref 1.7–7.7)
NEUTROPHILS RELATIVE PERCENT: 75.3 %
PDW BLD-RTO: 17.8 % (ref 12.4–15.4)
PLATELET # BLD: 226 K/UL (ref 135–450)
PMV BLD AUTO: 6.8 FL (ref 5–10.5)
POTASSIUM REFLEX MAGNESIUM: 3.8 MMOL/L (ref 3.5–5.1)
RBC # BLD: 3.12 M/UL (ref 4.2–5.9)
SODIUM BLD-SCNC: 137 MMOL/L (ref 136–145)
WBC # BLD: 7.3 K/UL (ref 4–11)

## 2022-02-08 PROCEDURE — APPNB30 APP NON BILLABLE TIME 0-30 MINS: Performed by: NURSE PRACTITIONER

## 2022-02-08 PROCEDURE — 6360000002 HC RX W HCPCS: Performed by: INTERNAL MEDICINE

## 2022-02-08 PROCEDURE — 2580000003 HC RX 258: Performed by: INTERNAL MEDICINE

## 2022-02-08 PROCEDURE — 6370000000 HC RX 637 (ALT 250 FOR IP): Performed by: INTERNAL MEDICINE

## 2022-02-08 PROCEDURE — 85025 COMPLETE CBC W/AUTO DIFF WBC: CPT

## 2022-02-08 PROCEDURE — APPSS30 APP SPLIT SHARED TIME 16-30 MINUTES: Performed by: NURSE PRACTITIONER

## 2022-02-08 PROCEDURE — 99233 SBSQ HOSP IP/OBS HIGH 50: CPT | Performed by: INTERNAL MEDICINE

## 2022-02-08 PROCEDURE — 1200000000 HC SEMI PRIVATE

## 2022-02-08 PROCEDURE — 99222 1ST HOSP IP/OBS MODERATE 55: CPT | Performed by: SURGERY

## 2022-02-08 PROCEDURE — 80048 BASIC METABOLIC PNL TOTAL CA: CPT

## 2022-02-08 PROCEDURE — 6370000000 HC RX 637 (ALT 250 FOR IP): Performed by: PODIATRIST

## 2022-02-08 RX ADMIN — PIPERACILLIN AND TAZOBACTAM 3375 MG: 3; .375 INJECTION, POWDER, LYOPHILIZED, FOR SOLUTION INTRAVENOUS at 05:36

## 2022-02-08 RX ADMIN — SODIUM HYPOCHLORITE: 5 SOLUTION TOPICAL at 09:45

## 2022-02-08 RX ADMIN — SODIUM CHLORIDE, PRESERVATIVE FREE 10 ML: 5 INJECTION INTRAVENOUS at 09:46

## 2022-02-08 RX ADMIN — TROSPIUM CHLORIDE 20 MG: 20 TABLET, FILM COATED ORAL at 05:07

## 2022-02-08 RX ADMIN — HYDROCHLOROTHIAZIDE 25 MG: 25 TABLET ORAL at 09:44

## 2022-02-08 RX ADMIN — ATORVASTATIN CALCIUM 80 MG: 80 TABLET, FILM COATED ORAL at 21:07

## 2022-02-08 RX ADMIN — TROSPIUM CHLORIDE 20 MG: 20 TABLET, FILM COATED ORAL at 16:50

## 2022-02-08 RX ADMIN — BACLOFEN 20 MG: 10 TABLET ORAL at 09:44

## 2022-02-08 RX ADMIN — MIRTAZAPINE 15 MG: 15 TABLET, FILM COATED ORAL at 21:07

## 2022-02-08 RX ADMIN — PIPERACILLIN AND TAZOBACTAM 3375 MG: 3; .375 INJECTION, POWDER, LYOPHILIZED, FOR SOLUTION INTRAVENOUS at 14:24

## 2022-02-08 RX ADMIN — SODIUM CHLORIDE 25 ML: 9 INJECTION, SOLUTION INTRAVENOUS at 21:10

## 2022-02-08 RX ADMIN — MORPHINE SULFATE 2 MG: 2 INJECTION, SOLUTION INTRAMUSCULAR; INTRAVENOUS at 23:05

## 2022-02-08 RX ADMIN — LISINOPRIL 20 MG: 20 TABLET ORAL at 09:44

## 2022-02-08 RX ADMIN — BACLOFEN 20 MG: 10 TABLET ORAL at 21:07

## 2022-02-08 RX ADMIN — SERTRALINE 150 MG: 100 TABLET, FILM COATED ORAL at 09:44

## 2022-02-08 RX ADMIN — PIPERACILLIN AND TAZOBACTAM 3375 MG: 3; .375 INJECTION, POWDER, LYOPHILIZED, FOR SOLUTION INTRAVENOUS at 21:11

## 2022-02-08 RX ADMIN — PANTOPRAZOLE SODIUM 40 MG: 40 TABLET, DELAYED RELEASE ORAL at 09:44

## 2022-02-08 RX ADMIN — CLOPIDOGREL BISULFATE 75 MG: 75 TABLET ORAL at 09:44

## 2022-02-08 RX ADMIN — ATENOLOL 25 MG: 25 TABLET ORAL at 09:44

## 2022-02-08 ASSESSMENT — PAIN SCALES - GENERAL
PAINLEVEL_OUTOF10: 0
PAINLEVEL_OUTOF10: 7
PAINLEVEL_OUTOF10: 7
PAINLEVEL_OUTOF10: 0

## 2022-02-08 ASSESSMENT — PAIN DESCRIPTION - ORIENTATION: ORIENTATION: LOWER

## 2022-02-08 ASSESSMENT — PAIN DESCRIPTION - PAIN TYPE: TYPE: CHRONIC PAIN

## 2022-02-08 ASSESSMENT — PAIN DESCRIPTION - LOCATION: LOCATION: BACK

## 2022-02-08 ASSESSMENT — ENCOUNTER SYMPTOMS: GASTROINTESTINAL NEGATIVE: 1

## 2022-02-08 ASSESSMENT — PAIN DESCRIPTION - DESCRIPTORS: DESCRIPTORS: ACHING

## 2022-02-08 NOTE — PROGRESS NOTES
HCT 25.8* 25.0*    226     Recent Labs     02/08/22  0520      K 3.8      CO2 23   BUN 20   CREATININE 1.0     No results for input(s): PROT, INR, APTT in the last 72 hours. LOWER EXTREMITY EXAMINATION   SKIN:    RIGHT postero-plantar heel with 8.0 x 5.2 cm wound with necrotic malodorous presentation  The left heel has a very small ulceration to the plantar fat pad. NEUROLOGIC:    Pain sensation isnot significantly changed Bilateral.  Light touch is decreasedBilateral. positive history of paresthesia Bilateral. negativehistory of burning Bilateral. Deep tendon reflexes are absent to include patellar and achilles Bilateral. Scarbro--Herman 5.07 monofilament sensitivity is abnormal 4 to 5 spots tested Bilateral.    VASCULAR:    bilaterally Dorsalis pedis is absent. bilaterally Posterior tibial pulse is absent. 2+ edema right. mild Varicosities right. MUSCULOSKELETAL:  Mary Jane Avelar is untested due to paraplegia. Muscle strength is 1/5 to all groups tested to include dorsiflexion, plantarflexion, inversion, eversion, and digital Bilateral . The ranges of motion of all joints tested from ankle distal is decreased there is crepitus noted Right. Radiology 2/3/2022  [place] Shown is severe diffuse osteopenia. [place] is soft tissue   ulceration of the hindfoot.  Subtle lucencies noted in the calcaneus may   represent erosive changes from osteomyelitis.  No evidence of an acute   traumatic displaced fracture.           Impression   Soft tissue ulceration of the hindfoot with possible lucencies in the   calcaneus that may represent erosive changes from osteomyelitis versus   overlying soft tissue artifact.       Severe diffuse osteopenia           IMPRESSION/RECOMMENDATIONS    1. Deeply penetrating RIGHT heel wound with exposed calcaneus / Desmond's tendon  Dakins wet to dry dressings continued    2. Osteomyelitis of RIGHT calcaneus    3. Left heel wound appears small and granular.     4. Severe peripheral vascular disease    5. Paraplegia with joint contractures about both knees, contributing to the pressure ulcers to both heels. DISPO: Patient wants conservative care only. His major carmencita for healing is the positioning in the bed and the contractures. A second opinion can be had from Dr. José Miguel Hector or Dr. Kulwant Anderson who are surgical podiatrists on staff and not in our group if the patient wound like a different treatment plan. Thank you for the opportunity to take part in the patient's care.     Rajesh Jhaveri LETTY  [facility]  [phone]

## 2022-02-08 NOTE — PROGRESS NOTES
Infectious Disease Follow up Notes  Admit Date: 2/3/2022  Hospital Day: 6    Antibiotics :   IV Zosyn     CHIEF COMPLAINT:       Rt heel ulcer  Enterococcus bacteremia  Pseudomonas infection   Rt heel osteomyelitis          Subjective interval History :  78 y.o. man with CVA and Rt side weakness now with Rt heel ulcer secondary infection drainage, malodor at presentation on going cellulitis with worsening wound infections he has bi lateral foot wounds,images reviewed. Has PVD. And he was hospitalized recently from 1/6 to 1/12/22 was treated with oral abx for the ulcer and was seen by Podiatry. Blood cx with Enterococcus noted, XRAY with Rt calcaneus osteomyelitis ? MRI Rt foot pending. Location :  Rt heel pain+ swelling, odor+ drainage+      Quality :   aching   Severity : 10/10    Duration :  weeks    Timing : intermittent   Context : Rt heel ulcer    Modifying factors : none  Associated signs and symptoms: Rt heel ulcer+ drainage+         Interval History :  Tolerating abx ok but confused and Rt heel ulcer wound cx MDRO Pseudomonas noted and PICC working ok       Past Medical History:    Past Medical History:   Diagnosis Date    Acute MI ([address] 09/2013    Anxiety     Arthritis     CAD (coronary artery disease)     COVID-19     Hyperlipidemia 2/3/2022    Hypertension     Influenza A 01/15/2017       Past Surgical History:    Past Surgical History:   Procedure Laterality Date    BACK SURGERY N/A 1989    herniated disc    CORONARY ANGIOPLASTY WITH STENT PLACEMENT  9/13    EYE SURGERY      VASCULAR SURGERY         Current Medications:    Outpatient Medications Marked as Taking for the 2/3/22 encounter [facility] Encounter)   Medication Sig Dispense Refill    busPIRone (BUSPAR) 5 MG tablet Take 5 mg by mouth 2 times daily      clindamycin (CLEOCIN) 300 MG capsule Take 300 mg by mouth 2 times daily      hydrOXYzine (ATARAX) 25 MG tablet Take 25 mg by mouth every 6 hours as needed for Anxiety      senna (SENOKOT) 8.6 MG tablet Take 1 tablet by mouth daily      collagenase 250 UNIT/GM ointment Apply 1 each topically daily Apply topically to right heel daily.  clopidogrel (PLAVIX) 75 MG tablet Take 1 tablet by mouth daily 30 tablet 3    ferrous sulfate (IRON 325) 325 (65 Fe) MG tablet Take 325 mg by mouth daily (with breakfast)      mirtazapine (REMERON) 15 MG tablet Take 15 mg by mouth nightly      pantoprazole (PROTONIX) 40 MG tablet Take 40 mg by mouth daily      mirabegron (MYRBETRIQ) 50 MG TB24 Take 50 mg by mouth daily      baclofen (LIORESAL) 20 MG tablet Take 20 mg by mouth 2 times daily      lisinopril (PRINIVIL;ZESTRIL) 20 MG tablet TAKE 1/2 TABLET BY MOUTH DAILY (Patient taking differently: Take 10 mg by mouth daily ) 45 tablet 0    hydroCHLOROthiazide (HYDRODIURIL) 25 MG tablet TAKE ONE TABLET BY MOUTH DAILY 90 tablet 0    atenolol (TENORMIN) 25 MG tablet Take 1 tablet by mouth daily 30 tablet 0    atorvastatin (LIPITOR) 80 MG tablet Take 1 tablet by mouth nightly 30 tablet 3    sertraline (ZOLOFT) 100 MG tablet TAKE 1 AND 1/2 TABLET BY MOUTH DAILY 45 tablet 2    Multiple Vitamins-Minerals (THERAPEUTIC MULTIVITAMIN-MINERALS) tablet Take 1 tablet by mouth daily      Ascorbic Acid (VITAMIN C) 500 MG tablet Take 500 mg by mouth daily         Allergies:  Patient has no known allergies.     Immunizations :   Immunization History   Administered Date(s) Administered    COVID-19, Pfizer Purple top, DILUTE for use, 12+ yrs, 30mcg/0.3mL dose 02/26/2021, 03/19/2021    Influenza, High Dose (Fluzone 65 yrs and older) 11/25/2019    Influenza, High-dose, Quadv, 65 yrs +, IM (Fluzone) 10/22/2020    Pneumococcal Conjugate 13-valent ([id number]) 06/04/2019    Pneumococcal Polysaccharide ([id number]) 10/22/2020    Zoster Recombinant (Shingrix) 10/22/2020, 02/20/2021       Social History:   Social History     Tobacco Use    Smoking status: Former Smoker     Packs/day: 0.50     Years: 40.00     Pack years: 20.00     Types: Cigarettes     Quit date: 1/14/2019     Years since quitting: 3.0    Smokeless tobacco: Never Used   Vaping Use    Vaping Use: Never used   Substance Use Topics    Alcohol use:  Yes     Alcohol/week: 2.0 standard drinks     Types: 2 Cans of beer per week     Comment: states used to drink 1 pint/vodka a day / now socially drinks    Drug use: Not on file     Social History     Tobacco Use   Smoking Status Former Smoker    Packs/day: 0.50    Years: 40.00    Pack years: 20.00    Types: Cigarettes    Quit date: 1/14/2019    Years since quitting: 3.0   Smokeless Tobacco Never Used      Family History   Problem Relation Age of Onset    Hypertension Mother     Hypertension Father     Heart Failure Father     Stroke Maternal Grandfather     Cancer Paternal Uncle         throat       REVIEW OF SYSTEMS:    NOT possible due to confusion                 PHYSICAL EXAM:      Vitals:    /72   Pulse 75   Temp 98.1 °F (36.7 °C) (Oral)   Resp 16   Ht 6' 3\" (1.905 m)   Wt 192 lb 0.3 oz (87.1 kg)   SpO2 95%   BMI 24.00 kg/m²      General Appearance: awake and able to converse and no distress  acute distress, ++ pallor, no icterus   Skin: warm and dry, no rash or erythema  Head: normocephalic and atraumatic  Eyes: pupils equal, round, and reactive to light, conjunctivae normal  ENT: tympanic membrane, external ear and ear canal normal bilaterally, nose without deformity, nasal mucosa and turbinates normal without polyps  Neck: supple and non-tender without mass, no thyromegaly  no cervical lymphadenopathy  Pulmonary/Chest: clear to auscultation bilaterally- no wheezes, rales or rhonchi, normal air movement, no respiratory distress  Cardiovascular: normal rate, regular rhythm, normal S1 and S2, no murmurs, rubs, clicks, or gallops, no carotid bruits  Abdomen: soft, non-tender, non-distended, normal bowel sounds, no masses or organomegaly  Extremities: no cyanosis, clubbing or edema  Musculoskeletal: normal range of motion, no joint swelling, deformity or tenderness  Integumentary: No rashes, no abnormal skin lesions, no petechiae  Neurologic: reflexes normal and symmetric, no cranial nerve deficit  Rt side weakness+ and heel ulcer+ deep with drainage+       Data Review:    CBC:   Lab Results   Component Value Date    WBC 7.3 02/08/2022    HGB 8.5 (L) 02/08/2022    HCT 25.0 (L) 02/08/2022    MCV 80.0 02/08/2022     02/08/2022     RENAL:   Lab Results   Component Value Date    CREATININE 1.0 02/08/2022    BUN 20 02/08/2022     02/08/2022    K 3.8 02/08/2022     02/08/2022    CO2 23 02/08/2022     SED RATE:   Lab Results   Component Value Date    SEDRATE 87 02/05/2022     CK:   Lab Results   Component Value Date    CKTOTAL 63 04/23/2021     CRP:   Lab Results   Component Value Date    .7 02/05/2022     Hepatic Function Panel:   Lab Results   Component Value Date    ALKPHOS 81 05/28/2021    ALT 20 05/28/2021    AST 24 05/28/2021    PROT 6.8 05/28/2021    BILITOT 0.9 05/28/2021    LABALBU 3.1 01/08/2022     UA:  Lab Results   Component Value Date    COLORU YELLOW 05/28/2021    CLARITYU Clear 05/28/2021    GLUCOSEU Negative 05/28/2021    BILIRUBINUR Negative 05/28/2021    BILIRUBINUR NEG 01/16/2020    KETUA Negative 05/28/2021    SPECGRAV 1.021 05/28/2021    BLOODU Negative 05/28/2021    PHUR 6.0 05/28/2021    PROTEINU Negative 05/28/2021    UROBILINOGEN 2.0 05/28/2021    NITRU Negative 05/28/2021    LEUKOCYTESUR Negative 05/28/2021    LABMICR Not Indicated 05/28/2021    URINETYPE NotGiven 05/28/2021      Urine Microscopic:   Lab Results   Component Value Date    COMU see below 04/26/2021    HYALCAST 0 04/26/2021    WBCUA 1 04/26/2021    RBCUA 1 04/26/2021    EPIU 0 04/26/2021     Urine Reflex to Culture:   Lab Results   Component Value Date    URRFLXCULT Not Indicated 05/28/2021         MICRO: cultures reviewed and updated by me   Blood Culture:   Lab Results   Component Value Date    [facility]  02/05/2022     No Growth to date. Any change in status will be called. BLOODCULT2  02/05/2022     No Growth to date. Any change in status will be called. Respiratory Culture:  Lab Results   Component Value Date    CULTRESP Moderate growth normal respiratory orlin with 05/09/2021    CULTRESP Heavy growth 05/09/2021    LABGRAM  02/05/2022     3+ WBC's (Polymorphonuclear)  2+ Epithelial Cells  2+ Gram positive rods resembling Diphtheroids  2+ Gram positive cocci  1+ Gram negative rods       AFB:No results found for: AFBSMEAR  Viral Culture:  Lab Results   Component Value Date    COVID19 DETECTED 01/06/2022    COVID19 Not Detected 01/06/2022     Urine Culture: No results for input(s): Chantal Perobert in the last 72 hours. Susceptibility     Pseudomonas aeruginosa     BACTERIAL SUSCEPTIBILITY PANEL BY MONICA     cefepime 16 mcg/mL Intermediate     Ceftolozane/Tazobactam (Zerbaxa) 1 mcg/mL Sensitive     ciprofloxacin >2 mcg/mL Resistant     gentamicin >8 mcg/mL Resistant     meropenem >8 mcg/mL Resistant     piperacillin-tazobactam 64 mcg/mL Intermediate     tobramycin >8 mcg/mL Resistant          IMAGING:    XR FOOT RIGHT (MIN 3 VIEWS)   Final Result   Soft tissue ulceration of the hindfoot with possible lucencies in the   calcaneus that may represent erosive changes from osteomyelitis versus   overlying soft tissue artifact. Severe diffuse osteopenia. Subacute healing stress fracture of the 2nd metatarsal.                   MRI FOOT RIGHT WO CONTRAST    (Results Pending)           Summary        Limited evaluation of both extremities. Evidence of calcific plaque    throughout both extremities. OMKAR not obtained. Popliteal arteries unable to    be visualized due to positioning.  Multiphasic waveforms throughout SFA    bilaterally with evidence of distal monophasic waveforms therefore cannot    exclude a popliteal lesion bilaterally.  The left anterior tibial artery    appears to be occluded.        Signature        ------------------------------------------------------------------    Electronically signed by Dimas Cardenas DO (Interpreting    physician) on 01/10/2022 at 03:18 PM         All the pertinent images and reports for the current Hospitalization were reviewed by me     Scheduled Meds:   sodium hypochlorite   Irrigation Daily    piperacillin-tazobactam  3,375 mg IntraVENous Q8H    lidocaine 1 % injection  5 mL IntraDERmal Once    sodium chloride flush  5-40 mL IntraVENous 2 times per day    sertraline  150 mg Oral Daily    pantoprazole  40 mg Oral Daily    mirtazapine  15 mg Oral Nightly    trospium  20 mg Oral BID AC    lisinopril  20 mg Oral Daily    hydroCHLOROthiazide  25 mg Oral Daily    clopidogrel  75 mg Oral Daily    baclofen  20 mg Oral BID    atorvastatin  80 mg Oral Nightly    atenolol  25 mg Oral Daily    sodium chloride flush  10 mL IntraVENous 2 times per day       Continuous Infusions:   sodium chloride      sodium chloride         PRN Meds:  sodium chloride flush, sodium chloride, sodium chloride flush, sodium chloride, ondansetron, polyethylene glycol, acetaminophen **OR** acetaminophen, morphine      Assessment:     Patient Active Problem List   Diagnosis    Chest pain    MI, acute, non ST segment elevation (HCC)    Tobacco abuse    Essential hypertension    Acute [address]    Acute CVA (cerebrovascular accident) ([facility]    Right leg weakness    Acute ischemic left [place] stroke (HCC)    Non-healing open wound of right heel    Right heel infection    Hyperlipidemia    CAD (coronary artery disease)    Cellulitis of left heel       \  Rt heel non healing ulcer  Rt heel osteomyelitis  Rt heel cellulitis   Enterococcus bacteremia+  H/o CVA  Rt side weakness  Rt leg contracture  Noted may need surgery when possible  [facility] resident  Pseudomonas heel infection   Bacteroides Bacteremia from the Rt heel ulcer+    He is contracted in Rt leg with pressure ulcer and non ambulatory will likely need surgery and Podiatry following will need IV abx for bacteremia and infection    Enterococcus bacteremia could be from the infected non healing ulcers Rt heel +    Blood cx positive for Bacteroides as well and covered by IV Zosyn     Rt heel ulcer with Pseudomonas sensitivities noted MDRO and its not in the Blood cx    There is on going discussion about amputation and level but he may not be interested ? But given the foot exam concern is for progression of the infection              Labs, Microbiology, Radiology and all the pertinent results from current hospitalization and  care every where were reviewed  by me as a part of the evaluation   Plan:   1. Cont  IV Zosyn x 3.375 mg Q 8 HRS  2. Rt heel local care  3. Podiatry notes reviewed   4. Esr 87 , crp 102.7   5. wOUND CX - Pseudomonas noted MDRO likely colonized the wound and cont local care   6. Cont Local care    7. X ray with early osteomyelitis of rt calcaneus  8. Contact precautions   9. May ultimately need amputation -     Discussed with patient/Family and Nursing   Risk of Complications/Morbidity: High      · Illness(es)/ Infection present that pose threat to bodily function. · There is potential for severe exacerbation of infection/side effects of treatment. · Therapy requires intensive monitoring for antimicrobial agent toxicity. Discussed with patient/Family and Nursing staff     Thanks for allowing me to participate in your patient's care and please call me with any questions or concerns.     Linda Olivarez MD  Infectious Disease  [facility] ([facility]) Physician  Phone: [phone]   Fax : [phone]

## 2022-02-08 NOTE — PLAN OF CARE
Problem: Falls - Risk of:  Goal: Will remain free from falls  Description: Will remain free from falls  2/8/2022 0028 by Martha Pedroza RN  Outcome: Ongoing    Goal: Absence of physical injury  Description: Absence of physical injury  2/8/2022 0028 by Martha Perdoza RN  Outcome: Ongoing       Problem: Infection:  Goal: Will remain free from infection  Description: Will remain free from infection  2/8/2022 0028 by Martha Pedroza RN  Outcome: Ongoing    Problem: Safety:  Goal: Free from accidental physical injury  Description: Free from accidental physical injury  2/8/2022 0028 by Martha Pedroza RN  Outcome: Ongoing    Goal: Free from intentional harm  Description: Free from intentional harm  2/8/2022 0028 by Martha Pedroza RN  Outcome: Ongoing    Problem: Daily Care:  Goal: Daily care needs are met  Description: Daily care needs are met  2/8/2022 0028 by Martha Pedroza RN  Outcome: Ongoing       Problem: Pain:  Goal: Patient's pain/discomfort is manageable  Description: Patient's pain/discomfort is manageable  2/8/2022 0028 by Martha Pedroza RN  Outcome: Ongoing       Problem: Skin Integrity:  Goal: Skin integrity will stabilize  Description: Skin integrity will stabilize  2/8/2022 0028 by Martha Pedroza RN  Outcome: Ongoing

## 2022-02-08 NOTE — PROGRESS NOTES
Hospitalist  Progress Note       James Marie is a 78 y.o. male   1955     SUBJECTIVE:   Patient seen and examined  Alert oriented  Aware of the potential need for amputation, still contemplating discussing with his family  Apparently transfer to [facility] was failed per the prior notes. OBJECTIVE:      Vitals:   Blood pressure 120/72, pulse 75, temperature 98.1 °F (36.7 °C), temperature source Oral, resp. rate 16, height 6' 3\" (1.905 m), weight 192 lb 0.3 oz (87.1 kg), SpO2 95 %. Gen: Not in distress. Alert. Chronically ill, emaciated, contracted  Head: Normocephalic. Atraumatic. Eyes: Conjunctivae/corneas clear. ENT: Oral mucosa moist  Neck: No JVD. No obvious thyromegaly. CVS: Nml S1S2, no murmur    Pulmomary: Fair air entry  Gastrointestinal: Soft, non tender, non distend, . Musculoskeletal: Contracted extremities, deep ulcer of the right heel as described        Neuro: Quadriparesis   psychiatry: Appropriate affect. Not agitated.         Past Medical History:   Diagnosis Date    Acute MI ([address] 09/2013    Anxiety     Arthritis     CAD (coronary artery disease)     COVID-19     Hyperlipidemia 2/3/2022    Hypertension     Influenza A 01/15/2017        Patient Active Problem List   Diagnosis    Chest pain    MI, acute, non ST segment elevation ([address]    Tobacco abuse    Essential hypertension    Acute MI ([address]    Acute CVA (cerebrovascular accident) ([address]    Right leg weakness    Acute ischemic left [place] stroke (HCC)    Non-healing open wound of right heel    Right heel infection    Hyperlipidemia    CAD (coronary artery disease)    Cellulitis of left heel        No Known Allergies     Current Inpatient Medications:    Current Facility-Administered Medications   Medication Dose Route Frequency Provider Last Rate Last Admin    sodium hypochlorite (DAKINS) external solution   Irrigation Daily Evelyne Dick DPM   Given at 02/08/22 0945    piperacillin-tazobactam (ZOSYN) 3,375 mg in dextrose 5 % 100 mL IVPB extended infusion (mini-bag)  3,375 mg IntraVENous Q8H Francisco Dobbs MD   Stopped at 02/08/22 0945    lidocaine PF 1 % injection 5 mL  5 mL IntraDERmal Once Francisco Dobbs MD        sodium chloride flush 0.9 % injection 5-40 mL  5-40 mL IntraVENous 2 times per day Francisco Dobbs MD   10 mL at 02/08/22 0946    sodium chloride flush 0.9 % injection 5-40 mL  5-40 mL IntraVENous PRN Francisco Dobbs MD        0.9 % sodium chloride infusion  25 mL IntraVENous PRN Francisco Dobbs MD        sertraline (ZOLOFT) tablet 150 mg  150 mg Oral Daily Ro Schuler MD   150 mg at 02/08/22 0944    pantoprazole (PROTONIX) tablet 40 mg  40 mg Oral Daily Ro Schuler MD   40 mg at 02/08/22 0944    mirtazapine (REMERON) tablet 15 mg  15 mg Oral Nightly Ro Schuler MD   15 mg at 02/07/22 2046    trospium (SANCTURA) tablet 20 mg  20 mg Oral BID AC Ro Schuler MD   20 mg at 02/08/22 0507    lisinopril (PRINIVIL;ZESTRIL) tablet 20 mg  20 mg Oral Daily Ro Schuler MD   20 mg at 02/08/22 0944    hydroCHLOROthiazide (HYDRODIURIL) tablet 25 mg  25 mg Oral Daily Ro Schuler MD   25 mg at 02/08/22 0944    clopidogrel (PLAVIX) tablet 75 mg  75 mg Oral Daily Ro Schuler MD   75 mg at 02/08/22 0944    baclofen (LIORESAL) tablet 20 mg  20 mg Oral BID Ro Schuler MD   20 mg at 02/08/22 0944    atorvastatin (LIPITOR) tablet 80 mg  80 mg Oral Nightly Ro Schuler MD   80 mg at 02/07/22 2046    atenolol (TENORMIN) tablet 25 mg  25 mg Oral Daily Ro Schuler MD   25 mg at 02/08/22 0944    sodium chloride flush 0.9 % injection 10 mL  10 mL IntraVENous 2 times per day Ro Schuler MD   10 mL at 02/08/22 0946    sodium chloride flush 0.9 % injection 10 mL  10 mL IntraVENous PRN Ro Schuler MD        0.9 % sodium chloride infusion  25 mL IntraVENous PRN Ro Schuler MD        ondansetron [facility]) injection 4 mg  4 mg IntraVENous Q4H PRN eHlen [place] Jose Alfredo Nichols MD        polyethylene glycol [facility]) packet 17 g  17 g Oral Daily PRN Destin Null MD        acetaminophen (TYLENOL) tablet 650 mg  650 mg Oral Q4H PRN Destin Null MD        Or    acetaminophen (TYLENOL) suppository 650 mg  650 mg Rectal Q4H PRN Destin Null MD        morphine (PF) injection 2 mg  2 mg IntraVENous Q4H PRN Destin Null MD   2 mg at 02/03/22 1408           Labs:  CBC with Differential:    Lab Results   Component Value Date    WBC 7.3 02/08/2022    RBC 3.12 02/08/2022    HGB 8.5 02/08/2022    HCT 25.0 02/08/2022     02/08/2022    MCV 80.0 02/08/2022    MCH 27.3 02/08/2022    MCHC 34.2 02/08/2022    RDW 17.8 02/08/2022    SEGSPCT 75.4 02/21/2011    LYMPHOPCT 17.2 02/08/2022    MONOPCT 4.3 02/08/2022    EOSPCT 0.5 02/21/2011    BASOPCT 1.0 02/08/2022    MONOSABS 0.3 02/08/2022    LYMPHSABS 1.3 02/08/2022    EOSABS 0.2 02/08/2022    BASOSABS 0.1 02/08/2022    DIFFTYPE Auto-K 02/21/2011     CMP:    Lab Results   Component Value Date     02/08/2022    K 3.8 02/08/2022     02/08/2022    CO2 23 02/08/2022    BUN 20 02/08/2022    CREATININE 1.0 02/08/2022    GFRAA >60 02/08/2022    GFRAA >60 02/21/2011    AGRATIO 0.8 05/28/2021    LABGLOM >60 02/08/2022    GLUCOSE 97 02/08/2022    PROT 6.8 05/28/2021    LABALBU 3.1 01/08/2022    CALCIUM 9.2 02/08/2022    BILITOT 0.9 05/28/2021    ALKPHOS 81 05/28/2021    AST 24 05/28/2021    ALT 20 05/28/2021     Hepatic Function Panel:    Lab Results   Component Value Date    ALKPHOS 81 05/28/2021    ALT 20 05/28/2021    AST 24 05/28/2021    PROT 6.8 05/28/2021    BILITOT 0.9 05/28/2021    LABALBU 3.1 01/08/2022     Magnesium:    Lab Results   Component Value Date    MG 1.50 01/08/2022     PT/INR:    Lab Results   Component Value Date    PROTIME 18.6 01/07/2022    INR 1.61 01/07/2022     Last 3 Troponin:    Lab Results   Component Value Date    TROPONINI <0.01 05/28/2021    TROPONINI 0.03 01/16/2017    TROPONINI 0.13 01/15/2017     U/A:    Lab Results   Component Value Date    NITRITE NEG 01/16/2020    COLORU YELLOW 05/28/2021    PHUR 6.0 05/28/2021    WBCUA 1 04/26/2021    RBCUA 1 04/26/2021    CLARITYU Clear 05/28/2021    SPECGRAV 1.021 05/28/2021    LEUKOCYTESUR Negative 05/28/2021    UROBILINOGEN 2.0 05/28/2021    BILIRUBINUR Negative 05/28/2021    BILIRUBINUR NEG 01/16/2020    BLOODU Negative 05/28/2021    GLUCOSEU Negative 05/28/2021     ABG:  No results found for: PHART, NKZ2MMW, PO2ART, GQK6WKB, BEART, THGBART, IKU3GXY, K1RZNQTE  FLP:    Lab Results   Component Value Date    TRIG 115 04/22/2021    HDL 38 04/22/2021    HDL 60 02/22/2011    LDLCALC 100 04/22/2021    LABVLDL 23 04/22/2021     TSH:  No results found for: TSH   DATA:   ECG: Sinus Rhythm     ASSESSMENT/Plan:    right heel osteomyelitis with bacteremia  - pressure wounds from paraplegia  - penetrates down to the achilles tendon, calcaneus bone   -Wound culture data is polymicrobial including Enterococcus, bacteroid, Pseudomonas    -Podiatry, vascular team input reviewed.   May benefit from amputation    Bacteroid bacteremia, 2/3/2022  ID following, continue IV antibiotic       Paraplegia   Peripheral vascular disease  CAD no chest pain  Hypertension    Dispo: Continue current antibiotics, appreciate multispecialty input

## 2022-02-08 NOTE — CONSULTS
[id number] [facility] Vascular and Endovascular Surgery  Consultation Note    2/8/2022 10:05 AM    Chief Complaint: Bilateral Foot Wounds     Reason for Consultation: PVD, Osteomyelitis    History of Present Illness  Patient is a 78 y.o. male who presented to the ED with complaints of Bilateral Malodorous Foot Wounds. He has a significant PMH of MI, CAD, HLD, HTN, Coronary Stenting and CVA. The patient has a deep right heel ulceration which was debrided by Podiatry on 2/4/2022. He has right side paralysis d/t his previous CVA and his Right Leg is contracted. ID is following the patient for his Right Heel Osteomyelitis and they have started him on IV Zosyn. We have been consulted to evaluate the patient for his PVD and Osteomyelitis. At present, the patient is seen resting in bed and appears to be in stable condition. Review of Systems  Review of Systems   Constitutional: Negative. Gastrointestinal: Negative. Musculoskeletal: Positive for gait problem. Right Leg Contracture/Paralysis, Right Arm Paralysis and Left Leg Weakness   Skin: Positive for wound.    Neurological:        Previous CVA        Past Medical History:   Diagnosis Date    Acute MI ([address] 09/2013    Anxiety     Arthritis     CAD (coronary artery disease)     COVID-19     Hyperlipidemia 2/3/2022    Hypertension     Influenza A 01/15/2017       Past Surgical History:   Procedure Laterality Date    BACK SURGERY N/A 1989    herniated disc    CORONARY ANGIOPLASTY WITH STENT PLACEMENT  9/13    EYE SURGERY      VASCULAR SURGERY         No Known Allergies    Social History     Socioeconomic History    Marital status: Single     Spouse name: Not on file    Number of children: Not on file    Years of education: Not on file    Highest education level: Not on file   Occupational History    Not on file   Tobacco Use    Smoking status: Former Smoker     Packs/day: 0.50     Years: 40.00     Pack years: 20.00     Types: Cigarettes     Quit date: 1/14/2019     Years since quitting: 3.0    Smokeless tobacco: Never Used   Vaping Use    Vaping Use: Never used   Substance and Sexual Activity    Alcohol use: Yes     Alcohol/week: 2.0 standard drinks     Types: 2 Cans of beer per week     Comment: states used to drink 1 pint/vodka a day / now socially drinks    Drug use: Not on file    Sexual activity: Yes     Partners: Female   Other Topics Concern    Not on file   Social History Narrative    Not on file     Social Determinants of Health     Financial Resource Strain: Low Risk     Difficulty of Paying Living Expenses: Not hard at all   Food Insecurity: No Food Insecurity    Worried About Running Out of Food in the Last Year: Never true    Ran Out of Food in the Last Year: Never true   Transportation Needs: No Transportation Needs    Lack of Transportation (Medical): No    Lack of Transportation (Non-Medical):  No   Physical Activity:     Days of Exercise per Week: Not on file    Minutes of Exercise per Session: Not on file   Stress:     Feeling of Stress : Not on file   Social Connections:     Frequency of Communication with Friends and Family: Not on file    Frequency of Social Gatherings with Friends and Family: Not on file    Attends Protestant Services: Not on file    Active Member of Clubs or Organizations: Not on file    Attends Club or Organization Meetings: Not on file    Marital Status: Not on file   Intimate Partner Violence:     Fear of Current or Ex-Partner: Not on file    Emotionally Abused: Not on file    Physically Abused: Not on file    Sexually Abused: Not on file   Housing Stability:     Unable to Pay for Housing in the Last Year: Not on file    Number of [place] in the Last Year: Not on file    Unstable Housing in the Last Year: Not on file       Family History   Problem Relation Age of Onset    Hypertension Mother     Hypertension Father     Heart Failure Father     Stroke Maternal Grandfather     Cancer Paternal Uncle         throat       Vital Signs  Vitals: 02/07/22 1420 02/07/22 2045 02/07/22 2208 02/08/22 0445   BP: 119/67 100/65  120/72   Pulse: 91 83 84 75   Resp: 18 16  16   Temp: 98 °F (36.7 °C) 97.9 °F (36.6 °C)  98.1 °F (36.7 °C)   TempSrc: Oral Oral  Oral   SpO2: 95% 95%  95%   Weight:    192 lb 0.3 oz (87.1 kg)   Height:    6' 3\" (1.905 m)       Physical Exam:  General: no apparent distress, alert and oriented, afebrile  Psychiatric: affect appropriate, cooperative  Head/Eyes/Ears/Nose/Throat: normocephalic, atraumatic   Neck: no carotid bruit, supple, symmetrical, trachea midline  Chest/Lungs: clear to auscultation bilaterally, no accessory muscle use  Cardiac: regular rate and rhythm  Abdomen: soft, non-tender, non-distended, active bowel sounds  Extremities: warm and well perfused, no signs of cyanosis or ischemia, no significant edema, Right sided paralysis, RLE contracture, Left Leg with slight movement to command  Vascular exam:  -R Radial: +2 palp  -L Radial: +2 palp  -R Femoral: +2 palp  -L Femoral: +2 palp  -R DP: biphasic doppler signal present  -L DP: biphasic doppler signal present  -R PT: biphasic doppler signal present  -L PT: biphasic doppler signal present  Wounds:  Right Lateral Foot - three areas of darkened discoloration likely as a result of pressure, no significant skin breakdown at this point  Right Second Toe - area of darkened discoloration  Left Heel - area of darkened discoloration, skin remains intact    Right Heel - Photo taken 2/5/2022        Labs  Lab Results   Component Value Date    WBC 7.3 02/08/2022    HGB 8.5 02/08/2022    HCT 25.0 02/08/2022    MCV 80.0 02/08/2022     02/08/2022     Lab Results   Component Value Date     02/08/2022    K 3.8 02/08/2022     02/08/2022    CO2 23 02/08/2022    PHOS 3.3 01/08/2022    BUN 20 02/08/2022    CREATININE 1.0 02/08/2022      No results found for: GLU    Scheduled Meds:    sodium hypochlorite   Irrigation Daily    piperacillin-tazobactam  3,375 mg IntraVENous Q8H lidocaine 1 % injection  5 mL IntraDERmal Once    sodium chloride flush  5-40 mL IntraVENous 2 times per day    sertraline  150 mg Oral Daily    pantoprazole  40 mg Oral Daily    mirtazapine  15 mg Oral Nightly    trospium  20 mg Oral BID AC    lisinopril  20 mg Oral Daily    hydroCHLOROthiazide  25 mg Oral Daily    clopidogrel  75 mg Oral Daily    baclofen  20 mg Oral BID    atorvastatin  80 mg Oral Nightly    atenolol  25 mg Oral Daily    sodium chloride flush  10 mL IntraVENous 2 times per day     Continuous Infusions:    sodium chloride      sodium chloride         Imaging:   Bilateral Arterial Duplex - 1/10/2022  Limited evaluation of both extremities. Evidence of calcific plaque throughout both extremities. OMKAR not obtained. Popliteal arteries unable to be visualized due to positioning. Multiphasic waveforms throughout SFA bilaterally with evidence of distal monophasic waveforms therefore cannot exclude a popliteal lesion bilaterally. The left anterior tibial artery appears to be occluded. Right Foot X-ray - 2/3/2022  Impression  Soft tissue ulceration of the hindfoot with possible lucencies in the calcaneus that may represent erosive changes from osteomyelitis versus overlying soft tissue artifact. Severe diffuse osteopenia. Subacute healing stress fracture of the 2nd metatarsal.    Assessment:  -Right Heel Wound - S/P I&D with Podiatry  -CVA - Right Sided paralysis, RLE contracture  -Bilateral PT and DP pulses with doppler signals present    Plan:   -IV Antibiotics per ID  -Local Wound Care per Podiatry (Betadine to Left Heel and Dakins to Right Heel)  -Not sure pt is much interested in an amputation at this time, it was discussed with him that an AKA would probably provide the best opportunity for healing given he is non-ambulatory, he states he will consider amputation - we will continue to follow    All pertinent information and plan of care discussed with Dr. Francesca Kolb.     All questions and concerns were addressed with the patient. I have discussed the above stated plan with the patient. The patient verbalized understanding and agreed with the plan. Thank you for allowing to us to participate in the care of Marly Downey      Electronically signed by MARSHALL Lee - CNP on 2/8/2022 at 10:05 AM  Pt seen and examined. for DIAGNOSIS OF severe ulceration of the right heel in the settings of paralysis of the right leg right and right arm and contracture of the hip and knee about 90 degrees agree with MARSHALL Lee's note. Labs reviewed. Vitals   Vitals:    02/07/22 1420 02/07/22 2045 02/07/22 2208 02/08/22 0445   BP: 119/67 100/65  120/72   Pulse: 91 83 84 75   Resp: 18 16  16   Temp: 98 °F (36.7 °C) 97.9 °F (36.6 °C)  98.1 °F (36.7 °C)   TempSrc: Oral Oral  Oral   SpO2: 95% 95%  95%   Weight:    192 lb 0.3 oz (87.1 kg)   Height:    6' 3\" (1.905 m)       Patient is unable to walk or pivot on this right leg get it still presents a risk to him for sepsis. Wound examined and is deep and least down the muscle and tendon question of bone involvement on last x-ray. Apparently patient and family wanted revascularization to heal this. Even if he did have severe arterial disease this would not be indicated.   However he has a palpable dorsalis pedis pulse on the right and Doppler signals dorsalis pedis and posterior tibial bilaterally also has good femoral pulse

## 2022-02-08 NOTE — PLAN OF CARE
Problem: Falls - Risk of:  Goal: Will remain free from falls  Description: Will remain free from falls  2/8/2022 1259 by Shalom uH RN  Outcome: Met This Shift     Problem: Falls - Risk of:  Goal: Absence of physical injury  Description: Absence of physical injury  2/8/2022 1259 by Shalom Hu RN  Outcome: Met This Shift     Problem: Infection:  Goal: Will remain free from infection  Description: Will remain free from infection  2/8/2022 1259 by Shalom Hu RN  Outcome: Ongoing     Problem: Safety:  Goal: Free from accidental physical injury  Description: Free from accidental physical injury  2/8/2022 1259 by Shalom Hu RN  Outcome: Met This Shift     Problem: Safety:  Goal: Free from intentional harm  Description: Free from intentional harm  2/8/2022 1259 by Shalom Hu RN  Outcome: Met This Shift     Problem: Daily Care:  Goal: Daily care needs are met  Description: Daily care needs are met  2/8/2022 1259 by Shalom Hu RN  Outcome: Ongoing     Problem: Pain:  Goal: Patient's pain/discomfort is manageable  Description: Patient's pain/discomfort is manageable  2/8/2022 1259 by Shalom Hu RN  Outcome: Ongoing     Problem: Skin Integrity:  Goal: Skin integrity will stabilize  Description: Skin integrity will stabilize  2/8/2022 1259 by Shalom Hu RN  Outcome: Ongoing     Problem: Skin Integrity:  Goal: Will show no infection signs and symptoms  Description: Will show no infection signs and symptoms  2/8/2022 1259 by Shalom Hu RN  Outcome: Ongoing     Problem: Skin Integrity:  Goal: Absence of new skin breakdown  Description: Absence of new skin breakdown  2/8/2022 1259 by Shalom Hu RN  Outcome: Ongoing

## 2022-02-08 NOTE — CONSULTS
Addendum to earlier note. Spoke with Dr. Orlando Ang he thinks the contracture is relatively new but very staff said he could not open up the knee or flatten the hip, because of this he does not think a [employer] amputation would help. In that case he would agree with me if he cannot do anything it would be an above-knee amputation. I talked to his power of  Pastora Glez she is not on board currently with doing a major amputation. Although he has positive blood cultures this admission I do not see any aggressive infection moving up his leg so we do not have to act emergently. I do think eventually this is what he will need.   We will follow up

## 2022-02-09 LAB
ANION GAP SERPL CALCULATED.3IONS-SCNC: 10 MMOL/L (ref 3–16)
BASOPHILS ABSOLUTE: 0.1 K/UL (ref 0–0.2)
BASOPHILS RELATIVE PERCENT: 0.9 %
BLOOD CULTURE, ROUTINE: NORMAL
BUN BLDV-MCNC: 24 MG/DL (ref 7–20)
CALCIUM SERPL-MCNC: 8.9 MG/DL (ref 8.3–10.6)
CHLORIDE BLD-SCNC: 99 MMOL/L (ref 99–110)
CO2: 25 MMOL/L (ref 21–32)
CREAT SERPL-MCNC: 1 MG/DL (ref 0.8–1.3)
CULTURE, BLOOD 2: NORMAL
EOSINOPHILS ABSOLUTE: 0.2 K/UL (ref 0–0.6)
EOSINOPHILS RELATIVE PERCENT: 2.7 %
GFR AFRICAN AMERICAN: >60
GFR NON-AFRICAN AMERICAN: >60
GLUCOSE BLD-MCNC: 117 MG/DL (ref 70–99)
HCT VFR BLD CALC: 24.7 % (ref 40.5–52.5)
HEMOGLOBIN: 8.2 G/DL (ref 13.5–17.5)
LYMPHOCYTES ABSOLUTE: 1.6 K/UL (ref 1–5.1)
LYMPHOCYTES RELATIVE PERCENT: 20.5 %
MCH RBC QN AUTO: 26.7 PG (ref 26–34)
MCHC RBC AUTO-ENTMCNC: 33 G/DL (ref 31–36)
MCV RBC AUTO: 81 FL (ref 80–100)
MONOCYTES ABSOLUTE: 0.4 K/UL (ref 0–1.3)
MONOCYTES RELATIVE PERCENT: 4.6 %
NEUTROPHILS ABSOLUTE: 5.5 K/UL (ref 1.7–7.7)
NEUTROPHILS RELATIVE PERCENT: 71.3 %
PDW BLD-RTO: 17.9 % (ref 12.4–15.4)
PLATELET # BLD: 235 K/UL (ref 135–450)
PMV BLD AUTO: 6.6 FL (ref 5–10.5)
POTASSIUM REFLEX MAGNESIUM: 3.7 MMOL/L (ref 3.5–5.1)
RBC # BLD: 3.05 M/UL (ref 4.2–5.9)
SODIUM BLD-SCNC: 134 MMOL/L (ref 136–145)
WBC # BLD: 7.7 K/UL (ref 4–11)

## 2022-02-09 PROCEDURE — 99233 SBSQ HOSP IP/OBS HIGH 50: CPT | Performed by: INTERNAL MEDICINE

## 2022-02-09 PROCEDURE — 6370000000 HC RX 637 (ALT 250 FOR IP): Performed by: INTERNAL MEDICINE

## 2022-02-09 PROCEDURE — 85025 COMPLETE CBC W/AUTO DIFF WBC: CPT

## 2022-02-09 PROCEDURE — 99233 SBSQ HOSP IP/OBS HIGH 50: CPT | Performed by: SURGERY

## 2022-02-09 PROCEDURE — APPSS15 APP SPLIT SHARED TIME 0-15 MINUTES: Performed by: NURSE PRACTITIONER

## 2022-02-09 PROCEDURE — 6360000002 HC RX W HCPCS: Performed by: INTERNAL MEDICINE

## 2022-02-09 PROCEDURE — 2580000003 HC RX 258: Performed by: INTERNAL MEDICINE

## 2022-02-09 PROCEDURE — 1200000000 HC SEMI PRIVATE

## 2022-02-09 PROCEDURE — APPNB30 APP NON BILLABLE TIME 0-30 MINS: Performed by: NURSE PRACTITIONER

## 2022-02-09 PROCEDURE — 80048 BASIC METABOLIC PNL TOTAL CA: CPT

## 2022-02-09 RX ADMIN — MIRTAZAPINE 15 MG: 15 TABLET, FILM COATED ORAL at 21:19

## 2022-02-09 RX ADMIN — PIPERACILLIN AND TAZOBACTAM 3375 MG: 3; .375 INJECTION, POWDER, LYOPHILIZED, FOR SOLUTION INTRAVENOUS at 05:42

## 2022-02-09 RX ADMIN — SERTRALINE 150 MG: 100 TABLET, FILM COATED ORAL at 10:42

## 2022-02-09 RX ADMIN — TROSPIUM CHLORIDE 20 MG: 20 TABLET, FILM COATED ORAL at 16:43

## 2022-02-09 RX ADMIN — SODIUM HYPOCHLORITE: 5 SOLUTION TOPICAL at 10:45

## 2022-02-09 RX ADMIN — BACLOFEN 20 MG: 10 TABLET ORAL at 10:44

## 2022-02-09 RX ADMIN — BACLOFEN 20 MG: 10 TABLET ORAL at 21:19

## 2022-02-09 RX ADMIN — PIPERACILLIN AND TAZOBACTAM 3375 MG: 3; .375 INJECTION, POWDER, LYOPHILIZED, FOR SOLUTION INTRAVENOUS at 21:20

## 2022-02-09 RX ADMIN — SODIUM CHLORIDE, PRESERVATIVE FREE 10 ML: 5 INJECTION INTRAVENOUS at 21:20

## 2022-02-09 RX ADMIN — PANTOPRAZOLE SODIUM 40 MG: 40 TABLET, DELAYED RELEASE ORAL at 10:43

## 2022-02-09 RX ADMIN — HYDROCHLOROTHIAZIDE 25 MG: 25 TABLET ORAL at 10:43

## 2022-02-09 RX ADMIN — SODIUM CHLORIDE 25 ML: 9 INJECTION, SOLUTION INTRAVENOUS at 05:41

## 2022-02-09 RX ADMIN — TROSPIUM CHLORIDE 20 MG: 20 TABLET, FILM COATED ORAL at 05:42

## 2022-02-09 RX ADMIN — SODIUM CHLORIDE, PRESERVATIVE FREE 10 ML: 5 INJECTION INTRAVENOUS at 21:19

## 2022-02-09 RX ADMIN — SODIUM CHLORIDE, PRESERVATIVE FREE 10 ML: 5 INJECTION INTRAVENOUS at 10:45

## 2022-02-09 RX ADMIN — ATORVASTATIN CALCIUM 80 MG: 80 TABLET, FILM COATED ORAL at 21:19

## 2022-02-09 RX ADMIN — CLOPIDOGREL BISULFATE 75 MG: 75 TABLET ORAL at 10:43

## 2022-02-09 RX ADMIN — PIPERACILLIN AND TAZOBACTAM 3375 MG: 3; .375 INJECTION, POWDER, LYOPHILIZED, FOR SOLUTION INTRAVENOUS at 12:54

## 2022-02-09 RX ADMIN — SODIUM CHLORIDE, PRESERVATIVE FREE 10 ML: 5 INJECTION INTRAVENOUS at 10:44

## 2022-02-09 ASSESSMENT — PAIN SCALES - WONG BAKER
WONGBAKER_NUMERICALRESPONSE: 0
WONGBAKER_NUMERICALRESPONSE: 0

## 2022-02-09 ASSESSMENT — PAIN SCALES - GENERAL: PAINLEVEL_OUTOF10: 0

## 2022-02-09 NOTE — PLAN OF CARE
Problem: Falls - Risk of:  Goal: Will remain free from falls  Description: Will remain free from falls  Outcome: Met This Shift  Goal: Absence of physical injury  Description: Absence of physical injury  Outcome: Met This Shift     Problem: Infection:  Goal: Will remain free from infection  Description: Will remain free from infection  Outcome: Ongoing     Problem: Safety:  Goal: Free from accidental physical injury  Description: Free from accidental physical injury  Outcome: Met This Shift  Goal: Free from intentional harm  Description: Free from intentional harm  Outcome: Met This Shift     Problem: Daily Care:  Goal: Daily care needs are met  Description: Daily care needs are met  Outcome: Met This Shift     Problem: Pain:  Goal: Patient's pain/discomfort is manageable  Description: Patient's pain/discomfort is manageable  Outcome: Met This Shift     Problem: Skin Integrity:  Goal: Skin integrity will stabilize  Description: Skin integrity will stabilize  Outcome: Met This Shift     Problem: Skin Integrity:  Goal: Will show no infection signs and symptoms  Description: Will show no infection signs and symptoms  Outcome: Met This Shift  Goal: Absence of new skin breakdown  Description: Absence of new skin breakdown  Outcome: Met This Shift

## 2022-02-09 NOTE — PROGRESS NOTES
Infectious Disease Follow up Notes  Admit Date: 2/3/2022  Hospital Day: 7    Antibiotics :   IV Zosyn     CHIEF COMPLAINT:       Rt heel ulcer  Enterococcus bacteremia  Pseudomonas infection   Rt heel osteomyelitis          Subjective interval History :  78 y.o. man with CVA and Rt side weakness now with Rt heel ulcer secondary infection drainage, malodor at presentation on going cellulitis with worsening wound infections he has bi lateral foot wounds,images reviewed. Has PVD. And he was hospitalized recently from  to 22 was treated with oral abx for the ulcer and was seen by Podiatry. Blood cx with Enterococcus noted, XRAY with Rt calcaneus osteomyelitis ? MRI Rt foot pending.   Location :  Rt heel pain+ swelling, odor+ drainage+      Quality :   aching   Severity : 10/10    Duration :  weeks    Timing : intermittent   Context : Rt heel ulcer    Modifying factors : none  Associated signs and symptoms: Rt heel ulcer+ drainage+         Interval History : Rt heel on going drainage+ and ulcer - tolerating IV abx ok - surgery notes reviewed he is declining amputation - PICC in place - Follow up Blood cx in process     Past Medical History:    Past Medical History:   Diagnosis Date    Acute MI ([address] 2013    Anxiety     Arthritis     CAD (coronary artery disease)     COVID-19     Hyperlipidemia 2/3/2022    Hypertension     Influenza A 01/15/2017       Past Surgical History:    Past Surgical History:   Procedure Laterality Date    BACK SURGERY N/A     herniated disc    CORONARY ANGIOPLASTY WITH STENT PLACEMENT      EYE SURGERY      VASCULAR SURGERY         Current Medications:    Outpatient Medications Marked as Taking for the 2/3/22 encounter [facility] Encounter)   Medication Sig Dispense Refill    busPIRone (BUSPAR) 5 MG tablet Take 5 mg by mouth 2 times daily      [] clindamycin (CLEOCIN) 300 MG capsule Take 300 mg by mouth 2 times daily      hydrOXYzine (ATARAX) 25 MG tablet Take 25 mg by mouth every 6 hours as needed for Anxiety      senna (SENOKOT) 8.6 MG tablet Take 1 tablet by mouth daily      collagenase 250 UNIT/GM ointment Apply 1 each topically daily Apply topically to right heel daily.  clopidogrel (PLAVIX) 75 MG tablet Take 1 tablet by mouth daily 30 tablet 3    ferrous sulfate (IRON 325) 325 (65 Fe) MG tablet Take 325 mg by mouth daily (with breakfast)      mirtazapine (REMERON) 15 MG tablet Take 15 mg by mouth nightly      pantoprazole (PROTONIX) 40 MG tablet Take 40 mg by mouth daily      mirabegron (MYRBETRIQ) 50 MG TB24 Take 50 mg by mouth daily      baclofen (LIORESAL) 20 MG tablet Take 20 mg by mouth 2 times daily      lisinopril (PRINIVIL;ZESTRIL) 20 MG tablet TAKE 1/2 TABLET BY MOUTH DAILY (Patient taking differently: Take 10 mg by mouth daily ) 45 tablet 0    hydroCHLOROthiazide (HYDRODIURIL) 25 MG tablet TAKE ONE TABLET BY MOUTH DAILY 90 tablet 0    atenolol (TENORMIN) 25 MG tablet Take 1 tablet by mouth daily 30 tablet 0    atorvastatin (LIPITOR) 80 MG tablet Take 1 tablet by mouth nightly 30 tablet 3    sertraline (ZOLOFT) 100 MG tablet TAKE 1 AND 1/2 TABLET BY MOUTH DAILY 45 tablet 2    Multiple Vitamins-Minerals (THERAPEUTIC MULTIVITAMIN-MINERALS) tablet Take 1 tablet by mouth daily      Ascorbic Acid (VITAMIN C) 500 MG tablet Take 500 mg by mouth daily         Allergies:  Patient has no known allergies.     Immunizations :   Immunization History   Administered Date(s) Administered    COVID-19, Pfizer Purple top, DILUTE for use, 12+ yrs, 30mcg/0.3mL dose 02/26/2021, 03/19/2021    Influenza, High Dose (Fluzone 65 yrs and older) 11/25/2019    Influenza, High-dose, Quadv, 65 yrs +, IM (Fluzone) 10/22/2020    Pneumococcal Conjugate 13-valent ([id number]) 06/04/2019    Pneumococcal Polysaccharide ([id number]) 10/22/2020    Zoster Recombinant (Shingrix) 10/22/2020, 02/20/2021       Social History:   Social History     Tobacco Use    Smoking status: Former Smoker     Packs/day: 0.50     Years: 40.00     Pack years: 20.00     Types: Cigarettes     Quit date: 1/14/2019     Years since quitting: 3.0    Smokeless tobacco: Never Used   Vaping Use    Vaping Use: Never used   Substance Use Topics    Alcohol use:  Yes     Alcohol/week: 2.0 standard drinks     Types: 2 Cans of beer per week     Comment: states used to drink 1 pint/vodka a day / now socially drinks    Drug use: Not on file     Social History     Tobacco Use   Smoking Status Former Smoker    Packs/day: 0.50    Years: 40.00    Pack years: 20.00    Types: Cigarettes    Quit date: 1/14/2019    Years since quitting: 3.0   Smokeless Tobacco Never Used      Family History   Problem Relation Age of Onset    Hypertension Mother     Hypertension Father     Heart Failure Father     Stroke Maternal Grandfather     Cancer Paternal Uncle         throat       REVIEW OF SYSTEMS:    NOT possible due to confusion                 PHYSICAL EXAM:      Vitals:    /60   Pulse 66   Temp 98.2 °F (36.8 °C) (Oral)   Resp 16   Ht 6' 3\" (1.905 m)   Wt 187 lb 6.3 oz (85 kg)   SpO2 93%   BMI 23.42 kg/m²      General Appearance: awake and able to converse and no distress  acute distress, ++ pallor, no icterus   Skin: warm and dry, no rash or erythema  Head: normocephalic and atraumatic  Eyes: pupils equal, round, and reactive to light, conjunctivae normal  ENT: tympanic membrane, external ear and ear canal normal bilaterally, nose without deformity, nasal mucosa and turbinates normal without polyps  Neck: supple and non-tender without mass, no thyromegaly  no cervical lymphadenopathy  Pulmonary/Chest: clear to auscultation bilaterally- no wheezes, rales or rhonchi, normal air movement, no respiratory distress  Cardiovascular: normal rate, regular rhythm, normal S1 and S2, no murmurs, rubs, clicks, or gallops, no carotid bruits  Abdomen: soft, non-tender, non-distended, normal bowel sounds, no masses or organomegaly  Extremities: no cyanosis, clubbing or edema  Musculoskeletal: normal range of motion, no joint swelling, deformity or tenderness  Integumentary: No rashes, no abnormal skin lesions, no petechiae  Neurologic: reflexes normal and symmetric, no cranial nerve deficit  Rt side weakness+ and heel ulcer+ deep with drainage+       Data Review:    CBC:   Lab Results   Component Value Date    WBC 7.7 02/09/2022    HGB 8.2 (L) 02/09/2022    HCT 24.7 (L) 02/09/2022    MCV 81.0 02/09/2022     02/09/2022     RENAL:   Lab Results   Component Value Date    CREATININE 1.0 02/09/2022    BUN 24 (H) 02/09/2022     (L) 02/09/2022    K 3.7 02/09/2022    CL 99 02/09/2022    CO2 25 02/09/2022     SED RATE:   Lab Results   Component Value Date    SEDRATE 87 02/05/2022     CK:   Lab Results   Component Value Date    CKTOTAL 63 04/23/2021     CRP:   Lab Results   Component Value Date    .7 02/05/2022     Hepatic Function Panel:   Lab Results   Component Value Date    ALKPHOS 81 05/28/2021    ALT 20 05/28/2021    AST 24 05/28/2021    PROT 6.8 05/28/2021    BILITOT 0.9 05/28/2021    LABALBU 3.1 01/08/2022     UA:  Lab Results   Component Value Date    COLORU YELLOW 05/28/2021    CLARITYU Clear 05/28/2021    GLUCOSEU Negative 05/28/2021    BILIRUBINUR Negative 05/28/2021    BILIRUBINUR NEG 01/16/2020    KETUA Negative 05/28/2021    SPECGRAV 1.021 05/28/2021    BLOODU Negative 05/28/2021    PHUR 6.0 05/28/2021    PROTEINU Negative 05/28/2021    UROBILINOGEN 2.0 05/28/2021    NITRU Negative 05/28/2021    LEUKOCYTESUR Negative 05/28/2021    LABMICR Not Indicated 05/28/2021    URINETYPE NotGiven 05/28/2021      Urine Microscopic:   Lab Results   Component Value Date    COMU see below 04/26/2021    HYALCAST 0 04/26/2021    WBCUA 1 04/26/2021    RBCUA 1 04/26/2021    EPIU 0 04/26/2021     Urine Reflex to Culture:   Lab Results Component Value Date    URRFLXCULT Not Indicated 05/28/2021         MICRO: cultures reviewed and updated by me   Blood Culture:   Lab Results   Component Value Date    BC No Growth after 4 days of incubation. 02/05/2022    BLOODCULT2 No Growth after 4 days of incubation. 02/05/2022       Respiratory Culture:  Lab Results   Component Value Date    CULTRESP Moderate growth normal respiratory orlin with 05/09/2021    CULTRESP Heavy growth 05/09/2021    LABGRAM  02/05/2022     3+ WBC's (Polymorphonuclear)  2+ Epithelial Cells  2+ Gram positive rods resembling Diphtheroids  2+ Gram positive cocci  1+ Gram negative rods       AFB:No results found for: AFBSMEAR  Viral Culture:  Lab Results   Component Value Date    COVID19 DETECTED 01/06/2022    COVID19 Not Detected 01/06/2022     Urine Culture: No results for input(s): Arielle Stewart in the last 72 hours. Susceptibility     Pseudomonas aeruginosa     BACTERIAL SUSCEPTIBILITY PANEL BY MONICA     cefepime 16 mcg/mL Intermediate     Ceftolozane/Tazobactam (Zerbaxa) 1 mcg/mL Sensitive     ciprofloxacin >2 mcg/mL Resistant     gentamicin >8 mcg/mL Resistant     meropenem >8 mcg/mL Resistant     piperacillin-tazobactam 64 mcg/mL Intermediate     tobramycin >8 mcg/mL Resistant          IMAGING:    XR FOOT RIGHT (MIN 3 VIEWS)   Final Result   Soft tissue ulceration of the hindfoot with possible lucencies in the   calcaneus that may represent erosive changes from osteomyelitis versus   overlying soft tissue artifact. Severe diffuse osteopenia. Subacute healing stress fracture of the 2nd metatarsal.                   MRI FOOT RIGHT WO CONTRAST    (Results Pending)           Summary        Limited evaluation of both extremities. Evidence of calcific plaque    throughout both extremities. OMKAR not obtained. Popliteal arteries unable to    be visualized due to positioning.  Multiphasic waveforms throughout SFA    bilaterally with evidence of distal monophasic waveforms therefore cannot    exclude a popliteal lesion bilaterally.  The left anterior tibial artery    appears to be occluded.        Signature        ------------------------------------------------------------------    Electronically signed by Janet Cummings DO (Interpreting    physician) on 01/10/2022 at 03:18 PM         All the pertinent images and reports for the current Hospitalization were reviewed by me     Scheduled Meds:   sodium hypochlorite   Irrigation Daily    piperacillin-tazobactam  3,375 mg IntraVENous Q8H    lidocaine 1 % injection  5 mL IntraDERmal Once    sodium chloride flush  5-40 mL IntraVENous 2 times per day    sertraline  150 mg Oral Daily    pantoprazole  40 mg Oral Daily    mirtazapine  15 mg Oral Nightly    trospium  20 mg Oral BID AC    lisinopril  20 mg Oral Daily    hydroCHLOROthiazide  25 mg Oral Daily    clopidogrel  75 mg Oral Daily    baclofen  20 mg Oral BID    atorvastatin  80 mg Oral Nightly    atenolol  25 mg Oral Daily    sodium chloride flush  10 mL IntraVENous 2 times per day       Continuous Infusions:   sodium chloride      sodium chloride 25 mL (02/09/22 0541)       PRN Meds:  sodium chloride flush, sodium chloride, sodium chloride flush, sodium chloride, ondansetron, polyethylene glycol, acetaminophen **OR** acetaminophen, morphine      Assessment:     Patient Active Problem List   Diagnosis    Chest pain    MI, acute, non ST segment elevation ([address]    Tobacco abuse    Essential hypertension    [facility]    Acute CVA [facility]cerebrovascular accident) [facility]    Right leg weakness    Acute ischemic left LEXA stroke (HCC)    Non-healing open wound of right heel    Right heel infection    Hyperlipidemia    CAD (coronary artery disease)    Cellulitis of left heel       \  Rt heel non healing ulcer  Rt heel osteomyelitis  Rt heel cellulitis   Enterococcus bacteremia+  H/o CVA  Rt side weakness  Rt leg contracture  Noted may need surgery when possible  [facility] resident  Pseudomonas heel infection   Bacteroides Bacteremia from the Rt heel ulcer+    He is contracted in Rt leg with pressure ulcer and non ambulatory will likely need surgery and Podiatry following will need IV abx for bacteremia and infection    Enterococcus bacteremia could be from the infected non healing ulcers Rt heel +    Blood cx positive for Bacteroides as well and covered by IV Zosyn     Rt heel ulcer with Pseudomonas sensitivities noted MDRO and its not in the Blood cx    There is on going discussion about amputation and level but he may not be interested ? But given the foot exam concern is for progression of the infection        He declines amputation and surgery discussions with POA noted for now cont IV abx for the bacteremia and Heel infection but feel the heel may not heal           Labs, Microbiology, Radiology and all the pertinent results from current hospitalization and  care every where were reviewed  by me as a part of the evaluation   Plan:   1. Cont  IV Zosyn x 3.375 mg Q 8 HRS x stop date  3/9  2. Rt heel local care  3. Podiatry notes reviewed   4. [address] , crp 102.7   5. wOUND CX - Pseudomonas noted MDRO likely colonized the wound and cont local care   6. Cont Local care    7. X ray with early osteomyelitis of rt calcaneus  8. Contact precautions   9. May ultimately need amputation -but pt refused for now     Discussed with patient/Family and Nursing   Risk of Complications/Morbidity: High      · Illness(es)/ Infection present that pose threat to bodily function. · There is potential for severe exacerbation of infection/side effects of treatment. · Therapy requires intensive monitoring for antimicrobial agent toxicity. Discussed with patient/Family and Nursing staff     Thanks for allowing me to participate in your patient's care and please call me with any questions or concerns.     Sharmila Germain MD  Infectious Disease  [facility]) Physician  Phone: [phone]   Fax : [phone]

## 2022-02-09 NOTE — PROGRESS NOTES
Pt awake and in need of repositioning in bed. Explained to pt the need for repositioning and he is agreeable. Incontinent of urine and complete linen change performed. Male purewick removed. Pt turned to L side. Sips of water given. Pt c/o 7/10 low back pain and will medicate. Call light in reach. Bed alarm on.

## 2022-02-09 NOTE — PROGRESS NOTES
Hospitalist  Progress Note       Ross Ledesma is a 78 y.o. male   1955     SUBJECTIVE:   Patient seen and examined  No new complaint  Continue to promote wound management, IV antibiotics, refusing amputation. Appreciate vascular team input. OBJECTIVE:    Overall physical finding unchanged from exam yesterday  Vitals:   Blood pressure 106/60, pulse 66, temperature 98.2 °F (36.8 °C), temperature source Oral, resp. rate 16, height 6' 3\" (1.905 m), weight 187 lb 6.3 oz (85 kg), SpO2 93 %. Gen: Not in distress. Alert. Chronically ill, emaciated, contracted  Head: Normocephalic. Atraumatic. Eyes: Conjunctivae/corneas clear. ENT: Oral mucosa moist  Neck: No JVD. No obvious thyromegaly. CVS: Nml S1S2, no murmur    Pulmomary: Fair air entry  Gastrointestinal: Soft, non tender, non distend, . Musculoskeletal: Contracted extremities, deep ulcer of the right heel as described        Neuro: Generalized weakness with dominant right-sided hemiparesis  psychiatry: Appropriate affect. Not agitated.         Past Medical History:   Diagnosis Date    Acute MI ([address] 09/2013    Anxiety     Arthritis     CAD (coronary artery disease)     COVID-19     Hyperlipidemia 2/3/2022    Hypertension     Influenza A 01/15/2017        Patient Active Problem List   Diagnosis    Chest pain    MI, acute, non ST segment elevation ([address]    Tobacco abuse    Essential hypertension    Acute MI ([address]    Acute CVA (cerebrovascular accident) ([address]    Right leg weakness    Acute ischemic left LEXA stroke (HCC)    Non-healing open wound of right heel    Right heel infection    Hyperlipidemia    CAD (coronary artery disease)    Cellulitis of left heel        No Known Allergies     Current Inpatient Medications:    Current Facility-Administered Medications   Medication Dose Route Frequency Provider Last Rate Last Admin    sodium hypochlorite (DAKINS) external solution   Irrigation Daily Geángela Steven DPM   Given at 02/09/22 1045    piperacillin-tazobactam (ZOSYN) 3,375 mg in dextrose 5 % 100 mL IVPB extended infusion (mini-bag)  3,375 mg IntraVENous Q8H Redmond Hammans, MD 25 mL/hr at 02/09/22 1254 3,375 mg at 02/09/22 1254    lidocaine PF 1 % injection 5 mL  5 mL IntraDERmal Once Redmond Hammans, MD        sodium chloride flush 0.9 % injection 5-40 mL  5-40 mL IntraVENous 2 times per day Redmond Hammans, MD   10 mL at 02/09/22 1044    sodium chloride flush 0.9 % injection 5-40 mL  5-40 mL IntraVENous PRN Redmond Hammans, MD        0.9 % sodium chloride infusion  25 mL IntraVENous PRN Redmond Hammans, MD        sertraline (ZOLOFT) tablet 150 mg  150 mg Oral Daily Sona Nova MD   150 mg at 02/09/22 1042    pantoprazole (PROTONIX) tablet 40 mg  40 mg Oral Daily Sona Nova MD   40 mg at 02/09/22 1043    mirtazapine (REMERON) tablet 15 mg  15 mg Oral Nightly Sona Nova MD   15 mg at 02/08/22 2107    trospium (SANCTURA) tablet 20 mg  20 mg Oral BID AC Sona Nova MD   20 mg at 02/09/22 0542    lisinopril (PRINIVIL;ZESTRIL) tablet 20 mg  20 mg Oral Daily Sona Nova MD   20 mg at 02/08/22 0944    hydroCHLOROthiazide (HYDRODIURIL) tablet 25 mg  25 mg Oral Daily Sona Nova MD   25 mg at 02/09/22 1043    clopidogrel (PLAVIX) tablet 75 mg  75 mg Oral Daily Sona Nova MD   75 mg at 02/09/22 1043    baclofen (LIORESAL) tablet 20 mg  20 mg Oral BID Sona Nova MD   20 mg at 02/09/22 1044    atorvastatin (LIPITOR) tablet 80 mg  80 mg Oral Nightly Sona Nova MD   80 mg at 02/08/22 2107    atenolol (TENORMIN) tablet 25 mg  25 mg Oral Daily Sona Nova MD   25 mg at 02/08/22 0944    sodium chloride flush 0.9 % injection 10 mL  10 mL IntraVENous 2 times per day Sona Nova MD   10 mL at 02/09/22 1045    sodium chloride flush 0.9 % injection 10 mL  10 mL IntraVENous PRN Sona Nova MD        0.9 % sodium chloride infusion  25 mL IntraVENous PRN Sona Nova MD 100 mL/hr at 02/09/22 0541 25 mL at 02/09/22 0541    ondansetron (ZOFRAN) injection 4 mg  4 mg IntraVENous Q4H PRN Aiden Jerez MD        polyethylene glycol [facility]) packet 17 g  17 g Oral Daily PRN Aiden Jerez MD        acetaminophen (TYLENOL) tablet 650 mg  650 mg Oral Q4H PRN Aiden Jerez MD        Or    acetaminophen (TYLENOL) suppository 650 mg  650 mg Rectal Q4H PRN Aiden Jerez MD        morphine (PF) injection 2 mg  2 mg IntraVENous Q4H PRN Aiden Jerez MD   2 mg at 02/08/22 2305           Labs:  CBC with Differential:    Lab Results   Component Value Date    WBC 7.7 02/09/2022    RBC 3.05 02/09/2022    HGB 8.2 02/09/2022    HCT 24.7 02/09/2022     02/09/2022    MCV 81.0 02/09/2022    MCH 26.7 02/09/2022    MCHC 33.0 02/09/2022    RDW 17.9 02/09/2022    SEGSPCT 75.4 02/21/2011    LYMPHOPCT 20.5 02/09/2022    MONOPCT 4.6 02/09/2022    EOSPCT 0.5 02/21/2011    BASOPCT 0.9 02/09/2022    MONOSABS 0.4 02/09/2022    LYMPHSABS 1.6 02/09/2022    EOSABS 0.2 02/09/2022    BASOSABS 0.1 02/09/2022    DIFFTYPE Auto-K 02/21/2011     CMP:    Lab Results   Component Value Date     02/09/2022    K 3.7 02/09/2022    CL 99 02/09/2022    CO2 25 02/09/2022    BUN 24 02/09/2022    CREATININE 1.0 02/09/2022    GFRAA >60 02/09/2022    GFRAA >60 02/21/2011    AGRATIO 0.8 05/28/2021    LABGLOM >60 02/09/2022    GLUCOSE 117 02/09/2022    PROT 6.8 05/28/2021    LABALBU 3.1 01/08/2022    CALCIUM 8.9 02/09/2022    BILITOT 0.9 05/28/2021    ALKPHOS 81 05/28/2021    AST 24 05/28/2021    ALT 20 05/28/2021     Hepatic Function Panel:    Lab Results   Component Value Date    ALKPHOS 81 05/28/2021    ALT 20 05/28/2021    AST 24 05/28/2021    PROT 6.8 05/28/2021    BILITOT 0.9 05/28/2021    LABALBU 3.1 01/08/2022     Magnesium:    Lab Results   Component Value Date    MG 1.50 01/08/2022     PT/INR:    Lab Results   Component Value Date    PROTIME 18.6 01/07/2022    INR 1.61 01/07/2022     Last 3

## 2022-02-09 NOTE — DISCHARGE INSTR - COC
Continuity of Care Form    Patient Name: Marjan Barclay   :  1955  MRN:  [id number]    Admit date:  2/3/2022  Discharge date:  ***    Code Status Order: Full Code   Advance Directives:      Admitting Physician:  Roland Ruiz MD  PCP: Jenna Hawley MD    Discharging Nurse: [facility] Unit/Room#: N6J-0433/6436-59  Discharging Unit Phone Number: ***    Emergency Contact:   Extended Emergency Contact Information  Primary Emergency Contact: PlacidoBecca  Address: [address] Phone: [phone]  Mobile Phone: [phone]  Relation: Domestic Partner    Past Surgical History:  Past Surgical History:   Procedure Laterality Date    BACK SURGERY N/A     herniated disc    CORONARY ANGIOPLASTY WITH STENT PLACEMENT      EYE SURGERY      VASCULAR SURGERY         Immunization History:   Immunization History   Administered Date(s) Administered    COVID-19, Pfizer Purple top, DILUTE for use, 12+ yrs, 30mcg/0.3mL dose 2021, 2021    Influenza, High Dose (Fluzone 65 yrs and older) 2019    Influenza, High-dose, Quadv, 65 yrs +, IM (Fluzone) 10/22/2020    Pneumococcal Conjugate 13-valent ([id number]) 2019    Pneumococcal Polysaccharide (Muipmrlxm81) 10/22/2020    Zoster Recombinant (Shingrix) 10/22/2020, 2021       Active Problems:  Patient Active Problem List   Diagnosis Code    Chest pain R07.9    MI, acute, non ST segment elevation (HCC) I21.4    Tobacco abuse Z72.0    Essential hypertension I10    Acute MI ([facility] [address] I21.9    Acute CVA (cerebrovascular accident) ([facility] [address] I63.9    Right leg weakness R29.898    Acute ischemic left LEXA stroke (HCC) L13.493    Non-healing open wound of right heel S91.301A    Right heel infection L08.9    Hyperlipidemia E78.5    CAD (coronary artery disease) I25.10    Cellulitis of left heel L03.116       Isolation/Infection:   Isolation            Contact          Patient Infection Status       Infection Onset Added Last Indicated Last Indicated By Review Planned Expiration Resolved Resolved By    MDRO (multi-drug resistant organism) 22 Culture, Wound        Resolved    COVID-19 22 COVID-19   22     COVID-19 (Rule Out) 22 COVID-19 (Ordered)   22 Rule-Out Test Resulted    COVID-19 (Rule Out) 22 COVID-19, Rapid (Ordered)   22 Rule-Out Test Resulted    COVID-19 (Rule Out) 21 COVID-19, Rapid (Ordered)   21 Rule-Out Test Resulted            Nurse Assessment:  Last Vital Signs: BP (!) 111/58   Pulse 65   Temp 97.4 °F (36.3 °C) (Oral)   Resp 16   Ht 6' 3\" (1.905 m)   Wt 187 lb 6.3 oz (85 kg)   SpO2 96%   BMI 23.42 kg/m²     Last documented pain score (0-10 scale): Pain Level: 0  Last Weight:   Wt Readings from Last 1 Encounters:   22 187 lb 6.3 oz (85 kg)     Mental Status:  {IP PT MENTAL STATUS:}    IV Access:  { RUSSELL IV ACCESS:879337050}    Nursing Mobility/ADLs:  Walking   {[facility] DME KHEH:083256310}  Transfer  {[facility] DME RTAX:799764021}  Bathing  {[facility] DME HEEQ:349543082}  Dressing  {[facility] DME XOWY:057251763}  Toileting  {[facility] DME XGKQ:824194792}  Feeding  {[facility] DME GXKS:313597319}  Med Admin  {[facility] DME BTNF:650045627}  Med Delivery   { RUSSELL MED Delivery:100200498}    Wound Care Documentation and Therapy:  Wound 22 Heel Right;Left rt heel is black, draining yellow fluid, left has open area with scant bleeding and yellow discharghe. (Active)   Wound Etiology Pressure Unstageable 22 1044   Dressing Status Clean;Dry; Intact 22 1044   Wound Cleansed Not Cleansed 22 0946   Dressing/Treatment Gauze dressing/dressing sponge 22 1044   Offloading for Diabetic Foot Ulcers Other (comment) 01/10/22 2039   Dressing Change Due 01/10/22 01/10/22 0437   Wound Length (cm) 4 cm 22 1817   Wound Width (cm) 4 cm 22   Wound Surface Area (cm^2) 16 cm^2 22   Wound Assessment Erythema;Bleeding;Pink/red;Purple/maroon 22 0946   Drainage Amount Small 22 0946   Drainage Description Serosanguinous; Sanguinous 22 0946   Odor Mild 22 0946   Number of days: 6       Wound 22 Heel Right;Left dry,  (Active)   Wound Etiology Pressure Unstageable 22 1044   Dressing Status Clean;Dry; Intact 22 1044   Wound Cleansed Not Cleansed 22 0547   Dressing/Treatment Dry dressing;Gauze dressing/dressing sponge 22 1044   Offloading for Diabetic Foot Ulcers Refused 01/10/22 2039   Wound Assessment Dry 22 0946   Drainage Amount None 01/10/22 1530   Odor Mild 01/10/22 1530   Margins Epibole (rolled edges) 01/10/22 1530   Number of days: 6       Wound 01/10/22 Heel Left Red base w/ yellow slough (Active)   Wound Etiology Pressure Unstageable 22 0800   Dressing Status Clean;Dry; Intact 22 0800   Wound Cleansed Not Cleansed 22 0547   Dressing/Treatment Gauze dressing/dressing sponge 22 0800   Offloading for Diabetic Foot Ulcers Refused 01/10/22 2039   Drainage Amount Moderate 22 0946   Drainage Description Serosanguinous; Yellow 22 0946   Odor Malodorous/putrid 22 0946   Margins Epibole (rolled edges) 01/10/22 1530   Number of days: 29        Elimination:  Continence: Bowel: {YES / T}  Bladder: {YES / RC:51806}  Urinary Catheter: {Urinary Catheter:746088506}   Colostomy/Ileostomy/Ileal Conduit: {YES / ZI:38186}       Date of Last BM: ***    Intake/Output Summary (Last 24 hours) at 2022 1610  Last data filed at 2022 1045  Gross per 24 hour   Intake 220 ml   Output --   Net 220 ml     I/O last 3 completed shifts:   In: 700 [P.O.:700]  Out: -     Safety Concerns:     [address] RUSSELL Safety Concerns:430233426}    Impairments/Disabilities:      [address] [facility] Impairments/Disabilities:583884560}    Nutrition Therapy:  Current Nutrition Therapy: [address] RUSSELL Diet List:773624009}    Routes of Feeding: {CHP DME Other Feedings:222669064}  Liquids: {Slp liquid thickness:87713}  Daily Fluid Restriction: {CHP DME Yes amt example:946915427}  Last Modified Barium Swallow with Video (Video Swallowing Test): {Done Not Done KOGB:783492818}    Treatments at the Time of Hospital Discharge:   Respiratory Treatments: ***  Oxygen Therapy:  {Therapy; copd oxygen:24915}  Ventilator:    { CC Vent YZQK:175159364}    Rehab Therapies: {THERAPEUTIC INTERVENTION:4153911809}  Weight Bearing Status/Restrictions: [address] RUIZ Weight Bearin}  Other Medical Equipment (for information only, NOT a DME order):  {EQUIPMENT:075858686}  Other Treatments: ***    Patient's personal belongings (please select all that are sent with patient):  {CHP DME Belongings:342773266}    RN SIGNATURE:  {Esignature:052308254}    CASE MANAGEMENT/SOCIAL WORK SECTION    Inpatient Status Date: 2022    Readmission Risk Assessment Score:  Readmission Risk              Risk of Unplanned Readmission:  20           Discharging to Facility/ Agency   Name: [facility]  Address:  [address] [place], [facility]  Phone:  [phone]  Fax:  [phone]          / signature: Electronically signed by Shane Davila on 2/10/2022 at 3:49 PM      PHYSICIAN SECTION    Prognosis: Guarded    Condition at Discharge: Stable    Rehab Potential (if transferring to Rehab): Guarded    Recommended Labs or Other Treatments After Discharge:     Outpatient follow-up with podiatry, vascular surgery for second opinion per the patient request.  Removal of PICC line after completing antibiotics    Wound care as outlined per the wound care treatment/podiatry recommendation    The recommended antibiotics per ID team.  IV Zosyn x 3.375 mg x Q 8 hrs x stop date  x 3/9/22  CBC with diff, BMP, ESR, CRP weekly  Fax results to [phone]  PICC line in place   NO ID follow up  First dose given in Hospital;    [address] Physician  Phone: [phone]   Fax : [phone]       Physician Certification: I certify the above information and transfer of Ross Ledesma  is necessary for the continuing treatment of the diagnosis listed and that he requires Clem Micah for greater 30 days.      Update Admission H&P: No change in H&P    PHYSICIAN SIGNATURE:  Electronically signed by Sivan Ramos MD on 2/10/22 at 12:51 PM EST

## 2022-02-09 NOTE — PROGRESS NOTES
[facility]  Progress Note    2/9/2022 10:32 AM    Chief Complaint: Bilateral Foot Wounds      Reason for Consultation: PVD, Osteomyelitis    Subjective: Pt seen resting in bed, Right Leg with continued contracture - pt reports this has been present for about 8 weeks, a discussion was had with the patient's POA yesterday [facility]) in regards to an amputation to the patient's Right Leg; however, she is not on board with this.  The situation was discussed with the patient today as well and he is still unsure as well and would like some more time to think about the situation    Vital Signs:  Vitals:    02/07/22 2208 02/08/22 0445 02/08/22 2008 02/09/22 0446   BP:  120/72 100/63 106/60   Pulse: 84 75 62 66   Resp:  16 16 16   Temp:  98.1 °F (36.7 °C) 98.1 °F (36.7 °C) 98.2 °F (36.8 °C)   TempSrc:  Oral Oral Oral   SpO2:  95% 95% 93%   Weight:  192 lb 0.3 oz (87.1 kg)  187 lb 6.3 oz (85 kg)   Height:  6' 3\" (1.905 m)         I/O:    Intake/Output Summary (Last 24 hours) at 2/9/2022 1032  Last data filed at 2/9/2022 9700  Gross per 24 hour   Intake 200 ml   Output --   Net 200 ml       Physical Exam:   General: no apparent distress, alert and oriented, afebrile  Chest/Lungs: no accessory muscle use  Cardiac: regular rate and rhythm  Abdomen: soft, non-tender, non-distended  Extremities: warm and well perfused, no signs of cyanosis or ischemia, no significant edema, Right sided paralysis, RLE contracture, Left Leg with slight movement to command  Wounds:  Right Lateral Foot - three areas of darkened discoloration likely as a result of pressure - dressing remains in place  Right Second Toe - area of darkened discoloration - dressing remains in place  Left Heel - area of darkened discoloration - dressing remains in place     Right Heel - Photo taken 2/5/2022         Labs:   Lab Results   Component Value Date     02/09/2022    K 3.7 02/09/2022    CL 99 02/09/2022    CO2 25 02/09/2022 BUN 24 02/09/2022    CREATININE 1.0 02/09/2022    GFRAA >60 02/09/2022    GFRAA >60 02/21/2011    LABGLOM >60 02/09/2022    GLUCOSE 117 02/09/2022    PHOS 3.3 01/08/2022    MG 1.50 01/08/2022    CALCIUM 8.9 02/09/2022     Lab Results   Component Value Date    WBC 7.7 02/09/2022    RBC 3.05 02/09/2022    HGB 8.2 02/09/2022    HCT 24.7 02/09/2022    MCV 81.0 02/09/2022    RDW 17.9 02/09/2022     02/09/2022     Lab Results   Component Value Date    INR 1.61 (H) 01/07/2022    PROTIME 18.6 (H) 01/07/2022        Scheduled Meds:    sodium hypochlorite   Irrigation Daily    piperacillin-tazobactam  3,375 mg IntraVENous Q8H    lidocaine 1 % injection  5 mL IntraDERmal Once    sodium chloride flush  5-40 mL IntraVENous 2 times per day    sertraline  150 mg Oral Daily    pantoprazole  40 mg Oral Daily    mirtazapine  15 mg Oral Nightly    trospium  20 mg Oral BID AC    lisinopril  20 mg Oral Daily    hydroCHLOROthiazide  25 mg Oral Daily    clopidogrel  75 mg Oral Daily    baclofen  20 mg Oral BID    atorvastatin  80 mg Oral Nightly    atenolol  25 mg Oral Daily    sodium chloride flush  10 mL IntraVENous 2 times per day     Continuous Infusions:    sodium chloride      sodium chloride 25 mL (02/09/22 0541)       Bilateral Arterial Duplex - 1/10/2022  Limited evaluation of both extremities. Evidence of calcific plaque throughout both extremities. OMKAR not obtained. Popliteal arteries unable to be visualized due to positioning. Multiphasic waveforms throughout SFA bilaterally with evidence of distal monophasic waveforms therefore cannot exclude a popliteal lesion bilaterally. The left anterior tibial artery appears to be occluded. Right Foot X-ray - 2/3/2022  Impression  Soft tissue ulceration of the hindfoot with possible lucencies in the calcaneus that may represent erosive changes from osteomyelitis versus overlying soft tissue artifact. Severe diffuse osteopenia.      Subacute healing stress fracture of the 2nd metatarsal.     Assessment:  -Right Heel Wound - S/P I&D with Podiatry  -CVA - Right Sided paralysis, RLE contracture (pt states contracture present for about 8 weeks)     Plan:   -IV Antibiotics per ID  -Local Wound Care per Podiatry  -Pt is still considering situation and any major amputation and has asked for more time to consider amputation, again, there are no obvious signs of aggressive infection; therefore, we can continue treating wounds locally    All pertinent information and plan of care discussed with Dr. Rodolfo Grady. All questions and concerns were addressed with the patient. I have discussed the above stated plan with the patient. The patient verbalized understanding and agreed with the plan. Thank you for allowing to us to participate in the care of Hannah Vu      Electronically signed by MARSHALL Hernandez - CNP on 2/9/2022 at 10:32 AM   Pt seen and examined. for DIAGNOSIS OF infected right heel ulcer agree with MARSHALL Hernandez's note. Labs reviewed. Vitals   Vitals:    02/08/22 0445 02/08/22 2008 02/09/22 0446 02/09/22 1436   BP: 120/72 100/63 106/60 (!) 111/58   Pulse: 75 62 66 65   Resp: 16 16 16 16   Temp: 98.1 °F (36.7 °C) 98.1 °F (36.7 °C) 98.2 °F (36.8 °C) 97.4 °F (36.3 °C)   TempSrc: Oral Oral Oral Oral   SpO2: 95% 95% 93% 96%   Weight: 192 lb 0.3 oz (87.1 kg)  187 lb 6.3 oz (85 kg)    Height: 6' 3\" (1.905 m)          Can we are unable to straighten his hip or knee without causing pain and did not feel like it was possible anyway he says they have been contracted for several weeks he says about 8 weeks. We again voiced our opinion that he would best served with a right above-knee amputation as a way to get rid of the source of his infection and get rid of the ulcer that will most likely not heal because  his heel digs into the bed because of the hip and knee contractures from his paralysis caused by a stroke.   I stressed to him that this is not an emergency rather I

## 2022-02-10 VITALS
RESPIRATION RATE: 17 BRPM | BODY MASS INDEX: 23.41 KG/M2 | WEIGHT: 188.27 LBS | SYSTOLIC BLOOD PRESSURE: 101 MMHG | HEIGHT: 75 IN | HEART RATE: 63 BPM | TEMPERATURE: 98.6 F | DIASTOLIC BLOOD PRESSURE: 61 MMHG | OXYGEN SATURATION: 95 %

## 2022-02-10 PROBLEM — E43 SEVERE MALNUTRITION (HCC): Chronic | Status: ACTIVE | Noted: 2022-02-10

## 2022-02-10 LAB
ANION GAP SERPL CALCULATED.3IONS-SCNC: 12 MMOL/L (ref 3–16)
BASOPHILS ABSOLUTE: 0.1 K/UL (ref 0–0.2)
BASOPHILS RELATIVE PERCENT: 0.8 %
BUN BLDV-MCNC: 17 MG/DL (ref 7–20)
CALCIUM SERPL-MCNC: 9.2 MG/DL (ref 8.3–10.6)
CHLORIDE BLD-SCNC: 102 MMOL/L (ref 99–110)
CO2: 25 MMOL/L (ref 21–32)
CREAT SERPL-MCNC: 0.9 MG/DL (ref 0.8–1.3)
EOSINOPHILS ABSOLUTE: 0.2 K/UL (ref 0–0.6)
EOSINOPHILS RELATIVE PERCENT: 3.3 %
GFR AFRICAN AMERICAN: >60
GFR NON-AFRICAN AMERICAN: >60
GLUCOSE BLD-MCNC: 114 MG/DL (ref 70–99)
HCT VFR BLD CALC: 26.1 % (ref 40.5–52.5)
HEMOGLOBIN: 8.5 G/DL (ref 13.5–17.5)
LYMPHOCYTES ABSOLUTE: 1.5 K/UL (ref 1–5.1)
LYMPHOCYTES RELATIVE PERCENT: 21.9 %
MCH RBC QN AUTO: 26.1 PG (ref 26–34)
MCHC RBC AUTO-ENTMCNC: 32.5 G/DL (ref 31–36)
MCV RBC AUTO: 80.2 FL (ref 80–100)
MONOCYTES ABSOLUTE: 0.4 K/UL (ref 0–1.3)
MONOCYTES RELATIVE PERCENT: 5.2 %
NEUTROPHILS ABSOLUTE: 4.8 K/UL (ref 1.7–7.7)
NEUTROPHILS RELATIVE PERCENT: 68.8 %
PDW BLD-RTO: 18.1 % (ref 12.4–15.4)
PLATELET # BLD: 237 K/UL (ref 135–450)
PMV BLD AUTO: 6.8 FL (ref 5–10.5)
POTASSIUM REFLEX MAGNESIUM: 4.2 MMOL/L (ref 3.5–5.1)
RBC # BLD: 3.26 M/UL (ref 4.2–5.9)
SODIUM BLD-SCNC: 139 MMOL/L (ref 136–145)
WBC # BLD: 7 K/UL (ref 4–11)

## 2022-02-10 PROCEDURE — 6360000002 HC RX W HCPCS: Performed by: INTERNAL MEDICINE

## 2022-02-10 PROCEDURE — 6370000000 HC RX 637 (ALT 250 FOR IP): Performed by: INTERNAL MEDICINE

## 2022-02-10 PROCEDURE — APPSS15 APP SPLIT SHARED TIME 0-15 MINUTES: Performed by: NURSE PRACTITIONER

## 2022-02-10 PROCEDURE — 1200000000 HC SEMI PRIVATE

## 2022-02-10 PROCEDURE — APPNB30 APP NON BILLABLE TIME 0-30 MINS: Performed by: NURSE PRACTITIONER

## 2022-02-10 PROCEDURE — 99233 SBSQ HOSP IP/OBS HIGH 50: CPT | Performed by: INTERNAL MEDICINE

## 2022-02-10 PROCEDURE — 2580000003 HC RX 258: Performed by: INTERNAL MEDICINE

## 2022-02-10 PROCEDURE — 85025 COMPLETE CBC W/AUTO DIFF WBC: CPT

## 2022-02-10 PROCEDURE — 80048 BASIC METABOLIC PNL TOTAL CA: CPT

## 2022-02-10 RX ADMIN — MIRTAZAPINE 15 MG: 15 TABLET, FILM COATED ORAL at 19:48

## 2022-02-10 RX ADMIN — ATENOLOL 25 MG: 25 TABLET ORAL at 08:35

## 2022-02-10 RX ADMIN — PANTOPRAZOLE SODIUM 40 MG: 40 TABLET, DELAYED RELEASE ORAL at 08:35

## 2022-02-10 RX ADMIN — SERTRALINE 150 MG: 100 TABLET, FILM COATED ORAL at 08:35

## 2022-02-10 RX ADMIN — SODIUM CHLORIDE, PRESERVATIVE FREE 10 ML: 5 INJECTION INTRAVENOUS at 08:36

## 2022-02-10 RX ADMIN — BACLOFEN 20 MG: 10 TABLET ORAL at 08:35

## 2022-02-10 RX ADMIN — TROSPIUM CHLORIDE 20 MG: 20 TABLET, FILM COATED ORAL at 08:35

## 2022-02-10 RX ADMIN — SODIUM CHLORIDE, PRESERVATIVE FREE 10 ML: 5 INJECTION INTRAVENOUS at 08:32

## 2022-02-10 RX ADMIN — HYDROCHLOROTHIAZIDE 25 MG: 25 TABLET ORAL at 08:35

## 2022-02-10 RX ADMIN — SODIUM HYPOCHLORITE: 5 SOLUTION TOPICAL at 09:24

## 2022-02-10 RX ADMIN — MORPHINE SULFATE 2 MG: 2 INJECTION, SOLUTION INTRAMUSCULAR; INTRAVENOUS at 21:51

## 2022-02-10 RX ADMIN — PIPERACILLIN AND TAZOBACTAM 3375 MG: 3; .375 INJECTION, POWDER, LYOPHILIZED, FOR SOLUTION INTRAVENOUS at 06:45

## 2022-02-10 RX ADMIN — ATORVASTATIN CALCIUM 80 MG: 80 TABLET, FILM COATED ORAL at 19:48

## 2022-02-10 RX ADMIN — PIPERACILLIN AND TAZOBACTAM 3375 MG: 3; .375 INJECTION, POWDER, LYOPHILIZED, FOR SOLUTION INTRAVENOUS at 19:53

## 2022-02-10 RX ADMIN — LISINOPRIL 20 MG: 20 TABLET ORAL at 08:35

## 2022-02-10 RX ADMIN — PIPERACILLIN AND TAZOBACTAM 3375 MG: 3; .375 INJECTION, POWDER, LYOPHILIZED, FOR SOLUTION INTRAVENOUS at 13:30

## 2022-02-10 RX ADMIN — BACLOFEN 20 MG: 10 TABLET ORAL at 19:48

## 2022-02-10 RX ADMIN — TROSPIUM CHLORIDE 20 MG: 20 TABLET, FILM COATED ORAL at 18:00

## 2022-02-10 RX ADMIN — CLOPIDOGREL BISULFATE 75 MG: 75 TABLET ORAL at 08:35

## 2022-02-10 ASSESSMENT — PAIN SCALES - GENERAL
PAINLEVEL_OUTOF10: 0
PAINLEVEL_OUTOF10: 8
PAINLEVEL_OUTOF10: 0

## 2022-02-10 ASSESSMENT — PAIN SCALES - WONG BAKER: WONGBAKER_NUMERICALRESPONSE: 0

## 2022-02-10 NOTE — PROGRESS NOTES
Infectious Disease Follow up Notes  Admit Date: 2/3/2022  Hospital Day: 8    Antibiotics :   IV Zosyn     CHIEF COMPLAINT:       Rt heel ulcer  Enterococcus bacteremia  Pseudomonas infection   Rt heel osteomyelitis          Subjective interval History :  78 y.o. man with CVA and Rt side weakness now with Rt heel ulcer secondary infection drainage, malodor at presentation on going cellulitis with worsening wound infections he has bi lateral foot wounds,images reviewed. Has PVD. And he was hospitalized recently from  to 22 was treated with oral abx for the ulcer and was seen by Podiatry. Blood cx with Enterococcus noted, XRAY with Rt calcaneus osteomyelitis ? MRI Rt foot pending.   Location :  Rt heel pain+ swelling, odor+ drainage+      Quality :   aching   Severity : 10/10    Duration :  weeks    Timing : intermittent   Context : Rt heel ulcer    Modifying factors : none  Associated signs and symptoms: Rt heel ulcer+ drainage+         Interval History : Rt heel dressing+ and tolerating IV abx ok and family at bed side d./w pt about IV abx and OPAT     Past Medical History:    Past Medical History:   Diagnosis Date    Acute MI ([address] 2013    Anxiety     Arthritis     CAD (coronary artery disease)     COVID-19     Hyperlipidemia 2/3/2022    Hypertension     Influenza A 01/15/2017       Past Surgical History:    Past Surgical History:   Procedure Laterality Date    BACK SURGERY N/A     herniated disc    CORONARY ANGIOPLASTY WITH STENT PLACEMENT      EYE SURGERY      VASCULAR SURGERY         Current Medications:    Outpatient Medications Marked as Taking for the 2/3/22 encounter [facility] Encounter)   Medication Sig Dispense Refill    busPIRone (BUSPAR) 5 MG tablet Take 5 mg by mouth 2 times daily      [] clindamycin (CLEOCIN) 300 MG capsule Take 300 mg by mouth 2 times daily      hydrOXYzine (ATARAX) 25 MG tablet Take 25 mg by mouth every 6 hours as needed for Anxiety      senna (SENOKOT) 8.6 MG tablet Take 1 tablet by mouth daily      collagenase 250 UNIT/GM ointment Apply 1 each topically daily Apply topically to right heel daily.  clopidogrel (PLAVIX) 75 MG tablet Take 1 tablet by mouth daily 30 tablet 3    ferrous sulfate (IRON 325) 325 (65 Fe) MG tablet Take 325 mg by mouth daily (with breakfast)      mirtazapine (REMERON) 15 MG tablet Take 15 mg by mouth nightly      pantoprazole (PROTONIX) 40 MG tablet Take 40 mg by mouth daily      mirabegron (MYRBETRIQ) 50 MG TB24 Take 50 mg by mouth daily      baclofen (LIORESAL) 20 MG tablet Take 20 mg by mouth 2 times daily      lisinopril (PRINIVIL;ZESTRIL) 20 MG tablet TAKE 1/2 TABLET BY MOUTH DAILY (Patient taking differently: Take 10 mg by mouth daily ) 45 tablet 0    hydroCHLOROthiazide (HYDRODIURIL) 25 MG tablet TAKE ONE TABLET BY MOUTH DAILY 90 tablet 0    atenolol (TENORMIN) 25 MG tablet Take 1 tablet by mouth daily 30 tablet 0    atorvastatin (LIPITOR) 80 MG tablet Take 1 tablet by mouth nightly 30 tablet 3    sertraline (ZOLOFT) 100 MG tablet TAKE 1 AND 1/2 TABLET BY MOUTH DAILY 45 tablet 2    Multiple Vitamins-Minerals (THERAPEUTIC MULTIVITAMIN-MINERALS) tablet Take 1 tablet by mouth daily      Ascorbic Acid (VITAMIN C) 500 MG tablet Take 500 mg by mouth daily         Allergies:  Patient has no known allergies.     Immunizations :   Immunization History   Administered Date(s) Administered    COVID-19, Pfizer Purple top, DILUTE for use, 12+ yrs, 30mcg/0.3mL dose 02/26/2021, 03/19/2021    Influenza, High Dose (Fluzone 65 yrs and older) 11/25/2019    Influenza, High-dose, Quadv, 65 yrs +, IM (Fluzone) 10/22/2020    Pneumococcal Conjugate 13-valent (Iknmesb69) 06/04/2019    Pneumococcal Polysaccharide (Kjvuajpub16) 10/22/2020    Zoster Recombinant (Shingrix) 10/22/2020, 02/20/2021       Social History:   Social History     Tobacco Use sounds, no masses or organomegaly  Extremities: no cyanosis, clubbing or edema  Musculoskeletal: normal range of motion, no joint swelling, deformity or tenderness  Integumentary: No rashes, no abnormal skin lesions, no petechiae  Neurologic: reflexes normal and symmetric, no cranial nerve deficit  Rt side weakness+ and heel ulcer+ deep with drainage+       Data Review:    CBC:   Lab Results   Component Value Date    WBC 7.0 02/10/2022    HGB 8.5 (L) 02/10/2022    HCT 26.1 (L) 02/10/2022    MCV 80.2 02/10/2022     02/10/2022     RENAL:   Lab Results   Component Value Date    CREATININE 0.9 02/10/2022    BUN 17 02/10/2022     02/10/2022    K 4.2 02/10/2022     02/10/2022    CO2 25 02/10/2022     SED RATE:   Lab Results   Component Value Date    SEDRATE 87 02/05/2022     CK:   Lab Results   Component Value Date    CKTOTAL 63 04/23/2021     CRP:   Lab Results   Component Value Date    .7 02/05/2022     Hepatic Function Panel:   Lab Results   Component Value Date    ALKPHOS 81 05/28/2021    ALT 20 05/28/2021    AST 24 05/28/2021    PROT 6.8 05/28/2021    BILITOT 0.9 05/28/2021    LABALBU 3.1 01/08/2022     UA:  Lab Results   Component Value Date    COLORU YELLOW 05/28/2021    CLARITYU Clear 05/28/2021    GLUCOSEU Negative 05/28/2021    BILIRUBINUR Negative 05/28/2021    BILIRUBINUR NEG 01/16/2020    KETUA Negative 05/28/2021    SPECGRAV 1.021 05/28/2021    BLOODU Negative 05/28/2021    PHUR 6.0 05/28/2021    PROTEINU Negative 05/28/2021    UROBILINOGEN 2.0 05/28/2021    NITRU Negative 05/28/2021    LEUKOCYTESUR Negative 05/28/2021    LABMICR Not Indicated 05/28/2021    URINETYPE NotGiven 05/28/2021      Urine Microscopic:   Lab Results   Component Value Date    COMU see below 04/26/2021    HYALCAST 0 04/26/2021    WBCUA 1 04/26/2021    RBCUA 1 04/26/2021    EPIU 0 04/26/2021     Urine Reflex to Culture:   Lab Results   Component Value Date    URRFLXCULT Not Indicated 05/28/2021         MICRO: resident  Pseudomonas heel infection   Bacteroides Bacteremia from the Rt heel ulcer+    He is contracted in Rt leg with pressure ulcer and non ambulatory will likely need surgery and Podiatry following will need IV abx for bacteremia and infection    Enterococcus bacteremia could be from the infected non healing ulcers Rt heel +    Blood cx positive for Bacteroides as well and covered by IV Zosyn     Rt heel ulcer with Pseudomonas sensitivities noted MDRO and its not in the Blood cx    There is on going discussion about amputation and level but he may not be interested ? But given the foot exam concern is for progression of the infection        He declines amputation and surgery discussions with POA noted for now cont IV abx for the bacteremia and Heel infection -     Pt and POA wants to think and decide on surgical plans for now cont IV abx - OPAT d/w pt RUSSELL completed           Labs, Microbiology, Radiology and all the pertinent results from current hospitalization and  care every where were reviewed  by me as a part of the evaluation   Plan:   1. Cont  IV Zosyn x 3.375 mg Q 8 HRS x stop date  3/9  2. Rt heel local care  3. Podiatry notes reviewed   4. Esr 87 , crp 102.7   5. wOUND CX - Pseudomonas noted MDRO likely colonized the wound and cont local care   6. Cont Local care    7. X ray with early osteomyelitis of rt calcaneus  8. Contact precautions   9. May ultimately need amputation -but pt refused and POA also dw surgery team they want more time to decide  10. RUSSELL completed       Discussed with patient/Family and Nursing   Risk of Complications/Morbidity: High      · Illness(es)/ Infection present that pose threat to bodily function. · There is potential for severe exacerbation of infection/side effects of treatment. · Therapy requires intensive monitoring for antimicrobial agent toxicity.         Discussed with patient/Family and Nursing staff     Thanks for allowing me to participate in your patient's care and please call me with any questions or concerns.     Grey Dover MD  Infectious Disease  [facility] ([facility]) Physician  Phone: [phone]   Fax : [phone]

## 2022-02-10 NOTE — PLAN OF CARE
Problem: Nutrition  Goal: Optimal nutrition therapy  Outcome: Ongoing     Nutrition Problem #1: Severe malnutrition  Intervention: Food and/or Nutrient Delivery: Continue Current Diet,Start Oral Nutrition Supplement  Nutritional Goals: Consume greater than 50% of meals and supplements this admission

## 2022-02-10 NOTE — DISCHARGE SUMMARY
Hospital Medicine Discharge Summary      Patient Identification:   Camden Mai   : 1955  MRN: [id number]   Account: [de-identified]      Patient's PCP: Antonella Hanks MD    Admit Date: 2/3/2022     Discharge Date:   2/10/2022    Admitting Physician: Kirstin Montemayor MD     Discharge Physician: Evonne Ayala MD     Consults:     IP CONSULT TO PODIATRY  IP CONSULT TO INFECTIOUS DISEASES  IP CONSULT TO VASCULAR SURGERY    Disposition: ECF    Condition at Discharge: Stable    Code Status:  Full Code        Discharge Diagnoses:    Deep right heel osteomyelitis with bacteremia  Bacteroid bacteremia  CVA with right-sided hemiplegia/contracture    Coronary artery disease  Essential hypertension      Hospital Course:   Camden Mai is a 78 y.o. male hospitalized   2/3/2022   worsening right heel wound. Patient evaluated by podiatry, vascular surgery service and infectious disease team.  Broad-spectrum antibiotic initiated,    wound culture was polymicrobial including Pseudomonas aeruginosa, blood culture on 2/3/2022 grew bacteroids. Giving the patient right hemiplegia with contracture limb saving strategy versus amputation discussed, chance of healing determined to be poor by vascular team and podiatry. Patient elected conservative treatment with antibiotics and wound care for now. He is to seek second opinion of his choosing. Patient discharged back to extended care facility to continue IV Zosyn through 3/9/2022 as recommended by ID team.  Patient to continue wound care in the meantime. Laboratory data, PICC removal as instructed in the [facility]. I attempted to contact diagnostic partner Hector Blankenship over the phone and no answer, message left. Patient seen and examined in the day of discharge. Vital signs reviewed.    /73   Pulse 77   Temp 97.6 °F (36.4 °C) (Oral)   Resp 18   Ht 6' 3\" (1.905 m)   Wt 188 lb 4.4 oz (85.4 kg)   SpO2 95%   BMI 23.53 kg/m²     Patient alert, oriented, chronically ill, right-sided hemiplegia, dressing on right foot noted. *. Patient reached the maximum benefit of this hospitalization. Patient  was hemodynamically stable on discharge   Available consultant are in agreement with discharge planning. Patient symptoms was controlled and in agreement with discharge planning. Patient Instructions:    Discharge lab/important testing/finding that need follow up : Outpatient follow-up with podiatry, vascular team,  Completing the Zosyn course as per RUSSELL  Removal of PICC after completing antibiotic    Activity: activity as tolerated    Diet: ADULT DIET; Regular  ADULT ORAL NUTRITION SUPPLEMENT; Breakfast, Lunch, Dinner; Standard High Calorie/High Protein Oral Supplement      [facility] visits:   [facility] [address]  [phone]    In 1 week           Discharge Medications:       Current Discharge Medication List        Current Discharge Medication List        Current Discharge Medication List      CONTINUE these medications which have NOT CHANGED    Details   busPIRone (BUSPAR) 5 MG tablet Take 5 mg by mouth 2 times daily      senna (SENOKOT) 8.6 MG tablet Take 1 tablet by mouth daily      collagenase 250 UNIT/GM ointment Apply 1 each topically daily Apply topically to right heel daily.       clopidogrel (PLAVIX) 75 MG tablet Take 1 tablet by mouth daily  Qty: 30 tablet, Refills: 3      ferrous sulfate (IRON 325) 325 (65 Fe) MG tablet Take 325 mg by mouth daily (with breakfast)      mirtazapine (REMERON) 15 MG tablet Take 15 mg by mouth nightly      pantoprazole (PROTONIX) 40 MG tablet Take 40 mg by mouth daily      mirabegron (MYRBETRIQ) 50 MG TB24 Take 50 mg by mouth daily      baclofen (LIORESAL) 20 MG tablet Take 20 mg by mouth 2 times daily      lisinopril (PRINIVIL;ZESTRIL) 20 MG tablet TAKE 1/2 TABLET BY MOUTH DAILY  Qty: 45 tablet, Refills: 0      hydroCHLOROthiazide (HYDRODIURIL) 25 MG tablet TAKE ONE TABLET BY MOUTH DAILY  Qty: 90 tablet, Refills: 0      atenolol (TENORMIN) 25 MG tablet Take 1 tablet by mouth daily  Qty: 30 tablet, Refills: 0      atorvastatin (LIPITOR) 80 MG tablet Take 1 tablet by mouth nightly  Qty: 30 tablet, Refills: 3      sertraline (ZOLOFT) 100 MG tablet TAKE 1 AND 1/2 TABLET BY MOUTH DAILY  Qty: 45 tablet, Refills: 2      Multiple Vitamins-Minerals (THERAPEUTIC MULTIVITAMIN-MINERALS) tablet Take 1 tablet by mouth daily      Ascorbic Acid (VITAMIN C) 500 MG tablet Take 500 mg by mouth daily      nitroGLYCERIN (NITROSTAT) 0.4 MG SL tablet Place 1 tablet under the tongue every 5 minutes as needed for Chest pain. Qty: 25 tablet, Refills: 2           Current Discharge Medication List      STOP taking these medications       clindamycin (CLEOCIN) 300 MG capsule Comments:   Reason for Stopping:         hydrOXYzine (ATARAX) 25 MG tablet Comments:   Reason for Stopping:                 Labs: For convenience and continuity at follow-up the following most recent labs are provided:      CBC:    Lab Results   Component Value Date    WBC 7.0 02/10/2022    HGB 8.5 02/10/2022    HCT 26.1 02/10/2022     02/10/2022       Renal:    Lab Results   Component Value Date     02/10/2022    K 4.2 02/10/2022     02/10/2022    CO2 25 02/10/2022    BUN 17 02/10/2022    CREATININE 0.9 02/10/2022    CALCIUM 9.2 02/10/2022    PHOS 3.3 01/08/2022         Significant Diagnostic Studies    Radiology:   XR FOOT RIGHT (MIN 3 VIEWS)   Final Result   Soft tissue ulceration of the hindfoot with possible lucencies in the   calcaneus that may represent erosive changes from osteomyelitis versus   overlying soft tissue artifact. Severe diffuse osteopenia. Subacute healing stress fracture of the 2nd metatarsal.                  Time Spent on discharge is 45 in the examination, evaluation, counseling and review of medications and discharge plan.           Thank you Brittany Rivas MD for the opportunity to be involved in this patient's care.          Electronically signed by Julio Cesar Martines MD on 2/10/2022 at 2:06 PM

## 2022-02-10 NOTE — CARE COORDINATION
DISCHARGE SUMMARY     DATE OF DISCHARGE: 02/10/2022    DISCHARGE DESTINATION: [facility]     FACILITY    Level of Care: SNF-Pt is a long term resident at [facility]. Will return under his SNF benefit as he will be receiving IV abx. Discharging to Facility/ Agency   · Name: [facility]  · Address:  [address] [facility]  · Phone:  [phone]  · Fax:  [phone]      TRANSPORTATION: [facility] [place] [address] Name:  [address] [place] [address] up Time: 9pm    Phone Number: [phone]    COMMENTS: SW attempted to reach pts ASHLEY Becca at [phone]. No Answer. Dr. Eleonora Mathews also tried to get in touch with [facility]. No answer. ASHER contacted [facility] and spoke with Aleksander. Modesta Carmichael is aware of pts IV abx needs and is able to accept this pt today. Aware that transport is arranged for Karey Mclaughlin will also try to get in touch with pts POA. ASHER met with pt who is agreeable to return.   John Raygoza [facility]  [phone]  Electronically signed by Ross Gutierrez on 2/10/2022 at 4:15 PM

## 2022-02-10 NOTE — PROGRESS NOTES
[facility]  Progress Note    2/10/2022 9:10 AM    Chief Complaint: Bilateral Foot Wounds      Reason for Consultation: PVD, Osteomyelitis    Subjective: Pt seen resting in bed, Right Leg with continued contracture and pain with trying to straighten, denies pain to Right Leg at rest, still unsure about moving forward with any amputation and thinks he may want to seek a second opinion regarding treatment options for his Right Heel and Leg    Vital Signs:  Vitals:    02/09/22 0446 02/09/22 1436 02/09/22 2113 02/10/22 0555   BP: 106/60 (!) 111/58 118/78 (!) 145/84   Pulse: 66 65 65 75   Resp: 16 16 16 18   Temp: 98.2 °F (36.8 °C) 97.4 °F (36.3 °C) 97.9 °F (36.6 °C) 97.6 °F (36.4 °C)   TempSrc: Oral Oral Oral Oral   SpO2: 93% 96% 96% 95%   Weight: 187 lb 6.3 oz (85 kg)   188 lb 4.4 oz (85.4 kg)   Height:           I/O:    Intake/Output Summary (Last 24 hours) at 2/10/2022 6932  Last data filed at 2/10/2022 9223  Gross per 24 hour   Intake 2817.21 ml   Output --   Net 2817.21 ml       Physical Exam:   General: no apparent distress, alert and oriented, afebrile  Chest/Lungs: no accessory muscle use  Cardiac: regular rate and rhythm  Abdomen: soft, non-tender, non-distended  Extremities: warm and well perfused, no signs of cyanosis or ischemia, no significant edema, Right sided paralysis, RLE contracture, Left Leg with slight movement to command  Wounds:  Right Lateral Foot - three areas of darkened discoloration likely as a result of pressure - dressing remains in place  Right Second Toe - area of darkened discoloration - dressing remains in place  Left Heel - area of darkened discoloration - dressing remains in place     Right Heel - Photo taken 2/5/2022         Labs:   Lab Results   Component Value Date     02/10/2022    K 4.2 02/10/2022     02/10/2022    CO2 25 02/10/2022    BUN 17 02/10/2022    CREATININE 0.9 02/10/2022    GFRAA >60 02/10/2022    GFRAA >60 02/21/2011    PRANAY >60 02/10/2022    GLUCOSE 114 02/10/2022    PHOS 3.3 01/08/2022    MG 1.50 01/08/2022    CALCIUM 9.2 02/10/2022     Lab Results   Component Value Date    WBC 7.0 02/10/2022    RBC 3.26 02/10/2022    HGB 8.5 02/10/2022    HCT 26.1 02/10/2022    MCV 80.2 02/10/2022    RDW 18.1 02/10/2022     02/10/2022     Lab Results   Component Value Date    INR 1.61 (H) 01/07/2022    PROTIME 18.6 (H) 01/07/2022        Scheduled Meds:    sodium hypochlorite   Irrigation Daily    piperacillin-tazobactam  3,375 mg IntraVENous Q8H    lidocaine 1 % injection  5 mL IntraDERmal Once    sodium chloride flush  5-40 mL IntraVENous 2 times per day    sertraline  150 mg Oral Daily    pantoprazole  40 mg Oral Daily    mirtazapine  15 mg Oral Nightly    trospium  20 mg Oral BID AC    lisinopril  20 mg Oral Daily    hydroCHLOROthiazide  25 mg Oral Daily    clopidogrel  75 mg Oral Daily    baclofen  20 mg Oral BID    atorvastatin  80 mg Oral Nightly    atenolol  25 mg Oral Daily    sodium chloride flush  10 mL IntraVENous 2 times per day     Continuous Infusions:    sodium chloride      sodium chloride 25 mL (02/09/22 0541)       Bilateral Arterial Duplex - 1/10/2022  Limited evaluation of both extremities. Evidence of calcific plaque throughout both extremities. OMKAR not obtained. Popliteal arteries unable to be visualized due to positioning. Multiphasic waveforms throughout SFA bilaterally with evidence of distal monophasic waveforms therefore cannot exclude a popliteal lesion bilaterally. The left anterior tibial artery appears to be occluded. Right Foot X-ray - 2/3/2022  Impression  Soft tissue ulceration of the hindfoot with possible lucencies in the calcaneus that may represent erosive changes from osteomyelitis versus overlying soft tissue artifact. Severe diffuse osteopenia.      Subacute healing stress fracture of the 2nd metatarsal.     Assessment:  -Right Heel Wound - S/P I&D with Podiatry  -CVA - Right Sided paralysis, RLE contracture (pt states contracture present for about 8 weeks)     Plan:   -IV Antibiotics - per ID  -Local Wound Care - per Podiatry  -Will allow pt to consider options for Right Leg treatment, will sign off at this time, please do not hesitate to contact us if pt agrees to move forward with amputation as we would be happy to participate in his care    All pertinent information and plan of care discussed with Dr. Glory Nash. All questions and concerns were addressed with the patient. I have discussed the above stated plan with the patient. The patient verbalized understanding and agreed with the plan.     Thank you for allowing to us to participate in the care of Jennifer Lerner      Electronically signed by MARSHALL Busby CNP on 2/10/2022 at 9:10 AM     We will sign off for now be happy to do his amputation down the road when he and his family decided appropriate

## 2022-02-10 NOTE — CARE COORDINATION
Discharge Planning:  ASHER contacted [facility] [phone] and spoke with Petrona Richmond. Pt is a long term resident at [facility] and will need IV abx upon d/c per Dr. Brittny Thorpe. ASHER spoke with Petrona Richmond and explained that pt will need  IV Zosyn x 3.375 mg Q 8 HRS x stop date  3/9. Petrona Richmond stated that this will not be a problem and this pt can return under his [employer] Medicare skilled benefit. [employer] is waiving the need for pre cert  at this time, so no pre cert is needed. Petrona Richmond stated that pt WILL NOT need a COVID test to return. Plan: Return to [facility] upon d/c. Pt has a PICC and will need IV Zosyn Q 8 hrs until 3/09. No pre cert or COVID test needed. Pt is a long term resident but will return under his [employer] Medicare benefit.   Torres Mancilla, [place]  [phone]  Electronically signed by Julia Ovalle on 2/10/2022 at 11:37 AM

## 2022-02-10 NOTE — PROGRESS NOTES
Patient was sleeping at time of rounds. I did speak with Dr. Rodolfo Grady yesterday and agree that at this time given the contracture that he is a poor candidate for anything below the knee surgically. The contracture is too great and is pressing on the wound despite current off loading techniques. We will continue to follow  Continue current dressing changes  Off load wound site if possible.      Jessica Nelson DPM  [facility]  Pager: [phone]  Office: [phone]  Fax: [phone]

## 2022-02-10 NOTE — PROGRESS NOTES
Comprehensive Nutrition Assessment    Type and Reason for Visit:  Initial    Nutrition Recommendations/Plan:   Regular diet   Ensure TID  Will monitor nutritional adequacy, nutrition-related labs, weights, BMs, and clinical progress     Nutrition Assessment:  LOS. Pt admitted with rt heel infection. Hx includes HLD, HTN, CAD, CVA. Pt currently refusing amputation which was recommended by MD. Pt with multiple unstageable wounds to heels. Spoke with pt and family at bedside. Reported poor po intake currently and PTA. Family is brining in food. Favorable to ONS. Noted significant wt loss, was 260 lb x 8 months ago, currently 188 lb. Malnutrition Assessment:  Malnutrition Status:  Severe malnutrition    Context:  Chronic Illness     Findings of the 6 clinical characteristics of malnutrition:  Energy Intake:  7 - 75% or less estimated energy requirements for 1 month or longer  Weight Loss:  7 - Greater than 20% over 1 year (-28% x 8 months)     Body Fat Loss:  7 - Severe body fat loss Triceps,Buccal region   Muscle Mass Loss:  7 - Severe muscle mass loss Temples (temporalis),Clavicles (pectoralis & deltoids)  Fluid Accumulation:  No significant fluid accumulation     Strength:  Not Performed    Estimated Daily Nutrient Needs:  Energy (kcal):  9654-5079 kcal (25-30 kcal/kg ABW); Weight Used for Energy Requirements:  Current     Protein (g):  102-128 gm (1.2-1.5 gm/kg ABW); Weight Used for Protein Requirements:  Current        Fluid (ml/day):   ; Method Used for Fluid Requirements:  1 ml/kcal      Nutrition Related Findings:  LBM 2/10      Wounds:  Unstageable,Multiple       Current Nutrition Therapies:    ADULT DIET;  Regular  ADULT ORAL NUTRITION SUPPLEMENT; Breakfast, Lunch, Dinner; Standard High Calorie/High Protein Oral Supplement    Anthropometric Measures:  · Height: 6' 3\" (190.5 cm)  · Current Body Weight: 188 lb (85.3 kg)   · Admission Body Weight: 199 lb (90.3 kg)    · Usual Body Weight: 260 lb

## 2022-02-10 NOTE — PROGRESS NOTES
Attempt to call the domestic partner [place] twice [phone], in order to answer questions. Unfortunately no answer, message left.

## 2022-02-10 NOTE — PROGRESS NOTES
Department of Podiatry Progress Note  2/10/2022  Timothy Spaulding          HISTORY OF PRESENT ILLNESS:                The patient is a 78 y.o. male with Right heel ulceration which has improved somewhat since admission and no longer has strong malodor. The central portions are granular. The periphery remains necrotic. Past Medical History:        Diagnosis Date    Acute MI ([facility].) 09/2013    Anxiety     Arthritis     CAD (coronary artery disease)     COVID-19     Hyperlipidemia 2/3/2022    Hypertension     Influenza A 01/15/2017    Severe [facility] ([facility] 2/10/2022     REVIEW OF SYSTEMS:    CONSTITUTIONAL:  negative  INTEGUMENT/BREAST:  positive for skin lesion(s), dryness, changes in lesion, changes in hair and changes in nails  MUSCULOSKELETAL:  positive for  decreased range of motion and joint contracture bilateral lower extremity  NEUROLOGICAL:  positive for gait problems, weakness and pain  BEHAVIOR/PSYCH:  Depression    PHYSICAL EXAM:      Vitals:    /75   Pulse 70   Temp 98 °F (36.7 °C) (Oral)   Resp 19   Ht 6' 3\" (1.905 m)   Wt 188 lb 4.4 oz (85.4 kg)   SpO2 96%   BMI 23.53 kg/m²     LABS:   Recent Labs     02/09/22  0530 02/10/22  0640   WBC 7.7 7.0   HGB 8.2* 8.5*   HCT 24.7* 26.1*    237     Recent Labs     02/10/22  0640      K 4.2      CO2 25   BUN 17   CREATININE 0.9     No results for input(s): PROT, INR, APTT in the last 72 hours. LOWER EXTREMITY EXAMINATION   SKIN:    RIGHT postero-plantar heel with 8.0 x 5.2 cm wound with necrotic malodorous presentation  The left heel has a very small ulceration to the plantar fat pad.      NEUROLOGIC:    Pain sensation isnot significantly changed Bilateral.  Light touch is decreasedBilateral. positive history of paresthesia Bilateral. negativehistory of burning Bilateral. Deep tendon reflexes are absent to include patellar and achilles Bilateral. [place]--Herman 5.07 monofilament sensitivity is abnormal 4 to 5 spots tested Bilateral.    VASCULAR:    bilaterally Dorsalis pedis is absent. bilaterally Posterior tibial pulse is absent. 2+ edema right. mild Varicosities right. MUSCULOSKELETAL:  Kimberlee Mancilla is untested due to paraplegia. Muscle strength is 1/5 to all groups tested to include dorsiflexion, plantarflexion, inversion, eversion, and digital Bilateral . The ranges of motion of all joints tested from ankle distal is decreased there is crepitus noted Right. Radiology 2/3/2022  Lilliana Pembina is severe diffuse osteopenia. Lilliana Pembina is soft tissue   ulceration of the hindfoot.  Subtle lucencies noted in the calcaneus may   represent erosive changes from osteomyelitis.  No evidence of an acute   traumatic displaced fracture.           Impression   Soft tissue ulceration of the hindfoot with possible lucencies in the   calcaneus that may represent erosive changes from osteomyelitis versus   overlying soft tissue artifact.       Severe diffuse osteopenia           IMPRESSION/RECOMMENDATIONS    1. Deeply penetrating RIGHT heel wound with exposed calcaneus / Desmond's tendon  Dakins wet to dry dressings continued    2. Osteomyelitis of RIGHT calcaneus    3. Left heel wound appears small and granular. 4.  Paraplegia with joint contractures about both knees, contributing to the pressure ulcers to both heels. DISPO: Patient wants conservative care only. His major carmencita for healing is the positioning in the bed and the contractures.     Kassandra Ganser, DPM  [facility]  Pager: [phone]  Office: [phone]  Fax: [phone]

## 2022-02-11 NOTE — PLAN OF CARE
Problem: Falls - Risk of:  Goal: Will remain free from falls  Description: Will remain free from falls  Outcome: Ongoing  Goal: Absence of physical injury  Description: Absence of physical injury  Outcome: Ongoing     Problem: Infection:  Goal: Will remain free from infection  Description: Will remain free from infection  Outcome: Ongoing     Problem: Safety:  Goal: Free from accidental physical injury  Description: Free from accidental physical injury  Outcome: Ongoing  Goal: Free from intentional harm  Description: Free from intentional harm  Outcome: Ongoing     Problem: Daily Care:  Goal: Daily care needs are met  Description: Daily care needs are met  Outcome: Ongoing     Problem: Pain:  Goal: Patient's pain/discomfort is manageable  Description: Patient's pain/discomfort is manageable  Outcome: Ongoing     Problem: Skin Integrity:  Goal: Skin integrity will stabilize  Description: Skin integrity will stabilize  Outcome: Ongoing     Problem: Skin Integrity:  Goal: Will show no infection signs and symptoms  Description: Will show no infection signs and symptoms  Outcome: Ongoing  Goal: Absence of new skin breakdown  Description: Absence of new skin breakdown  Outcome: Ongoing     Problem: Nutrition  Goal: Optimal nutrition therapy  2/10/2022 1949 by Guadalupe Flynn RN  Outcome: Ongoing  2/10/2022 1157 by Jane Phoenix RD, LD  Outcome: Ongoing

## 2022-02-14 LAB
GRAM STAIN RESULT: ABNORMAL
Lab: NORMAL
ORGANISM: ABNORMAL
REPORT: NORMAL
THIS TEST SENT TO: NORMAL
WOUND/ABSCESS: ABNORMAL
WOUND/ABSCESS: ABNORMAL

## 2022-02-23 ENCOUNTER — TELEPHONE (OUTPATIENT)
Dept: INFECTIOUS DISEASES | Age: 67
End: 2022-02-23

## 2022-02-23 NOTE — TELEPHONE ENCOUNTER
Adriel lemons nurse at [facility], contacted this office to notify us this pt is currently in patient at [facility]

## 2022-08-31 ENCOUNTER — APPOINTMENT (OUTPATIENT)
Dept: CT IMAGING | Age: 67
DRG: 682 | End: 2022-08-31
Payer: MEDICARE

## 2022-08-31 ENCOUNTER — HOSPITAL ENCOUNTER (INPATIENT)
Age: 67
LOS: 7 days | Discharge: HOME | DRG: 682 | End: 2022-09-12
Attending: EMERGENCY MEDICINE | Admitting: HOSPITALIST
Payer: MEDICARE

## 2022-08-31 ENCOUNTER — APPOINTMENT (OUTPATIENT)
Dept: GENERAL RADIOLOGY | Age: 67
DRG: 682 | End: 2022-08-31
Payer: MEDICARE

## 2022-08-31 DIAGNOSIS — R77.8 ELEVATED TROPONIN: ICD-10-CM

## 2022-08-31 DIAGNOSIS — R29.810 FACIAL DROOP: Primary | ICD-10-CM

## 2022-08-31 LAB
A/G RATIO: 1.2 (ref 1.1–2.2)
ALBUMIN SERPL-MCNC: 4.2 G/DL (ref 3.4–5)
ALP BLD-CCNC: 128 U/L (ref 40–129)
ALT SERPL-CCNC: 11 U/L (ref 10–40)
ANION GAP SERPL CALCULATED.3IONS-SCNC: 11 MMOL/L (ref 3–16)
AST SERPL-CCNC: 13 U/L (ref 15–37)
BASOPHILS ABSOLUTE: 0.1 K/UL (ref 0–0.2)
BASOPHILS RELATIVE PERCENT: 0.9 %
BILIRUB SERPL-MCNC: 0.4 MG/DL (ref 0–1)
BUN BLDV-MCNC: 42 MG/DL (ref 7–20)
CALCIUM SERPL-MCNC: 9.5 MG/DL (ref 8.3–10.6)
CHLORIDE BLD-SCNC: 106 MMOL/L (ref 99–110)
CHP ED QC CHECK: NORMAL
CO2: 26 MMOL/L (ref 21–32)
CREAT SERPL-MCNC: 1.3 MG/DL (ref 0.8–1.3)
EOSINOPHILS ABSOLUTE: 0.3 K/UL (ref 0–0.6)
EOSINOPHILS RELATIVE PERCENT: 3.9 %
GFR AFRICAN AMERICAN: >60
GFR NON-AFRICAN AMERICAN: 55
GLUCOSE BLD-MCNC: 116 MG/DL (ref 70–99)
GLUCOSE BLD-MCNC: 93 MG/DL (ref 70–99)
HCT VFR BLD CALC: 36.3 % (ref 40.5–52.5)
HEMOGLOBIN: 12.3 G/DL (ref 13.5–17.5)
INR BLD: 1.03 (ref 0.87–1.14)
LYMPHOCYTES ABSOLUTE: 2 K/UL (ref 1–5.1)
LYMPHOCYTES RELATIVE PERCENT: 25.4 %
MCH RBC QN AUTO: 29.2 PG (ref 26–34)
MCHC RBC AUTO-ENTMCNC: 33.7 G/DL (ref 31–36)
MCV RBC AUTO: 86.7 FL (ref 80–100)
MONOCYTES ABSOLUTE: 0.6 K/UL (ref 0–1.3)
MONOCYTES RELATIVE PERCENT: 8 %
NEUTROPHILS ABSOLUTE: 4.9 K/UL (ref 1.7–7.7)
NEUTROPHILS RELATIVE PERCENT: 61.8 %
PDW BLD-RTO: 16.4 % (ref 12.4–15.4)
PERFORMED ON: ABNORMAL
PLATELET # BLD: 151 K/UL (ref 135–450)
PMV BLD AUTO: 7.3 FL (ref 5–10.5)
POTASSIUM REFLEX MAGNESIUM: 4.5 MMOL/L (ref 3.5–5.1)
PROTHROMBIN TIME: 13.4 SEC (ref 11.7–14.5)
RBC # BLD: 4.19 M/UL (ref 4.2–5.9)
SODIUM BLD-SCNC: 143 MMOL/L (ref 136–145)
TOTAL PROTEIN: 7.6 G/DL (ref 6.4–8.2)
TROPONIN: 0.06 NG/ML
WBC # BLD: 7.9 K/UL (ref 4–11)

## 2022-08-31 PROCEDURE — 84484 ASSAY OF TROPONIN QUANT: CPT

## 2022-08-31 PROCEDURE — 96372 THER/PROPH/DIAG INJ SC/IM: CPT

## 2022-08-31 PROCEDURE — 80053 COMPREHEN METABOLIC PANEL: CPT

## 2022-08-31 PROCEDURE — 99285 EMERGENCY DEPT VISIT HI MDM: CPT

## 2022-08-31 PROCEDURE — 71045 X-RAY EXAM CHEST 1 VIEW: CPT

## 2022-08-31 PROCEDURE — 85025 COMPLETE CBC W/AUTO DIFF WBC: CPT

## 2022-08-31 PROCEDURE — G0378 HOSPITAL OBSERVATION PER HR: HCPCS

## 2022-08-31 PROCEDURE — 6370000000 HC RX 637 (ALT 250 FOR IP): Performed by: INTERNAL MEDICINE

## 2022-08-31 PROCEDURE — 6360000002 HC RX W HCPCS: Performed by: INTERNAL MEDICINE

## 2022-08-31 PROCEDURE — 70496 CT ANGIOGRAPHY HEAD: CPT

## 2022-08-31 PROCEDURE — 85610 PROTHROMBIN TIME: CPT

## 2022-08-31 PROCEDURE — 93005 ELECTROCARDIOGRAM TRACING: CPT | Performed by: PHYSICIAN ASSISTANT

## 2022-08-31 PROCEDURE — 70450 CT HEAD/BRAIN W/O DYE: CPT

## 2022-08-31 PROCEDURE — 36415 COLL VENOUS BLD VENIPUNCTURE: CPT

## 2022-08-31 PROCEDURE — 2580000003 HC RX 258: Performed by: INTERNAL MEDICINE

## 2022-08-31 PROCEDURE — 6360000004 HC RX CONTRAST MEDICATION: Performed by: PHYSICIAN ASSISTANT

## 2022-08-31 RX ORDER — NITROGLYCERIN 0.4 MG/1
0.4 TABLET SUBLINGUAL EVERY 5 MIN PRN
Status: DISCONTINUED | OUTPATIENT
Start: 2022-08-31 | End: 2022-09-12 | Stop reason: HOSPADM

## 2022-08-31 RX ORDER — ONDANSETRON 2 MG/ML
4 INJECTION INTRAMUSCULAR; INTRAVENOUS EVERY 6 HOURS PRN
Status: DISCONTINUED | OUTPATIENT
Start: 2022-08-31 | End: 2022-09-12 | Stop reason: HOSPADM

## 2022-08-31 RX ORDER — LISINOPRIL 10 MG/1
10 TABLET ORAL DAILY
Status: DISCONTINUED | OUTPATIENT
Start: 2022-09-01 | End: 2022-09-06

## 2022-08-31 RX ORDER — M-VIT,TX,IRON,MINS/CALC/FOLIC 27MG-0.4MG
1 TABLET ORAL DAILY
Status: DISCONTINUED | OUTPATIENT
Start: 2022-09-01 | End: 2022-09-12 | Stop reason: HOSPADM

## 2022-08-31 RX ORDER — SENNA PLUS 8.6 MG/1
1 TABLET ORAL DAILY
Status: DISCONTINUED | OUTPATIENT
Start: 2022-09-01 | End: 2022-09-12 | Stop reason: HOSPADM

## 2022-08-31 RX ORDER — ONDANSETRON 4 MG/1
4 TABLET, ORALLY DISINTEGRATING ORAL EVERY 8 HOURS PRN
Status: DISCONTINUED | OUTPATIENT
Start: 2022-08-31 | End: 2022-09-12 | Stop reason: HOSPADM

## 2022-08-31 RX ORDER — FERROUS SULFATE TAB EC 324 MG (65 MG FE EQUIVALENT) 324 (65 FE) MG
324 TABLET DELAYED RESPONSE ORAL
Status: DISCONTINUED | OUTPATIENT
Start: 2022-09-01 | End: 2022-09-12 | Stop reason: HOSPADM

## 2022-08-31 RX ORDER — HYDROCHLOROTHIAZIDE 25 MG/1
25 TABLET ORAL DAILY
Status: DISCONTINUED | OUTPATIENT
Start: 2022-09-01 | End: 2022-09-12 | Stop reason: HOSPADM

## 2022-08-31 RX ORDER — CLOPIDOGREL BISULFATE 75 MG/1
75 TABLET ORAL DAILY
Status: DISCONTINUED | OUTPATIENT
Start: 2022-09-01 | End: 2022-09-12 | Stop reason: HOSPADM

## 2022-08-31 RX ORDER — BUSPIRONE HYDROCHLORIDE 5 MG/1
5 TABLET ORAL 2 TIMES DAILY
Status: DISCONTINUED | OUTPATIENT
Start: 2022-08-31 | End: 2022-09-12 | Stop reason: HOSPADM

## 2022-08-31 RX ORDER — ATORVASTATIN CALCIUM 80 MG/1
80 TABLET, FILM COATED ORAL NIGHTLY
Status: DISCONTINUED | OUTPATIENT
Start: 2022-08-31 | End: 2022-09-12 | Stop reason: HOSPADM

## 2022-08-31 RX ORDER — MIRTAZAPINE 15 MG/1
15 TABLET, FILM COATED ORAL NIGHTLY
Status: DISCONTINUED | OUTPATIENT
Start: 2022-08-31 | End: 2022-09-12 | Stop reason: HOSPADM

## 2022-08-31 RX ORDER — ASPIRIN 300 MG/1
300 SUPPOSITORY RECTAL DAILY
Status: DISCONTINUED | OUTPATIENT
Start: 2022-08-31 | End: 2022-09-12 | Stop reason: HOSPADM

## 2022-08-31 RX ORDER — BACLOFEN 10 MG/1
20 TABLET ORAL 2 TIMES DAILY
Status: DISCONTINUED | OUTPATIENT
Start: 2022-08-31 | End: 2022-09-12 | Stop reason: HOSPADM

## 2022-08-31 RX ORDER — ENOXAPARIN SODIUM 100 MG/ML
40 INJECTION SUBCUTANEOUS NIGHTLY
Status: DISCONTINUED | OUTPATIENT
Start: 2022-08-31 | End: 2022-09-12 | Stop reason: HOSPADM

## 2022-08-31 RX ORDER — ASCORBIC ACID 500 MG
500 TABLET ORAL DAILY
Status: DISCONTINUED | OUTPATIENT
Start: 2022-09-01 | End: 2022-09-12 | Stop reason: HOSPADM

## 2022-08-31 RX ORDER — ATORVASTATIN CALCIUM 40 MG/1
40 TABLET, FILM COATED ORAL NIGHTLY
Status: DISCONTINUED | OUTPATIENT
Start: 2022-08-31 | End: 2022-08-31

## 2022-08-31 RX ORDER — ASPIRIN 81 MG/1
81 TABLET ORAL DAILY
Status: DISCONTINUED | OUTPATIENT
Start: 2022-08-31 | End: 2022-09-12 | Stop reason: HOSPADM

## 2022-08-31 RX ORDER — PANTOPRAZOLE SODIUM 40 MG/1
40 TABLET, DELAYED RELEASE ORAL DAILY
Status: DISCONTINUED | OUTPATIENT
Start: 2022-09-01 | End: 2022-09-12 | Stop reason: HOSPADM

## 2022-08-31 RX ORDER — SODIUM CHLORIDE 0.9 % (FLUSH) 0.9 %
10 SYRINGE (ML) INJECTION PRN
Status: DISCONTINUED | OUTPATIENT
Start: 2022-08-31 | End: 2022-09-12 | Stop reason: HOSPADM

## 2022-08-31 RX ORDER — SODIUM CHLORIDE 9 MG/ML
INJECTION, SOLUTION INTRAVENOUS PRN
Status: DISCONTINUED | OUTPATIENT
Start: 2022-08-31 | End: 2022-09-12 | Stop reason: HOSPADM

## 2022-08-31 RX ORDER — ATENOLOL 25 MG/1
25 TABLET ORAL DAILY
Status: DISCONTINUED | OUTPATIENT
Start: 2022-09-01 | End: 2022-09-12 | Stop reason: HOSPADM

## 2022-08-31 RX ORDER — SODIUM CHLORIDE 0.9 % (FLUSH) 0.9 %
10 SYRINGE (ML) INJECTION EVERY 12 HOURS SCHEDULED
Status: DISCONTINUED | OUTPATIENT
Start: 2022-08-31 | End: 2022-09-12 | Stop reason: HOSPADM

## 2022-08-31 RX ADMIN — BUSPIRONE HYDROCHLORIDE 5 MG: 5 TABLET ORAL at 21:05

## 2022-08-31 RX ADMIN — MIRTAZAPINE 15 MG: 15 TABLET, FILM COATED ORAL at 21:05

## 2022-08-31 RX ADMIN — IOPAMIDOL 75 ML: 755 INJECTION, SOLUTION INTRAVENOUS at 14:08

## 2022-08-31 RX ADMIN — ENOXAPARIN SODIUM 40 MG: 100 INJECTION SUBCUTANEOUS at 21:05

## 2022-08-31 RX ADMIN — SODIUM CHLORIDE, PRESERVATIVE FREE 10 ML: 5 INJECTION INTRAVENOUS at 21:05

## 2022-08-31 RX ADMIN — ATORVASTATIN CALCIUM 80 MG: 80 TABLET, FILM COATED ORAL at 21:05

## 2022-08-31 RX ADMIN — BACLOFEN 20 MG: 10 TABLET ORAL at 21:05

## 2022-08-31 ASSESSMENT — PAIN - FUNCTIONAL ASSESSMENT
PAIN_FUNCTIONAL_ASSESSMENT: NONE - DENIES PAIN
PAIN_FUNCTIONAL_ASSESSMENT: NONE - DENIES PAIN

## 2022-08-31 ASSESSMENT — ENCOUNTER SYMPTOMS
NAUSEA: 0
SHORTNESS OF BREATH: 0
COUGH: 0
COLOR CHANGE: 0
VOMITING: 0

## 2022-08-31 NOTE — PROGRESS NOTES
NAME:  Cris Rush  YOB: 1955  MEDICAL RECORD NUMBER:  [id number]  TODAYS DATE:  8/31/2022    Discussed personal risk factors for Stroke /TIA with patient/family, and ways to reduce the risk for a recurrent stroke. Patient's personal risk factors which were identified are:     [] Alcohol Abuse: check with your physician before any alcohol consumption. [] Atrial fibrillation: may cause blood clots. [] Drug Abuse: Seek help, talk with your doctor  [] Clotting Disorder  [] Diabetes  [] Family history of stroke or heart disease  [x] High Blood Pressure/Hypertension: work with your physician.  [] High cholesterol: monitor cholesterol levels with your physician. [x] Overweight/Obesity: work with your physician for your ideal body weight. [x] Physical Inactivity: get regular exercise as directed by your physician. [x] Personal history of previous TIA or stroke  [] Poor Diet; decrease salt (sodium) in your diet, follow diet directed by physician. [] Smoking: Cigarette/Cigar: stop smoking. Advised pt. that you can reduce your risk for stroke/TIA by modifying/controlling the risk factors that you have. Pt.advised to take the medications as prescribed, which will be detailed in the discharge instructions, and to not stop taking them without consulting their physician. In addition, pt. advised to maintain a healthy diet, exercise regularly and to not smoke. [facility]'s Stroke treatment and prevention, Managing your recovery  notebook  provided and/or reviewed  with patient/family. The notebook includes, but not limited to, sections addressing warning signs & symptoms of a stroke, which are: sudden numbness or weakness especially on one side of the body, sudden confusion, difficulty speaking or understanding, sudden changes in vision, sudden dizziness or loss of balance/ coordination, sudden severe headache, syncope and seizure.   The need to call EMS (911) immediately if signs & symptoms occur is emphasized . The notebook also provides education on Stroke community resources and stroke advocacy. The need for follow-up after discharge was highlighted with patient/family with them being able to repeat understanding of the importance of this.       Electronically signed by Jack Conte RN on 8/31/2022 at 6:59 PM

## 2022-08-31 NOTE — ED PROVIDER NOTES
[address] [facility]        Pt Name: Gayla Zamudio  MRN: [id number]  [place] 1955  Date of evaluation: 8/31/2022  Provider: CARIN Friedman  PCP: No primary care provider on file. Note Started: 2:24 PM EDT        I have seen and evaluated this patient with my supervising physician Owen Jacob MD.    CHIEF COMPLAINT       No chief complaint on file. Left facial droop    HISTORY OF PRESENT ILLNESS   (Location, Timing/Onset, Context/Setting, Quality, Duration, Modifying Factors, Severity, Associated Signs and Symptoms)  Note limiting factors. Chief Complaint: Left facial droop     Gayla Zamudio is a 75 y.o. male who presents to the emergency department today via EMS with concern for possible stroke. Patient resides at a skilled nursing facility. On initial presentation, we had not received a call from the nursing home, and EMS was unsure of his last known well time. The patient states that he was told that they were concerned for possible stroke. He does have a history of a stroke that has left him with residual right-sided deficits, with some sensory deficits on the right, and that feels to be about at baseline. He states he is not sure when he woke up this morning. He is frustrated because he is having a hard time communicating what he wants to say, which is not typical for him. Patient has no vision changes, denies chest pain, shortness of breath, abdominal pain, nausea or vomiting. Well after arrival, the nursing home is able to be reached despite multiple attempts by nursing staff immediately upon patient arrival, and they report that the last time he had been seen well was around 0915 this morning. When they went in around 1230 this afternoon, they noticed him to be listing to the left, drooling from the left side of his mouth, and they had a difficult time arousing him.   At the time he presented in the emergency department, he had the left facial droop, but was alert and oriented and conversant. There are no further complaints. Nursing Notes were all reviewed and agreed with or any disagreements were addressed in the HPI. REVIEW OF SYSTEMS    (2-9 systems for level 4, 10 or more for level 5)     Review of Systems   Constitutional:  Negative for chills and fever. Respiratory:  Negative for cough and shortness of breath. Cardiovascular:  Negative for chest pain and palpitations. Gastrointestinal:  Negative for nausea and vomiting. Genitourinary:  Negative for dysuria, frequency and urgency. Musculoskeletal:  Negative for arthralgias and gait problem. Skin:  Negative for color change and rash. Neurological:  Positive for facial asymmetry (left facial droop), speech difficulty (pt states he feels that his speech is slurred, and he has a difficult time expressing what he wants to say) and numbness (Chronic right-sided numbness due to remote stroke in 2021, no acute change). Negative for dizziness, syncope (? unsure, per [facility] possible with unresponsive episode, though no evidence of that during his evaluation in the ED), light-headedness and headaches. Psychiatric/Behavioral:  Negative for agitation and confusion. Positives and Pertinent negatives as per HPI. Except as noted above in the ROS, all other systems were reviewed and negative.        PAST MEDICAL HISTORY     Past Medical History:   Diagnosis Date    Acute MI ([facility] [address] 09/2013    Anxiety     Arthritis     CAD (coronary artery disease)     COVID-19     Hyperlipidemia 2/3/2022    Hypertension     Influenza A 01/15/2017    Severe malnutrition ([facility] [address] 2/10/2022         SURGICAL HISTORY     Past Surgical History:   Procedure Laterality Date    BACK SURGERY N/A 1989    herniated disc    CORONARY ANGIOPLASTY WITH STENT PLACEMENT  9/13    EYE SURGERY      VASCULAR SURGERY           CURRENTMEDICATIONS       Previous Medications    ASCORBIC ACID (VITAMIN C) 500 MG TABLET    Take 500 mg by mouth daily    ATENOLOL (TENORMIN) 25 MG TABLET    Take 1 tablet by mouth daily    ATORVASTATIN (LIPITOR) 80 MG TABLET    Take 1 tablet by mouth nightly    BACLOFEN (LIORESAL) 20 MG TABLET    Take 20 mg by mouth 2 times daily    BUSPIRONE (BUSPAR) 5 MG TABLET    Take 5 mg by mouth 2 times daily    CLOPIDOGREL (PLAVIX) 75 MG TABLET    Take 1 tablet by mouth daily    COLLAGENASE 250 UNIT/GM OINTMENT    Apply 1 each topically daily Apply topically to right heel daily. FERROUS SULFATE (IRON 325) 325 (65 FE) MG TABLET    Take 325 mg by mouth daily (with breakfast)    HYDROCHLOROTHIAZIDE (HYDRODIURIL) 25 MG TABLET    TAKE ONE TABLET BY MOUTH DAILY    LISINOPRIL (PRINIVIL;ZESTRIL) 20 MG TABLET    TAKE 1/2 TABLET BY MOUTH DAILY    MIRABEGRON (MYRBETRIQ) 50 MG TB24    Take 50 mg by mouth daily    MIRTAZAPINE (REMERON) 15 MG TABLET    Take 15 mg by mouth nightly    MULTIPLE VITAMINS-MINERALS (THERAPEUTIC MULTIVITAMIN-MINERALS) TABLET    Take 1 tablet by mouth daily    NITROGLYCERIN (NITROSTAT) 0.4 MG SL TABLET    Place 1 tablet under the tongue every 5 minutes as needed for Chest pain. PANTOPRAZOLE (PROTONIX) 40 MG TABLET    Take 40 mg by mouth daily    SENNA (SENOKOT) 8.6 MG TABLET    Take 1 tablet by mouth daily    SERTRALINE (ZOLOFT) 100 MG TABLET    TAKE 1 AND 1/2 TABLET BY MOUTH DAILY         ALLERGIES     Patient has no known allergies.     FAMILYHISTORY       Family History   Problem Relation Age of Onset    Hypertension Mother     Hypertension Father     Heart Failure Father     Stroke Maternal Grandfather     Cancer Paternal Uncle         throat          SOCIAL HISTORY       Social History     Tobacco Use    Smoking status: Former     Packs/day: 0.50     Years: 40.00     Pack years: 20.00     Types: Cigarettes     Quit date: 1/14/2019     Years since quitting: 3.6    Smokeless tobacco: Never   Vaping Use    Vaping Use: Never used   Substance Use Topics    Alcohol use: Yes     Alcohol/week: 2.0 standard drinks     Types: 2 Cans of beer per week     Comment: states used to drink 1 pint/vodka a day / now socially drinks       SCREENINGS   NIH Stroke Scale  Interval: Baseline  Level of Consciousness (1a): Alert  LOC Questions (1b): Answers both correctly  LOC Commands (1c): Performs both tasks correctly  Best Gaze (2): Normal  Visual (3): No visual loss  Facial Palsy (4): (!) Minor paralysis  Motor Arm, Left (5a): No drift  Motor Arm, Right (5b): No drift  Motor Leg, Left (6a): No drift  Motor Leg, Right (6b): Amputation or joint fusion (Explain)  Limb Ataxia (7): Absent  Sensory (8): Normal  Best Language (9): Mild to moderate aphasia  Dysarthria (10): Mild to moderate, slurs some words  Extinction and Inattention (11): No abnormality  Total: 3Glasgow Coma Scale  Eye Opening: Spontaneous  Best Verbal Response: Oriented  Best Motor Response: Obeys commands  Stephenie Coma Scale Score: 15        PHYSICAL EXAM    (up to 7 for level 4, 8 or more for level 5)     ED Triage Vitals [08/31/22 1349]   BP Temp Temp src Heart Rate Resp SpO2 Height Weight   (!) 152/97 97.7 °F (36.5 °C) -- 54 16 99 % -- 216 lb 11.2 oz (98.3 kg)       Physical Exam  Vitals and nursing note reviewed. Constitutional:       General: He is not in acute distress. Appearance: Normal appearance. He is well-developed. He is not ill-appearing, toxic-appearing or diaphoretic. HENT:      Head: Normocephalic and atraumatic. Right Ear: Tympanic membrane and ear canal normal.      Left Ear: Tympanic membrane and ear canal normal.      Mouth/Throat:      Mouth: Mucous membranes are moist.      Pharynx: Oropharynx is clear. Eyes:      General: No scleral icterus. Right eye: No discharge. Left eye: No discharge. Extraocular Movements: Extraocular movements intact. Conjunctiva/sclera: Conjunctivae normal.      Pupils: Pupils are equal, round, and reactive to light.    Cardiovascular:      Rate and Rhythm: Normal rate and regular rhythm. Pulses: Normal pulses. Pulmonary:      Effort: Pulmonary effort is normal. No respiratory distress. Breath sounds: Normal breath sounds. Abdominal:      General: Bowel sounds are normal. There is no distension. Palpations: Abdomen is soft. Musculoskeletal:      Cervical back: Normal range of motion and neck supple. Right lower leg: No edema. Legs:    Skin:     General: Skin is warm and dry. Neurological:      Mental Status: He is alert and oriented to person, place, and time. GCS: GCS eye subscore is 4. GCS verbal subscore is 5. GCS motor subscore is 6. Cranial Nerves: Cranial nerve deficit (left facial droop) present. Sensory: Sensory deficit (right sided, no worse than baseline, due to previous CVA) present. Motor: Weakness (right sided - pt states due to previous CVA) present. Coordination: Coordination normal (normal finger to nose on left; unable to due heel-to-shin due to amputation on right). Finger-Nose-Finger Test normal.   Psychiatric:         Mood and Affect: Mood normal.         Behavior: Behavior normal. Behavior is cooperative.        DIAGNOSTIC RESULTS   LABS:    Labs Reviewed   CBC WITH AUTO DIFFERENTIAL - Abnormal; Notable for the following components:       Result Value    RBC 4.19 (*)     Hemoglobin 12.3 (*)     Hematocrit 36.3 (*)     RDW 16.4 (*)     All other components within normal limits   COMPREHENSIVE METABOLIC PANEL W/ REFLEX TO MG FOR LOW K - Abnormal; Notable for the following components:    BUN 42 (*)     GFR Non- 55 (*)     AST 13 (*)     All other components within normal limits   TROPONIN - Abnormal; Notable for the following components:    Troponin 0.06 (*)     All other components within normal limits   POCT GLUCOSE - Abnormal; Notable for the following components:    POC Glucose 116 (*)     All other components within normal limits   POCT GLUCOSE - Normal PROTIME-INR       When ordered only abnormal lab results are displayed. All other labs were within normal range or not returned as of this dictation. EKG: When ordered, EKG's are interpreted by the Emergency Department Physician in the absence of a cardiologist.  Please see their note for interpretation of EKG. RADIOLOGY:   Non-plain film images such as CT, Ultrasound and MRI are read by the radiologist. Plain radiographic images are visualized and preliminarily interpreted by the ED Provider with the below findings:        Interpretation per the Radiologist below, if available at the time of this note:    XR CHEST PORTABLE   Final Result   No radiographic evidence of acute pulmonary disease. CT HEAD WO CONTRAST   Final Result   CT brain:      1. No acute intracranial abnormality. 2. Involutional parenchymal changes with microvascular ischemic disease. 3. Chronic left parasagittal frontoparietal encephalomalacia/gliosis. 4. Scattered chronic lacunar infarcts such as in the bilateral basal ganglia   and right cerebellum. CTA head/neck:      1. 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 2. Otherwise, no evidence of large vessel occlusion or significant stenosis   in the remaining major arteries of the head and neck. 3. Mild (less than 50%) possibly moderate (50-69%) stenosis of the proximal   left ICA with an associated ulcerated atherosclerotic plaque. 4. Mild prominence of the pulmonary artery, may be related to pulmonary   arterial hypertension. 5. Bubbly air-fluid level present within the trachea and left main bronchus,   compatible with aspiration. 6. Scattered dental caries and periapical lucency. The preliminary noncontrast CT brain findings were sent to the Radiology   Results [id number] at 2:28 pm on 8/31/2022 and subsequently   communicated to [address] at 2:24 p.m. CTA HEAD NECK W CONTRAST   Final Result   CT brain:      1.  No acute intracranial abnormality. 2. Involutional parenchymal changes with microvascular ischemic disease. 3. Chronic left parasagittal frontoparietal encephalomalacia/gliosis. 4. Scattered chronic lacunar infarcts such as in the bilateral basal ganglia   and right cerebellum. CTA head/neck:      1. 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 2. Otherwise, no evidence of large vessel occlusion or significant stenosis   in the remaining major arteries of the head and neck. 3. Mild (less than 50%) possibly moderate (50-69%) stenosis of the proximal   left ICA with an associated ulcerated atherosclerotic plaque. 4. Mild prominence of the pulmonary artery, may be related to pulmonary   arterial hypertension. 5. Bubbly air-fluid level present within the trachea and left main bronchus,   compatible with aspiration. 6. Scattered dental caries and periapical lucency. The preliminary noncontrast CT brain findings were sent to the Radiology   Results [id number] at 2:28 pm on 8/31/2022 and subsequently   communicated to [address] [place] [address] Felicitas Portillo at 2:24 p.m. No results found. PROCEDURES   Unless otherwise noted below, none     Procedures    CONSULTS:  IP CONSULT TO STROKE TEAM  IP CONSULT TO PHARMACY  PHARMACY TO CHANGE BASE FLUIDS  IP CONSULT TO HOSPITALIST  IP CONSULT TO NEUROLOGY  IP CONSULT TO CASE MANAGEMENT      EMERGENCY DEPARTMENT COURSE and DIFFERENTIAL DIAGNOSIS/MDM:   Vitals:    Vitals:    08/31/22 1415 08/31/22 1430 08/31/22 1445 08/31/22 1446   BP: (!) 160/82 (!) 150/71 (!) 156/57    Pulse: 60 60 57 58   Resp: 17 11 17 15   Temp:       SpO2: 98% 98% 100% 95%   Weight:           Patient was given the following medications:  Medications   iopamidol (ISOVUE-370) 76 % injection 75 mL (75 mLs IntraVENous Given 8/31/22 1408)         Is this patient to be included in the SEP-1 Core Measure due to severe sepsis or septic shock?    No   Exclusion criteria - the patient is NOT to be included for SEP-1 Core Measure due to: Infection is not suspected    ED COURSE & MEDICAL DECISION MAKING    - The patient presented to the ER with complaints of left facial droop. Vital signs were reviewed. Exam as above. Peripheral IV placed. Labs, Imaging ordered. - Pertinent Labs & Imaging studies reviewed. (See chart for details)   -  Patient seen and evaluated in the emergency department. -  Triage and nursing notes reviewed and incorporated. -  Old chart records reviewed and incorporated. -   I have seen and evaluated this patient with my supervising physician Rory Urbina MD.  -  Differential diagnosis includes: stroke, TIA, intracranial bleed, trauma, infection/sepsis, medication side effect, intoxication/withdrawal, metabolic/electrolyte abnormalities  -  Work-up included:  See above  - Consults:  Stroke Team, I spoke with Dr. Martha Mena, who recommended that because we did not have a reliable last known well time that the patient was not a tPA or TNKase candidate. Hospitalist was consulted for admission.  -  Results discussed with patient and/or family. Labs show no leukocytosis, metabolic panel with a BUN of 42, troponin is 0.06. Pro time and INR are not elevated. Imaging studies show no acute process on chest x-ray. CT of the head with no acute intracranial abnormality, with some chronic changes noted. CTA shows chronic occlusion of the left LEXA A3 and distal segments, with otherwise no evidence of large vessel occlusion or significant stenosis in the remaining major arteries of the head and neck. At this time, we recommend admission, as the patient has a left facial droop, and would benefit from admission for further evaluation management. The patient and/or family is agreeable with plan of care and disposition.  -  Disposition: Admission  - Critical Care: The total critical care time I independently spent while evaluating and treating this patient was 31 minutes.   This excludes time spent doing separately billable procedures. This includes time at the bedside, data interpretation, medication management, obtaining critical history from collateral sources if the patient is unable to provide it directly, and physician consultation. Specifics of interventions taken and potentially life-threatening diagnostic considerations are listed above in the medical decision making. If this was a shared visit with an physician, the time in this attestation is non-concurrent critical care time out of the total shared critical care time provided by the physician and myself. FINAL IMPRESSION      1. Facial droop    2. Elevated troponin          DISPOSITION/PLAN   DISPOSITION Admitted 08/31/2022 04:00:12 PM      PATIENT REFERRED TO:  No follow-up provider specified.     DISCHARGE MEDICATIONS:  New Prescriptions    No medications on file       DISCONTINUED MEDICATIONS:  Discontinued Medications    No medications on file              (Please note that portions of this note were completed with a voice recognition program.  Efforts were made to edit the dictations but occasionally words are mis-transcribed.)    CARIN Alatorre (electronically signed)            Maribell Alatorre  08/31/22 2437

## 2022-08-31 NOTE — ED PROVIDER NOTES
I have personally performed a face to face diagnostic evaluation on this patient. I have fully participated in the care of this patient I personally saw the patient and performed a substantive portion of the visit including all aspects of the medical decision making. I have reviewed and agree with all pertinent clinical information including history, physical exam, diagnostic tests, and the plan. HPI: Wiliam Martinez presented with facial droop and altered mental status. Unknown last known well. Patient is in a nursing home. Patient is not sure if he woke up with the symptoms. They were noticed approximately an hour and a half prior to arrival.  Patient is brought in via EMS. Patient has a history of prior stroke history of MI. History of prior right AKA. Right facial droop. No right-sided weakness. Also having some mild expressive aphasia. No chief complaint on file. Review of Systems: See [facility] note  Vital Signs: BP (!) 152/97   Pulse 54   Temp 97.7 °F (36.5 °C)   Resp 16   Wt 216 lb 11.2 oz (98.3 kg)   SpO2 99%   BMI 27.09 kg/m²     Alert 75 y.o. male who does not appear toxic or acutely ill  HENT: Atraumatic, oral mucosa moist  Neck: Grossly normal ROM  Chest/Lungs: respiratory effort normal   Abdomen: Soft nontender  Musculoskeletal: Grossly normal ROM, right AKA  Skin: No palor or diaphoresis  Neuro: Right facial droop with forehead sparing, mild aphasia, finger-to-nose normal bilateral    Medical Decision Making and Plan:  Pertinent Labs & Imaging studies reviewed. (See [facility] chart for details)  I agree with [facility] assessment and plan. Patient is a code stroke. Aphasia and right-sided facial droop with forehead sparing no concern for Bell's palsy. Unknown last known well but sometime in the last 5 hours patient woke up around 9 AM.  Will consult stroke team we will also order CT head and CTA. Will reeval closely and likely admit. Labs pending and imaging pending at this time.   Vital signs are stable.     I personally saw the patient and independently provided 15 minutes of nonconcurrent critical care out of the total shared critical care time provided    EKG Interpretation    Interpreted by emergency department physician    Rhythm: normal sinus   Rate: normal  Axis: normal  Ectopy: none  Conduction: normal  ST Segments: Nonspecific changes  T Waves: normal  Q Waves: none    Clinical Impression: Normal sinus rhythm, normal EKG        MD Bimal Yates MD  08/31/22 9711

## 2022-09-01 ENCOUNTER — APPOINTMENT (OUTPATIENT)
Dept: MRI IMAGING | Age: 67
DRG: 682 | End: 2022-09-01
Payer: MEDICARE

## 2022-09-01 ENCOUNTER — APPOINTMENT (OUTPATIENT)
Dept: ULTRASOUND IMAGING | Age: 67
DRG: 682 | End: 2022-09-01
Payer: MEDICARE

## 2022-09-01 LAB
ANION GAP SERPL CALCULATED.3IONS-SCNC: 12 MMOL/L (ref 3–16)
BACTERIA: ABNORMAL /HPF
BILIRUBIN URINE: NEGATIVE
BLOOD, URINE: ABNORMAL
BUN BLDV-MCNC: 43 MG/DL (ref 7–20)
CALCIUM SERPL-MCNC: 9.1 MG/DL (ref 8.3–10.6)
CHLORIDE BLD-SCNC: 105 MMOL/L (ref 99–110)
CHOLESTEROL, TOTAL: 110 MG/DL (ref 0–199)
CLARITY: CLEAR
CO2: 24 MMOL/L (ref 21–32)
COLOR: YELLOW
CREAT SERPL-MCNC: 1.6 MG/DL (ref 0.8–1.3)
EKG ATRIAL RATE: 58 BPM
EKG DIAGNOSIS: NORMAL
EKG P-R INTERVAL: 210 MS
EKG Q-T INTERVAL: 452 MS
EKG QRS DURATION: 104 MS
EKG QTC CALCULATION (BAZETT): 443 MS
EKG R AXIS: -29 DEGREES
EKG T AXIS: 71 DEGREES
EKG VENTRICULAR RATE: 58 BPM
EPITHELIAL CELLS, UA: 0 /HPF (ref 0–5)
GFR AFRICAN AMERICAN: 52
GFR NON-AFRICAN AMERICAN: 43
GLUCOSE BLD-MCNC: 90 MG/DL (ref 70–99)
GLUCOSE URINE: NEGATIVE MG/DL
HCT VFR BLD CALC: 34 % (ref 40.5–52.5)
HDLC SERPL-MCNC: 38 MG/DL (ref 40–60)
HEMOGLOBIN: 11.6 G/DL (ref 13.5–17.5)
HYALINE CASTS: 2 /LPF (ref 0–8)
KETONES, URINE: NEGATIVE MG/DL
LDL CHOLESTEROL CALCULATED: 53 MG/DL
LEUKOCYTE ESTERASE, URINE: ABNORMAL
LV EF: 55 %
LVEF MODALITY: NORMAL
MCH RBC QN AUTO: 29.1 PG (ref 26–34)
MCHC RBC AUTO-ENTMCNC: 34.1 G/DL (ref 31–36)
MCV RBC AUTO: 85.5 FL (ref 80–100)
MICROSCOPIC EXAMINATION: YES
NITRITE, URINE: NEGATIVE
PDW BLD-RTO: 16.3 % (ref 12.4–15.4)
PH UA: 5 (ref 5–8)
PLATELET # BLD: 151 K/UL (ref 135–450)
PMV BLD AUTO: 7.4 FL (ref 5–10.5)
POTASSIUM REFLEX MAGNESIUM: 4.1 MMOL/L (ref 3.5–5.1)
PROTEIN UA: NEGATIVE MG/DL
RBC # BLD: 3.98 M/UL (ref 4.2–5.9)
RBC UA: 0 /HPF (ref 0–4)
SODIUM BLD-SCNC: 141 MMOL/L (ref 136–145)
SPECIFIC GRAVITY UA: 1.01 (ref 1–1.03)
TRIGL SERPL-MCNC: 97 MG/DL (ref 0–150)
TROPONIN: 0.06 NG/ML
TROPONIN: 0.07 NG/ML
URINE TYPE: ABNORMAL
UROBILINOGEN, URINE: 0.2 E.U./DL
VLDLC SERPL CALC-MCNC: 19 MG/DL
WBC # BLD: 7.7 K/UL (ref 4–11)
WBC UA: 74 /HPF (ref 0–5)

## 2022-09-01 PROCEDURE — 2580000003 HC RX 258: Performed by: STUDENT IN AN ORGANIZED HEALTH CARE EDUCATION/TRAINING PROGRAM

## 2022-09-01 PROCEDURE — 96365 THER/PROPH/DIAG IV INF INIT: CPT

## 2022-09-01 PROCEDURE — 94760 N-INVAS EAR/PLS OXIMETRY 1: CPT

## 2022-09-01 PROCEDURE — 83036 HEMOGLOBIN GLYCOSYLATED A1C: CPT

## 2022-09-01 PROCEDURE — 2580000003 HC RX 258

## 2022-09-01 PROCEDURE — 85027 COMPLETE CBC AUTOMATED: CPT

## 2022-09-01 PROCEDURE — 76770 US EXAM ABDO BACK WALL COMP: CPT

## 2022-09-01 PROCEDURE — 81001 URINALYSIS AUTO W/SCOPE: CPT

## 2022-09-01 PROCEDURE — 2580000003 HC RX 258: Performed by: INTERNAL MEDICINE

## 2022-09-01 PROCEDURE — G0378 HOSPITAL OBSERVATION PER HR: HCPCS

## 2022-09-01 PROCEDURE — 96372 THER/PROPH/DIAG INJ SC/IM: CPT

## 2022-09-01 PROCEDURE — 92523 SPEECH SOUND LANG COMPREHEN: CPT

## 2022-09-01 PROCEDURE — 93010 ELECTROCARDIOGRAM REPORT: CPT | Performed by: INTERNAL MEDICINE

## 2022-09-01 PROCEDURE — 6360000002 HC RX W HCPCS: Performed by: INTERNAL MEDICINE

## 2022-09-01 PROCEDURE — 6370000000 HC RX 637 (ALT 250 FOR IP): Performed by: INTERNAL MEDICINE

## 2022-09-01 PROCEDURE — 94640 AIRWAY INHALATION TREATMENT: CPT

## 2022-09-01 PROCEDURE — 6360000002 HC RX W HCPCS: Performed by: NURSE PRACTITIONER

## 2022-09-01 PROCEDURE — 80048 BASIC METABOLIC PNL TOTAL CA: CPT

## 2022-09-01 PROCEDURE — 97162 PT EVAL MOD COMPLEX 30 MIN: CPT

## 2022-09-01 PROCEDURE — 6360000004 HC RX CONTRAST MEDICATION: Performed by: INTERNAL MEDICINE

## 2022-09-01 PROCEDURE — 6360000002 HC RX W HCPCS

## 2022-09-01 PROCEDURE — 97530 THERAPEUTIC ACTIVITIES: CPT

## 2022-09-01 PROCEDURE — 84484 ASSAY OF TROPONIN QUANT: CPT

## 2022-09-01 PROCEDURE — 80061 LIPID PANEL: CPT

## 2022-09-01 PROCEDURE — C8929 TTE W OR WO FOL WCON,DOPPLER: HCPCS

## 2022-09-01 PROCEDURE — 92610 EVALUATE SWALLOWING FUNCTION: CPT

## 2022-09-01 PROCEDURE — 70551 MRI BRAIN STEM W/O DYE: CPT

## 2022-09-01 PROCEDURE — 36415 COLL VENOUS BLD VENIPUNCTURE: CPT

## 2022-09-01 RX ORDER — ALBUTEROL SULFATE 2.5 MG/3ML
2.5 SOLUTION RESPIRATORY (INHALATION) EVERY 4 HOURS PRN
Status: DISCONTINUED | OUTPATIENT
Start: 2022-09-01 | End: 2022-09-12 | Stop reason: HOSPADM

## 2022-09-01 RX ORDER — SODIUM CHLORIDE, SODIUM LACTATE, POTASSIUM CHLORIDE, CALCIUM CHLORIDE 600; 310; 30; 20 MG/100ML; MG/100ML; MG/100ML; MG/100ML
INJECTION, SOLUTION INTRAVENOUS CONTINUOUS
Status: ACTIVE | OUTPATIENT
Start: 2022-09-01 | End: 2022-09-02

## 2022-09-01 RX ADMIN — SENNOSIDES 8.6 MG: 8.6 TABLET, FILM COATED ORAL at 08:20

## 2022-09-01 RX ADMIN — OXYCODONE HYDROCHLORIDE AND ACETAMINOPHEN 500 MG: 500 TABLET ORAL at 08:20

## 2022-09-01 RX ADMIN — FERROUS SULFATE TAB EC 324 MG (65 MG FE EQUIVALENT) 324 MG: 324 (65 FE) TABLET DELAYED RESPONSE at 08:20

## 2022-09-01 RX ADMIN — PERFLUTREN 1.5 ML: 6.52 INJECTION, SUSPENSION INTRAVENOUS at 11:48

## 2022-09-01 RX ADMIN — BUSPIRONE HYDROCHLORIDE 5 MG: 5 TABLET ORAL at 08:20

## 2022-09-01 RX ADMIN — ENOXAPARIN SODIUM 40 MG: 100 INJECTION SUBCUTANEOUS at 20:30

## 2022-09-01 RX ADMIN — ASPIRIN 81 MG: 81 TABLET, COATED ORAL at 08:20

## 2022-09-01 RX ADMIN — HYDROCHLOROTHIAZIDE 25 MG: 25 TABLET ORAL at 08:20

## 2022-09-01 RX ADMIN — SODIUM CHLORIDE, POTASSIUM CHLORIDE, SODIUM LACTATE AND CALCIUM CHLORIDE: 600; 310; 30; 20 INJECTION, SOLUTION INTRAVENOUS at 18:06

## 2022-09-01 RX ADMIN — PANTOPRAZOLE SODIUM 40 MG: 40 TABLET, DELAYED RELEASE ORAL at 08:20

## 2022-09-01 RX ADMIN — MULTIPLE VITAMINS W/ MINERALS TAB 1 TABLET: TAB at 08:20

## 2022-09-01 RX ADMIN — MIRTAZAPINE 15 MG: 15 TABLET, FILM COATED ORAL at 20:30

## 2022-09-01 RX ADMIN — ATORVASTATIN CALCIUM 80 MG: 80 TABLET, FILM COATED ORAL at 20:30

## 2022-09-01 RX ADMIN — BACLOFEN 20 MG: 10 TABLET ORAL at 08:20

## 2022-09-01 RX ADMIN — CEFTRIAXONE: 1 INJECTION, POWDER, FOR SOLUTION INTRAMUSCULAR; INTRAVENOUS at 21:00

## 2022-09-01 RX ADMIN — SODIUM CHLORIDE, PRESERVATIVE FREE 10 ML: 5 INJECTION INTRAVENOUS at 20:31

## 2022-09-01 RX ADMIN — BACLOFEN 20 MG: 10 TABLET ORAL at 20:30

## 2022-09-01 RX ADMIN — ATENOLOL 25 MG: 25 TABLET ORAL at 08:20

## 2022-09-01 RX ADMIN — LISINOPRIL 10 MG: 10 TABLET ORAL at 08:21

## 2022-09-01 RX ADMIN — BUSPIRONE HYDROCHLORIDE 5 MG: 5 TABLET ORAL at 20:30

## 2022-09-01 RX ADMIN — CLOPIDOGREL BISULFATE 75 MG: 75 TABLET ORAL at 08:21

## 2022-09-01 RX ADMIN — ALBUTEROL SULFATE 2.5 MG: 2.5 SOLUTION RESPIRATORY (INHALATION) at 21:57

## 2022-09-01 RX ADMIN — SODIUM CHLORIDE, PRESERVATIVE FREE 10 ML: 5 INJECTION INTRAVENOUS at 08:21

## 2022-09-01 NOTE — H&P
Hospital Medicine History & Physical      PCP: No primary care provider on file. Date of Admission: 8/31/2022    Chief Complaint: Left-sided facial droop    History Of Present Illness:   Patient is a 56-year-old male with past medical history of CAD hypertension hyperlipidemia who presents to the hospital due to left-sided facial droop. 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 Otherwise denied chest pain shortness of breath nausea vomiting difficulty speaking. Mentions he thinks that he has been slow to respond to questions today. Otherwise denies fevers chills diarrhea constipation dysuria. He also denies any new numbness tingling or weakness in arms legs. Past Medical History:          Diagnosis Date    Acute MI ([facility] [address] 09/2013    Anxiety     Arthritis     CAD (coronary artery disease)     COVID-19     Hyperlipidemia 2/3/2022    Hypertension     Influenza A 01/15/2017    Severe malnutrition ([facility] 2/10/2022       Past Surgical History:          Procedure Laterality Date    BACK SURGERY N/A 1989    herniated disc    CORONARY ANGIOPLASTY WITH STENT PLACEMENT  9/13    EYE SURGERY      VASCULAR SURGERY         Medications Prior to Admission:      Prior to Admission medications    Medication Sig Start Date End Date Taking? Authorizing Provider   busPIRone (BUSPAR) 5 MG tablet Take 5 mg by mouth 2 times daily    Historical Provider, MD olivasna (SENOKOT) 8.6 MG tablet Take 1 tablet by mouth daily    Historical Provider, MD   collagenase 250 UNIT/GM ointment Apply 1 each topically daily Apply topically to right heel daily.     Historical Provider, MD   clopidogrel (PLAVIX) 75 MG tablet Take 1 tablet by mouth daily 1/11/22   Peter Mandel MD   ferrous sulfate (IRON 325) 325 (65 Fe) MG tablet Take 325 mg by mouth daily (with breakfast)    Historical Provider, MD   mirtazapine (REMERON) 15 MG tablet Take 15 mg by mouth nightly    Historical Provider, MD   pantoprazole (PROTONIX) 40 MG tablet Take 40 mg by mouth daily    Historical Provider, MD   mirabegron (MYRBETRIQ) 50 MG TB24 Take 50 mg by mouth daily    Historical Provider, MD   baclofen (LIORESAL) 20 MG tablet Take 20 mg by mouth 2 times daily    Historical Provider, MD   lisinopril (PRINIVIL;ZESTRIL) 20 MG tablet TAKE 1/2 TABLET BY MOUTH DAILY  Patient taking differently: Take 10 mg by mouth daily  6/14/21   Meeta Marina MD   hydroCHLOROthiazide (HYDRODIURIL) 25 MG tablet TAKE ONE TABLET BY MOUTH DAILY 5/17/21   Meeta Marina MD   atenolol (TENORMIN) 25 MG tablet Take 1 tablet by mouth daily 5/12/21   Cesar Paige MD   atorvastatin (LIPITOR) 80 MG tablet Take 1 tablet by mouth nightly 4/23/21   Eve Kruse MD   sertraline (ZOLOFT) 100 MG tablet TAKE 1 AND 1/2 TABLET BY MOUTH DAILY 4/12/21   Meeta Marina MD   Multiple Vitamins-Minerals (THERAPEUTIC MULTIVITAMIN-MINERALS) tablet Take 1 tablet by mouth daily    Historical Provider, MD   Ascorbic Acid (VITAMIN C) 500 MG tablet Take 500 mg by mouth daily    Historical Provider, MD   nitroGLYCERIN (NITROSTAT) 0.4 MG SL tablet Place 1 tablet under the tongue every 5 minutes as needed for Chest pain. 9/11/13   MARSHALL James - CNP       Allergies:  Patient has no known allergies. Social History:      TOBACCO:   reports that he quit smoking about 3 years ago. His smoking use included cigarettes. He has a 20.00 pack-year smoking history. He has never used smokeless tobacco.  ETOH:   reports current alcohol use of about 2.0 standard drinks per week. Family History:       Reviewed in detail and non contributory          Problem Relation Age of Onset    Hypertension Mother     Hypertension Father     Heart Failure Father     Stroke Maternal Grandfather     Cancer Paternal Uncle         throat       REVIEW OF SYSTEMS:   Pertinent positives as noted in the HPI.  All other systems reviewed and negative. PHYSICAL EXAM PERFORMED:    BP (!) 158/108   Pulse 66   Temp 97.7 °F (36.5 °C) (Oral)   Resp 16   Ht 6' 3\" (1.905 m)   Wt 216 lb 11.2 oz (98.3 kg)   SpO2 97%   BMI 27.09 kg/m²     General appearance:  No apparent distress, cooperative. HEENT:  Normal cephalic, atraumatic without obvious deformity. Conjunctivae/corneas clear. Neck: Supple, with full range of motion. No cervical lymphadenopathy  Respiratory:  Normal respiratory effort. Clear to auscultation, bilaterally without Rales/Wheezes/Rhonchi. Cardiovascular:  Regular rate and rhythm with normal S1/S2 without murmurs, rubs or gallops. Abdomen: Soft, non-tender, non-distended, normal bowel sounds. Musculoskeletal: Right leg amputation noted, no edema noted bilaterally. No tenderness on palpation   Skin: no rash visible  Neurologic: He has left-sided facial droop, he has weakness on the right per patient this is not new. Psychiatric:  Alert and oriented, normal mood  Peripheral Pulses: +2 palpable, equal bilaterally       Labs:     Recent Labs     08/31/22  1425   WBC 7.9   HGB 12.3*   HCT 36.3*        Recent Labs     08/31/22  1425      K 4.5      CO2 26   BUN 42*   CREATININE 1.3   CALCIUM 9.5     Recent Labs     08/31/22  1425   AST 13*   ALT 11   BILITOT 0.4   ALKPHOS 128     Recent Labs     08/31/22  1425   INR 1.03     Recent Labs     08/31/22  1425   TROPONINI 0.06*       Urinalysis:      Lab Results   Component Value Date/Time    NITRU Negative 05/28/2021 06:30 PM    WBCUA 1 04/26/2021 11:02 AM    RBCUA 1 04/26/2021 11:02 AM    BLOODU Negative 05/28/2021 06:30 PM    SPECGRAV 1.021 05/28/2021 06:30 PM    GLUCOSEU Negative 05/28/2021 06:30 PM       Radiology:       XR CHEST PORTABLE   Final Result   No radiographic evidence of acute pulmonary disease. CT HEAD WO CONTRAST   Final Result   CT brain:      1. No acute intracranial abnormality.    2. Involutional parenchymal changes with microvascular ischemic disease. 3. Chronic left parasagittal frontoparietal encephalomalacia/gliosis. 4. Scattered chronic lacunar infarcts such as in the bilateral basal ganglia   and right cerebellum. CTA head/neck:      1. 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 2. Otherwise, no evidence of large vessel occlusion or significant stenosis   in the remaining major arteries of the head and neck. 3. Mild (less than 50%) possibly moderate (50-69%) stenosis of the proximal   left ICA with an associated ulcerated atherosclerotic plaque. 4. Mild prominence of the pulmonary artery, may be related to pulmonary   arterial hypertension. 5. Bubbly air-fluid level present within the trachea and left main bronchus,   compatible with aspiration. 6. Scattered dental caries and periapical lucency. The preliminary noncontrast CT brain findings were sent to the Radiology   Results [id number] at 2:28 pm on 8/31/2022 and subsequently   communicated to [address] [place] [address] Felicitas Portillo at 2:24 p.m. CTA HEAD NECK W CONTRAST   Final Result   CT brain:      1. No acute intracranial abnormality. 2. Involutional parenchymal changes with microvascular ischemic disease. 3. Chronic left parasagittal frontoparietal encephalomalacia/gliosis. 4. Scattered chronic lacunar infarcts such as in the bilateral basal ganglia   and right cerebellum. CTA head/neck:      1. 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 2. Otherwise, no evidence of large vessel occlusion or significant stenosis   in the remaining major arteries of the head and neck. 3. Mild (less than 50%) possibly moderate (50-69%) stenosis of the proximal   left ICA with an associated ulcerated atherosclerotic plaque. 4. Mild prominence of the pulmonary artery, may be related to pulmonary   arterial hypertension. 5. Bubbly air-fluid level present within the trachea and left main bronchus,   compatible with aspiration.    6. Scattered dental caries and periapical lucency. The preliminary noncontrast CT brain findings were sent to the Radiology   Results [id number] at 2:28 pm on 8/31/2022 and subsequently   communicated to [facility] [facility] [address] at 2:24 p.m. MRI brain without contrast    (Results Pending)           Active Hospital Problems    Diagnosis Date Noted    Facial weakness [R29.810] 08/31/2022     Priority: Medium       Patient is a 27-year-old male with past medical history of CAD hypertension hyperlipidemia who presents to the hospital due to left-sided facial droop. 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 Otherwise denied chest pain shortness of breath nausea vomiting difficulty speaking. Mentions he thinks that he has been slow to respond to questions today. Otherwise denies fevers chills diarrhea constipation dysuria. He also denies any new numbness tingling or weakness in arms legs. Assessment  Left-sided facial droop, rule out CVA  History of prior CVAs with residual right-sided weakness  Elevated troponin, patient is chest pain-free  CAD  Hypertension  Hyperlipidemia    Plan  MRI brain without contrast  Consult neurology  Aspirin, statin  PT/OT/speech therapy consult  Monitor on cardiac telemetry  Monitor troponin  DVT prophylaxis-Lovenox  Diet: ADULT DIET; Regular; Low Fat/Low Chol/High Fiber/AYAKA  Code Status: Full Code    PT/OT Eval Status: ordered    Dispo - pending clinical improvement       Leticia Rodriguez MD    The note was completed using EMR and Dragon dictation system. Every effort was made to ensure accuracy; however, inadvertent computerized transcription errors may be present. Thank you No primary care provider on file. for the opportunity to be involved in this patient's care. If you have any questions or concerns please feel free to contact me at [phone].     Leticia Rodriguez MD

## 2022-09-01 NOTE — CONSULTS
[address] [place] [address] Nephrology   [facility]  [phone]  Nephrology Consult Note          Patient ID: Gayla Zamudio  Referring/ Physician: Blanca Lama MD      HPI/Summary:   Gayla Zamudio is being seen by nephrology for Acute kidney injury (MECHE). He is a 75 y.o. male with a PMH significant for CAD, hypertension, hyperlipidemia, anxiety who presented to the ED 8/31/2022 with a left sided facial droop. He has a hx of previous CVAs and has residual right sided weakness. The facial droop was new. He is otherwise symptom free. His Cr on admission was 1.3 and increased to 1.6 on hospital day 2. As part of the stroke work up he had a CTA with contrast of the head and neck on 8/31/2022. No significant hemodynamic insult noted. Plan:   Looks a little dry on exam, will give him a liter of LR over 10 hours today and re-evaluate. Check UA, renal US. Assessment:  MECHE:  - baseline Cr 0.7-1.0  - Cr on admission was 1.3, increased to 1.6 the next day  - no UA this admission  - no significant hemodynamic insult or fluctuation noted. - he had a CT with contrast 8/31/2022  - home lisinopril on hold  - check UA, renal US  - start IVF    Hypertension:  - home regimen: lisinopril 20 mg daily, atenolol 25 mg daily, HCTZ 25 mg daily  - lisinopril on hold due to MECHE    CVA:  - per primary and neurology        [facility] would like to thank you for the opportunity to serve this patient. Please call with any questions or concerns. Josue Dejesus MD  [facility]  [address], [address]  Fax: [phone]  Office: [phone]         CC/Reason for consult:   Reason for consult: MECHE  CC: left facial droop        Review of Systems:   Positives Peter Mali Lamas. All other remaining systems are negative. Constitutional:  fever, chills, weakness, weight change, fatigue,      Skin:  rash, pruritus, hair loss, bruising, dry skin, petechiae.   Head, Face, Neck   headaches, swelling,  cervical adenopathy. Respiratory: shortness of breath, cough, or wheezing  Cardiovascular: chest pain, palpitations, dizzy, edema  Gastrointestinal: nausea, vomiting, diarrhea, constipation,belly pain    Yellow skin, blood in stool  Musculoskeletal:  back pain, muscle weakness, gait problems,       joint pain or swelling. Genitourinary:  dysuria, poor urine flow, flank pain, blood in urine  Neurologic:  vertigo, TIA'S, syncope, seizures, focal weakness  Psychosocial:  insomnia, anxiety, or depression. Additional positive findings: -     PMH/SH/FH:    Medical Hx: reviewed and updated as appropriate  Past Medical History:   Diagnosis Date    Acute MI ([facility] 09/2013    Anxiety     Arthritis     CAD (coronary artery disease)     COVID-19     Hyperlipidemia 2/3/2022    Hypertension     Influenza A 01/15/2017    Severe malnutrition ([facility] 2/10/2022         Surgical Hx: reviewed and updated as appropriate   has a past surgical history that includes back surgery (N/A, 1989); Coronary angioplasty with stent (9/13); vascular surgery; and eye surgery. Social Hx: reviewed and updated as appropriate  Social History     Tobacco Use    Smoking status: Former     Packs/day: 0.50     Years: 40.00     Pack years: 20.00     Types: Cigarettes     Quit date: 1/14/2019     Years since quitting: 3.6    Smokeless tobacco: Never   Substance Use Topics    Alcohol use: Yes     Alcohol/week: 2.0 standard drinks     Types: 2 Cans of beer per week     Comment: states used to drink 1 pint/vodka a day / now socially drinks        Family hx: reviewed and updated as appropriate  family history includes Cancer in his paternal uncle; Heart Failure in his father; Hypertension in his father and mother; Stroke in his maternal grandfather.     Medications:   sodium chloride flush, 10 mL, 2 times per day  enoxaparin, 40 mg, Nightly  aspirin, 81 mg, Daily   Or  aspirin, 300 mg, Daily  vitamin C, 500 mg, Daily  atenolol, 25 mg, Daily  atorvastatin, 80 mg, Nightly  baclofen, 20 mg, BID  busPIRone, 5 mg, BID  clopidogrel, 75 mg, Daily  ferrous sulfate, 324 mg, Daily with breakfast  hydroCHLOROthiazide, 25 mg, Daily  [Held by provider] lisinopril, 10 mg, Daily  mirtazapine, 15 mg, Nightly  therapeutic multivitamin-minerals, 1 tablet, Daily  pantoprazole, 40 mg, Daily  senna, 1 tablet, Daily       Patient has no known allergies. Allergies:   No Known Allergies      Physical Exam/Objective:   Vitals:    09/01/22 1330   BP: (!) 149/77   Pulse: 53   Resp: 15   Temp: 97.9 °F (36.6 °C)   SpO2: 94%       Intake/Output Summary (Last 24 hours) at 9/1/2022 1444  Last data filed at 9/1/2022 0649  Gross per 24 hour   Intake 480 ml   Output 900 ml   Net -420 ml         General appearance: in no acute distress, comfortable, communicative, awake and alert. HEENT: no icterus, EOM intact, trachea midline. Neck : no masses, appears symmetrical and no JVD appreciated. Respiratory: Respiratory effort normal, bilateral equal chest rise. No wheeze, no crackles  Cardiovascular: Ausculation shows RRR and  no edema   Abdomen: abdomen is soft, non distended, no masses, no pain with palpation. Musculoskeletal:  no joint swelling, no deformity, strength grossly normal.   Skin: no rashes, no induration, no tightening, no jaundice   Neuro:   Follows commands, moves all extremities spontaneously       Data:   CBC:   Recent Labs     08/31/22  1425 09/01/22  0523   WBC 7.9 7.7   HGB 12.3* 11.6*   HCT 36.3* 34.0*    151     BMP:    Recent Labs     08/31/22  1425 09/01/22  0523    141   K 4.5 4.1    105   CO2 26 24   BUN 42* 43*   CREATININE 1.3 1.6*   GLUCOSE 93 90

## 2022-09-01 NOTE — CONSULTS
Neurology Consult Note  Reason for Consult: CVA    Chief complaint: they thought I had a stroke    Dr Ava Bruno MD asked me to see Kim Son in consultation for evaluation of CVA    History of Present Illness:  Kim Son is a 75 y.o. male who presents with AMS. I obtained my information via interview w/ the patient, supplemented by chart review. In April of last year the patient presented w/ R hemibody weakness and was found to have an acute L LEXA stroke. He has residual spastic R sided weakness and he says some aphasia. 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 He was at his facility yesterday. He says he had been up. Apparently at one point he sort of slumped over in the chair w/ LOC. He is not sure how long he was unconscious for. When he came he says that he was drooling and they thought he had L facial weakness. No convulsive behavior that he is aware of. No tongue biting. No incontinence. He says for a couple of hours afterwards he felt \"lost\". When he arrived here his BP was 152/97. CT head no hemorrhage. CTA head/neck as below. No acute intervention was pursued. Currently he feels OK.   Perhaps his R sided weakness may be a bit worse than normal.      Medical History:  Past Medical History:   Diagnosis Date    Acute MI ([facility] [address] 09/2013    Anxiety     Arthritis     CAD (coronary artery disease)     COVID-19     Hyperlipidemia 2/3/2022    Hypertension     Influenza A 01/15/2017    Severe malnutrition ([facility] [address] 2/10/2022     Past Surgical History:   Procedure Laterality Date    BACK SURGERY N/A 1989    herniated disc    CORONARY ANGIOPLASTY WITH STENT PLACEMENT  9/13    EYE SURGERY      VASCULAR SURGERY       Scheduled Meds:   sodium chloride flush  10 mL IntraVENous 2 times per day    enoxaparin  40 mg SubCUTAneous Nightly    aspirin  81 mg Oral Daily    Or    aspirin  300 mg Rectal Daily vitamin C  500 mg Oral Daily    atenolol  25 mg Oral Daily    atorvastatin  80 mg Oral Nightly    baclofen  20 mg Oral BID    busPIRone  5 mg Oral BID    clopidogrel  75 mg Oral Daily    ferrous sulfate  324 mg Oral Daily with breakfast    hydroCHLOROthiazide  25 mg Oral Daily    lisinopril  10 mg Oral Daily    mirtazapine  15 mg Oral Nightly    therapeutic multivitamin-minerals  1 tablet Oral Daily    pantoprazole  40 mg Oral Daily    senna  1 tablet Oral Daily     Medications Prior to Admission:   busPIRone (BUSPAR) 5 MG tablet, Take 5 mg by mouth 2 times daily  senna (SENOKOT) 8.6 MG tablet, Take 1 tablet by mouth daily  collagenase 250 UNIT/GM ointment, Apply 1 each topically daily Apply topically to right heel daily. clopidogrel (PLAVIX) 75 MG tablet, Take 1 tablet by mouth daily  ferrous sulfate (IRON 325) 325 (65 Fe) MG tablet, Take 325 mg by mouth daily (with breakfast)  mirtazapine (REMERON) 15 MG tablet, Take 15 mg by mouth nightly  pantoprazole (PROTONIX) 40 MG tablet, Take 40 mg by mouth daily  mirabegron (MYRBETRIQ) 50 MG TB24, Take 50 mg by mouth daily  baclofen (LIORESAL) 20 MG tablet, Take 20 mg by mouth 2 times daily  lisinopril (PRINIVIL;ZESTRIL) 20 MG tablet, TAKE 1/2 TABLET BY MOUTH DAILY (Patient taking differently: Take 10 mg by mouth daily)  hydroCHLOROthiazide (HYDRODIURIL) 25 MG tablet, TAKE ONE TABLET BY MOUTH DAILY  atenolol (TENORMIN) 25 MG tablet, Take 1 tablet by mouth daily  atorvastatin (LIPITOR) 80 MG tablet, Take 1 tablet by mouth nightly  sertraline (ZOLOFT) 100 MG tablet, TAKE 1 AND 1/2 TABLET BY MOUTH DAILY  Multiple Vitamins-Minerals (THERAPEUTIC MULTIVITAMIN-MINERALS) tablet, Take 1 tablet by mouth daily  Ascorbic Acid (VITAMIN C) 500 MG tablet, Take 500 mg by mouth daily  nitroGLYCERIN (NITROSTAT) 0.4 MG SL tablet, Place 1 tablet under the tongue every 5 minutes as needed for Chest pain.     No Known Allergies    Family History   Problem Relation Age of Onset    Hypertension Mother     Hypertension Father     Heart Failure Father     Stroke Maternal Grandfather     Cancer Paternal Uncle         throat     Social History     Tobacco Use   Smoking Status Former    Packs/day: 0.50    Years: 40.00    Pack years: 20.00    Types: Cigarettes    Quit date: 1/14/2019    Years since quitting: 3.6   Smokeless Tobacco Never     Social History     Substance and Sexual Activity   Drug Use Not on file     Social History     Substance and Sexual Activity   Alcohol Use Yes    Alcohol/week: 2.0 standard drinks    Types: 2 Cans of beer per week    Comment: states used to drink 1 pint/vodka a day / now socially drinks     ROS  Constitutional- No weight loss or fevers  Eyes- No diplopia. No photophobia. Ears/nose/throat- No dysphagia. No Dysarthria  Cardiovascular- No palpitations. No chest pain  Respiratory- No dyspnea. No Cough  Gastrointestinal- No Abdominal pain. No Vomiting. Genitourinary- No incontinence. No urinary retention  Musculoskeletal- No myalgia. No arthralgia  Skin- No rash. No easy bruising. Psychiatric- No depression. No anxiety  Endocrine- No diabetes. No thyroid issues. Hematologic- No bleeding difficulty. No fatigue  Neurologic- + weakness. No Headache. Exam  Blood pressure (!) 152/78, pulse 54, temperature 97.7 °F (36.5 °C), temperature source Oral, resp. rate 14, height 6' 3\" (1.905 m), weight 203 lb 4.2 oz (92.2 kg), SpO2 96 %. Constitutional    Vital signs: BP, HR, and RR reviewed   General alert, no distress  Eyes: unable to visualize the fundi  Cardiovascular: no peripheral edema. Psychiatric: cooperative with examination, no psychotic behavior noted. Neurologic  Mental status:   orientation to person, place   General fund of knowledge grossly intact   Memory grossly intact   Attention intact as able to attend well to the exam     Language he does have mild expressive aphasia.      Comprehension intact; follows simple commands  Cranial nerves:   CN2: visual fields full CN 3,4,6: extraocular muscles intact  CN5: facial sensation symmetric   CN7: face actually looks relatively symmetric to me. No dysarthria. CN8: hearing grossly intact  CN9: palate elevated symmetrically  CN11: trap full strength on shoulder shrug  CN12: tongue midline with protrusion  Strength: spastic RUE paresis. 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 Sensory: RUE/RLE sensory deficit. Cerebellar/coordination: finger nose finger normal without ataxia in LUE. Tone: increased RUE. Gait: deferred at this time for safety. Labs  Glucose 90  Na 141  K 4.1  BUN 43  Cr 1.6    LDL 53    Troponin 0.07    ALT 11  AST 13    WBC 7.7K  Hg 11.6  Platelets 751    Studies  CT head w/o 8/31/22, independently reviewed  Impression   1. No acute intracranial abnormality. 2. Involutional parenchymal changes with microvascular ischemic disease. 3. Chronic left parasagittal frontoparietal encephalomalacia/gliosis. 4. Scattered chronic lacunar infarcts such as in the bilateral basal ganglia   and right cerebellum. CTA head/neck 8/31/22, independently reviewed  1. 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 2. Otherwise, no evidence of large vessel occlusion or significant stenosis   in the remaining major arteries of the head and neck. 3. Mild (less than 50%) possibly moderate (50-69%) stenosis of the proximal   left ICA with an associated ulcerated atherosclerotic plaque. 4. Mild prominence of the pulmonary artery, may be related to pulmonary   arterial hypertension. 5. Bubbly air-fluid level present within the trachea and left main bronchus,   compatible with aspiration. 6. Scattered dental caries and periapical lucency. Impression  The patient reports experiencing an episode of LOC. Apparently staff at his facility felt that he had L facial weakness and was drooling when he woke up. He says that a couple hours after he felt \"lost\".   I'm not real sure that this is new stroke though something to consider. A seizure could also be on the differential.  His EEG back in April was slightly abnormal (rare L sharps) though non specific. It looks like he may have a UTI which may be a factor. Hx L LEXA stroke. MECHE. Hypertension. Recommendations  Brain MRI w/o.   EEG. Continue home Plavix/statin.       Clarice Avitia NP  [address] [place] [address] [id number] Neurology    A copy of this note was provided for Dr Cesar Weiss MD

## 2022-09-01 NOTE — PLAN OF CARE
Problem: Neurosensory - Adult  Goal: Achieves stable or improved neurological status  Outcome: Progressing  Flowsheets (Taken 8/31/2022 2024)  Achieves stable or improved neurological status: Assess for and report changes in neurological status  Goal: Achieves maximal functionality and self care  Outcome: Progressing  Flowsheets (Taken 8/31/2022 2024)  Achieves maximal functionality and self care: Monitor swallowing and airway patency with patient fatigue and changes in neurological status     Problem: Discharge Planning  Goal: Discharge to home or other facility with appropriate resources  Outcome: Progressing  Flowsheets  Taken 8/31/2022 2024 by Nixon Urrutia RN  Discharge to home or other facility with appropriate resources: Identify barriers to discharge with patient and caregiver  Taken 8/31/2022 1951 by Soco Silva RN  Discharge to home or other facility with appropriate resources:   Identify barriers to discharge with patient and caregiver   Identify discharge learning needs (meds, wound care, etc)     Problem: Safety - Adult  Goal: Free from fall injury  Outcome: Progressing   Patient assessed for fall risk; fall precautions initiated. Patient and family instructed about safety devices. Environment kept free of clutter and adequate lighting provided. Bed locked and in lowest position. Call light within reach. Will continue to monitor. Problem: Skin/Tissue Integrity  Goal: Absence of new skin breakdown  Description: 1. Monitor for areas of redness and/or skin breakdown  2. Assess vascular access sites hourly  3. Every 4-6 hours minimum:  Change oxygen saturation probe site  4. Every 4-6 hours:  If on nasal continuous positive airway pressure, respiratory therapy assess nares and determine need for appliance change or resting period.   Outcome: Progressing

## 2022-09-01 NOTE — PROGRESS NOTES
Pt alert and orientated x4. Responses are slightly delayed. Pt still has slight left side facial droop. Pt denies any pain or new symptoms. Pt updated on plan of care, and MRI checklist complete. Pt denies any further needs, and call light within reach.      Electronically signed by Citlaly Becerril RN on 8/31/2022 at 11:59 PM

## 2022-09-01 NOTE — PROGRESS NOTES
Occupational Therapy  Orders received. Chart reviewed. Pt dependent at baseline for transfers with lift. Pt needing significant assist for all ADLs. Pt with no ongoing OT needs. Will sign off at this time.      Haresh Randall, OTR/L

## 2022-09-01 NOTE — PROGRESS NOTES
Hospitalist Progress Note      PCP: No primary care provider on file. Chief Complaint. Patient is a 69-year-old male with past medical history of CAD hypertension hyperlipidemia who presents to the hospital due to left-sided facial droop. 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 Otherwise denied chest pain shortness of breath nausea vomiting difficulty speaking. Mentions he thinks that he has been slow to respond to questions today. Otherwise denies fevers chills diarrhea constipation dysuria. He also denies any new numbness tingling or weakness in arms legs. Date of Admission: 8/31/2022    Subjective:   denies chest pain, nausea, vomiting, shortness of breath, fever or chills.  mention feels overall better    Medications:  Reviewed    Infusion Medications    lactated ringers 100 mL/hr at 09/01/22 1806    sodium chloride       Scheduled Medications    sodium chloride flush  10 mL IntraVENous 2 times per day    enoxaparin  40 mg SubCUTAneous Nightly    aspirin  81 mg Oral Daily    Or    aspirin  300 mg Rectal Daily    vitamin C  500 mg Oral Daily    atenolol  25 mg Oral Daily    atorvastatin  80 mg Oral Nightly    baclofen  20 mg Oral BID    busPIRone  5 mg Oral BID    clopidogrel  75 mg Oral Daily    ferrous sulfate  324 mg Oral Daily with breakfast    hydroCHLOROthiazide  25 mg Oral Daily    [Held by provider] lisinopril  10 mg Oral Daily    mirtazapine  15 mg Oral Nightly    therapeutic multivitamin-minerals  1 tablet Oral Daily    pantoprazole  40 mg Oral Daily    senna  1 tablet Oral Daily     PRN Meds: sodium chloride flush, sodium chloride, ondansetron **OR** ondansetron, nitroGLYCERIN      Intake/Output Summary (Last 24 hours) at 9/1/2022 1824  Last data filed at 9/1/2022 1534  Gross per 24 hour   Intake 480 ml   Output 1700 ml   Net -1220 ml       Physical Exam Performed:    /73 Pulse 53   Temp 97.8 °F (36.6 °C) (Oral)   Resp 12   Ht 6' 3\" (1.905 m)   Wt 203 lb 4.2 oz (92.2 kg)   SpO2 95%   BMI 25.41 kg/m²     General appearance: No apparent distress,   HEENT:  Conjunctivae/corneas clear. Neck: Supple, with full range of motion. Respiratory:  Normal respiratory effort. Clear to auscultation, bilaterally without Rales/Wheezes/Rhonchi. Cardiovascular: Regular rate and rhythm with normal S1/S2 without murmurs or rubs  Abdomen: Soft, non-tender, non-distended, normal bowel sounds. 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 Psychiatric: Alert and oriented, Normal mood  Peripheral Pulses: +2 palpable, equal bilaterally       Labs:   Recent Labs     08/31/22  1425 09/01/22  0523   WBC 7.9 7.7   HGB 12.3* 11.6*   HCT 36.3* 34.0*    151     Recent Labs     08/31/22  1425 09/01/22  0523    141   K 4.5 4.1    105   CO2 26 24   BUN 42* 43*   CREATININE 1.3 1.6*   CALCIUM 9.5 9.1     Recent Labs     08/31/22  1425   AST 13*   ALT 11   BILITOT 0.4   ALKPHOS 128     Recent Labs     08/31/22  1425   INR 1.03     Recent Labs     08/31/22  1425 08/31/22  2332 09/01/22  0523   TROPONINI 0.06* 0.06* 0.07*       Urinalysis:      Lab Results   Component Value Date/Time    NITRU Negative 09/01/2022 03:30 PM    WBCUA 74 09/01/2022 03:30 PM    BACTERIA None Seen 09/01/2022 03:30 PM    RBCUA 0 09/01/2022 03:30 PM    BLOODU TRACE 09/01/2022 03:30 PM    SPECGRAV 1.014 09/01/2022 03:30 PM    GLUCOSEU Negative 09/01/2022 03:30 PM       Radiology:  MRI brain without contrast   Final Result   1. Few small foci of diffusion restriction present in the left parasagittal   frontoparietal lobe may represent acute/subacute ischemia on a background of   chronic encephalomalacia/gliosis. 2. Involutional parenchymal changes with moderate microvascular ischemic   disease.    3. Scattered chronic lacunar infarcts such as in the bilateral basal ganglia   and right cerebellum. XR CHEST PORTABLE   Final Result   No radiographic evidence of acute pulmonary disease. CT HEAD WO CONTRAST   Final Result   CT brain:      1. No acute intracranial abnormality. 2. Involutional parenchymal changes with microvascular ischemic disease. 3. Chronic left parasagittal frontoparietal encephalomalacia/gliosis. 4. Scattered chronic lacunar infarcts such as in the bilateral basal ganglia   and right cerebellum. CTA head/neck:      1. 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 2. Otherwise, no evidence of large vessel occlusion or significant stenosis   in the remaining major arteries of the head and neck. 3. Mild (less than 50%) possibly moderate (50-69%) stenosis of the proximal   left ICA with an associated ulcerated atherosclerotic plaque. 4. Mild prominence of the pulmonary artery, may be related to pulmonary   arterial hypertension. 5. Bubbly air-fluid level present within the trachea and left main bronchus,   compatible with aspiration. 6. Scattered dental caries and periapical lucency. The preliminary noncontrast CT brain findings were sent to the Radiology   Results [id number] at 2:28 pm on 8/31/2022 and subsequently   communicated to [address] [place] [address] Felicitas Portillo at 2:24 p.m. CTA HEAD NECK W CONTRAST   Final Result   CT brain:      1. No acute intracranial abnormality. 2. Involutional parenchymal changes with microvascular ischemic disease. 3. Chronic left parasagittal frontoparietal encephalomalacia/gliosis. 4. Scattered chronic lacunar infarcts such as in the bilateral basal ganglia   and right cerebellum. CTA head/neck:      1. 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 2. Otherwise, no evidence of large vessel occlusion or significant stenosis   in the remaining major arteries of the head and neck.    3. Mild (less than 50%) possibly moderate (50-69%) stenosis of the proximal   left ICA with an associated ulcerated atherosclerotic plaque. 4. Mild prominence of the pulmonary artery, may be related to pulmonary   arterial hypertension. 5. Bubbly air-fluid level present within the trachea and left main bronchus,   compatible with aspiration. 6. Scattered dental caries and periapical lucency. The preliminary noncontrast CT brain findings were sent to the Radiology   Results [address] at 2:28 pm on 8/31/2022 and subsequently   communicated to [address] [place] [address] Felicitas Portillo at 2:24 p.m.         US RENAL COMPLETE    (Results Pending)         Assessment/Plan:    Active Hospital Problems    Diagnosis     Facial weakness [R29.810]      Priority: Medium     Patient is a 53-year-old male with past medical history of CAD hypertension hyperlipidemia who presents to the hospital due to left-sided facial droop. 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 Otherwise denied chest pain shortness of breath nausea vomiting difficulty speaking. Mentions he thinks that he has been slow to respond to questions today. Otherwise denies fevers chills diarrhea constipation dysuria. He also denies any new numbness tingling or weakness in arms legs. Assessment  Left-sided facial droop, rule out CVA  History of prior CVAs with residual right-sided weakness  Elevated troponin, patient is chest pain-free  MECHE  CAD  Hypertension  Hyperlipidemia     Plan  MRI brain without contrast - Few small foci of diffusion restriction present in the left parasagittal frontoparietal lobe may represent acute/subacute ischemia on a background of chronic encephalomalacia/gliosis.    Consult neurology - recs asa, plavix  Aspirin, statin  UA positive - started on empirical rocephin  MECHE - consulted nephrology  PT/OT/speech therapy consult  Monitor on cardiac telemetry  Monitor troponin  DVT prophylaxis-Lovenox  Diet: ADULT DIET; Regular; Low Fat/Low Chol/High Fiber/AYAKA  Code Status: Full Code    PT/OT Eval Status: ordered    Dispo/Plan of care - continue ASA, Plavix, await final neurology recs based on MRI results    Alana Sevilla MD

## 2022-09-01 NOTE — PLAN OF CARE
Problem: Neurosensory - Adult  Goal: Achieves stable or improved neurological status  9/1/2022 0823 by Desire Mcallister RN  Outcome: Progressing     Problem: Neurosensory - Adult  Goal: Achieves maximal functionality and self care  9/1/2022 0823 by Desire Mcallister RN  Outcome: Progressing     Problem: Discharge Planning  Goal: Discharge to home or other facility with appropriate resources  9/1/2022 0823 by Desire Mcallister RN  Outcome: Progressing     Problem: Safety - Adult  Goal: Free from fall injury  9/1/2022 0823 by Desire Mcallister RN  Outcome: Progressing     Problem: Skin/Tissue Integrity  Goal: Absence of new skin breakdown  Description: 1. Monitor for areas of redness and/or skin breakdown  2. Assess vascular access sites hourly  3. Every 4-6 hours minimum:  Change oxygen saturation probe site  4. Every 4-6 hours:  If on nasal continuous positive airway pressure, respiratory therapy assess nares and determine need for appliance change or resting period.   9/1/2022 0823 by Desire Mcallister RN  Outcome: Progressing

## 2022-09-01 NOTE — PROGRESS NOTES
[facility]  Personalized Stroke Treatment Plan  My Stroke Type:   [] Ischemic Stroke (Blockage of blood flow to the brain)     [x] TIA - Transient Ischemic Attack (mini-stroke)    Personal risk factors you can control include:    [] Alcohol Abuse: check with your physician before any alcohol consumption. [] Atrial fibrillation: may cause blood clots. [] Drug Abuse: Seek help, talk with your doctor  [] Clotting Disorder  [] Diabetes  [] Family history of stroke or heart disease  [x] High Blood Pressure/Hypertension: work with your physician.  [] High cholesterol: monitor cholesterol levels with your physician. [x] Overweight/Obesity: work with your physician for your ideal body weight. [x] Physical Inactivity: get regular exercise as directed by your physician. [x] Personal history of previous TIA or stroke  [] Poor Diet; decrease salt (sodium) in your diet, follow diet directed by physician. [] Smoking: Cigarette/Cigar: stop smoking. Follow up with your physician is important after discharge. TAKE all medications as prescribed. Do not stop taking any medications   without talking to your physician. BE FAST is a simple way to remember the main symptoms of stroke. Recognizing these symptoms helps you know when to call for medical help. BE FAST stands for:                                                 B Balance problems                                                 E Eyes, vision lost        F  Face Drooping      A  Arm Weakness        S  Speech Difficulty      T  Time to Call 9-1-1  DO NOT DELAY THIS! Educated patient and/or family on personal risk factors for stroke/TIA. Included ways to reduce the risk for recurrent stroke. [facility] "[employer]'s Stroke treatment and prevention, Managing your recovery  notebook  provided to patient. The notebook includes, but not limited to, sections addressing warning signs & symptoms of a stroke.  The need to call EMS ([phone]) immediately if signs & symptoms occur is emphasized . Medication information will be reviewed at discharge. The notebook also provides education on Stroke community resources and stroke advocacy.     Electronically signed by Electronically signed by Clayton Fish RN on 8/31/2022 at 11:55 PM

## 2022-09-02 LAB
ANION GAP SERPL CALCULATED.3IONS-SCNC: 13 MMOL/L (ref 3–16)
BUN BLDV-MCNC: 39 MG/DL (ref 7–20)
CALCIUM SERPL-MCNC: 9.5 MG/DL (ref 8.3–10.6)
CHLORIDE BLD-SCNC: 102 MMOL/L (ref 99–110)
CO2: 23 MMOL/L (ref 21–32)
CREAT SERPL-MCNC: 1.3 MG/DL (ref 0.8–1.3)
ESTIMATED AVERAGE GLUCOSE: 102.5 MG/DL
GFR AFRICAN AMERICAN: >60
GFR NON-AFRICAN AMERICAN: 55
GLUCOSE BLD-MCNC: 94 MG/DL (ref 70–99)
HBA1C MFR BLD: 5.2 %
HCT VFR BLD CALC: 35.5 % (ref 40.5–52.5)
HEMOGLOBIN: 12.2 G/DL (ref 13.5–17.5)
MCH RBC QN AUTO: 29.1 PG (ref 26–34)
MCHC RBC AUTO-ENTMCNC: 34.4 G/DL (ref 31–36)
MCV RBC AUTO: 84.8 FL (ref 80–100)
PDW BLD-RTO: 16.3 % (ref 12.4–15.4)
PLATELET # BLD: 150 K/UL (ref 135–450)
PMV BLD AUTO: 7.2 FL (ref 5–10.5)
POTASSIUM REFLEX MAGNESIUM: 4.2 MMOL/L (ref 3.5–5.1)
RBC # BLD: 4.19 M/UL (ref 4.2–5.9)
SODIUM BLD-SCNC: 138 MMOL/L (ref 136–145)
WBC # BLD: 7.5 K/UL (ref 4–11)

## 2022-09-02 PROCEDURE — G0378 HOSPITAL OBSERVATION PER HR: HCPCS

## 2022-09-02 PROCEDURE — 6360000002 HC RX W HCPCS: Performed by: INTERNAL MEDICINE

## 2022-09-02 PROCEDURE — 6370000000 HC RX 637 (ALT 250 FOR IP): Performed by: INTERNAL MEDICINE

## 2022-09-02 PROCEDURE — 80048 BASIC METABOLIC PNL TOTAL CA: CPT

## 2022-09-02 PROCEDURE — 95819 EEG AWAKE AND ASLEEP: CPT

## 2022-09-02 PROCEDURE — 2580000003 HC RX 258: Performed by: INTERNAL MEDICINE

## 2022-09-02 PROCEDURE — 85027 COMPLETE CBC AUTOMATED: CPT

## 2022-09-02 PROCEDURE — 96375 TX/PRO/DX INJ NEW DRUG ADDON: CPT

## 2022-09-02 PROCEDURE — 6360000002 HC RX W HCPCS: Performed by: HOSPITALIST

## 2022-09-02 PROCEDURE — 96376 TX/PRO/DX INJ SAME DRUG ADON: CPT

## 2022-09-02 PROCEDURE — 96372 THER/PROPH/DIAG INJ SC/IM: CPT

## 2022-09-02 PROCEDURE — 36415 COLL VENOUS BLD VENIPUNCTURE: CPT

## 2022-09-02 PROCEDURE — 96366 THER/PROPH/DIAG IV INF ADDON: CPT

## 2022-09-02 PROCEDURE — 92526 ORAL FUNCTION THERAPY: CPT

## 2022-09-02 PROCEDURE — 92507 TX SP LANG VOICE COMM INDIV: CPT

## 2022-09-02 PROCEDURE — 94760 N-INVAS EAR/PLS OXIMETRY 1: CPT

## 2022-09-02 RX ORDER — ASPIRIN 81 MG/1
81 TABLET, CHEWABLE ORAL DAILY
COMMUNITY

## 2022-09-02 RX ORDER — NYSTATIN 100000 U/G
CREAM TOPICAL 2 TIMES DAILY PRN
COMMUNITY

## 2022-09-02 RX ORDER — GABAPENTIN 300 MG/1
300 CAPSULE ORAL 3 TIMES DAILY
COMMUNITY

## 2022-09-02 RX ORDER — SODIUM PHOSPHATE, DIBASIC AND SODIUM PHOSPHATE, MONOBASIC 7; 19 G/133ML; G/133ML
1 ENEMA RECTAL
COMMUNITY

## 2022-09-02 RX ORDER — CARBOXYMETHYLCELLULOSE SODIUM AND GLYCERIN 5; 9 MG/ML; MG/ML
1 SOLUTION/ DROPS OPHTHALMIC 4 TIMES DAILY PRN
COMMUNITY

## 2022-09-02 RX ORDER — SERTRALINE HYDROCHLORIDE 100 MG/1
100 TABLET, FILM COATED ORAL DAILY
Status: DISCONTINUED | OUTPATIENT
Start: 2022-09-02 | End: 2022-09-12 | Stop reason: HOSPADM

## 2022-09-02 RX ORDER — BISACODYL 10 MG
10 SUPPOSITORY, RECTAL RECTAL DAILY PRN
COMMUNITY

## 2022-09-02 RX ORDER — SERTRALINE HYDROCHLORIDE 100 MG/1
100 TABLET, FILM COATED ORAL DAILY
COMMUNITY

## 2022-09-02 RX ORDER — ALBUTEROL SULFATE 90 UG/1
2 AEROSOL, METERED RESPIRATORY (INHALATION) EVERY 6 HOURS PRN
COMMUNITY

## 2022-09-02 RX ORDER — ACETAMINOPHEN 325 MG/1
650 TABLET ORAL EVERY 6 HOURS PRN
COMMUNITY

## 2022-09-02 RX ORDER — TALC/ZINC OXIDE 81-15%
POWDER (GRAM) TOPICAL 2 TIMES DAILY PRN
COMMUNITY

## 2022-09-02 RX ORDER — HYDRALAZINE HYDROCHLORIDE 20 MG/ML
10 INJECTION INTRAMUSCULAR; INTRAVENOUS EVERY 6 HOURS PRN
Status: DISCONTINUED | OUTPATIENT
Start: 2022-09-02 | End: 2022-09-12 | Stop reason: HOSPADM

## 2022-09-02 RX ORDER — LISINOPRIL 20 MG/1
20 TABLET ORAL DAILY
Status: ON HOLD | COMMUNITY
End: 2022-09-12 | Stop reason: HOSPADM

## 2022-09-02 RX ORDER — GABAPENTIN 300 MG/1
300 CAPSULE ORAL 3 TIMES DAILY
Status: DISCONTINUED | OUTPATIENT
Start: 2022-09-02 | End: 2022-09-12 | Stop reason: HOSPADM

## 2022-09-02 RX ORDER — POLYETHYLENE GLYCOL 3350 17 G/17G
17 POWDER, FOR SOLUTION ORAL DAILY PRN
COMMUNITY

## 2022-09-02 RX ORDER — LORATADINE 10 MG/1
10 TABLET ORAL DAILY
COMMUNITY

## 2022-09-02 RX ADMIN — BACLOFEN 20 MG: 10 TABLET ORAL at 09:24

## 2022-09-02 RX ADMIN — SODIUM CHLORIDE, PRESERVATIVE FREE 10 ML: 5 INJECTION INTRAVENOUS at 09:25

## 2022-09-02 RX ADMIN — HYDRALAZINE HYDROCHLORIDE 10 MG: 20 INJECTION INTRAMUSCULAR; INTRAVENOUS at 08:28

## 2022-09-02 RX ADMIN — ASPIRIN 81 MG: 81 TABLET, COATED ORAL at 09:24

## 2022-09-02 RX ADMIN — MULTIPLE VITAMINS W/ MINERALS TAB 1 TABLET: TAB at 09:24

## 2022-09-02 RX ADMIN — CLOPIDOGREL BISULFATE 75 MG: 75 TABLET ORAL at 09:25

## 2022-09-02 RX ADMIN — OXYCODONE HYDROCHLORIDE AND ACETAMINOPHEN 500 MG: 500 TABLET ORAL at 09:24

## 2022-09-02 RX ADMIN — MIRTAZAPINE 15 MG: 15 TABLET, FILM COATED ORAL at 20:45

## 2022-09-02 RX ADMIN — ATENOLOL 25 MG: 25 TABLET ORAL at 09:25

## 2022-09-02 RX ADMIN — BACLOFEN 20 MG: 10 TABLET ORAL at 20:45

## 2022-09-02 RX ADMIN — ENOXAPARIN SODIUM 40 MG: 100 INJECTION SUBCUTANEOUS at 20:45

## 2022-09-02 RX ADMIN — GABAPENTIN 300 MG: 300 CAPSULE ORAL at 20:45

## 2022-09-02 RX ADMIN — PANTOPRAZOLE SODIUM 40 MG: 40 TABLET, DELAYED RELEASE ORAL at 09:25

## 2022-09-02 RX ADMIN — BUSPIRONE HYDROCHLORIDE 5 MG: 5 TABLET ORAL at 20:45

## 2022-09-02 RX ADMIN — SODIUM CHLORIDE, PRESERVATIVE FREE 10 ML: 5 INJECTION INTRAVENOUS at 20:38

## 2022-09-02 RX ADMIN — SENNOSIDES 8.6 MG: 8.6 TABLET, FILM COATED ORAL at 09:25

## 2022-09-02 RX ADMIN — SERTRALINE 100 MG: 100 TABLET, FILM COATED ORAL at 15:41

## 2022-09-02 RX ADMIN — GABAPENTIN 300 MG: 300 CAPSULE ORAL at 15:41

## 2022-09-02 RX ADMIN — FERROUS SULFATE TAB EC 324 MG (65 MG FE EQUIVALENT) 324 MG: 324 (65 FE) TABLET DELAYED RESPONSE at 09:29

## 2022-09-02 RX ADMIN — CEFTRIAXONE 1000 MG: 1 INJECTION, POWDER, FOR SOLUTION INTRAMUSCULAR; INTRAVENOUS at 20:44

## 2022-09-02 RX ADMIN — HYDROCHLOROTHIAZIDE 25 MG: 25 TABLET ORAL at 09:24

## 2022-09-02 RX ADMIN — ATORVASTATIN CALCIUM 80 MG: 80 TABLET, FILM COATED ORAL at 20:45

## 2022-09-02 RX ADMIN — SODIUM CHLORIDE 25 ML: 9 INJECTION, SOLUTION INTRAVENOUS at 20:43

## 2022-09-02 RX ADMIN — HYDRALAZINE HYDROCHLORIDE 10 MG: 20 INJECTION INTRAMUSCULAR; INTRAVENOUS at 06:07

## 2022-09-02 RX ADMIN — BUSPIRONE HYDROCHLORIDE 5 MG: 5 TABLET ORAL at 09:24

## 2022-09-02 NOTE — PROGRESS NOTES
Hospitalist Progress Note      PCP: No primary care provider on file. Chief Complaint. Patient is a 68-year-old male with past medical history of CAD hypertension hyperlipidemia who presents to the hospital due to left-sided facial droop. 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 Otherwise denied chest pain shortness of breath nausea vomiting difficulty speaking. Mentions he thinks that he has been slow to respond to questions today. Otherwise denies fevers chills diarrhea constipation dysuria. He also denies any new numbness tingling or weakness in arms legs.     Date of Admission: 8/31/2022    Subjective: no acute events reported overnight, no fevers    Medications:  Reviewed    Infusion Medications    sodium chloride       Scheduled Medications    cefTRIAXone (ROCEPHIN) 1000 mg in NS 50 mL IVPB mini-bag  1,000 mg IntraVENous Daily    gabapentin  300 mg Oral TID    sertraline  100 mg Oral Daily    sodium chloride flush  10 mL IntraVENous 2 times per day    enoxaparin  40 mg SubCUTAneous Nightly    aspirin  81 mg Oral Daily    Or    aspirin  300 mg Rectal Daily    vitamin C  500 mg Oral Daily    atenolol  25 mg Oral Daily    atorvastatin  80 mg Oral Nightly    baclofen  20 mg Oral BID    busPIRone  5 mg Oral BID    clopidogrel  75 mg Oral Daily    ferrous sulfate  324 mg Oral Daily with breakfast    hydroCHLOROthiazide  25 mg Oral Daily    [Held by provider] lisinopril  10 mg Oral Daily    mirtazapine  15 mg Oral Nightly    therapeutic multivitamin-minerals  1 tablet Oral Daily    pantoprazole  40 mg Oral Daily    senna  1 tablet Oral Daily     PRN Meds: hydrALAZINE, albuterol, sodium chloride flush, sodium chloride, ondansetron **OR** ondansetron, nitroGLYCERIN      Intake/Output Summary (Last 24 hours) at 9/2/2022 1752  Last data filed at 9/2/2022 0912  Gross per 24 hour   Intake 40 ml   Output 900 ml Net -860 ml         Physical Exam Performed:    BP (!) 141/64   Pulse 53   Temp 98.1 °F (36.7 °C) (Oral)   Resp 19   Ht 6' 3\" (1.905 m)   Wt 198 lb 3.1 oz (89.9 kg)   SpO2 94%   BMI 24.77 kg/m²     Patient in EEG, patient not in room, attempted to see twice      Labs:   Recent Labs     08/31/22  1425 09/01/22  0523 09/02/22  0619   WBC 7.9 7.7 7.5   HGB 12.3* 11.6* 12.2*   HCT 36.3* 34.0* 35.5*    151 150       Recent Labs     08/31/22  1425 09/01/22  0523 09/02/22  0619    141 138   K 4.5 4.1 4.2    105 102   CO2 26 24 23   BUN 42* 43* 39*   CREATININE 1.3 1.6* 1.3   CALCIUM 9.5 9.1 9.5       Recent Labs     08/31/22  1425   AST 13*   ALT 11   BILITOT 0.4   ALKPHOS 128       Recent Labs     08/31/22  1425   INR 1.03       Recent Labs     08/31/22  1425 08/31/22  2332 09/01/22  0523   TROPONINI 0.06* 0.06* 0.07*         Urinalysis:      Lab Results   Component Value Date/Time    NITRU Negative 09/01/2022 03:30 PM    WBCUA 74 09/01/2022 03:30 PM    BACTERIA None Seen 09/01/2022 03:30 PM    RBCUA 0 09/01/2022 03:30 PM    BLOODU TRACE 09/01/2022 03:30 PM    SPECGRAV 1.014 09/01/2022 03:30 PM    GLUCOSEU Negative 09/01/2022 03:30 PM       Radiology:  MRI brain without contrast   Final Result   Addendum (preliminary) 1 of 1   ADDENDUM:   Altered differential for small foci of diffusion restriction seen may be   seizure-related diffusion restriction in the appropriate clinical setting. Updated differential discussed with Dr. Linda Tanner on 09/01/2022 at   7:33 p.m. Final   1. Few small foci of diffusion restriction present in the left parasagittal   frontoparietal lobe may represent acute/subacute ischemia on a background of   chronic encephalomalacia/gliosis. 2. Involutional parenchymal changes with moderate microvascular ischemic   disease. 3. Scattered chronic lacunar infarcts such as in the bilateral basal ganglia   and right cerebellum.             US RENAL COMPLETE Final Result   No hydronephrosis or acute findings. Complex right renal cyst.  Recommend outpatient CT or MRI renal mass protocol   follow-up. XR CHEST PORTABLE   Final Result   No radiographic evidence of acute pulmonary disease. CT HEAD WO CONTRAST   Final Result   CT brain:      1. No acute intracranial abnormality. 2. Involutional parenchymal changes with microvascular ischemic disease. 3. Chronic left parasagittal frontoparietal encephalomalacia/gliosis. 4. Scattered chronic lacunar infarcts such as in the bilateral basal ganglia   and right cerebellum. CTA head/neck:      1. 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 2. Otherwise, no evidence of large vessel occlusion or significant stenosis   in the remaining major arteries of the head and neck. 3. Mild (less than 50%) possibly moderate (50-69%) stenosis of the proximal   left ICA with an associated ulcerated atherosclerotic plaque. 4. Mild prominence of the pulmonary artery, may be related to pulmonary   arterial hypertension. 5. Bubbly air-fluid level present within the trachea and left main bronchus,   compatible with aspiration. 6. Scattered dental caries and periapical lucency. The preliminary noncontrast CT brain findings were sent to the Radiology   Results [id number] at 2:28 pm on 8/31/2022 and subsequently   communicated to [address] [place] [address] Felicitas Portillo at 2:24 p.m. CTA HEAD NECK W CONTRAST   Final Result   CT brain:      1. No acute intracranial abnormality. 2. Involutional parenchymal changes with microvascular ischemic disease. 3. Chronic left parasagittal frontoparietal encephalomalacia/gliosis. 4. Scattered chronic lacunar infarcts such as in the bilateral basal ganglia   and right cerebellum. CTA head/neck:      1. 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    2. Otherwise, no evidence of large vessel occlusion or significant stenosis   in the remaining major arteries of the head and neck. 3. Mild (less than 50%) possibly moderate (50-69%) stenosis of the proximal   left ICA with an associated ulcerated atherosclerotic plaque. 4. Mild prominence of the pulmonary artery, may be related to pulmonary   arterial hypertension. 5. Bubbly air-fluid level present within the trachea and left main bronchus,   compatible with aspiration. 6. Scattered dental caries and periapical lucency. The preliminary noncontrast CT brain findings were sent to the Radiology   Results [id number] at 2:28 pm on 8/31/2022 and subsequently   communicated to [address] [place] [place] [address]  at 2:24 p.m. Assessment/Plan:    Active Hospital Problems    Diagnosis     Facial weakness [R29.810]      Priority: Medium     Patient is a 77-year-old male with past medical history of CAD hypertension hyperlipidemia who presents to the hospital due to left-sided facial droop. 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 Otherwise denied chest pain shortness of breath nausea vomiting difficulty speaking. Mentions he thinks that he has been slow to respond to questions today. Otherwise denies fevers chills diarrhea constipation dysuria. He also denies any new numbness tingling or weakness in arms legs. Assessment  Left-sided facial droop, rule out CVA  History of prior CVAs with residual right-sided weakness  Elevated troponin, patient is chest pain-free  MECHE  CAD  Hypertension  Hyperlipidemia     Plan  MRI brain without contrast - Few small foci of diffusion restriction present in the left parasagittal frontoparietal lobe may represent acute/subacute ischemia on a background of chronic encephalomalacia/gliosis.    Consult neurology - recs asa, plavix  Aspirin, statin  UA positive - started on empirical rocephin  MECHE - consulted nephrology  PT/OT/speech therapy consult  Monitor on cardiac telemetry  Monitor troponin  DVT prophylaxis-Lovenox  Diet: ADULT DIET;  Regular; Low Fat/Low Chol/High Fiber/AYAKA  Code Status: Full Code    PT/OT Eval Status: ordered    Dispo/Plan of care - continue ASA, Plavix, An EEG was completed today due to concerns that this was a seizure and did show focal sharp/epileptiform waves - no AED per neurology, dc tomorrow if no seizures and BMP stable    Nic Mast MD

## 2022-09-02 NOTE — CARE COORDINATION
Patient came from [facility] prior to arrival.  Call to [email or web address], at [facility] who confirmed the patient is:  [x] [address], no Precert required for return.  [] [facility] will be required for return. [] [facility], no Precert required for return. [] [address] required for return. [x] Is fully vaccinated for Covid. [] Has started Covid vaccination, but is not complete. [] Is not vaccinated for Covid. [] No Covid Test needed for return. [x] Rapid Covid test needed before return within: [x] 48 hours, [] 72 hours, [] 5 days, [] other   [] PCR Covid test needed before return. [x] Confirmed with the patient or family that the plan is to return to this facility at D/C. [] Patient and/or designated decision maker does not plan for the patient to return to this facility at D/C.      Electronically signed by Rut Gillespie RN on 9/2/2022 at 10:10 AM

## 2022-09-02 NOTE — PROCEDURES
[address] [place] [address]                          ELECTROENCEPHALOGRAM REPORT    PATIENT NAME: Renate Saunders                   :        1955  MED REC NO:   [id number]                          ROOM:       [id number]  ACCOUNT NO:   [de-identified]                           ADMIT DATE: 2022  PROVIDER:     Al Stephens DO    DATE OF EE2022    ELECTROENCEPHALOGRAM    REFERRING PROVIDER:  Rodolfo Victoria, MSN, APRN-CNP    REASON FOR STUDY:  Seizure. BRIEF HISTORY AND NEUROLOGIC FINDINGS:  The patient is a 56-year-old  male being evaluated for concern for possible seizure. The patient does  have a history of stroke. MEDICATIONS:  The patient's centrally acting medications listed include  Remeron, BuSpar, and Lioresal.    EEG FINDING:  This is a 20-channel digital EEG performed utilizing  bipolar and referential montages. Wakefulness, drowsiness, and sleep  were obtained during the recording. During maximum wakefulness, there  was a moderate voltage asymmetric background consisting of theta and  delta frequencies in the left hemisphere and a posterior dominant 8 Hz  background rhythm on the right. The anterior background consists of low  to moderate voltage mixed frequencies on the left and low voltage fast  frequencies on the right. Drowsiness is manifested by attenuation in  the waking background rhythms. Sleep is manifested by asymmetric  K-complexes and sleep spindles. Occasional left hemispheric sharp waves often with aftergoing slow waves were  noted in the left hemisphere. These often had a triphasic-appearing  morphology with a maximum in the left central region. No electrographic  seizures were recorded. Photic stimulation was performed at various flash frequencies and did  not evoke a significant posterior driving response.   Hyperventilation  exercise was not performed due to the patient's clinical history. A 1-channel EKG rhythm strip was reviewed and it showed no obvious  cardiac dysrhythmias. EEG DIAGNOSIS:  This EEG is abnormal due to the presence of left  hemispheric sharp waves, which appeared epileptiform with a maximum in  the left central region as well as moderate background slowing and  disorganization in the left hemisphere. CLINICAL INTERPRETATION:  The focal sharp waves appeared epileptiform  and suggest that the patient may be at risk for focal seizures emanating  from the left central region. The focal slowing and disorganization in  the left hemisphere is also consistent with focal neuronal dysfunction  in that region. This may be seen in ischemic or structural  abnormalities. If seizures remain a clinical concern, then a repeat EEG  may be of further benefit. Clinical correlation is advised.         Arielle Berry DO    D: 09/02/2022 12:55:05       T: 09/02/2022 12:57:36     [id number]  Job#: [id number]     Doc#: [id number]    CC:

## 2022-09-02 NOTE — PROGRESS NOTES
Neurology Progress Note    Updates  Doing well. No complaints.      Medical History:  Past Medical History:   Diagnosis Date    [facility] 09/2013    Anxiety     Arthritis     CAD (coronary artery disease)     COVID-19     Hyperlipidemia 2/3/2022    Hypertension     Influenza A 01/15/2017    Severe malnutrition ([facility] 2/10/2022     Past Surgical History:   Procedure Laterality Date    BACK SURGERY N/A 1989    herniated disc    CORONARY ANGIOPLASTY WITH STENT PLACEMENT  9/13    EYE SURGERY      VASCULAR SURGERY       Current Facility-Administered Medications:   hydrALAZINE (APRESOLINE) injection 10 mg, 10 mg, IntraVENous, Q6H PRN  cefTRIAXone (ROCEPHIN) 1,000 mg in sodium chloride 0.9 % 50 mL IVPB, , IntraVENous, Q24H  albuterol (PROVENTIL) nebulizer solution 2.5 mg, 2.5 mg, Nebulization, Q4H PRN  sodium chloride flush 0.9 % injection 10 mL, 10 mL, IntraVENous, 2 times per day  sodium chloride flush 0.9 % injection 10 mL, 10 mL, IntraVENous, PRN  0.9 % sodium chloride infusion, , IntraVENous, PRN  ondansetron (ZOFRAN-ODT) disintegrating tablet 4 mg, 4 mg, Oral, Q8H PRN **OR** ondansetron (ZOFRAN) injection 4 mg, 4 mg, IntraVENous, Q6H PRN  enoxaparin (LOVENOX) injection 40 mg, 40 mg, SubCUTAneous, Nightly  aspirin EC tablet 81 mg, 81 mg, Oral, Daily **OR** aspirin suppository 300 mg, 300 mg, Rectal, Daily  ascorbic acid (VITAMIN C) tablet 500 mg, 500 mg, Oral, Daily  atenolol (TENORMIN) tablet 25 mg, 25 mg, Oral, Daily  atorvastatin (LIPITOR) tablet 80 mg, 80 mg, Oral, Nightly  baclofen (LIORESAL) tablet 20 mg, 20 mg, Oral, BID  busPIRone (BUSPAR) tablet 5 mg, 5 mg, Oral, BID  clopidogrel (PLAVIX) tablet 75 mg, 75 mg, Oral, Daily  ferrous sulfate EC tablet 324 mg, 324 mg, Oral, Daily with breakfast  hydroCHLOROthiazide (HYDRODIURIL) tablet 25 mg, 25 mg, Oral, Daily  [Held by provider] lisinopril (PRINIVIL;ZESTRIL) tablet 10 mg, 10 mg, Oral, Daily  mirtazapine (REMERON) tablet 15 mg, 15 mg, Oral, Vomiting. Genitourinary- No incontinence. No urinary retention  Musculoskeletal- No myalgia. No arthralgia  Skin- No rash. No easy bruising. Psychiatric- No depression. No anxiety  Endocrine- No diabetes. No thyroid issues. Hematologic- No bleeding difficulty. No fatigue  Neurologic- + weakness. No Headache. Exam  Blood pressure (!) 157/78, pulse 81, temperature 97.8 °F (36.6 °C), temperature source Oral, resp. rate 10, height 6' 3\" (1.905 m), weight 198 lb 3.1 oz (89.9 kg), SpO2 95 %. Constitutional    Vital signs: BP, HR, and RR reviewed   General alert, no distress  Eyes: unable to visualize the fundi  Cardiovascular: no peripheral edema. Psychiatric: cooperative with examination, no psychotic behavior noted. Neurologic  Mental status:   orientation to person, place   Attention intact as able to attend well to the exam     Language he does have mild expressive aphasia. Comprehension intact; follows simple commands  Cranial nerves:   CN2: visual fields full   CN 3,4,6: extraocular muscles intact. CN5: facial sensation symmetric   CN7: face is symmetric. No dysarthria. CN8: hearing grossly intact  CN12: tongue midline with protrusion  Strength: spastic RUE paresis. 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 Sensory: RUE/RLE sensory deficit. Cerebellar/coordination: finger nose finger normal without ataxia in LUE. Tone: increased RUE. Gait: deferred at this time for safety. Labs  Glucose 94  Na 138  K 4.2  BUN 39  Cr 1.3    LDL 53  HgA1c 5.2    Troponin 0.07    ALT 11  AST 13    WBC 7.5K  Hg 12.2  Platelets 474    UA large LE, 74 WBC. Studies  EEG 9/2/22  Left hemispheric sharp waves, which appeared epileptiform with a maximum in  the left central region as well as moderate background slowing and disorganization in the left hemisphere. MRI brain w/o 9/1/22, independently reviewed  Impression   1.  Few small foci of diffusion restriction present in the left parasagittal sharp/epileptiform waves. This was probably most likely a seizure in the setting of a UTI. Discussed w/ Dr. Emmanuel Mcgregor. We will hold off on an AED at this point though would have a low threshold to initiate should he have any further events. Continue home antiplatelet/statin therapy for stroke prophylaxis. Follow up w/ Dr. Emmanuel Mcgregor. Please call back w/ any additional questions or concerns. Thank you.       Shahab Pro NP  [address] [place] [address] [address] Neurology    A copy of this note was provided for Dr Eleanor Carrillo MD

## 2022-09-02 NOTE — PROGRESS NOTES
[address] [place] [address] Nephrology   [facility]  [phone]  Nephrology Consult Note          Patient ID: Zee Langford  Referring/ Physician: Sun Glass MD      HPI/Summary:   Zee Langford is being seen by nephrology for Acute kidney injury (MECHE). He is a 75 y.o. male with a PMH significant for CAD, hypertension, hyperlipidemia, anxiety who presented to the ED 8/31/2022 with a left sided facial droop. He has a hx of previous CVAs and has residual right sided weakness. The facial droop was new. He is otherwise symptom free. His Cr on admission was 1.3 and increased to 1.6 on hospital day 2. As part of the stroke work up he had a CTA with contrast of the head and neck on 8/31/2022. No significant hemodynamic insult noted. Plan:   Received LR and SCr now 1.3 from 1.6  UA suggests UTI with LE and WBC but no bacteria noted. On atbx Rocephin. US shows complex Rt renal cysts with outpatient CT / MRI for evaluation. BP high 168/70 holding Lisinopril on HCTZ and Atenolol. Well controlled yesterday. May add back Lisinopril tomorrow if continues to improve. Assessment:  MECHE:  - baseline Cr 0.7-1.0  - Cr on admission was 1.3, increased to 1.6 the next day  - no UA this admission  - no significant hemodynamic insult or fluctuation noted. - he had a CT with contrast 8/31/2022  - home lisinopril on hold  - check UA, renal US  - start IVF    Hypertension:  - home regimen: lisinopril 20 mg daily, atenolol 25 mg daily, HCTZ 25 mg daily  - lisinopril on hold due to MECHE    CVA:  - per primary and neurology        [facility] would like to thank you for the opportunity to serve this patient. Please call with any questions or concerns. Hermila Snow, [address] [facility]ph[facility]  [address], [address] [place] [address] Av  Fax: [phone]  Office: [phone]         CC/Reason for consult:   Reason for consult: MECHE  CC: left facial droop        Review of Systems:   Positives Denny Lamas.  All other remaining systems are negative. Constitutional:  fever, chills, weakness, weight change, fatigue,      Skin:  rash, pruritus, hair loss, bruising, dry skin, petechiae. Head, Face, Neck   headaches, swelling,  cervical adenopathy. Respiratory: shortness of breath, cough, or wheezing  Cardiovascular: chest pain, palpitations, dizzy, edema  Gastrointestinal: nausea, vomiting, diarrhea, constipation,belly pain    Yellow skin, blood in stool  Musculoskeletal:  back pain, muscle weakness, gait problems,       joint pain or swelling. Genitourinary:  dysuria, poor urine flow, flank pain, blood in urine  Neurologic:  vertigo, TIA'S, syncope, seizures, focal weakness  Psychosocial:  insomnia, anxiety, or depression. Additional positive findings: -     PMH/SH/FH:    Medical Hx: reviewed and updated as appropriate  Past Medical History:   Diagnosis Date    [facility]    Anxiety     Arthritis     CAD (coronary artery disease)     COVID-19     Hyperlipidemia 2/3/2022    Hypertension     Influenza A 01/15/2017    Severe malnutrition ([facility] 2/10/2022         Surgical Hx: reviewed and updated as appropriate   has a past surgical history that includes back surgery (N/A, 1989); Coronary angioplasty with stent (9/13); vascular surgery; and eye surgery. Social Hx: reviewed and updated as appropriate  Social History     Tobacco Use    Smoking status: Former     Packs/day: 0.50     Years: 40.00     Pack years: 20.00     Types: Cigarettes     Quit date: 1/14/2019     Years since quitting: 3.6    Smokeless tobacco: Never   Substance Use Topics    Alcohol use: Yes     Alcohol/week: 2.0 standard drinks     Types: 2 Cans of beer per week     Comment: states used to drink 1 pint/vodka a day / now socially drinks        Family hx: reviewed and updated as appropriate  family history includes Cancer in his paternal uncle;  Heart Failure in his father; Hypertension in his father and mother; Stroke in his maternal grandfather. Medications:   cefTRIAXone (ROCEPHIN) 1000 mg in NS 50 mL IVPB mini-bag, 1,000 mg, Daily  gabapentin, 300 mg, TID  sertraline, 100 mg, Daily  sodium chloride flush, 10 mL, 2 times per day  enoxaparin, 40 mg, Nightly  aspirin, 81 mg, Daily   Or  aspirin, 300 mg, Daily  vitamin C, 500 mg, Daily  atenolol, 25 mg, Daily  atorvastatin, 80 mg, Nightly  baclofen, 20 mg, BID  busPIRone, 5 mg, BID  clopidogrel, 75 mg, Daily  ferrous sulfate, 324 mg, Daily with breakfast  hydroCHLOROthiazide, 25 mg, Daily  [Held by provider] lisinopril, 10 mg, Daily  mirtazapine, 15 mg, Nightly  therapeutic multivitamin-minerals, 1 tablet, Daily  pantoprazole, 40 mg, Daily  senna, 1 tablet, Daily     Patient has no known allergies. Allergies:   No Known Allergies      Physical Exam/Objective:   Vitals:    09/02/22 1200   BP: (!) 168/70   Pulse: 58   Resp: 19   Temp: 97.9 °F (36.6 °C)   SpO2: 99%       Intake/Output Summary (Last 24 hours) at 9/2/2022 1517  Last data filed at 9/2/2022 0912  Gross per 24 hour   Intake 40 ml   Output 1700 ml   Net -1660 ml           General appearance: in no acute distress, comfortable, communicative, awake and alert. HEENT: no icterus, EOM intact, trachea midline. Neck : no masses, appears symmetrical and no JVD appreciated. Respiratory: Respiratory effort normal, bilateral equal chest rise. No wheeze, no crackles  Cardiovascular: Ausculation shows RRR and  no edema   Abdomen: abdomen is soft, non distended, no masses, no pain with palpation. Musculoskeletal:  no joint swelling, no deformity, strength grossly normal.   Skin: no rashes, no induration, no tightening, no jaundice   Neuro:   Follows commands, moves all extremities spontaneously       Data:   CBC:   Recent Labs     08/31/22  1425 09/01/22  0523 09/02/22  0619   WBC 7.9 7.7 7.5   HGB 12.3* 11.6* 12.2*   HCT 36.3* 34.0* 35.5*    151 150       BMP:    Recent Labs     08/31/22  1425 09/01/22  0523 09/02/22  0619    141 138   K 4.5 4.1 4.2    105 102   CO2 26 24 23   BUN 42* 43* 39*   CREATININE 1.3 1.6* 1.3   GLUCOSE 93 90 94

## 2022-09-02 NOTE — PROGRESS NOTES
Speech Language/Pathology   SPEECH LANGUAGE AND CLINICAL BEDSIDE SWALLOWING TREATMENT  Speech Therapy Department       Ronald Altamirano  AGE: 75 y.o. GENDER: male  : 1955  [id number]  EPISODE DATE:  2022    MEDICAL DIAGNOSIS:   No chief complaint on file. ONSET: Expressive aphasia symptom onset has been chronic for a time period since prior CVA. Severity is described as mild-moderate. PAST MEDICAL HISTORY        Diagnosis Date    Acute MI ([facility] 2013    Anxiety     Arthritis     CAD (coronary artery disease)     COVID-19     Hyperlipidemia 2/3/2022    Hypertension     Influenza A 01/15/2017    Severe malnutrition ([facility] 2/10/2022       PAST SURGICAL HISTORY    Past Surgical History:   Procedure Laterality Date    BACK SURGERY N/A     herniated disc    CORONARY ANGIOPLASTY WITH STENT PLACEMENT      EYE SURGERY      VASCULAR SURGERY         ALLERGIES    No Known Allergies    Chart Review:   H&P: Patient is a 54-year-old male with past medical history of CAD hypertension hyperlipidemia who presents to the hospital due to left-sided facial droop. 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 Otherwise denied chest pain shortness of breath nausea vomiting difficulty speaking. Mentions he thinks that he has been slow to respond to questions today. Otherwise denies fevers chills diarrhea constipation dysuria. He also denies any new numbness tingling or weakness in arms legs. Imaging:  CT head 22  Impression   CT brain:       1. No acute intracranial abnormality. 2. Involutional parenchymal changes with microvascular ischemic disease. 3. Chronic left parasagittal frontoparietal encephalomalacia/gliosis. 4. Scattered chronic lacunar infarcts such as in the bilateral basal ganglia   and right cerebellum. CTA head/neck:       1.  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 2. Otherwise, no evidence of large vessel occlusion or significant stenosis   in the remaining major arteries of the head and neck. 3. Mild (less than 50%) possibly moderate (50-69%) stenosis of the proximal   left ICA with an associated ulcerated atherosclerotic plaque. 4. Mild prominence of the pulmonary artery, may be related to pulmonary   arterial hypertension. 5. Bubbly air-fluid level present within the trachea and left main bronchus,   compatible with aspiration. 6. Scattered dental caries and periapical lucency. CHEST X-RAY 8/31/22  Impression   No radiographic evidence of acute pulmonary disease      Brain MRI 91/22:  Impression   1. Few small foci of diffusion restriction present in the left parasagittal   frontoparietal lobe may represent acute/subacute ischemia on a background of   chronic encephalomalacia/gliosis. 2. Involutional parenchymal changes with moderate microvascular ischemic   disease. 3. Scattered chronic lacunar infarcts such as in the bilateral basal ganglia   and right cerebellum. Prior Status: (Diet consistency; Cognitive-Communication)  Subjective:     Current diet  Regular Texture / Thin liquids     Pt/staff statements regarding diet tolerance: Per patient, no difficulty swallowing or consuming regular texture/ thin liquids. Cognitive-communication treatment progress: He believes language function is somewhat improved compared to previous date. Objective: Current Therapy Impression of progress/goal status    IMPRESSIONS  Dysphagia:   Mild oral dysphagia characterized by increased time for mastication, though reduced dentition also likely exacerbate deficit. Patient appears to tolerate regular texture / thin liquids with adequate mastication/bolus control. No overt s/s of aspiration.    Dysphagia goals met  Expressive Aphasia  Pt with ongoing mild-moderate expressive language deficits during structured and unstructured tasks. He completed sequencing and divergent naming tasks this date with cues to utilize circumlocution for word finding. Continue SLP services during acute care stay and after discharge. Recommended Diet:   Regular Texture/ Thin liquids  Medication Administration:  Whole with thin liquid    Compensatory Swallowing Strategies:  Upright as possible with all PO intake , Small bites/sips , Eat/feed slowly, Remain upright 30-45 min     SHORT TERM DYSPHAGIA GOALS  Pt will functionally tolerate recommended diet with no overt clinical s/s of aspiration - MET  Pt will demonstrate understanding of aspiration risk and precautions via education/demonstration with occasional prompting - MET    Short Term Speech/Language/Cognitive Goals:   Pt will improve verbal expression for functional expression via graded tasks to 95% -ONGOING  Pt will participate in ongoing cognitive assessment with goals to be established as indicated - ONGOING  Patient will complete short term memory tasks of increasing length and complexity with 85% accuracy given min cues -ONGOING    Prognosis  Good    Education  Role of SLP, strategies to reduce aspiration, word finding strategies. Pt with good understanding    Discharge Recommendations:  Pt will benefit from continued skilled Speech Therapy for Speech and Dysphagia services. Please accept this as Speech Therapy Discharge status, if pt is discharged prior to next therapy session.     Objective: Assessment/Therapy activities    Treatment this session  Objective:  COMPREHENSION  Auditory Comprehension: [x]WFL []Mild   [] Moderate  []Severe  []To be assessed  Functional Ability to Comprehend Basic Commands/Questions     EXPRESSION  Primary Mode of Expression: Verbal  Verbal Expression: []WFL [x]Mild   [x] Moderate  []Severe  []To be assessed  Impaired Convergent Naming  Impaired Conversation  Impaired Thought Organization   Anomia      Pragmatics/Social Functioning: [x]WFL []Mild   [] Moderate []Severe  []To be assessed        MOTOR SPEECH  Motor Speech: [x]WFL []Mild   [] Moderate  []Severe  []To be assessed      VOICE  Voice: [x]WFL []Mild   [] Moderate  []Severe  []To be assessed      COGNITION      Overall Orientation : [x]WFL []Mild   [] Moderate  []Severe  []To be assessed   []Unable to be assessed secondary to Aphasia   Oriented x4    Attention: [x]WFL []Mild   [] Moderate  []Severe  []To be assessed    Memory: []WFL []Mild   [] Moderate  []Severe  []To be assessed    Problem Solving: []WFL []Mild   [] Moderate  []Severe  [x]To be assessed      Safety/Judgement: [x]WFL []Mild   [] Moderate  []Severe  []To be assessed      DYSPHAGIA TREATMENT     Positioning: upright in bed      PO Trials: Thin Liquids- Not assessed   Nectar thick liquids - Not assessed   Honey Thick liquids- Not assessed   Puree -Not assessed   Soft food -Not assessed  Regular food - Mildly increased time for mastication, adequate bolus control and suspected AP transport. Patient denies globus sensation and no overt s/s of aspiration or penetration.      Dysphagia Tx:   Direct Dysphagia tx: Education re: aspiration precautions  Dysphagia ex: N/a  Training in compensatory strategies: Sit upright, chew thoroughly, small bites/drinks  Pt response to ex/training: Appears to demonstrate good comprehension      Plan   Plan:    Recommendations     Plan/Recommendations: Speech therapy 3-5 times a weeks for speech-language treatment,    Interventions: Language therapy, word finding strategy training, cognitive-communication tx  Barriers toward progress toward pt goals:  Communication deficit      Timed Code Treatment:  Time In: [address]  Time In: 1400  Time Out: 1425  Minutes: 25  Total Treatment Time: 25 minutes     Signed:  Kyung Owen, [id number] [facility], .[id number]    Speech-Language Pathologist

## 2022-09-03 LAB
ANION GAP SERPL CALCULATED.3IONS-SCNC: 13 MMOL/L (ref 3–16)
BUN BLDV-MCNC: 51 MG/DL (ref 7–20)
CALCIUM SERPL-MCNC: 9 MG/DL (ref 8.3–10.6)
CHLORIDE BLD-SCNC: 102 MMOL/L (ref 99–110)
CO2: 24 MMOL/L (ref 21–32)
CREAT SERPL-MCNC: 2.1 MG/DL (ref 0.8–1.3)
GFR AFRICAN AMERICAN: 38
GFR NON-AFRICAN AMERICAN: 32
GLUCOSE BLD-MCNC: 104 MG/DL (ref 70–99)
HCT VFR BLD CALC: 35.1 % (ref 40.5–52.5)
HEMOGLOBIN: 11.9 G/DL (ref 13.5–17.5)
MCH RBC QN AUTO: 29.2 PG (ref 26–34)
MCHC RBC AUTO-ENTMCNC: 33.8 G/DL (ref 31–36)
MCV RBC AUTO: 86.2 FL (ref 80–100)
PDW BLD-RTO: 16.4 % (ref 12.4–15.4)
PLATELET # BLD: 153 K/UL (ref 135–450)
PMV BLD AUTO: 7.2 FL (ref 5–10.5)
POTASSIUM REFLEX MAGNESIUM: 4.2 MMOL/L (ref 3.5–5.1)
RBC # BLD: 4.07 M/UL (ref 4.2–5.9)
SODIUM BLD-SCNC: 139 MMOL/L (ref 136–145)
WBC # BLD: 7.6 K/UL (ref 4–11)

## 2022-09-03 PROCEDURE — 6370000000 HC RX 637 (ALT 250 FOR IP): Performed by: INTERNAL MEDICINE

## 2022-09-03 PROCEDURE — 85027 COMPLETE CBC AUTOMATED: CPT

## 2022-09-03 PROCEDURE — 94640 AIRWAY INHALATION TREATMENT: CPT

## 2022-09-03 PROCEDURE — 6370000000 HC RX 637 (ALT 250 FOR IP): Performed by: HOSPITALIST

## 2022-09-03 PROCEDURE — 2580000003 HC RX 258: Performed by: INTERNAL MEDICINE

## 2022-09-03 PROCEDURE — 6360000002 HC RX W HCPCS: Performed by: INTERNAL MEDICINE

## 2022-09-03 PROCEDURE — 51798 US URINE CAPACITY MEASURE: CPT

## 2022-09-03 PROCEDURE — G0378 HOSPITAL OBSERVATION PER HR: HCPCS

## 2022-09-03 PROCEDURE — 36415 COLL VENOUS BLD VENIPUNCTURE: CPT

## 2022-09-03 PROCEDURE — 80048 BASIC METABOLIC PNL TOTAL CA: CPT

## 2022-09-03 PROCEDURE — 96366 THER/PROPH/DIAG IV INF ADDON: CPT

## 2022-09-03 PROCEDURE — 96372 THER/PROPH/DIAG INJ SC/IM: CPT

## 2022-09-03 RX ORDER — SODIUM CHLORIDE, SODIUM LACTATE, POTASSIUM CHLORIDE, CALCIUM CHLORIDE 600; 310; 30; 20 MG/100ML; MG/100ML; MG/100ML; MG/100ML
INJECTION, SOLUTION INTRAVENOUS CONTINUOUS
Status: ACTIVE | OUTPATIENT
Start: 2022-09-03 | End: 2022-09-03

## 2022-09-03 RX ORDER — ALBUTEROL SULFATE 90 UG/1
2 AEROSOL, METERED RESPIRATORY (INHALATION) EVERY 6 HOURS PRN
Status: DISCONTINUED | OUTPATIENT
Start: 2022-09-03 | End: 2022-09-12 | Stop reason: HOSPADM

## 2022-09-03 RX ADMIN — GABAPENTIN 300 MG: 300 CAPSULE ORAL at 20:53

## 2022-09-03 RX ADMIN — CLOPIDOGREL BISULFATE 75 MG: 75 TABLET ORAL at 09:05

## 2022-09-03 RX ADMIN — BACLOFEN 20 MG: 10 TABLET ORAL at 20:53

## 2022-09-03 RX ADMIN — GABAPENTIN 300 MG: 300 CAPSULE ORAL at 13:41

## 2022-09-03 RX ADMIN — GABAPENTIN 300 MG: 300 CAPSULE ORAL at 09:05

## 2022-09-03 RX ADMIN — Medication 2 PUFF: at 18:26

## 2022-09-03 RX ADMIN — SENNOSIDES 8.6 MG: 8.6 TABLET, FILM COATED ORAL at 09:06

## 2022-09-03 RX ADMIN — PANTOPRAZOLE SODIUM 40 MG: 40 TABLET, DELAYED RELEASE ORAL at 09:05

## 2022-09-03 RX ADMIN — CEFTRIAXONE 1000 MG: 1 INJECTION, POWDER, FOR SOLUTION INTRAMUSCULAR; INTRAVENOUS at 20:58

## 2022-09-03 RX ADMIN — ENOXAPARIN SODIUM 40 MG: 100 INJECTION SUBCUTANEOUS at 20:54

## 2022-09-03 RX ADMIN — BUSPIRONE HYDROCHLORIDE 5 MG: 5 TABLET ORAL at 09:05

## 2022-09-03 RX ADMIN — ATORVASTATIN CALCIUM 80 MG: 80 TABLET, FILM COATED ORAL at 20:53

## 2022-09-03 RX ADMIN — SERTRALINE 100 MG: 100 TABLET, FILM COATED ORAL at 09:05

## 2022-09-03 RX ADMIN — MULTIPLE VITAMINS W/ MINERALS TAB 1 TABLET: TAB at 09:06

## 2022-09-03 RX ADMIN — MIRTAZAPINE 15 MG: 15 TABLET, FILM COATED ORAL at 20:53

## 2022-09-03 RX ADMIN — OXYCODONE HYDROCHLORIDE AND ACETAMINOPHEN 500 MG: 500 TABLET ORAL at 09:05

## 2022-09-03 RX ADMIN — SODIUM CHLORIDE, POTASSIUM CHLORIDE, SODIUM LACTATE AND CALCIUM CHLORIDE: 600; 310; 30; 20 INJECTION, SOLUTION INTRAVENOUS at 07:01

## 2022-09-03 RX ADMIN — ASPIRIN 81 MG: 81 TABLET, COATED ORAL at 09:05

## 2022-09-03 RX ADMIN — BACLOFEN 20 MG: 10 TABLET ORAL at 09:05

## 2022-09-03 RX ADMIN — SODIUM CHLORIDE, PRESERVATIVE FREE 10 ML: 5 INJECTION INTRAVENOUS at 09:15

## 2022-09-03 RX ADMIN — BUSPIRONE HYDROCHLORIDE 5 MG: 5 TABLET ORAL at 20:54

## 2022-09-03 RX ADMIN — FERROUS SULFATE TAB EC 324 MG (65 MG FE EQUIVALENT) 324 MG: 324 (65 FE) TABLET DELAYED RESPONSE at 08:18

## 2022-09-03 RX ADMIN — SODIUM CHLORIDE, PRESERVATIVE FREE 10 ML: 5 INJECTION INTRAVENOUS at 20:54

## 2022-09-03 NOTE — PROGRESS NOTES
Hospitalist Progress Note  9/3/2022 9:59 AM    PCP: No primary care provider on file.    [id number]     Date of Admission: 8/31/2022                                                                                                                     HOSPITAL COURSE    Patient demographics:  The patient  Flakita Regalado is a 75 y.o. male     Significant past medical history:   Patient Active Problem List   Diagnosis    Chest pain    MI, acute, non ST segment elevation (HCC)    Tobacco abuse    Essential hypertension    Acute MI ([facility] [address]    Acute CVA (cerebrovascular accident) ([facility] [address]    Right leg weakness    Acute ischemic left [place] stroke (HCC)    Non-healing open wound of right heel    Right heel infection    Hyperlipidemia    CAD (coronary artery disease)    Cellulitis of left heel    Wound infection    Acute hematogenous osteomyelitis (HCC)    Right hemiparesis (HCC)    Severe malnutrition (HCC)    Facial weakness         Presenting symptoms:  Left-sided facial droop    Diagnostic workup:      CONSULTS DURING ADMISSION :   IP CONSULT TO STROKE TEAM  IP CONSULT TO PHARMACY  PHARMACY TO CHANGE BASE FLUIDS  IP CONSULT TO HOSPITALIST  IP CONSULT TO NEUROLOGY  IP CONSULT TO CASE MANAGEMENT  IP CONSULT TO NEPHROLOGY      Patient was diagnosed with:  Left-sided facial droop, rule out CVA  History of prior CVAs   Elevated troponin,   MECHE  CAD  Hypertension  Hyperlipidemia      Treatment while inpatient:  Patient is a 70-year-old male with past medical history of CAD hypertension hyperlipidemia who presents to the hospital due to left-sided facial droop. Patient already has right-sided weakness from previous stroke.   MRI of the brain showed multiple areas of stroke but no acute evidence of stroke.                                                                                   ----------------------------------------------------------      SUBJECTIVE COMPLAINTS- follow up for Left-sided facial droop    Diet: ADULT DIET; Regular; Low Fat/Low Chol/High Fiber/AYAKA      OBJECTIVE:   Patient Active Problem List   Diagnosis    Chest pain    MI, acute, non ST segment elevation (HCC)    Tobacco abuse    Essential hypertension    Acute MI ([facility] [address]    Acute CVA (cerebrovascular accident) ([facility] [address]    Right leg weakness    Acute ischemic left [place] stroke (HCC)    Non-healing open wound of right heel    Right heel infection    Hyperlipidemia    CAD (coronary artery disease)    Cellulitis of left heel    Wound infection    Acute hematogenous osteomyelitis (HCC)    Right hemiparesis (HCC)    Severe malnutrition (HCC)    Facial weakness       Allergies  Patient has no known allergies.     Medications    Scheduled Meds:   cefTRIAXone (ROCEPHIN) 1000 mg in NS 50 mL IVPB mini-bag  1,000 mg IntraVENous Daily    gabapentin  300 mg Oral TID    sertraline  100 mg Oral Daily    sodium chloride flush  10 mL IntraVENous 2 times per day    enoxaparin  40 mg SubCUTAneous Nightly    aspirin  81 mg Oral Daily    Or    aspirin  300 mg Rectal Daily    vitamin C  500 mg Oral Daily    atenolol  25 mg Oral Daily    atorvastatin  80 mg Oral Nightly    baclofen  20 mg Oral BID    busPIRone  5 mg Oral BID    clopidogrel  75 mg Oral Daily    ferrous sulfate  324 mg Oral Daily with breakfast    [Held by provider] hydroCHLOROthiazide  25 mg Oral Daily    [Held by provider] lisinopril  10 mg Oral Daily    mirtazapine  15 mg Oral Nightly    therapeutic multivitamin-minerals  1 tablet Oral Daily    pantoprazole  40 mg Oral Daily    senna  1 tablet Oral Daily     Continuous Infusions:   lactated ringers 100 mL/hr at 09/03/22 0701    sodium chloride 25 mL (09/02/22 2043)     PRN Meds:  hydrALAZINE, albuterol, sodium chloride flush, sodium chloride, ondansetron **OR** ondansetron, nitroGLYCERIN    Vitals   Vitals /wt Patient Vitals for the past 8 hrs:   BP Temp Temp src Pulse Resp SpO2 Weight   09/03/22 0815 122/70 97.8 °F (36.6 °C) Axillary 51 13 95 % --   09/03/22 0407 (!) 95/58 97.9 °F (36.6 °C) Oral (!) 49 13 95 % 202 lb 2.6 oz (91.7 kg)        72HR INTAKE/OUTPUT:    Intake/Output Summary (Last 24 hours) at 9/3/2022 0959  Last data filed at 9/3/2022 9650  Gross per 24 hour   Intake --   Output 600 ml   Net -600 ml       Exam:    Gen:   Alert and oriented ×3    Eyes: PERRL. No sclera icterus. No conjunctival injection. ENT: No discharge. Pharynx clear. External appearance of ears and nose normal.  Neck: Trachea midline. No obvious mass. Resp: No accessory muscle use. No crackles. No wheezes. No rhonchi. CV: Regular rate. Regular rhythm. No murmur or rub. No edema. GI: Non-tender. Non-distended. No hernia. Skin: Warm, dry, normal texture and turgor. Lymph: No cervical LAD. No supraclavicular LAD. M/S: / Ext. 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 PT/INR:   Recent Labs     08/31/22  1425   PROTIME 13.4   INR 1.03     APTT: No results for input(s): APTT in the last 72 hours. CBC:   Recent Labs     08/31/22  1425 09/01/22  0523 09/02/22  0619 09/03/22  0839   WBC 7.9 7.7 7.5 7.6   HGB 12.3* 11.6* 12.2* 11.9*   HCT 36.3* 34.0* 35.5* 35.1*   MCV 86.7 85.5 84.8 86.2    151 150 153       BMP:   Recent Labs     08/31/22  1425 09/01/22  0523 09/02/22  0619 09/03/22  0547    141 138 139   K 4.5 4.1 4.2 4.2    105 102 102   CO2 26 24 23 24   BUN 42* 43* 39* 51*   CREATININE 1.3 1.6* 1.3 2.1*       LIVER PROFILE:   Recent Labs     08/31/22  1425   ALKPHOS 128   AST 13*   ALT 11   BILITOT 0.4     No results for input(s): AMYLASE in the last 72 hours. No results for input(s): LIPASE in the last 72 hours.     UA:  Recent Labs     09/01/22  1530   WBCUA 74*   RBCUA 0       TROPONIN:   Recent Labs     08/31/22  1425 08/31/22  2332 09/01/22  0523   TROPONINI 0.06* 0.06* 0.07*       Lab Results   Component Value Date/Time    URRFLXCULT Not Indicated 05/28/2021 06:30 PM       No results for input(s): TSHREFLEX in the last 72 hours. No components found for: [id number]  POC GLUCOSE:    Recent Labs     08/31/22  1354   POCGLU 116*     Recent Labs     09/01/22  0523   LABA1C 5.2      Lab Results   Component Value Date/Time    LABA1C 5.2 09/01/2022 05:23 AM      Normal left ventricle size and systolic function with an estimated ejection   fraction of 55%. (8/31/2022)    ASSESSMENT AND PLAN  Left-sided facial droop  MRI ruled out acute stroke  EEG was completed and it did show focal sharp epileptiform waves  Neurology is following  No plan for AED at this time  Continue aspirin Plavix and statin        Elevated troponin, patient is chest pain-free      MECHE  Nephrology is consulted    CAD      Hypertension      Hyperlipidemia    Urinary tract infection  Continue with ceftriaxone      PT/OT/speech therapy consult        Code Status: Full Code        Dispo - cc        The patient and / or the family were informed of the results of any tests, a time was given to answer questions, a plan was proposed and they agreed with plan. Syd Sands MD    This note was transcribed using [id number] [employer]. Please disregard any translational errors.

## 2022-09-03 NOTE — PROGRESS NOTES
[facility]                                               Progress note    Summary:   Ellison Apley is being seen by nephrology for MECHE. Admitted with left-sided facial droop. Has a history of CVA and residual right-sided weakness. Creatinine admission was 1.3 and increased to 1.6 on day 2 of hospitalization. Interval History  Seen and examined at bedside. Awake and alert   No edema or SOB  Denies trouble emptying his bladder but says he cant use the urinal   No dizziness but BP softer. PO intake low. Serum creatinine improved with IV fluids initially   Urinalysis suggestive of UTI on antibiotics  Ultrasound showing right renal cyst  Lisinopril on hold. Creatinine 1.3 > 2.1, off fluids. Sodium 138 > 139  Potassium 4.2    Last BP 95/58  Did get HCTZ yesterday    cc , no boss     Plan:   Worsening MECHE , cr nearly double from yesterday so need to make sure no retention. Did have softer BP and was getting HCTZ  - resume IVFs  - check bladder scan, confirm no retention.   - need strict IOS, may need to consider boss   PO intake poor, no signs of volume overload so will give LR at 100 cc/hr     BP is low, placed hold on HCTZ and should continue to hold lisinopril       Daphene Holstein, MD  [facility]  Office: [phone]    Assessment:   MECHE:  Cr up to 2.1, rule out retention, resume iVFs. Stopping HCTZ as well  - baseline Cr 0.7-1.0  - Cr on admission was 1.3, increased to 1.6 the next day  - no UA this admission  - no significant hemodynamic insult or fluctuation noted. - he had a CT with contrast 8/31/2022  - home lisinopril on hold  - check UA, renal US  - start IVF    Hypertension:  - home regimen: lisinopril 20 mg daily, atenolol 25 mg daily, HCTZ 25 mg daily  - lisinopril on hold due to MECHE    CVA:  - per primary and neurology            ROS:     Positives Listed Bold. All other remaining systems are negative.     Constitutional:  fever, chills, weakness, weight change, fatigue,      Skin:  rash, pruritus, hair loss, bruising, dry skin, petechiae. Head, Face, Neck   headaches, swelling,  cervical adenopathy. Respiratory: shortness of breath, cough, or wheezing  Cardiovascular: chest pain, palpitations, dizzy, edema  Gastrointestinal: nausea, vomiting, diarrhea, constipation,belly pain    Yellow skin, blood in stool  Musculoskeletal:  back pain, muscle weakness, gait problems,       joint pain or swelling. Genitourinary:  dysuria, poor urine flow, flank pain, blood in urine  Neurologic:  vertigo, TIA'S, syncope, seizures, focal weakness  Psychosocial:  insomnia, anxiety, or depression. Additional positive findings: -     PMH:   Past medical history, surgical history, social history, family history are reviewed and updated as appropriate. Reviewed current medication list.   Allergies reviewed and updated as needed. PE:   Vitals:    09/02/22 2000   BP: (!) 153/81   Pulse: 55   Resp:    Temp: 98.6 °F (37 °C)   SpO2: 98%       General appearance:  in NAD, fully alert and oriented. Comfortable. HEENT: EOM intact, no icterus. Trachea is midline. Neck : No masses, appears symmetrical, no JVD  Respiratory: Respiratory effort appears normal, bilateral equal chest rise, no wheeze, no crackles   Cardiovascular: Ausculation shows RRR no edema  Abdomen: No visible mass or tenderness, non distended. Musculoskeletal:  Joints with no swelling or deformity. Skin:no rashes, ulcers, induration, no jaundice. Neuro: Appropriate responses. Though a little slow.        Lab Results   Component Value Date    CREATININE 1.3 09/02/2022    BUN 39 (H) 09/02/2022     09/02/2022    K 4.2 09/02/2022     09/02/2022    CO2 23 09/02/2022      Lab Results   Component Value Date    WBC 7.5 09/02/2022    HGB 12.2 (L) 09/02/2022    HCT 35.5 (L) 09/02/2022    MCV 84.8 09/02/2022     09/02/2022     Lab Results   Component Value Date    CALCIUM 9.5 09/02/2022    CAION 1.09 (L) 09/24/2014    PHOS 3.3 01/08/2022

## 2022-09-04 LAB
ANION GAP SERPL CALCULATED.3IONS-SCNC: 12 MMOL/L (ref 3–16)
BUN BLDV-MCNC: 51 MG/DL (ref 7–20)
CALCIUM SERPL-MCNC: 9 MG/DL (ref 8.3–10.6)
CHLORIDE BLD-SCNC: 106 MMOL/L (ref 99–110)
CO2: 23 MMOL/L (ref 21–32)
CREAT SERPL-MCNC: 1.5 MG/DL (ref 0.8–1.3)
GFR AFRICAN AMERICAN: 56
GFR NON-AFRICAN AMERICAN: 47
GLUCOSE BLD-MCNC: 113 MG/DL (ref 70–99)
HCT VFR BLD CALC: 33 % (ref 40.5–52.5)
HEMOGLOBIN: 11.2 G/DL (ref 13.5–17.5)
MCH RBC QN AUTO: 29.3 PG (ref 26–34)
MCHC RBC AUTO-ENTMCNC: 34 G/DL (ref 31–36)
MCV RBC AUTO: 86.4 FL (ref 80–100)
PDW BLD-RTO: 16.4 % (ref 12.4–15.4)
PLATELET # BLD: 141 K/UL (ref 135–450)
PMV BLD AUTO: 7 FL (ref 5–10.5)
POTASSIUM SERPL-SCNC: 3.9 MMOL/L (ref 3.5–5.1)
RBC # BLD: 3.82 M/UL (ref 4.2–5.9)
SODIUM BLD-SCNC: 141 MMOL/L (ref 136–145)
WBC # BLD: 7.6 K/UL (ref 4–11)

## 2022-09-04 PROCEDURE — 96372 THER/PROPH/DIAG INJ SC/IM: CPT

## 2022-09-04 PROCEDURE — 6370000000 HC RX 637 (ALT 250 FOR IP): Performed by: INTERNAL MEDICINE

## 2022-09-04 PROCEDURE — 6360000002 HC RX W HCPCS: Performed by: INTERNAL MEDICINE

## 2022-09-04 PROCEDURE — 36415 COLL VENOUS BLD VENIPUNCTURE: CPT

## 2022-09-04 PROCEDURE — 80048 BASIC METABOLIC PNL TOTAL CA: CPT

## 2022-09-04 PROCEDURE — 51798 US URINE CAPACITY MEASURE: CPT

## 2022-09-04 PROCEDURE — 94760 N-INVAS EAR/PLS OXIMETRY 1: CPT

## 2022-09-04 PROCEDURE — G0378 HOSPITAL OBSERVATION PER HR: HCPCS

## 2022-09-04 PROCEDURE — 85027 COMPLETE CBC AUTOMATED: CPT

## 2022-09-04 PROCEDURE — 2580000003 HC RX 258: Performed by: INTERNAL MEDICINE

## 2022-09-04 PROCEDURE — 6370000000 HC RX 637 (ALT 250 FOR IP): Performed by: HOSPITALIST

## 2022-09-04 PROCEDURE — 94640 AIRWAY INHALATION TREATMENT: CPT

## 2022-09-04 RX ADMIN — ATORVASTATIN CALCIUM 80 MG: 80 TABLET, FILM COATED ORAL at 20:26

## 2022-09-04 RX ADMIN — GABAPENTIN 300 MG: 300 CAPSULE ORAL at 10:00

## 2022-09-04 RX ADMIN — ENOXAPARIN SODIUM 40 MG: 100 INJECTION SUBCUTANEOUS at 20:25

## 2022-09-04 RX ADMIN — PANTOPRAZOLE SODIUM 40 MG: 40 TABLET, DELAYED RELEASE ORAL at 10:00

## 2022-09-04 RX ADMIN — GABAPENTIN 300 MG: 300 CAPSULE ORAL at 15:22

## 2022-09-04 RX ADMIN — MIRTAZAPINE 15 MG: 15 TABLET, FILM COATED ORAL at 20:26

## 2022-09-04 RX ADMIN — SODIUM CHLORIDE, PRESERVATIVE FREE 10 ML: 5 INJECTION INTRAVENOUS at 10:01

## 2022-09-04 RX ADMIN — FERROUS SULFATE TAB EC 324 MG (65 MG FE EQUIVALENT) 324 MG: 324 (65 FE) TABLET DELAYED RESPONSE at 10:00

## 2022-09-04 RX ADMIN — GABAPENTIN 300 MG: 300 CAPSULE ORAL at 20:25

## 2022-09-04 RX ADMIN — MULTIPLE VITAMINS W/ MINERALS TAB 1 TABLET: TAB at 10:00

## 2022-09-04 RX ADMIN — OXYCODONE HYDROCHLORIDE AND ACETAMINOPHEN 500 MG: 500 TABLET ORAL at 10:00

## 2022-09-04 RX ADMIN — SODIUM CHLORIDE, PRESERVATIVE FREE 10 ML: 5 INJECTION INTRAVENOUS at 20:09

## 2022-09-04 RX ADMIN — ASPIRIN 81 MG: 81 TABLET, COATED ORAL at 10:00

## 2022-09-04 RX ADMIN — ATENOLOL 25 MG: 25 TABLET ORAL at 10:00

## 2022-09-04 RX ADMIN — Medication 2 PUFF: at 18:57

## 2022-09-04 RX ADMIN — SERTRALINE 100 MG: 100 TABLET, FILM COATED ORAL at 10:00

## 2022-09-04 RX ADMIN — BACLOFEN 20 MG: 10 TABLET ORAL at 20:25

## 2022-09-04 RX ADMIN — BUSPIRONE HYDROCHLORIDE 5 MG: 5 TABLET ORAL at 10:00

## 2022-09-04 RX ADMIN — BACLOFEN 20 MG: 10 TABLET ORAL at 10:00

## 2022-09-04 RX ADMIN — SENNOSIDES 8.6 MG: 8.6 TABLET, FILM COATED ORAL at 10:00

## 2022-09-04 RX ADMIN — CLOPIDOGREL BISULFATE 75 MG: 75 TABLET ORAL at 10:00

## 2022-09-04 RX ADMIN — BUSPIRONE HYDROCHLORIDE 5 MG: 5 TABLET ORAL at 20:25

## 2022-09-04 NOTE — PROGRESS NOTES
Hospitalist Progress Note  9/4/2022 8:15 AM    PCP: No primary care provider on file.    [phone]     Date of Admission: 8/31/2022                                                                                                                     HOSPITAL COURSE    Patient demographics:  The patient  Ellison Apley is a 75 y.o. male     Significant past medical history:   Patient Active Problem List   Diagnosis    Chest pain    MI, acute, non ST segment elevation (HCC)    Tobacco abuse    Essential hypertension    Acute MI ([facility] [address]    Acute CVA (cerebrovascular accident) ([facility] [address]    Right leg weakness    Acute ischemic left [place] stroke (HCC)    Non-healing open wound of right heel    Right heel infection    Hyperlipidemia    CAD (coronary artery disease)    Cellulitis of left heel    Wound infection    Acute hematogenous osteomyelitis (HCC)    Right hemiparesis (HCC)    Severe malnutrition (HCC)    Facial weakness         Presenting symptoms:  Left-sided facial droop    Diagnostic workup:      CONSULTS DURING ADMISSION :   IP CONSULT TO STROKE TEAM  IP CONSULT TO PHARMACY  PHARMACY TO CHANGE BASE FLUIDS  IP CONSULT TO HOSPITALIST  IP CONSULT TO NEUROLOGY  IP CONSULT TO CASE MANAGEMENT  IP CONSULT TO NEPHROLOGY      Patient was diagnosed with:  Left-sided facial droop, rule out CVA  History of prior CVAs   Elevated troponin,   MECHE  CAD  Hypertension  Hyperlipidemia      Treatment while inpatient:  Patient is a 18-year-old male with past medical history of CAD hypertension hyperlipidemia who presents to the hospital due to left-sided facial droop. Patient already has right-sided weakness from previous stroke.   MRI of the brain showed multiple areas of stroke but no acute evidence of stroke.                                                                                   ----------------------------------------------------------      SUBJECTIVE COMPLAINTS- follow up for Left-sided facial droop    Diet: ADULT DIET; Regular; Low Fat/Low Chol/High Fiber/AYAKA      OBJECTIVE:   Patient Active Problem List   Diagnosis    Chest pain    MI, acute, non ST segment elevation (HCC)    Tobacco abuse    Essential hypertension    Acute MI ([facility] [address]    [facility]    Right leg weakness    Acute ischemic left [place] stroke (HCC)    Non-healing open wound of right heel    Right heel infection    Hyperlipidemia    CAD (coronary artery disease)    Cellulitis of left heel    Wound infection    Acute hematogenous osteomyelitis (HCC)    Right hemiparesis (HCC)    Severe malnutrition (HCC)    Facial weakness       Allergies  Patient has no known allergies.     Medications    Scheduled Meds:   gabapentin  300 mg Oral TID    sertraline  100 mg Oral Daily    sodium chloride flush  10 mL IntraVENous 2 times per day    enoxaparin  40 mg SubCUTAneous Nightly    aspirin  81 mg Oral Daily    Or    aspirin  300 mg Rectal Daily    vitamin C  500 mg Oral Daily    atenolol  25 mg Oral Daily    atorvastatin  80 mg Oral Nightly    baclofen  20 mg Oral BID    busPIRone  5 mg Oral BID    clopidogrel  75 mg Oral Daily    ferrous sulfate  324 mg Oral Daily with breakfast    [Held by provider] hydroCHLOROthiazide  25 mg Oral Daily    [Held by provider] lisinopril  10 mg Oral Daily    mirtazapine  15 mg Oral Nightly    therapeutic multivitamin-minerals  1 tablet Oral Daily    pantoprazole  40 mg Oral Daily    senna  1 tablet Oral Daily     Continuous Infusions:   sodium chloride 25 mL (09/02/22 2043)     PRN Meds:  albuterol sulfate HFA, hydrALAZINE, albuterol, sodium chloride flush, sodium chloride, ondansetron **OR** ondansetron, nitroGLYCERIN    Vitals   Vitals /wt Patient Vitals for the past 8 hrs:   BP Temp Temp src Pulse Resp SpO2 Weight   09/04/22 0800 (!) 118/57 98.2 °F (36.8 °C) Oral -- 14 95 % --   09/04/22 0427 107/68 98 °F (36.7 °C) Oral 60 12 94 % --   09/04/22 0209 -- -- -- -- -- -- 204 lb 12.9 oz (92.9 kg)        72HR INTAKE/OUTPUT: Intake/Output Summary (Last 24 hours) at 9/4/2022 0815  Last data filed at 9/4/2022 0428  Gross per 24 hour   Intake 1000 ml   Output 900 ml   Net 100 ml       Exam:    Gen:   Alert and oriented ×3    Eyes: PERRL. No sclera icterus. No conjunctival injection. ENT: No discharge. Pharynx clear. External appearance of ears and nose normal.  Neck: Trachea midline. No obvious mass. Resp: No accessory muscle use. No crackles. No wheezes. No rhonchi. CV: Regular rate. Regular rhythm. No murmur or rub. No edema. GI: Non-tender. Non-distended. No hernia. Skin: Warm, dry, normal texture and turgor. Lymph: No cervical LAD. No supraclavicular LAD. M/S: / Ext. 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 PT/INR:   No results for input(s): PROTIME, INR in the last 72 hours. APTT: No results for input(s): APTT in the last 72 hours. CBC:   Recent Labs     09/02/22  0619 09/03/22  0839 09/04/22  0542   WBC 7.5 7.6 7.6   HGB 12.2* 11.9* 11.2*   HCT 35.5* 35.1* 33.0*   MCV 84.8 86.2 86.4    153 141       BMP:   Recent Labs     09/02/22  0619 09/03/22  0547 09/04/22  0542    139 141   K 4.2 4.2 3.9    102 106   CO2 23 24 23   BUN 39* 51* 51*   CREATININE 1.3 2.1* 1.5*       LIVER PROFILE:   No results for input(s): ALKPHOS, AST, ALT, ALB, BILIDIR, BILITOT, ALKPHOS in the last 72 hours. No results for input(s): AMYLASE in the last 72 hours. No results for input(s): LIPASE in the last 72 hours. UA:  Recent Labs     09/01/22  1530   WBCUA 74*   RBCUA 0       TROPONIN:   No results for input(s): Tia Fuchs in the last 72 hours. Lab Results   Component Value Date/Time    URRFLXCULT Not Indicated 05/28/2021 06:30 PM       No results for input(s): TSHREFLEX in the last 72 hours.     No components found for: [id number]  POC GLUCOSE:    No results for input(s): POCGLU in the last 72 hours. No results for input(s): LABA1C in the last 72 hours. Lab Results   Component Value Date/Time    LABA1C 5.2 09/01/2022 05:23 AM      Normal left ventricle size and systolic function with an estimated ejection   fraction of 55%. (8/31/2022)    ASSESSMENT AND PLAN  Left-sided facial droop  MRI ruled out acute stroke  EEG was completed and it did show focal sharp epileptiform waves  Neurology is following  No plan for AED at this time  Continue aspirin Plavix and statin        Elevated troponin, patient is chest pain-free      MECHE  Hctz and lisinopril on hold  Ivf  Cr improving  Nephrology consult appreciated    CAD      Hypertension      Hyperlipidemia    Urinary tract infection  Continue with ceftriaxone  Check procalcitonin    PT/OT/speech therapy consult        Code Status: Full Code        Dispo - cc        The patient and / or the family were informed of the results of any tests, a time was given to answer questions, a plan was proposed and they agreed with plan. Armand Watt MD    This note was transcribed using [id number] [employer]. Please disregard any translational errors.

## 2022-09-04 NOTE — PLAN OF CARE
Problem: Neurosensory - Adult  Goal: Achieves stable or improved neurological status  9/4/2022 1333 by Rebeka Perez RN  Outcome: Progressing     Problem: Neurosensory - Adult  Goal: Achieves maximal functionality and self care  9/4/2022 1333 by Rebeka Perez RN  Outcome: Progressing     Problem: Discharge Planning  Goal: Discharge to home or other facility with appropriate resources  9/4/2022 1333 by Rebeka Perez RN  Outcome: Progressing     Problem: Safety - Adult  Goal: Free from fall injury  9/4/2022 1333 by Rebeka Perez RN  Outcome: Progressing     Problem: Skin/Tissue Integrity  Goal: Absence of new skin breakdown  Description: 1. Monitor for areas of redness and/or skin breakdown  2. Assess vascular access sites hourly  3. Every 4-6 hours minimum:  Change oxygen saturation probe site  4. Every 4-6 hours:  If on nasal continuous positive airway pressure, respiratory therapy assess nares and determine need for appliance change or resting period.   9/4/2022 1333 by Rebeka Perez RN  Outcome: Progressing

## 2022-09-04 NOTE — PLAN OF CARE
Problem: Neurosensory - Adult  Goal: Achieves stable or improved neurological status  Outcome: Progressing     Problem: Neurosensory - Adult  Goal: Achieves maximal functionality and self care  Outcome: Progressing     Problem: Safety - Adult  Goal: Free from fall injury  Outcome: Progressing     Problem: Skin/Tissue Integrity  Goal: Absence of new skin breakdown  Description: 1. Monitor for areas of redness and/or skin breakdown  2. Assess vascular access sites hourly  3. Every 4-6 hours minimum:  Change oxygen saturation probe site  4. Every 4-6 hours:  If on nasal continuous positive airway pressure, respiratory therapy assess nares and determine need for appliance change or resting period.   Outcome: Progressing

## 2022-09-04 NOTE — PROGRESS NOTES
[facility]                                               Progress note    Summary:   Viviana Pierre is being seen by nephrology for MECHE. Admitted with left-sided facial droop. Has a history of CVA and residual right-sided weakness. Creatinine admission was 1.3 and increased to 1.6 on day 2 of hospitalization. Interval History  Seen and examined at bedside. Awake and alert   No edema or SOB  Family at bedside. PO intake better today    UTI on antibiotics  Ultrasound showing right renal cyst  Lisinopril on hold. Creatinine 1.3 > 2.1> 1.5   Sodium 138 > 139  Potassium 4.2    UO 2200    Plan:   MECHE improved. UO much better  PO intake better, stop fluids. hold on HCTZ and should continue to hold lisinopril       Denise Archibald MD  [facility]  Office: [phone]    Assessment:   MECHE:  Cr up to 2.1, rule out retention, resume iVFs. Stopping HCTZ as well  - baseline Cr 0.7-1.0  - Cr on admission was 1.3, increased to 1.6 the next day  - no UA this admission  - no significant hemodynamic insult or fluctuation noted. - he had a CT with contrast 8/31/2022  - home lisinopril on hold  - check UA, renal US  - start IVF    Hypertension:  - home regimen: lisinopril 20 mg daily, atenolol 25 mg daily, HCTZ 25 mg daily  - lisinopril on hold due to MECHE    CVA:  - per primary and neurology            ROS:     Positives Listed Bold. All other remaining systems are negative. Constitutional:  fever, chills, weakness, weight change, fatigue,      Skin:  rash, pruritus, hair loss, bruising, dry skin, petechiae. Head, Face, Neck   headaches, swelling,  cervical adenopathy. Respiratory: shortness of breath, cough, or wheezing  Cardiovascular: chest pain, palpitations, dizzy, edema  Gastrointestinal: nausea, vomiting, diarrhea, constipation,belly pain    Yellow skin, blood in stool  Musculoskeletal:  back pain, muscle weakness, gait problems,       joint pain or swelling.   Genitourinary: dysuria, poor urine flow, flank pain, blood in urine  Neurologic:  vertigo, TIA'S, syncope, seizures, focal weakness  Psychosocial:  insomnia, anxiety, or depression. Additional positive findings: -     PMH:   Past medical history, surgical history, social history, family history are reviewed and updated as appropriate. Reviewed current medication list.   Allergies reviewed and updated as needed. PE:   Vitals:    09/04/22 1858   BP:    Pulse:    Resp:    Temp:    SpO2: 95%       General appearance:  in NAD, fully alert and oriented. Comfortable. HEENT: EOM intact, no icterus. Trachea is midline. Neck : No masses, appears symmetrical, no JVD  Respiratory: Respiratory effort appears normal, bilateral equal chest rise, no wheeze, no crackles   Cardiovascular: Ausculation shows RRR no edema  Abdomen: No visible mass or tenderness, non distended. Musculoskeletal:  Joints with no swelling or deformity. Skin:no rashes, ulcers, induration, no jaundice. Neuro: Appropriate responses. Though a little slow.        Lab Results   Component Value Date    CREATININE 1.5 (H) 09/04/2022    BUN 51 (H) 09/04/2022     09/04/2022    K 3.9 09/04/2022     09/04/2022    CO2 23 09/04/2022      Lab Results   Component Value Date    WBC 7.6 09/04/2022    HGB 11.2 (L) 09/04/2022    HCT 33.0 (L) 09/04/2022    MCV 86.4 09/04/2022     09/04/2022     Lab Results   Component Value Date    CALCIUM 9.0 09/04/2022    CAION 1.09 (L) 09/24/2014    PHOS 3.3 01/08/2022

## 2022-09-05 PROBLEM — Z86.73 H/O: STROKE: Status: ACTIVE | Noted: 2022-09-05

## 2022-09-05 LAB
ANION GAP SERPL CALCULATED.3IONS-SCNC: 9 MMOL/L (ref 3–16)
BUN BLDV-MCNC: 40 MG/DL (ref 7–20)
CALCIUM SERPL-MCNC: 9 MG/DL (ref 8.3–10.6)
CHLORIDE BLD-SCNC: 106 MMOL/L (ref 99–110)
CO2: 24 MMOL/L (ref 21–32)
CREAT SERPL-MCNC: 1.3 MG/DL (ref 0.8–1.3)
GFR AFRICAN AMERICAN: >60
GFR NON-AFRICAN AMERICAN: 55
GLUCOSE BLD-MCNC: 111 MG/DL (ref 70–99)
HCT VFR BLD CALC: 32.5 % (ref 40.5–52.5)
HEMOGLOBIN: 11.1 G/DL (ref 13.5–17.5)
MCH RBC QN AUTO: 29.2 PG (ref 26–34)
MCHC RBC AUTO-ENTMCNC: 34.3 G/DL (ref 31–36)
MCV RBC AUTO: 85 FL (ref 80–100)
PDW BLD-RTO: 16.1 % (ref 12.4–15.4)
PLATELET # BLD: 153 K/UL (ref 135–450)
PMV BLD AUTO: 7.3 FL (ref 5–10.5)
POTASSIUM SERPL-SCNC: 4 MMOL/L (ref 3.5–5.1)
RBC # BLD: 3.82 M/UL (ref 4.2–5.9)
SODIUM BLD-SCNC: 139 MMOL/L (ref 136–145)
WBC # BLD: 8.1 K/UL (ref 4–11)

## 2022-09-05 PROCEDURE — 6370000000 HC RX 637 (ALT 250 FOR IP): Performed by: INTERNAL MEDICINE

## 2022-09-05 PROCEDURE — 2580000003 HC RX 258: Performed by: INTERNAL MEDICINE

## 2022-09-05 PROCEDURE — 92507 TX SP LANG VOICE COMM INDIV: CPT

## 2022-09-05 PROCEDURE — 94760 N-INVAS EAR/PLS OXIMETRY 1: CPT

## 2022-09-05 PROCEDURE — 80048 BASIC METABOLIC PNL TOTAL CA: CPT

## 2022-09-05 PROCEDURE — 6360000002 HC RX W HCPCS: Performed by: INTERNAL MEDICINE

## 2022-09-05 PROCEDURE — 36415 COLL VENOUS BLD VENIPUNCTURE: CPT

## 2022-09-05 PROCEDURE — 2580000003 HC RX 258: Performed by: HOSPITALIST

## 2022-09-05 PROCEDURE — 85027 COMPLETE CBC AUTOMATED: CPT

## 2022-09-05 PROCEDURE — 1200000000 HC SEMI PRIVATE

## 2022-09-05 RX ORDER — SODIUM CHLORIDE 9 MG/ML
INJECTION, SOLUTION INTRAVENOUS CONTINUOUS
Status: DISCONTINUED | OUTPATIENT
Start: 2022-09-05 | End: 2022-09-06

## 2022-09-05 RX ADMIN — PANTOPRAZOLE SODIUM 40 MG: 40 TABLET, DELAYED RELEASE ORAL at 08:55

## 2022-09-05 RX ADMIN — GABAPENTIN 300 MG: 300 CAPSULE ORAL at 21:10

## 2022-09-05 RX ADMIN — GABAPENTIN 300 MG: 300 CAPSULE ORAL at 16:22

## 2022-09-05 RX ADMIN — SODIUM CHLORIDE: 9 INJECTION, SOLUTION INTRAVENOUS at 11:06

## 2022-09-05 RX ADMIN — SENNOSIDES 8.6 MG: 8.6 TABLET, FILM COATED ORAL at 08:55

## 2022-09-05 RX ADMIN — ENOXAPARIN SODIUM 40 MG: 100 INJECTION SUBCUTANEOUS at 21:10

## 2022-09-05 RX ADMIN — SODIUM CHLORIDE, PRESERVATIVE FREE 10 ML: 5 INJECTION INTRAVENOUS at 21:38

## 2022-09-05 RX ADMIN — BACLOFEN 20 MG: 10 TABLET ORAL at 21:10

## 2022-09-05 RX ADMIN — BUSPIRONE HYDROCHLORIDE 5 MG: 5 TABLET ORAL at 08:55

## 2022-09-05 RX ADMIN — SODIUM CHLORIDE: 9 INJECTION, SOLUTION INTRAVENOUS at 13:54

## 2022-09-05 RX ADMIN — ATORVASTATIN CALCIUM 80 MG: 80 TABLET, FILM COATED ORAL at 21:10

## 2022-09-05 RX ADMIN — BUSPIRONE HYDROCHLORIDE 5 MG: 5 TABLET ORAL at 21:10

## 2022-09-05 RX ADMIN — SERTRALINE 100 MG: 100 TABLET, FILM COATED ORAL at 08:55

## 2022-09-05 RX ADMIN — FERROUS SULFATE TAB EC 324 MG (65 MG FE EQUIVALENT) 324 MG: 324 (65 FE) TABLET DELAYED RESPONSE at 08:55

## 2022-09-05 RX ADMIN — MULTIPLE VITAMINS W/ MINERALS TAB 1 TABLET: TAB at 08:55

## 2022-09-05 RX ADMIN — SODIUM CHLORIDE, PRESERVATIVE FREE 10 ML: 5 INJECTION INTRAVENOUS at 08:59

## 2022-09-05 RX ADMIN — CLOPIDOGREL BISULFATE 75 MG: 75 TABLET ORAL at 08:55

## 2022-09-05 RX ADMIN — OXYCODONE HYDROCHLORIDE AND ACETAMINOPHEN 500 MG: 500 TABLET ORAL at 08:55

## 2022-09-05 RX ADMIN — GABAPENTIN 300 MG: 300 CAPSULE ORAL at 08:55

## 2022-09-05 RX ADMIN — BACLOFEN 20 MG: 10 TABLET ORAL at 08:55

## 2022-09-05 RX ADMIN — ASPIRIN 81 MG: 81 TABLET, COATED ORAL at 08:55

## 2022-09-05 RX ADMIN — MIRTAZAPINE 15 MG: 15 TABLET, FILM COATED ORAL at 21:10

## 2022-09-05 NOTE — PROGRESS NOTES
Pt remained in bed all of shift- turned as allows. Stroke completed as ordered with a score of 3. Pt eating well and taking pills without difficulty.    Electronically signed by Arthur Asencio RN on 9/5/22 at 6:02 PM EDT

## 2022-09-05 NOTE — PROGRESS NOTES
Speech Language/Pathology   SPEECH LANGUAGE AND CLINICAL BEDSIDE SWALLOWING TREATMENT  Speech Therapy Department       Eyal Joshua Altamirano  AGE: 75 y.o. GENDER: male  : 1955  [id number]  EPISODE DATE:  2022    MEDICAL DIAGNOSIS:   No chief complaint on file. ONSET: Expressive aphasia symptom onset has been chronic for a time period since prior CVA. Severity is described as mild-moderate. PAST MEDICAL HISTORY        Diagnosis Date    Acute MI ([facility] [address] 2013    Anxiety     Arthritis     CAD (coronary artery disease)     COVID-19     Hyperlipidemia 2/3/2022    Hypertension     Influenza A 01/15/2017    Severe malnutrition ([facility] [address] 2/10/2022       PAST SURGICAL HISTORY    Past Surgical History:   Procedure Laterality Date    BACK SURGERY N/A     herniated disc    CORONARY ANGIOPLASTY WITH STENT PLACEMENT      EYE SURGERY      VASCULAR SURGERY         ALLERGIES    No Known Allergies    Chart Review:   H&P: Patient is a 79-year-old male with past medical history of CAD hypertension hyperlipidemia who presents to the hospital due to left-sided facial droop. 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 Otherwise denied chest pain shortness of breath nausea vomiting difficulty speaking. Mentions he thinks that he has been slow to respond to questions today. Otherwise denies fevers chills diarrhea constipation dysuria. He also denies any new numbness tingling or weakness in arms legs. Imaging:  CT head 22  Impression   CT brain:       1. No acute intracranial abnormality. 2. Involutional parenchymal changes with microvascular ischemic disease. 3. Chronic left parasagittal frontoparietal encephalomalacia/gliosis. 4. Scattered chronic lacunar infarcts such as in the bilateral basal ganglia   and right cerebellum. CTA head/neck:       1.  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 2. Otherwise, no evidence of large vessel occlusion or significant stenosis   in the remaining major arteries of the head and neck. 3. Mild (less than 50%) possibly moderate (50-69%) stenosis of the proximal   left ICA with an associated ulcerated atherosclerotic plaque. 4. Mild prominence of the pulmonary artery, may be related to pulmonary   arterial hypertension. 5. Bubbly air-fluid level present within the trachea and left main bronchus,   compatible with aspiration. 6. Scattered dental caries and periapical lucency. CHEST X-RAY 8/31/22  Impression   No radiographic evidence of acute pulmonary disease      Brain MRI 91/22:  Impression   1. Few small foci of diffusion restriction present in the left parasagittal   frontoparietal lobe may represent acute/subacute ischemia on a background of   chronic encephalomalacia/gliosis. 2. Involutional parenchymal changes with moderate microvascular ischemic   disease. 3. Scattered chronic lacunar infarcts such as in the bilateral basal ganglia   and right cerebellum. Prior Status: Reg/thin at [facility]; long term resident at [facility]. Pt reports previously seen by ST at [facility] but not currently on caseload. Subjective:     Current diet  Regular Texture / Thin liquids     Pt/staff statements regarding diet tolerance: 9/5: Patient and RN continue to report no difficulty swallowing or consuming regular texture/ thin liquids. Cognitive-communication treatment progress: He believes language function is somewhat improved compared to previous date, but not back to baseline. Objective: Current Therapy Impression of progress/goal status    IMPRESSIONS  Dysphagia:   Stable mild oral dysphagia characterized by mildly prolonged but effective mastication, potentially impacted by reduced dentition. No overt s/s. Patient continues to tolerate regular texture / thin liquids. Dysphagia goals met 9/2/2022.   Expressive Aphasia  Pt with persistent mild-moderate expressive language deficits during structured and unstructured tasks which appears below his reported baseline, despite known prior aphasia from previous CVAs. He completed sequencing and descriptive language tasks this date with cues to utilize circumlocution for word finding and prompts for verbal expansion. Continue SLP services during acute care stay and recommend to continue after discharge. Pt verbalizes motivation for therapy and frustration that speech/language function is below his PLOF. Recommended Diet:   Regular Texture/ Thin liquids  Medication Administration:  Whole with thin liquid    Compensatory Swallowing Strategies:  Upright as possible with all PO intake , Small bites/sips , Eat/feed slowly, Remain upright 30-45 min     SHORT TERM DYSPHAGIA GOALS  Pt will functionally tolerate recommended diet with no overt clinical s/s of aspiration - MET 9/2  Pt will demonstrate understanding of aspiration risk and precautions via education/demonstration with occasional prompting - MET 9/2    Short Term Speech/Language/Cognitive Goals:   Pt will improve verbal expression for functional expression via graded tasks to 95% -ONGOING; min-mod cues for description and expansion   Pt will participate in ongoing cognitive assessment with goals to be established as indicated - ONGOING; oriented x3 IND  Patient will complete short term memory tasks of increasing length and complexity with 85% accuracy given min cues -ONGOING; episodic memory appears intact    Prognosis  Good    Education  Role of SLP, strategies to reduce aspiration, word finding strategies. Pt with good understanding    Discharge Recommendations:  Pt will benefit from continued skilled Speech Therapy for Speech services. Please accept this as Speech Therapy Discharge status, if pt is discharged prior to next therapy session.     Objective: Assessment/Therapy activities    Treatment this deficit      Timed Code Treatment:  SLP Individual Minutes  Time In: 1325  Time Out: 9645  Minutes: 20  Total Treatment Time:  20 minutes     Signed:  Melissa Shukla MS, CCC-SLP #[id number]  Speech Language Pathologist

## 2022-09-05 NOTE — PROGRESS NOTES
Hospitalist Progress Note  9/5/2022 9:30 AM    PCP: No primary care provider on file.    [phone]     Date of Admission: 8/31/2022                                                                                                                     HOSPITAL COURSE    Patient demographics:  The patient  Trey Boyce is a 75 y.o. male     Significant past medical history:   Patient Active Problem List   Diagnosis    Chest pain    MI, acute, non ST segment elevation (HCC)    Tobacco abuse    Essential hypertension    Acute MI ([facility] [address]    Acute CVA (cerebrovascular accident) ([facility] [address]    Right leg weakness    Acute ischemic left LEXA stroke (HCC)    Non-healing open wound of right heel    Right heel infection    Hyperlipidemia    CAD (coronary artery disease)    Cellulitis of left heel    Wound infection    Acute hematogenous osteomyelitis (HCC)    Right hemiparesis (HCC)    Severe malnutrition (HCC)    Facial weakness         Presenting symptoms:  Left-sided facial droop    Diagnostic workup:      CONSULTS DURING ADMISSION :   IP CONSULT TO STROKE TEAM  IP CONSULT TO PHARMACY  PHARMACY TO CHANGE BASE FLUIDS  IP CONSULT TO HOSPITALIST  IP CONSULT TO NEUROLOGY  IP CONSULT TO CASE MANAGEMENT  IP CONSULT TO NEPHROLOGY      Patient was diagnosed with:  Left-sided facial droop, rule out CVA  History of prior CVAs   Elevated troponin,   MECHE  CAD  Hypertension  Hyperlipidemia      Treatment while inpatient:  Patient is a 60-year-old male with past medical history of CAD hypertension hyperlipidemia     who presents to the hospital due to left-sided facial droop. Patient already has right-sided weakness from previous stroke. MRI of the brain showed multiple areas of stroke but no acute evidence of stroke. Pt was also diagnosed with MECHE.  Which improved with IVF.                                                                         ----------------------------------------------------------      SUBJECTIVE COMPLAINTS- follow up for Left-sided facial droop    Diet: ADULT DIET; Regular; Low Fat/Low Chol/High Fiber/AYAKA      OBJECTIVE:   Patient Active Problem List   Diagnosis    Chest pain    MI, acute, non ST segment elevation (HCC)    Tobacco abuse    Essential hypertension    Acute MI ([facility] [address]    Acute CVA (cerebrovascular accident) ([facility] [address]    Right leg weakness    Acute ischemic left LEXA stroke (HCC)    Non-healing open wound of right heel    Right heel infection    Hyperlipidemia    CAD (coronary artery disease)    Cellulitis of left heel    Wound infection    Acute hematogenous osteomyelitis (HCC)    Right hemiparesis (HCC)    Severe malnutrition (HCC)    Facial weakness       Allergies  Patient has no known allergies.     Medications    Scheduled Meds:   gabapentin  300 mg Oral TID    sertraline  100 mg Oral Daily    sodium chloride flush  10 mL IntraVENous 2 times per day    enoxaparin  40 mg SubCUTAneous Nightly    aspirin  81 mg Oral Daily    Or    aspirin  300 mg Rectal Daily    vitamin C  500 mg Oral Daily    atenolol  25 mg Oral Daily    atorvastatin  80 mg Oral Nightly    baclofen  20 mg Oral BID    busPIRone  5 mg Oral BID    clopidogrel  75 mg Oral Daily    ferrous sulfate  324 mg Oral Daily with breakfast    [Held by provider] hydroCHLOROthiazide  25 mg Oral Daily    [Held by provider] lisinopril  10 mg Oral Daily    mirtazapine  15 mg Oral Nightly    therapeutic multivitamin-minerals  1 tablet Oral Daily    pantoprazole  40 mg Oral Daily    senna  1 tablet Oral Daily     Continuous Infusions:   sodium chloride 25 mL (09/02/22 2043)     PRN Meds:  sodium chloride, albuterol sulfate HFA, hydrALAZINE, albuterol, sodium chloride flush, sodium chloride, ondansetron **OR** ondansetron, nitroGLYCERIN    Vitals   Vitals /wt Patient Vitals for the past 8 hrs:   BP Temp Temp src Pulse Resp SpO2 Weight   09/05/22 0745 (!) 98/55 98.8 °F (37.1 °C) Axillary 52 14 94 % --   09/05/22 0353 121/66 97.7 °F (36.5 °C) Oral 62 14 96 % 202 lb 13.2 oz (92 kg)        72HR INTAKE/OUTPUT:    Intake/Output Summary (Last 24 hours) at 9/5/2022 0930  Last data filed at 9/5/2022 8371  Gross per 24 hour   Intake 120 ml   Output 3200 ml   Net -3080 ml       Exam:    Gen:   Alert and oriented ×3    Eyes: PERRL. No sclera icterus. No conjunctival injection. ENT: No discharge. Pharynx clear. External appearance of ears and nose normal.  Neck: Trachea midline. No obvious mass. Resp: No accessory muscle use. No crackles. No wheezes. No rhonchi. CV: Regular rate. Regular rhythm. No murmur or rub. No edema. GI: Non-tender. Non-distended. No hernia. Skin: Warm, dry, normal texture and turgor. Lymph: No cervical LAD. No supraclavicular LAD. M/S: / Ext. 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 PT/INR:   No results for input(s): PROTIME, INR in the last 72 hours. APTT: No results for input(s): APTT in the last 72 hours. CBC:   Recent Labs     09/03/22  0839 09/04/22  0542 09/05/22  0735   WBC 7.6 7.6 8.1   HGB 11.9* 11.2* 11.1*   HCT 35.1* 33.0* 32.5*   MCV 86.2 86.4 85.0    141 153       BMP:   Recent Labs     09/03/22  0547 09/04/22  0542 09/05/22  0735    141 139   K 4.2 3.9 4.0    106 106   CO2 24 23 24   BUN 51* 51* 40*   CREATININE 2.1* 1.5* 1.3       LIVER PROFILE:   No results for input(s): ALKPHOS, AST, ALT, ALB, BILIDIR, BILITOT, ALKPHOS in the last 72 hours. No results for input(s): AMYLASE in the last 72 hours. No results for input(s): LIPASE in the last 72 hours. UA:  No results for input(s): NITRITE, LABCAST, WBCUA, RBCUA, MUCUS in the last 72 hours. TROPONIN:   No results for input(s): Yosi Selfle in the last 72 hours. Lab Results   Component Value Date/Time    URRFLXCULT Not Indicated 05/28/2021 06:30 PM       No results for input(s): TSHREFLEX in the last 72 hours.     No components found for: [id number]  POC GLUCOSE:    No results for input(s): POCGLU in the last 72 hours. No results for input(s): LABA1C in the last 72 hours. Lab Results   Component Value Date/Time    LABA1C 5.2 09/01/2022 05:23 AM      Normal left ventricle size and systolic function with an estimated ejection   fraction of 55%. (8/31/2022)    ASSESSMENT AND PLAN  Left-sided facial droop  MRI ruled out acute stroke  EEG was completed and it did show focal sharp epileptiform waves  Neurology is following  No plan for AED at this time  Continue aspirin Plavix and statin        Elevated troponin,   patient is chest pain-free      MECHE  Hctz and lisinopril on hold  Ivf  Cr improving  Nephrology consult appreciated    CAD      Hypertension  Bp on the low side  Continue IVF    Hyperlipidemia    Urinary tract infection  Continue with ceftriaxone  Check procalcitonin    PT/OT/speech therapy consult        Code Status: Full Code        Dispo - cc        The patient and / or the family were informed of the results of any tests, a time was given to answer questions, a plan was proposed and they agreed with plan. Merline Portugal, MD    This note was transcribed using [id number] [email or web address]. Please disregard any translational errors.

## 2022-09-05 NOTE — PROGRESS NOTES
[facility]  Personalized Stroke Treatment Plan  My Stroke Type:   [] Ischemic Stroke (Blockage of blood flow to the brain)     [] TIA - Transient Ischemic Attack (mini-stroke)    Personal risk factors you can control include:    [] Alcohol Abuse: check with your physician before any alcohol consumption. [] Atrial fibrillation: may cause blood clots. [] Drug Abuse: Seek help, talk with your doctor  [] Clotting Disorder  [] Diabetes  [] Family history of stroke or heart disease  [] High Blood Pressure/Hypertension: work with your physician.  [] High cholesterol: monitor cholesterol levels with your physician. [x] Overweight/Obesity: work with your physician for your ideal body weight. [x] Physical Inactivity: get regular exercise as directed by your physician. [x] Personal history of previous TIA or stroke  [] Poor Diet; decrease salt (sodium) in your diet, follow diet directed by physician. [] Smoking: Cigarette/Cigar: stop smoking. Follow up with your physician is important after discharge. TAKE all medications as prescribed. Do not stop taking any medications   without talking to your physician. BE FAST is a simple way to remember the main symptoms of stroke. Recognizing these symptoms helps you know when to call for medical help. BE FAST stands for:                                                 B Balance problems                                                 E Eyes, vision lost        F  Face Drooping      A  Arm Weakness        S  Speech Difficulty      T  Time to Call 9-1-1  DO NOT DELAY THIS! Educated patient and/or family on personal risk factors for stroke/TIA. Included ways to reduce the risk for recurrent stroke. [facility]'s Stroke treatment and prevention, Managing your recovery  notebook  provided to patient. The notebook includes, but not limited to, sections addressing warning signs & symptoms of a stroke.  The need to call EMS ([phone]) immediately if signs & symptoms occur is emphasized . Medication information will be reviewed at discharge. The notebook also provides education on Stroke community resources and stroke advocacy.     Electronically signed by Electronically signed by My Ramsay RN on 9/5/2022 at 6:03 PM

## 2022-09-05 NOTE — CARE COORDINATION
09/05/22 1557   IMM Letter   IMM Letter given to Patient/Family/Significant other/Guardian/POA/by: Initial letter reviewed and given to patient by Godwin Mccartney RN CM. Patient has no questions, verbalized understanding.    IMM Letter date given: 09/05/22   IMM Letter time given: 6328

## 2022-09-05 NOTE — PLAN OF CARE
Problem: Neurosensory - Adult  Goal: Achieves stable or improved neurological status  Outcome: Progressing     Problem: Neurosensory - Adult  Goal: Achieves maximal functionality and self care  Outcome: Progressing     Problem: Discharge Planning  Goal: Discharge to home or other facility with appropriate resources  Outcome: Progressing     Problem: Safety - Adult  Goal: Free from fall injury  Outcome: Progressing     Problem: Skin/Tissue Integrity  Goal: Absence of new skin breakdown  Description: 1. Monitor for areas of redness and/or skin breakdown  2. Assess vascular access sites hourly  3. Every 4-6 hours minimum:  Change oxygen saturation probe site  4. Every 4-6 hours:  If on nasal continuous positive airway pressure, respiratory therapy assess nares and determine need for appliance change or resting period.   Outcome: Progressing

## 2022-09-05 NOTE — PROGRESS NOTES
[facility]                                               Progress note    Summary:   Robin Sahni is being seen by nephrology for MECHE. Admitted with left-sided facial droop. Has a history of CVA and residual right-sided weakness. Creatinine admission was 1.3 and increased to 1.6 on day 2 of hospitalization. Interval History  Seen and examined at bedside. Awake and alert   No edema or SOB    PO intake good, eating when seen  UTI on antibiotics  Ultrasound showing right renal cyst  Lisinopril on hold. Creatinine 1.3 > 2.1> 1.5 > 1.3  Sodium 138 > 139  Potassium 4.2 > 4    UO 2200 > 3200 cc    Plan: MECHE resolved. Off fluids. BP acceptable off lisinopril and HCTZ. Rajan Walton MD  [facility]  Office: [phone]    Assessment:   MECHE:  Cr up to 2.1, rule out retention, resume iVFs. Stopping HCTZ as well  - baseline Cr 0.7-1.0  - Cr on admission was 1.3, increased to 1.6 the next day  - no UA this admission  - no significant hemodynamic insult or fluctuation noted. - he had a CT with contrast 8/31/2022  - home lisinopril on hold  - check UA, renal US  - start IVF    Hypertension:  - home regimen: lisinopril 20 mg daily, atenolol 25 mg daily, HCTZ 25 mg daily  - lisinopril on hold due to MECHE    CVA:  - per primary and neurology            ROS:     Positives Listed Bold. All other remaining systems are negative. Constitutional:  fever, chills, weakness, weight change, fatigue,      Skin:  rash, pruritus, hair loss, bruising, dry skin, petechiae. Head, Face, Neck   headaches, swelling,  cervical adenopathy. Respiratory: shortness of breath, cough, or wheezing  Cardiovascular: chest pain, palpitations, dizzy, edema  Gastrointestinal: nausea, vomiting, diarrhea, constipation,belly pain    Yellow skin, blood in stool  Musculoskeletal:  back pain, muscle weakness, gait problems,       joint pain or swelling.   Genitourinary:  dysuria, poor urine flow, flank pain, blood in urine  Neurologic:  vertigo, TIA'S, syncope, seizures, focal weakness  Psychosocial:  insomnia, anxiety, or depression. Additional positive findings: -     PMH:   Past medical history, surgical history, social history, family history are reviewed and updated as appropriate. Reviewed current medication list.   Allergies reviewed and updated as needed. PE:   Vitals:    09/05/22 1100   BP: 122/65   Pulse: 56   Resp: 12   Temp: 97.7 °F (36.5 °C)   SpO2: 93%       General appearance:  in NAD, fully alert and oriented. Comfortable. HEENT: EOM intact, no icterus. Trachea is midline. Neck : No masses, appears symmetrical, no JVD  Respiratory: Respiratory effort appears normal, bilateral equal chest rise, no wheeze, no crackles   Cardiovascular: Ausculation shows RRR no edema  Abdomen: No visible mass or tenderness, non distended. Musculoskeletal:  Joints with no swelling or deformity. Skin:no rashes, ulcers, induration, no jaundice. Neuro: Appropriate responses. Though a little slow.        Lab Results   Component Value Date    CREATININE 1.3 09/05/2022    BUN 40 (H) 09/05/2022     09/05/2022    K 4.0 09/05/2022     09/05/2022    CO2 24 09/05/2022      Lab Results   Component Value Date    WBC 8.1 09/05/2022    HGB 11.1 (L) 09/05/2022    HCT 32.5 (L) 09/05/2022    MCV 85.0 09/05/2022     09/05/2022     Lab Results   Component Value Date    CALCIUM 9.0 09/05/2022    CAION 1.09 (L) 09/24/2014    PHOS 3.3 01/08/2022

## 2022-09-05 NOTE — PLAN OF CARE
Problem: Neurosensory - Adult  Goal: Achieves stable or improved neurological status  9/5/2022 1004 by Wilman Vidal RN  Outcome: Progressing  9/5/2022 0449 by Herbert Moya RN  Outcome: Progressing  Goal: Achieves maximal functionality and self care  9/5/2022 1004 by Wilman Vidal RN  Outcome: Progressing  9/5/2022 0449 by Herbert Moya RN  Outcome: Progressing     Problem: Discharge Planning  Goal: Discharge to home or other facility with appropriate resources  9/5/2022 1004 by Wilman Vidal RN  Outcome: Progressing  9/5/2022 0449 by Herbert Moya RN  Outcome: Progressing     Problem: Safety - Adult  Goal: Free from fall injury  9/5/2022 1004 by Wilman Vidal RN  Outcome: Progressing  Note: Fall precautions in place. Bed locked and in lowest position. Alarm on. Call light within reach. 9/5/2022 0449 by Herbert Moya RN  Outcome: Progressing     Problem: Skin/Tissue Integrity  Goal: Absence of new skin breakdown  Description: 1. Monitor for areas of redness and/or skin breakdown  2. Assess vascular access sites hourly  3. Every 4-6 hours minimum:  Change oxygen saturation probe site  4. Every 4-6 hours:  If on nasal continuous positive airway pressure, respiratory therapy assess nares and determine need for appliance change or resting period.   9/5/2022 1004 by Wilman Vidal RN  Outcome: Progressing  9/5/2022 0449 by Herbert Moya RN  Outcome: Progressing

## 2022-09-06 ENCOUNTER — APPOINTMENT (OUTPATIENT)
Dept: ULTRASOUND IMAGING | Age: 67
DRG: 682 | End: 2022-09-06
Payer: MEDICARE

## 2022-09-06 LAB
ANION GAP SERPL CALCULATED.3IONS-SCNC: 13 MMOL/L (ref 3–16)
BUN BLDV-MCNC: 32 MG/DL (ref 7–20)
CALCIUM SERPL-MCNC: 9.2 MG/DL (ref 8.3–10.6)
CHLORIDE BLD-SCNC: 105 MMOL/L (ref 99–110)
CO2: 20 MMOL/L (ref 21–32)
CREAT SERPL-MCNC: 1 MG/DL (ref 0.8–1.3)
GFR AFRICAN AMERICAN: >60
GFR NON-AFRICAN AMERICAN: >60
GLUCOSE BLD-MCNC: 133 MG/DL (ref 70–99)
HCT VFR BLD CALC: 34.7 % (ref 40.5–52.5)
HEMOGLOBIN: 11.6 G/DL (ref 13.5–17.5)
MCH RBC QN AUTO: 28.8 PG (ref 26–34)
MCHC RBC AUTO-ENTMCNC: 33.5 G/DL (ref 31–36)
MCV RBC AUTO: 86.1 FL (ref 80–100)
PDW BLD-RTO: 15.9 % (ref 12.4–15.4)
PLATELET # BLD: 144 K/UL (ref 135–450)
PMV BLD AUTO: 7.3 FL (ref 5–10.5)
POTASSIUM SERPL-SCNC: 4.3 MMOL/L (ref 3.5–5.1)
PROCALCITONIN: 0.06 NG/ML (ref 0–0.15)
RBC # BLD: 4.03 M/UL (ref 4.2–5.9)
SODIUM BLD-SCNC: 138 MMOL/L (ref 136–145)
WBC # BLD: 13.3 K/UL (ref 4–11)

## 2022-09-06 PROCEDURE — 1200000000 HC SEMI PRIVATE

## 2022-09-06 PROCEDURE — 6360000002 HC RX W HCPCS: Performed by: HOSPITALIST

## 2022-09-06 PROCEDURE — 85027 COMPLETE CBC AUTOMATED: CPT

## 2022-09-06 PROCEDURE — 2580000003 HC RX 258: Performed by: HOSPITALIST

## 2022-09-06 PROCEDURE — 6370000000 HC RX 637 (ALT 250 FOR IP): Performed by: HOSPITALIST

## 2022-09-06 PROCEDURE — 6360000002 HC RX W HCPCS: Performed by: INTERNAL MEDICINE

## 2022-09-06 PROCEDURE — 84145 PROCALCITONIN (PCT): CPT

## 2022-09-06 PROCEDURE — 6370000000 HC RX 637 (ALT 250 FOR IP): Performed by: INTERNAL MEDICINE

## 2022-09-06 PROCEDURE — 80048 BASIC METABOLIC PNL TOTAL CA: CPT

## 2022-09-06 PROCEDURE — 2580000003 HC RX 258: Performed by: INTERNAL MEDICINE

## 2022-09-06 PROCEDURE — 76770 US EXAM ABDO BACK WALL COMP: CPT

## 2022-09-06 PROCEDURE — 36415 COLL VENOUS BLD VENIPUNCTURE: CPT

## 2022-09-06 RX ORDER — LISINOPRIL 10 MG/1
10 TABLET ORAL DAILY
Status: DISCONTINUED | OUTPATIENT
Start: 2022-09-06 | End: 2022-09-12 | Stop reason: HOSPADM

## 2022-09-06 RX ORDER — LISINOPRIL 10 MG/1
10 TABLET ORAL DAILY
Status: DISCONTINUED | OUTPATIENT
Start: 2022-09-06 | End: 2022-09-06

## 2022-09-06 RX ORDER — ACETAMINOPHEN 325 MG/1
650 TABLET ORAL EVERY 4 HOURS PRN
Status: DISCONTINUED | OUTPATIENT
Start: 2022-09-06 | End: 2022-09-12 | Stop reason: HOSPADM

## 2022-09-06 RX ADMIN — MULTIPLE VITAMINS W/ MINERALS TAB 1 TABLET: TAB at 10:25

## 2022-09-06 RX ADMIN — GABAPENTIN 300 MG: 300 CAPSULE ORAL at 13:23

## 2022-09-06 RX ADMIN — SERTRALINE 100 MG: 100 TABLET, FILM COATED ORAL at 10:25

## 2022-09-06 RX ADMIN — ENOXAPARIN SODIUM 40 MG: 100 INJECTION SUBCUTANEOUS at 20:10

## 2022-09-06 RX ADMIN — SODIUM CHLORIDE, PRESERVATIVE FREE 10 ML: 5 INJECTION INTRAVENOUS at 20:12

## 2022-09-06 RX ADMIN — OXYCODONE HYDROCHLORIDE AND ACETAMINOPHEN 500 MG: 500 TABLET ORAL at 10:25

## 2022-09-06 RX ADMIN — GABAPENTIN 300 MG: 300 CAPSULE ORAL at 10:25

## 2022-09-06 RX ADMIN — BACLOFEN 20 MG: 10 TABLET ORAL at 20:10

## 2022-09-06 RX ADMIN — FERROUS SULFATE TAB EC 324 MG (65 MG FE EQUIVALENT) 324 MG: 324 (65 FE) TABLET DELAYED RESPONSE at 10:25

## 2022-09-06 RX ADMIN — CEFTRIAXONE 1000 MG: 1 INJECTION, POWDER, FOR SOLUTION INTRAMUSCULAR; INTRAVENOUS at 13:26

## 2022-09-06 RX ADMIN — BACLOFEN 20 MG: 10 TABLET ORAL at 10:25

## 2022-09-06 RX ADMIN — BUSPIRONE HYDROCHLORIDE 5 MG: 5 TABLET ORAL at 20:10

## 2022-09-06 RX ADMIN — ACETAMINOPHEN 650 MG: 325 TABLET ORAL at 13:23

## 2022-09-06 RX ADMIN — BUSPIRONE HYDROCHLORIDE 5 MG: 5 TABLET ORAL at 10:25

## 2022-09-06 RX ADMIN — PANTOPRAZOLE SODIUM 40 MG: 40 TABLET, DELAYED RELEASE ORAL at 10:25

## 2022-09-06 RX ADMIN — ONDANSETRON 4 MG: 4 TABLET, ORALLY DISINTEGRATING ORAL at 01:43

## 2022-09-06 RX ADMIN — SENNOSIDES 8.6 MG: 8.6 TABLET, FILM COATED ORAL at 10:25

## 2022-09-06 RX ADMIN — LISINOPRIL 10 MG: 10 TABLET ORAL at 18:49

## 2022-09-06 RX ADMIN — ATORVASTATIN CALCIUM 80 MG: 80 TABLET, FILM COATED ORAL at 20:10

## 2022-09-06 RX ADMIN — GABAPENTIN 300 MG: 300 CAPSULE ORAL at 20:10

## 2022-09-06 RX ADMIN — SODIUM CHLORIDE: 9 INJECTION, SOLUTION INTRAVENOUS at 03:20

## 2022-09-06 RX ADMIN — ASPIRIN 81 MG: 81 TABLET, COATED ORAL at 10:25

## 2022-09-06 RX ADMIN — MIRTAZAPINE 15 MG: 15 TABLET, FILM COATED ORAL at 20:10

## 2022-09-06 RX ADMIN — ATENOLOL 25 MG: 25 TABLET ORAL at 10:25

## 2022-09-06 RX ADMIN — CLOPIDOGREL BISULFATE 75 MG: 75 TABLET ORAL at 10:25

## 2022-09-06 ASSESSMENT — PAIN SCALES - GENERAL
PAINLEVEL_OUTOF10: 0

## 2022-09-06 NOTE — PLAN OF CARE
Problem: Neurosensory - Adult  Goal: Achieves stable or improved neurological status  Outcome: Progressing  Goal: Achieves maximal functionality and self care  Outcome: Progressing     Problem: Discharge Planning  Goal: Discharge to home or other facility with appropriate resources  Outcome: Progressing     Problem: Safety - Adult  Goal: Free from fall injury  Outcome: Progressing     Problem: Skin/Tissue Integrity  Goal: Absence of new skin breakdown  Description: 1. Monitor for areas of redness and/or skin breakdown  2. Assess vascular access sites hourly  3. Every 4-6 hours minimum:  Change oxygen saturation probe site  4. Every 4-6 hours:  If on nasal continuous positive airway pressure, respiratory therapy assess nares and determine need for appliance change or resting period.   Outcome: Progressing     Problem: ABCDS Injury Assessment  Goal: Absence of physical injury  Outcome: Progressing

## 2022-09-06 NOTE — CARE COORDINATION
Discharge Planning:    Call placed to Alethea Fallon, patient's significant other, to inquire about HCPOA documentation and to provide update on discharge plans. Left HIPAA-compliant message requesting a return call. Plan remains for patient to return to [place] [phone] upon discharge.     Electronically signed by Jose Marquez RN on 9/6/2022 at 1:27 PM

## 2022-09-06 NOTE — PROGRESS NOTES
[facility]                                               Progress note    Summary:   Flakita Regalado is being seen by nephrology for MECHE. Admitted with left-sided facial droop. Has a history of CVA and residual right-sided weakness. Creatinine admission was 1.3 and increased to 1.6 on day 2 of hospitalization. Interval History  Patient was seen and examined at bedside  He is awake and alert, has a good appetite. He has no complaints. Blood pressure 120/61  91% on room air  Urine output 2.6 L, does not have any Corbett    Labs show sodium of 138 potassium 4.3  Bicarb 28 BUN 32 creatinine 1    Plan:   His MECHE is resolved and his blood pressure is acceptable  He has been restarted on lisinopril 10 mg daily  I would leave him off of the hydrochlorothiazide at discharge. He has normal kidney function at baseline and as long as he has labs with his primary care provider does not need nephrology follow-up. Crystal Ugalde MD  [facility]  Office: [phone]    Assessment:   MECHE:  Cr up to 2.1, rule out retention, resume iVFs. Stopping HCTZ as well  - baseline Cr 0.7-1.0  - Cr on admission was 1.3, increased to 1.6 the next day  - no UA this admission  - no significant hemodynamic insult or fluctuation noted. - he had a CT with contrast 8/31/2022  - home lisinopril on hold  - check UA, renal US  - start IVF    Hypertension:  - home regimen: lisinopril 20 mg daily, atenolol 25 mg daily, HCTZ 25 mg daily  - lisinopril on hold due to MECHE    CVA:  - per primary and neurology            ROS:     Positives Listed Bold. All other remaining systems are negative. Constitutional:  fever, chills, weakness, weight change, fatigue,      Skin:  rash, pruritus, hair loss, bruising, dry skin, petechiae. Head, Face, Neck   headaches, swelling,  cervical adenopathy.      Respiratory: shortness of breath, cough, or wheezing  Cardiovascular: chest pain, palpitations, dizzy, edema  Gastrointestinal: nausea, vomiting, diarrhea, constipation,belly pain    Yellow skin, blood in stool  Musculoskeletal:  back pain, muscle weakness, gait problems,       joint pain or swelling. Genitourinary:  dysuria, poor urine flow, flank pain, blood in urine  Neurologic:  vertigo, TIA'S, syncope, seizures, focal weakness  Psychosocial:  insomnia, anxiety, or depression. Additional positive findings: -     PMH:   Past medical history, surgical history, social history, family history are reviewed and updated as appropriate. Reviewed current medication list.   Allergies reviewed and updated as needed. PE:   Vitals:    09/06/22 1214   BP: 120/61   Pulse:    Resp:    Temp: 98.5 °F (36.9 °C)   SpO2: (!) 79%       General appearance:  in NAD, fully alert and oriented. Comfortable. HEENT: EOM intact, no icterus. Trachea is midline. Neck : No masses, appears symmetrical, no JVD  Respiratory: Respiratory effort appears normal, bilateral equal chest rise, no wheeze, no crackles   Cardiovascular: Ausculation shows RRR no edema  Abdomen: No visible mass or tenderness, non distended. Musculoskeletal:  Joints with no swelling or deformity. Skin:no rashes, ulcers, induration, no jaundice. Neuro: Appropriate responses. Though a little slow.        Lab Results   Component Value Date    CREATININE 1.0 09/06/2022    BUN 32 (H) 09/06/2022     09/06/2022    K 4.3 09/06/2022     09/06/2022    CO2 20 (L) 09/06/2022      Lab Results   Component Value Date    WBC 13.3 (H) 09/06/2022    HGB 11.6 (L) 09/06/2022    HCT 34.7 (L) 09/06/2022    MCV 86.1 09/06/2022     09/06/2022     Lab Results   Component Value Date    CALCIUM 9.2 09/06/2022    CAION 1.09 (L) 09/24/2014    PHOS 3.3 01/08/2022

## 2022-09-06 NOTE — PLAN OF CARE
Problem: Neurosensory - Adult  Goal: Achieves stable or improved neurological status  9/5/2022 1004 by Nadiya Godwin RN  Outcome: Progressing  Goal: Achieves maximal functionality and self care  9/5/2022 1004 by Nadiya Godwin RN  Outcome: Progressing     Problem: Discharge Planning  Goal: Discharge to home or other facility with appropriate resources  9/5/2022 2252 by Alonso Coats RN  Outcome: Progressing  9/5/2022 1004 by Nadiya Godwin RN  Outcome: Progressing     Problem: Safety - Adult  Goal: Free from fall injury  9/5/2022 2252 by Alonso Coats RN  Outcome: Progressing  9/5/2022 1004 by Nadiya Godwin RN  Outcome: Progressing  Note: Fall precautions in place. Bed locked and in lowest position. Alarm on. Call light within reach. Problem: Skin/Tissue Integrity  Goal: Absence of new skin breakdown  Description: 1. Monitor for areas of redness and/or skin breakdown  2. Assess vascular access sites hourly  3. Every 4-6 hours minimum:  Change oxygen saturation probe site  4. Every 4-6 hours:  If on nasal continuous positive airway pressure, respiratory therapy assess nares and determine need for appliance change or resting period.   9/5/2022 2252 by Alonso Coats RN  Outcome: Progressing  9/5/2022 1004 by Nadiya Godwin RN  Outcome: Progressing     Problem: ABCDS Injury Assessment  Goal: Absence of physical injury  Outcome: Progressing

## 2022-09-06 NOTE — PROGRESS NOTES
Speech Language Pathology    Attempted to see patient for speech lang treatment. Patient being taken down for ultrasound at this time. Will attempt 9/7/22.     Kyung Sanders, Count includes the [facility]  Speech-Language Pathologist  On 09/06/22 at 2:50 PM

## 2022-09-06 NOTE — PROGRESS NOTES
Hospitalist Progress Note  9/6/2022 9:56 AM    PCP: No primary care provider on file.    [phone]     Date of Admission: 8/31/2022                                                                                                                     HOSPITAL COURSE    Patient demographics:  The patient  Alex Johnson is a 75 y.o. male     Significant past medical history:   Patient Active Problem List   Diagnosis    Chest pain    MI, acute, non ST segment elevation (HCC)    Tobacco abuse    Essential hypertension    Acute MI ([address]    Acute CVA (cerebrovascular accident) ([address]    Right leg weakness    Acute ischemic left LEXA stroke (HCC)    Non-healing open wound of right heel    Right heel infection    Hyperlipidemia    CAD (coronary artery disease)    Cellulitis of left heel    Wound infection    Acute hematogenous osteomyelitis ([address]    Right hemiparesis (HCC)    Severe malnutrition ([address]    Facial weakness    H/O: stroke         Presenting symptoms:  Left-sided facial droop    Diagnostic workup:      CONSULTS DURING ADMISSION :   IP CONSULT TO STROKE TEAM  IP CONSULT TO PHARMACY  PHARMACY TO CHANGE BASE FLUIDS  IP CONSULT TO HOSPITALIST  IP CONSULT TO NEUROLOGY  IP CONSULT TO CASE MANAGEMENT  IP CONSULT TO NEPHROLOGY      Patient was diagnosed with:  Left-sided facial droop, rule out CVA  History of prior CVAs   Elevated troponin,   MECHE  CAD  Hypertension  Hyperlipidemia      Treatment while inpatient:  Patient is a 71-year-old male with past medical history of CAD hypertension hyperlipidemia     who presents to the hospital due to left-sided facial droop. Patient already has right-sided weakness from previous stroke. MRI of the brain showed multiple areas of stroke but no acute evidence of stroke. Pt was also diagnosed with MECHE.  Which improved with IVF.                                                                         ----------------------------------------------------------      SUBJECTIVE COMPLAINTS- follow up for Left-sided facial droop    Diet: ADULT DIET; Regular; Low Fat/Low Chol/High Fiber/AYAKA      OBJECTIVE:   Patient Active Problem List   Diagnosis    Chest pain    MI, acute, non ST segment elevation (HCC)    Tobacco abuse    Essential hypertension    [address]    Acute CVA (cerebrovascular accident) [address] [facility]    Right leg weakness    Acute ischemic left LEXA stroke (HCC)    Non-healing open wound of right heel    Right heel infection    Hyperlipidemia    CAD (coronary artery disease)    Cellulitis of left heel    Wound infection    Acute hematogenous osteomyelitis (HCC)    Right hemiparesis (HCC)    Severe malnutrition (HCC)    Facial weakness    H/O: stroke       Allergies  Patient has no known allergies.     Medications    Scheduled Meds:   gabapentin  300 mg Oral TID    sertraline  100 mg Oral Daily    sodium chloride flush  10 mL IntraVENous 2 times per day    enoxaparin  40 mg SubCUTAneous Nightly    aspirin  81 mg Oral Daily    Or    aspirin  300 mg Rectal Daily    vitamin C  500 mg Oral Daily    atenolol  25 mg Oral Daily    atorvastatin  80 mg Oral Nightly    baclofen  20 mg Oral BID    busPIRone  5 mg Oral BID    clopidogrel  75 mg Oral Daily    ferrous sulfate  324 mg Oral Daily with breakfast    [Held by provider] hydroCHLOROthiazide  25 mg Oral Daily    [Held by provider] lisinopril  10 mg Oral Daily    mirtazapine  15 mg Oral Nightly    therapeutic multivitamin-minerals  1 tablet Oral Daily    pantoprazole  40 mg Oral Daily    senna  1 tablet Oral Daily     Continuous Infusions:   sodium chloride 76 mL/hr at 09/06/22 0320    sodium chloride 25 mL (09/02/22 2043)     PRN Meds:  sodium chloride, albuterol sulfate HFA, hydrALAZINE, albuterol, sodium chloride flush, sodium chloride, ondansetron **OR** ondansetron, nitroGLYCERIN    Vitals   Vitals /wt Patient Vitals for the past 8 hrs:   BP Temp Temp src Pulse Resp SpO2 Weight   09/06/22 0351 -- -- -- -- -- -- 203 lb 7.8 oz (92.3 kg) for input(s): TSHREFLEX in the last 72 hours. No components found for: [id number]  POC GLUCOSE:    No results for input(s): POCGLU in the last 72 hours. No results for input(s): LABA1C in the last 72 hours. Lab Results   Component Value Date/Time    LABA1C 5.2 09/01/2022 05:23 AM      Normal left ventricle size and systolic function with an estimated ejection   fraction of 55%. (8/31/2022)    ASSESSMENT AND PLAN  Left-sided facial droop  MRI ruled out acute stroke  EEG was completed and it did show focal sharp epileptiform waves  Neurology is following  No plan for AED at this time  Continue aspirin Plavix and statin      Elevated troponin,   patient is chest pain-free      MECHE  Dc ivf  Cr improved  Resume lisinopril    CAD      Hypertension  Bp on the low side  Dc ivf  Resume lisinopril    Hyperlipidemia    Urinary tract infection  Continue with ceftriaxone  Check procalcitonin    PT/OT/speech therapy consult        Code Status: Full Code        Dispo - cc        The patient and / or the family were informed of the results of any tests, a time was given to answer questions, a plan was proposed and they agreed with plan. Livia Ferguson MD    This note was transcribed using [id number] [employer]. Please disregard any translational errors.

## 2022-09-07 ENCOUNTER — APPOINTMENT (OUTPATIENT)
Dept: MRI IMAGING | Age: 67
DRG: 682 | End: 2022-09-07
Payer: MEDICARE

## 2022-09-07 ENCOUNTER — APPOINTMENT (OUTPATIENT)
Dept: GENERAL RADIOLOGY | Age: 67
DRG: 682 | End: 2022-09-07
Payer: MEDICARE

## 2022-09-07 LAB
HCT VFR BLD CALC: 33 % (ref 40.5–52.5)
HEMOGLOBIN: 11.1 G/DL (ref 13.5–17.5)
MCH RBC QN AUTO: 29.3 PG (ref 26–34)
MCHC RBC AUTO-ENTMCNC: 33.7 G/DL (ref 31–36)
MCV RBC AUTO: 86.9 FL (ref 80–100)
PDW BLD-RTO: 16.2 % (ref 12.4–15.4)
PLATELET # BLD: 144 K/UL (ref 135–450)
PMV BLD AUTO: 7.4 FL (ref 5–10.5)
PROCALCITONIN: 0.13 NG/ML (ref 0–0.15)
RBC # BLD: 3.79 M/UL (ref 4.2–5.9)
SARS-COV-2, NAAT: NOT DETECTED
WBC # BLD: 16.6 K/UL (ref 4–11)

## 2022-09-07 PROCEDURE — 2580000003 HC RX 258: Performed by: HOSPITALIST

## 2022-09-07 PROCEDURE — 92507 TX SP LANG VOICE COMM INDIV: CPT

## 2022-09-07 PROCEDURE — 2580000003 HC RX 258: Performed by: INTERNAL MEDICINE

## 2022-09-07 PROCEDURE — 84145 PROCALCITONIN (PCT): CPT

## 2022-09-07 PROCEDURE — 6370000000 HC RX 637 (ALT 250 FOR IP): Performed by: INTERNAL MEDICINE

## 2022-09-07 PROCEDURE — A9577 INJ MULTIHANCE: HCPCS | Performed by: HOSPITALIST

## 2022-09-07 PROCEDURE — 6370000000 HC RX 637 (ALT 250 FOR IP): Performed by: HOSPITALIST

## 2022-09-07 PROCEDURE — 36415 COLL VENOUS BLD VENIPUNCTURE: CPT

## 2022-09-07 PROCEDURE — 6360000002 HC RX W HCPCS: Performed by: HOSPITALIST

## 2022-09-07 PROCEDURE — 97129 THER IVNTJ 1ST 15 MIN: CPT

## 2022-09-07 PROCEDURE — 6360000002 HC RX W HCPCS: Performed by: INTERNAL MEDICINE

## 2022-09-07 PROCEDURE — 87635 SARS-COV-2 COVID-19 AMP PRB: CPT

## 2022-09-07 PROCEDURE — 94760 N-INVAS EAR/PLS OXIMETRY 1: CPT

## 2022-09-07 PROCEDURE — 85027 COMPLETE CBC AUTOMATED: CPT

## 2022-09-07 PROCEDURE — 1200000000 HC SEMI PRIVATE

## 2022-09-07 PROCEDURE — 87641 MR-STAPH DNA AMP PROBE: CPT

## 2022-09-07 PROCEDURE — 71045 X-RAY EXAM CHEST 1 VIEW: CPT

## 2022-09-07 PROCEDURE — 74183 MRI ABD W/O CNTR FLWD CNTR: CPT

## 2022-09-07 PROCEDURE — 6360000004 HC RX CONTRAST MEDICATION: Performed by: HOSPITALIST

## 2022-09-07 RX ADMIN — SODIUM CHLORIDE, PRESERVATIVE FREE 10 ML: 5 INJECTION INTRAVENOUS at 20:23

## 2022-09-07 RX ADMIN — ASPIRIN 81 MG: 81 TABLET, COATED ORAL at 08:39

## 2022-09-07 RX ADMIN — BACLOFEN 20 MG: 10 TABLET ORAL at 20:22

## 2022-09-07 RX ADMIN — OXYCODONE HYDROCHLORIDE AND ACETAMINOPHEN 500 MG: 500 TABLET ORAL at 08:38

## 2022-09-07 RX ADMIN — BUSPIRONE HYDROCHLORIDE 5 MG: 5 TABLET ORAL at 08:39

## 2022-09-07 RX ADMIN — ENOXAPARIN SODIUM 40 MG: 100 INJECTION SUBCUTANEOUS at 20:22

## 2022-09-07 RX ADMIN — MIRTAZAPINE 15 MG: 15 TABLET, FILM COATED ORAL at 20:22

## 2022-09-07 RX ADMIN — PANTOPRAZOLE SODIUM 40 MG: 40 TABLET, DELAYED RELEASE ORAL at 08:39

## 2022-09-07 RX ADMIN — SERTRALINE 100 MG: 100 TABLET, FILM COATED ORAL at 08:38

## 2022-09-07 RX ADMIN — BACLOFEN 20 MG: 10 TABLET ORAL at 08:38

## 2022-09-07 RX ADMIN — MULTIPLE VITAMINS W/ MINERALS TAB 1 TABLET: TAB at 08:38

## 2022-09-07 RX ADMIN — GABAPENTIN 300 MG: 300 CAPSULE ORAL at 08:38

## 2022-09-07 RX ADMIN — CEFEPIME 2000 MG: 2 INJECTION, POWDER, FOR SOLUTION INTRAVENOUS at 15:03

## 2022-09-07 RX ADMIN — FERROUS SULFATE TAB EC 324 MG (65 MG FE EQUIVALENT) 324 MG: 324 (65 FE) TABLET DELAYED RESPONSE at 08:38

## 2022-09-07 RX ADMIN — CLOPIDOGREL BISULFATE 75 MG: 75 TABLET ORAL at 08:38

## 2022-09-07 RX ADMIN — BUSPIRONE HYDROCHLORIDE 5 MG: 5 TABLET ORAL at 20:22

## 2022-09-07 RX ADMIN — Medication 1500 MG: at 15:47

## 2022-09-07 RX ADMIN — GADOBENATE DIMEGLUMINE 14 ML: 529 INJECTION, SOLUTION INTRAVENOUS at 10:52

## 2022-09-07 RX ADMIN — ATENOLOL 25 MG: 25 TABLET ORAL at 08:38

## 2022-09-07 RX ADMIN — SODIUM CHLORIDE, PRESERVATIVE FREE 10 ML: 5 INJECTION INTRAVENOUS at 08:41

## 2022-09-07 RX ADMIN — CEFTRIAXONE 1000 MG: 1 INJECTION, POWDER, FOR SOLUTION INTRAMUSCULAR; INTRAVENOUS at 11:13

## 2022-09-07 RX ADMIN — GABAPENTIN 300 MG: 300 CAPSULE ORAL at 20:22

## 2022-09-07 RX ADMIN — SENNOSIDES 8.6 MG: 8.6 TABLET, FILM COATED ORAL at 08:38

## 2022-09-07 RX ADMIN — GABAPENTIN 300 MG: 300 CAPSULE ORAL at 13:10

## 2022-09-07 RX ADMIN — ATORVASTATIN CALCIUM 80 MG: 80 TABLET, FILM COATED ORAL at 20:22

## 2022-09-07 RX ADMIN — LISINOPRIL 10 MG: 10 TABLET ORAL at 08:38

## 2022-09-07 ASSESSMENT — PAIN SCALES - GENERAL
PAINLEVEL_OUTOF10: 0

## 2022-09-07 NOTE — PROGRESS NOTES
Speech Language/Pathology   SPEECH LANGUAGE TREATMENT  Speech Therapy Department       Trey Boyce  AGE: 75 y.o. GENDER: male  : 1955  [id number]  EPISODE DATE:  2022    MEDICAL DIAGNOSIS:  No chief complaint on file. ONSET: Expressive aphasia symptom onset has been chronic for a time period since prior CVA. Severity is described as mild-moderate. PAST MEDICAL HISTORY        Diagnosis Date    Acute MI ([facility] [address] 2013    Anxiety     Arthritis     CAD (coronary artery disease)     COVID-19     Hyperlipidemia 2/3/2022    Hypertension     Influenza A 01/15/2017    Severe malnutrition ([facility] [address] 2/10/2022       PAST SURGICAL HISTORY    Past Surgical History:   Procedure Laterality Date    BACK SURGERY N/A     herniated disc    CORONARY ANGIOPLASTY WITH STENT PLACEMENT      EYE SURGERY      VASCULAR SURGERY         ALLERGIES    No Known Allergies    Chart Review:   H&P: Patient is a 65-year-old male with past medical history of CAD hypertension hyperlipidemia who presents to the hospital due to left-sided facial droop. 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 Otherwise denied chest pain shortness of breath nausea vomiting difficulty speaking. Mentions he thinks that he has been slow to respond to questions today. Otherwise denies fevers chills diarrhea constipation dysuria. He also denies any new numbness tingling or weakness in arms legs. Imaging:  CT head 22  Impression   CT brain:       1. No acute intracranial abnormality. 2. Involutional parenchymal changes with microvascular ischemic disease. 3. Chronic left parasagittal frontoparietal encephalomalacia/gliosis. 4. Scattered chronic lacunar infarcts such as in the bilateral basal ganglia   and right cerebellum. CTA head/neck:       1. 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    2. Otherwise, no evidence of large vessel occlusion or significant stenosis   in the remaining major arteries of the head and neck. 3. Mild (less than 50%) possibly moderate (50-69%) stenosis of the proximal   left ICA with an associated ulcerated atherosclerotic plaque. 4. Mild prominence of the pulmonary artery, may be related to pulmonary   arterial hypertension. 5. Bubbly air-fluid level present within the trachea and left main bronchus,   compatible with aspiration. 6. Scattered dental caries and periapical lucency. CHEST X-RAY 8/31/22  Impression   No radiographic evidence of acute pulmonary disease      Brain MRI 91/22:  Impression   1. Few small foci of diffusion restriction present in the left parasagittal   frontoparietal lobe may represent acute/subacute ischemia on a background of   chronic encephalomalacia/gliosis. 2. Involutional parenchymal changes with moderate microvascular ischemic   disease. 3. Scattered chronic lacunar infarcts such as in the bilateral basal ganglia   and right cerebellum. Prior Status: Reg/thin at [facility]; long term resident at [facility]. Pt reports previously seen by ST at [facility] but not currently on caseload. Subjective:     Cognitive-communication treatment progress: reports language continuing to improve but remains below baseline     Objective: Current Therapy Impression of progress/goal status    IMPRESSIONS  Expressive Aphasia  Pt with persistent mild-moderate expressive language deficits during structured and unstructured tasks which appears below his reported baseline, despite known prior aphasia from previous CVAs. He completed sequencing and descriptive language tasks this date with min cues to utilize circumlocution for word finding and moderate prompts for verbal expansion. Continue SLP services during acute care stay and recommend to continue after discharge.   Pt verbalizes motivation for therapy and frustration that speech/language function is below his PLOF.      Short Term Speech/Language/Cognitive Goals:   Pt will improve verbal expression for functional expression via graded tasks to 95% -ONGOING; min-mod cues for description and expansion   Pt will participate in ongoing cognitive assessment with goals to be established as indicated - ONGOING  Patient will complete short term memory tasks of increasing length and complexity with 85% accuracy given min cues -ONGOING; episodic memory appears intact    Speech Therapy Prognosis  Good    Education  Role of SLP, word finding strategies: Pt with good understanding    Discharge Recommendations:  Pt will benefit from continued skilled Speech Therapy for Speech services. Please accept this as Speech Therapy Discharge status, if pt is discharged prior to next therapy session.     Objective: Assessment/Therapy activities    Treatment this session  Objective:  COMPREHENSION  Auditory Comprehension: [x]WFL []Mild   [] Moderate  []Severe  []To be assessed  Functional Ability to Comprehend Basic Commands/Questions     EXPRESSION  Primary Mode of Expression: Verbal  Verbal Expression: []WFL [x]Mild   [x] Moderate  []Severe  []To be assessed  Convergent naming: min cues  Compare/contrast: min cues  Open-ended wh- ?: mod cues     Pragmatics/Social Functioning: [x]WFL []Mild   [] Moderate  []Severe  []To be assessed     MOTOR SPEECH  Motor Speech: [x]WFL []Mild   [] Moderate  []Severe  []To be assessed    VOICE  Voice: [x]WFL []Mild   [] Moderate  []Severe  []To be assessed      COGNITION      Overall Orientation : [x]WFL []Mild   [] Moderate  []Severe  []To be assessed   []Unable to be assessed secondary to Aphasia   Oriented x4    Attention: [x]WFL []Mild   [] Moderate  []Severe  []To be assessed    Memory: [x]WFL [x]Mild   [] Moderate  []Severe  []To be assessed  Recalls daily evens/info  Recalls novel 5 unit message over 15\"    Problem Solving: []WFL []Mild   [] Moderate  []Severe  [x]To be assessed  Anomia is a barrier    Safety/Judgement: [x]WFL []Mild   [] Moderate  []Severe  []To be assessed      Plan   Plan:    Speech therapy 3-5 times a weeks for speech-language treatment,    Interventions: Language therapy, word finding strategy training, cognitive-communication tx  Barriers toward progress toward pt goals: Communication deficit      Timed Code Treatment: 10  Total Treatment Time: 25       Signed:  Constanza Karimi.  Juanis Cullen, [address] [facility], [id number]  Speech-Language Pathologist  Portable phone: [phone]  09/07/22 4:40 PM

## 2022-09-07 NOTE — PROGRESS NOTES
Hospitalist Progress Note  9/7/2022 9:29 AM    PCP: No primary care provider on file.    [phone]     Date of Admission: 8/31/2022                                                                                                                     HOSPITAL COURSE    Patient demographics:  The patient  Alex Johnson is a 75 y.o. male     Significant past medical history:   Patient Active Problem List   Diagnosis    Chest pain    MI, acute, non ST segment elevation (HCC)    Tobacco abuse    Essential hypertension    Acute MI ([address]    Acute CVA (cerebrovascular accident) ([address]    Right leg weakness    Acute ischemic left [place] stroke (HCC)    Non-healing open wound of right heel    Right heel infection    Hyperlipidemia    CAD (coronary artery disease)    Cellulitis of left heel    Wound infection    Acute hematogenous osteomyelitis ([address]    Right hemiparesis (HCC)    Severe malnutrition ([address]    Facial weakness    H/O: stroke         Presenting symptoms:  Left-sided facial droop    Diagnostic workup:      CONSULTS DURING ADMISSION :   IP CONSULT TO STROKE TEAM  IP CONSULT TO PHARMACY  PHARMACY TO CHANGE BASE FLUIDS  IP CONSULT TO HOSPITALIST  IP CONSULT TO NEUROLOGY  IP CONSULT TO CASE MANAGEMENT  IP CONSULT TO NEPHROLOGY      Patient was diagnosed with:  Left-sided facial droop, rule out CVA  History of prior CVAs   Elevated troponin,   MECHE  CAD  Hypertension  Hyperlipidemia      Treatment while inpatient:  Patient is a 53-year-old male with past medical history of CAD hypertension hyperlipidemia     who presents to the hospital due to left-sided facial droop. Patient already has right-sided weakness from previous stroke. MRI of the brain showed multiple areas of stroke but no acute evidence of stroke. Pt was also diagnosed with MECHE.  Which improved with IVF.                                                                         ----------------------------------------------------------      SUBJECTIVE COMPLAINTS- follow up for Left-sided facial droop    Diet: ADULT DIET; Regular; Low Fat/Low Chol/High Fiber/AYAKA      OBJECTIVE:   Patient Active Problem List   Diagnosis    Chest pain    MI, acute, non ST segment elevation (HCC)    Tobacco abuse    Essential hypertension    Acute [address]    Acute CVA (cerebrovascular accident) [address]    Right leg weakness    Acute ischemic left [place] stroke (HCC)    Non-healing open wound of right heel    Right heel infection    Hyperlipidemia    CAD (coronary artery disease)    Cellulitis of left heel    Wound infection    Acute hematogenous osteomyelitis (HCC)    Right hemiparesis (HCC)    Severe malnutrition (HCC)    Facial weakness    H/O: stroke       Allergies  Patient has no known allergies.     Medications    Scheduled Meds:   cefTRIAXone (ROCEPHIN) IV  1,000 mg IntraVENous Q24H    lisinopril  10 mg Oral Daily    gabapentin  300 mg Oral TID    sertraline  100 mg Oral Daily    sodium chloride flush  10 mL IntraVENous 2 times per day    enoxaparin  40 mg SubCUTAneous Nightly    aspirin  81 mg Oral Daily    Or    aspirin  300 mg Rectal Daily    vitamin C  500 mg Oral Daily    atenolol  25 mg Oral Daily    atorvastatin  80 mg Oral Nightly    baclofen  20 mg Oral BID    busPIRone  5 mg Oral BID    clopidogrel  75 mg Oral Daily    ferrous sulfate  324 mg Oral Daily with breakfast    [Held by provider] hydroCHLOROthiazide  25 mg Oral Daily    mirtazapine  15 mg Oral Nightly    therapeutic multivitamin-minerals  1 tablet Oral Daily    pantoprazole  40 mg Oral Daily    senna  1 tablet Oral Daily     Continuous Infusions:   sodium chloride 25 mL (09/02/22 2043)     PRN Meds:  acetaminophen, sodium chloride, albuterol sulfate HFA, hydrALAZINE, albuterol, sodium chloride flush, sodium chloride, ondansetron **OR** ondansetron, nitroGLYCERIN    Vitals   Vitals /wt Patient Vitals for the past 8 hrs:   BP Temp Temp src Pulse Resp SpO2 Weight   09/07/22 0740 (!) 142/75 99.6 °F (37.6 °C) Oral 78 21 94 % --   09/07/22 0600 -- -- -- 79 -- -- --   09/07/22 0400 -- -- -- 80 -- -- --   09/07/22 0357 (!) 149/72 99.8 °F (37.7 °C) Oral 80 23 92 % --   09/07/22 0342 -- -- -- -- -- -- 203 lb 7.8 oz (92.3 kg)   09/07/22 0200 -- -- -- 70 -- -- --        72HR INTAKE/OUTPUT:    Intake/Output Summary (Last 24 hours) at 9/7/2022 0929  Last data filed at 9/7/2022 0604  Gross per 24 hour   Intake 280 ml   Output 2050 ml   Net -1770 ml       Exam:    Gen:   Alert and oriented ×3    Eyes: PERRL. No sclera icterus. No conjunctival injection. ENT: No discharge. Pharynx clear. External appearance of ears and nose normal.  Neck: Trachea midline. No obvious mass. Resp: No accessory muscle use. No crackles. No wheezes. No rhonchi. CV: Regular rate. Regular rhythm. No murmur or rub. No edema. GI: Non-tender. Non-distended. No hernia. Skin: Warm, dry, normal texture and turgor. Lymph: No cervical LAD. No supraclavicular LAD. M/S: / Ext. 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 PT/INR:   No results for input(s): PROTIME, INR in the last 72 hours. APTT: No results for input(s): APTT in the last 72 hours. CBC:   Recent Labs     09/05/22  0735 09/06/22 0624   WBC 8.1 13.3*   HGB 11.1* 11.6*   HCT 32.5* 34.7*   MCV 85.0 86.1    144       BMP:   Recent Labs     09/05/22  0735 09/06/22 0624    138   K 4.0 4.3    105   CO2 24 20*   BUN 40* 32*   CREATININE 1.3 1.0       LIVER PROFILE:   No results for input(s): ALKPHOS, AST, ALT, ALB, BILIDIR, BILITOT, ALKPHOS in the last 72 hours. No results for input(s): AMYLASE in the last 72 hours. No results for input(s): LIPASE in the last 72 hours. UA:  No results for input(s): NITRITE, LABCAST, WBCUA, RBCUA, MUCUS in the last 72 hours. TROPONIN:   No results for input(s): Ankit Hdez in the last 72 hours.       Lab Results Component Value Date/Time    URRFLXCULT Not Indicated 05/28/2021 06:30 PM       No results for input(s): TSHREFLEX in the last 72 hours. No components found for: [id number]  POC GLUCOSE:    No results for input(s): POCGLU in the last 72 hours. No results for input(s): LABA1C in the last 72 hours. Lab Results   Component Value Date/Time    LABA1C 5.2 09/01/2022 05:23 AM      Normal left ventricle size and systolic function with an estimated ejection   fraction of 55%. (8/31/2022)    ASSESSMENT AND PLAN    Pneumonia  Likely because of increasing WBC count  Left-sided pleural effusion  Start patient on antibiotics  Consult to pulmonary      Left-sided facial droop  MRI ruled out acute stroke  EEG was completed and it did show focal sharp epileptiform waves  Neurology is following  No plan for AED at this time  Continue aspirin Plavix and statin      Elevated troponin,   patient is chest pain-free      MECHE  Improved    CAD      Hypertension  Bp on the low side  Dc ivf  Resume lisinopril    Hyperlipidemia    Urinary tract infection  Continue with ceftriaxone  Check procalcitonin    PT/OT/speech therapy consult        Code Status: Full Code        Dispo - cc        The patient and / or the family were informed of the results of any tests, a time was given to answer questions, a plan was proposed and they agreed with plan. Erik Gonzales MD    This note was transcribed using [id number] [employer]. Please disregard any translational errors.

## 2022-09-07 NOTE — PLAN OF CARE
Problem: Neurosensory - Adult  Goal: Achieves stable or improved neurological status  9/7/2022 0309 by Omari Joe RN  Outcome: Progressing  9/6/2022 1534 by Nikolas Acosta RN  Outcome: Progressing  Goal: Achieves maximal functionality and self care  9/7/2022 0309 by Omari Joe RN  Outcome: Progressing  9/6/2022 1534 by Nikolas Acosta RN  Outcome: Progressing     Problem: Discharge Planning  Goal: Discharge to home or other facility with appropriate resources  9/7/2022 0309 by Omari Joe RN  Outcome: Progressing  9/6/2022 1534 by Nikolas Acosta RN  Outcome: Progressing     Problem: Safety - Adult  Goal: Free from fall injury  9/7/2022 0309 by Omari Joe RN  Outcome: Progressing  Flowsheets (Taken 9/6/2022 2336)  Free From Fall Injury:   Yana Churchill family/caregiver on patient safety   Based on caregiver fall risk screen, instruct family/caregiver to ask for assistance with transferring infant if caregiver noted to have fall risk factors  9/6/2022 1534 by Nikolas Acosta RN  Outcome: Progressing     Problem: Skin/Tissue Integrity  Goal: Absence of new skin breakdown  Description: 1. Monitor for areas of redness and/or skin breakdown  2. Assess vascular access sites hourly  3. Every 4-6 hours minimum:  Change oxygen saturation probe site  4. Every 4-6 hours:  If on nasal continuous positive airway pressure, respiratory therapy assess nares and determine need for appliance change or resting period.   9/7/2022 0309 by Omari Joe RN  Outcome: Progressing  9/6/2022 1534 by Nikolas Acosta RN  Outcome: Progressing     Problem: ABCDS Injury Assessment  Goal: Absence of physical injury  9/7/2022 0309 by Omari Joe RN  Outcome: Progressing  Flowsheets (Taken 9/6/2022 2336)  Absence of Physical Injury: Implement safety measures based on patient assessment  9/6/2022 1534 by Nikolas Acosta RN  Outcome: Progressing     Problem: Pain  Goal: Verbalizes/displays adequate comfort level or baseline comfort level  Outcome: Progressing

## 2022-09-07 NOTE — CONSULTS
Clinical Pharmacy Note  Vancomycin Consult    Trey Boyce is a 75 y.o. male ordered Vancomycin for pneumonia; consult received from Dr. Tony Can to manage therapy. Also receiving cefepime. Allergies:  Patient has no known allergies. Temp max:  Temp (24hrs), Av °F (37.2 °C), Min:98.5 °F (36.9 °C), Max:99.8 °F (37.7 °C)      Recent Labs     22  0735 22  0624 22  1404   WBC 8.1 13.3* 16.6*       Recent Labs     22  0735 22  0624   BUN 40* 32*   CREATININE 1.3 1.0         Intake/Output Summary (Last 24 hours) at 2022 1506  Last data filed at 2022 1320  Gross per 24 hour   Intake 480 ml   Output 2350 ml   Net -1870 ml       Culture Results:  Pending    Ht Readings from Last 1 Encounters:   22 6' 3\" (1.905 m)        Wt Readings from Last 1 Encounters:   22 203 lb 7.8 oz (92.3 kg)         Estimated Creatinine Clearance: 85 mL/min (based on SCr of 1 mg/dL). Assessment/Plan:  Vancomycin 1500 mg IV x 1 followed by 1000 mg IV every 12 hours ordered. Exposure target: AUC24 (range)400-600 mg/L.hr   AUC24,ss: 533 mg/L.hr  Probability of AUC24 > 400: 79 %  Ctrough,ss: 17.6 mg/L  Probability of Ctrough,ss > 20: 38 %  Probability of nephrotoxicity (Lodise NERY ): 13 %    Trough level ordered for 1500 on 22 prior to the third dose. Thank you for the consult.      Alonso Joyce, [facility]  2022 3:08 PM

## 2022-09-07 NOTE — CARE COORDINATION
Discharge Planning:    Received HCPOA paperwork from [facility] via fax. Copies placed in soft chart at nurses' station. Per bedside RN, patient undergoing abdominal MRI today. Will continue to monitor for updates. Plan remains for patient to return to [facility] [address] when medically stable for discharge.     Electronically signed by Galileo Hector RN on 9/7/2022 at 1:08 PM

## 2022-09-07 NOTE — PLAN OF CARE
Problem: Neurosensory - Adult  Goal: Achieves stable or improved neurological status  9/7/2022 0930 by Breanna Massey RN  Outcome: Progressing  Flowsheets (Taken 9/7/2022 0740)  Achieves stable or improved neurological status: Assess for and report changes in neurological status     Problem: Discharge Planning  Goal: Discharge to home or other facility with appropriate resources  9/7/2022 0930 by Breanna Massey RN  Outcome: Progressing  Flowsheets (Taken 9/7/2022 0740)  Discharge to home or other facility with appropriate resources: Identify barriers to discharge with patient and caregiver     Problem: Safety - Adult  Goal: Free from fall injury  9/7/2022 0930 by Breanna Massey RN  Outcome: Progressing     Problem: Skin/Tissue Integrity  Goal: Absence of new skin breakdown  Description: 1. Monitor for areas of redness and/or skin breakdown  2. Assess vascular access sites hourly  3. Every 4-6 hours minimum:  Change oxygen saturation probe site  4. Every 4-6 hours:  If on nasal continuous positive airway pressure, respiratory therapy assess nares and determine need for appliance change or resting period.   9/7/2022 0930 by Breanna Massey RN  Outcome: Progressing

## 2022-09-08 LAB
ANION GAP SERPL CALCULATED.3IONS-SCNC: 11 MMOL/L (ref 3–16)
BUN BLDV-MCNC: 36 MG/DL (ref 7–20)
CALCIUM SERPL-MCNC: 8.7 MG/DL (ref 8.3–10.6)
CHLORIDE BLD-SCNC: 105 MMOL/L (ref 99–110)
CO2: 21 MMOL/L (ref 21–32)
CREAT SERPL-MCNC: 1.3 MG/DL (ref 0.8–1.3)
GFR AFRICAN AMERICAN: >60
GFR NON-AFRICAN AMERICAN: 55
GLUCOSE BLD-MCNC: 111 MG/DL (ref 70–99)
HCT VFR BLD CALC: 30.3 % (ref 40.5–52.5)
HEMOGLOBIN: 10.4 G/DL (ref 13.5–17.5)
MCH RBC QN AUTO: 29.3 PG (ref 26–34)
MCHC RBC AUTO-ENTMCNC: 34.2 G/DL (ref 31–36)
MCV RBC AUTO: 85.6 FL (ref 80–100)
MRSA SCREEN RT-PCR: ABNORMAL
ORGANISM: ABNORMAL
PDW BLD-RTO: 16 % (ref 12.4–15.4)
PLATELET # BLD: 138 K/UL (ref 135–450)
PMV BLD AUTO: 7.5 FL (ref 5–10.5)
POTASSIUM SERPL-SCNC: 4 MMOL/L (ref 3.5–5.1)
RBC # BLD: 3.53 M/UL (ref 4.2–5.9)
SODIUM BLD-SCNC: 137 MMOL/L (ref 136–145)
VANCOMYCIN TROUGH: 14.2 UG/ML (ref 10–20)
WBC # BLD: 13 K/UL (ref 4–11)

## 2022-09-08 PROCEDURE — 6360000002 HC RX W HCPCS: Performed by: HOSPITALIST

## 2022-09-08 PROCEDURE — 2580000003 HC RX 258: Performed by: HOSPITALIST

## 2022-09-08 PROCEDURE — 1200000000 HC SEMI PRIVATE

## 2022-09-08 PROCEDURE — 6370000000 HC RX 637 (ALT 250 FOR IP): Performed by: HOSPITALIST

## 2022-09-08 PROCEDURE — 80048 BASIC METABOLIC PNL TOTAL CA: CPT

## 2022-09-08 PROCEDURE — 97129 THER IVNTJ 1ST 15 MIN: CPT

## 2022-09-08 PROCEDURE — 92507 TX SP LANG VOICE COMM INDIV: CPT

## 2022-09-08 PROCEDURE — 6370000000 HC RX 637 (ALT 250 FOR IP): Performed by: INTERNAL MEDICINE

## 2022-09-08 PROCEDURE — 36415 COLL VENOUS BLD VENIPUNCTURE: CPT

## 2022-09-08 PROCEDURE — 99223 1ST HOSP IP/OBS HIGH 75: CPT | Performed by: INTERNAL MEDICINE

## 2022-09-08 PROCEDURE — 80202 ASSAY OF VANCOMYCIN: CPT

## 2022-09-08 PROCEDURE — 85027 COMPLETE CBC AUTOMATED: CPT

## 2022-09-08 PROCEDURE — 6360000002 HC RX W HCPCS: Performed by: INTERNAL MEDICINE

## 2022-09-08 PROCEDURE — 2580000003 HC RX 258: Performed by: INTERNAL MEDICINE

## 2022-09-08 RX ADMIN — BUSPIRONE HYDROCHLORIDE 5 MG: 5 TABLET ORAL at 23:28

## 2022-09-08 RX ADMIN — LISINOPRIL 10 MG: 10 TABLET ORAL at 08:08

## 2022-09-08 RX ADMIN — MIRTAZAPINE 15 MG: 15 TABLET, FILM COATED ORAL at 23:28

## 2022-09-08 RX ADMIN — CEFEPIME 2000 MG: 2 INJECTION, POWDER, FOR SOLUTION INTRAVENOUS at 23:43

## 2022-09-08 RX ADMIN — CEFEPIME 2000 MG: 2 INJECTION, POWDER, FOR SOLUTION INTRAVENOUS at 16:23

## 2022-09-08 RX ADMIN — OXYCODONE HYDROCHLORIDE AND ACETAMINOPHEN 500 MG: 500 TABLET ORAL at 08:08

## 2022-09-08 RX ADMIN — GABAPENTIN 300 MG: 300 CAPSULE ORAL at 08:08

## 2022-09-08 RX ADMIN — ENOXAPARIN SODIUM 40 MG: 100 INJECTION SUBCUTANEOUS at 23:28

## 2022-09-08 RX ADMIN — ASPIRIN 81 MG: 81 TABLET, COATED ORAL at 08:08

## 2022-09-08 RX ADMIN — VANCOMYCIN HYDROCHLORIDE 1000 MG: 1 INJECTION, POWDER, LYOPHILIZED, FOR SOLUTION INTRAVENOUS at 04:17

## 2022-09-08 RX ADMIN — GABAPENTIN 300 MG: 300 CAPSULE ORAL at 23:28

## 2022-09-08 RX ADMIN — SODIUM CHLORIDE, PRESERVATIVE FREE 10 ML: 5 INJECTION INTRAVENOUS at 23:30

## 2022-09-08 RX ADMIN — PANTOPRAZOLE SODIUM 40 MG: 40 TABLET, DELAYED RELEASE ORAL at 08:08

## 2022-09-08 RX ADMIN — ATENOLOL 25 MG: 25 TABLET ORAL at 08:08

## 2022-09-08 RX ADMIN — ATORVASTATIN CALCIUM 80 MG: 80 TABLET, FILM COATED ORAL at 23:28

## 2022-09-08 RX ADMIN — MULTIPLE VITAMINS W/ MINERALS TAB 1 TABLET: TAB at 08:08

## 2022-09-08 RX ADMIN — GABAPENTIN 300 MG: 300 CAPSULE ORAL at 13:27

## 2022-09-08 RX ADMIN — CEFEPIME 2000 MG: 2 INJECTION, POWDER, FOR SOLUTION INTRAVENOUS at 00:03

## 2022-09-08 RX ADMIN — SENNOSIDES 8.6 MG: 8.6 TABLET, FILM COATED ORAL at 08:08

## 2022-09-08 RX ADMIN — BUSPIRONE HYDROCHLORIDE 5 MG: 5 TABLET ORAL at 08:08

## 2022-09-08 RX ADMIN — FERROUS SULFATE TAB EC 324 MG (65 MG FE EQUIVALENT) 324 MG: 324 (65 FE) TABLET DELAYED RESPONSE at 08:08

## 2022-09-08 RX ADMIN — CEFEPIME 2000 MG: 2 INJECTION, POWDER, FOR SOLUTION INTRAVENOUS at 08:07

## 2022-09-08 RX ADMIN — VANCOMYCIN HYDROCHLORIDE 1000 MG: 1 INJECTION, POWDER, LYOPHILIZED, FOR SOLUTION INTRAVENOUS at 15:06

## 2022-09-08 RX ADMIN — CLOPIDOGREL BISULFATE 75 MG: 75 TABLET ORAL at 08:08

## 2022-09-08 RX ADMIN — BACLOFEN 20 MG: 10 TABLET ORAL at 23:28

## 2022-09-08 RX ADMIN — SERTRALINE 100 MG: 100 TABLET, FILM COATED ORAL at 08:08

## 2022-09-08 RX ADMIN — BACLOFEN 20 MG: 10 TABLET ORAL at 08:08

## 2022-09-08 ASSESSMENT — PAIN SCALES - GENERAL
PAINLEVEL_OUTOF10: 0

## 2022-09-08 NOTE — PLAN OF CARE
Problem: Neurosensory - Adult  Goal: Achieves maximal functionality and self care  9/8/2022 0904 by Adan Clements RN  Outcome: Progressing  Flowsheets (Taken 9/8/2022 0725)  Achieves maximal functionality and self care: Monitor swallowing and airway patency with patient fatigue and changes in neurological status     Problem: Discharge Planning  Goal: Discharge to home or other facility with appropriate resources  9/8/2022 0904 by Adan Clements RN  Outcome: Progressing  Flowsheets (Taken 9/8/2022 0725)  Discharge to home or other facility with appropriate resources: Identify barriers to discharge with patient and caregiver     Problem: ABCDS Injury Assessment  Goal: Absence of physical injury  9/8/2022 0904 by Adan Clements RN  Outcome: Progressing     Problem: Pain  Goal: Verbalizes/displays adequate comfort level or baseline comfort level  9/8/2022 0904 by Adan Clements RN  Outcome: Progressing

## 2022-09-08 NOTE — CONSULTS
Pulmonary Consult Note     Patient's name:  Mario Alberto Childs  Medical Record Number: [id number]  Patient's account/billing number: [de-identified]  Patient's YOB: 1955  Age: 75 y.o. Date of Admission: 8/31/2022  1:46 PM  Date of Consult: 9/8/2022      Primary Care Physician: No primary care provider on file. Code Status: Full Code    Reason for consult: HCAP     Assessment and Plan     HCAP  Left pleural effusion  H/o CVA with right residual hemiparesis   CAD  HTN  MECHE        Plan:  Antibiotics vanc and cefepime x 7 days  Aspiration precautions  Will evaluate left side and potentially perform thoracentesis if needed @ bedside        HISTORY OF PRESENT ILLNESS:   Mr./Ms. Mario Alberto Childs is a 75 y.o.  gentleman with PMH stated below significant for h/o CVA with right hemiparesis, NH resident presented with left facial droop    Patient initially got treated for UTI treated with Rocephin but his leukocytes continue to worsen for that he got CXR that showed multifocal airspace disease on the left with associated effusion for that we were consulted  He has no issue with swallowing   Cough  minimal  No sputum  PCT negative  No fever     He is on ASA and plavix       Past Medical History:        Diagnosis Date    Acute MI ([facility] 09/2013    Anxiety     Arthritis     CAD (coronary artery disease)     COVID-19     Hyperlipidemia 2/3/2022    Hypertension     Influenza A 01/15/2017    Severe malnutrition ([facility] [address] 2/10/2022       Past Surgical History:        Procedure Laterality Date    BACK SURGERY N/A 1989    herniated disc    CORONARY ANGIOPLASTY WITH STENT PLACEMENT  9/13    EYE SURGERY      VASCULAR SURGERY         Allergies:    No Known Allergies      Home Meds:   Prior to Admission medications    Medication Sig Start Date End Date Taking?  Authorizing Provider   albuterol sulfate HFA (VENTOLIN HFA) 108 (90 Base) MCG/ACT inhaler Inhale 2 puffs into the lungs every 6 hours as needed for Wheezing   Yes Historical Provider, MD   miconazole (MICOTIN) 2 % powder Apply topically 2 times daily as needed for Itching Apply to buttocks   Yes Historical Provider, MD   aspirin 81 MG chewable tablet Take 81 mg by mouth daily   Yes Historical Provider, MD   loratadine (CLARITIN) 10 MG tablet Take 10 mg by mouth daily   Yes Historical Provider, MD   nystatin (MYCOSTATIN) 914865 UNIT/GM cream Apply topically 2 times daily as needed for Dry Skin Apply topically to buttocks   Yes Historical Provider, MD   Carboxymethylcellul-Glycerin (REFRESH OPTIVE) 0.5-0.9 % SOLN Apply 1 drop to eye 4 times daily as needed   Yes Historical Provider, MD   acetaminophen (TYLENOL) 325 MG tablet Take 650 mg by mouth every 6 hours as needed for Pain   Yes Historical Provider, MD   Dimethicone-Zinc Oxide-Vit A-D ([employer] ZINC OXIDE DIAPER) 1-10 % CREA Apply topically 2 times daily as needed   Yes Historical Provider, MD   sertraline (ZOLOFT) 100 MG tablet Take 100 mg by mouth daily   Yes Historical Provider, MD   bisacodyl (DULCOLAX) 10 MG suppository Place 10 mg rectally daily as needed for Constipation   Yes Historical Provider, MD   Sodium Phosphates (FLEET) 7-19 GM/118ML Place 1 enema rectally once as needed   Yes Historical Provider, MD   gabapentin (NEURONTIN) 300 MG capsule Take 300 mg by mouth 3 times daily.    Yes Historical Provider, MD   lisinopril (PRINIVIL;ZESTRIL) 20 MG tablet Take 20 mg by mouth daily   Yes Historical Provider, MD   polyethylene glycol (GLYCOLAX) 17 g packet Take 17 g by mouth daily as needed for Constipation   Yes Historical Provider, MD   magnesium hydroxide (MILK OF MAGNESIA) 400 MG/5ML suspension Take 30 mLs by mouth daily as needed for Constipation   Yes Historical Provider, MD   busPIRone (BUSPAR) 5 MG tablet Take 5 mg by mouth 2 times daily    Historical Provider, MD   senna (SENOKOT) 8.6 MG tablet Take 1 tablet by mouth daily    Historical Provider, MD   collagenase 250 reports current alcohol use of about 2.0 standard drinks per week. DRUGS:  has no history on file for drug use. REVIEW OF SYSTEMS:  Review of Systems -   General ROS: weakness chronic   Psychological ROS: negative  Ophthalmic ROS: negative  ENT ROS: negative  Allergy and Immunology ROS: negative  Hematological and Lymphatic ROS: negative  Endocrine ROS: negative  Breast ROS: negative  Respiratory ROS: no cough, shortness of breath, or wheezing  Cardiovascular ROS: no chest pain or dyspnea on exertion  Gastrointestinal ROS:negative  Genito-Urinary ROS: negative  Musculoskeletal ROS: negative  Neurological ROS: right side paralysis   Dermatological ROS: negative        Physical Exam:    Vitals: BP (!) 128/57   Pulse 74   Temp 99.2 °F (37.3 °C) (Oral)   Resp 21   Ht 6' 3\" (1.905 m)   Wt 208 lb 1.8 oz (94.4 kg)   SpO2 90%   BMI 26.01 kg/m²     Last Body weight:   Wt Readings from Last 3 Encounters:   09/08/22 208 lb 1.8 oz (94.4 kg)   02/10/22 188 lb 4.4 oz (85.4 kg)   01/11/22 187 lb 13.3 oz (85.2 kg)       Body Mass Index : Body mass index is 26.01 kg/m². Intake and Output summary:   Intake/Output Summary (Last 24 hours) at 9/8/2022 1016  Last data filed at 9/8/2022 0618  Gross per 24 hour   Intake 600 ml   Output 800 ml   Net -200 ml       Physical Examination:     PHYSICAL EXAM:    Gen:  No acute distress. Eyes: PERRL. Anicteric sclera. No conjunctival injection. ENT: No discharge. Posterior oropharynx clear. External appearance of ears and nose normal.  Neck: Trachea midline. No mass, no lymphadenopathy    Resp:  diminished on the left with no wheezing or rhonchi   CV: Regular rate. Regular rhythm. No murmur or rub. No edema. GI: Soft, Non-tender. Non-distended. +BS  Skin: Warm, dry, w/o erythema. Lymph: No cervical or supraclavicular LAD. M/S: No cyanosis. No clubbing.   Rigth AKA  Neuro:  CN 2-12 tested, right upper extremity paralysis, spasticity,   Psych: Awake and alert, Oriented x 3.        Laboratory findings:-    CBC:   Recent Labs     09/08/22  0706   WBC 13.0*   HGB 10.4*        BMP:    Recent Labs     09/06/22  0624 09/08/22  0706    137   K 4.3 4.0    105   CO2 20* 21   BUN 32* 36*   CREATININE 1.0 1.3   GLUCOSE 133* 111*     S. Calcium:  Recent Labs     09/08/22  0706   CALCIUM 8.7       Radiology Review:  Pertinent images / reports were reviewed as a part of this visit. CTPA: No results found for this or any previous visit. CXR PA/LAT: Results for orders placed during the hospital encounter of 01/15/17    XR Chest Standard TWO VW    Narrative  EXAMINATION:  TWO VIEWS OF THE CHEST    1/15/2017 8:01 pm    COMPARISON:  September 24, 2014    HISTORY:  ORDERING SYSTEM PROVIDED HISTORY: fatigue  TECHNOLOGIST PROVIDED HISTORY:  Ordering Physician Provided Reason for Exam: cough  Acuity: Unknown  Type of Exam: Unknown    FINDINGS:  The cardiomediastinal silhouette is stable. There are increased lung  markings at the bilateral parahilar regions, may be related to bronchitis. There is no pneumothorax or pleural effusion. The visualized osseous  structures are within normal limits. Impression  Increased lung markings at the bilateral parahilar regions, may be related to  bronchitis. CXR portable: Results for orders placed during the hospital encounter of 08/31/22    XR CHEST PORTABLE    Narrative  EXAMINATION:  ONE XRAY VIEW OF THE CHEST    9/7/2022 1:46 pm    COMPARISON:  Chest x-ray dated 31 August 2022    HISTORY:  ORDERING SYSTEM PROVIDED HISTORY: pleural effusion  TECHNOLOGIST PROVIDED HISTORY:  Reason for exam:->pleural effusion  Reason for Exam: pleural effusion    FINDINGS:  There is complete opacification of the left hemithorax. The right lung is  clear. No pneumothorax. The cardiomediastinal silhouette is obscured.     Impression  Complete opacification of the left hemithorax which appears caused by a large  left pleural effusion as well as left upper lobe airspace consolidation. Consider pneumonia.         Paula Mixon MD, M.D.            9/8/2022, 10:16 AM

## 2022-09-08 NOTE — PROGRESS NOTES
Nutrition Assessment     Type and Reason for Visit: Initial    Nutrition Recommendations/Plan:   Continue cardiac diet. Malnutrition Assessment:  Malnutrition Status: No malnutrition    Nutrition Assessment:  LOS. Pt with PMH of CAD and HLD who presented with L sided facial droop. Pt eating well sicne admission, >75%. No nutrition interventions needed at this time. Nutrition Related Findings:   BM 9/8; Wound Type: None    Current Nutrition Therapies:    ADULT DIET;  Regular; Low Fat/Low Chol/High Fiber/AYAKA    Anthropometric Measures:  Height: 6' 3\" (190.5 cm)  Current Body Wt: 208 lb (94.3 kg)   BMI: 26    Nutrition Diagnosis:   No nutrition diagnosis at this time     Nutrition Interventions:   Food and/or Nutrient Delivery: Continue Current Diet  Nutrition Education/Counseling: No recommendation at this time  Coordination of Nutrition Care: Continue to monitor while inpatient       Discharge Planning:    No discharge needs at this time     Nya Horne RD, LD  Contact: [phone]

## 2022-09-08 NOTE — PROGRESS NOTES
Speech Language/Pathology   SPEECH LANGUAGE TREATMENT  Speech Therapy Department     [facility]  AGE: 75 y.o. GENDER: male  : 1955  [id number]  EPISODE DATE:  2022    MEDICAL DIAGNOSIS:  No chief complaint on file. ONSET: Expressive aphasia symptom onset has been chronic for a time period since prior CVA. Severity is described as mild-moderate. PAST MEDICAL HISTORY        Diagnosis Date    Acute MI ([facility] [address] 2013    Anxiety     Arthritis     CAD (coronary artery disease)     COVID-19     Hyperlipidemia 2/3/2022    Hypertension     Influenza A 01/15/2017    Severe malnutrition ([facility] [address] 2/10/2022       PAST SURGICAL HISTORY    Past Surgical History:   Procedure Laterality Date    BACK SURGERY N/A     herniated disc    CORONARY ANGIOPLASTY WITH STENT PLACEMENT      EYE SURGERY      VASCULAR SURGERY         ALLERGIES    No Known Allergies    Chart Review:   H&P: Patient is a 79-year-old male with past medical history of CAD hypertension hyperlipidemia who presents to the hospital due to left-sided facial droop. 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 Otherwise denied chest pain shortness of breath nausea vomiting difficulty speaking. Mentions he thinks that he has been slow to respond to questions today. Otherwise denies fevers chills diarrhea constipation dysuria. He also denies any new numbness tingling or weakness in arms legs. Imaging:  CT head 22  Impression   CT brain:       1. No acute intracranial abnormality. 2. Involutional parenchymal changes with microvascular ischemic disease. 3. Chronic left parasagittal frontoparietal encephalomalacia/gliosis. 4. Scattered chronic lacunar infarcts such as in the bilateral basal ganglia   and right cerebellum. CTA head/neck:       1. 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    2. Otherwise, no evidence of large vessel occlusion or significant stenosis   in the remaining major arteries of the head and neck. 3. Mild (less than 50%) possibly moderate (50-69%) stenosis of the proximal   left ICA with an associated ulcerated atherosclerotic plaque. 4. Mild prominence of the pulmonary artery, may be related to pulmonary   arterial hypertension. 5. Bubbly air-fluid level present within the trachea and left main bronchus,   compatible with aspiration. 6. Scattered dental caries and periapical lucency. CHEST X-RAY 8/31/22  Impression   No radiographic evidence of acute pulmonary disease      Brain MRI 91/22:  Impression   1. Few small foci of diffusion restriction present in the left parasagittal   frontoparietal lobe may represent acute/subacute ischemia on a background of   chronic encephalomalacia/gliosis. 2. Involutional parenchymal changes with moderate microvascular ischemic   disease. 3. Scattered chronic lacunar infarcts such as in the bilateral basal ganglia   and right cerebellum. Prior Status: Reg/thin at [facility]; long term resident at [facility]. Pt reports previously seen by ST at [facility] but not currently on caseload. Subjective:   Cognitive-communication treatment progress: reports language continuing to improve but remains below baseline     Objective: Current Therapy Impression of progress/goal status    IMPRESSIONS  Expressive Aphasia  Pt with persistent mild-moderate expressive language deficits during structured and unstructured tasks which appears below his reported baseline, despite known prior aphasia from previous CVAs. He completed sequencing and descriptive language tasks this date with min cues to utilize circumlocution for word finding and moderate prompts for verbal expansion. Continue SLP services during acute care stay and recommend to continue after discharge. Pt verbalizes motivation for therapy and frustration that speech/language function is below his PLOF. Short Term Speech/Language/Cognitive Goals:   Pt will improve verbal expression for functional expression via graded tasks to 95% -ONGOING; min-mod cues for description and expansion   Pt will participate in ongoing cognitive assessment with goals to be established as indicated - ONGOING  Patient will complete short term memory tasks of increasing length and complexity with 85% accuracy given min cues -ONGOING; episodic memory appears intact    Speech Therapy Prognosis  Good    Education  Role of SLP, word finding strategies: Pt with good understanding    Discharge Recommendations:  Pt will benefit from continued skilled Speech Therapy for Speech services. Please accept this as Speech Therapy Discharge status, if pt is discharged prior to next therapy session.     Objective: Assessment/Therapy activities    Treatment this session  Objective:  COMPREHENSION  Auditory Comprehension: [x]WFL []Mild   [] Moderate  []Severe  []To be assessed  Functional Ability to Comprehend Basic Commands/Questions     EXPRESSION  Primary Mode of Expression: Verbal  Verbal Expression: []WFL [x]Mild   [x] Moderate  []Severe  []To be assessed  Divergent naming: 3 items per abstract category-independently 60% accuracy, accuracy improved to 100%  Convergent naming: min cues  Compare/contrast: min cues  Open-ended wh- ?: mod cues     Pragmatics/Social Functioning: [x]WFL []Mild   [] Moderate  []Severe  []To be assessed     MOTOR SPEECH  Motor Speech: [x]WFL []Mild   [] Moderate  []Severe  []To be assessed    VOICE  Voice: [x]WFL []Mild   [] Moderate  []Severe  []To be assessed      COGNITION      Overall Orientation : [x]WFL []Mild   [] Moderate  []Severe  []To be assessed   []Unable to be assessed secondary to Aphasia   Oriented x4    Attention: [x]WFL []Mild   [] Moderate  []Severe  []To be assessed    Memory: [x]WFL [x]Mild   [] Moderate  []Severe  []To be assessed  Recalls daily evens/info  Recalls information from paragraph read verbally with 80% accuracy     Problem Solving: []WFL []Mild   [] Moderate  []Severe  [x]To be assessed  Anomia is a barrier    Safety/Judgement: [x]WFL []Mild   [] Moderate  []Severe  []To be assessed      Plan   Plan:    Speech therapy 3-5 times a weeks for speech-language treatment,    Interventions: Language therapy, word finding strategy training, cognitive-communication tx  Barriers toward progress toward pt goals: Communication deficit    Timed Code Treatment: 15  Total Treatment Time: [address], [address] [place] [address]  Speech-Language Pathologist  On 09/08/22 at 3:30 PM

## 2022-09-08 NOTE — SIGNIFICANT EVENT
I evaluated the left chest with bedside ultrasound that showed moderate pleural effusion I offered thoracentesis but the patient declined at this point. Will continue to follow        Dario Chavez M.D.   Pulmonary Critical Care Department

## 2022-09-08 NOTE — PROGRESS NOTES
[facility]                                               Progress note    Summary:   Robin Sahni is being seen by nephrology for MECHE. Admitted with left-sided facial droop. Has a history of CVA and residual right-sided weakness. Creatinine admission was 1.3 and increased to 1.6 on day 2 of hospitalization. Interval History  Patient was seen and examined at bedside  Has no complaints   Was supposed to get discharge but now being treated for pneumonia. Found to have a moderate pleural effusion. Blood pressure 106/56  On room air. 94%  uO 200 cc    Cr 1 > 1.3 after resuming lisinopril     Plan:   His creatinine has bumped since resuming lisinopril  His BP is low side of normal.   Dc lisinopril       Rajan Walton MD  [facility]  Office: [phone]    Assessment:   MECHE:  Cr up to 2.1, rule out retention, resume iVFs. Stopping HCTZ as well  - baseline Cr 0.7-1.0  - Cr on admission was 1.3, increased to 1.6 the next day  - no UA this admission  - no significant hemodynamic insult or fluctuation noted. - he had a CT with contrast 8/31/2022  - home lisinopril on hold  - check UA, renal US  - start IVF    Hypertension:  - home regimen: lisinopril 20 mg daily, atenolol 25 mg daily, HCTZ 25 mg daily  - lisinopril on hold due to MECHE    CVA:  - per primary and neurology            ROS:     Positives Listed Bold. All other remaining systems are negative. Constitutional:  fever, chills, weakness, weight change, fatigue,      Skin:  rash, pruritus, hair loss, bruising, dry skin, petechiae. Head, Face, Neck   headaches, swelling,  cervical adenopathy. Respiratory: shortness of breath, cough, or wheezing  Cardiovascular: chest pain, palpitations, dizzy, edema  Gastrointestinal: nausea, vomiting, diarrhea, constipation,belly pain    Yellow skin, blood in stool  Musculoskeletal:  back pain, muscle weakness, gait problems,       joint pain or swelling.   Genitourinary:  dysuria, poor urine flow, flank pain, blood in urine  Neurologic:  vertigo, TIA'S, syncope, seizures, focal weakness  Psychosocial:  insomnia, anxiety, or depression. Additional positive findings: -     PMH:   Past medical history, surgical history, social history, family history are reviewed and updated as appropriate. Reviewed current medication list.   Allergies reviewed and updated as needed. PE:   Vitals:    09/08/22 1625   BP: (!) 106/56   Pulse:    Resp:    Temp:    SpO2:        General appearance:  in NAD, fully alert and oriented. Comfortable. HEENT: EOM intact, no icterus. Trachea is midline. Neck : No masses, appears symmetrical, no JVD  Respiratory: Respiratory effort appears normal, bilateral equal chest rise, no wheeze, no crackles   Cardiovascular: Ausculation shows RRR no edema  Abdomen: No visible mass or tenderness, non distended. Musculoskeletal:  Joints with no swelling or deformity. Skin:no rashes, ulcers, induration, no jaundice. Neuro: Appropriate responses. Though a little slow.        Lab Results   Component Value Date    CREATININE 1.3 09/08/2022    BUN 36 (H) 09/08/2022     09/08/2022    K 4.0 09/08/2022     09/08/2022    CO2 21 09/08/2022      Lab Results   Component Value Date    WBC 13.0 (H) 09/08/2022    HGB 10.4 (L) 09/08/2022    HCT 30.3 (L) 09/08/2022    MCV 85.6 09/08/2022     09/08/2022     Lab Results   Component Value Date    CALCIUM 8.7 09/08/2022    CAION 1.09 (L) 09/24/2014    PHOS 3.3 01/08/2022

## 2022-09-08 NOTE — PROGRESS NOTES
Hospitalist Progress Note  9/8/2022 8:40 AM    PCP: No primary care provider on file.    [phone]     Date of Admission: 8/31/2022                                                                                                                     HOSPITAL COURSE    Patient demographics:  The patient  Ellison Apley is a 75 y.o. male     Significant past medical history:   Patient Active Problem List   Diagnosis    Chest pain    MI, acute, non ST segment elevation ([address]    Tobacco abuse    Essential hypertension    Acute MI ([address]    Acute CVA (cerebrovascular accident) ([address]    Right leg weakness    Acute ischemic left LEXA stroke ([address]    Non-healing open wound of right heel    Right heel infection    Hyperlipidemia    CAD (coronary artery disease)    Cellulitis of left heel    Wound infection    Acute hematogenous osteomyelitis ([address]    Right hemiparesis (HCC)    Severe malnutrition ([address]    Facial weakness    H/O: stroke         Presenting symptoms:  Left-sided facial droop    Diagnostic workup:      CONSULTS DURING ADMISSION :   IP CONSULT TO STROKE TEAM  IP CONSULT TO PHARMACY  PHARMACY TO CHANGE BASE FLUIDS  IP CONSULT TO HOSPITALIST  IP CONSULT TO NEUROLOGY  IP CONSULT TO CASE MANAGEMENT  IP CONSULT TO NEPHROLOGY  IP CONSULT TO PULMONOLOGY  PHARMACY TO DOSE VANCOMYCIN      Patient was diagnosed with:  Left-sided facial droop, rule out CVA  History of prior CVAs   Elevated troponin,   MECHE  CAD  Hypertension  Hyperlipidemia      Treatment while inpatient:  Patient is a 69-year-old male with past medical history of CAD hypertension hyperlipidemia     who presents to the hospital due to left-sided facial droop. Patient already has right-sided weakness from previous stroke. MRI of the brain showed multiple areas of stroke but no acute evidence of stroke. Pt was also diagnosed with MECHE. Which improved with IVF. ----------------------------------------------------------      SUBJECTIVE COMPLAINTS- follow up for Left-sided facial droop    Diet: ADULT DIET; Regular; Low Fat/Low Chol/High Fiber/AYAKA      OBJECTIVE:   Patient Active Problem List   Diagnosis    Chest pain    MI, acute, non ST segment elevation (HCC)    Tobacco abuse    Essential hypertension    Acute MI ([facility] [address]    Acute CVA (cerebrovascular accident) ([facility] [address]    Right leg weakness    Acute ischemic left LEXA stroke (HCC)    Non-healing open wound of right heel    Right heel infection    Hyperlipidemia    CAD (coronary artery disease)    Cellulitis of left heel    Wound infection    Acute hematogenous osteomyelitis (HCC)    Right hemiparesis (HCC)    Severe malnutrition (HCC)    Facial weakness    H/O: stroke       Allergies  Patient has no known allergies.     Medications    Scheduled Meds:   cefepime  2,000 mg IntraVENous Q8H    vancomycin (VANCOCIN) intermittent dosing (placeholder)   Other RX Placeholder    vancomycin  1,000 mg IntraVENous Q12H    lisinopril  10 mg Oral Daily    gabapentin  300 mg Oral TID    sertraline  100 mg Oral Daily    sodium chloride flush  10 mL IntraVENous 2 times per day    enoxaparin  40 mg SubCUTAneous Nightly    aspirin  81 mg Oral Daily    Or    aspirin  300 mg Rectal Daily    vitamin C  500 mg Oral Daily    atenolol  25 mg Oral Daily    atorvastatin  80 mg Oral Nightly    baclofen  20 mg Oral BID    busPIRone  5 mg Oral BID    clopidogrel  75 mg Oral Daily    ferrous sulfate  324 mg Oral Daily with breakfast    [Held by provider] hydroCHLOROthiazide  25 mg Oral Daily    mirtazapine  15 mg Oral Nightly    therapeutic multivitamin-minerals  1 tablet Oral Daily    pantoprazole  40 mg Oral Daily    senna  1 tablet Oral Daily     Continuous Infusions:   sodium chloride 25 mL (09/02/22 2043)     PRN Meds:  acetaminophen, sodium chloride, albuterol sulfate HFA, hydrALAZINE, albuterol, sodium chloride flush, sodium chloride, ondansetron **OR** ondansetron, nitroGLYCERIN    Vitals   Vitals /wt Patient Vitals for the past 8 hrs:   BP Temp Temp src Pulse Resp SpO2 Weight   09/08/22 0725 (!) 128/57 99.2 °F (37.3 °C) Oral 74 21 90 % --   09/08/22 0600 -- -- -- 65 -- -- --   09/08/22 0400 -- -- -- 68 -- -- --   09/08/22 0329 (!) 113/48 98.1 °F (36.7 °C) Oral 70 20 93 % --   09/08/22 0315 -- -- -- -- -- -- 208 lb 1.8 oz (94.4 kg)   09/08/22 0200 -- -- -- 73 -- -- --        72HR INTAKE/OUTPUT:    Intake/Output Summary (Last 24 hours) at 9/8/2022 0840  Last data filed at 9/8/2022 0618  Gross per 24 hour   Intake 600 ml   Output 800 ml   Net -200 ml       Exam:    Gen:   Alert and oriented ×3    Eyes: PERRL. No sclera icterus. No conjunctival injection. ENT: No discharge. Pharynx clear. External appearance of ears and nose normal.  Neck: Trachea midline. No obvious mass. Resp: No accessory muscle use. No crackles. No wheezes. No rhonchi. CV: Regular rate. Regular rhythm. No murmur or rub. No edema. GI: Non-tender. Non-distended. No hernia. Skin: Warm, dry, normal texture and turgor. Lymph: No cervical LAD. No supraclavicular LAD. M/S: / Ext. 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 PT/INR:   No results for input(s): PROTIME, INR in the last 72 hours. APTT: No results for input(s): APTT in the last 72 hours. CBC:   Recent Labs     09/06/22  0624 09/07/22  1404 09/08/22  0706   WBC 13.3* 16.6* 13.0*   HGB 11.6* 11.1* 10.4*   HCT 34.7* 33.0* 30.3*   MCV 86.1 86.9 85.6    144 138       BMP:   Recent Labs     09/06/22  0624 09/08/22  0706    137   K 4.3 4.0    105   CO2 20* 21   BUN 32* 36*   CREATININE 1.0 1.3       LIVER PROFILE:   No results for input(s): ALKPHOS, AST, ALT, ALB, BILIDIR, BILITOT, ALKPHOS in the last 72 hours. No results for input(s): AMYLASE in the last 72 hours.   No results for input(s): LIPASE in the last 72 hours. UA:  No results for input(s): NITRITE, LABCAST, WBCUA, RBCUA, MUCUS in the last 72 hours. TROPONIN:   No results for input(s): [facility] in the last 72 hours. Lab Results   Component Value Date/Time    URRFLXCULT Not Indicated 05/28/2021 06:30 PM       No results for input(s): TSHREFLEX in the last 72 hours. No components found for: [id number]  POC GLUCOSE:    No results for input(s): POCGLU in the last 72 hours. No results for input(s): LABA1C in the last 72 hours. Lab Results   Component Value Date/Time    LABA1C 5.2 09/01/2022 05:23 AM      Normal left ventricle size and systolic function with an estimated ejection   fraction of 55%. (8/31/2022)    ASSESSMENT AND PLAN    Pneumonia  Healthcare associated pneumonia  WBC count has started improving  Left-sided pleural effusion  Pulmonary is considering thoracentesis if needed        Left-sided facial droop  MRI ruled out acute stroke  EEG was completed and it did show focal sharp epileptiform waves  Neurology is following  No plan for AED at this time  Continue aspirin Plavix and statin      Elevated troponin,   patient is chest pain-free      MECHE  Improved    CAD      Hypertension  Bp on the low side  Dc ivf  Resume lisinopril    Hyperlipidemia    Urinary tract infection  Continue with ceftriaxone  Check procalcitonin    PT/OT/speech therapy consult        Code Status: Full Code        Dispo - cc        The patient and / or the family were informed of the results of any tests, a time was given to answer questions, a plan was proposed and they agreed with plan. Elva Friedman MD    This note was transcribed using [id number] [employer]. Please disregard any translational errors.

## 2022-09-08 NOTE — PLAN OF CARE
Problem: Neurosensory - Adult  Goal: Achieves stable or improved neurological status  Outcome: Progressing  Goal: Achieves maximal functionality and self care  Outcome: Progressing     Problem: Discharge Planning  Goal: Discharge to home or other facility with appropriate resources  Outcome: Progressing     Problem: Safety - Adult  Goal: Free from fall injury  Outcome: Progressing  Flowsheets (Taken 9/7/2022 2116)  Free From Fall Injury:   Instruct family/caregiver on patient safety   Based on caregiver fall risk screen, instruct family/caregiver to ask for assistance with transferring infant if caregiver noted to have fall risk factors     Problem: Skin/Tissue Integrity  Goal: Absence of new skin breakdown  Description: 1. Monitor for areas of redness and/or skin breakdown  2. Assess vascular access sites hourly  3. Every 4-6 hours minimum:  Change oxygen saturation probe site  4. Every 4-6 hours:  If on nasal continuous positive airway pressure, respiratory therapy assess nares and determine need for appliance change or resting period.   Outcome: Progressing     Problem: ABCDS Injury Assessment  Goal: Absence of physical injury  Outcome: Progressing  Flowsheets (Taken 9/7/2022 2116)  Absence of Physical Injury: Implement safety measures based on patient assessment     Problem: Pain  Goal: Verbalizes/displays adequate comfort level or baseline comfort level  Outcome: Progressing

## 2022-09-09 LAB
ANION GAP SERPL CALCULATED.3IONS-SCNC: 15 MMOL/L (ref 3–16)
BUN BLDV-MCNC: 50 MG/DL (ref 7–20)
CALCIUM SERPL-MCNC: 8.5 MG/DL (ref 8.3–10.6)
CHLORIDE BLD-SCNC: 106 MMOL/L (ref 99–110)
CO2: 20 MMOL/L (ref 21–32)
CREAT SERPL-MCNC: 1.4 MG/DL (ref 0.8–1.3)
GFR AFRICAN AMERICAN: >60
GFR NON-AFRICAN AMERICAN: 50
GLUCOSE BLD-MCNC: 121 MG/DL (ref 70–99)
HCT VFR BLD CALC: 29 % (ref 40.5–52.5)
HEMOGLOBIN: 10 G/DL (ref 13.5–17.5)
MCH RBC QN AUTO: 29.5 PG (ref 26–34)
MCHC RBC AUTO-ENTMCNC: 34.4 G/DL (ref 31–36)
MCV RBC AUTO: 85.8 FL (ref 80–100)
PDW BLD-RTO: 16.7 % (ref 12.4–15.4)
PLATELET # BLD: 150 K/UL (ref 135–450)
PMV BLD AUTO: 7.7 FL (ref 5–10.5)
POTASSIUM SERPL-SCNC: 4.2 MMOL/L (ref 3.5–5.1)
RBC # BLD: 3.38 M/UL (ref 4.2–5.9)
SODIUM BLD-SCNC: 141 MMOL/L (ref 136–145)
VANCOMYCIN TROUGH: 19.6 UG/ML (ref 10–20)
WBC # BLD: 10 K/UL (ref 4–11)

## 2022-09-09 PROCEDURE — 6360000002 HC RX W HCPCS: Performed by: INTERNAL MEDICINE

## 2022-09-09 PROCEDURE — 80048 BASIC METABOLIC PNL TOTAL CA: CPT

## 2022-09-09 PROCEDURE — 6370000000 HC RX 637 (ALT 250 FOR IP): Performed by: INTERNAL MEDICINE

## 2022-09-09 PROCEDURE — 2580000003 HC RX 258: Performed by: INTERNAL MEDICINE

## 2022-09-09 PROCEDURE — 6360000002 HC RX W HCPCS: Performed by: HOSPITALIST

## 2022-09-09 PROCEDURE — 36415 COLL VENOUS BLD VENIPUNCTURE: CPT

## 2022-09-09 PROCEDURE — 85027 COMPLETE CBC AUTOMATED: CPT

## 2022-09-09 PROCEDURE — 99232 SBSQ HOSP IP/OBS MODERATE 35: CPT | Performed by: INTERNAL MEDICINE

## 2022-09-09 PROCEDURE — 1200000000 HC SEMI PRIVATE

## 2022-09-09 PROCEDURE — 92526 ORAL FUNCTION THERAPY: CPT

## 2022-09-09 PROCEDURE — 80202 ASSAY OF VANCOMYCIN: CPT

## 2022-09-09 PROCEDURE — 2580000003 HC RX 258: Performed by: HOSPITALIST

## 2022-09-09 RX ADMIN — GABAPENTIN 300 MG: 300 CAPSULE ORAL at 09:01

## 2022-09-09 RX ADMIN — GABAPENTIN 300 MG: 300 CAPSULE ORAL at 13:14

## 2022-09-09 RX ADMIN — MIRTAZAPINE 15 MG: 15 TABLET, FILM COATED ORAL at 21:16

## 2022-09-09 RX ADMIN — BUSPIRONE HYDROCHLORIDE 5 MG: 5 TABLET ORAL at 21:16

## 2022-09-09 RX ADMIN — PANTOPRAZOLE SODIUM 40 MG: 40 TABLET, DELAYED RELEASE ORAL at 08:57

## 2022-09-09 RX ADMIN — SODIUM CHLORIDE, PRESERVATIVE FREE 10 ML: 5 INJECTION INTRAVENOUS at 21:16

## 2022-09-09 RX ADMIN — VANCOMYCIN HYDROCHLORIDE 750 MG: 750 INJECTION, POWDER, LYOPHILIZED, FOR SOLUTION INTRAVENOUS at 15:09

## 2022-09-09 RX ADMIN — ATORVASTATIN CALCIUM 80 MG: 80 TABLET, FILM COATED ORAL at 21:16

## 2022-09-09 RX ADMIN — BUSPIRONE HYDROCHLORIDE 5 MG: 5 TABLET ORAL at 09:04

## 2022-09-09 RX ADMIN — GABAPENTIN 300 MG: 300 CAPSULE ORAL at 21:16

## 2022-09-09 RX ADMIN — ATENOLOL 25 MG: 25 TABLET ORAL at 09:02

## 2022-09-09 RX ADMIN — CLOPIDOGREL BISULFATE 75 MG: 75 TABLET ORAL at 09:04

## 2022-09-09 RX ADMIN — SENNOSIDES 8.6 MG: 8.6 TABLET, FILM COATED ORAL at 09:05

## 2022-09-09 RX ADMIN — FERROUS SULFATE TAB EC 324 MG (65 MG FE EQUIVALENT) 324 MG: 324 (65 FE) TABLET DELAYED RESPONSE at 09:05

## 2022-09-09 RX ADMIN — BACLOFEN 20 MG: 10 TABLET ORAL at 21:16

## 2022-09-09 RX ADMIN — BACLOFEN 20 MG: 10 TABLET ORAL at 08:59

## 2022-09-09 RX ADMIN — CEFEPIME 2000 MG: 2 INJECTION, POWDER, FOR SOLUTION INTRAVENOUS at 16:24

## 2022-09-09 RX ADMIN — ASPIRIN 81 MG: 81 TABLET, COATED ORAL at 09:00

## 2022-09-09 RX ADMIN — ENOXAPARIN SODIUM 40 MG: 100 INJECTION SUBCUTANEOUS at 21:15

## 2022-09-09 RX ADMIN — VANCOMYCIN HYDROCHLORIDE 750 MG: 750 INJECTION, POWDER, LYOPHILIZED, FOR SOLUTION INTRAVENOUS at 06:46

## 2022-09-09 RX ADMIN — OXYCODONE HYDROCHLORIDE AND ACETAMINOPHEN 500 MG: 500 TABLET ORAL at 09:02

## 2022-09-09 RX ADMIN — SERTRALINE 100 MG: 100 TABLET, FILM COATED ORAL at 09:03

## 2022-09-09 RX ADMIN — MULTIPLE VITAMINS W/ MINERALS TAB 1 TABLET: TAB at 08:56

## 2022-09-09 RX ADMIN — CEFEPIME 2000 MG: 2 INJECTION, POWDER, FOR SOLUTION INTRAVENOUS at 09:17

## 2022-09-09 ASSESSMENT — PAIN SCALES - GENERAL: PAINLEVEL_OUTOF10: 0

## 2022-09-09 NOTE — PROGRESS NOTES
Speech Language/Pathology   SPEECH LANGUAGE AND CLINICAL BEDSIDE SWALLOWING TREATMENT  Speech Therapy Department       Dimitri Altamirano  AGE: 75 y.o. GENDER: male  : 1955  [id number]  EPISODE DATE:  2022    MEDICAL DIAGNOSIS:   No chief complaint on file. ONSET: Expressive aphasia symptom onset has been chronic for a time period since prior CVA. Severity is described as mild-moderate. PAST MEDICAL HISTORY        Diagnosis Date    Acute MI ([facility] [address] 2013    Anxiety     Arthritis     CAD (coronary artery disease)     COVID-19     Hyperlipidemia 2/3/2022    Hypertension     Influenza A 01/15/2017    Severe malnutrition ([facility] [address] 2/10/2022       PAST SURGICAL HISTORY    Past Surgical History:   Procedure Laterality Date    BACK SURGERY N/A     herniated disc    CORONARY ANGIOPLASTY WITH STENT PLACEMENT      EYE SURGERY      VASCULAR SURGERY         ALLERGIES    No Known Allergies    Chart Review:   H&P: Patient is a 26-year-old male with past medical history of CAD hypertension hyperlipidemia who presents to the hospital due to left-sided facial droop. 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 Otherwise denied chest pain shortness of breath nausea vomiting difficulty speaking. Mentions he thinks that he has been slow to respond to questions today. Otherwise denies fevers chills diarrhea constipation dysuria. He also denies any new numbness tingling or weakness in arms legs. Imaging:  CT head 22  Impression   CT brain:       1. No acute intracranial abnormality. 2. Involutional parenchymal changes with microvascular ischemic disease. 3. Chronic left parasagittal frontoparietal encephalomalacia/gliosis. 4. Scattered chronic lacunar infarcts such as in the bilateral basal ganglia   and right cerebellum. CTA head/neck:       1.  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 2. Otherwise, no evidence of large vessel occlusion or significant stenosis   in the remaining major arteries of the head and neck. 3. Mild (less than 50%) possibly moderate (50-69%) stenosis of the proximal   left ICA with an associated ulcerated atherosclerotic plaque. 4. Mild prominence of the pulmonary artery, may be related to pulmonary   arterial hypertension. 5. Bubbly air-fluid level present within the trachea and left main bronchus,   compatible with aspiration. 6. Scattered dental caries and periapical lucency. CHEST X-RAY 8/31/22  Impression   No radiographic evidence of acute pulmonary disease      Brain MRI 91/22:  Impression   1. Few small foci of diffusion restriction present in the left parasagittal   frontoparietal lobe may represent acute/subacute ischemia on a background of   chronic encephalomalacia/gliosis. 2. Involutional parenchymal changes with moderate microvascular ischemic   disease. 3. Scattered chronic lacunar infarcts such as in the bilateral basal ganglia   and right cerebellum. Prior Status: Reg/thin at [facility]; long term resident at [facility]. Pt reports previously seen by ST at [facility] but not currently on caseload. Subjective:     Current diet  Regular Texture / Thin liquids     Pt/staff statements regarding diet tolerance:   9/9: MD d/w SLP Shirley Clark) due to concerns with MD noting pt coughing when MD arrives to pt room while pt eating lunch and specificlly pt had just taken a drink and is putting his cup down. Patient and RN continue to report no difficulty swallowing or consuming regular texture/ thin liquids. Cognitive-communication treatment progress: He believes language function is somewhat improved compared to previous date, but not back to baseline.      Objective: Current Therapy Impression of progress/goal status    IMPRESSIONS  Dysphagia:   Pt seen for direct dysphagia tx this date due to MD concerns (see comments above)  Pt with no clinical signs of aspiration this date. Mild oral dysphagia characterized by mildly prolonged but effective mastication with trials with regular solid. Pt with no oral residue and no cough, choke or vocal change noted. Pt offered thin liquids to assist  with solids, but pt reports he does not mix the consistencies in this way and declines to do so this date. No clinical signs of aspiration with liquids. Thin liquids by straw with sequential swallows trialed with no cough, choke or vocal change noted. O2 levels remain 93-94% during trials. Based on trials this session, pt with no clinical indications of aspiration. If medical team concern for silent aspiration, a Modified Barium Swallow would need to be ordered. Expressive Aphasia   No direct tx this date  Pt with noted aphasic errors in conversation  Continue tx.     Recommended Diet:   Regular Texture/ Thin liquids  Medication Administration:  Whole with thin liquid    Compensatory Swallowing Strategies:  Upright as possible with all PO intake , Small bites/sips , Eat/feed slowly, Remain upright 30-45 min     SHORT TERM DYSPHAGIA GOALS  Pt will functionally tolerate recommended diet with no overt clinical s/s of aspiration - MET 9/2 and this date 9/9  Pt will demonstrate understanding of aspiration risk and precautions via education/demonstration with occasional prompting - MET 9/2 and 9/9    Short Term Speech/Language/Cognitive Goals: not addressed this date (9/9/2022)  Pt will improve verbal expression for functional expression via graded tasks to 95% -ONGOING; min-mod cues for description and expansion   Pt will participate in ongoing cognitive assessment with goals to be established as indicated - ONGOING; oriented x3 IND  Patient will complete short term memory tasks of increasing length and complexity with 85% accuracy given min cues -ONGOING; episodic memory appears intact    Prognosis  Good    Education  Role of SLP, strategies to reduce aspiration, . Pt with good understanding    Discharge Recommendations:  Pt will benefit from continued skilled Speech Therapy for Speech services. Please accept this as Speech Therapy Discharge status, if pt is discharged prior to next therapy session. Objective: Assessment/Therapy activities    Treatment this session: no treatment this session. Objective:  COMPREHENSION  Auditory Comprehension: [x]WFL []Mild   [] Moderate  []Severe  []To be assessed  Functional Ability to Comprehend Basic Commands/Questions     EXPRESSION  Primary Mode of Expression: Verbal  Verbal Expression: []WFL [x]Mild   [x] Moderate  []Severe  []To be assessed  Impaired Convergent Naming  Impaired Conversation  Impaired Thought Organization   Anomia      Pragmatics/Social Functioning: [x]WFL []Mild   [] Moderate  []Severe  []To be assessed     MOTOR SPEECH  Motor Speech: [x]WFL []Mild   [] Moderate  []Severe  []To be assessed    VOICE  Voice: [x]WFL []Mild   [] Moderate  []Severe  []To be assessed      COGNITION      Overall Orientation : [x]WFL []Mild   [] Moderate  []Severe  []To be assessed   []Unable to be assessed secondary to Aphasia   Oriented x4    Attention: [x]WFL []Mild   [] Moderate  []Severe  []To be assessed    Memory: []WFL [x]Mild   [] Moderate  []Severe  []To be assessed    Problem Solving: []WFL [x]Mild   [] Moderate  []Severe  [x]To be assessed    Safety/Judgement: [x]WFL []Mild   [] Moderate  []Severe  []To be assessed    DYSPHAGIA TREATMENT     Positioning: upright in bed      PO Trials: Thin Liquids- by straw with serial swallows with No cough, choke or vocal change noted and no change in O2 sats. Nectar thick liquids - Not assessed   Honey Thick liquids- Not assessed   Puree -Not assessed   Soft food -Not assessed  Regular food - turkey on bun; slow oral phase with no residue and No cough, choke or vocal change noted. Dysphagia Tx:   Direct Dysphagia tx: PO trials as above. D/W pt MD concerns.   Pt denies any difficulties. Delayed volitional swallow initiation noted during assessment portion of session. Training in compensatory strategies: Sit upright, chew thoroughly, small bites/drinks  Pt response to ex/training: Appears to demonstrate good comprehension. Pt self feeds slowly and does not mix thin liquids with solids.       Plan   Plan:    Recommendations  Continue current diet     Plan/Recommendations: Speech therapy 3-5 times a weeks for speech-language treatment,    Interventions: Language therapy, word finding strategy training, cognitive-communication tx  Barriers toward progress toward pt goals:  Communication deficit      Timed Code Treatment:  SLP Individual Minutes  Time In:1345  Time Out: 1400  Minutes: 15  Total Treatment Time:  15 minutes     Signed:  Linda Hooks MS CCC/SLP [id number]  Speech Language Pathologist  09/09/22  2:16 PM

## 2022-09-09 NOTE — PROGRESS NOTES
Patient alert and oriented. Quiet in bed. Bed in lowest position. Assessment complete. No complaints of pain. No signs or symptoms of distress. Vikash light placed within reach. Patient instructed to call out for needs.

## 2022-09-09 NOTE — PROGRESS NOTES
Pulmonary progress Note     Patient's name:  Nadia Everett  Medical Record Number: [id number]  Patient's account/billing number: [de-identified]  Patient's YOB: 1955  Age: 75 y.o. Date of Admission: 8/31/2022  1:46 PM  Date of Consult: 9/9/2022      Primary Care Physician: No primary care provider on file. Code Status: Full Code    Reason for consult: HCAP     Assessment and Plan     HCAP  Left pleural effusion  H/o CVA with right residual hemiparesis   CAD  HTN  MECHE        Plan:  Antibiotics vanc and cefepime x 7 days  Aspiration precautions  Bedside US showed moderate free moving fluid/left effusion, I offered thoracentesis but patient declined at this time. IS/Acapella  Monitor Bp      Subjective: Bp soft overnight  Afebrile      HISTORY OF PRESENT ILLNESS:   /Ms. Nadia Everett is a 75 y.o.  gentleman with PMH stated below significant for h/o CVA with right hemiparesis, [facility] resident presented with left facial droop    Patient initially got treated for UTI treated with Rocephin but his leukocytes continue to worsen for that he got CXR that showed multifocal airspace disease on the left with associated effusion for that we were consulted  He has no issue with swallowing   Cough  minimal  No sputum  PCT negative  No fever     He is on ASA and plavix       REVIEW OF SYSTEMS:  Review of Systems -   General ROS: weakness chronic   Psychological ROS: negative  Ophthalmic ROS: negative  ENT ROS: negative  Allergy and Immunology ROS: negative  Hematological and Lymphatic ROS: negative  Endocrine ROS: negative  Breast ROS: negative  Respiratory ROS: denied cough or sob   Cardiovascular ROS: no chest pain or dyspnea on exertion  Gastrointestinal ROS:negative  Genito-Urinary ROS: negative  Musculoskeletal ROS: negative  Neurological ROS: right side paralysis   Dermatological ROS: negative        Physical Exam:    Vitals: BP (!) 102/56   Pulse 55 Temp 97.9 °F (36.6 °C) (Oral)   Resp 16   Ht 6' 3\" (1.905 m)   Wt 208 lb 5.4 oz (94.5 kg)   SpO2 94%   BMI 26.04 kg/m²     Last Body weight:   Wt Readings from Last 3 Encounters:   09/09/22 208 lb 5.4 oz (94.5 kg)   02/10/22 188 lb 4.4 oz (85.4 kg)   01/11/22 187 lb 13.3 oz (85.2 kg)       Body Mass Index : Body mass index is 26.04 kg/m². Intake and Output summary:   Intake/Output Summary (Last 24 hours) at 9/9/2022 1206  Last data filed at 9/9/2022 1030  Gross per 24 hour   Intake 480 ml   Output 1100 ml   Net -620 ml       Physical Examination:     PHYSICAL EXAM:    Gen:  chronically ill appearing   Eyes: PERRL. Anicteric sclera. No conjunctival injection. ENT: No discharge. Posterior oropharynx clear. External appearance of ears and nose normal.  Neck: Trachea midline. No mass, no lymphadenopathy    Resp:  diminished on the left with no wheezing or rhonchi   CV: Regular rate. Regular rhythm. No murmur or rub. No edema. GI: Soft, Non-tender. Non-distended. +BS  Skin: Warm, dry, w/o erythema. Lymph: No cervical or supraclavicular LAD. M/S: No cyanosis. No clubbing. Rigth AKA  Neuro:  CN 2-12 tested, right upper extremity paralysis, spasticity,   Psych: Awake and lethargic       Laboratory findings:-    CBC:   Recent Labs     09/09/22  0439   WBC 10.0   HGB 10.0*        BMP:    Recent Labs     09/08/22  0706 09/09/22  0439    141   K 4.0 4.2    106   CO2 21 20*   BUN 36* 50*   CREATININE 1.3 1.4*   GLUCOSE 111* 121*     S. Calcium:  Recent Labs     09/09/22  0439   CALCIUM 8.5       Radiology Review:  Pertinent images / reports were reviewed as a part of this visit. CTPA: No results found for this or any previous visit.       CXR PA/LAT: Results for orders placed during the hospital encounter of 01/15/17    XR Chest Standard TWO VW    Narrative  EXAMINATION:  TWO VIEWS OF THE CHEST    1/15/2017 8:01 pm    COMPARISON:  September 24, 2014    HISTORY:  [address] PROVIDED HISTORY: fatigue  TECHNOLOGIST PROVIDED HISTORY:  Ordering Physician Provided Reason for Exam: cough  Acuity: Unknown  Type of Exam: Unknown    FINDINGS:  The cardiomediastinal silhouette is stable. There are increased lung  markings at the bilateral parahilar regions, may be related to bronchitis. There is no pneumothorax or pleural effusion. The visualized osseous  structures are within normal limits. Impression  Increased lung markings at the bilateral parahilar regions, may be related to  bronchitis. CXR portable: Results for orders placed during the hospital encounter of 08/31/22    XR CHEST PORTABLE    Narrative  EXAMINATION:  ONE XRAY VIEW OF THE CHEST    9/7/2022 1:46 pm    COMPARISON:  Chest x-ray dated 31 August 2022    HISTORY:  ORDERING SYSTEM PROVIDED HISTORY: pleural effusion  TECHNOLOGIST PROVIDED HISTORY:  Reason for exam:->pleural effusion  Reason for Exam: pleural effusion    FINDINGS:  There is complete opacification of the left hemithorax. The right lung is  clear. No pneumothorax. The cardiomediastinal silhouette is obscured. Impression  Complete opacification of the left hemithorax which appears caused by a large  left pleural effusion as well as left upper lobe airspace consolidation. Consider pneumonia.         Yvonne Kyle MD, M.D.            9/9/2022, 12:06 PM

## 2022-09-09 NOTE — PROGRESS NOTES
[facility]                                               Progress note    Summary:   Flakita Regalado is being seen by nephrology for MECHE. Admitted with left-sided facial droop. Has a history of CVA and residual right-sided weakness. Creatinine admission was 1.3 and increased to 1.6 on day 2 of hospitalization. Interval History  Patient was seen and examined at bedside  Feeling well has no complaints no chest pain or shortness of breath  No trouble swallowing, eating without issue    Blood pressure 102/56  On room air. 94%  Urine output today 900 cc, external urinary catheter    Cr 1 > 1.3 after resuming lisinopril > 1.4    Plan:   Hold lisinopril      Crystal Ugalde MD  [address] [facility]  Office: [phone]    Assessment:   MECHE:  Cr up to 2.1, rule out retention, resume iVFs. Stopping HCTZ as well  - baseline Cr 0.7-1.0  - Cr on admission was 1.3, increased to 1.6 the next day  - no UA this admission  - no significant hemodynamic insult or fluctuation noted. - he had a CT with contrast 8/31/2022  - home lisinopril on hold  - check UA, renal US  - start IVF    Hypertension:  - home regimen: lisinopril 20 mg daily, atenolol 25 mg daily, HCTZ 25 mg daily  - lisinopril on hold due to MECHE    CVA:  - per primary and neurology            ROS:     Positives Listed Bold. All other remaining systems are negative. Constitutional:  fever, chills, weakness, weight change, fatigue,      Skin:  rash, pruritus, hair loss, bruising, dry skin, petechiae. Head, Face, Neck   headaches, swelling,  cervical adenopathy. Respiratory: shortness of breath, cough, or wheezing  Cardiovascular: chest pain, palpitations, dizzy, edema  Gastrointestinal: nausea, vomiting, diarrhea, constipation,belly pain    Yellow skin, blood in stool  Musculoskeletal:  back pain, muscle weakness, gait problems,       joint pain or swelling.   Genitourinary:  dysuria, poor urine flow, flank pain, blood in urine  Neurologic: vertigo, TIA'S, syncope, seizures, focal weakness  Psychosocial:  insomnia, anxiety, or depression. Additional positive findings: -     PMH:   Past medical history, surgical history, social history, family history are reviewed and updated as appropriate. Reviewed current medication list.   Allergies reviewed and updated as needed. PE:   Vitals:    09/09/22 1100   BP: (!) 102/56   Pulse: 55   Resp: 16   Temp: 97.9 °F (36.6 °C)   SpO2: 94%       General appearance:  in NAD, fully alert and oriented. Comfortable. HEENT: EOM intact, no icterus. Trachea is midline. Neck : No masses, appears symmetrical, no JVD  Respiratory: Respiratory effort appears normal, bilateral equal chest rise, no wheeze, no crackles   Cardiovascular: Ausculation shows RRR no edema  Abdomen: No visible mass or tenderness, non distended. Musculoskeletal:  Joints with no swelling or deformity. Skin:no rashes, ulcers, induration, no jaundice. Neuro: Appropriate responses. Though a little slow.        Lab Results   Component Value Date    CREATININE 1.4 (H) 09/09/2022    BUN 50 (H) 09/09/2022     09/09/2022    K 4.2 09/09/2022     09/09/2022    CO2 20 (L) 09/09/2022      Lab Results   Component Value Date    WBC 10.0 09/09/2022    HGB 10.0 (L) 09/09/2022    HCT 29.0 (L) 09/09/2022    MCV 85.8 09/09/2022     09/09/2022     Lab Results   Component Value Date    CALCIUM 8.5 09/09/2022    CAION 1.09 (L) 09/24/2014    PHOS 3.3 01/08/2022

## 2022-09-10 LAB
ANION GAP SERPL CALCULATED.3IONS-SCNC: 11 MMOL/L (ref 3–16)
BUN BLDV-MCNC: 48 MG/DL (ref 7–20)
CALCIUM SERPL-MCNC: 8.9 MG/DL (ref 8.3–10.6)
CHLORIDE BLD-SCNC: 109 MMOL/L (ref 99–110)
CO2: 20 MMOL/L (ref 21–32)
CREAT SERPL-MCNC: 1.2 MG/DL (ref 0.8–1.3)
GFR AFRICAN AMERICAN: >60
GFR NON-AFRICAN AMERICAN: >60
GLUCOSE BLD-MCNC: 123 MG/DL (ref 70–99)
HCT VFR BLD CALC: 29.9 % (ref 40.5–52.5)
HEMOGLOBIN: 10.2 G/DL (ref 13.5–17.5)
MCH RBC QN AUTO: 29.5 PG (ref 26–34)
MCHC RBC AUTO-ENTMCNC: 34.2 G/DL (ref 31–36)
MCV RBC AUTO: 86.4 FL (ref 80–100)
PDW BLD-RTO: 16.2 % (ref 12.4–15.4)
PLATELET # BLD: 158 K/UL (ref 135–450)
PMV BLD AUTO: 7.6 FL (ref 5–10.5)
POTASSIUM SERPL-SCNC: 4.2 MMOL/L (ref 3.5–5.1)
RBC # BLD: 3.46 M/UL (ref 4.2–5.9)
SODIUM BLD-SCNC: 140 MMOL/L (ref 136–145)
VANCOMYCIN TROUGH: 19.7 UG/ML (ref 10–20)
WBC # BLD: 9 K/UL (ref 4–11)

## 2022-09-10 PROCEDURE — 6360000002 HC RX W HCPCS: Performed by: HOSPITALIST

## 2022-09-10 PROCEDURE — 2580000003 HC RX 258: Performed by: INTERNAL MEDICINE

## 2022-09-10 PROCEDURE — 36415 COLL VENOUS BLD VENIPUNCTURE: CPT

## 2022-09-10 PROCEDURE — 1200000000 HC SEMI PRIVATE

## 2022-09-10 PROCEDURE — 94760 N-INVAS EAR/PLS OXIMETRY 1: CPT

## 2022-09-10 PROCEDURE — 2580000003 HC RX 258: Performed by: HOSPITALIST

## 2022-09-10 PROCEDURE — 6360000002 HC RX W HCPCS: Performed by: INTERNAL MEDICINE

## 2022-09-10 PROCEDURE — 85027 COMPLETE CBC AUTOMATED: CPT

## 2022-09-10 PROCEDURE — 6370000000 HC RX 637 (ALT 250 FOR IP): Performed by: INTERNAL MEDICINE

## 2022-09-10 PROCEDURE — 80048 BASIC METABOLIC PNL TOTAL CA: CPT

## 2022-09-10 PROCEDURE — 80202 ASSAY OF VANCOMYCIN: CPT

## 2022-09-10 RX ADMIN — FERROUS SULFATE TAB EC 324 MG (65 MG FE EQUIVALENT) 324 MG: 324 (65 FE) TABLET DELAYED RESPONSE at 08:39

## 2022-09-10 RX ADMIN — ATENOLOL 25 MG: 25 TABLET ORAL at 08:39

## 2022-09-10 RX ADMIN — SODIUM CHLORIDE, PRESERVATIVE FREE 10 ML: 5 INJECTION INTRAVENOUS at 13:14

## 2022-09-10 RX ADMIN — PANTOPRAZOLE SODIUM 40 MG: 40 TABLET, DELAYED RELEASE ORAL at 08:39

## 2022-09-10 RX ADMIN — CEFEPIME 2000 MG: 2 INJECTION, POWDER, FOR SOLUTION INTRAVENOUS at 23:44

## 2022-09-10 RX ADMIN — CEFEPIME 2000 MG: 2 INJECTION, POWDER, FOR SOLUTION INTRAVENOUS at 00:00

## 2022-09-10 RX ADMIN — BACLOFEN 20 MG: 10 TABLET ORAL at 20:54

## 2022-09-10 RX ADMIN — BUSPIRONE HYDROCHLORIDE 5 MG: 5 TABLET ORAL at 13:12

## 2022-09-10 RX ADMIN — GABAPENTIN 300 MG: 300 CAPSULE ORAL at 08:39

## 2022-09-10 RX ADMIN — VANCOMYCIN HYDROCHLORIDE 750 MG: 750 INJECTION, POWDER, LYOPHILIZED, FOR SOLUTION INTRAVENOUS at 17:06

## 2022-09-10 RX ADMIN — SERTRALINE 100 MG: 100 TABLET, FILM COATED ORAL at 08:38

## 2022-09-10 RX ADMIN — BACLOFEN 20 MG: 10 TABLET ORAL at 08:39

## 2022-09-10 RX ADMIN — OXYCODONE HYDROCHLORIDE AND ACETAMINOPHEN 500 MG: 500 TABLET ORAL at 08:39

## 2022-09-10 RX ADMIN — GABAPENTIN 300 MG: 300 CAPSULE ORAL at 15:06

## 2022-09-10 RX ADMIN — MULTIPLE VITAMINS W/ MINERALS TAB 1 TABLET: TAB at 08:39

## 2022-09-10 RX ADMIN — ATORVASTATIN CALCIUM 80 MG: 80 TABLET, FILM COATED ORAL at 20:54

## 2022-09-10 RX ADMIN — SENNOSIDES 8.6 MG: 8.6 TABLET, FILM COATED ORAL at 08:39

## 2022-09-10 RX ADMIN — VANCOMYCIN HYDROCHLORIDE 750 MG: 750 INJECTION, POWDER, LYOPHILIZED, FOR SOLUTION INTRAVENOUS at 04:19

## 2022-09-10 RX ADMIN — GABAPENTIN 300 MG: 300 CAPSULE ORAL at 20:55

## 2022-09-10 RX ADMIN — MIRTAZAPINE 15 MG: 15 TABLET, FILM COATED ORAL at 20:55

## 2022-09-10 RX ADMIN — CEFEPIME 2000 MG: 2 INJECTION, POWDER, FOR SOLUTION INTRAVENOUS at 09:55

## 2022-09-10 RX ADMIN — CLOPIDOGREL BISULFATE 75 MG: 75 TABLET ORAL at 08:39

## 2022-09-10 RX ADMIN — ASPIRIN 81 MG: 81 TABLET, COATED ORAL at 08:39

## 2022-09-10 RX ADMIN — CEFEPIME 2000 MG: 2 INJECTION, POWDER, FOR SOLUTION INTRAVENOUS at 18:28

## 2022-09-10 RX ADMIN — SODIUM CHLORIDE, PRESERVATIVE FREE 10 ML: 5 INJECTION INTRAVENOUS at 20:55

## 2022-09-10 RX ADMIN — ENOXAPARIN SODIUM 40 MG: 100 INJECTION SUBCUTANEOUS at 20:54

## 2022-09-10 RX ADMIN — BUSPIRONE HYDROCHLORIDE 5 MG: 5 TABLET ORAL at 20:55

## 2022-09-10 ASSESSMENT — PAIN SCALES - GENERAL
PAINLEVEL_OUTOF10: 0

## 2022-09-10 NOTE — PROGRESS NOTES
Clinical Pharmacy Note  Vancomycin Consult    Cuauhtemoc Deleon is a 75 y.o. male ordered vancomycin for pneumonia; consult received from Dr. Dotty Murcia to manage therapy. Also receiving cefepime. Allergies:  Patient has no known allergies. Temp max:  Temp (24hrs), Av.6 °F (36.4 °C), Min:97.4 °F (36.3 °C), Max:97.9 °F (36.6 °C)      Recent Labs     22  0706 22  0439 09/10/22  0531   WBC 13.0* 10.0 9.0         Recent Labs     22  0706 22  0439 09/10/22  0531   BUN 36* 50* 48*   CREATININE 1.3 1.4* 1.2         Intake/Output Summary (Last 24 hours) at 9/10/2022 1851  Last data filed at 9/10/2022 1834  Gross per 24 hour   Intake 1310 ml   Output 2400 ml   Net -1090 ml       Culture Results:  22 MRSA nasal probe - positive for MRSA    Ht Readings from Last 1 Encounters:   22 6' 3\" (1.905 m)        Wt Readings from Last 1 Encounters:   09/10/22 209 lb 14.1 oz (95.2 kg)         Estimated Creatinine Clearance: 70 mL/min (based on SCr of 1.2 mg/dL). Assessment/Plan:  Vancomycin Day 4  Trough level = 19.7 mg/L  Continue current regimen of vancomycin  mg Q12H. Estimated AUC = 508 and trough 15-20 mg/L. Next trough level ordered on 22. Thank you for the consult.      Jil Osgood Leisure, [facility]  9/10/2022 6:51 PM

## 2022-09-10 NOTE — PLAN OF CARE
Continuing to work with patient and health care team on discharge plan. Discharge instructions and medication management will be reviewed prior to discharge.     Problem: Infection - Adult  Goal: Absence of infection at discharge  9/10/2022 1435 by Chula Titus RN  Outcome: Progressing  9/10/2022 0036 by Shakira Hampton RN  Outcome: Progressing  Flowsheets (Taken 9/10/2022 0036)  Absence of infection at discharge: Assess and monitor for signs and symptoms of infection     Problem: Neurosensory - Adult  Goal: Achieves stable or improved neurological status  9/10/2022 1435 by Chula Titus RN  Outcome: Progressing  Flowsheets (Taken 9/10/2022 1011)  Achieves stable or improved neurological status: Assess for and report changes in neurological status  9/10/2022 0036 by Shakira Hampton RN  Outcome: Progressing  Flowsheets (Taken 9/9/2022 2003)  Achieves stable or improved neurological status: Assess for and report changes in neurological status  Goal: Achieves maximal functionality and self care  9/10/2022 1435 by Chula Titus RN  Outcome: Progressing  Flowsheets (Taken 9/10/2022 1011)  Achieves maximal functionality and self care: Monitor swallowing and airway patency with patient fatigue and changes in neurological status  9/10/2022 0036 by Shakira Hampton RN  Outcome: Progressing  [address] (Taken 9/9/2022 2003)  Achieves maximal functionality and self care: Monitor swallowing and airway patency with patient fatigue and changes in neurological status

## 2022-09-10 NOTE — PROGRESS NOTES
[facility]                                               Progress note    Summary:   Guillermina Ocasio is being seen by nephrology for MECHE. Admitted with left-sided facial droop. Has a history of CVA and residual right-sided weakness. Creatinine admission was 1.3 and increased to 1.6 on day 2 of hospitalization. Interval History  Seen at bedside  BP stronger after stopping lisinopril  Cr today also improved from 1.4 to 1.2  Mild acidosis persists. Plan:   BP is at goal on current regimen  Would hold off on lisinopril for now. Brittany Talbot MD  [facility]  Office: [phone]    Assessment:   MECHE:  Cr up to 2.1, rule out retention, resume iVFs. Stopping HCTZ as well  - baseline Cr 0.7-1.0  - Cr on admission was 1.3, increased to 1.6 the next day  - no UA this admission  - no significant hemodynamic insult or fluctuation noted. - he had a CT with contrast 8/31/2022  - home lisinopril on hold  - check UA, renal US  - start IVF    Hypertension:  - home regimen: lisinopril 20 mg daily, atenolol 25 mg daily, HCTZ 25 mg daily  - lisinopril on hold due to MECHE    CVA:  - per primary and neurology            ROS:     Positives Listed Bold. All other remaining systems are negative. Constitutional:  fever, chills, weakness, weight change, fatigue,      Skin:  rash, pruritus, hair loss, bruising, dry skin, petechiae. Head, Face, Neck   headaches, swelling,  cervical adenopathy. Respiratory: shortness of breath, cough, or wheezing  Cardiovascular: chest pain, palpitations, dizzy, edema  Gastrointestinal: nausea, vomiting, diarrhea, constipation,belly pain    Yellow skin, blood in stool  Musculoskeletal:  back pain, muscle weakness, gait problems,       joint pain or swelling. Genitourinary:  dysuria, poor urine flow, flank pain, blood in urine  Neurologic:  vertigo, TIA'S, syncope, seizures, focal weakness  Psychosocial:  insomnia, anxiety, or depression.   Additional positive findings: -     PMH:   Past medical history, surgical history, social history, family history are reviewed and updated as appropriate. Reviewed current medication list.   Allergies reviewed and updated as needed. PE:   Vitals:    09/10/22 1702   BP: 132/67   Pulse: 52   Resp: 17   Temp: 97.5 °F (36.4 °C)   SpO2: 96%       General appearance:  in NAD, fully alert and oriented. Comfortable. HEENT: EOM intact, no icterus. Trachea is midline. Neck : No masses, appears symmetrical, no JVD  Respiratory: Respiratory effort appears normal, bilateral equal chest rise, no wheeze, no crackles   Cardiovascular: Ausculation shows RRR no edema  Abdomen: No visible mass or tenderness, non distended. Musculoskeletal:  Joints with no swelling or deformity. Skin:no rashes, ulcers, induration, no jaundice. Neuro: Appropriate responses. Though a little slow.        Lab Results   Component Value Date    CREATININE 1.2 09/10/2022    BUN 48 (H) 09/10/2022     09/10/2022    K 4.2 09/10/2022     09/10/2022    CO2 20 (L) 09/10/2022      Lab Results   Component Value Date    WBC 9.0 09/10/2022    HGB 10.2 (L) 09/10/2022    HCT 29.9 (L) 09/10/2022    MCV 86.4 09/10/2022     09/10/2022     Lab Results   Component Value Date    CALCIUM 8.9 09/10/2022    CAION 1.09 (L) 09/24/2014    PHOS 3.3 01/08/2022

## 2022-09-10 NOTE — PLAN OF CARE
Problem: Neurosensory - Adult  Goal: Achieves stable or improved neurological status  9/10/2022 0036 by Nan Garsia RN  Outcome: Progressing  Flowsheets (Taken 9/9/2022 2003)  Achieves stable or improved neurological status: Assess for and report changes in neurological status     Problem: Neurosensory - Adult  Goal: Achieves maximal functionality and self care  9/10/2022 0036 by Nan Garsia RN  Outcome: Progressing  [address] (Taken 9/9/2022 2003)  Achieves maximal functionality and self care: Monitor swallowing and airway patency with patient fatigue and changes in neurological status     Problem: Safety - Adult  Goal: Free from fall injury  9/10/2022 0036 by Nan Garsia RN  Outcome: Progressing  Flowsheets (Taken 9/10/2022 0036)  Free From Fall Injury: Instruct family/caregiver on patient safety     Problem: Skin/Tissue Integrity  Goal: Absence of new skin breakdown  Description: 1. Monitor for areas of redness and/or skin breakdown  2. Assess vascular access sites hourly  3. Every 4-6 hours minimum:  Change oxygen saturation probe site  4. Every 4-6 hours:  If on nasal continuous positive airway pressure, respiratory therapy assess nares and determine need for appliance change or resting period.   9/9/2022 2226 by Nan Garsia RN  Outcome: Progressing     Problem: ABCDS Injury Assessment  Goal: Absence of physical injury  9/10/2022 0036 by Nan Garsia RN  Outcome: Progressing  Flowsheets (Taken 9/10/2022 0036)  Absence of Physical Injury: Implement safety measures based on patient assessment

## 2022-09-10 NOTE — PROGRESS NOTES
Hospitalist Progress Note  9/10/2022 9:24 AM    PCP: No primary care provider on file.    [phone]     Date of Admission: 8/31/2022                                                                                                                     HOSPITAL COURSE    Patient demographics:  The patient  Cynda Kayser is a 75 y.o. male     Significant past medical history:   Patient Active Problem List   Diagnosis    Chest pain    MI, acute, non ST segment elevation ([address]    Tobacco abuse    Essential hypertension    Acute MI ([address]    Acute CVA (cerebrovascular accident) ([address]    Right leg weakness    Acute ischemic left LEXA stroke ([address]    Non-healing open wound of right heel    Right heel infection    Hyperlipidemia    CAD (coronary artery disease)    Cellulitis of left heel    Wound infection    Acute hematogenous osteomyelitis ([address]    Right hemiparesis ([address]    Severe malnutrition ([address]    Facial weakness    H/O: stroke         Presenting symptoms:  Left-sided facial droop    Diagnostic workup:      CONSULTS DURING ADMISSION :   IP CONSULT TO STROKE TEAM  IP CONSULT TO PHARMACY  PHARMACY TO CHANGE BASE FLUIDS  IP CONSULT TO HOSPITALIST  IP CONSULT TO NEUROLOGY  IP CONSULT TO CASE MANAGEMENT  IP CONSULT TO NEPHROLOGY  IP CONSULT TO PULMONOLOGY  PHARMACY TO DOSE VANCOMYCIN      Patient was diagnosed with:  Left-sided facial droop, rule out CVA  History of prior CVAs   Elevated troponin,   MECHE  CAD  Hypertension  Hyperlipidemia      Treatment while inpatient:  Patient is a 50-year-old male with past medical history of CAD hypertension hyperlipidemia     who presents to the hospital due to left-sided facial droop. Patient already has right-sided weakness from previous stroke. MRI of the brain showed multiple areas of stroke but no acute evidence of stroke. Pt was also diagnosed with MECHE. Which improved with IVF. ----------------------------------------------------------      SUBJECTIVE COMPLAINTS- follow up for Left-sided facial droop    Diet: ADULT DIET; Regular; Low Fat/Low Chol/High Fiber/AYAKA      OBJECTIVE:   Patient Active Problem List   Diagnosis    Chest pain    MI, acute, non ST segment elevation (HCC)    Tobacco abuse    Essential hypertension    [facility]    Acute CVA (cerebrovascular accident) ([facility]    Right leg weakness    Acute ischemic left LEXA stroke (HCC)    Non-healing open wound of right heel    Right heel infection    Hyperlipidemia    CAD (coronary artery disease)    Cellulitis of left heel    Wound infection    Acute hematogenous osteomyelitis (HCC)    Right hemiparesis (HCC)    Severe malnutrition (HCC)    Facial weakness    H/O: stroke       Allergies  Patient has no known allergies.     Medications    Scheduled Meds:   vancomycin  750 mg IntraVENous Q12H    cefepime  2,000 mg IntraVENous Q8H    vancomycin (VANCOCIN) intermittent dosing (placeholder)   Other RX Placeholder    [Held by provider] lisinopril  10 mg Oral Daily    gabapentin  300 mg Oral TID    sertraline  100 mg Oral Daily    sodium chloride flush  10 mL IntraVENous 2 times per day    enoxaparin  40 mg SubCUTAneous Nightly    aspirin  81 mg Oral Daily    Or    aspirin  300 mg Rectal Daily    vitamin C  500 mg Oral Daily    atenolol  25 mg Oral Daily    atorvastatin  80 mg Oral Nightly    baclofen  20 mg Oral BID    busPIRone  5 mg Oral BID    clopidogrel  75 mg Oral Daily    ferrous sulfate  324 mg Oral Daily with breakfast    [Held by provider] hydroCHLOROthiazide  25 mg Oral Daily    mirtazapine  15 mg Oral Nightly    therapeutic multivitamin-minerals  1 tablet Oral Daily    pantoprazole  40 mg Oral Daily    senna  1 tablet Oral Daily     Continuous Infusions:   sodium chloride 25 mL (09/02/22 2043)     PRN Meds:  acetaminophen, sodium chloride, albuterol sulfate HFA, hydrALAZINE, albuterol, sodium chloride flush, sodium

## 2022-09-10 NOTE — PROGRESS NOTES
NAME:  Flakita Regalado  YOB: 1955  MEDICAL RECORD NUMBER:  [id number]  TODAYS DATE:  9/9/2022    Discussed personal risk factors for Stroke /TIA with patient/family, and ways to reduce the risk for a recurrent stroke. Patient's personal risk factors which were identified are:     [] Alcohol Abuse: check with your physician before any alcohol consumption. [] Atrial fibrillation: may cause blood clots. [] Drug Abuse: Seek help, talk with your doctor  [] Clotting Disorder  [] Diabetes  [] Family history of stroke or heart disease  [x] High Blood Pressure/Hypertension: work with your physician. [x] High cholesterol: monitor cholesterol levels with your physician. [x] Overweight/Obesity: work with your physician for your ideal body weight. [x] Physical Inactivity: get regular exercise as directed by your physician. [x] Personal history of previous TIA or stroke  [x] Poor Diet; decrease salt (sodium) in your diet, follow diet directed by physician. [] Smoking: Cigarette/Cigar: stop smoking. Advised pt. that you can reduce your risk for stroke/TIA by modifying/controlling the risk factors that you have. Pt.advised to take the medications as prescribed, which will be detailed in the discharge instructions, and to not stop taking them without consulting their physician. In addition, pt. advised to maintain a healthy diet, exercise regularly and to not smoke. [facility]'s Stroke treatment and prevention, Managing your recovery  notebook  provided and/or reviewed  with patient/family. The notebook includes, but not limited to, sections addressing warning signs & symptoms of a stroke, which are: sudden numbness or weakness especially on one side of the body, sudden confusion, difficulty speaking or understanding, sudden changes in vision, sudden dizziness or loss of balance/ coordination, sudden severe headache, syncope and seizure.   The need to call EMS (911) immediately if signs &

## 2022-09-11 PROCEDURE — 6370000000 HC RX 637 (ALT 250 FOR IP): Performed by: INTERNAL MEDICINE

## 2022-09-11 PROCEDURE — 6360000002 HC RX W HCPCS: Performed by: INTERNAL MEDICINE

## 2022-09-11 PROCEDURE — 2580000003 HC RX 258: Performed by: INTERNAL MEDICINE

## 2022-09-11 PROCEDURE — 6360000002 HC RX W HCPCS: Performed by: HOSPITALIST

## 2022-09-11 PROCEDURE — 1200000000 HC SEMI PRIVATE

## 2022-09-11 PROCEDURE — 2580000003 HC RX 258: Performed by: HOSPITALIST

## 2022-09-11 RX ADMIN — ATENOLOL 25 MG: 25 TABLET ORAL at 08:55

## 2022-09-11 RX ADMIN — VANCOMYCIN HYDROCHLORIDE 750 MG: 750 INJECTION, POWDER, LYOPHILIZED, FOR SOLUTION INTRAVENOUS at 16:03

## 2022-09-11 RX ADMIN — ENOXAPARIN SODIUM 40 MG: 100 INJECTION SUBCUTANEOUS at 21:13

## 2022-09-11 RX ADMIN — GABAPENTIN 300 MG: 300 CAPSULE ORAL at 21:13

## 2022-09-11 RX ADMIN — BUSPIRONE HYDROCHLORIDE 5 MG: 5 TABLET ORAL at 21:13

## 2022-09-11 RX ADMIN — OXYCODONE HYDROCHLORIDE AND ACETAMINOPHEN 500 MG: 500 TABLET ORAL at 08:55

## 2022-09-11 RX ADMIN — SENNOSIDES 8.6 MG: 8.6 TABLET, FILM COATED ORAL at 08:55

## 2022-09-11 RX ADMIN — GABAPENTIN 300 MG: 300 CAPSULE ORAL at 08:55

## 2022-09-11 RX ADMIN — ASPIRIN 81 MG: 81 TABLET, COATED ORAL at 08:55

## 2022-09-11 RX ADMIN — BUSPIRONE HYDROCHLORIDE 5 MG: 5 TABLET ORAL at 08:56

## 2022-09-11 RX ADMIN — SERTRALINE 100 MG: 100 TABLET, FILM COATED ORAL at 08:55

## 2022-09-11 RX ADMIN — MIRTAZAPINE 15 MG: 15 TABLET, FILM COATED ORAL at 21:14

## 2022-09-11 RX ADMIN — BACLOFEN 20 MG: 10 TABLET ORAL at 21:14

## 2022-09-11 RX ADMIN — FERROUS SULFATE TAB EC 324 MG (65 MG FE EQUIVALENT) 324 MG: 324 (65 FE) TABLET DELAYED RESPONSE at 08:55

## 2022-09-11 RX ADMIN — MULTIPLE VITAMINS W/ MINERALS TAB 1 TABLET: TAB at 08:56

## 2022-09-11 RX ADMIN — GABAPENTIN 300 MG: 300 CAPSULE ORAL at 13:49

## 2022-09-11 RX ADMIN — VANCOMYCIN HYDROCHLORIDE 750 MG: 750 INJECTION, POWDER, LYOPHILIZED, FOR SOLUTION INTRAVENOUS at 04:26

## 2022-09-11 RX ADMIN — CEFEPIME 2000 MG: 2 INJECTION, POWDER, FOR SOLUTION INTRAVENOUS at 08:54

## 2022-09-11 RX ADMIN — ATORVASTATIN CALCIUM 80 MG: 80 TABLET, FILM COATED ORAL at 21:13

## 2022-09-11 RX ADMIN — CLOPIDOGREL BISULFATE 75 MG: 75 TABLET ORAL at 08:55

## 2022-09-11 RX ADMIN — PANTOPRAZOLE SODIUM 40 MG: 40 TABLET, DELAYED RELEASE ORAL at 08:56

## 2022-09-11 RX ADMIN — CEFEPIME 2000 MG: 2 INJECTION, POWDER, FOR SOLUTION INTRAVENOUS at 16:03

## 2022-09-11 RX ADMIN — BACLOFEN 20 MG: 10 TABLET ORAL at 08:55

## 2022-09-11 RX ADMIN — SODIUM CHLORIDE, PRESERVATIVE FREE 10 ML: 5 INJECTION INTRAVENOUS at 21:14

## 2022-09-11 ASSESSMENT — PAIN SCALES - GENERAL
PAINLEVEL_OUTOF10: 0
PAINLEVEL_OUTOF10: 0

## 2022-09-11 NOTE — PLAN OF CARE
for areas of redness and/or skin breakdown  Taken 9/10/2022 1937 by Kiley Marcelo RN  Problem: Musculoskeletal - Adult  Goal: Return mobility to safest level of function  Outcome: Progressing  Flowsheets (Taken 9/10/2022 1937)  Return Mobility to Safest Level of Function:   Assess patient stability and activity tolerance for standing, transferring and ambulating with or without assistive devices   Assist with transfers and ambulation using safe patient handling equipment as needed     Problem: Musculoskeletal - Adult  Goal: Return ADL status to a safe level of function  Outcome: Progressing  Flowsheets (Taken 9/10/2022 1937)  Return ADL Status to a Safe Level of Function:   Administer medication as ordered   Assess activities of daily living deficits and provide assistive devices as needed     Problem: Genitourinary - Adult  Goal: Absence of urinary retention  Outcome: Progressing  Flowsheets (Taken 9/10/2022 1937)  Absence of urinary retention:   Assess patients ability to void and empty bladder   Monitor intake/output and perform bladder scan as needed     Problem: Discharge Planning  Goal: Discharge to home or other facility with appropriate resources  9/10/2022 2103 by Kiley Marcelo RN  Outcome: Progressing  Flowsheets (Taken 9/10/2022 1937)  Discharge to home or other facility with appropriate resources: Identify barriers to discharge with patient and caregiver     Problem: Safety - Adult  Goal: Free from fall injury  9/10/2022 2103 by Kiley Marcelo RN  Outcome: Progressing  Note: Patient assessed for fall risk; fall precautions initiated. Patient instructed about safety devices. Environment kept free of clutter and adequate lighting provided. Bed locked and in lowest position. Call light within reach. Will continue to monitor. Problem: Skin/Tissue Integrity  Goal: Absence of new skin breakdown  Description: 1. Monitor for areas of redness and/or skin breakdown  2.   Assess vascular access sites hourly  3. Every 4-6 hours minimum:  Change oxygen saturation probe site  4. Every 4-6 hours:  If on nasal continuous positive airway pressure, respiratory therapy assess nares and determine need for appliance change or resting period. 9/10/2022 2103 by Olinda Tai RN  Outcome: Progressing  Note: Skin assessment completed every shift. Pt assessed for incontinence, appropriate barrier cream applied prn. Pt encouraged to turn/rotate every 2 hours. Assistance provided if pt unable to do so themselves.          Problem: ABCDS Injury Assessment  Goal: Absence of physical injury  9/10/2022 2103 by Olinda Tai RN  Outcome: Progressing  Flowsheets (Taken 9/10/2022 2102)  Absence of Physical Injury: Implement safety measures based on patient assessment     Problem: Pain  Goal: Verbalizes/displays adequate comfort level or baseline comfort level  9/10/2022 2103 by Olinda Tai RN  Outcome: Progressing  Flowsheets (Taken 9/10/2022 2103)  Verbalizes/displays adequate comfort level or baseline comfort level:   Encourage patient to monitor pain and request assistance   Assess pain using appropriate pain scale   Administer analgesics based on type and severity of pain and evaluate response   Implement non-pharmacological measures as appropriate and evaluate response

## 2022-09-11 NOTE — PROGRESS NOTES
Hospitalist Progress Note  9/11/2022 8:37 AM    PCP: No primary care provider on file.    [phone]     Date of Admission: 8/31/2022                                                                                                                     HOSPITAL COURSE    Patient demographics:  The patient  Audrea Seip is a 75 y.o. male     Significant past medical history:   Patient Active Problem List   Diagnosis    Chest pain    MI, acute, non ST segment elevation ([address]    Tobacco abuse    Essential hypertension    Acute MI ([address]    Acute CVA (cerebrovascular accident) ([address]    Right leg weakness    Acute ischemic left LEXA stroke ([address]    Non-healing open wound of right heel    Right heel infection    Hyperlipidemia    CAD (coronary artery disease)    Cellulitis of left heel    Wound infection    Acute hematogenous osteomyelitis ([address]    Right hemiparesis ([address]    Severe malnutrition ([address]    Facial weakness    H/O: stroke         Presenting symptoms:  Left-sided facial droop    Diagnostic workup:      CONSULTS DURING ADMISSION :   IP CONSULT TO STROKE TEAM  IP CONSULT TO PHARMACY  PHARMACY TO CHANGE BASE FLUIDS  IP CONSULT TO HOSPITALIST  IP CONSULT TO NEUROLOGY  IP CONSULT TO CASE MANAGEMENT  IP CONSULT TO NEPHROLOGY  IP CONSULT TO PULMONOLOGY  PHARMACY TO DOSE VANCOMYCIN      Patient was diagnosed with:  Left-sided facial droop, rule out CVA  History of prior CVAs   Elevated troponin,   MECHE  CAD  Hypertension  Hyperlipidemia      Treatment while inpatient:  Patient is a 79-year-old male with past medical history of CAD hypertension hyperlipidemia     who presents to the hospital due to left-sided facial droop. Patient already has right-sided weakness from previous stroke. MRI of the brain showed multiple areas of stroke but no acute evidence of stroke. Pt was also diagnosed with MECHE. Which improved with IVF. ----------------------------------------------------------      SUBJECTIVE COMPLAINTS- follow up for Left-sided facial droop    Diet: ADULT DIET; Regular; Low Fat/Low Chol/High Fiber/AYAKA      OBJECTIVE:   Patient Active Problem List   Diagnosis    Chest pain    MI, acute, non ST segment elevation (HCC)    Tobacco abuse    Essential hypertension    Acute MI ([facility]    Acute CVA (cerebrovascular accident) ([facility]    Right leg weakness    Acute ischemic left LEXA stroke (HCC)    Non-healing open wound of right heel    Right heel infection    Hyperlipidemia    CAD (coronary artery disease)    Cellulitis of left heel    Wound infection    Acute hematogenous osteomyelitis (HCC)    Right hemiparesis (HCC)    Severe malnutrition (HCC)    Facial weakness    H/O: stroke       Allergies  Patient has no known allergies.     Medications    Scheduled Meds:   vancomycin  750 mg IntraVENous Q12H    cefepime  2,000 mg IntraVENous Q8H    vancomycin (VANCOCIN) intermittent dosing (placeholder)   Other RX Placeholder    [Held by provider] lisinopril  10 mg Oral Daily    gabapentin  300 mg Oral TID    sertraline  100 mg Oral Daily    sodium chloride flush  10 mL IntraVENous 2 times per day    enoxaparin  40 mg SubCUTAneous Nightly    aspirin  81 mg Oral Daily    Or    aspirin  300 mg Rectal Daily    vitamin C  500 mg Oral Daily    atenolol  25 mg Oral Daily    atorvastatin  80 mg Oral Nightly    baclofen  20 mg Oral BID    busPIRone  5 mg Oral BID    clopidogrel  75 mg Oral Daily    ferrous sulfate  324 mg Oral Daily with breakfast    [Held by provider] hydroCHLOROthiazide  25 mg Oral Daily    mirtazapine  15 mg Oral Nightly    therapeutic multivitamin-minerals  1 tablet Oral Daily    pantoprazole  40 mg Oral Daily    senna  1 tablet Oral Daily     Continuous Infusions:   sodium chloride 25 mL (09/02/22 2043)     PRN Meds:  acetaminophen, sodium chloride, albuterol sulfate HFA, hydrALAZINE, albuterol, sodium chloride flush, sodium chloride, ondansetron **OR** ondansetron, nitroGLYCERIN    Vitals   Vitals /wt Patient Vitals for the past 8 hrs:   BP Temp Temp src Pulse Resp SpO2 Weight   09/11/22 0745 123/86 97.9 °F (36.6 °C) Oral 57 16 93 % --   09/11/22 0604 -- -- -- 53 14 -- --   09/11/22 0556 117/67 97.6 °F (36.4 °C) Oral -- -- 96 % 209 lb 7 oz (95 kg)   09/11/22 0427 -- -- -- 50 13 -- --   09/11/22 0317 -- -- -- (!) 48 12 -- --   09/11/22 0145 -- -- -- 53 17 -- --        72HR INTAKE/OUTPUT:    Intake/Output Summary (Last 24 hours) at 9/11/2022 0837  Last data filed at 9/11/2022 9752  Gross per 24 hour   Intake 1485.54 ml   Output 1850 ml   Net -364.46 ml       Exam:    Gen:   Alert and oriented ×3    Eyes: PERRL. No sclera icterus. No conjunctival injection. ENT: No discharge. Pharynx clear. External appearance of ears and nose normal.  Neck: Trachea midline. No obvious mass. Resp: No accessory muscle use. No crackles. No wheezes. No rhonchi. CV: Regular rate. Regular rhythm. No murmur or rub. No edema. GI: Non-tender. Non-distended. No hernia. Skin: Warm, dry, normal texture and turgor. Lymph: No cervical LAD. No supraclavicular LAD. M/S: / Ext. 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 PT/INR:   No results for input(s): PROTIME, INR in the last 72 hours. APTT: No results for input(s): APTT in the last 72 hours. CBC:   Recent Labs     09/09/22  0439 09/10/22  0531   WBC 10.0 9.0   HGB 10.0* 10.2*   HCT 29.0* 29.9*   MCV 85.8 86.4    158       BMP:   Recent Labs     09/09/22  0439 09/10/22  0531    140   K 4.2 4.2    109   CO2 20* 20*   BUN 50* 48*   CREATININE 1.4* 1.2       LIVER PROFILE:   No results for input(s): ALKPHOS, AST, ALT, ALB, BILIDIR, BILITOT, ALKPHOS in the last 72 hours. No results for input(s): AMYLASE in the last 72 hours.   No results for input(s): LIPASE in the last 72 hours. UA:  No results for input(s): NITRITE, LABCAST, WBCUA, RBCUA, MUCUS in the last 72 hours. TROPONIN:   No results for input(s): Tha Shorts in the last 72 hours. Lab Results   Component Value Date/Time    URRFLXCULT Not Indicated 05/28/2021 06:30 PM       No results for input(s): TSHREFLEX in the last 72 hours. No components found for: [id number]  POC GLUCOSE:    No results for input(s): POCGLU in the last 72 hours. No results for input(s): LABA1C in the last 72 hours. Lab Results   Component Value Date/Time    LABA1C 5.2 09/01/2022 05:23 AM      Normal left ventricle size and systolic function with an estimated ejection   fraction of 55%. (8/31/2022)    ASSESSMENT AND PLAN    Pneumonia  Healthcare associated pneumonia  Left-sided pleural effusion  WBC count improved  Plan is to complete 7 days of treatment with cefepime and vancomycin  Patient declined thoracentesis  Pulmonary is following    Left-sided facial droop  MRI ruled out acute stroke  EEG was completed and it did show focal sharp epileptiform waves  Neurology is following  No plan for AED at this time  Continue aspirin Plavix and statin      Elevated troponin,   patient is chest pain-free      MECHE  Improved    CAD      Hypertension  Bp on the low side  Dc ivf  Resume lisinopril    Hyperlipidemia    Urinary tract infection  Continue with ceftriaxone  Check procalcitonin    PT/OT/speech therapy consult        Code Status: Full Code        Dispo -to extended-care facility in a.m. The patient and / or the family were informed of the results of any tests, a time was given to answer questions, a plan was proposed and they agreed with plan. Adryan Mccrary MD    This note was transcribed using [id number] [employer]. Please disregard any translational errors.

## 2022-09-12 VITALS
BODY MASS INDEX: 25.38 KG/M2 | SYSTOLIC BLOOD PRESSURE: 122 MMHG | HEART RATE: 47 BPM | RESPIRATION RATE: 19 BRPM | DIASTOLIC BLOOD PRESSURE: 78 MMHG | TEMPERATURE: 97.5 F | OXYGEN SATURATION: 94 % | WEIGHT: 204.15 LBS | HEIGHT: 75 IN

## 2022-09-12 LAB
ANION GAP SERPL CALCULATED.3IONS-SCNC: 13 MMOL/L (ref 3–16)
BUN BLDV-MCNC: 31 MG/DL (ref 7–20)
CALCIUM SERPL-MCNC: 9.4 MG/DL (ref 8.3–10.6)
CHLORIDE BLD-SCNC: 108 MMOL/L (ref 99–110)
CO2: 21 MMOL/L (ref 21–32)
CREAT SERPL-MCNC: 1.1 MG/DL (ref 0.8–1.3)
GFR AFRICAN AMERICAN: >60
GFR NON-AFRICAN AMERICAN: >60
GLUCOSE BLD-MCNC: 141 MG/DL (ref 70–99)
HCT VFR BLD CALC: 33.1 % (ref 40.5–52.5)
HEMOGLOBIN: 11.1 G/DL (ref 13.5–17.5)
MCH RBC QN AUTO: 29.3 PG (ref 26–34)
MCHC RBC AUTO-ENTMCNC: 33.7 G/DL (ref 31–36)
MCV RBC AUTO: 87.1 FL (ref 80–100)
PDW BLD-RTO: 15.8 % (ref 12.4–15.4)
PLATELET # BLD: 175 K/UL (ref 135–450)
PMV BLD AUTO: 7 FL (ref 5–10.5)
POTASSIUM SERPL-SCNC: 4.4 MMOL/L (ref 3.5–5.1)
RBC # BLD: 3.79 M/UL (ref 4.2–5.9)
SARS-COV-2, NAAT: NOT DETECTED
SODIUM BLD-SCNC: 142 MMOL/L (ref 136–145)
WBC # BLD: 9.1 K/UL (ref 4–11)

## 2022-09-12 PROCEDURE — 6360000002 HC RX W HCPCS: Performed by: HOSPITALIST

## 2022-09-12 PROCEDURE — 2580000003 HC RX 258: Performed by: HOSPITALIST

## 2022-09-12 PROCEDURE — 85027 COMPLETE CBC AUTOMATED: CPT

## 2022-09-12 PROCEDURE — 6370000000 HC RX 637 (ALT 250 FOR IP): Performed by: INTERNAL MEDICINE

## 2022-09-12 PROCEDURE — 87635 SARS-COV-2 COVID-19 AMP PRB: CPT

## 2022-09-12 PROCEDURE — 80048 BASIC METABOLIC PNL TOTAL CA: CPT

## 2022-09-12 PROCEDURE — 36415 COLL VENOUS BLD VENIPUNCTURE: CPT

## 2022-09-12 PROCEDURE — 2580000003 HC RX 258: Performed by: INTERNAL MEDICINE

## 2022-09-12 RX ORDER — LEVOFLOXACIN 500 MG/1
500 TABLET, FILM COATED ORAL DAILY
Qty: 5 TABLET | Refills: 0
Start: 2022-09-12

## 2022-09-12 RX ADMIN — FERROUS SULFATE TAB EC 324 MG (65 MG FE EQUIVALENT) 324 MG: 324 (65 FE) TABLET DELAYED RESPONSE at 08:39

## 2022-09-12 RX ADMIN — SERTRALINE 100 MG: 100 TABLET, FILM COATED ORAL at 08:39

## 2022-09-12 RX ADMIN — VANCOMYCIN HYDROCHLORIDE 750 MG: 750 INJECTION, POWDER, LYOPHILIZED, FOR SOLUTION INTRAVENOUS at 04:31

## 2022-09-12 RX ADMIN — CLOPIDOGREL BISULFATE 75 MG: 75 TABLET ORAL at 08:38

## 2022-09-12 RX ADMIN — SENNOSIDES 8.6 MG: 8.6 TABLET, FILM COATED ORAL at 08:39

## 2022-09-12 RX ADMIN — BUSPIRONE HYDROCHLORIDE 5 MG: 5 TABLET ORAL at 08:39

## 2022-09-12 RX ADMIN — ATENOLOL 25 MG: 25 TABLET ORAL at 08:38

## 2022-09-12 RX ADMIN — BACLOFEN 20 MG: 10 TABLET ORAL at 08:39

## 2022-09-12 RX ADMIN — GABAPENTIN 300 MG: 300 CAPSULE ORAL at 08:39

## 2022-09-12 RX ADMIN — OXYCODONE HYDROCHLORIDE AND ACETAMINOPHEN 500 MG: 500 TABLET ORAL at 08:39

## 2022-09-12 RX ADMIN — CEFEPIME 2000 MG: 2 INJECTION, POWDER, FOR SOLUTION INTRAVENOUS at 00:27

## 2022-09-12 RX ADMIN — CEFEPIME 2000 MG: 2 INJECTION, POWDER, FOR SOLUTION INTRAVENOUS at 07:35

## 2022-09-12 RX ADMIN — SODIUM CHLORIDE, PRESERVATIVE FREE 10 ML: 5 INJECTION INTRAVENOUS at 08:40

## 2022-09-12 RX ADMIN — MULTIPLE VITAMINS W/ MINERALS TAB 1 TABLET: TAB at 08:39

## 2022-09-12 RX ADMIN — PANTOPRAZOLE SODIUM 40 MG: 40 TABLET, DELAYED RELEASE ORAL at 08:39

## 2022-09-12 RX ADMIN — ASPIRIN 81 MG: 81 TABLET, COATED ORAL at 08:39

## 2022-09-12 ASSESSMENT — PAIN SCALES - GENERAL: PAINLEVEL_OUTOF10: 0

## 2022-09-12 NOTE — PLAN OF CARE
Problem: Neurosensory - Adult  Goal: Achieves stable or improved neurological status  Outcome: Progressing  Flowsheets (Taken 9/11/2022 2002)  Achieves stable or improved neurological status: Assess for and report changes in neurological status  Goal: Achieves maximal functionality and self care  Outcome: Progressing  Flowsheets (Taken 9/11/2022 2002)  Achieves maximal functionality and self care:   Monitor swallowing and airway patency with patient fatigue and changes in neurological status   Encourage and assist patient to increase activity and self care with guidance from physical therapy/occupational therapy     Problem: Infection - Adult  Goal: Absence of infection at discharge  Outcome: Progressing  Flowsheets (Taken 9/11/2022 2002)  Absence of infection at discharge:   Assess and monitor for signs and symptoms of infection   Monitor lab/diagnostic results   Monitor all insertion sites i.e., indwelling lines, tubes and drains   Administer medications as ordered     Problem: Respiratory - Adult  Goal: Achieves optimal ventilation and oxygenation  Outcome: Progressing  Flowsheets (Taken 9/11/2022 2002)  Achieves optimal ventilation and oxygenation:   Assess for changes in respiratory status   Assess for changes in mentation and behavior   Position to facilitate oxygenation and minimize respiratory effort   Oxygen supplementation based on oxygen saturation or arterial blood gases   Encourage broncho-pulmonary hygiene including cough, deep breathe, incentive spirometry   Assess and instruct to report shortness of breath or any respiratory difficulty     Problem: Skin/Tissue Integrity - Adult  Goal: Skin integrity remains intact  Outcome: Progressing  Flowsheets  Taken 9/12/2022 0204  Skin Integrity Remains Intact: Monitor for areas of redness and/or skin breakdown  Taken 9/11/2022 2002  Skin Integrity Remains Intact: Monitor for areas of redness and/or skin breakdown     Problem: Musculoskeletal - Adult  Goal: Return mobility to safest level of function  Outcome: Progressing  Flowsheets (Taken 9/11/2022 2002)  Return Mobility to Safest Level of Function:   Assess patient stability and activity tolerance for standing, transferring and ambulating with or without assistive devices   Assist with transfers and ambulation using safe patient handling equipment as needed  Goal: Return ADL status to a safe level of function  Outcome: Progressing  Flowsheets (Taken 9/11/2022 2002)  Return ADL Status to a Safe Level of Function:   Administer medication as ordered   Assess activities of daily living deficits and provide assistive devices as needed     Problem: Genitourinary - Adult  Goal: Absence of urinary retention  Outcome: Progressing  Flowsheets (Taken 9/11/2022 2002)  Absence of urinary retention:   Assess patients ability to void and empty bladder   Monitor intake/output and perform bladder scan as needed     Problem: Discharge Planning  Goal: Discharge to home or other facility with appropriate resources  Outcome: Progressing  Flowsheets (Taken 9/11/2022 2002)  Discharge to home or other facility with appropriate resources: Identify barriers to discharge with patient and caregiver     Problem: Safety - Adult  Goal: Free from fall injury  Outcome: Progressing  Note: Patient assessed for fall risk; fall precautions initiated. Patient instructed about safety devices. Environment kept free of clutter and adequate lighting provided. Bed locked and in lowest position. Call light within reach. Will continue to monitor. Problem: Skin/Tissue Integrity  Goal: Absence of new skin breakdown  Description: 1. Monitor for areas of redness and/or skin breakdown  2. Assess vascular access sites hourly  3. Every 4-6 hours minimum:  Change oxygen saturation probe site  4.   Every 4-6 hours:  If on nasal continuous positive airway pressure, respiratory therapy assess nares and determine need for appliance change or resting period. Outcome: Progressing  Note: Skin assessment completed every shift. Pt assessed for incontinence, appropriate barrier cream applied prn. Pt encouraged to turn/rotate every 2 hours. Assistance provided if pt unable to do so themselves.          Problem: ABCDS Injury Assessment  Goal: Absence of physical injury  Outcome: Progressing  Flowsheets (Taken 9/12/2022 0204)  Absence of Physical Injury: Implement safety measures based on patient assessment     Problem: Pain  Goal: Verbalizes/displays adequate comfort level or baseline comfort level  Outcome: Progressing  Flowsheets (Taken 9/11/2022 2002)  Verbalizes/displays adequate comfort level or baseline comfort level:   Encourage patient to monitor pain and request assistance   Assess pain using appropriate pain scale   Administer analgesics based on type and severity of pain and evaluate response   Implement non-pharmacological measures as appropriate and evaluate response

## 2022-09-12 NOTE — CARE COORDINATION
CASE MANAGEMENT DISCHARGE SUMMARY:    DISCHARGE DATE: 9/12/2022    Discharging to Facility/ Agency   Name: [facility]  Address:  [address], [place], [facility]  Phone:  [phone]  Fax:  [phone]     TRANSPORTATION: [facility]             TIME: 2:00pm    INSURANCE PRECERT OBTAINED: N/A    ERENDIRA/PASAAKAIT COMPLETED: N/A    COMMENTS: Patient to return to [address] [place] [address] via medical transport. Patient, bedside RN, and facility aware of discharge plan and timeline.     Electronically signed by Radha Tim RN on 9/12/2022 at 11:26 AM

## 2022-09-12 NOTE — PROGRESS NOTES
[facility]                                               Progress note    Summary:   Zee Langford is being seen by nephrology for MECHE. Admitted with left-sided facial droop. Has a history of CVA and residual right-sided weakness. Creatinine admission was 1.3 and increased to 1.6 on day 2 of hospitalization. Interval History  Seen at bedside  Going to discharge today   No complaints       /54  On room air   Cr 1.1    Plan: MECHE resolved   No electrolyte issues. Leave off of HCTZ and lisinopril at discharge  F/u 2-4 weeks. Lady Galdamez MD  [facility]  Office: [phone]    Assessment:   MECHE:  Cr up to 2.1, rule out retention, resume iVFs. Stopping HCTZ as well  - baseline Cr 0.7-1.0  - Cr on admission was 1.3, increased to 1.6 the next day  - no UA this admission  - no significant hemodynamic insult or fluctuation noted. - he had a CT with contrast 8/31/2022  - home lisinopril on hold  - check UA, renal US  - start IVF    Hypertension:  - home regimen: lisinopril 20 mg daily, atenolol 25 mg daily, HCTZ 25 mg daily  - lisinopril on hold due to MECHE    CVA:  - per primary and neurology            ROS:     Positives Listed Bold. All other remaining systems are negative. Constitutional:  fever, chills, weakness, weight change, fatigue,      Skin:  rash, pruritus, hair loss, bruising, dry skin, petechiae. Head, Face, Neck   headaches, swelling,  cervical adenopathy. Respiratory: shortness of breath, cough, or wheezing  Cardiovascular: chest pain, palpitations, dizzy, edema  Gastrointestinal: nausea, vomiting, diarrhea, constipation,belly pain    Yellow skin, blood in stool  Musculoskeletal:  back pain, muscle weakness, gait problems,       joint pain or swelling. Genitourinary:  dysuria, poor urine flow, flank pain, blood in urine  Neurologic:  vertigo, TIA'S, syncope, seizures, focal weakness  Psychosocial:  insomnia, anxiety, or depression.   Additional positive findings: -     PMH:   Past medical history, surgical history, social history, family history are reviewed and updated as appropriate. Reviewed current medication list.   Allergies reviewed and updated as needed. PE:   Vitals:    09/12/22 0838   BP:    Pulse: 60   Resp:    Temp:    SpO2:        General appearance:  in NAD, fully alert and oriented. Comfortable. HEENT: EOM intact, no icterus. Trachea is midline. Neck : No masses, appears symmetrical, no JVD  Respiratory: Respiratory effort appears normal, bilateral equal chest rise, no wheeze, no crackles   Cardiovascular: Ausculation shows RRR no edema  Abdomen: No visible mass or tenderness, non distended. Musculoskeletal:  Joints with no swelling or deformity. Skin:no rashes, ulcers, induration, no jaundice. Neuro: Appropriate responses. Though a little slow.        Lab Results   Component Value Date    CREATININE 1.1 09/12/2022    BUN 31 (H) 09/12/2022     09/12/2022    K 4.4 09/12/2022     09/12/2022    CO2 21 09/12/2022      Lab Results   Component Value Date    WBC 9.1 09/12/2022    HGB 11.1 (L) 09/12/2022    HCT 33.1 (L) 09/12/2022    MCV 87.1 09/12/2022     09/12/2022     Lab Results   Component Value Date    CALCIUM 9.4 09/12/2022    CAION 1.09 (L) 09/24/2014    PHOS 3.3 01/08/2022

## 2022-09-12 NOTE — DISCHARGE INSTR - COC
Continuity of Care Form    Patient Name: Rand Muñiz   :  1955  MRN:  [id number]    Admit date:  2022  Discharge date:  2022    Code Status Order: Full Code   Advance Directives:     Admitting Physician:  Janice Sainz MD  PCP: No primary care provider on file. Discharging Nurse: Denys Mosquera RN  [facility] Unit/Room#: J8Y-6299/6203-26  Discharging Unit Phone Number: [phone]    Emergency Contact:   Extended Emergency Contact Information  Primary Emergency Contact: Becca Cummins  Address: [address] [place] [address], [address] [place] [address] Phone: [phone]  Mobile Phone: [phone]  Relation: Domestic Partner    Past Surgical History:  Past Surgical History:   Procedure Laterality Date    BACK SURGERY N/A     herniated disc    CORONARY ANGIOPLASTY WITH STENT PLACEMENT      EYE SURGERY      VASCULAR SURGERY         Immunization History:   Immunization History   Administered Date(s) Administered    COVID-19, PFIZER PURPLE top, DILUTE for use, (age 15 y+), 30mcg/0.3mL 2021, 2021    Influenza, FLUZONE (age 72 y+), High Dose, 0.7mL 10/22/2020    Influenza, High Dose (Fluzone 65 yrs and older) 2019    Pneumococcal Conjugate 13-valent (Ajylfui26) 2019    Pneumococcal Polysaccharide (Dobdfwxyt47) 10/22/2020    Zoster Recombinant (Shingrix) 10/22/2020, 2021       Active Problems:  Patient Active Problem List   Diagnosis Code    Chest pain R07.9    MI, acute, non ST segment elevation (HCC) I21.4    Tobacco abuse Z72.0    Essential hypertension I10    Acute MI ([address] I21.9    Acute CVA (cerebrovascular accident) ([address] I63.9    Right leg weakness R29.898    Acute ischemic left LEXA stroke (HCC) I63.522    Non-healing open wound of right heel S91.301A    Right heel infection L08.9    Hyperlipidemia E78.5    CAD (coronary artery disease) I25.10    Cellulitis of left heel L03.116    Wound infection T14. 8XXA, L08.9 Acute hematogenous osteomyelitis (HCC) M86.00    Right hemiparesis (HCC) G81.91    Severe malnutrition (HCC) E43    Facial weakness R29.810    H/O: stroke Z86.73       Isolation/Infection:   Isolation            Contact          Patient Infection Status       Infection Onset Added Last Indicated Last Indicated By Review Planned Expiration Resolved Resolved By    MRSA 22 MRSA DNA Probe, Nasal        MDRO (multi-drug resistant organism) 22 Culture, Wound        Resolved    COVID-19 (Rule Out) 22 COVID-19, Rapid (Ordered)   22 Rule-Out Test Resulted    COVID-19 22 COVID-19   22     COVID-19 (Rule Out) 22 COVID-19 (Ordered)   22 Rule-Out Test Resulted    COVID-19 (Rule Out) 22 COVID-19, Rapid (Ordered)   22 Rule-Out Test Resulted    COVID-19 (Rule Out) 21 COVID-19, Rapid (Ordered)   21 Rule-Out Test Resulted            Nurse Assessment:  Last Vital Signs: BP (!) 125/54   Pulse 60   Temp 98 °F (36.7 °C) (Oral)   Resp 18   Ht 6' 3\" (1.905 m)   Wt 204 lb 2.3 oz (92.6 kg)   SpO2 95%   BMI 25.52 kg/m²     Last documented pain score (0-10 scale): Pain Level: 0  Last Weight:   Wt Readings from Last 1 Encounters:   22 204 lb 2.3 oz (92.6 kg)     Mental Status:  oriented    IV Access:  - None    Nursing Mobility/ADLs:  Walking   Dependent  Transfer  Dependent  Bathing  Dependent  Dressing  Dependent  Toileting  Dependent  Feeding  Assisted  Med Admin  Dependent  Med Delivery   whole    Wound Care Documentation and Therapy:  Wound 22 Heel Right;Left rt heel is black, draining yellow fluid, left has open area with scant bleeding and yellow discharghe.  (Active)   Number of days: 221       Wound 22 Heel Right;Left dry,  (Active)   Number of days: 221       Wound 01/10/22 Heel Left Red base w/ yellow inocencio (Active)   Number of days: 244        Elimination:  Continence: Bowel: No  Bladder: No  Urinary Catheter: None   Colostomy/Ileostomy/Ileal Conduit: No       Date of Last BM: 09/08/2022      Intake/Output Summary (Last 24 hours) at 9/12/2022 1040  Last data filed at 9/12/2022 0534  Gross per 24 hour   Intake 1346.25 ml   Output 1950 ml   Net -603.75 ml     I/O last 3 completed shifts: In: 2111.8 [P.O.:600; IV Piggyback:1511.8]  Out: 2500 [Urine:2500]    Safety Concerns: At Risk for Falls    Impairments/Disabilities:      Speech, Contractures - RUE, and Amputation - RLE    Nutrition Therapy:  Current Nutrition Therapy:   - Oral Diet:  General    Routes of Feeding: Oral  Liquids: No Restrictions  Daily Fluid Restriction: no  Last Modified Barium Swallow with Video (Video Swallowing Test): not done    Treatments at the Time of Hospital Discharge:   Respiratory Treatments: ***  Oxygen Therapy:  is not on home oxygen therapy.   Ventilator:    - No ventilator support    Rehab Therapies: Physical Therapy and Occupational Therapy  Weight Bearing Status/Restrictions: No weight bearing restrictions  Other Medical Equipment (for information only, NOT a DME order):  hospital bed  Other Treatments: n/a    Patient's personal belongings (please select all that are sent with patient):  Glasses    RN SIGNATURE:  Electronically signed by Roland Jerome RN on 9/12/22 at 11:49 AM EDT    CASE MANAGEMENT/SOCIAL WORK SECTION    Inpatient Status Date: 9/5/2022    Readmission Risk Assessment Score:  Readmission Risk              Risk of Unplanned Readmission:  22           Discharging to Facility/ Agency   Name: [facility]  Address:  [address] [place] [address]  Phone:  [phone]  Fax:  [phone]     / signature: Electronically signed by Eli Rosenberg RN on 9/12/22 at 11:27 AM EDT    PHYSICIAN SECTION    Prognosis: Good    Condition at Discharge: Stable    Rehab Potential (if transferring to Rehab): Good    Recommended Labs or Other Treatments After Discharge: pt/ot    Physician Certification: I certify the above information and transfer of Lucinda Blake  is necessary for the continuing treatment of the diagnosis listed and that he requires [facility] for less 30 days.      Update Admission H&P: No change in H&P    PHYSICIAN SIGNATURE:  Electronically signed by Linsey Casiano MD on 9/12/22 at 10:41 AM EDT

## 2022-09-12 NOTE — CARE COORDINATION
09/12/22 1210   IMM Letter   IMM Letter given to Patient/Family/Significant other/Guardian/POA/by: Provided to patient at bedside by Guilherme Escudero RN. Education provided to patient, patient reported no questions and verbalized understanding. Patient aware of 4 hours allotted time to determine if they choose to pursue Medicare appeal process.    IMM Letter date given: 09/12/22   IMM Letter time given: 1200   Electronically signed by Radha Fleming RN on 9/12/2022 at 12:14 PM

## 2022-09-13 NOTE — DISCHARGE SUMMARY
Hospital Medicine Discharge Summary      Patient ID: Fatou Paige [id number]     Patient's PCP: No primary care provider on file. Admit Date: 8/31/2022     Discharge Date: 9/12/2022      Admitting Physician: Aung Ramachandran MD    Discharge Physician: Chad Mcmanus MD     Discharge Diagnoses: Active Hospital Problems    Diagnosis Date Noted    H/O: stroke [Z86.73] 09/05/2022     Priority: Medium    Facial weakness [R29.810] 08/31/2022     Priority: Medium         The patient was seen and examined on the day of discharge and this discharge summary is in conjunction with any daily progress note from day of discharge.     HOSPITAL COURSE    Patient demographics:  The patient  Juan Holland is a 75 y.o. male      Significant past medical history:       Patient Active Problem List   Diagnosis    Chest pain    MI, acute, non ST segment elevation ([address]    Tobacco abuse    Essential hypertension    Acute MI ([address]    Acute CVA (cerebrovascular accident) ([address]    Right leg weakness    Acute ischemic left LEXA stroke ([address]    Non-healing open wound of right heel    Right heel infection    Hyperlipidemia    CAD (coronary artery disease)    Cellulitis of left heel    Wound infection    Acute hematogenous osteomyelitis ([address]    Right hemiparesis ([address]    Severe malnutrition ([address]    Facial weakness    H/O: stroke            Presenting symptoms:  Left-sided facial droop     Diagnostic workup:   CT HEAD WO CONTRAST  CTA HEAD NECK W CONTRAST  MRI brain without contrast  US RENAL COMPLETE  MRI ABDOMEN W WO CONTRAST       CONSULTS DURING ADMISSION :   IP CONSULT TO STROKE TEAM  IP CONSULT TO PHARMACY  PHARMACY TO CHANGE BASE FLUIDS  IP CONSULT TO HOSPITALIST  IP CONSULT TO NEUROLOGY  IP CONSULT TO CASE MANAGEMENT  IP CONSULT TO NEPHROLOGY  IP CONSULT TO PULMONOLOGY  PHARMACY TO DOSE VANCOMYCIN        Patient was diagnosed with:  Left-sided facial droop, rule out CVA  History of prior CVAs   Elevated troponin,   MECHE  CAD  Hypertension  Hyperlipidemia        Treatment while inpatient:  Patient is a 59-year-old male with past medical history of CAD hypertension hyperlipidemia      who presents to the hospital due to left-sided facial droop. Patient already has right-sided weakness from previous stroke. MRI of the brain showed multiple areas of stroke but no acute evidence of stroke. Pt was also diagnosed with MECHE. Which improved with IVF. hospital course was complicated with left-sided pneumonia and pleural effusion. Pulmonary was consulted and patient was started on cefepime and vancomycin. Patient completed 6 days of cefepime and vancomycin treatment and patient will be discharged to the extended care facility with 5 days of Levaquin. Clinically patient is improved and breathing comfortably on room air. Discharge Condition:  stable:    Discharged to:  skilled nursing  facility    Activity:   as tolerated: Follow Up: Follow-up with the nursing home [facility]:  For convenience and continuity at follow-up the following most recent labs are provided:      CBC:   Lab Results   Component Value Date/Time    WBC 9.1 09/12/2022 10:12 AM    HGB 11.1 09/12/2022 10:12 AM    HCT 33.1 09/12/2022 10:12 AM     09/12/2022 10:12 AM       RENAL:   Lab Results   Component Value Date/Time     09/12/2022 10:12 AM    K 4.4 09/12/2022 10:12 AM    K 4.2 09/03/2022 05:47 AM     09/12/2022 10:12 AM    CO2 21 09/12/2022 10:12 AM    BUN 31 09/12/2022 10:12 AM    CREATININE 1.1 09/12/2022 10:12 AM           Discharge Medications:      Medication List        START taking these medications      levoFLOXacin 500 MG tablet  Commonly known as: LEVAQUIN  Take 1 tablet by mouth daily            CHANGE how you take these medications      atenolol 25 MG tablet  Commonly known as: TENORMIN  Take 1 tablet by mouth daily  What changed: additional instructions            CONTINUE taking these medications      acetaminophen 325 MG tablet  Commonly known as: TYLENOL     aspirin 81 MG chewable tablet     atorvastatin 80 MG tablet  Commonly known as: LIPITOR  Take 1 tablet by mouth nightly     baclofen 20 MG tablet  Commonly known as: LIORESAL     bisacodyl 10 MG suppository  Commonly known as: DULCOLAX     busPIRone 5 MG tablet  Commonly known as: BUSPAR     clopidogrel 75 MG tablet  Commonly known as: Plavix  Take 1 tablet by mouth daily     collagenase 250 UNIT/GM ointment     [employer] Zinc Oxide Diaper 1-10 % Crea     ferrous sulfate 325 (65 Fe) MG tablet  Commonly known as: IRON 325     fleet 7-19 GM/118ML     gabapentin 300 MG capsule  Commonly known as: NEURONTIN     loratadine 10 MG tablet  Commonly known as: CLARITIN     magnesium hydroxide 400 MG/5ML suspension  Commonly known as: MILK OF MAGNESIA     miconazole 2 % powder  Commonly known as: MICOTIN     mirabegron 50 MG Tb24  Commonly known as: MYRBETRIQ     mirtazapine 15 MG tablet  Commonly known as: REMERON     nitroGLYCERIN 0.4 MG SL tablet  Commonly known as: NITROSTAT  Place 1 tablet under the tongue every 5 minutes as needed for Chest pain.      nystatin 421354 UNIT/GM cream  Commonly known as: MYCOSTATIN     pantoprazole 40 MG tablet  Commonly known as: PROTONIX     polyethylene glycol 17 g packet  Commonly known as: GLYCOLAX     Refresh Optive 0.5-0.9 % Soln  Generic drug: Carboxymethylcellul-Glycerin     senna 8.6 MG tablet  Commonly known as: SENOKOT     sertraline 100 MG tablet  Commonly known as: ZOLOFT     therapeutic multivitamin-minerals tablet     Ventolin  (90 Base) MCG/ACT inhaler  Generic drug: albuterol sulfate HFA     vitamin C 500 MG tablet  Commonly known as: ASCORBIC ACID            STOP taking these medications      hydroCHLOROthiazide 25 MG tablet  Commonly known as: HYDRODIURIL     lisinopril 20 MG tablet  Commonly known as: PRINIVIL;ZESTRIL               Where to Get Your Medications        Information about where to get these medications is not yet available    Ask your nurse or doctor about these medications  levoFLOXacin 500 MG tablet            Time Spent on discharge is more than 30 min in the examination, evaluation, counseling and review of medications and discharge plan. Signed:  Angélica Ahmadi MD   9/12/2022      Thank you No primary care provider on file. for the opportunity to be involved in this patient's care. If you have any questions or concerns please feel free to contact me at [phone]. This note was transcribed using [employer]. Please disregard any translational errors.

## 2022-09-15 NOTE — CARE COORDINATION
Call rec'd from [facility], [phone] who reports she is the advocate for this pateint. She states he was sent back to [address] [place] [address]  without any orders for therapy. He was getting out patient therapy prior to being sent to hospital and they want orders for this to be reinstated. Call placed to [facility] at [address] [place] [address] Dr to suggest that whatever MD ordered the out patient orders prior to his admit to the hospital write orders for any therapy needs he might need. Asked for returned call to discuss.   [place], [place]     Case Management   [phone]    9/15/2022  10:15 AM

## 2023-01-18 ENCOUNTER — HOSPITAL ENCOUNTER (INPATIENT)
Age: 68
LOS: 6 days | Discharge: HOME | DRG: 981 | End: 2023-01-24
Attending: EMERGENCY MEDICINE | Admitting: INTERNAL MEDICINE
Payer: MEDICARE

## 2023-01-18 ENCOUNTER — APPOINTMENT (OUTPATIENT)
Dept: CT IMAGING | Age: 68
DRG: 981 | End: 2023-01-18
Payer: MEDICARE

## 2023-01-18 ENCOUNTER — APPOINTMENT (OUTPATIENT)
Dept: GENERAL RADIOLOGY | Age: 68
DRG: 981 | End: 2023-01-18
Payer: MEDICARE

## 2023-01-18 DIAGNOSIS — E83.42 HYPOMAGNESEMIA: ICD-10-CM

## 2023-01-18 DIAGNOSIS — I26.99 ACUTE PULMONARY EMBOLISM, UNSPECIFIED PULMONARY EMBOLISM TYPE, UNSPECIFIED WHETHER ACUTE COR PULMONALE PRESENT (HCC): Primary | ICD-10-CM

## 2023-01-18 PROBLEM — I21.4 NSTEMI (NON-ST ELEVATED MYOCARDIAL INFARCTION) (HCC): Status: ACTIVE | Noted: 2023-01-18

## 2023-01-18 LAB
A/G RATIO: 1 (ref 1.1–2.2)
ALBUMIN SERPL-MCNC: 3.9 G/DL (ref 3.4–5)
ALP BLD-CCNC: 126 U/L (ref 40–129)
ALT SERPL-CCNC: 10 U/L (ref 10–40)
ANION GAP SERPL CALCULATED.3IONS-SCNC: 12 MMOL/L (ref 3–16)
APTT: 27.4 SEC (ref 23–34.3)
AST SERPL-CCNC: 10 U/L (ref 15–37)
BASE EXCESS VENOUS: 2 MMOL/L
BASOPHILS ABSOLUTE: 0 K/UL (ref 0–0.2)
BASOPHILS RELATIVE PERCENT: 0.2 %
BILIRUB SERPL-MCNC: 0.7 MG/DL (ref 0–1)
BUN BLDV-MCNC: 24 MG/DL (ref 7–20)
CALCIUM SERPL-MCNC: 9.3 MG/DL (ref 8.3–10.6)
CARBOXYHEMOGLOBIN: 1.4 %
CHLORIDE BLD-SCNC: 96 MMOL/L (ref 99–110)
CO2: 27 MMOL/L (ref 21–32)
CREAT SERPL-MCNC: 1 MG/DL (ref 0.8–1.3)
EOSINOPHILS ABSOLUTE: 0.1 K/UL (ref 0–0.6)
EOSINOPHILS RELATIVE PERCENT: 0.6 %
GFR SERPL CREATININE-BSD FRML MDRD: >60 ML/MIN/{1.73_M2}
GLUCOSE BLD-MCNC: 125 MG/DL (ref 70–99)
GLUCOSE BLD-MCNC: 144 MG/DL (ref 70–99)
HCO3 VENOUS: 29 MMOL/L (ref 23–29)
HCT VFR BLD CALC: 33.6 % (ref 40.5–52.5)
HCT VFR BLD CALC: 36.7 % (ref 40.5–52.5)
HEMOGLOBIN: 10.9 G/DL (ref 13.5–17.5)
HEMOGLOBIN: 12.2 G/DL (ref 13.5–17.5)
LACTIC ACID, SEPSIS: 1.4 MMOL/L (ref 0.4–1.9)
LACTIC ACID, SEPSIS: 1.7 MMOL/L (ref 0.4–1.9)
LYMPHOCYTES ABSOLUTE: 1.5 K/UL (ref 1–5.1)
LYMPHOCYTES RELATIVE PERCENT: 12 %
MAGNESIUM: 1.5 MG/DL (ref 1.8–2.4)
MCH RBC QN AUTO: 28.4 PG (ref 26–34)
MCH RBC QN AUTO: 29.1 PG (ref 26–34)
MCHC RBC AUTO-ENTMCNC: 32.4 G/DL (ref 31–36)
MCHC RBC AUTO-ENTMCNC: 33.2 G/DL (ref 31–36)
MCV RBC AUTO: 87.4 FL (ref 80–100)
MCV RBC AUTO: 87.5 FL (ref 80–100)
METHEMOGLOBIN VENOUS: 0.3 %
MONOCYTES ABSOLUTE: 1.1 K/UL (ref 0–1.3)
MONOCYTES RELATIVE PERCENT: 8.7 %
NEUTROPHILS ABSOLUTE: 9.8 K/UL (ref 1.7–7.7)
NEUTROPHILS RELATIVE PERCENT: 78.5 %
O2 SAT, VEN: 40 %
O2 THERAPY: ABNORMAL
PCO2, VEN: 56.5 MMHG (ref 40–50)
PDW BLD-RTO: 16.6 % (ref 12.4–15.4)
PDW BLD-RTO: 16.7 % (ref 12.4–15.4)
PERFORMED ON: ABNORMAL
PH VENOUS: 7.32 (ref 7.35–7.45)
PHOSPHORUS: 3.5 MG/DL (ref 2.5–4.9)
PLATELET # BLD: 123 K/UL (ref 135–450)
PLATELET # BLD: 132 K/UL (ref 135–450)
PMV BLD AUTO: 8.1 FL (ref 5–10.5)
PMV BLD AUTO: 8.3 FL (ref 5–10.5)
PO2, VEN: <30 MMHG
POTASSIUM REFLEX MAGNESIUM: 3.5 MMOL/L (ref 3.5–5.1)
PRO-BNP: 697 PG/ML (ref 0–124)
PROCALCITONIN: 0.14 NG/ML (ref 0–0.15)
PROCALCITONIN: 0.17 NG/ML (ref 0–0.15)
RAPID INFLUENZA  B AGN: NEGATIVE
RAPID INFLUENZA A AGN: NEGATIVE
RBC # BLD: 3.84 M/UL (ref 4.2–5.9)
RBC # BLD: 4.2 M/UL (ref 4.2–5.9)
REASON FOR REJECTION: NORMAL
REJECTED TEST: NORMAL
SARS-COV-2, NAAT: NOT DETECTED
SODIUM BLD-SCNC: 135 MMOL/L (ref 136–145)
TCO2 CALC VENOUS: 31 MMOL/L
TOTAL PROTEIN: 7.8 G/DL (ref 6.4–8.2)
TROPONIN: 0.06 NG/ML
WBC # BLD: 12.5 K/UL (ref 4–11)
WBC # BLD: 13.4 K/UL (ref 4–11)

## 2023-01-18 PROCEDURE — 71260 CT THORAX DX C+: CPT | Performed by: EMERGENCY MEDICINE

## 2023-01-18 PROCEDURE — 84145 PROCALCITONIN (PCT): CPT

## 2023-01-18 PROCEDURE — 2580000003 HC RX 258: Performed by: INTERNAL MEDICINE

## 2023-01-18 PROCEDURE — 87040 BLOOD CULTURE FOR BACTERIA: CPT

## 2023-01-18 PROCEDURE — 70450 CT HEAD/BRAIN W/O DYE: CPT

## 2023-01-18 PROCEDURE — 82803 BLOOD GASES ANY COMBINATION: CPT

## 2023-01-18 PROCEDURE — 6360000002 HC RX W HCPCS: Performed by: EMERGENCY MEDICINE

## 2023-01-18 PROCEDURE — 84100 ASSAY OF PHOSPHORUS: CPT

## 2023-01-18 PROCEDURE — 99285 EMERGENCY DEPT VISIT HI MDM: CPT

## 2023-01-18 PROCEDURE — 2580000003 HC RX 258: Performed by: EMERGENCY MEDICINE

## 2023-01-18 PROCEDURE — 83605 ASSAY OF LACTIC ACID: CPT

## 2023-01-18 PROCEDURE — 85025 COMPLETE CBC W/AUTO DIFF WBC: CPT

## 2023-01-18 PROCEDURE — 84484 ASSAY OF TROPONIN QUANT: CPT

## 2023-01-18 PROCEDURE — 83735 ASSAY OF MAGNESIUM: CPT

## 2023-01-18 PROCEDURE — 2060000000 HC ICU INTERMEDIATE R&B

## 2023-01-18 PROCEDURE — 80053 COMPREHEN METABOLIC PANEL: CPT

## 2023-01-18 PROCEDURE — 85027 COMPLETE CBC AUTOMATED: CPT

## 2023-01-18 PROCEDURE — 6370000000 HC RX 637 (ALT 250 FOR IP): Performed by: INTERNAL MEDICINE

## 2023-01-18 PROCEDURE — 94640 AIRWAY INHALATION TREATMENT: CPT

## 2023-01-18 PROCEDURE — 6360000004 HC RX CONTRAST MEDICATION: Performed by: EMERGENCY MEDICINE

## 2023-01-18 PROCEDURE — 94761 N-INVAS EAR/PLS OXIMETRY MLT: CPT

## 2023-01-18 PROCEDURE — 87635 SARS-COV-2 COVID-19 AMP PRB: CPT

## 2023-01-18 PROCEDURE — 85730 THROMBOPLASTIN TIME PARTIAL: CPT

## 2023-01-18 PROCEDURE — 83880 ASSAY OF NATRIURETIC PEPTIDE: CPT

## 2023-01-18 PROCEDURE — 87804 INFLUENZA ASSAY W/OPTIC: CPT

## 2023-01-18 PROCEDURE — 93005 ELECTROCARDIOGRAM TRACING: CPT

## 2023-01-18 PROCEDURE — 96374 THER/PROPH/DIAG INJ IV PUSH: CPT

## 2023-01-18 PROCEDURE — 71045 X-RAY EXAM CHEST 1 VIEW: CPT

## 2023-01-18 RX ORDER — HYDROCHLOROTHIAZIDE 25 MG/1
25 TABLET ORAL DAILY
COMMUNITY

## 2023-01-18 RX ORDER — POTASSIUM CHLORIDE 7.45 MG/ML
10 INJECTION INTRAVENOUS PRN
Status: DISCONTINUED | OUTPATIENT
Start: 2023-01-18 | End: 2023-01-24 | Stop reason: HOSPADM

## 2023-01-18 RX ORDER — MAGNESIUM SULFATE IN WATER 40 MG/ML
2000 INJECTION, SOLUTION INTRAVENOUS PRN
Status: DISCONTINUED | OUTPATIENT
Start: 2023-01-18 | End: 2023-01-24 | Stop reason: HOSPADM

## 2023-01-18 RX ORDER — ATORVASTATIN CALCIUM 80 MG/1
80 TABLET, FILM COATED ORAL NIGHTLY
Status: DISCONTINUED | OUTPATIENT
Start: 2023-01-18 | End: 2023-01-24 | Stop reason: HOSPADM

## 2023-01-18 RX ORDER — CLOPIDOGREL BISULFATE 75 MG/1
75 TABLET ORAL DAILY
Status: DISCONTINUED | OUTPATIENT
Start: 2023-01-19 | End: 2023-01-19

## 2023-01-18 RX ORDER — MIRTAZAPINE 15 MG/1
15 TABLET, FILM COATED ORAL NIGHTLY
Status: DISCONTINUED | OUTPATIENT
Start: 2023-01-18 | End: 2023-01-24 | Stop reason: HOSPADM

## 2023-01-18 RX ORDER — HEPARIN SODIUM 10000 [USP'U]/100ML
0-4000 INJECTION, SOLUTION INTRAVENOUS CONTINUOUS
Status: DISCONTINUED | OUTPATIENT
Start: 2023-01-18 | End: 2023-01-19

## 2023-01-18 RX ORDER — MAGNESIUM SULFATE IN WATER 40 MG/ML
2000 INJECTION, SOLUTION INTRAVENOUS ONCE
Status: COMPLETED | OUTPATIENT
Start: 2023-01-18 | End: 2023-01-18

## 2023-01-18 RX ORDER — HEPARIN SODIUM 10000 [USP'U]/100ML
0-4000 INJECTION, SOLUTION INTRAVENOUS CONTINUOUS
Status: DISCONTINUED | OUTPATIENT
Start: 2023-01-18 | End: 2023-01-18

## 2023-01-18 RX ORDER — ACETAMINOPHEN 650 MG/1
650 SUPPOSITORY RECTAL EVERY 6 HOURS PRN
Status: DISCONTINUED | OUTPATIENT
Start: 2023-01-18 | End: 2023-01-24 | Stop reason: HOSPADM

## 2023-01-18 RX ORDER — SODIUM CHLORIDE, SODIUM LACTATE, POTASSIUM CHLORIDE, AND CALCIUM CHLORIDE .6; .31; .03; .02 G/100ML; G/100ML; G/100ML; G/100ML
1000 INJECTION, SOLUTION INTRAVENOUS ONCE
Status: COMPLETED | OUTPATIENT
Start: 2023-01-18 | End: 2023-01-19

## 2023-01-18 RX ORDER — SODIUM CHLORIDE 0.9 % (FLUSH) 0.9 %
10 SYRINGE (ML) INJECTION EVERY 12 HOURS SCHEDULED
Status: DISCONTINUED | OUTPATIENT
Start: 2023-01-18 | End: 2023-01-19 | Stop reason: SDUPTHER

## 2023-01-18 RX ORDER — GABAPENTIN 300 MG/1
300 CAPSULE ORAL 3 TIMES DAILY
Status: DISCONTINUED | OUTPATIENT
Start: 2023-01-18 | End: 2023-01-24 | Stop reason: HOSPADM

## 2023-01-18 RX ORDER — ATENOLOL 25 MG/1
25 TABLET ORAL DAILY
Status: DISCONTINUED | OUTPATIENT
Start: 2023-01-19 | End: 2023-01-19

## 2023-01-18 RX ORDER — HEPARIN SODIUM 1000 [USP'U]/ML
4000 INJECTION, SOLUTION INTRAVENOUS; SUBCUTANEOUS PRN
Status: DISCONTINUED | OUTPATIENT
Start: 2023-01-18 | End: 2023-01-18

## 2023-01-18 RX ORDER — ASPIRIN 81 MG/1
81 TABLET, CHEWABLE ORAL DAILY
Status: DISCONTINUED | OUTPATIENT
Start: 2023-01-19 | End: 2023-01-24 | Stop reason: HOSPADM

## 2023-01-18 RX ORDER — HEPARIN SODIUM 1000 [USP'U]/ML
3760 INJECTION, SOLUTION INTRAVENOUS; SUBCUTANEOUS ONCE
Status: COMPLETED | OUTPATIENT
Start: 2023-01-18 | End: 2023-01-18

## 2023-01-18 RX ORDER — SERTRALINE HYDROCHLORIDE 100 MG/1
100 TABLET, FILM COATED ORAL DAILY
Status: DISCONTINUED | OUTPATIENT
Start: 2023-01-19 | End: 2023-01-24 | Stop reason: HOSPADM

## 2023-01-18 RX ORDER — ONDANSETRON 2 MG/ML
4 INJECTION INTRAMUSCULAR; INTRAVENOUS EVERY 6 HOURS PRN
Status: DISCONTINUED | OUTPATIENT
Start: 2023-01-18 | End: 2023-01-24 | Stop reason: HOSPADM

## 2023-01-18 RX ORDER — SODIUM CHLORIDE 9 MG/ML
INJECTION, SOLUTION INTRAVENOUS PRN
Status: DISCONTINUED | OUTPATIENT
Start: 2023-01-18 | End: 2023-01-24 | Stop reason: HOSPADM

## 2023-01-18 RX ORDER — LISINOPRIL 20 MG/1
20 TABLET ORAL DAILY
COMMUNITY

## 2023-01-18 RX ORDER — GUAIFENESIN 600 MG/1
600 TABLET, EXTENDED RELEASE ORAL 2 TIMES DAILY PRN
COMMUNITY

## 2023-01-18 RX ORDER — PROMETHAZINE HYDROCHLORIDE 25 MG/1
12.5 TABLET ORAL EVERY 6 HOURS PRN
Status: DISCONTINUED | OUTPATIENT
Start: 2023-01-18 | End: 2023-01-24 | Stop reason: HOSPADM

## 2023-01-18 RX ORDER — ACETAMINOPHEN 325 MG/1
650 TABLET ORAL EVERY 6 HOURS PRN
Status: DISCONTINUED | OUTPATIENT
Start: 2023-01-18 | End: 2023-01-19 | Stop reason: SDUPTHER

## 2023-01-18 RX ORDER — HEPARIN SODIUM 1000 [USP'U]/ML
2000 INJECTION, SOLUTION INTRAVENOUS; SUBCUTANEOUS PRN
Status: DISCONTINUED | OUTPATIENT
Start: 2023-01-18 | End: 2023-01-18

## 2023-01-18 RX ORDER — BUSPIRONE HYDROCHLORIDE 5 MG/1
5 TABLET ORAL 2 TIMES DAILY
Status: DISCONTINUED | OUTPATIENT
Start: 2023-01-18 | End: 2023-01-24 | Stop reason: HOSPADM

## 2023-01-18 RX ORDER — PANTOPRAZOLE SODIUM 40 MG/1
40 TABLET, DELAYED RELEASE ORAL DAILY
Status: DISCONTINUED | OUTPATIENT
Start: 2023-01-19 | End: 2023-01-24 | Stop reason: HOSPADM

## 2023-01-18 RX ORDER — HEPARIN SODIUM 1000 [USP'U]/ML
4000 INJECTION, SOLUTION INTRAVENOUS; SUBCUTANEOUS ONCE
Status: COMPLETED | OUTPATIENT
Start: 2023-01-18 | End: 2023-01-18

## 2023-01-18 RX ORDER — SODIUM CHLORIDE 0.9 % (FLUSH) 0.9 %
10 SYRINGE (ML) INJECTION PRN
Status: DISCONTINUED | OUTPATIENT
Start: 2023-01-18 | End: 2023-01-19 | Stop reason: SDUPTHER

## 2023-01-18 RX ADMIN — CEFTRIAXONE 1000 MG: 1 INJECTION, POWDER, FOR SOLUTION INTRAMUSCULAR; INTRAVENOUS at 21:22

## 2023-01-18 RX ADMIN — MAGNESIUM SULFATE HEPTAHYDRATE 2000 MG: 40 INJECTION, SOLUTION INTRAVENOUS at 19:05

## 2023-01-18 RX ADMIN — HEPARIN SODIUM 4000 UNITS: 1000 INJECTION INTRAVENOUS; SUBCUTANEOUS at 19:18

## 2023-01-18 RX ADMIN — MIRTAZAPINE 15 MG: 15 TABLET, FILM COATED ORAL at 21:21

## 2023-01-18 RX ADMIN — HEPARIN SODIUM 1750 UNITS/HR: 10000 INJECTION, SOLUTION INTRAVENOUS at 19:49

## 2023-01-18 RX ADMIN — ALBUTEROL SULFATE 2.5 MG: 2.5 SOLUTION RESPIRATORY (INHALATION) at 17:33

## 2023-01-18 RX ADMIN — HEPARIN SODIUM 3760 UNITS: 1000 INJECTION INTRAVENOUS; SUBCUTANEOUS at 20:57

## 2023-01-18 RX ADMIN — SODIUM CHLORIDE, POTASSIUM CHLORIDE, SODIUM LACTATE AND CALCIUM CHLORIDE 1000 ML: 600; 310; 30; 20 INJECTION, SOLUTION INTRAVENOUS at 21:27

## 2023-01-18 RX ADMIN — ALBUTEROL SULFATE 2.5 MG: 2.5 SOLUTION RESPIRATORY (INHALATION) at 17:34

## 2023-01-18 RX ADMIN — ATORVASTATIN CALCIUM 80 MG: 80 TABLET, FILM COATED ORAL at 21:21

## 2023-01-18 RX ADMIN — HEPARIN SODIUM 1000 UNITS/HR: 10000 INJECTION, SOLUTION INTRAVENOUS at 19:22

## 2023-01-18 RX ADMIN — IOPAMIDOL 75 ML: 755 INJECTION, SOLUTION INTRAVENOUS at 18:43

## 2023-01-18 ASSESSMENT — PAIN SCALES - GENERAL: PAINLEVEL_OUTOF10: 8

## 2023-01-18 NOTE — ED NOTES
Fall risk screening completed. Fall risk bracelet applied to patient. Non-skid socks provided and placed on patient. The fall risk is indicated using the dome light. The call light is within the patient's reach, and instructions for use were provided. The bed is in the lowest position with wheels locked. The patient has been advised to notify staff, using the call light, if there is a need to get up or use restroom. The patient verbalized understanding of safety precautions and how to contact staff for assistance.       Jose Walsh RN  01/18/23 4542

## 2023-01-18 NOTE — ED PROVIDER NOTES
[address] EMERGENCY DEPARTMENT    CHIEF COMPLAINT  Chest Pain (Pt c/o of chest pain, SOB, and lower back pain. Pt has a cough. States this all started yesterday. EMS gave duoneb en route and said pt had audible crackles. )       HISTORY OF PRESENT ILLNESS  Lucinda Blake is a 75 y.o. male who presents to the ED with complaint of chest pain, cough, and SOB. The chest pain is described as right-sided and posterior. Chart review reveals patient has a history of CVA with residual right-sided weakness and subsequent stroke leading to left-sided facial droop. I have reviewed the following from the nursing documentation:    Past Medical History:   Diagnosis Date    Acute MI ([facility] [address] 2013    Anxiety     Arthritis     CAD (coronary artery disease)     COVID-19     Hyperlipidemia 2/3/2022    Hypertension     Influenza A 01/15/2017    Severe malnutrition ([facility] [address] 2/10/2022     Past Surgical History:   Procedure Laterality Date    BACK SURGERY N/A     herniated disc    CORONARY ANGIOPLASTY WITH STENT PLACEMENT      EYE SURGERY      VASCULAR SURGERY       Family History   Problem Relation Age of Onset    Hypertension Mother     Hypertension Father     Heart Failure Father     Stroke Maternal Grandfather     Cancer Paternal Uncle         throat     Social History     Socioeconomic History    Marital status: Single     Spouse name: Not on file    Number of children: Not on file    Years of education: Not on file    Highest education level: Not on file   Occupational History    Not on file   Tobacco Use    Smoking status: Former     Packs/day: 0.50     Years: 40.00     Pack years: 20.00     Types: Cigarettes     Quit date: 2019     Years since quittin.0    Smokeless tobacco: Never   Vaping Use    Vaping Use: Never used   Substance and Sexual Activity    Alcohol use:  Yes     Alcohol/week: 2.0 standard drinks     Types: 2 Cans of beer per week     Comment: states used to drink 1 pint/vodka a day / now socially drinks    Drug use: Not on file    Sexual activity: Yes     Partners: Female   Other Topics Concern    Not on file   Social History Narrative    Not on file     Social Determinants of Health     Financial Resource Strain: Not on file   Food Insecurity: Not on file   Transportation Needs: Not on file   Physical Activity: Not on file   Stress: Not on file   Social Connections: Not on file   Intimate Partner Violence: Not on file   Housing Stability: Not on file     Current Facility-Administered Medications   Medication Dose Route Frequency Provider Last Rate Last Admin    magnesium sulfate 2000 mg in 50 mL IVPB premix  2,000 mg IntraVENous Once Jonathan Richardson MD 25 mL/hr at 01/18/23 1905 2,000 mg at 01/18/23 1905    heparin (porcine) injection 3,760 Units  3,760 Units IntraVENous Once Jonathan Richardson MD        heparin 25,000 units in dextrose 5% 250 mL (premix) infusion  0-4,000 Units/hr IntraVENous Continuous Jonathan Richardson MD 17.5 mL/hr at 01/18/23 1949 1,750 Units/hr at 01/18/23 1949    atorvastatin (LIPITOR) tablet 80 mg  80 mg Oral Nightly Ahmad A Fernando, DO        mirtazapine (REMERON) tablet 15 mg  15 mg Oral Nightly Ahmad A Fernando, DO        cefTRIAXone (ROCEPHIN) 1,000 mg in dextrose 5 % 50 mL IVPB mini-bag  1,000 mg IntraVENous Once Jonathan Richardson MD        And    azithromycin (ZITHROMAX) 500 mg in D5W 250ml addavial  500 mg IntraVENous Once Jonathan Richardson MD         Current Outpatient Medications   Medication Sig Dispense Refill    hydroCHLOROthiazide (HYDRODIURIL) 25 MG tablet Take 25 mg by mouth daily      lisinopril (PRINIVIL;ZESTRIL) 20 MG tablet Take 20 mg by mouth daily      miconazole (MICOTIN) 2 % powder Apply topically 2 times daily as needed for Itching      guaiFENesin (MUCINEX) 600 MG extended release tablet Take 600 mg by mouth 2 times daily as needed for Congestion      albuterol sulfate HFA (VENTOLIN HFA) 108 (90 Base) MCG/ACT inhaler Inhale 2 puffs into the lungs every 6 hours as needed for Wheezing      miconazole (MICOTIN) 2 % powder Apply topically 2 times daily as needed for Itching Apply to buttocks      aspirin 81 MG chewable tablet Take 81 mg by mouth daily      loratadine (CLARITIN) 10 MG tablet Take 10 mg by mouth daily      nystatin (MYCOSTATIN) 801403 UNIT/GM cream Apply topically 2 times daily as needed for Dry Skin Apply topically to buttocks      Carboxymethylcellul-Glycerin (REFRESH OPTIVE) 0.5-0.9 % SOLN Apply 1 drop to eye 4 times daily as needed      acetaminophen (TYLENOL) 325 MG tablet Take 650 mg by mouth every 6 hours as needed for Pain      Dimethicone-Zinc Oxide-Vit A-D ([employer] ZINC OXIDE DIAPER) 1-10 % CREA Apply topically 2 times daily as needed      sertraline (ZOLOFT) 100 MG tablet Take 100 mg by mouth daily      bisacodyl (DULCOLAX) 10 MG suppository Place 10 mg rectally daily as needed for Constipation      Sodium Phosphates (FLEET) 7-19 GM/118ML Place 1 enema rectally once as needed      gabapentin (NEURONTIN) 300 MG capsule Take 300 mg by mouth 3 times daily.       polyethylene glycol (GLYCOLAX) 17 g packet Take 17 g by mouth daily as needed for Constipation      magnesium hydroxide (MILK OF MAGNESIA) 400 MG/5ML suspension Take 30 mLs by mouth daily as needed for Constipation      busPIRone (BUSPAR) 5 MG tablet Take 5 mg by mouth 2 times daily      senna (SENOKOT) 8.6 MG tablet Take 1 tablet by mouth daily      clopidogrel (PLAVIX) 75 MG tablet Take 1 tablet by mouth daily 30 tablet 3    ferrous sulfate (IRON 325) 325 (65 Fe) MG tablet Take 325 mg by mouth daily (with breakfast)      pantoprazole (PROTONIX) 40 MG tablet Take 40 mg by mouth daily      mirabegron (MYRBETRIQ) 50 MG TB24 Take 50 mg by mouth daily      baclofen (LIORESAL) 20 MG tablet Take 20 mg by mouth 2 times daily      atenolol (TENORMIN) 25 MG tablet Take 1 tablet by mouth daily 30 tablet 0    atorvastatin (LIPITOR) 80 MG tablet Take 1 tablet by mouth nightly 30 tablet 3    Multiple Vitamins-Minerals (THERAPEUTIC MULTIVITAMIN-MINERALS) tablet Take 1 tablet by mouth daily      Ascorbic Acid (VITAMIN C) 500 MG tablet Take 500 mg by mouth daily      nitroGLYCERIN (NITROSTAT) 0.4 MG SL tablet Place 1 tablet under the tongue every 5 minutes as needed for Chest pain. 25 tablet 2     No Known Allergies      PHYSICAL EXAM  ED Triage Vitals   BP Temp Temp Source Heart Rate Resp SpO2 Height Weight   01/18/23 1715 01/18/23 1707 01/18/23 1715 01/18/23 1715 01/18/23 1715 01/18/23 1715 -- --   128/66 99.8 °F (37.7 °C) Oral 84 26 95 %       General appearance: Awake and alert. Cooperative. No acute distress.  Heart/Chest: RRR. No murmurs.    Lungs: Respirations unlabored. Crackles R base. Good air exchange.   Abdomen: Soft. Non-tender. Non-distended. No rebound or guarding.   Skin: Warm and dry. No acute rashes.       LABS  I have reviewed all labs for this visit.   Results for orders placed or performed during the hospital encounter of 01/18/23   Rapid influenza A/B antigens    Specimen: Nasopharyngeal   Result Value Ref Range    Rapid Influenza A Ag Negative Negative    Rapid Influenza B Ag Negative Negative   COVID-19, Rapid    Specimen: Nasopharyngeal Swab   Result Value Ref Range    SARS-CoV-2, NAAT Not Detected Not Detected   CBC with Auto Differential   Result Value Ref Range    WBC 12.5 (H) 4.0 - 11.0 K/uL    RBC 4.20 4.20 - 5.90 M/uL    Hemoglobin 12.2 (L) 13.5 - 17.5 g/dL    Hematocrit 36.7 (L) 40.5 - 52.5 %    MCV 87.4 80.0 - 100.0 fL    MCH 29.1 26.0 - 34.0 pg    MCHC 33.2 31.0 - 36.0 g/dL    RDW 16.6 (H) 12.4 - 15.4 %    Platelets 132 (L) 135 - 450 K/uL    MPV 8.3 5.0 - 10.5 fL    Neutrophils % 78.5 %    Lymphocytes % 12.0 %    Monocytes % 8.7 %    Eosinophils % 0.6 %    Basophils % 0.2 %    Neutrophils Absolute 9.8 (H) 1.7 - 7.7 K/uL    Lymphocytes Absolute 1.5 1.0 - 5.1 K/uL    Monocytes Absolute 1.1 0.0 - 1.3 K/uL    Eosinophils Absolute 0.1 0.0 - 0.6 K/uL    Basophils Absolute 0.0 0.0 - 0.2  K/uL   Comprehensive Metabolic Panel w/ Reflex to MG   Result Value Ref Range    Sodium 135 (L) 136 - 145 mmol/L    Potassium reflex Magnesium 3.5 3.5 - 5.1 mmol/L    Chloride 96 (L) 99 - 110 mmol/L    CO2 27 21 - 32 mmol/L    Anion Gap 12 3 - 16    Glucose 125 (H) 70 - 99 mg/dL    BUN 24 (H) 7 - 20 mg/dL    Creatinine 1.0 0.8 - 1.3 mg/dL    Est, Glom Filt Rate >60 >60    Calcium 9.3 8.3 - 10.6 mg/dL    Total Protein 7.8 6.4 - 8.2 g/dL    Albumin 3.9 3.4 - 5.0 g/dL    Albumin/Globulin Ratio 1.0 (L) 1.1 - 2.2    Total Bilirubin 0.7 0.0 - 1.0 mg/dL    Alkaline Phosphatase 126 40 - 129 U/L    ALT 10 10 - 40 U/L    AST 10 (L) 15 - 37 U/L   Troponin   Result Value Ref Range    Troponin 0.06 (H) <0.01 ng/mL   Brain Natriuretic Peptide   Result Value Ref Range    Pro- (H) 0 - 124 pg/mL   Lactate, Sepsis   Result Value Ref Range    Lactic Acid, Sepsis 1.4 0.4 - 1.9 mmol/L   Lactate, Sepsis   Result Value Ref Range    Lactic Acid, Sepsis 1.7 0.4 - 1.9 mmol/L   Blood Gas, Venous   Result Value Ref Range    pH, Iron 7.324 (L) 7.350 - 7.450    pCO2, Iron 56.5 (H) 40.0 - 50.0 mmHg    pO2, Iron <30 Not Established mmHg    HCO3, Venous 29 23 - 29 mmol/L    Base Excess, Iron 2.0 Not Established mmol/L    O2 Sat, Iron 40 Not Established %    Carboxyhemoglobin 1.4 %    MetHgb, Iron 0.3 <1.5 %    TC02 (Calc), Iron 31 Not Established mmol/L    O2 Therapy Unknown    Procalcitonin   Result Value Ref Range    Procalcitonin 0.14 0.00 - 0.15 ng/mL   Magnesium   Result Value Ref Range    Magnesium 1.50 (L) 1.80 - 2.40 mg/dL   APTT   Result Value Ref Range    aPTT 27.4 23.0 - 34.3 sec   SPECIMEN REJECTION   Result Value Ref Range    Rejected Test cbcnd     Reason for Rejection see below    CBC   Result Value Ref Range    WBC 13.4 (H) 4.0 - 11.0 K/uL    RBC 3.84 (L) 4.20 - 5.90 M/uL    Hemoglobin 10.9 (L) 13.5 - 17.5 g/dL    Hematocrit 33.6 (L) 40.5 - 52.5 %    MCV 87.5 80.0 - 100.0 fL    MCH 28.4 26.0 - 34.0 pg    MCHC 32.4 31.0 - 36.0 g/dL RDW 16.7 (H) 12.4 - 15.4 %    Platelets 375 (L) 993 - 450 K/uL    MPV 8.1 5.0 - 10.5 fL         RADIOLOGY  I have reviewed all radiographic studies for this visit. CT CHEST PULMONARY EMBOLISM W CONTRAST   Final Result   Subsegmental acute pulmonary emboli scattered in the pulmonary arteries to   the right lower lobe posteriorly. Moderate right basilar opacity posteriorly which could be due to a pulmonary   infarct vs pneumonia and atelectasis. Small bibasilar pleural effusions with bibasilar atelectasis posteriorly. Cholelithiasis. Mildly dilated and atherosclerotic thoracic aorta with no aneurysm or   dissection. The findings were called to Dr. Jose Blevins in the emergency room at 7:20 p.m. XR CHEST PORTABLE   Final Result   No acute cardiopulmonary disease. Mild cardiomegaly without overt failure. CT HEAD WO CONTRAST    (Results Pending)          ECG  EKG interpreted by myself. Rate: 84  Rhythm: sinus w/ 1st deg av block  Axis: normal  Intervals:  QRS 96 QTc 423  T waves: lateral T wave inversion  Comparison: Compared to 8/31/22, lateral T wave abnormality is more pronounced, ventricular rate faster by 26 bpm  Impression: sinus w/ 1st deg av block, lateral T wave inversion    ED COURSE/MDM    75 y.o. male presents with cough, right-sided chest pain, and shortness of breath. I reviewed the patient's EKG which is notable for ST/T wave abnormalities of the lateral leads, more pronounced versus prior. I reviewed the patient's laboratory studies. Patient is hypomagnesemic. He was administered 2 g of magnesium IV. He is found to have an elevated troponin and BNP. He was initially started on low-dose heparin however underwent a CTPA that revealed right-sided pulmonary embolism. Transition to high-dose heparin. His flu and COVID tests are negative.   I reviewed the patient's CTPA which also shows a moderate right basilar opacity which could represent pulmonary infarct however might also represent pneumonia. In the setting of cough patient was administered ceftriaxone and azithromycin. Blood cultures obtained. Lactate is within normal limits. I discussed the patient's case with Dr. Jason Meza in interventional cardiology who will follow along. I discussed the patient's case with Dr. Lary Cisneros, hospital medicine, who will admit the patient to the hospital.  I discussed Critical access hospital [address] with the patient-full code. - History obtained from: patient   - Records reviewed: Hospital discharge summary 9/12/2022 shows patient's history of previous CVAs. - Consults:   IP CONSULT TO HOSPITALIST  IP CONSULT TO CARDIOLOGY     Chronic Conditions: CVA w/ residual weakness      Is this patient to be included in the SEP-1 Core Measure? No   Exclusion criteria - the patient is NOT to be included for SEP-1 Core Measure due to:  May have criteria for sepsis, but does not meet criteria for severe sepsis or septic shock    I, Elmre Minaya MD, am the primary clinician of record.      During the patient's ED course, the patient was given:  Medications   magnesium sulfate 2000 mg in 50 mL IVPB premix (2,000 mg IntraVENous New Bag 1/18/23 1905)   heparin (porcine) injection 3,760 Units (has no administration in time range)   heparin 25,000 units in dextrose 5% 250 mL (premix) infusion (1,750 Units/hr IntraVENous New Bag 1/18/23 1949)   atorvastatin (LIPITOR) tablet 80 mg (has no administration in time range)   mirtazapine (REMERON) tablet 15 mg (has no administration in time range)   cefTRIAXone (ROCEPHIN) 1,000 mg in dextrose 5 % 50 mL IVPB mini-bag (has no administration in time range)     And   azithromycin (ZITHROMAX) 500 mg in D5W 250ml addavial (has no administration in time range)   albuterol (PROVENTIL) nebulizer solution 2.5 mg (2.5 mg Nebulization Given 1/18/23 1734)   albuterol (PROVENTIL) nebulizer solution 2.5 mg (2.5 mg Nebulization Given 1/18/23 1733)   heparin (porcine) injection 4,000 Units (4,000 Units IntraVENous Given 1/18/23 1918)   iopamidol (ISOVUE-370) 76 % injection 75 mL (75 mLs IntraVENous Given 1/18/23 1843)        Patient was given prescriptions for the following medications. I counseled the patient as to how to take these medications. New Prescriptions    No medications on file       Patient instructed to follow up with:   No follow-up provider specified. All questions were answered and the patient/family expressed understanding and agreement with the plan. PROCEDURES  None    CRITICAL CARE  Total critical care time provided today thus far was 33 minutes. This excludes seperately billable procedures. Critical care time was provided for acute pulmonary embolism requiring heparin gtt and that required close evaluation and/or intervention with concern for potential patient decompensation. CLINICAL IMPRESSION  1. Acute pulmonary embolism, unspecified pulmonary embolism type, unspecified whether acute cor pulmonale present ([facility] [address]    2. Hypomagnesemia        DISPOSITION   admit    Charlotte Toussaint MD    Note: This chart was created using voice recognition dictation software. Efforts were made by me to ensure accuracy, however some errors may be present due to limitations of this technology and occasionally words are not transcribed correctly.         Charlotte Toussaint MD  01/18/23 2028

## 2023-01-19 ENCOUNTER — APPOINTMENT (OUTPATIENT)
Dept: CARDIAC CATH/INVASIVE PROCEDURES | Age: 68
DRG: 981 | End: 2023-01-19
Payer: MEDICARE

## 2023-01-19 ENCOUNTER — APPOINTMENT (OUTPATIENT)
Dept: GENERAL RADIOLOGY | Age: 68
DRG: 981 | End: 2023-01-19
Payer: MEDICARE

## 2023-01-19 PROBLEM — I26.99 ACUTE PULMONARY EMBOLISM (HCC): Status: ACTIVE | Noted: 2023-01-19

## 2023-01-19 LAB
APTT: 143.5 SEC (ref 23–34.3)
APTT: >180 SEC (ref 23–34.3)
BASE EXCESS VENOUS: 1.5 MMOL/L
BASOPHILS ABSOLUTE: 0.1 K/UL (ref 0–0.2)
BASOPHILS RELATIVE PERCENT: 0.3 %
CARBOXYHEMOGLOBIN: 0.7 %
EKG ATRIAL RATE: 144 BPM
EKG ATRIAL RATE: 73 BPM
EKG DIAGNOSIS: NORMAL
EKG DIAGNOSIS: NORMAL
EKG P AXIS: 88 DEGREES
EKG P-R INTERVAL: 214 MS
EKG Q-T INTERVAL: 330 MS
EKG Q-T INTERVAL: 424 MS
EKG QRS DURATION: 102 MS
EKG QRS DURATION: 108 MS
EKG QTC CALCULATION (BAZETT): 467 MS
EKG QTC CALCULATION (BAZETT): 496 MS
EKG R AXIS: -17 DEGREES
EKG R AXIS: -31 DEGREES
EKG T AXIS: 79 DEGREES
EKG T AXIS: 96 DEGREES
EKG VENTRICULAR RATE: 136 BPM
EKG VENTRICULAR RATE: 73 BPM
EOSINOPHILS ABSOLUTE: 0 K/UL (ref 0–0.6)
EOSINOPHILS RELATIVE PERCENT: 0.1 %
GLUCOSE BLD-MCNC: 125 MG/DL (ref 70–99)
HCO3 VENOUS: 27 MMOL/L (ref 23–29)
HCT VFR BLD CALC: 33.5 % (ref 40.5–52.5)
HEMOGLOBIN: 10.9 G/DL (ref 13.5–17.5)
LV EF: 58 %
LVEF MODALITY: NORMAL
LYMPHOCYTES ABSOLUTE: 1.3 K/UL (ref 1–5.1)
LYMPHOCYTES RELATIVE PERCENT: 8.6 %
MCH RBC QN AUTO: 28.6 PG (ref 26–34)
MCHC RBC AUTO-ENTMCNC: 32.6 G/DL (ref 31–36)
MCV RBC AUTO: 87.9 FL (ref 80–100)
METHEMOGLOBIN VENOUS: 0.5 %
MONOCYTES ABSOLUTE: 0.8 K/UL (ref 0–1.3)
MONOCYTES RELATIVE PERCENT: 5 %
NEUTROPHILS ABSOLUTE: 13.5 K/UL (ref 1.7–7.7)
NEUTROPHILS RELATIVE PERCENT: 86 %
O2 SAT, VEN: 93 %
O2 THERAPY: NORMAL
PCO2, VEN: 46.4 MMHG (ref 40–50)
PDW BLD-RTO: 16.9 % (ref 12.4–15.4)
PERFORMED ON: ABNORMAL
PH VENOUS: 7.38 (ref 7.35–7.45)
PLATELET # BLD: 135 K/UL (ref 135–450)
PMV BLD AUTO: 8.4 FL (ref 5–10.5)
PO2, VEN: 65 MMHG
POC ACT LR: 269 SEC
RBC # BLD: 3.82 M/UL (ref 4.2–5.9)
TCO2 CALC VENOUS: 29 MMOL/L
TROPONIN: 0.05 NG/ML
WBC # BLD: 15.7 K/UL (ref 4–11)

## 2023-01-19 PROCEDURE — 6360000004 HC RX CONTRAST MEDICATION

## 2023-01-19 PROCEDURE — C8929 TTE W OR WO FOL WCON,DOPPLER: HCPCS

## 2023-01-19 PROCEDURE — 2100000000 HC CCU R&B

## 2023-01-19 PROCEDURE — 2709999900 HC NON-CHARGEABLE SUPPLY

## 2023-01-19 PROCEDURE — 2580000003 HC RX 258: Performed by: INTERNAL MEDICINE

## 2023-01-19 PROCEDURE — C1874 STENT, COATED/COV W/DEL SYS: HCPCS

## 2023-01-19 PROCEDURE — B240ZZ3 ULTRASONOGRAPHY OF SINGLE CORONARY ARTERY, INTRAVASCULAR: ICD-10-PCS | Performed by: INTERNAL MEDICINE

## 2023-01-19 PROCEDURE — 87449 NOS EACH ORGANISM AG IA: CPT

## 2023-01-19 PROCEDURE — 93010 ELECTROCARDIOGRAM REPORT: CPT | Performed by: INTERNAL MEDICINE

## 2023-01-19 PROCEDURE — 85025 COMPLETE CBC W/AUTO DIFF WBC: CPT

## 2023-01-19 PROCEDURE — 85730 THROMBOPLASTIN TIME PARTIAL: CPT

## 2023-01-19 PROCEDURE — 6370000000 HC RX 637 (ALT 250 FOR IP): Performed by: INTERNAL MEDICINE

## 2023-01-19 PROCEDURE — 6360000002 HC RX W HCPCS: Performed by: NURSE PRACTITIONER

## 2023-01-19 PROCEDURE — 6360000004 HC RX CONTRAST MEDICATION: Performed by: INTERNAL MEDICINE

## 2023-01-19 PROCEDURE — 2700000000 HC OXYGEN THERAPY PER DAY

## 2023-01-19 PROCEDURE — 027034Z DILATION OF CORONARY ARTERY, ONE ARTERY WITH DRUG-ELUTING INTRALUMINAL DEVICE, PERCUTANEOUS APPROACH: ICD-10-PCS | Performed by: INTERNAL MEDICINE

## 2023-01-19 PROCEDURE — 71045 X-RAY EXAM CHEST 1 VIEW: CPT

## 2023-01-19 PROCEDURE — C1769 GUIDE WIRE: HCPCS

## 2023-01-19 PROCEDURE — C1751 CATH, INF, PER/CENT/MIDLINE: HCPCS

## 2023-01-19 PROCEDURE — 93456 R HRT CORONARY ARTERY ANGIO: CPT | Performed by: INTERNAL MEDICINE

## 2023-01-19 PROCEDURE — 93005 ELECTROCARDIOGRAM TRACING: CPT | Performed by: NURSE PRACTITIONER

## 2023-01-19 PROCEDURE — 85347 COAGULATION TIME ACTIVATED: CPT

## 2023-01-19 PROCEDURE — C1753 CATH, INTRAVAS ULTRASOUND: HCPCS

## 2023-01-19 PROCEDURE — 92941 PRQ TRLML REVSC TOT OCCL AMI: CPT | Performed by: INTERNAL MEDICINE

## 2023-01-19 PROCEDURE — 99291 CRITICAL CARE FIRST HOUR: CPT | Performed by: INTERNAL MEDICINE

## 2023-01-19 PROCEDURE — 82803 BLOOD GASES ANY COMBINATION: CPT

## 2023-01-19 PROCEDURE — 99153 MOD SED SAME PHYS/QHP EA: CPT

## 2023-01-19 PROCEDURE — 2500000003 HC RX 250 WO HCPCS

## 2023-01-19 PROCEDURE — 6360000002 HC RX W HCPCS: Performed by: EMERGENCY MEDICINE

## 2023-01-19 PROCEDURE — C1725 CATH, TRANSLUMIN NON-LASER: HCPCS

## 2023-01-19 PROCEDURE — 94761 N-INVAS EAR/PLS OXIMETRY MLT: CPT

## 2023-01-19 PROCEDURE — 6360000002 HC RX W HCPCS

## 2023-01-19 PROCEDURE — 93460 R&L HRT ART/VENTRICLE ANGIO: CPT

## 2023-01-19 PROCEDURE — 4A023N6 MEASUREMENT OF CARDIAC SAMPLING AND PRESSURE, RIGHT HEART, PERCUTANEOUS APPROACH: ICD-10-PCS | Performed by: INTERNAL MEDICINE

## 2023-01-19 PROCEDURE — C1894 INTRO/SHEATH, NON-LASER: HCPCS

## 2023-01-19 PROCEDURE — 99152 MOD SED SAME PHYS/QHP 5/>YRS: CPT | Performed by: INTERNAL MEDICINE

## 2023-01-19 PROCEDURE — 92978 ENDOLUMINL IVUS OCT C 1ST: CPT | Performed by: INTERNAL MEDICINE

## 2023-01-19 PROCEDURE — 87040 BLOOD CULTURE FOR BACTERIA: CPT

## 2023-01-19 PROCEDURE — 36415 COLL VENOUS BLD VENIPUNCTURE: CPT

## 2023-01-19 PROCEDURE — 94640 AIRWAY INHALATION TREATMENT: CPT

## 2023-01-19 PROCEDURE — 84484 ASSAY OF TROPONIN QUANT: CPT

## 2023-01-19 PROCEDURE — C9600 PERC DRUG-EL COR STENT SING: HCPCS

## 2023-01-19 PROCEDURE — 87641 MR-STAPH DNA AMP PROBE: CPT

## 2023-01-19 PROCEDURE — 6360000002 HC RX W HCPCS: Performed by: INTERNAL MEDICINE

## 2023-01-19 PROCEDURE — 92978 ENDOLUMINL IVUS OCT C 1ST: CPT

## 2023-01-19 PROCEDURE — 93005 ELECTROCARDIOGRAM TRACING: CPT | Performed by: INTERNAL MEDICINE

## 2023-01-19 PROCEDURE — 99152 MOD SED SAME PHYS/QHP 5/>YRS: CPT

## 2023-01-19 PROCEDURE — 94660 CPAP INITIATION&MGMT: CPT

## 2023-01-19 PROCEDURE — B2111ZZ FLUOROSCOPY OF MULTIPLE CORONARY ARTERIES USING LOW OSMOLAR CONTRAST: ICD-10-PCS | Performed by: INTERNAL MEDICINE

## 2023-01-19 PROCEDURE — 2580000003 HC RX 258: Performed by: EMERGENCY MEDICINE

## 2023-01-19 RX ORDER — MORPHINE SULFATE 2 MG/ML
2 INJECTION, SOLUTION INTRAMUSCULAR; INTRAVENOUS ONCE
Status: COMPLETED | OUTPATIENT
Start: 2023-01-19 | End: 2023-01-19

## 2023-01-19 RX ORDER — FUROSEMIDE 10 MG/ML
40 INJECTION INTRAMUSCULAR; INTRAVENOUS ONCE
Status: COMPLETED | OUTPATIENT
Start: 2023-01-19 | End: 2023-01-19

## 2023-01-19 RX ORDER — SODIUM CHLORIDE 9 MG/ML
INJECTION, SOLUTION INTRAVENOUS PRN
Status: DISCONTINUED | OUTPATIENT
Start: 2023-01-19 | End: 2023-01-19 | Stop reason: SDUPTHER

## 2023-01-19 RX ORDER — 0.9 % SODIUM CHLORIDE 0.9 %
500 INTRAVENOUS SOLUTION INTRAVENOUS ONCE
Status: COMPLETED | OUTPATIENT
Start: 2023-01-19 | End: 2023-01-19

## 2023-01-19 RX ORDER — FUROSEMIDE 10 MG/ML
INJECTION INTRAMUSCULAR; INTRAVENOUS
Status: DISPENSED
Start: 2023-01-19 | End: 2023-01-19

## 2023-01-19 RX ORDER — SODIUM CHLORIDE 0.9 % (FLUSH) 0.9 %
5-40 SYRINGE (ML) INJECTION PRN
Status: DISCONTINUED | OUTPATIENT
Start: 2023-01-19 | End: 2023-01-24 | Stop reason: HOSPADM

## 2023-01-19 RX ORDER — FUROSEMIDE 10 MG/ML
40 INJECTION INTRAMUSCULAR; INTRAVENOUS 2 TIMES DAILY
Status: DISCONTINUED | OUTPATIENT
Start: 2023-01-19 | End: 2023-01-19

## 2023-01-19 RX ORDER — ACETAMINOPHEN 325 MG/1
650 TABLET ORAL EVERY 4 HOURS PRN
Status: DISCONTINUED | OUTPATIENT
Start: 2023-01-19 | End: 2023-01-24 | Stop reason: HOSPADM

## 2023-01-19 RX ORDER — IPRATROPIUM BROMIDE AND ALBUTEROL SULFATE 2.5; .5 MG/3ML; MG/3ML
1 SOLUTION RESPIRATORY (INHALATION) 2 TIMES DAILY
Status: DISCONTINUED | OUTPATIENT
Start: 2023-01-19 | End: 2023-01-20

## 2023-01-19 RX ORDER — SODIUM CHLORIDE 0.9 % (FLUSH) 0.9 %
5-40 SYRINGE (ML) INJECTION EVERY 12 HOURS SCHEDULED
Status: DISCONTINUED | OUTPATIENT
Start: 2023-01-19 | End: 2023-01-24 | Stop reason: HOSPADM

## 2023-01-19 RX ADMIN — IOPAMIDOL 175 ML: 755 INJECTION, SOLUTION INTRAVENOUS at 15:30

## 2023-01-19 RX ADMIN — ATORVASTATIN CALCIUM 80 MG: 80 TABLET, FILM COATED ORAL at 20:53

## 2023-01-19 RX ADMIN — TICAGRELOR 90 MG: 90 TABLET ORAL at 20:52

## 2023-01-19 RX ADMIN — GABAPENTIN 300 MG: 300 CAPSULE ORAL at 09:18

## 2023-01-19 RX ADMIN — PANTOPRAZOLE SODIUM 40 MG: 40 TABLET, DELAYED RELEASE ORAL at 09:18

## 2023-01-19 RX ADMIN — GABAPENTIN 300 MG: 300 CAPSULE ORAL at 20:52

## 2023-01-19 RX ADMIN — FUROSEMIDE 40 MG: 10 INJECTION, SOLUTION INTRAVENOUS at 01:33

## 2023-01-19 RX ADMIN — PERFLUTREN: 6.52 INJECTION, SUSPENSION INTRAVENOUS at 10:00

## 2023-01-19 RX ADMIN — FUROSEMIDE 40 MG: 10 INJECTION, SOLUTION INTRAMUSCULAR; INTRAVENOUS at 01:58

## 2023-01-19 RX ADMIN — MORPHINE SULFATE 2 MG: 2 INJECTION, SOLUTION INTRAMUSCULAR; INTRAVENOUS at 13:05

## 2023-01-19 RX ADMIN — IPRATROPIUM BROMIDE AND ALBUTEROL SULFATE 1 AMPULE: .5; 2.5 SOLUTION RESPIRATORY (INHALATION) at 12:17

## 2023-01-19 RX ADMIN — SODIUM CHLORIDE 500 ML: 9 INJECTION, SOLUTION INTRAVENOUS at 12:30

## 2023-01-19 RX ADMIN — MIRTAZAPINE 15 MG: 15 TABLET, FILM COATED ORAL at 20:52

## 2023-01-19 RX ADMIN — AZITHROMYCIN DIHYDRATE 500 MG: 500 INJECTION, POWDER, LYOPHILIZED, FOR SOLUTION INTRAVENOUS at 02:38

## 2023-01-19 RX ADMIN — MORPHINE SULFATE 2 MG: 2 INJECTION, SOLUTION INTRAMUSCULAR; INTRAVENOUS at 02:28

## 2023-01-19 RX ADMIN — BUSPIRONE HYDROCHLORIDE 5 MG: 5 TABLET ORAL at 09:18

## 2023-01-19 RX ADMIN — CEFEPIME 2000 MG: 2 INJECTION, POWDER, FOR SOLUTION INTRAVENOUS at 15:37

## 2023-01-19 RX ADMIN — CEFEPIME 2000 MG: 2 INJECTION, POWDER, FOR SOLUTION INTRAVENOUS at 06:13

## 2023-01-19 RX ADMIN — GABAPENTIN 300 MG: 300 CAPSULE ORAL at 15:33

## 2023-01-19 RX ADMIN — TICAGRELOR 180 MG: 90 TABLET ORAL at 13:05

## 2023-01-19 RX ADMIN — ACETAMINOPHEN 650 MG: 325 TABLET ORAL at 04:39

## 2023-01-19 RX ADMIN — BUSPIRONE HYDROCHLORIDE 5 MG: 5 TABLET ORAL at 20:52

## 2023-01-19 RX ADMIN — CLOPIDOGREL BISULFATE 75 MG: 75 TABLET ORAL at 09:18

## 2023-01-19 RX ADMIN — ASPIRIN 81 MG CHEWABLE TABLET 81 MG: 81 TABLET CHEWABLE at 09:18

## 2023-01-19 RX ADMIN — SERTRALINE 100 MG: 100 TABLET, FILM COATED ORAL at 09:18

## 2023-01-19 RX ADMIN — VANCOMYCIN HYDROCHLORIDE 1500 MG: 1.5 INJECTION, POWDER, LYOPHILIZED, FOR SOLUTION INTRAVENOUS at 08:59

## 2023-01-19 RX ADMIN — IPRATROPIUM BROMIDE AND ALBUTEROL SULFATE 1 AMPULE: .5; 2.5 SOLUTION RESPIRATORY (INHALATION) at 20:13

## 2023-01-19 ASSESSMENT — PAIN DESCRIPTION - LOCATION: LOCATION: BACK

## 2023-01-19 ASSESSMENT — PAIN DESCRIPTION - DESCRIPTORS: DESCRIPTORS: STABBING

## 2023-01-19 ASSESSMENT — PAIN SCALES - GENERAL: PAINLEVEL_OUTOF10: 6

## 2023-01-19 ASSESSMENT — PAIN DESCRIPTION - ORIENTATION: ORIENTATION: MID

## 2023-01-19 NOTE — PROGRESS NOTES
Clinical Pharmacy Note  Heparin Dosing Consult    Alex Johnson is a 75 y.o. male ordered heparin per high dose nomogram by Dr. Colette Preston. Lab Results   Component Value Date/Time    APTT 27.4 01/18/2023 07:06 PM     Lab Results   Component Value Date/Time    HGB 12.2 01/18/2023 05:21 PM    HCT 36.7 01/18/2023 05:21 PM     01/18/2023 05:21 PM    INR 1.03 08/31/2022 02:25 PM       Ht Readings from Last 1 Encounters:   09/08/22 6' 3\" (1.905 m)        Wt Readings from Last 1 Encounters:   01/18/23 213 lb 13.5 oz (97 kg)        Assessment/Plan:  Initial bolus: 7760 units  Initial infusion rate: 1750 units/hr  Next aPTT: 1/19/23 0200    Pharmacy will continue to monitor adjust heparin based on aPTT results using nomogram below:     VTE/DVT/PE Heparin Nomogram     Initial Bolus: 80 units/kg Max Bolus: 10,000 units       Initial Rate: 18 units/kg/hr Max Initial Rate: 2,100 units/hr     aPTT < 59    Heparin 80 units/kg bolus Increase infusion by 4 units/kg/hr        (maximum 10,000 units)   aPTT 59-72.9    Heparin 40 units/kg bolus Increase infusion by 2 units/kg/hr        (maximum 5,000 units)   aPTT      No bolus   No change   aPTT 102.1-109  No bolus   Decrease infusion by 1 units/kg/hr   aPTT 109.1-122.9   No bolus   Decrease infusion by 2 units/kg/hr   aPTT > 123     Hold heparin for 1 hour Decrease infusion by 3 units/kg/hr    Obtain aPTT 6 hours after initial bolus and 6 hours after any dose change until two consecutive therapeutic aPTTs are achieved - then daily.   Anuja Bishop, [facility],1/18/2023,7:52 PM

## 2023-01-19 NOTE — PROGRESS NOTES
After lab draw around 127am, pt woke up and his HR spiked to 130-140's, with a high BP. He began to struggle with breathing, turned grey and became diaphoretic. This RN called a RR. Pt alert and able to answer questions.  Electronically signed by Kyara Barone RN on 1/19/2023 at 2:14 AM

## 2023-01-19 NOTE — PROGRESS NOTES
Pt arrived via stretcher from ED to room 5260. Heart monitor connected and verified with [facility]. VS, and assessment. 4 eyes assessment complete. Pt very lethargic. Arouses to voice but then falls back to sleep. Pt isn't able to stay awake long enough to answer admission questions. Bed is locked, lowered and alarmed and call light is within reach.  Electronically signed by Melodie Hernandez RN on 1/18/2023 at 10:39 PM

## 2023-01-19 NOTE — PROGRESS NOTES
4 Eyes Skin Assessment     NAME:  Alex Johnson  YOB: 1955  MEDICAL RECORD NUMBER:  [id number]    The patient is being assessed for  Admission    I agree that One RN have performed a thorough Head to Toe Skin Assessment on the patient. ALL assessment sites listed below have been assessed. Areas assessed by both nurses:    Head, Face, Ears, Shoulders, Back, Chest, Arms, Elbows, Hands, Sacrum. Buttock, Coccyx, Ischium, and Legs. Feet and Heels        Does the Patient have a Wound?  No noted wound(s)       Ilia Prevention initiated by RN: Yes   Wound Care Orders initiated by RN: No    Pressure Injury (Stage 3,4, Unstageable, DTI, NWPT, and Complex wounds) if present place referral order by RN under : No    New and Established Ostomies, if present place, referral order under : No      Nurse 1 eSignature: Electronically signed by Nani Sánchez RN on 1/18/23 at 10:39 PM EST    **SHARE this note so that the co-signing nurse is able to place an eSignature**    Nurse 2 eSignature: Electronically signed by Eliseo Hardy RN on 1/19/23 at 4:26 AM EST

## 2023-01-19 NOTE — PROGRESS NOTES
Clinical Pharmacy Note  Renal Dose Adjustment    Cris Rush is receiving cefepime. This medication is renally eliminated. Based on the patient's Estimated Creatinine Clearance: 85 mL/min (based on SCr of 1 mg/dL). and indication of CAP, the dose has been adjusted to cefepime 2g IV Q8H per protocol. Pharmacy will continue to monitor and adjust dose as needed for changes in renal function.       Blank Rice PharmD  1/19/2023 5:04 AM

## 2023-01-19 NOTE — CONSULTS
Clinical Pharmacy Note  Vancomycin Consult    Ankur Ortiz is a 75 y.o. male ordered Vancomycin for CAP/sepsis of unknown origin; consult received from Dr. Deo Rivas to manage therapy. Also receiving cefepime. Allergies:  Patient has no known allergies. Temp max:  Temp (24hrs), Av.8 °F (37.7 °C), Min:98.5 °F (36.9 °C), Max:100.8 °F (38.2 °C)      Recent Labs     23  1721 23  1938 23  0224   WBC 12.5* 13.4* 15.7*       Recent Labs     23  1721   BUN 24*   CREATININE 1.0         Intake/Output Summary (Last 24 hours) at 2023 0542  Last data filed at 2023 0200  Gross per 24 hour   Intake --   Output 850 ml   Net -850 ml       Culture Results:  pending    Ht Readings from Last 1 Encounters:   23 6' 3\" (1.905 m)        Wt Readings from Last 1 Encounters:   23 209 lb 7 oz (95 kg)         Estimated Creatinine Clearance: 85 mL/min (based on SCr of 1 mg/dL). Assessment/Plan:  Day # 1 of vancomycin. Vancomycin 1500 mg IV every 18 hours ordered. Goal -600  Predicted , trough 15.5 mg/L  Level ordered after 2 total doses (1700 on )    Thank you for the consult.    Elian BeachD  2023 5:43 AM

## 2023-01-19 NOTE — SIGNIFICANT EVENT
RN Notified me about patient's BP became unstable. On exam appeared hypovolemic, consistent with history as well. No JVD, no pitting edema, no crackles on exam. BP 80/50s  Map <60, BNP currently at 697, prior >8000, CXR clear .  1L bolus was ordered

## 2023-01-19 NOTE — ED NOTES
Patient remains alert and oreinted. Pain score and patient condition reviewed. No acute distress noted. Report given to RN recieving patient.       Musa Gibson RN  01/18/23 3360

## 2023-01-19 NOTE — PROGRESS NOTES
This RN asked pt if he would want us to contact anyone else about for updates and pt declined.  Electronically signed by Elisha Etienne RN on 1/19/2023 at 2:12 AM

## 2023-01-19 NOTE — PROGRESS NOTES
Attempted to call pt's emergency contact, Kathy Victoria, but unsuccessful. Voicemail box is full.  Electronically signed by Gerardo Deshpande RN on 1/19/2023 at 1:48 AM

## 2023-01-19 NOTE — PLAN OF CARE
Problem: Discharge Planning  Goal: Discharge to home or other facility with appropriate resources  Outcome: Progressing     Problem: Pain  Goal: Verbalizes/displays adequate comfort level or baseline comfort level  Outcome: Progressing     Problem: ABCDS Injury Assessment  Goal: Absence of physical injury  Outcome: Progressing     Problem: Safety - Adult  Goal: Free from fall injury  Outcome: Progressing     Problem: Skin/Tissue Integrity  Goal: Absence of new skin breakdown  Description: 1. Monitor for areas of redness and/or skin breakdown  2. Assess vascular access sites hourly  3. Every 4-6 hours minimum:  Change oxygen saturation probe site  4. Every 4-6 hours:  If on nasal continuous positive airway pressure, respiratory therapy assess nares and determine need for appliance change or resting period.   Outcome: Progressing

## 2023-01-19 NOTE — H&P
Hospital Medicine History & Physical      PCP: No primary care provider on file. Date of Admission: 1/18/2023    Date of Service: Pt seen/examined on 1/18/2023    Pt seen/examined face to face on and admitted as inpatient with expected LOS to be two days but can change depending on diagnostic work up and treatment response. Chief Complaint:    Chief Complaint   Patient presents with    Chest Pain     Pt c/o of chest pain, SOB, and lower back pain. Pt has a cough. States this all started yesterday. EMS gave duoneb en route and said pt had audible crackles. History Of Present Illness:      75 y.o. male who presented to [facility] with past medical history of CAD, hypertension, hyperlipidemia, MI, history of CVA presented to the ED with chief complaint of shortness of breath    Patient reported that he has been having worsening shortness of breath, cough in the past 2 days no known alleviating factor, exacerbating factor is walking and exertion reports that he does not have history of DVT or clots before. Patient reports that he did recover from COVID-19. Patient also reports has been being compliant with medication. Patient otherwise denied increased salt intake or worsening lower extremity edema. Patient also reports decreased oral intake      Past Medical History:          Diagnosis Date    Acute MI ([facility] [address] 09/2013    Anxiety     Arthritis     CAD (coronary artery disease)     COVID-19     Hyperlipidemia 2/3/2022    Hypertension     Influenza A 01/15/2017    Severe malnutrition ([facility] [address] 2/10/2022       Past Surgical History:          Procedure Laterality Date    BACK SURGERY N/A 1989    herniated disc    CORONARY ANGIOPLASTY WITH STENT PLACEMENT  9/13    EYE SURGERY      VASCULAR SURGERY         Medications Prior to Admission:      Prior to Admission medications    Medication Sig Start Date End Date Taking?  Authorizing Provider   levoFLOXacin (LEVAQUIN) 500 MG tablet Take 1 tablet by mouth daily 9/12/22   Gerson Kennedy MD   albuterol sulfate HFA (VENTOLIN HFA) 108 (90 Base) MCG/ACT inhaler Inhale 2 puffs into the lungs every 6 hours as needed for Wheezing    Historical Provider, MD   miconazole (MICOTIN) 2 % powder Apply topically 2 times daily as needed for Itching Apply to buttocks    Historical Provider, MD   aspirin 81 MG chewable tablet Take 81 mg by mouth daily    Historical Provider, MD   loratadine (CLARITIN) 10 MG tablet Take 10 mg by mouth daily    Historical Provider, MD   nystatin (MYCOSTATIN) 069200 UNIT/GM cream Apply topically 2 times daily as needed for Dry Skin Apply topically to buttocks    Historical Provider, MD   Carboxymethylcellul-Glycerin (REFRESH OPTIVE) 0.5-0.9 % SOLN Apply 1 drop to eye 4 times daily as needed    Historical Provider, MD   acetaminophen (TYLENOL) 325 MG tablet Take 650 mg by mouth every 6 hours as needed for Pain    Historical Provider, MD   Dimethicone-Zinc Oxide-Vit A-D ([employer] ZINC OXIDE DIAPER) 1-10 % CREA Apply topically 2 times daily as needed    Historical Provider, MD   sertraline (ZOLOFT) 100 MG tablet Take 100 mg by mouth daily    Historical Provider, MD   bisacodyl (DULCOLAX) 10 MG suppository Place 10 mg rectally daily as needed for Constipation    Historical Provider, MD   Sodium Phosphates (FLEET) 7-19 GM/118ML Place 1 enema rectally once as needed    Historical Provider, MD   gabapentin (NEURONTIN) 300 MG capsule Take 300 mg by mouth 3 times daily.     Historical Provider, MD   polyethylene glycol (GLYCOLAX) 17 g packet Take 17 g by mouth daily as needed for Constipation    Historical Provider, MD   magnesium hydroxide (MILK OF MAGNESIA) 400 MG/5ML suspension Take 30 mLs by mouth daily as needed for Constipation    Historical Provider, MD   busPIRone (BUSPAR) 5 MG tablet Take 5 mg by mouth 2 times daily    Historical Provider, MD   senna (SENOKOT) 8.6 MG tablet Take 1 tablet by mouth daily    Historical Provider, MD   collagenase 250 UNIT/GM ointment Apply 1 each topically daily Apply topically to right heel daily. Historical Provider, MD   clopidogrel (PLAVIX) 75 MG tablet Take 1 tablet by mouth daily 1/11/22   Sean Martin MD   ferrous sulfate (IRON 325) 325 (65 Fe) MG tablet Take 325 mg by mouth daily (with breakfast)    Historical Provider, MD   mirtazapine (REMERON) 15 MG tablet Take 15 mg by mouth nightly    Historical Provider, MD   pantoprazole (PROTONIX) 40 MG tablet Take 40 mg by mouth daily    Historical Provider, MD   mirabegron (MYRBETRIQ) 50 MG TB24 Take 50 mg by mouth daily    Historical Provider, MD   baclofen (LIORESAL) 20 MG tablet Take 20 mg by mouth 2 times daily    Historical Provider, MD   hydroCHLOROthiazide (HYDRODIURIL) 25 MG tablet TAKE ONE TABLET BY MOUTH DAILY 5/17/21 9/12/22  Eliz Robles MD   atenolol (TENORMIN) 25 MG tablet Take 1 tablet by mouth daily 5/12/21   Reginald Ness MD   atorvastatin (LIPITOR) 80 MG tablet Take 1 tablet by mouth nightly 4/23/21   Danita Sanchez MD   Multiple Vitamins-Minerals (THERAPEUTIC MULTIVITAMIN-MINERALS) tablet Take 1 tablet by mouth daily    Historical Provider, MD   Ascorbic Acid (VITAMIN C) 500 MG tablet Take 500 mg by mouth daily    Historical Provider, MD   nitroGLYCERIN (NITROSTAT) 0.4 MG SL tablet Place 1 tablet under the tongue every 5 minutes as needed for Chest pain. 9/11/13   MARSHALL Perez - CNP       Allergies:  Patient has no known allergies. Social History:          TOBACCO:   reports that he quit smoking about 4 years ago. His smoking use included cigarettes. He has a 20.00 pack-year smoking history. He has never used smokeless tobacco.  ETOH:   reports current alcohol use of about 2.0 standard drinks per week.   E-cigarette/Vaping       Questions Responses    E-cigarette/Vaping Use Never User    Start Date     Passive Exposure     Quit Date     Counseling Given     Comments               Family History:      Family History reviewed with patient, and does not pertain and non-contributory to the current illness        Problem Relation Age of Onset    Hypertension Mother     Hypertension Father     Heart Failure Father     Stroke Maternal Grandfather     Cancer Paternal Uncle         throat       REVIEW OF SYSTEMS:     Constitutional:  No Fever, No Chills, No Night Sweats  ENT/Mouth:  No Nasal Congestion,  No Hoarseness, No new mouth lesion  Eyes:  No Eye Pain, No Redness, No Discharge  Cardiovascular:  No Chest Pain, No Orthopnea, No Palpitations  Respiratory: + Cough, + sputum, + dyspnea  Gastrointestinal: No Vomiting, No Diarrhea, No abdominal pain  Genitourinary: No Urinary Frequency, No Hematuria, No Urinary pain  Musculoskeletal:  No worsening Arthralgias, No worsening Myalgias  Skin:  No new Skin Lesions, No new skin rash  Neuro:  No new weakness, No new numbness. Psych:  No suicial ideation, No Violence ideation    PHYSICAL EXAM PERFORMED:    /62   Pulse 96   Temp 99.8 °F (37.7 °C) (Oral)   Resp 21   Wt 213 lb 13.5 oz (97 kg)   SpO2 93%   BMI 26.73 kg/m²     General appearance:  mild acute distress, appears older than stated age  HEENT:   atraumatic, sclera anicteric, Conjunctivae clear. Neck: Supple,Trachea midline, no goiter, No JVD  Respiratory:minimal accessory muscle usage, Normal respiratory effort. Clear to auscultation, bilaterally without wheezing  Cardiovascular:  Regular rate and rhythm, capillary refill 2 seconds  Abdomen: Soft, non-tender, non-distended with normal bowel sounds. Musculoskeletal:  No clubbing, cyanosis. no edema LE, Right AKA  Skin: turgor normal.  No new rashes or lesions. Neurologic: Alert and oriented x4, no new focal sensory/motor deficits.      Labs:     Recent Labs     01/18/23  1721   WBC 12.5*   HGB 12.2*   HCT 36.7*   *     Recent Labs     01/18/23  1721   *   K 3.5   CL 96*   CO2 27   BUN 24*   CREATININE 1.0   CALCIUM 9.3     Recent Labs     01/18/23  1721   AST 10*   ALT 10   BILITOT 0.7 ALKPHOS 126     No results for input(s): INR in the last 72 hours. Recent Labs     01/18/23  1721   TROPONINI 0.06*       Urinalysis:      Lab Results   Component Value Date/Time    NITRU Negative 09/01/2022 03:30 PM    WBCUA 74 09/01/2022 03:30 PM    BACTERIA None Seen 09/01/2022 03:30 PM    RBCUA 0 09/01/2022 03:30 PM    BLOODU TRACE 09/01/2022 03:30 PM    SPECGRAV 1.014 09/01/2022 03:30 PM    GLUCOSEU Negative 09/01/2022 03:30 PM       Radiology:     CXR: I have reviewed the CXR with the following interpretation:   No acute process, mild cardiomegaly  EKG:  I have reviewed the EKG with the following interpretation:   Normal sinus rhythm with primary AV block QTc 423  CT CHEST PULMONARY EMBOLISM W CONTRAST   Final Result   Subsegmental acute pulmonary emboli scattered in the pulmonary arteries to   the right lower lobe posteriorly. Moderate right basilar opacity posteriorly which could be due to a pulmonary   infarct vs pneumonia and atelectasis. Small bibasilar pleural effusions with bibasilar atelectasis posteriorly. Cholelithiasis. Mildly dilated and atherosclerotic thoracic aorta with no aneurysm or   dissection. The findings were called to Dr. Pierre Logan in the emergency room at 7:20 p.m. XR CHEST PORTABLE   Final Result   No acute cardiopulmonary disease. Mild cardiomegaly without overt failure.          CT HEAD WO CONTRAST    (Results Pending)       ASSESSMENT AND PLAN:    Active Hospital Problems    Diagnosis Date Noted    NSTEMI (non-ST elevated myocardial infarction) ([facility] [address] [I21.4] 01/18/2023     Priority: Medium     Acute subsegmental pulmonary embolism scattered right lower lobe,  Pulmonary embolism severity index at 88 point spring the patient class III with intermediate risk, 3.2-7.1% 30-day mortality  Etiology suspected to be provoked secondary to COVID-19 that was diagnosed on 01/6/2022  IV heparin initiated  Echo to check for heart strain although not evident on CT    NSTEMI:  Suspected type II in the setting of acute pulmonary embolism,  Heparin drip initiated, aspirin given  Cardiology consulted in the ED, much appreciated    Leukocytosis,  Noted on CT showing right basilar opacity unclear if this is a infarct pneumonia or atelectasis  Suspect this is likely infarct  Negative Pro-Vikash  Incentive spirometry ordered    Normocytic anemia:  No signs symptoms of bleeding  Hemoglobin above prior  Continue trend    Essential Hypertension: Reviewed patient's medications and plan is to continue home medication  Hyperlipidemia: Controlled on home Statin. Outpatient PCP follow up post-discharge  CAD: Symptomatic with chest pain with elevated troponin concern for NSTEMI, continued home meds  Anxiety: Controlled, continue home medications  HFpEF: Not in acute exacerbation, continue home medication. Discussion with the primary ER physician in regards to symptoms, history, physical exam, diagnosis and treatment, collaborative decision was to admit the patient.     Diet: NPO except meds ordered past midnight    DVT Prophylaxis: Heparin    Dispo:   Expected LOS of two days         Marvin Zuniga DO

## 2023-01-19 NOTE — CONSULTS
Interventional Cardiology Consultation     Pablo Bhat  1955    PCP: No primary care provider on file. Referring Physician: Dr. Rehana Nichols   Reason for Referral: Pulmonary embolism  Chief Complaint:   Chief Complaint   Patient presents with    Chest Pain     Pt c/o of chest pain, SOB, and lower back pain. Pt has a cough. States this all started yesterday. EMS gave duoneb en route and said pt had audible crackles. Subjective:     History of Present Illness: The patient is 75 y.o. male with a past medical history significant for peripheral arterial disease with right prior right lower extremity amputation, CVA, coronary artery disease with prior acute MI, who presents with above complaint. According to his significant other he is complained of substernal chest discomfort that has acutely worsened over the last 24 hours associated with worsening shortness of breath. He is nonambulatory he lives in a nursing home. Work-up in the emergency was Hospital ER included a CT pulm angiogram which demonstrated a right lower lobe pulm embolism. His cardiac markers are elevated. Of note overnight he developed acute pulmonary edema requiring BiPAP and IV Lasix. His EKG shows nonspecific ST-T wave normality. Stat bedside echo showed inferior septal regional wall motion abnormalities.   With normal LV and RV systolic function      Past Medical History:   Diagnosis Date    Acute MI ([address] 09/2013    Anxiety     Arthritis     CAD (coronary artery disease)     COVID-19     Hyperlipidemia 2/3/2022    Hypertension     Influenza A 01/15/2017    Severe malnutrition ([facility] [address] 2/10/2022     Past Surgical History:   Procedure Laterality Date    BACK SURGERY N/A 1989    herniated disc    CORONARY ANGIOPLASTY WITH STENT PLACEMENT  9/13    EYE SURGERY      VASCULAR SURGERY       Family History   Problem Relation Age of Onset    Hypertension Mother     Hypertension Father     Heart Failure Father     Stroke Maternal Grandfather     Cancer Paternal Uncle         throat     Social History     Tobacco Use    Smoking status: Former     Packs/day: 0.50     Years: 40.00     Pack years: 20.00     Types: Cigarettes     Quit date: 2019     Years since quittin.0    Smokeless tobacco: Never   Vaping Use    Vaping Use: Never used   Substance Use Topics    Alcohol use:  Yes     Alcohol/week: 2.0 standard drinks     Types: 2 Cans of beer per week     Comment: states used to drink 1 pint/vodka a day / now socially drinks       No Known Allergies    Current Facility-Administered Medications   Medication Dose Route Frequency Provider Last Rate Last Admin    [Held by provider] furosemide (LASIX) injection 40 mg  40 mg IntraVENous BID Ale King DO        cefepime (MAXIPIME) 2000 mg IVPB minibag  2,000 mg IntraVENous Q8H Ale King DO 12.5 mL/hr at 23 1537 2,000 mg at 23 1537    vancomycin (VANCOCIN) intermittent dosing (placeholder)   Other RX Placeholder Ale King DO        [START ON 2023] vancomycin (VANCOCIN) 1,500 mg in dextrose 5 % 250 mL IVPB (ADDAVIAL)  1,500 mg IntraVENous Q18H Ale King DO        ipratropium-albuterol (DUONEB) nebulizer solution 1 ampule  1 ampule Inhalation BID Ruben Lawrence MD   1 ampule at 23 1217    sodium chloride flush 0.9 % injection 5-40 mL  5-40 mL IntraVENous 2 times per day Edmund Osman MD        sodium chloride flush 0.9 % injection 5-40 mL  5-40 mL IntraVENous PRN Edmund Osman MD        acetaminophen (TYLENOL) tablet 650 mg  650 mg Oral Q4H PRN Edmund Osman MD        0.9 % sodium chloride infusion   IntraVENous PRN Ale King DO        potassium chloride 10 mEq/100 mL IVPB (Peripheral Line)  10 mEq IntraVENous PRN Lana King DO        magnesium sulfate 2000 mg in 50 mL IVPB premix  2,000 mg IntraVENous PRN Ale King DO        promethazine (PHENERGAN) tablet 12.5 mg  12.5 mg Oral Q6H PRN Charmayne Foster Hijazi, DO        Or ondansetron (ZOFRAN) injection 4 mg  4 mg IntraVENous Q6H PRN Ahmad A Fernando, DO        acetaminophen (TYLENOL) suppository 650 mg  650 mg Rectal Q6H PRN [facility], [facility]        heparin 25,000 units in dextrose 5% 250 mL (premix) infusion  0-4,000 Units/hr IntraVENous Continuous Christen Dawn MD 14.6 mL/hr at 01/19/23 0441 1,460 Units/hr at 01/19/23 0441    atenolol (TENORMIN) tablet 25 mg  25 mg Oral Daily Ahmad A Fernando, DO        atorvastatin (LIPITOR) tablet 80 mg  80 mg Oral Nightly [facility]   80 mg at 01/18/23 2121    busPIRone (BUSPAR) tablet 5 mg  5 mg Oral BID [facility], [facility]   5 mg at 01/19/23 0918    clopidogrel (PLAVIX) tablet 75 mg  75 mg Oral Daily [facility] Fernando, DO   75 mg at 01/19/23 0918    gabapentin (NEURONTIN) capsule 300 mg  300 mg Oral TID [facility] A Fernando, DO   300 mg at 01/19/23 1533    mirtazapine (REMERON) tablet 15 mg  15 mg Oral Nightly [facility] Fernando, DO   15 mg at 01/18/23 2121    pantoprazole (PROTONIX) tablet 40 mg  40 mg Oral Daily [facility], [facility]   40 mg at 01/19/23 0918    sertraline (ZOLOFT) tablet 100 mg  100 mg Oral Daily [facility]  Fernando, DO   100 mg at 01/19/23 1355    aspirin chewable tablet 81 mg  81 mg Oral Daily [facility], DO   81 mg at 01/19/23 2366       Review of Systems:  Constitutional: No unanticipated weight loss. There's been no change in energy level, sleep pattern, or activity level. No fevers, chills. Eyes: No visual changes or diplopia. No scleral icterus. ENT: No Headaches, hearing loss or vertigo. No mouth sores or sore throat. Cardiovascular: as reviewed in HPI  Respiratory: No cough or wheezing, no sputum production. No hemoptysis. Gastrointestinal: No abdominal pain, appetite loss, blood in stools. No change in bowel or bladder habits. Genitourinary: No dysuria, trouble voiding, or hematuria. Musculoskeletal:  No gait disturbance, no joint complaints. Integumentary: No rash or pruritis.   Neurological: No headache, diplopia, change in muscle strength, numbness or tingling. Psychiatric: No anxiety or depression. Endocrine: No temperature intolerance. No excessive thirst, fluid intake, or urination. No tremor. Hematologic/Lymphatic: No abnormal bruising or bleeding, blood clots or swollen lymph nodes. Allergic/Immunologic: No nasal congestion or hives. Physical Exam:   /74   Pulse 81   Temp 99.2 °F (37.3 °C) (Axillary)   Resp 19   Ht 6' 3\" (1.905 m)   Wt 209 lb 7 oz (95 kg)   SpO2 94%   BMI 26.18 kg/m²   Wt Readings from Last 3 Encounters:   01/19/23 209 lb 7 oz (95 kg)   09/12/22 204 lb 2.3 oz (92.6 kg)   02/10/22 188 lb 4.4 oz (85.4 kg)     Constitutional: He appears chronically ill   Head: Normocephalic and atraumatic. Pupils equal and round. Neck: Neck supple. No JVP or carotid bruit appreciated. No mass and no thyromegaly present. No lymphadenopathy present. Cardiovascular: Normal rate. Normal heart sounds. Exam reveals no gallop and no friction rub. No murmur heard. Pulmonary/Chest: Effort normal and breath sounds normal. No respiratory distress. He has no wheezes, rhonchi or rales. Abdominal: Soft, non-tender. Bowel sounds are normal. He exhibits no organomegaly, mass or bruit. Extremities: No edema. No cyanosis or clubbing. Pulses are 2+ radial/carotid bilaterally. Right BKA   Neurological: No gross cranial nerve deficit. Coordination normal.   Skin: Skin is warm and dry. There is no rash or diaphoresis. Psychiatric: He has a normal mood and affect.  His speech is normal and behavior is normal.     Lab Review:   FLP:    Lab Results   Component Value Date/Time    TRIG 97 09/01/2022 05:23 AM    HDL 38 09/01/2022 05:23 AM    HDL 60 02/22/2011 01:05 AM    LDLCALC 53 09/01/2022 05:23 AM    LABVLDL 19 09/01/2022 05:23 AM     BUN/Creatinine:    Lab Results   Component Value Date/Time    BUN 24 01/18/2023 05:21 PM    CREATININE 1.0 01/18/2023 05:21 PM     PT/INR, TNI, HGB A1C:   Lab Results   Component Value Date/Time    TROPONINI 0.05 (H) 01/19/2023 02:24 AM    LABA1C 5.2 09/01/2022 05:23 AM      No results found for: CBCAUTODIF    EKG Interpretation: Sinus tach nonspecific ST-T wave normality    Echo:     Summary   Suboptimal image quality; Definity contrast administered. Left ventricular cavity size is normal.   There is moderately increased left ventricular wall thickness suggestive of   LVH. Overall left ventricular systolic function appears normal.   Ejection fraction is visually estimated to be 55-60%. Grade II diastolic dysfunction with elevated LV filling pressures. The right ventricle is borderline dilated. Right ventricular systolic function is normal .   Mild to moderate tricuspid regurgitation. Estimated pulmonary artery systolic pressure is mildly to moderately   elevated at 46 mmHg assuming a right atrial pressure of 15 mmHg. Prior negative bubble study in 9/2022. Stress Test:     Cath:     CT:    Subsegmental acute pulmonary emboli scattered in the pulmonary arteries to   the right lower lobe posteriorly. Moderate right basilar opacity posteriorly which could be due to a pulmonary   infarct vs pneumonia and atelectasis. Small bibasilar pleural effusions with bibasilar atelectasis posteriorly. Cholelithiasis. Mildly dilated and atherosclerotic thoracic aorta with no aneurysm or   dissection. Doppler:      Summary        Limited evaluation of both extremities. Evidence of calcific plaque    throughout both extremities. OMKAR not obtained. Popliteal arteries unable to    be visualized due to positioning. Multiphasic waveforms throughout SFA    bilaterally with evidence of distal monophasic waveforms therefore cannot    exclude a popliteal lesion bilaterally. The left anterior tibial artery    appears to be occluded.            All above diagnostic testing and laboratory data was independently visualized and reviewed by me (not simply review of report) Assessment and Plan   1) subsegmental PE without cor pulmonale  -His overall clot burden is quite small I do not believe that is contributing to his overall clinical syndrome and does not result in acute pulmonary edema  -He is high risk for having obstructive coronary disease and I feel like that that is actually the contributing factor  -I would continue with his heparin infusion and can be transition to [address] therapy do not recommend intervention for his PE  -Bilateral venous Doppler    2) elevated troponin  -Clinically I feel this is more consistent with acute coronary syndrome  -Recommend coronary angiogram  -We will perform a left and right heart cath to determine overall filling status and right-sided filling pressures    3) acute pulmonary edema  -Currently resolved we will check his left-sided filling pressures with a right heart cath but his echo does demonstrate normal systolic function however high-grade diastolic  -If his coronary angiogram is normal we will do a bilateral renal angiogram      I spent > 70 min providing critical care services to this patient, exclusive of time spent in performance of separately billable procedures. The time involved in performing separately billable procedures was not counted toward critical care time. Overall, the problems requiring hospitalization are high in severity     Thank you very much for allowing me to participate in the care of your patient. Please do not hesitate to contact me if you have any questions.       Mayur Multani MD [address] [facility], Interventional Cardiology, and Peripheral Vascular Disease   [facility]   Ph: [phone]  Fax: [phone]

## 2023-01-19 NOTE — PROGRESS NOTES
Medication Reconciliation    List of medications for Jessica Kerr is currently taking is complete.      Source of Information:   Epic records  Medication list from nursing home/facility (Meera Pelletier, [phone]) at bedside    Angelic Valverde, Pharmacy Intern  1/18/2023 8:14 PM

## 2023-01-19 NOTE — CONSULTS
Pulmonary Critical Care Consult Note     Patient's name:  Kiarra Whitehead  Medical Record Number: [id number]  Patient's account/billing number: [de-identified]  Patient's YOB: 1955  Age: 75 y.o. Date of Admission: 1/18/2023  5:03 PM  Date of Consult: 1/19/2023      Primary Care Physician: No primary care provider on file. Code Status: Full Code    Reason for consult: Acute respiratory failure with hypoxia and hypercapnia    Assessment and Plan     Acute respiratory with hypoxia and hypercapnia  Right lower lobe HCAP  Acute PE, unprovoked         Plan: Alternate BiPAP and high flow oxygen to keep sats more than 90%. Antibiotics cefepime and vancomycin. Pancultures. Heparin infusion. Echo pending  Aspiration precautions. HISTORY OF PRESENT ILLNESS:   /Ms. Kiarra Whitehead is a 75 y.o.   gentleman with PMH stated below significant for h/o CVA with right hemiparesis, history of coronary artery disease, hypertension and hyperlipidemia NH resident presented with worsening shortness of breath cough for the last few days, transferred to the ICU overnight for hypoxic respiratory failure in severe respiratory distress  proBNP 697. Procalcitonin 0.17. Leukocytosis. CTA chest with subsegmental acute PE. Right lower lobe airspace disease. Past Medical History:        Diagnosis Date    Acute MI ([facility] [address] 09/2013    Anxiety     Arthritis     CAD (coronary artery disease)     COVID-19     Hyperlipidemia 2/3/2022    Hypertension     Influenza A 01/15/2017    Severe malnutrition ([facility] [address] 2/10/2022       Past Surgical History:        Procedure Laterality Date    BACK SURGERY N/A 1989    herniated disc    CORONARY ANGIOPLASTY WITH STENT PLACEMENT  9/13    EYE SURGERY      VASCULAR SURGERY         Allergies:    No Known Allergies      Home Meds:   Prior to Admission medications    Medication Sig Start Date End Date Taking?  Authorizing Provider hydroCHLOROthiazide (HYDRODIURIL) 25 MG tablet Take 25 mg by mouth daily   Yes Historical Provider, MD   lisinopril (PRINIVIL;ZESTRIL) 20 MG tablet Take 20 mg by mouth daily   Yes Historical Provider, MD   miconazole (MICOTIN) 2 % powder Apply topically 2 times daily as needed for Itching   Yes Historical Provider, MD   guaiFENesin (MUCINEX) 600 MG extended release tablet Take 600 mg by mouth 2 times daily as needed for Congestion   Yes Historical Provider, MD   albuterol sulfate HFA (VENTOLIN HFA) 108 (90 Base) MCG/ACT inhaler Inhale 2 puffs into the lungs every 6 hours as needed for Wheezing    Historical Provider, MD   miconazole (MICOTIN) 2 % powder Apply topically 2 times daily as needed for Itching Apply to buttocks    Historical Provider, MD   aspirin 81 MG chewable tablet Take 81 mg by mouth daily    Historical Provider, MD   loratadine (CLARITIN) 10 MG tablet Take 10 mg by mouth daily    Historical Provider, MD   nystatin (MYCOSTATIN) 939563 UNIT/GM cream Apply topically 2 times daily as needed for Dry Skin Apply topically to buttocks    Historical Provider, MD   Carboxymethylcellul-Glycerin (REFRESH OPTIVE) 0.5-0.9 % SOLN Apply 1 drop to eye 4 times daily as needed    Historical Provider, MD   acetaminophen (TYLENOL) 325 MG tablet Take 650 mg by mouth every 6 hours as needed for Pain    Historical Provider, MD   Dimethicone-Zinc Oxide-Vit A-D ([employer] ZINC OXIDE DIAPER) 1-10 % CREA Apply topically 2 times daily as needed    Historical Provider, MD   sertraline (ZOLOFT) 100 MG tablet Take 100 mg by mouth daily    Historical Provider, MD   bisacodyl (DULCOLAX) 10 MG suppository Place 10 mg rectally daily as needed for Constipation    Historical Provider, MD   Sodium Phosphates (FLEET) 7-19 GM/118ML Place 1 enema rectally once as needed    Historical Provider, MD   gabapentin (NEURONTIN) 300 MG capsule Take 300 mg by mouth 3 times daily.     Historical Provider, MD   polyethylene glycol (GLYCOLAX) 17 g packet Take 17 g by mouth daily as needed for Constipation    Historical Provider, MD   magnesium hydroxide (MILK OF MAGNESIA) 400 MG/5ML suspension Take 30 mLs by mouth daily as needed for Constipation    Historical Provider, MD   busPIRone (BUSPAR) 5 MG tablet Take 5 mg by mouth 2 times daily    Historical Provider, MD   senna (SENOKOT) 8.6 MG tablet Take 1 tablet by mouth daily    Historical Provider, MD   clopidogrel (PLAVIX) 75 MG tablet Take 1 tablet by mouth daily 1/11/22   Erna Ambrocio MD   ferrous sulfate (IRON 325) 325 (65 Fe) MG tablet Take 325 mg by mouth daily (with breakfast)    Historical Provider, MD   pantoprazole (PROTONIX) 40 MG tablet Take 40 mg by mouth daily    Historical Provider, MD   mirabegron (MYRBETRIQ) 50 MG TB24 Take 50 mg by mouth daily    Historical Provider, MD   baclofen (LIORESAL) 20 MG tablet Take 20 mg by mouth 2 times daily    Historical Provider, MD   atenolol (TENORMIN) 25 MG tablet Take 1 tablet by mouth daily 5/12/21   Carrie Mitchell MD   atorvastatin (LIPITOR) 80 MG tablet Take 1 tablet by mouth nightly 4/23/21   Rebekah Wesley MD   Multiple Vitamins-Minerals (THERAPEUTIC MULTIVITAMIN-MINERALS) tablet Take 1 tablet by mouth daily    Historical Provider, MD   Ascorbic Acid (VITAMIN C) 500 MG tablet Take 500 mg by mouth daily    Historical Provider, MD   nitroGLYCERIN (NITROSTAT) 0.4 MG SL tablet Place 1 tablet under the tongue every 5 minutes as needed for Chest pain. 9/11/13   MARSHALL Fraser - CNP       Family History:       Problem Relation Age of Onset    Hypertension Mother     Hypertension Father     Heart Failure Father     Stroke Maternal Grandfather     Cancer Paternal Uncle         throat         Social History:   TOBACCO:   reports that he quit smoking about 4 years ago. His smoking use included cigarettes. He has a 20.00 pack-year smoking history.  He has never used smokeless tobacco.  ETOH:   reports current alcohol use of about 2.0 standard drinks per week.  DRUGS:  has no history on file for drug use. REVIEW OF SYSTEMS:  Review of Systems -   Unable to obtain on BiPAP and respiratory distress        Physical Exam:    Vitals: BP 98/61   Pulse 67   Temp (!) 100.8 °F (38.2 °C) (Axillary)   Resp 17   Ht 6' 3\" (1.905 m)   Wt 209 lb 7 oz (95 kg)   SpO2 100%   BMI 26.18 kg/m²     Last Body weight:   Wt Readings from Last 3 Encounters:   01/19/23 209 lb 7 oz (95 kg)   09/12/22 204 lb 2.3 oz (92.6 kg)   02/10/22 188 lb 4.4 oz (85.4 kg)       Body Mass Index : Body mass index is 26.18 kg/m². Intake and Output summary:   Intake/Output Summary (Last 24 hours) at 1/19/2023 0904  Last data filed at 1/19/2023 0600  Gross per 24 hour   Intake --   Output 1200 ml   Net -1200 ml       Physical Examination:     PHYSICAL EXAM:    Gen: Alert respiratory distress currently on BiPAP  Eyes: PERRL. Anicteric sclera. No conjunctival injection. ENT: No discharge. Posterior oropharynx clear. External appearance of ears and nose normal.  Neck: Trachea midline. No mass, no lymphadenopathy    Resp: Diminished bilaterally with scattered rhonchi no wheezing  CV: Regular rate. Regular rhythm. No murmur or rub. No edema. GI: Soft, Non-tender. Non-distended. +BS  Skin: Warm, dry, w/o erythema. Lymph: No cervical or supraclavicular LAD. M/S: No cyanosis. No clubbing. Right above-knee amputation  Neuro:  CN 2-12 tested, no focal neurologic deficit. Moves all extremities  Psych: Awake lethargic right-sided weakness        Laboratory findings:-    CBC:   Recent Labs     01/19/23  0224   WBC 15.7*   HGB 10.9*        BMP:    Recent Labs     01/18/23  1721   *   K 3.5   CL 96*   CO2 27   BUN 24*   CREATININE 1.0   GLUCOSE 125*     S. Calcium:  Recent Labs     01/18/23  1721   CALCIUM 9.3       S. Magnesium:  Recent Labs     01/18/23  1721   MG 1.50*     S. Phosphorus:  Recent Labs     01/18/23  2052   PHOS 3.5     S.  Glucose:  Recent Labs     01/18/23  2245 01/19/23  0128   POCGLU 144* 125*       Hepatic functions:   Recent Labs     01/18/23  1721   ALKPHOS 126   ALT 10   AST 10*   PROT 7.8   BILITOT 0.7   LABALBU 3.9       Cardiac enzymes:  Recent Labs     01/18/23  1721 01/19/23  0224   TROPONINI 0.06* 0.05*       Radiology Review:  Pertinent images / reports were reviewed as a part of this visit. CTPA: Results for orders placed during the hospital encounter of 01/18/23    CT CHEST PULMONARY EMBOLISM W CONTRAST    Narrative  EXAMINATION:  CTA OF THE CHEST 1/18/2023 6:43 pm    TECHNIQUE:  CTA of the chest was performed after the administration of intravenous  contrast.  Multiplanar reformatted images are provided for review. MIP  images are provided for review. Automated exposure control, iterative  reconstruction, and/or weight based adjustment of the mA/kV was utilized to  reduce the radiation dose to as low as reasonably achievable. COMPARISON:  None. HISTORY:  ORDERING SYSTEM PROVIDED HISTORY: right sided posterior chest pain, elevated  troponin  TECHNOLOGIST PROVIDED HISTORY:  Reason for exam:->right sided posterior chest pain, elevated troponin  Decision Support Exception - unselect if not a suspected or confirmed  emergency medical condition->Emergency Medical Condition (MA)  Reason for Exam: right sided posterior chest pain, elevated troponin    FINDINGS:  Pulmonary Arteries: There are multiple small subsegmental filling defects in  the pulmonary arteries to the right lower lobe with contrast seen distal to  the filling defects. No other filling defects are seen. Mediastinum: No evidence of mediastinal lymphadenopathy. The heart and  pericardium demonstrate no acute abnormality. There is no significant right  heart enlargement. There is moderate calcified plaque throughout the aorta  which is mildly dilated with no aneurysm or dissection. Lungs/pleura:  There are small bibasilar pleural effusions layering  posteriorly with associated adjacent atelectasis and consolidation  posteriorly which is more prominent on the right. There is right basilar  consolidation posteriorly. No endobronchial lesion is seen. There is no  pulmonary nodule or mass. Upper Abdomen: There are calcifications scattered in the gallbladder. The  adrenals are normal.    Soft Tissues/Bones: No acute bone or soft tissue abnormality. There is  moderate scoliosis    Impression  Subsegmental acute pulmonary emboli scattered in the pulmonary arteries to  the right lower lobe posteriorly. Moderate right basilar opacity posteriorly which could be due to a pulmonary  infarct vs pneumonia and atelectasis. Small bibasilar pleural effusions with bibasilar atelectasis posteriorly. Cholelithiasis. Mildly dilated and atherosclerotic thoracic aorta with no aneurysm or  dissection. The findings were called to Dr. Hellen Gonzalez in the emergency room at 7:20 p.m. CXR PA/LAT: Results for orders placed during the hospital encounter of 01/15/17    XR Chest Standard TWO VW    Narrative  EXAMINATION:  TWO VIEWS OF THE CHEST    1/15/2017 8:01 pm    COMPARISON:  September 24, 2014    HISTORY:  ORDERING SYSTEM PROVIDED HISTORY: fatigue  TECHNOLOGIST PROVIDED HISTORY:  Ordering Physician Provided Reason for Exam: cough  Acuity: Unknown  Type of Exam: Unknown    FINDINGS:  The cardiomediastinal silhouette is stable. There are increased lung  markings at the bilateral parahilar regions, may be related to bronchitis. There is no pneumothorax or pleural effusion. The visualized osseous  structures are within normal limits. Impression  Increased lung markings at the bilateral parahilar regions, may be related to  bronchitis. CXR portable: Results for orders placed during the hospital encounter of 01/18/23    XR CHEST PORTABLE    Narrative  EXAMINATION:  ONE XRAY VIEW OF THE CHEST    1/19/2023 1:36 am    COMPARISON:  Chest radiograph performed 01/18/2023.     HISTORY:  ORDERING SYSTEM PROVIDED HISTORY: rapid  TECHNOLOGIST PROVIDED HISTORY:  Reason for exam:->rapid  Reason for Exam: rapid    FINDINGS:  There is chronic pulmonary change. There is no acute consolidation or  effusion. There is no pneumothorax. The mediastinal structures are stable. The upper abdomen unremarkable. The extrathoracic soft tissues are  unremarkable. Impression  Chronic pulmonary change without acute cardiopulmonary process.       Critical Care time 35 min     Mere Angeles MD, M.D.            1/19/2023, 9:04 AM

## 2023-01-19 NOTE — PROGRESS NOTES
Evaluation requested for pulmonary embolism  Chart reviewed    RLL thrombus   Recommend heparin gtt   NPO MN for possible intervention in the AM   Echo tomorrow     Full consult in AM     Norbert Manning MD [address], Interventional Cardiology, and Peripheral Vascular [address] [facility]   (O): [phone]  (F): [phone]

## 2023-01-19 NOTE — OP NOTE
[address]   Procedure Note    CLINICAL HISTORY:       Raoul Guillen is a 75 y.o. male with a history of peripheral arterial disease with non-ST elevation myocardial infarction    Patient Active Problem List   Diagnosis    Chest pain    MI, acute, non ST segment elevation (HCC)    Tobacco abuse    Essential hypertension    Acute MI ([facility] [address]    Acute CVA (cerebrovascular accident) ([facility] [address]    Right leg weakness    Acute ischemic left LEXA stroke (HCC)    Non-healing open wound of right heel    Right heel infection    Hyperlipidemia    CAD (coronary artery disease)    Cellulitis of left heel    Wound infection    Acute hematogenous osteomyelitis (HCC)    Right hemiparesis (HCC)    Severe malnutrition (HCC)    Facial weakness    H/O: stroke    NSTEMI (non-ST elevated myocardial infarction) ([facility] [address]    Acute pulmonary embolism (HCC)       The risks, benefits, and details of the procedure were explained to the patient. The patient verbalized understanding and wanted to proceed. Informed written consent was obtained. INDICATION:  Non-ST elevation myocardial infarction    PROCEDURES PERFORMED:   Coronary angiogram  Right heart cath  IVUS  PCI    PROCEDURE TECHNIQUE:  The patient was approached from the right radial artery using a 5-6  Tamazight slender sheath. Left coronary angiography was done using a Nisa L3.5 diagnostic catheter. Right coronary angiography was done using a Nisa R4 guide catheter. Venous access was obtained in the right brachial vein using a 4 5 slender sheath    CONTRAST:  Total of 170 cc. COMPLICATIONS:  None. At the end of the procedure a TR device was used for hemostasis. EBL: 5 cc    Moderate Sedation:  Start time: 1046  Stop time: 1130  0.5 mg versed   25 mcg fentanyl   An independent trained observer pushed medications at my direction.  We monitored the patient's level of consciousness and vital signs/physiologic status throughout the procedure duration (see start and start times above). HEMODYNAMICS:  Aortic pressure was 90/60/74    RA 15  RV 43/11  PA 42/19/31  PCWP 20   PA % 61  AO % 89  CO/CI 7.1 L/min 3.2 L/min/m2   dynes . Sec/cm-5   dynes . Sec/cm-5      ANGIOGRAM/CORONARY ARTERIOGRAM:       The left main coronary artery is normal.    The left anterior descending artery mild diffuse disease. D1 is normal    The left circumflex artery has a 99% lesion at the bifurcation of OM 2.    OM1 is normal    The right coronary artery is dominant vessel with moderate disease there is a mid 50% lesion   RPDA is normal      INTERVENTION  Guide catheter: 6 [place] XB 3 5 guide  Runthrough wire used to cross the left circumflex lesion  Lesion was predilated with a 2.5 regular balloon and IVUS catheter was passed to the distal lesion and confirmed a minimal luminal area of 3 millimeters square  A 3.0 x 18 mm drug-eluting stent was deployed to the OM 2  The lesion was then postdilated with a 3.0 NC balloon    SUMMARY:   Single-vessel obstructive CAD status post successful IVUS guided PCI x1 drug-eluting stent  Post capillary  pulmonary hypertension with mild residual left-sided filling pressure    RECOMMENDATION:  .    -Recommend beta blockade, high potency statin, aspirin and brilinta for 12 months  -Referral to cardiac rehab placed  -will transition to oral diuresis on discharge  - Can start DOAC tomorrow morning; recommend triple therapy for 30 days and then will discontinue aspirin on day [address], [place] [address] [facility], Interventional Cardiology, and Peripheral Vascular [address] [facility]   (C): [phone]  (O): [phone]

## 2023-01-19 NOTE — PROGRESS NOTES
Clinical Pharmacy Note  Heparin Dosing       Lab Results   Component Value Date/Time    APTT 143.5 01/19/2023 02:24 AM     Lab Results   Component Value Date/Time    HGB 10.9 01/19/2023 02:24 AM    HCT 33.5 01/19/2023 02:24 AM     01/19/2023 02:24 AM    INR 1.03 08/31/2022 02:25 PM       Current Infusion Rate: 1750 units/hr    Plan:  Hold x 1 hour  Rate: decrease to 1460 units/hr  Next aPTT: 1030 on 1/19    Pharmacy will continue to monitor and adjust based on aPTT results.     Ginger Bustillo, PharmHALEY  1/19/2023 3:48 AM

## 2023-01-19 NOTE — PROGRESS NOTES
Pt was admitted to CVU  after a Rapid was called for hypertensive urgency and tachypnea.  Pt   Bedside echo was obtain by Hospitalist, Stat Labs were sent, 80 mg IV Lasix was given, Bipap was placed

## 2023-01-19 NOTE — SIGNIFICANT EVENT
Patient overnight worsened. Patient was in in respiratory distress, hypoxia, nonrebreather. Patient complains of shortness of breath, patient was placed on BiPAP and transferred to the ICU. Echocardiogram bedside was obtained showing RV was not dilated with no hypokinesis also. Patient was started on BiPAP, acute heart failure exacerbation. 80 Lasix given, Ocrbett catheter inserted with over 700 output. Patient tolerated BiPAP well, continues to be on heparin. Repeat EKG did not show any evolution of NSTEMI. Repeat troponin not significantly changed.  Continue heparin drip, meeting SIRS thys escalated abx, cx pending

## 2023-01-20 ENCOUNTER — APPOINTMENT (OUTPATIENT)
Dept: GENERAL RADIOLOGY | Age: 68
DRG: 981 | End: 2023-01-20
Payer: MEDICARE

## 2023-01-20 ENCOUNTER — TELEPHONE (OUTPATIENT)
Dept: CARDIOLOGY CLINIC | Age: 68
End: 2023-01-20

## 2023-01-20 LAB
A/G RATIO: 1.1 (ref 1.1–2.2)
ALBUMIN SERPL-MCNC: 2.9 G/DL (ref 3.4–5)
ALP BLD-CCNC: 83 U/L (ref 40–129)
ALT SERPL-CCNC: 6 U/L (ref 10–40)
ANION GAP SERPL CALCULATED.3IONS-SCNC: 11 MMOL/L (ref 3–16)
AST SERPL-CCNC: 12 U/L (ref 15–37)
BASE EXCESS VENOUS: -0.2 MMOL/L
BASOPHILS ABSOLUTE: 0 K/UL (ref 0–0.2)
BASOPHILS RELATIVE PERCENT: 0.4 %
BILIRUB SERPL-MCNC: 0.4 MG/DL (ref 0–1)
BUN BLDV-MCNC: 28 MG/DL (ref 7–20)
CALCIUM SERPL-MCNC: 8.4 MG/DL (ref 8.3–10.6)
CARBOXYHEMOGLOBIN: 0.6 %
CHLORIDE BLD-SCNC: 99 MMOL/L (ref 99–110)
CO2: 25 MMOL/L (ref 21–32)
CREAT SERPL-MCNC: 1.3 MG/DL (ref 0.8–1.3)
EKG ATRIAL RATE: 84 BPM
EKG DIAGNOSIS: NORMAL
EKG P AXIS: 91 DEGREES
EKG P-R INTERVAL: 210 MS
EKG Q-T INTERVAL: 358 MS
EKG QRS DURATION: 96 MS
EKG QTC CALCULATION (BAZETT): 423 MS
EKG R AXIS: -21 DEGREES
EKG T AXIS: 92 DEGREES
EKG VENTRICULAR RATE: 84 BPM
EOSINOPHILS ABSOLUTE: 0.1 K/UL (ref 0–0.6)
EOSINOPHILS RELATIVE PERCENT: 0.9 %
GFR SERPL CREATININE-BSD FRML MDRD: 60 ML/MIN/{1.73_M2}
GLUCOSE BLD-MCNC: 134 MG/DL (ref 70–99)
HCO3 VENOUS: 24 MMOL/L (ref 23–29)
HCT VFR BLD CALC: 28.1 % (ref 40.5–52.5)
HEMOGLOBIN: 9.4 G/DL (ref 13.5–17.5)
L. PNEUMOPHILA SEROGP 1 UR AG: NORMAL
LYMPHOCYTES ABSOLUTE: 1.4 K/UL (ref 1–5.1)
LYMPHOCYTES RELATIVE PERCENT: 17.1 %
MAGNESIUM: 1.6 MG/DL (ref 1.8–2.4)
MCH RBC QN AUTO: 28.9 PG (ref 26–34)
MCHC RBC AUTO-ENTMCNC: 33.6 G/DL (ref 31–36)
MCV RBC AUTO: 86.2 FL (ref 80–100)
METHEMOGLOBIN VENOUS: 0.6 %
MONOCYTES ABSOLUTE: 0.8 K/UL (ref 0–1.3)
MONOCYTES RELATIVE PERCENT: 9.1 %
MRSA SCREEN RT-PCR: ABNORMAL
NEUTROPHILS ABSOLUTE: 6.1 K/UL (ref 1.7–7.7)
NEUTROPHILS RELATIVE PERCENT: 72.5 %
O2 SAT, VEN: 97 %
O2 THERAPY: ABNORMAL
ORGANISM: ABNORMAL
PCO2, VEN: 37 MMHG (ref 40–50)
PDW BLD-RTO: 17.2 % (ref 12.4–15.4)
PH VENOUS: 7.42 (ref 7.35–7.45)
PLATELET # BLD: 131 K/UL (ref 135–450)
PMV BLD AUTO: 8 FL (ref 5–10.5)
PO2, VEN: 85 MMHG
POTASSIUM REFLEX MAGNESIUM: 3.3 MMOL/L (ref 3.5–5.1)
POTASSIUM SERPL-SCNC: 3.3 MMOL/L (ref 3.5–5.1)
POTASSIUM SERPL-SCNC: 3.5 MMOL/L (ref 3.5–5.1)
RBC # BLD: 3.26 M/UL (ref 4.2–5.9)
SODIUM BLD-SCNC: 135 MMOL/L (ref 136–145)
STREP PNEUMONIAE ANTIGEN, URINE: NORMAL
TCO2 CALC VENOUS: 25 MMOL/L
TOTAL PROTEIN: 5.6 G/DL (ref 6.4–8.2)
VANCOMYCIN TROUGH: 17.6 UG/ML (ref 10–20)
WBC # BLD: 8.4 K/UL (ref 4–11)

## 2023-01-20 PROCEDURE — 80202 ASSAY OF VANCOMYCIN: CPT

## 2023-01-20 PROCEDURE — 6370000000 HC RX 637 (ALT 250 FOR IP): Performed by: INTERNAL MEDICINE

## 2023-01-20 PROCEDURE — 80053 COMPREHEN METABOLIC PANEL: CPT

## 2023-01-20 PROCEDURE — 93971 EXTREMITY STUDY: CPT

## 2023-01-20 PROCEDURE — 71045 X-RAY EXAM CHEST 1 VIEW: CPT

## 2023-01-20 PROCEDURE — 2140000000 HC CCU INTERMEDIATE R&B

## 2023-01-20 PROCEDURE — 82803 BLOOD GASES ANY COMBINATION: CPT

## 2023-01-20 PROCEDURE — 94667 MNPJ CHEST WALL 1ST: CPT

## 2023-01-20 PROCEDURE — 99291 CRITICAL CARE FIRST HOUR: CPT | Performed by: INTERNAL MEDICINE

## 2023-01-20 PROCEDURE — 36415 COLL VENOUS BLD VENIPUNCTURE: CPT

## 2023-01-20 PROCEDURE — 2580000003 HC RX 258: Performed by: INTERNAL MEDICINE

## 2023-01-20 PROCEDURE — 6360000002 HC RX W HCPCS: Performed by: INTERNAL MEDICINE

## 2023-01-20 PROCEDURE — 84132 ASSAY OF SERUM POTASSIUM: CPT

## 2023-01-20 PROCEDURE — 2100000000 HC CCU R&B

## 2023-01-20 PROCEDURE — 94761 N-INVAS EAR/PLS OXIMETRY MLT: CPT

## 2023-01-20 PROCEDURE — 2700000000 HC OXYGEN THERAPY PER DAY

## 2023-01-20 PROCEDURE — 99233 SBSQ HOSP IP/OBS HIGH 50: CPT | Performed by: INTERNAL MEDICINE

## 2023-01-20 PROCEDURE — 83735 ASSAY OF MAGNESIUM: CPT

## 2023-01-20 PROCEDURE — 94640 AIRWAY INHALATION TREATMENT: CPT

## 2023-01-20 PROCEDURE — 85025 COMPLETE CBC W/AUTO DIFF WBC: CPT

## 2023-01-20 PROCEDURE — 93010 ELECTROCARDIOGRAM REPORT: CPT | Performed by: INTERNAL MEDICINE

## 2023-01-20 RX ORDER — MAGNESIUM SULFATE IN WATER 40 MG/ML
2000 INJECTION, SOLUTION INTRAVENOUS PRN
Status: DISCONTINUED | OUTPATIENT
Start: 2023-01-20 | End: 2023-01-20

## 2023-01-20 RX ORDER — METHYLPREDNISOLONE SODIUM SUCCINATE 40 MG/ML
40 INJECTION, POWDER, LYOPHILIZED, FOR SOLUTION INTRAMUSCULAR; INTRAVENOUS DAILY
Status: COMPLETED | OUTPATIENT
Start: 2023-01-20 | End: 2023-01-24

## 2023-01-20 RX ORDER — ALBUTEROL SULFATE 2.5 MG/3ML
2.5 SOLUTION RESPIRATORY (INHALATION) 2 TIMES DAILY
Status: DISCONTINUED | OUTPATIENT
Start: 2023-01-20 | End: 2023-01-24 | Stop reason: HOSPADM

## 2023-01-20 RX ADMIN — ACETAMINOPHEN 650 MG: 325 TABLET ORAL at 00:07

## 2023-01-20 RX ADMIN — IPRATROPIUM BROMIDE AND ALBUTEROL SULFATE 1 AMPULE: .5; 2.5 SOLUTION RESPIRATORY (INHALATION) at 07:35

## 2023-01-20 RX ADMIN — SERTRALINE 100 MG: 100 TABLET, FILM COATED ORAL at 09:15

## 2023-01-20 RX ADMIN — TICAGRELOR 90 MG: 90 TABLET ORAL at 21:13

## 2023-01-20 RX ADMIN — GABAPENTIN 300 MG: 300 CAPSULE ORAL at 14:26

## 2023-01-20 RX ADMIN — Medication 10 MEQ: at 12:39

## 2023-01-20 RX ADMIN — METHYLPREDNISOLONE SODIUM SUCCINATE 40 MG: 40 INJECTION, POWDER, FOR SOLUTION INTRAMUSCULAR; INTRAVENOUS at 11:41

## 2023-01-20 RX ADMIN — APIXABAN 10 MG: 5 TABLET, FILM COATED ORAL at 09:15

## 2023-01-20 RX ADMIN — CEFEPIME 2000 MG: 2 INJECTION, POWDER, FOR SOLUTION INTRAVENOUS at 17:13

## 2023-01-20 RX ADMIN — Medication 10 ML: at 09:15

## 2023-01-20 RX ADMIN — BUSPIRONE HYDROCHLORIDE 5 MG: 5 TABLET ORAL at 21:13

## 2023-01-20 RX ADMIN — GABAPENTIN 300 MG: 300 CAPSULE ORAL at 09:15

## 2023-01-20 RX ADMIN — Medication 10 MEQ: at 09:35

## 2023-01-20 RX ADMIN — ATORVASTATIN CALCIUM 80 MG: 80 TABLET, FILM COATED ORAL at 21:13

## 2023-01-20 RX ADMIN — PANTOPRAZOLE SODIUM 40 MG: 40 TABLET, DELAYED RELEASE ORAL at 09:15

## 2023-01-20 RX ADMIN — CEFEPIME 2000 MG: 2 INJECTION, POWDER, FOR SOLUTION INTRAVENOUS at 10:42

## 2023-01-20 RX ADMIN — CEFEPIME 2000 MG: 2 INJECTION, POWDER, FOR SOLUTION INTRAVENOUS at 00:06

## 2023-01-20 RX ADMIN — Medication 10 MEQ: at 14:25

## 2023-01-20 RX ADMIN — ASPIRIN 81 MG CHEWABLE TABLET 81 MG: 81 TABLET CHEWABLE at 09:15

## 2023-01-20 RX ADMIN — MAGNESIUM SULFATE HEPTAHYDRATE 2000 MG: 40 INJECTION, SOLUTION INTRAVENOUS at 08:11

## 2023-01-20 RX ADMIN — VANCOMYCIN HYDROCHLORIDE 1500 MG: 1.5 INJECTION, POWDER, LYOPHILIZED, FOR SOLUTION INTRAVENOUS at 04:15

## 2023-01-20 RX ADMIN — MIRTAZAPINE 15 MG: 15 TABLET, FILM COATED ORAL at 21:13

## 2023-01-20 RX ADMIN — APIXABAN 10 MG: 5 TABLET, FILM COATED ORAL at 21:13

## 2023-01-20 RX ADMIN — Medication 10 MEQ: at 11:04

## 2023-01-20 RX ADMIN — MUPIROCIN: 20 OINTMENT TOPICAL at 08:16

## 2023-01-20 RX ADMIN — ALBUTEROL SULFATE 2.5 MG: 2.5 SOLUTION RESPIRATORY (INHALATION) at 20:54

## 2023-01-20 RX ADMIN — BUSPIRONE HYDROCHLORIDE 5 MG: 5 TABLET ORAL at 09:15

## 2023-01-20 RX ADMIN — TICAGRELOR 90 MG: 90 TABLET ORAL at 09:15

## 2023-01-20 RX ADMIN — GABAPENTIN 300 MG: 300 CAPSULE ORAL at 21:13

## 2023-01-20 RX ADMIN — MUPIROCIN: 20 OINTMENT TOPICAL at 21:13

## 2023-01-20 ASSESSMENT — PAIN SCALES - GENERAL
PAINLEVEL_OUTOF10: 0
PAINLEVEL_OUTOF10: 0

## 2023-01-20 NOTE — PLAN OF CARE
Problem: Discharge Planning  Goal: Discharge to home or other facility with appropriate resources  1/20/2023 1725 by Parth Willis RN  Outcome: Progressing  Flowsheets (Taken 1/20/2023 1725)  Discharge to home or other facility with appropriate resources:   Identify barriers to discharge with patient and caregiver   Arrange for needed discharge resources and transportation as appropriate   Identify discharge learning needs (meds, wound care, etc)  1/20/2023 0431 by Addie Baldwin RN  Outcome: Progressing     Problem: Pain  Goal: Verbalizes/displays adequate comfort level or baseline comfort level  1/20/2023 1725 by Parth Willis RN  Outcome: Progressing  Flowsheets (Taken 1/20/2023 1725)  Verbalizes/displays adequate comfort level or baseline comfort level:   Encourage patient to monitor pain and request assistance   Assess pain using appropriate pain scale   Administer analgesics based on type and severity of pain and evaluate response   Implement non-pharmacological measures as appropriate and evaluate response  1/20/2023 0431 by Addie Baldwin RN  Outcome: Progressing     Problem: ABCDS Injury Assessment  Goal: Absence of physical injury  1/20/2023 1725 by Parth Willis RN  Outcome: Progressing  Flowsheets (Taken 1/20/2023 1725)  Absence of Physical Injury: Implement safety measures based on patient assessment  1/20/2023 0431 by Addie Baldwin RN  Outcome: Progressing     Problem: Safety - Adult  Goal: Free from fall injury  1/20/2023 1725 by Parth Willis RN  Outcome: Progressing  Flowsheets (Taken 1/20/2023 1725)  Free From Fall Injury: Instruct family/caregiver on patient safety  1/20/2023 0431 by Addie Baldwin RN  Outcome: Progressing     Problem: Skin/Tissue Integrity  Goal: Absence of new skin breakdown  Description: 1. Monitor for areas of redness and/or skin breakdown  2. Assess vascular access sites hourly  3.   Every 4-6 hours minimum:  Change oxygen saturation probe site  4. Every 4-6 hours:  If on nasal continuous positive airway pressure, respiratory therapy assess nares and determine need for appliance change or resting period.   1/20/2023 1725 by Dana Sidhu RN  Outcome: Progressing  1/20/2023 0431 by Louanna Crigler, RN  Outcome: Progressing     Problem: Neurosensory - Adult  Goal: Achieves stable or improved neurological status  1/20/2023 0431 by Louanna Crigler, RN  Outcome: Progressing  Goal: Achieves maximal functionality and self care  1/20/2023 0431 by Louanna Crigler, RN  Outcome: Progressing     Problem: Cardiovascular - Adult  Goal: Maintains optimal cardiac output and hemodynamic stability  1/20/2023 1725 by Dana Sidhu RN  Outcome: Progressing  Flowsheets (Taken 1/20/2023 1725)  Maintains optimal cardiac output and hemodynamic stability:   Monitor blood pressure and heart rate   Monitor urine output and notify Licensed Independent Practitioner for values outside of normal range   Assess for signs of decreased cardiac output   Administer fluid and/or volume expanders as ordered  1/20/2023 0431 by Louanna Crigler, RN  Outcome: Progressing  Goal: Absence of cardiac dysrhythmias or at baseline  1/20/2023 1725 by Dana Sidhu RN  Outcome: Progressing  Flowsheets (Taken 1/20/2023 1725)  Absence of cardiac dysrhythmias or at baseline:   Monitor cardiac rate and rhythm   Assess for signs of decreased cardiac output   Administer antiarrhythmia medication and electrolyte replacement as ordered  1/20/2023 0431 by Louanna Crigler, RN  Outcome: Progressing     Problem: Skin/Tissue Integrity - Adult  Goal: Skin integrity remains intact  1/20/2023 1725 by Dana Sidhu RN  Outcome: Progressing  Flowsheets (Taken 1/20/2023 1725)  Skin Integrity Remains Intact:   Monitor for areas of redness and/or skin breakdown   Assess vascular access sites hourly  1/20/2023 0431 by Louanna Crigler, RN  Outcome: Progressing Problem: Infection - Adult  Goal: Absence of infection at discharge  1/20/2023 1725 by Tosin Ang RN  Outcome: Progressing  Flowsheets (Taken 1/20/2023 1725)  Absence of infection at discharge:   Assess and monitor for signs and symptoms of infection   Monitor lab/diagnostic results   Monitor all insertion sites i.e., indwelling lines, tubes and drains   Administer medications as ordered  1/20/2023 0431 by Ed Edge RN  Outcome: Progressing  Goal: Absence of infection during hospitalization  1/20/2023 1725 by Tosin Ang RN  Outcome: Progressing  Flowsheets (Taken 1/20/2023 1725)  Absence of infection during hospitalization:   Assess and monitor for signs and symptoms of infection   Monitor lab/diagnostic results   Monitor all insertion sites i.e., indwelling lines, tubes and drains  1/20/2023 0431 by Ed Edge RN  Outcome: Progressing     Problem: Metabolic/Fluid and Electrolytes - Adult  Goal: Electrolytes maintained within normal limits  1/20/2023 1725 by Tosin Ang RN  Outcome: Progressing  Flowsheets (Taken 1/20/2023 1725)  Electrolytes maintained within normal limits:   Monitor labs and assess patient for signs and symptoms of electrolyte imbalances   Administer electrolyte replacement as ordered   Monitor response to electrolyte replacements, including repeat lab results as appropriate  1/20/2023 0431 by Ed Edge RN  Outcome: Progressing     Problem: Hematologic - Adult  Goal: Maintains hematologic stability  1/20/2023 0431 by Ed Edge RN  Outcome: Progressing

## 2023-01-20 NOTE — RT PROTOCOL NOTE
RT Inhaler-Nebulizer Bronchodilator Protocol Note    There is a bronchodilator order in the chart from a provider indicating to follow the RT Bronchodilator Protocol and there is an Initiate RT Inhaler-Nebulizer Bronchodilator Protocol order as well (see protocol at bottom of note). CXR Findings:  XR CHEST PORTABLE    Result Date: 1/19/2023  Chronic pulmonary change without acute cardiopulmonary process. XR CHEST PORTABLE    Result Date: 1/18/2023  No acute cardiopulmonary disease. Mild cardiomegaly without overt failure. The findings from the last RT Protocol Assessment were as follows:   History Pulmonary Disease: None or smoker <15 pack years  Respiratory Pattern: Mild dyspnea at rest, irregular pattern, or RR 21-25 bpm  Breath Sounds: Inspiratory and expiratory or bilateral wheezing and/or rhonchi  Cough: Weak, non-productive  Indication for Bronchodilator Therapy: Decreased or absent breath sounds  Bronchodilator Assessment Score: 13    Aerosolized bronchodilator medication orders have been revised according to the RT Inhaler-Nebulizer Bronchodilator Protocol below. Respiratory Therapist to perform RT Therapy Protocol Assessment initially then follow the protocol. Repeat RT Therapy Protocol Assessment PRN for score 0-3 or on second treatment, BID, and PRN for scores above 3. No Indications - adjust the frequency to every 6 hours PRN wheezing or bronchospasm, if no treatments needed after 48 hours then discontinue using Per Protocol order mode. If indication present, adjust the RT bronchodilator orders based on the Bronchodilator Assessment Score as indicated below.   Use Inhaler orders unless patient has one or more of the following: on home nebulizer, not able to hold breath for 10 seconds, is not alert and oriented, cannot activate and use MDI correctly, or respiratory rate 25 breaths per minute or more, then use the equivalent nebulizer order(s) with same Frequency and PRN reasons based on the score. If a patient is on this medication at home then do not decrease Frequency below that used at home. 0-3 - enter or revise RT bronchodilator order(s) to equivalent RT Bronchodilator order with Frequency of every 4 hours PRN for wheezing or increased work of breathing using Per Protocol order mode. 4-6 - enter or revise RT Bronchodilator order(s) to two equivalent RT bronchodilator orders with one order with BID Frequency and one order with Frequency of every 4 hours PRN wheezing or increased work of breathing using Per Protocol order mode. 7-10 - enter or revise RT Bronchodilator order(s) to two equivalent RT bronchodilator orders with one order with TID Frequency and one order with Frequency of every 4 hours PRN wheezing or increased work of breathing using Per Protocol order mode. 11-13 - enter or revise RT Bronchodilator order(s) to one equivalent RT bronchodilator order with QID Frequency and an Albuterol order with Frequency of every 4 hours PRN wheezing or increased work of breathing using Per Protocol order mode. Greater than 13 - enter or revise RT Bronchodilator order(s) to one equivalent RT bronchodilator order with every 4 hours Frequency and an Albuterol order with Frequency of every 2 hours PRN wheezing or increased work of breathing using Per Protocol order mode. RT to enter RT Home Evaluation for COPD & MDI Assessment order using Per Protocol order mode.     Electronically signed by Noam Zuniga RCP on 1/19/2023 at 8:19 PM

## 2023-01-20 NOTE — CARE COORDINATION
ASHER met with patient and girlfriend Antonella Leon at bedside today regarding possible discharge needs. They inform that they are comfortable with him returning back to [address] [place] [address]  at discharge. Patient advised that he would prefer to return home but unsure as to if it is safe. SW will request therapies to see him. Stalin Cardona MD to find out if he would be open to ordering therapies. We will continue to follow this patient. Respectfully submitted,    DUSTIN Penny  [facility]   [phone]    . Electronically signed by DUSTIN Marley on 1/20/2023 at 2:06 PM

## 2023-01-20 NOTE — PROGRESS NOTES
Pulmonary Critical Care Progress Note     Patient's name:  Emily Etienne  Medical Record Number: [id number]  Patient's account/billing number: [de-identified]  Patient's YOB: 1955  Age: 75 y.o. Date of Admission: 1/18/2023  5:03 PM  Date of Consult: 1/20/2023      Primary Care Physician: No primary care provider on file. Code Status: Full Code    Reason for consult: Acute respiratory failure with hypoxia and hypercapnia    Assessment and Plan     Acute respiratory with hypoxia and hypercapnia  Right lower lobe HCAP  Acute PE, unprovoked   CAD s/p stent        Plan: Alternate BiPAP and high flow oxygen to keep sats more than 90%. Antibiotics cefepime and vancomycin. Heparin infusion, switch to DOAC. Systemic steroid and bronchodilators   Aspiration precautions. Vest therapy  Replace lytes       Subjective:  S/p LHC and RHC results reviewed  Fever  Lots of secretions      HISTORY OF PRESENT ILLNESS:   /Ms. Emily Etienne is a 75 y.o.   gentleman with PMH stated below significant for h/o CVA with right hemiparesis, history of coronary artery disease, hypertension and hyperlipidemia NH resident presented with worsening shortness of breath cough for the last few days, transferred to the ICU overnight for hypoxic respiratory failure in severe respiratory distress  proBNP 697. Procalcitonin 0.17. Leukocytosis. CTA chest with subsegmental acute PE. Right lower lobe airspace disease.       REVIEW OF SYSTEMS:  Review of Systems -   C/o cough  Sob  Sputum  Not able to expectorate  No chest pain  Fever overnight        Physical Exam:    Vitals: BP (!) 97/48   Pulse 78   Temp 98.3 °F (36.8 °C) (Oral)   Resp 19   Ht 6' 3\" (1.905 m)   Wt 212 lb 15.4 oz (96.6 kg)   SpO2 92%   BMI 26.62 kg/m²     Last Body weight:   Wt Readings from Last 3 Encounters:   01/20/23 212 lb 15.4 oz (96.6 kg)   09/12/22 204 lb 2.3 oz (92.6 kg)   02/10/22 188 lb 4.4 oz (85.4 kg)       Body Mass Index : Body mass index is 26.62 kg/m². Intake and Output summary:   Intake/Output Summary (Last 24 hours) at 1/20/2023 5663  Last data filed at 1/20/2023 0600  Gross per 24 hour   Intake 480 ml   Output 820 ml   Net -340 ml       Physical Examination:     PHYSICAL EXAM:    Gen: Alert respiratory distress currently on BiPAP  Eyes: PERRL. Anicteric sclera. No conjunctival injection. ENT: No discharge. Posterior oropharynx clear. External appearance of ears and nose normal.  Neck: Trachea midline. No mass, no lymphadenopathy    Resp: Diminished bilaterally with scattered rhonchi no wheezing  CV: Regular rate. Regular rhythm. No murmur or rub. No edema. GI: Soft, Non-tender. Non-distended. +BS  Skin: Warm, dry, w/o erythema. Lymph: No cervical or supraclavicular LAD. M/S: No cyanosis. No clubbing. Right above-knee amputation  Neuro:  CN 2-12 tested, no focal neurologic deficit. Moves all extremities  Psych: Awake lethargic right-sided weakness        Laboratory findings:-    CBC:   Recent Labs     01/20/23  0355   WBC 8.4   HGB 9.4*   *     BMP:    Recent Labs     01/18/23  1721 01/20/23  0355   * 135*   K 3.5 3.3*  3.3*   CL 96* 99   CO2 27 25   BUN 24* 28*   CREATININE 1.0 1.3   GLUCOSE 125* 134*     S. Calcium:  Recent Labs     01/20/23  0355   CALCIUM 8.4       S. Magnesium:  Recent Labs     01/20/23  0355   MG 1.60*     S. Phosphorus:  Recent Labs     01/18/23  2052   PHOS 3.5     S. Glucose:  Recent Labs     01/18/23  2249 01/19/23  0128   POCGLU 144* 125*       Hepatic functions:   Recent Labs     01/20/23  0355   ALKPHOS 83   ALT 6*   AST 12*   PROT 5.6*   BILITOT 0.4   LABALBU 2.9*       Cardiac enzymes:  Recent Labs     01/18/23  1721 01/19/23  0224   TROPONINI 0.06* 0.05*       Radiology Review:  Pertinent images / reports were reviewed as a part of this visit.       CTPA: Results for orders placed during the hospital encounter of 01/18/23    CT CHEST PULMONARY EMBOLISM W CONTRAST    Narrative  EXAMINATION:  CTA OF THE CHEST 1/18/2023 6:43 pm    TECHNIQUE:  CTA of the chest was performed after the administration of intravenous  contrast.  Multiplanar reformatted images are provided for review.  MIP  images are provided for review. Automated exposure control, iterative  reconstruction, and/or weight based adjustment of the mA/kV was utilized to  reduce the radiation dose to as low as reasonably achievable.    COMPARISON:  None.    HISTORY:  ORDERING SYSTEM PROVIDED HISTORY: right sided posterior chest pain, elevated  troponin  TECHNOLOGIST PROVIDED HISTORY:  Reason for exam:->right sided posterior chest pain, elevated troponin  Decision Support Exception - unselect if not a suspected or confirmed  emergency medical condition->Emergency Medical Condition (MA)  Reason for Exam: right sided posterior chest pain, elevated troponin    FINDINGS:  Pulmonary Arteries: There are multiple small subsegmental filling defects in  the pulmonary arteries to the right lower lobe with contrast seen distal to  the filling defects.  No other filling defects are seen.    Mediastinum: No evidence of mediastinal lymphadenopathy.  The heart and  pericardium demonstrate no acute abnormality.  There is no significant right  heart enlargement.  There is moderate calcified plaque throughout the aorta  which is mildly dilated with no aneurysm or dissection.    Lungs/pleura: There are small bibasilar pleural effusions layering  posteriorly with associated adjacent atelectasis and consolidation  posteriorly which is more prominent on the right.  There is right basilar  consolidation posteriorly.  No endobronchial lesion is seen.  There is no  pulmonary nodule or mass.    Upper Abdomen: There are calcifications scattered in the gallbladder.  The  adrenals are normal.    Soft Tissues/Bones: No acute bone or soft tissue abnormality.  There is  moderate scoliosis    Impression  Subsegmental acute  pulmonary emboli scattered in the pulmonary arteries to  the right lower lobe posteriorly. Moderate right basilar opacity posteriorly which could be due to a pulmonary  infarct vs pneumonia and atelectasis. Small bibasilar pleural effusions with bibasilar atelectasis posteriorly. Cholelithiasis. Mildly dilated and atherosclerotic thoracic aorta with no aneurysm or  dissection. The findings were called to Dr. Ruth Mercedes in the emergency room at 7:20 p.m. CXR PA/LAT: Results for orders placed during the hospital encounter of 01/15/17    XR Chest Standard TWO VW    Narrative  EXAMINATION:  TWO VIEWS OF THE CHEST    1/15/2017 8:01 pm    COMPARISON:  September 24, 2014    HISTORY:  ORDERING SYSTEM PROVIDED HISTORY: fatigue  TECHNOLOGIST PROVIDED HISTORY:  Ordering Physician Provided Reason for Exam: cough  Acuity: Unknown  Type of Exam: Unknown    FINDINGS:  The cardiomediastinal silhouette is stable. There are increased lung  markings at the bilateral parahilar regions, may be related to bronchitis. There is no pneumothorax or pleural effusion. The visualized osseous  structures are within normal limits. Impression  Increased lung markings at the bilateral parahilar regions, may be related to  bronchitis. CXR portable: Results for orders placed during the hospital encounter of 01/18/23    XR CHEST PORTABLE    Narrative  EXAMINATION:  ONE XRAY VIEW OF THE CHEST    1/19/2023 1:36 am    COMPARISON:  Chest radiograph performed 01/18/2023. HISTORY:  ORDERING SYSTEM PROVIDED HISTORY: rapid  TECHNOLOGIST PROVIDED HISTORY:  Reason for exam:->rapid  Reason for Exam: rapid    FINDINGS:  There is chronic pulmonary change. There is no acute consolidation or  effusion. There is no pneumothorax. The mediastinal structures are stable. The upper abdomen unremarkable. The extrathoracic soft tissues are  unremarkable.     Impression  Chronic pulmonary change without acute cardiopulmonary process.       Critical Care time 35 min     Zhen Tejada MD, M.D.            1/20/2023, 9:39 AM

## 2023-01-20 NOTE — PROGRESS NOTES
4 Eyes Skin Assessment     NAME:  Dylon Richards  YOB: 1955  MEDICAL RECORD NUMBER:  [id number]    The patient is being assessed for  Shift Handoff    I agree that One RN have performed a thorough Head to Toe Skin Assessment on the patient. ALL assessment sites listed below have been assessed. Areas assessed by both nurses:    Head, Face, Ears, Shoulders, Back, Chest, Arms, Elbows, Hands, Sacrum. Buttock, Coccyx, Ischium, and Legs. Feet and Heels        Does the Patient have a Wound?  No noted wound(s)       Ilia Prevention initiated by RN: Yes   Wound Care Orders initiated by RN: NA    Pressure Injury (Stage 3,4, Unstageable, DTI, NWPT, and Complex wounds) if present place referral order by RN under : NA    New and Established Ostomies, if present place, referral order under : NA      Nurse 1 eSignature: Electronically signed by Esperanza Lewis RN on 1/20/23 at 6:54 PM EST    **SHARE this note so that the co-signing nurse is able to place an eSignature**    Nurse 2 eSignature: Electronically signed by Amanda Ruiz RN on 1/21/23 at 3:40 AM EST

## 2023-01-20 NOTE — CONSULTS
Nutrition Note    Consult for diet education.  Pt from ECF where diet needs are managed.  No other nutrition-related risk factors identified.  Will see at LOS per nutrition SOC.  Please consult if nutrition intervention is needed sooner.      Electronically signed by Silvia Clinton RD, IDRIS on 1/20/23 at 1:04 PM EST    Contact: [phone]

## 2023-01-20 NOTE — CONSULTS
01/20/2023 Pt lives at a skilled nursing facility. Not appropriate for cardiac rehab at this time.  Mark BAEN, Med

## 2023-01-20 NOTE — PROGRESS NOTES
Interventional Cardiology Consultation     Ankur Angel  1955    PCP: No primary care provider on file. Referring Physician: Dr. Xin Mc   Reason for Referral: Pulmonary embolism  Chief Complaint:   Chief Complaint   Patient presents with    Chest Pain     Pt c/o of chest pain, SOB, and lower back pain. Pt has a cough. States this all started yesterday. EMS gave duoneb en route and said pt had audible crackles. Interval history  Status post PCI left circumflex yesterday  Still continues to require oxygen    Subjective:     History of Present Illness: The patient is 75 y.o. male with a past medical history significant for peripheral arterial disease with right prior right lower extremity amputation, CVA, coronary artery disease with prior acute MI, who presents with above complaint. According to his significant other he is complained of substernal chest discomfort that has acutely worsened over the last 24 hours associated with worsening shortness of breath. He is nonambulatory he lives in a nursing home. Work-up in the emergency was Hospital ER included a CT pulm angiogram which demonstrated a right lower lobe pulm embolism. His cardiac markers are elevated. Of note overnight he developed acute pulmonary edema requiring BiPAP and IV Lasix. His EKG shows nonspecific ST-T wave normality. Stat bedside echo showed inferior septal regional wall motion abnormalities.   With normal LV and RV systolic function      Past Medical History:   Diagnosis Date    Acute MI ([address] 09/2013    Anxiety     Arthritis     CAD (coronary artery disease)     COVID-19     Hyperlipidemia 2/3/2022    Hypertension     Influenza A 01/15/2017    Severe malnutrition ([address] 2/10/2022     Past Surgical History:   Procedure Laterality Date    BACK SURGERY N/A 1989    herniated disc    CORONARY ANGIOPLASTY WITH STENT PLACEMENT  9/13    EYE SURGERY      VASCULAR SURGERY       Family History   Problem Relation Age of Onset    Hypertension Mother     Hypertension Father     Heart Failure Father     Stroke Maternal Grandfather     Cancer Paternal Uncle         throat     Social History     Tobacco Use    Smoking status: Former     Packs/day: 0.50     Years: 40.00     Pack years: 20.00     Types: Cigarettes     Quit date: 2019     Years since quittin.0    Smokeless tobacco: Never   Vaping Use    Vaping Use: Never used   Substance Use Topics    Alcohol use:  Yes     Alcohol/week: 2.0 standard drinks     Types: 2 Cans of beer per week     Comment: states used to drink 1 pint/vodka a day / now socially drinks       No Known Allergies    Current Facility-Administered Medications   Medication Dose Route Frequency Provider Last Rate Last Admin    mupirocin (BACTROBAN) 2 % ointment   Nasal BID Anant Back, DO   Given at 23 0816    albuterol (PROVENTIL) nebulizer solution 2.5 mg  2.5 mg Nebulization BID Camryn Ayala MD        ipratropium (ATROVENT) 0.02 % nebulizer solution 0.5 mg  0.5 mg Nebulization BID Camryn Ayala MD        methylPREDNISolone sodium (SOLU-MEDROL) injection 40 mg  40 mg IntraVENous Daily Yousef Abdullahi Garcia MD   40 mg at 23 1141    cefepime (MAXIPIME) 2000 mg IVPB minibag  2,000 mg IntraVENous Q8H Ale King DO 12.5 mL/hr at 23 1444 Rate Verify at 23 1444    vancomycin (VANCOCIN) intermittent dosing (placeholder)   Other RX Placeholder Ale King DO        vancomycin (VANCOCIN) 1,500 mg in dextrose 5 % 250 mL IVPB (ADDAVIAL)  1,500 mg IntraVENous Q18H Ale King DO   Stopped at 23 0545    sodium chloride flush 0.9 % injection 5-40 mL  5-40 mL IntraVENous 2 times per day Meriam Scheuermann, MD   10 mL at 23 0915    sodium chloride flush 0.9 % injection 5-40 mL  5-40 mL IntraVENous PRN Meriam Scheuermann, MD        acetaminophen (TYLENOL) tablet 650 mg  650 mg Oral Q4H PRN Meriam Scheuermann, MD   650 mg at 23 0007    apixaban (ELIQUIS) tablet 10 mg  10 mg Oral BID Joce Hernandez MD   10 mg at 01/20/23 0915    ticagrelor (BRILINTA) tablet 90 mg  90 mg Oral BID Joce Hernandez MD   90 mg at 01/20/23 0915    0.9 % sodium chloride infusion   IntraVENous PRN Ahmad A Fernando, DO        potassium chloride 10 mEq/100 mL IVPB (Peripheral Line)  10 mEq IntraVENous PRN Dea Locket Fernando,  mL/hr at 01/20/23 1444 Rate Verify at 01/20/23 1444    magnesium sulfate 2000 mg in 50 mL IVPB premix  2,000 mg IntraVENous PRN Dea Locket Fenrando, DO   Stopped at 01/20/23 1029    promethazine (PHENERGAN) tablet 12.5 mg  12.5 mg Oral Q6H PRN Ahmad A Fernando, DO        Or    ondansetron (ZOFRAN) injection 4 mg  4 mg IntraVENous Q6H PRN Ahmad A Fernando, DO        acetaminophen (TYLENOL) suppository 650 mg  650 mg Rectal Q6H PRN Ahmad A Fernando, DO        atorvastatin (LIPITOR) tablet 80 mg  80 mg Oral Nightly Ahmad A Fernando, DO   80 mg at 01/19/23 2053    busPIRone (BUSPAR) tablet 5 mg  5 mg Oral BID Ahmad A Fernando, DO   5 mg at 01/20/23 0915    gabapentin (NEURONTIN) capsule 300 mg  300 mg Oral TID Roosvelt Abbot A Fernando, DO   300 mg at 01/20/23 1426    mirtazapine (REMERON) tablet 15 mg  15 mg Oral Nightly Ahmad A Fernando, DO   15 mg at 01/19/23 2052    pantoprazole (PROTONIX) tablet 40 mg  40 mg Oral Daily Ahmad A Fernando, DO   40 mg at 01/20/23 0915    sertraline (ZOLOFT) tablet 100 mg  100 mg Oral Daily Ahmad A Fernando, DO   100 mg at 01/20/23 0915    aspirin chewable tablet 81 mg  81 mg Oral Daily Ahmad A Fernando, DO   81 mg at 01/20/23 0915       Review of Systems:  Constitutional: No unanticipated weight loss. There's been no change in energy level, sleep pattern, or activity level. No fevers, chills. Eyes: No visual changes or diplopia. No scleral icterus. ENT: No Headaches, hearing loss or vertigo. No mouth sores or sore throat. Cardiovascular: as reviewed in HPI  Respiratory: No cough or wheezing, no sputum production. No hemoptysis.      Gastrointestinal: No abdominal pain, appetite loss, blood in stools. No change in bowel or bladder habits. Genitourinary: No dysuria, trouble voiding, or hematuria. Musculoskeletal:  No gait disturbance, no joint complaints. Integumentary: No rash or pruritis. Neurological: No headache, diplopia, change in muscle strength, numbness or tingling. Psychiatric: No anxiety or depression. Endocrine: No temperature intolerance. No excessive thirst, fluid intake, or urination. No tremor. Hematologic/Lymphatic: No abnormal bruising or bleeding, blood clots or swollen lymph nodes. Allergic/Immunologic: No nasal congestion or hives. Physical Exam:   /60   Pulse 78   Temp 97.7 °F (36.5 °C) (Oral)   Resp 20   Ht 6' 3\" (1.905 m)   Wt 212 lb 15.4 oz (96.6 kg)   SpO2 (!) 89%   BMI 26.62 kg/m²   Wt Readings from Last 3 Encounters:   01/20/23 212 lb 15.4 oz (96.6 kg)   09/12/22 204 lb 2.3 oz (92.6 kg)   02/10/22 188 lb 4.4 oz (85.4 kg)     Constitutional: He appears chronically ill   Head: Normocephalic and atraumatic. Pupils equal and round. Neck: Neck supple. No JVP or carotid bruit appreciated. No mass and no thyromegaly present. No lymphadenopathy present. Cardiovascular: Normal rate. Normal heart sounds. Exam reveals no gallop and no friction rub. No murmur heard. Pulmonary/Chest: Effort normal and breath sounds normal. No respiratory distress. He has no wheezes, rhonchi or rales. Abdominal: Soft, non-tender. Bowel sounds are normal. He exhibits no organomegaly, mass or bruit. Extremities: No edema. No cyanosis or clubbing. Pulses are 2+ radial/carotid bilaterally. Right BKA   Neurological: No gross cranial nerve deficit. Coordination normal.   Skin: Skin is warm and dry. There is no rash or diaphoresis. Psychiatric: He has a normal mood and affect.  His speech is normal and behavior is normal.     Lab Review:   FLP:    Lab Results   Component Value Date/Time    TRIG 97 09/01/2022 05:23 AM    HDL 38 09/01/2022 05:23 AM    HDL 60 02/22/2011 01:05 AM    LDLCALC 53 09/01/2022 05:23 AM    LABVLDL 19 09/01/2022 05:23 AM     BUN/Creatinine:    Lab Results   Component Value Date/Time    BUN 28 01/20/2023 03:55 AM    CREATININE 1.3 01/20/2023 03:55 AM     PT/INR, TNI, HGB A1C:   Lab Results   Component Value Date/Time    TROPONINI 0.05 (H) 01/19/2023 02:24 AM    LABA1C 5.2 09/01/2022 05:23 AM      No results found for: CBCAUTODIF    EKG Interpretation: Sinus tach nonspecific ST-T wave normality    Echo:     Summary   Suboptimal image quality; Definity contrast administered. Left ventricular cavity size is normal.   There is moderately increased left ventricular wall thickness suggestive of   LVH. Overall left ventricular systolic function appears normal.   Ejection fraction is visually estimated to be 55-60%. Grade II diastolic dysfunction with elevated LV filling pressures. The right ventricle is borderline dilated. Right ventricular systolic function is normal .   Mild to moderate tricuspid regurgitation. Estimated pulmonary artery systolic pressure is mildly to moderately   elevated at 46 mmHg assuming a right atrial pressure of 15 mmHg. Prior negative bubble study in 9/2022. Stress Test:     Cath:   ANGIOGRAM/CORONARY ARTERIOGRAM:        The left main coronary artery is normal.     The left anterior descending artery mild diffuse disease. D1 is normal     The left circumflex artery has a 99% lesion at the bifurcation of OM 2.               OM1 is normal     The right coronary artery is dominant vessel with moderate disease there is a mid 50% lesion              RPDA is normal        INTERVENTION  Guide catheter: 6 [employer] [place] XB 3 5 guide  Runthrough wire used to cross the left circumflex lesion  Lesion was predilated with a 2.5 regular balloon and IVUS catheter was passed to the distal lesion and confirmed a minimal luminal area of 3 millimeters square  A 3.0 x 18 mm drug-eluting stent was deployed to the OM 2  The lesion was then postdilated with a 3.0 NC balloon     SUMMARY:   Single-vessel obstructive CAD status post successful IVUS guided PCI x1 drug-eluting stent  Post capillary  pulmonary hypertension with mild residual left-sided filling pressure     RECOMMENDATION:  .     -Recommend beta blockade, high potency statin, aspirin and brilinta for 12 months  -Referral to cardiac rehab placed  -will transition to oral diuresis on discharge  - Can start DOAC tomorrow morning; recommend triple therapy for 30 days and then will discontinue aspirin on day 30    CT:    Subsegmental acute pulmonary emboli scattered in the pulmonary arteries to   the right lower lobe posteriorly. Moderate right basilar opacity posteriorly which could be due to a pulmonary   infarct vs pneumonia and atelectasis. Small bibasilar pleural effusions with bibasilar atelectasis posteriorly. Cholelithiasis. Mildly dilated and atherosclerotic thoracic aorta with no aneurysm or   dissection. Doppler:      Summary        Limited evaluation of both extremities. Evidence of calcific plaque    throughout both extremities. OMKAR not obtained. Popliteal arteries unable to    be visualized due to positioning. Multiphasic waveforms throughout SFA    bilaterally with evidence of distal monophasic waveforms therefore cannot    exclude a popliteal lesion bilaterally. The left anterior tibial artery    appears to be occluded.            All above diagnostic testing and laboratory data was independently visualized and reviewed by me (not simply review of report)       Assessment and Plan   1) subsegmental PE without cor pulmonale  -DOAC  -Venous doppler negative for DVT     2) elevated troponin  -s/p PCI Lcx  - DAPT for one month and then will d/c asa   -Statin   -Currently receiving bronchodilator therapy we will start beta-blockade as outpatient    3) acute pulmonary edema  -Currently resolved       Cardiology will sign off please reconsult as needed outpatient follow-up in 4 to 6 weeks    Thank you very much for allowing me to participate in the care of your patient. Please do not hesitate to contact me if you have any questions.       Jonny Suarez MD [address] [place] [address], Interventional Cardiology, and Peripheral Vascular Disease   [facility]   Ph: [phone]  Fax: [phone]

## 2023-01-20 NOTE — PLAN OF CARE
Problem: Discharge Planning  Goal: Discharge to home or other facility with appropriate resources  Outcome: Progressing     Problem: Pain  Goal: Verbalizes/displays adequate comfort level or baseline comfort level  Outcome: Progressing     Problem: ABCDS Injury Assessment  Goal: Absence of physical injury  Outcome: Progressing     Problem: Safety - Adult  Goal: Free from fall injury  Outcome: Progressing     Problem: Skin/Tissue Integrity  Goal: Absence of new skin breakdown  Description: 1. Monitor for areas of redness and/or skin breakdown  2. Assess vascular access sites hourly  3. Every 4-6 hours minimum:  Change oxygen saturation probe site  4. Every 4-6 hours:  If on nasal continuous positive airway pressure, respiratory therapy assess nares and determine need for appliance change or resting period.   Outcome: Progressing     Problem: Neurosensory - Adult  Goal: Achieves stable or improved neurological status  Outcome: Progressing  Goal: Achieves maximal functionality and self care  Outcome: Progressing     Problem: Cardiovascular - Adult  Goal: Maintains optimal cardiac output and hemodynamic stability  Outcome: Progressing  Goal: Absence of cardiac dysrhythmias or at baseline  Outcome: Progressing     Problem: Skin/Tissue Integrity - Adult  Goal: Skin integrity remains intact  Outcome: Progressing     Problem: Infection - Adult  Goal: Absence of infection at discharge  Outcome: Progressing  Goal: Absence of infection during hospitalization  Outcome: Progressing     Problem: Metabolic/Fluid and Electrolytes - Adult  Goal: Electrolytes maintained within normal limits  Outcome: Progressing     Problem: Hematologic - Adult  Goal: Maintains hematologic stability  Outcome: Progressing

## 2023-01-20 NOTE — CONSULTS
Consult received. Pt resides in a long term care facility where his care is provided . Consult deferred.   Will follow along peripherally

## 2023-01-20 NOTE — PROGRESS NOTES
Afebrile throughout shift. Oxygen titrated up from 4L to 6L. Currently on 6L high flow cannula to keep SpO2 sats >90. Taking in adequate amounts PO with good appetite. Corbtet cath patent, draining adequate amounts of urine.

## 2023-01-20 NOTE — ACP (ADVANCE CARE PLANNING)
Advance Care Planning     Advance Care Planning Activator (Inpatient)  Conversation Note      Date of ACP Conversation: 1/20/2023     Conversation Conducted with: Patient with Decision Making Capacity    ACP Activator: DUSTIN George    Health Care Decision Maker:     Current Designated Health Care Decision Maker:     Primary Decision Maker: Ramsey Bonds Domestic Partner - [phone]    Care Preferences    Ventilation: \"If you were in your present state of health and suddenly became very ill and were unable to breathe on your own, what would your preference be about the use of a ventilator (breathing machine) if it were available to you? \"      Would the patient desire the use of ventilator (breathing machine)?: yes    \"If your health worsens and it becomes clear that your chance of recovery is unlikely, what would your preference be about the use of a ventilator (breathing machine) if it were available to you? \"     Would the patient desire the use of ventilator (breathing machine)?: No      Resuscitation  \"CPR works best to restart the heart when there is a sudden event, like a heart attack, in someone who is otherwise healthy. Unfortunately, CPR does not typically restart the heart for people who have serious health conditions or who are very sick. \"    \"In the event your heart stopped as a result of an underlying serious health condition, would you want attempts to be made to restart your heart (answer \"yes\" for attempt to resuscitate) or would you prefer a natural death (answer \"no\" for do not attempt to resuscitate)? \" yes       [] Yes   [] No   Educated Patient / Excello Hopes regarding differences between Advance Directives and portable DNR orders.     Length of ACP Conversation in minutes:  3    Conversation Outcomes:  [x] ACP discussion completed  [] Existing advance directive reviewed with patient; no changes to patient's previously recorded wishes  [] New Advance Directive completed  [] Portable Do Not Rescitate prepared for Provider review and signature  [] POLST/POST/MOLST/MOST prepared for Provider review and signature      Follow-up plan:    [] Schedule follow-up conversation to continue planning  [] Referred individual to Provider for additional questions/concerns   [] Advised patient/agent/surrogate to review completed ACP document and update if needed with changes in condition, patient preferences or care setting    [] This note routed to one or more involved healthcare providers     Respectfully submitted,    DUSTIN Ruiz  [facility]   [phone]    Electronically signed by DUSTIN Weems on 1/20/2023 at 2:04 PM

## 2023-01-20 NOTE — PROGRESS NOTES
Hospitalist Progress Note      PCP: No primary care provider on file. Date of Admission: 1/18/2023    Chief Complaint:   SOB    Hospital Course:    75 y.o. male who presented to [facility] with past medical history of CAD, hypertension, hyperlipidemia, MI, history of CVA presented to the ED with chief complaint of shortness of breath     Patient reported that he has been having worsening shortness of breath, cough in the past 2 days no known alleviating factor, exacerbating factor is walking and exertion reports that he does not have history of DVT or clots before. Patient reports that he did recover from COVID-19. Patient also reports has been being compliant with medication. Patient otherwise denied increased salt intake or worsening lower extremity edema.   Patient also reports decreased oral intake  Subjective:   Better  No F/C  Tolerating a diet       Medications:  Reviewed    Infusion Medications    sodium chloride       Scheduled Medications    mupirocin   Nasal BID    albuterol  2.5 mg Nebulization BID    ipratropium  0.5 mg Nebulization BID    methylPREDNISolone  40 mg IntraVENous Daily    cefepime  2,000 mg IntraVENous Q8H    vancomycin (VANCOCIN) intermittent dosing (placeholder)   Other RX Placeholder    vancomycin  1,500 mg IntraVENous Q18H    sodium chloride flush  5-40 mL IntraVENous 2 times per day    apixaban  10 mg Oral BID    ticagrelor  90 mg Oral BID    atorvastatin  80 mg Oral Nightly    busPIRone  5 mg Oral BID    gabapentin  300 mg Oral TID    mirtazapine  15 mg Oral Nightly    pantoprazole  40 mg Oral Daily    sertraline  100 mg Oral Daily    aspirin  81 mg Oral Daily     PRN Meds: sodium chloride flush, acetaminophen, sodium chloride, potassium chloride, magnesium sulfate, promethazine **OR** ondansetron, [DISCONTINUED] acetaminophen **OR** acetaminophen      Intake/Output Summary (Last 24 hours) at 1/20/2023 1723  Last data filed at 1/20/2023 1600  Gross per 24 hour   Intake 1858.2 ml   Output 1560 ml   Net 298.2 ml       Physical Exam Performed:    /67   Pulse 71   Temp 98.3 °F (36.8 °C) (Oral)   Resp 21   Ht 6' 3\" (1.905 m)   Wt 212 lb 15.4 oz (96.6 kg)   SpO2 93%   BMI 26.62 kg/m²     General appearance: No apparent distress, appears stated age and cooperative. HEENT: Pupils equal, round, and reactive to light. Conjunctivae/corneas clear. Neck: Supple, with full range of motion. No jugular venous distention. Trachea midline. Respiratory:  Normal respiratory effort. Some crackles right base. Cardiovascular: Regular rate and rhythm with normal S1/S2 without murmurs, rubs or gallops. Abdomen: Soft, non-tender, non-distended with normal bowel sounds. Musculoskeletal: No clubbing, cyanosis or edema bilaterally. Full range of motion without deformity. Skin: Skin color, texture, turgor normal.  No rashes or lesions. Neurologic:  Neurovascularly intact without any focal sensory/motor deficits. Cranial nerves: II-XII intact, grossly non-focal.  Psychiatric: Alert and oriented, thought content appropriate, normal insight  Capillary Refill: Brisk, 3 seconds, normal   Peripheral Pulses: +2 palpable, equal bilaterally       Labs:   Recent Labs     01/18/23 1938 01/19/23 0224 01/20/23 0355   WBC 13.4* 15.7* 8.4   HGB 10.9* 10.9* 9.4*   HCT 33.6* 33.5* 28.1*   * 135 131*     Recent Labs     01/18/23 1721 01/18/23 2052 01/20/23  0355   *  --  135*   K 3.5  --  3.3*  3.3*   CL 96*  --  99   CO2 27  --  25   BUN 24*  --  28*   CREATININE 1.0  --  1.3   CALCIUM 9.3  --  8.4   PHOS  --  3.5  --      Recent Labs     01/18/23 1721 01/20/23  0355   AST 10* 12*   ALT 10 6*   BILITOT 0.7 0.4   ALKPHOS 126 83     No results for input(s): INR in the last 72 hours.   Recent Labs     01/18/23 1721 01/19/23 0224   TROPONINI 0.06* 0.05*       Urinalysis:      Lab Results   Component Value Date/Time    NITRU Negative 09/01/2022 03:30 PM    WBCUA 74 09/01/2022 03:30 PM BACTERIA None Seen 09/01/2022 03:30 PM    RBCUA 0 09/01/2022 03:30 PM    BLOODU TRACE 09/01/2022 03:30 PM    SPECGRAV 1.014 09/01/2022 03:30 PM    GLUCOSEU Negative 09/01/2022 03:30 PM       Radiology:  VL Extremity Venous Left   Final Result      XR CHEST PORTABLE   Final Result   Chronic pulmonary change without acute cardiopulmonary process. CT HEAD WO CONTRAST   Final Result   No acute intracranial abnormality. Encephalomalacia in the parasagittal left frontal parietal lobes, likely   related to old infarction in the left LEXA territory. Small old infarctions in the right cerebellar hemisphere and anterior right   temporal lobe. Mild parenchymal volume loss. Mild chronic microvascular disease. CT CHEST PULMONARY EMBOLISM W CONTRAST   Final Result   Subsegmental acute pulmonary emboli scattered in the pulmonary arteries to   the right lower lobe posteriorly. Moderate right basilar opacity posteriorly which could be due to a pulmonary   infarct vs pneumonia and atelectasis. Small bibasilar pleural effusions with bibasilar atelectasis posteriorly. Cholelithiasis. Mildly dilated and atherosclerotic thoracic aorta with no aneurysm or   dissection. The findings were called to Dr. Yon Bassett in the emergency room at 7:20 p.m. XR CHEST PORTABLE   Final Result   No acute cardiopulmonary disease. Mild cardiomegaly without overt failure. IP CONSULT TO HOSPITALIST  IP CONSULT TO CARDIOLOGY  IP CONSULT TO HEART FAILURE NURSE/COORDINATOR  IP CONSULT TO DIETITIAN  PHARMACY TO DOSE VANCOMYCIN  IP CONSULT TO CRITICAL CARE  IP CONSULT TO CARDIAC REHAB  IP CONSULT TO CARDIAC REHAB    Assessment:    1. Acute subsegmental pulmonary embolism scattered right lower lobe. No RV strain. 2. RLL consolidation- infarct v PNA  3. Type II NSTEMI  4. CAD s/p stent  5.  Hypertension    Plan  -cont cefepime/ vanc  -prn suppl oxygen, keep sats >90%  -cont percussion vest  -cont eliquis  -cont home meds     Diet: cardiac diet     DVT Prophylaxis: Heparin     Dispo:   Expected LOS of two days    Appropriate for A1 Discharge Unit: No      Steve Smith MD

## 2023-01-20 NOTE — PROGRESS NOTES
4 Eyes Skin Assessment     NAME:  Zee Langford  YOB: 1955  MEDICAL RECORD NUMBER:  [id number]    The patient is being assessed for  Shift Handoff    I agree that One RN have performed a thorough Head to Toe Skin Assessment on the patient. ALL assessment sites listed below have been assessed. Areas assessed by both nurses:    Head, Face, Ears, Shoulders, Back, Chest, Arms, Elbows, Hands, Sacrum. Buttock, Coccyx, Ischium, and Legs. Feet and Heels        Does the Patient have a Wound?  No noted wound(s)       Ilia Prevention initiated by RN: Yes   Wound Care Orders initiated by RN: No    Pressure Injury (Stage 3,4, Unstageable, DTI, NWPT, and Complex wounds) if present place referral order by RN under : No    New and Established Ostomies, if present place, referral order under : No      Nurse 1 eSignature: Electronically signed by Justus Pagan RN on 1/20/23 at 6:06 AM EST    **SHARE this note so that the co-signing nurse is able to place an eSignature**    Nurse 2 eSignature: Electronically signed by Onel Eason RN on 1/20/23 at 2:28 PM EST

## 2023-01-20 NOTE — CARE COORDINATION
Patient came from [address] [place] [address] prior to arrival.  Call to Rachael Reyes, in admissions, who confirmed the patient is:  [x] [address], no Precert required for return.  [] [facility] will be required for return. [] [facility], no Precert required for return. [] [address] required for return. [x] Is fully vaccinated for Covid. Can offer booster.   [] Has started Covid vaccination, but is not complete. [] Is not vaccinated for Covid. [x] No Covid Test needed for return.  [] Rapid Covid test needed before return within: [] 48 hours, [] 72 hours, [] 5 days, [] other (***)  [] PCR Covid test needed before return.    [] Confirmed with the patient or family that the plan is to return to this facility at D/C. [] Patient and/or designated decision maker does not plan for the patient to return to this facility at D/C. SW attempted to call Ms. Leo Stevens at number listed on file of [phone] and her voicemail is full. If discharged on the weekend please call [phone] to coordinate his care. Respectfully submitted,    CHRISTA HaleyS  [facility]   [phone]    Electronically signed by DUSTIN Mcgee on 1/20/2023 at 12:19 PM

## 2023-01-21 LAB
A/G RATIO: 1 (ref 1.1–2.2)
ALBUMIN SERPL-MCNC: 3.1 G/DL (ref 3.4–5)
ALP BLD-CCNC: 90 U/L (ref 40–129)
ALT SERPL-CCNC: 8 U/L (ref 10–40)
ANION GAP SERPL CALCULATED.3IONS-SCNC: 14 MMOL/L (ref 3–16)
AST SERPL-CCNC: 16 U/L (ref 15–37)
BILIRUB SERPL-MCNC: 0.4 MG/DL (ref 0–1)
BUN BLDV-MCNC: 29 MG/DL (ref 7–20)
CALCIUM SERPL-MCNC: 8.9 MG/DL (ref 8.3–10.6)
CHLORIDE BLD-SCNC: 101 MMOL/L (ref 99–110)
CO2: 22 MMOL/L (ref 21–32)
CREAT SERPL-MCNC: 1 MG/DL (ref 0.8–1.3)
GFR SERPL CREATININE-BSD FRML MDRD: >60 ML/MIN/{1.73_M2}
GLUCOSE BLD-MCNC: 170 MG/DL (ref 70–99)
MAGNESIUM: 2.2 MG/DL (ref 1.8–2.4)
POTASSIUM SERPL-SCNC: 3.5 MMOL/L (ref 3.5–5.1)
POTASSIUM SERPL-SCNC: 3.9 MMOL/L (ref 3.5–5.1)
SODIUM BLD-SCNC: 137 MMOL/L (ref 136–145)
TOTAL PROTEIN: 6.3 G/DL (ref 6.4–8.2)

## 2023-01-21 PROCEDURE — 2580000003 HC RX 258: Performed by: INTERNAL MEDICINE

## 2023-01-21 PROCEDURE — 6370000000 HC RX 637 (ALT 250 FOR IP): Performed by: INTERNAL MEDICINE

## 2023-01-21 PROCEDURE — 6360000002 HC RX W HCPCS: Performed by: INTERNAL MEDICINE

## 2023-01-21 PROCEDURE — 94640 AIRWAY INHALATION TREATMENT: CPT

## 2023-01-21 PROCEDURE — 2700000000 HC OXYGEN THERAPY PER DAY

## 2023-01-21 PROCEDURE — 93005 ELECTROCARDIOGRAM TRACING: CPT | Performed by: INTERNAL MEDICINE

## 2023-01-21 PROCEDURE — 80053 COMPREHEN METABOLIC PANEL: CPT

## 2023-01-21 PROCEDURE — 94761 N-INVAS EAR/PLS OXIMETRY MLT: CPT

## 2023-01-21 PROCEDURE — 83735 ASSAY OF MAGNESIUM: CPT

## 2023-01-21 PROCEDURE — 9990000010 HC NO CHARGE VISIT

## 2023-01-21 PROCEDURE — 94668 MNPJ CHEST WALL SBSQ: CPT

## 2023-01-21 PROCEDURE — 84132 ASSAY OF SERUM POTASSIUM: CPT

## 2023-01-21 PROCEDURE — 2140000000 HC CCU INTERMEDIATE R&B

## 2023-01-21 PROCEDURE — 36415 COLL VENOUS BLD VENIPUNCTURE: CPT

## 2023-01-21 PROCEDURE — 99291 CRITICAL CARE FIRST HOUR: CPT | Performed by: INTERNAL MEDICINE

## 2023-01-21 RX ORDER — OXYMETAZOLINE HYDROCHLORIDE 0.05 G/100ML
2 SPRAY NASAL 2 TIMES DAILY PRN
Status: DISPENSED | OUTPATIENT
Start: 2023-01-21 | End: 2023-01-24

## 2023-01-21 RX ADMIN — CEFEPIME 2000 MG: 2 INJECTION, POWDER, FOR SOLUTION INTRAVENOUS at 00:13

## 2023-01-21 RX ADMIN — ATORVASTATIN CALCIUM 80 MG: 80 TABLET, FILM COATED ORAL at 20:09

## 2023-01-21 RX ADMIN — ALBUTEROL SULFATE 2.5 MG: 2.5 SOLUTION RESPIRATORY (INHALATION) at 19:24

## 2023-01-21 RX ADMIN — IPRATROPIUM BROMIDE 0.5 MG: 0.5 SOLUTION RESPIRATORY (INHALATION) at 08:05

## 2023-01-21 RX ADMIN — ASPIRIN 81 MG CHEWABLE TABLET 81 MG: 81 TABLET CHEWABLE at 08:21

## 2023-01-21 RX ADMIN — Medication 10 ML: at 08:39

## 2023-01-21 RX ADMIN — Medication 10 MEQ: at 05:40

## 2023-01-21 RX ADMIN — BUSPIRONE HYDROCHLORIDE 5 MG: 5 TABLET ORAL at 08:21

## 2023-01-21 RX ADMIN — GABAPENTIN 300 MG: 300 CAPSULE ORAL at 13:44

## 2023-01-21 RX ADMIN — APIXABAN 10 MG: 5 TABLET, FILM COATED ORAL at 08:21

## 2023-01-21 RX ADMIN — APIXABAN 10 MG: 5 TABLET, FILM COATED ORAL at 20:09

## 2023-01-21 RX ADMIN — TICAGRELOR 90 MG: 90 TABLET ORAL at 08:21

## 2023-01-21 RX ADMIN — VANCOMYCIN HYDROCHLORIDE 1250 MG: 1.25 INJECTION, POWDER, LYOPHILIZED, FOR SOLUTION INTRAVENOUS at 04:16

## 2023-01-21 RX ADMIN — IPRATROPIUM BROMIDE 0.5 MG: 0.5 SOLUTION RESPIRATORY (INHALATION) at 19:24

## 2023-01-21 RX ADMIN — MIRTAZAPINE 15 MG: 15 TABLET, FILM COATED ORAL at 20:09

## 2023-01-21 RX ADMIN — CEFEPIME 2000 MG: 2 INJECTION, POWDER, FOR SOLUTION INTRAVENOUS at 23:40

## 2023-01-21 RX ADMIN — TICAGRELOR 90 MG: 90 TABLET ORAL at 20:09

## 2023-01-21 RX ADMIN — SERTRALINE 100 MG: 100 TABLET, FILM COATED ORAL at 08:21

## 2023-01-21 RX ADMIN — BUSPIRONE HYDROCHLORIDE 5 MG: 5 TABLET ORAL at 20:09

## 2023-01-21 RX ADMIN — Medication 10 MEQ: at 08:18

## 2023-01-21 RX ADMIN — CEFEPIME 2000 MG: 2 INJECTION, POWDER, FOR SOLUTION INTRAVENOUS at 17:03

## 2023-01-21 RX ADMIN — MUPIROCIN: 20 OINTMENT TOPICAL at 20:10

## 2023-01-21 RX ADMIN — CEFEPIME 2000 MG: 2 INJECTION, POWDER, FOR SOLUTION INTRAVENOUS at 08:34

## 2023-01-21 RX ADMIN — GABAPENTIN 300 MG: 300 CAPSULE ORAL at 20:09

## 2023-01-21 RX ADMIN — Medication 10 ML: at 20:10

## 2023-01-21 RX ADMIN — MUPIROCIN: 20 OINTMENT TOPICAL at 08:26

## 2023-01-21 RX ADMIN — Medication 10 MEQ: at 11:37

## 2023-01-21 RX ADMIN — ALBUTEROL SULFATE 2.5 MG: 2.5 SOLUTION RESPIRATORY (INHALATION) at 08:05

## 2023-01-21 RX ADMIN — METHYLPREDNISOLONE SODIUM SUCCINATE 40 MG: 40 INJECTION, POWDER, FOR SOLUTION INTRAMUSCULAR; INTRAVENOUS at 08:21

## 2023-01-21 RX ADMIN — GABAPENTIN 300 MG: 300 CAPSULE ORAL at 08:21

## 2023-01-21 RX ADMIN — PANTOPRAZOLE SODIUM 40 MG: 40 TABLET, DELAYED RELEASE ORAL at 08:21

## 2023-01-21 RX ADMIN — Medication 10 MEQ: at 10:34

## 2023-01-21 ASSESSMENT — PAIN SCALES - GENERAL
PAINLEVEL_OUTOF10: 0

## 2023-01-21 NOTE — PROGRESS NOTES
Physical Therapy  Status Note  1/21/2023  Aram Victoria  [id number]     [id number]  [id number]    Therapy orders noted. Chart reviewed. Per therapy notes on 9/1/22 and 2/7/22, pt is dependent from LTC w/ yarelis lift used for OOB. No therapy needs. Will sign off. Therapy orders cancelled. Thank You.     Electronically signed by Beti Murphy, [address] [place] [address] Drive [phone])  on 1/21/2023 at 11:01 AM

## 2023-01-21 NOTE — PROGRESS NOTES
Occupational Therapy Attempt/Discharge  Ankur Ortiz    OT order noted. Chart reviewed. Per therapy notes on 9/1/22 and 2/7/22, pt is dependent from LTC w/ yarelis lift used for OOB. No therapy needs. Will sign off.     Electronically signed by Anand Wetzel OT on 1/21/23 at 10:12 AM EST

## 2023-01-21 NOTE — PROGRESS NOTES
Verbalized that his shortness-of-breath has already resolved. He's currently on O2 @ 15 L/min per high-flow nasal cannula & w/ RR of 18.     Electronically signed by Adryan Milner RN on 1/21/2023 at 12:02 AM

## 2023-01-21 NOTE — PROGRESS NOTES
Late entry due to patient care. @ 3787 - O2 sat = 85% on O2 @ 5 L/min per high-flow nasal cannula, RR =  25/min. He said he has some dyspnea, although he's not visibly in distress. He has rhonchi on all his lung fields & has non-productive, congested/ moist cough. He hasn't coughed-out any phlegm since this shift started. O2 flow titrated-up (see flowsheets) to attain an O2 sat of more than 90%. This RN assisted him w/ using the Kourtney Manjit, which he was able to perform (x12 times).     Electronically signed by Prasanna Gupta RN on 1/20/2023 at 10:46 PM

## 2023-01-21 NOTE — PLAN OF CARE
Problem: Respiratory - Adult  Goal: Achieves optimal ventilation and oxygenation  Outcome: Not Progressing  Achieves optimal ventilation and oxygenation:   Assess for changes in respiratory status   Assess for changes in mentation and behavior   Position to facilitate oxygenation and minimize respiratory effort   Oxygen supplementation based on oxygen saturation or arterial blood gases   Encourage broncho-pulmonary hygiene including cough, deep breathe, incentive spirometry   Assess the need for suctioning and aspirate as needed   Assess and instruct to report shortness of breath or any respiratory difficulty   Respiratory therapy support as indicated     Problem: Neurosensory - Adult  Goal: Achieves stable or improved neurological status  Outcome: Progressing  Achieves stable or improved neurological status: Assess for and report changes in neurological status     Problem: Cardiovascular - Adult  Goal: Maintains optimal cardiac output and hemodynamic stability  Outcome: Progressing  Maintains optimal cardiac output and hemodynamic stability:   Monitor blood pressure and heart rate   Monitor urine output and notify Licensed Independent Practitioner for values outside of normal range   Assess for signs of decreased cardiac output     Problem: Infection - Adult  Goal: Absence of infection at discharge  Outcome: Progressing  Absence of infection at discharge:   Assess and monitor for signs and symptoms of infection   Monitor lab/diagnostic results   Administer medications as ordered   Instruct and encourage patient and family to use good hand hygiene technique     Problem: Infection - Adult  Goal: Absence of infection during hospitalization  Outcome: Progressing  Absence of infection during hospitalization:   Assess and monitor for signs and symptoms of infection   Monitor lab/diagnostic results   Administer medications as ordered   Instruct and encourage patient and family to use good hand hygiene technique   Problem: Metabolic/Fluid and Electrolytes - Adult  Goal: Electrolytes maintained within normal limits  Outcome: Progressing  Electrolytes maintained within normal limits:   Monitor labs and assess patient for signs and symptoms of electrolyte imbalances   Administer electrolyte replacement as ordered   Monitor response to electrolyte replacements, including repeat lab results as appropriate     Problem: Hematologic - Adult  Goal: Maintains hematologic stability  Outcome: Progressing  Maintains hematologic stability:   Assess for signs and symptoms of bleeding or hemorrhage   Monitor labs for bleeding or clotting disorders   Administer blood products/factors as ordered     Problem: Genitourinary - Adult  Goal: Urinary catheter remains patent  Outcome: Progressing  Urinary catheter remains patent: Assess patency of urinary catheter     Problem: Skin/Tissue Integrity - Adult  Goal: Skin integrity remains intact  Outcome: Progressing  Skin Integrity Remains Intact: Monitor for areas of redness and/or skin breakdown     Electronically signed by Cleopatra Sheldon RN on 1/21/2023 at 4:32 AM

## 2023-01-21 NOTE — PROGRESS NOTES
Late entry due to patient care. Bedside shift hand-off done w/ off-going ALENA SUN Pt. is alert and oriented, SR w/ PVCs on the monitor, on O2 @ 7 L/min per high-flow nasal cannula, not in any distress, and w/ intact boss catheter. Call light and bedside table are within his reach. Bed alarm is activated.     Electronically signed by Shanae Cruz RN on 1/21/2023 at 12:00 AM

## 2023-01-21 NOTE — PROGRESS NOTES
EKG done at bedside to confirm patient rate and rhythm. For the earlier half of the shift patient was  tachycardiac in low 100's. At about 1600 patient HR reduced to 70's and has stayed there consistently. Dr Kayla Hunter was available nearby and RN discussed with him. Patient oxygenating better, RN able to wean to 6L high flow. VSS. No new orders. Pt nose bleed has seemed to slow. Patient denies pain, afebrile and denies needs at this time and none are anticipated. Call light within patients reach on left hand (unaffected) side.     Electronically signed by Brigid Roblero RN on 1/21/2023 at 4:35 PM

## 2023-01-21 NOTE — PROGRESS NOTES
Clinical Pharmacy Note  Vancomycin Consult    Katarina Hsu is a 75 y.o. male ordered Vancomycin for CAP/sepsis of unknown origin; consult received from Dr. Griselda Ahle to manage therapy. Also receiving cefepime. Allergies:  Patient has no known allergies. Temp max:  Temp (24hrs), Av.9 °F (37.2 °C), Min:97.7 °F (36.5 °C), Max:100.6 °F (38.1 °C)      Recent Labs     23  1938 23  0224 23  0355   WBC 13.4* 15.7* 8.4       Recent Labs     23  1721 23  0355   BUN 24* 28*   CREATININE 1.0 1.3         Intake/Output Summary (Last 24 hours) at 2023 2206  Last data filed at 2023 2057  Gross per 24 hour   Intake 2128.98 ml   Output 1860 ml   Net 268.98 ml       Culture Results:   MRSA probe: positive    Ht Readings from Last 1 Encounters:   23 6' 3\" (1.905 m)        Wt Readings from Last 1 Encounters:   23 212 lb 15.4 oz (96.6 kg)         Estimated Creatinine Clearance: 65 mL/min (based on SCr of 1.3 mg/dL). Assessment:  Day # 2 of vancomycin. Current regimen: 1500 mg every 18 hours  Vancomycin level: 17.6 mg/L (17 hours after dose)  Predicted AUC: 775    Plan:  Change regimen to 1250 mg every 24 hours. Predicted , trough 14.7 mg/L  Will order level after 1 dose of new regimen to assess PK (level for 0300 on )    Thank you for the consult.    Joanna Jolley, PharmD  2023 10:08 PM

## 2023-01-21 NOTE — PROGRESS NOTES
This RN [employer], Dr. Merlinda Balls, regarding the pt.'s increase in O2 demand and his shortness of breath. The message was, Emily Celaya for this message is to inform you of the pt.'s increase in O2 demand. This RN started the shift w/ his O2 flow @ 7 L per high-flow nasal cannula. RN was able to go to as low as 5 L/min, but less than an hour ago, his O2 sat went to as low as 82-85%, so from there, I began to tirated-up his O2. Also, he said that he has some dyspnea that also just started around the time his O2 sat dropped. His lung sounds are now mostly diminished but w/ rhonchi on the upper L field. He had scattered rhonchi when this shift started. He has congested/ moist cough but couldn't expectorate. He received his BID Albuterol neb around 2100, along w/ chest vest percussion. Current diagnosis: Acute respiratory failure w/ hypoxia & hypercapnia, Right lower lobe HCAP, Acute subsegmental PE scattered right lower lobe (No RV strain), NSTEMI (s/p drug-eluting stent x1 on OM2 1/19). He's alert and oriented, ST on the monitor, afebrile, on O2 @ 15 L/min per high-flow nasal cannula now, and said that he's still short-of-breath. He refused to wear the BiPAP when this RN told him about this. \" This RN will wait for Dr. Reece Manner response.     Electronically signed by Governor ALENA Adams on 1/20/2023 at 11:12 PM

## 2023-01-21 NOTE — PROGRESS NOTES
This RN Dianne Mon Dr. Brigitte Councilman to inform of RT's (Respiratory Therapist's) recommendation to change to a non-rebreather mask if 15 L of high-flow nasal cannula can't make him attain O2 sat of more than 90%, since the pt. has intermittent nose bleeding. The message was, \"RT went in the patient's room to see the pt. RT recommended to change to a non-rebreather if patient needed to be on more than 15 liters on high-flow nasal cannula since the pt has intermittent nose bleeding. He's now on 14 L/min high-flow nasal cannula w/ O2 sat around 92-93% when he's asleep w/ mouth wide-open. He denies any shortness of breath now. Also, CXR and VBG already resulted, if you can check. \" This RN will wait for Dr. Ayoub Job response.     Electronically signed by Jesús Fuller RN on 1/21/2023 at 1:22 AM

## 2023-01-21 NOTE — PROGRESS NOTES
Hospitalist Progress Note      PCP: No primary care provider on file. Date of Admission: 1/18/2023    Chief Complaint:   SOB    Hospital Course:    75 y.o. male who presented to [facility] with past medical history of CAD, hypertension, hyperlipidemia, MI, history of CVA presented to the ED with chief complaint of shortness of breath. He had 2 days of worsening shortness of breath and adam on exertion. No h/o DVT/Pes. He recovered from COVID-19. Patient also reports has been being compliant with medication. Patient otherwise denied increased salt intake or worsening lower extremity edema.   Patient also reports decreased oral intake  Subjective:   Better  No F/C  Tolerating a diet       Medications:  Reviewed    Infusion Medications    sodium chloride       Scheduled Medications    ipratropium  0.5 mg Nebulization BID    mupirocin   Nasal BID    albuterol  2.5 mg Nebulization BID    methylPREDNISolone  40 mg IntraVENous Daily    vancomycin  1,250 mg IntraVENous Q24H    cefepime  2,000 mg IntraVENous Q8H    vancomycin (VANCOCIN) intermittent dosing (placeholder)   Other RX Placeholder    sodium chloride flush  5-40 mL IntraVENous 2 times per day    apixaban  10 mg Oral BID    ticagrelor  90 mg Oral BID    atorvastatin  80 mg Oral Nightly    busPIRone  5 mg Oral BID    gabapentin  300 mg Oral TID    mirtazapine  15 mg Oral Nightly    pantoprazole  40 mg Oral Daily    sertraline  100 mg Oral Daily    aspirin  81 mg Oral Daily     PRN Meds: oxymetazoline, sodium chloride flush, acetaminophen, sodium chloride, potassium chloride, magnesium sulfate, promethazine **OR** ondansetron, [DISCONTINUED] acetaminophen **OR** acetaminophen      Intake/Output Summary (Last 24 hours) at 1/21/2023 1614  Last data filed at 1/21/2023 1400  Gross per 24 hour   Intake 1721.49 ml   Output 2375 ml   Net -653.51 ml         Physical Exam Performed:    /71   Pulse (!) 103   Temp 98.7 °F (37.1 °C) (Oral)   Resp 20 Ht 6' 3\" (1.905 m)   Wt 211 lb 10.3 oz (96 kg)   SpO2 97%   BMI 26.45 kg/m²     General appearance: No apparent distress, appears stated age and cooperative. HEENT: Pupils equal, round, and reactive to light. Conjunctivae/corneas clear. Neck: Supple, with full range of motion. No jugular venous distention. Trachea midline. Respiratory:  Normal respiratory effort. Some crackles right base. Cardiovascular: Regular rate and rhythm with normal S1/S2 without murmurs, rubs or gallops. Abdomen: Soft, non-tender, non-distended with normal bowel sounds. Musculoskeletal: No clubbing, cyanosis or edema bilaterally. Full range of motion without deformity. Skin: Skin color, texture, turgor normal.  No rashes or lesions. Neurologic:  Neurovascularly intact without any focal sensory/motor deficits. Cranial nerves: II-XII intact, grossly non-focal.  Psychiatric: Alert and oriented, thought content appropriate, normal insight  Capillary Refill: Brisk, 3 seconds, normal   Peripheral Pulses: +2 palpable, equal bilaterally       Labs:   Recent Labs     01/18/23  1938 01/19/23 0224 01/20/23  0355   WBC 13.4* 15.7* 8.4   HGB 10.9* 10.9* 9.4*   HCT 33.6* 33.5* 28.1*   * 135 131*       Recent Labs     01/18/23  1721 01/18/23  2052 01/20/23  0355 01/20/23  1842 01/21/23  0352   *  --  135*  --  137   K 3.5  --  3.3*  3.3* 3.5 3.5   CL 96*  --  99  --  101   CO2 27  --  25  --  22   BUN 24*  --  28*  --  29*   CREATININE 1.0  --  1.3  --  1.0   CALCIUM 9.3  --  8.4  --  8.9   PHOS  --  3.5  --   --   --        Recent Labs     01/18/23  1721 01/20/23  0355 01/21/23  0352   AST 10* 12* 16   ALT 10 6* 8*   BILITOT 0.7 0.4 0.4   ALKPHOS 126 83 90       No results for input(s): INR in the last 72 hours.   Recent Labs     01/18/23  1721 01/19/23  0224   TROPONINI 0.06* 0.05*         Urinalysis:      Lab Results   Component Value Date/Time    NITRU Negative 09/01/2022 03:30 PM    WBCUA 74 09/01/2022 03:30 PM    BACTERIA None Seen 09/01/2022 03:30 PM    RBCUA 0 09/01/2022 03:30 PM    BLOODU TRACE 09/01/2022 03:30 PM    SPECGRAV 1.014 09/01/2022 03:30 PM    GLUCOSEU Negative 09/01/2022 03:30 PM       Radiology:  XR CHEST PORTABLE   Final Result   Mild cardiomegaly and mild central pulmonary congestion which is more   prominent. Hazy perihilar and bibasilar opacities which is increased. Small bibasilar pleural effusions which are more prominent. VL Extremity Venous Left   Final Result      XR CHEST PORTABLE   Final Result   Chronic pulmonary change without acute cardiopulmonary process. CT HEAD WO CONTRAST   Final Result   No acute intracranial abnormality. Encephalomalacia in the parasagittal left frontal parietal lobes, likely   related to old infarction in the left LEXA territory. Small old infarctions in the right cerebellar hemisphere and anterior right   temporal lobe. Mild parenchymal volume loss. Mild chronic microvascular disease. CT CHEST PULMONARY EMBOLISM W CONTRAST   Final Result   Subsegmental acute pulmonary emboli scattered in the pulmonary arteries to   the right lower lobe posteriorly. Moderate right basilar opacity posteriorly which could be due to a pulmonary   infarct vs pneumonia and atelectasis. Small bibasilar pleural effusions with bibasilar atelectasis posteriorly. Cholelithiasis. Mildly dilated and atherosclerotic thoracic aorta with no aneurysm or   dissection. The findings were called to Dr. Domenic Tompkins in the emergency room at 7:20 p.m. XR CHEST PORTABLE   Final Result   No acute cardiopulmonary disease. Mild cardiomegaly without overt failure. IP CONSULT TO HOSPITALIST  IP CONSULT TO CARDIOLOGY  IP CONSULT TO HEART FAILURE NURSE/COORDINATOR  IP CONSULT TO DIETITIAN  PHARMACY TO DOSE VANCOMYCIN  IP CONSULT TO CRITICAL CARE  IP CONSULT TO CARDIAC REHAB  IP CONSULT TO CARDIAC REHAB    Assessment:    1.  Acute subsegmental pulmonary embolism scattered right lower lobe. No RV strain. 2. RLL consolidation- infarct v PNA. MRSA pcr positive. 3. Type II NSTEMI  4. CAD s/p stent  5. Hypertension  6.  Ectopy with first degree HB      New probs  Epistaxis    Plan  -cont cefepime/ vanc  -prn suppl oxygen, keep sats >90%  -cont percussion vest  -cont eliquis  -cont home meds  -Affrin nasal spray  -If more profuse, will need a rhinorocket  -CBC qd  -check Mag/K and replete prn     Diet: cardiac diet     DVT Prophylaxis: Heparin     Dispo:   Expected LOS of two days    Appropriate for A1 Discharge Unit: No      Yaya MD Geoff

## 2023-01-21 NOTE — PROGRESS NOTES
Pulmonary Critical Care Progress Note     Patient's name:  Wiliam Martinez  Medical Record Number: [id number]  Patient's account/billing number: [de-identified]  Patient's YOB: 1955  Age: 75 y.o. Date of Admission: 1/18/2023  5:03 PM  Date of Consult: 1/21/2023      Primary Care Physician: No primary care provider on file. Code Status: Full Code    Reason for consult: Acute respiratory failure with hypoxia and hypercapnia    Assessment and Plan     Acute respiratory with hypoxia and hypercapnia  Right lower lobe HCAP  Acute PE, unprovoked   CAD s/p stent        Plan: Alternate BiPAP and high flow oxygen to keep sats more than 90%. Antibiotics cefepime and vancomycin x 7 days   Eliquis long term   Systemic steroid and bronchodilators   Aspiration precautions. Vest therapy  Replace lytes       Subjective:  Weak  On high flow  No fever  Weak cough       HISTORY OF PRESENT ILLNESS:   /Ms. Wiliam Martinez is a 75 y.o.   gentleman with PMH stated below significant for h/o CVA with right hemiparesis, history of coronary artery disease, hypertension and hyperlipidemia NH resident presented with worsening shortness of breath cough for the last few days, transferred to the ICU overnight for hypoxic respiratory failure in severe respiratory distress  proBNP 697. Procalcitonin 0.17. Leukocytosis. CTA chest with subsegmental acute PE. Right lower lobe airspace disease.       REVIEW OF SYSTEMS:  Review of Systems -   C/o cough  Sob  Sputum  Not able to expectorate  No chest pain  Fever overnight        Physical Exam:    Vitals: BP (!) 147/85   Pulse 97   Temp 97.7 °F (36.5 °C) (Oral)   Resp 20   Ht 6' 3\" (1.905 m)   Wt 211 lb 10.3 oz (96 kg)   SpO2 97%   BMI 26.45 kg/m²     Last Body weight:   Wt Readings from Last 3 Encounters:   01/21/23 211 lb 10.3 oz (96 kg)   09/12/22 204 lb 2.3 oz (92.6 kg)   02/10/22 188 lb 4.4 oz (85.4 kg)       Body Mass Index : Body mass index is 26.45 kg/m². Intake and Output summary:   Intake/Output Summary (Last 24 hours) at 1/21/2023 0902  Last data filed at 1/21/2023 0747  Gross per 24 hour   Intake 2859.69 ml   Output 2425 ml   Net 434.69 ml       Physical Examination:     PHYSICAL EXAM:    Gen: NAD  Eyes: PERRL. Anicteric sclera. No conjunctival injection. ENT: No discharge. Posterior oropharynx clear. External appearance of ears and nose normal.  Neck: Trachea midline. No mass, no lymphadenopathy    Resp: Diminished bilaterally with scattered rhonchi no wheezing  CV: Regular rate. Regular rhythm. No murmur or rub. No edema. GI: Soft, Non-tender. Non-distended. +BS  Skin: Warm, dry, w/o erythema. Lymph: No cervical or supraclavicular LAD. M/S: No cyanosis. No clubbing. Right above-knee amputation  Neuro:  CN 2-12 tested, no focal neurologic deficit. Moves all extremities  Psych: Awake lethargic right-sided weakness        Laboratory findings:-    CBC:   Recent Labs     01/20/23  0355   WBC 8.4   HGB 9.4*   *     BMP:    Recent Labs     01/18/23  1721 01/20/23  0355 01/20/23  1842 01/21/23  0352   * 135*  --  137   K 3.5 3.3*  3.3*   < > 3.5   CL 96* 99  --  101   CO2 27 25  --  22   BUN 24* 28*  --  29*   CREATININE 1.0 1.3  --  1.0   GLUCOSE 125* 134*  --  170*    < > = values in this interval not displayed. S. Calcium:  Recent Labs     01/21/23  0352   CALCIUM 8.9       S. Magnesium:  Recent Labs     01/21/23  0352   MG 2.20     S. Phosphorus:  Recent Labs     01/18/23  2052   PHOS 3.5     S. Glucose:  Recent Labs     01/18/23  2249 01/19/23  0128   POCGLU 144* 125*       Hepatic functions:   Recent Labs     01/21/23  0352   ALKPHOS 90   ALT 8*   AST 16   PROT 6.3*   BILITOT 0.4   LABALBU 3.1*       Cardiac enzymes:  Recent Labs     01/18/23  1721 01/19/23  0224   TROPONINI 0.06* 0.05*       Radiology Review:  Pertinent images / reports were reviewed as a part of this visit.       CTPA: Results for orders placed during the hospital encounter of 01/18/23    CT CHEST PULMONARY EMBOLISM W CONTRAST    Narrative  EXAMINATION:  CTA OF THE CHEST 1/18/2023 6:43 pm    TECHNIQUE:  CTA of the chest was performed after the administration of intravenous  contrast.  Multiplanar reformatted images are provided for review. MIP  images are provided for review. Automated exposure control, iterative  reconstruction, and/or weight based adjustment of the mA/kV was utilized to  reduce the radiation dose to as low as reasonably achievable. COMPARISON:  None. HISTORY:  ORDERING SYSTEM PROVIDED HISTORY: right sided posterior chest pain, elevated  troponin  TECHNOLOGIST PROVIDED HISTORY:  Reason for exam:->right sided posterior chest pain, elevated troponin  Decision Support Exception - unselect if not a suspected or confirmed  emergency medical condition->Emergency Medical Condition (MA)  Reason for Exam: right sided posterior chest pain, elevated troponin    FINDINGS:  Pulmonary Arteries: There are multiple small subsegmental filling defects in  the pulmonary arteries to the right lower lobe with contrast seen distal to  the filling defects. No other filling defects are seen. Mediastinum: No evidence of mediastinal lymphadenopathy. The heart and  pericardium demonstrate no acute abnormality. There is no significant right  heart enlargement. There is moderate calcified plaque throughout the aorta  which is mildly dilated with no aneurysm or dissection. Lungs/pleura: There are small bibasilar pleural effusions layering  posteriorly with associated adjacent atelectasis and consolidation  posteriorly which is more prominent on the right. There is right basilar  consolidation posteriorly. No endobronchial lesion is seen. There is no  pulmonary nodule or mass. Upper Abdomen: There are calcifications scattered in the gallbladder.   The  adrenals are normal.    Soft Tissues/Bones: No acute bone or soft tissue abnormality. There is  moderate scoliosis    Impression  Subsegmental acute pulmonary emboli scattered in the pulmonary arteries to  the right lower lobe posteriorly. Moderate right basilar opacity posteriorly which could be due to a pulmonary  infarct vs pneumonia and atelectasis. Small bibasilar pleural effusions with bibasilar atelectasis posteriorly. Cholelithiasis. Mildly dilated and atherosclerotic thoracic aorta with no aneurysm or  dissection. The findings were called to Dr. Melissa Michele in the emergency room at 7:20 p.m. CXR PA/LAT: Results for orders placed during the hospital encounter of 01/15/17    XR Chest Standard TWO VW    Narrative  EXAMINATION:  TWO VIEWS OF THE CHEST    1/15/2017 8:01 pm    COMPARISON:  September 24, 2014    HISTORY:  ORDERING SYSTEM PROVIDED HISTORY: fatigue  TECHNOLOGIST PROVIDED HISTORY:  Ordering Physician Provided Reason for Exam: cough  Acuity: Unknown  Type of Exam: Unknown    FINDINGS:  The cardiomediastinal silhouette is stable. There are increased lung  markings at the bilateral parahilar regions, may be related to bronchitis. There is no pneumothorax or pleural effusion. The visualized osseous  structures are within normal limits. Impression  Increased lung markings at the bilateral parahilar regions, may be related to  bronchitis. CXR portable: Results for orders placed during the hospital encounter of 01/18/23    XR CHEST PORTABLE    Narrative  EXAMINATION:  ONE XRAY VIEW OF THE CHEST    1/19/2023 1:36 am    COMPARISON:  Chest radiograph performed 01/18/2023. HISTORY:  ORDERING SYSTEM PROVIDED HISTORY: rapid  TECHNOLOGIST PROVIDED HISTORY:  Reason for exam:->rapid  Reason for Exam: rapid    FINDINGS:  There is chronic pulmonary change. There is no acute consolidation or  effusion. There is no pneumothorax. The mediastinal structures are stable. The upper abdomen unremarkable.   The extrathoracic soft tissues are  unremarkable. Impression  Chronic pulmonary change without acute cardiopulmonary process.       Critical Care time 31 min     Obdulia Hendrix MD, M.D.            1/21/2023, 9:02 AM

## 2023-01-21 NOTE — PROGRESS NOTES
Pt is on High flow 14 lpm of oxygen, pulse ox 93%. Pt has nose bleed. Unable to start vapotherm due to nose bleed. Will try NR if  pulse ox dropped. Will continue to monitor.   Electronically signed by Conrad Malone RCP on 1/21/23

## 2023-01-21 NOTE — PROGRESS NOTES
Bedside shift hand-off done w/ oncoming ALENA Watts., to assume pt. care. This RN handed-off the pt. in a stable condition.     Electronically signed by Cristiano Amaya RN on 1/21/2023 at 7:57 AM

## 2023-01-21 NOTE — PROGRESS NOTES
4 Eyes Skin Assessment     NAME:  Rajesh Andersen  YOB: 1955  MEDICAL RECORD NUMBER:  [id number]    The patient is being assessed for  Shift Handoff    I agree that One RN have performed a thorough Head to Toe Skin Assessment on the patient. ALL assessment sites listed below have been assessed. Areas assessed by both nurses:    Head, Face, Ears, Shoulders, Back, Chest, Arms, Elbows, Hands, Sacrum. Buttock, Coccyx, Ischium, and Legs. Feet and Heels        Does the Patient have a Wound?  No noted wound(s)       Ilia Prevention initiated by RN: Yes   Wound Care Orders initiated by RN: NA    Pressure Injury (Stage 3,4, Unstageable, DTI, NWPT, and Complex wounds) if present place referral order by RN under : NA    New and Established Ostomies, if present place, referral order under : NA      Nurse 1 eSignature: Electronically signed by Andriy Yuan RN on 1/21/23 at 7:44 AM EST    **SHARE this note so that the co-signing nurse is able to place an eSignature**    Nurse 2 eSignature: Electronically signed by Tisha Craig RN on 1/21/23 at 2:24 PM EST

## 2023-01-21 NOTE — PROGRESS NOTES
His O2 flow is now down to 10 L/ min per high-flow nasal cannula (see flowsheet). He denies any SOB nor any pain. His BP has been stable and he remains afebrile. He's still alert and oriented but slow to respond (residual from his CVA).     Electronically signed by Prasanna Gupta RN on 1/21/2023 at 6:48 AM

## 2023-01-22 ENCOUNTER — APPOINTMENT (OUTPATIENT)
Dept: GENERAL RADIOLOGY | Age: 68
DRG: 981 | End: 2023-01-22
Payer: MEDICARE

## 2023-01-22 LAB
ANION GAP SERPL CALCULATED.3IONS-SCNC: 14 MMOL/L (ref 3–16)
BLOOD CULTURE, ROUTINE: NORMAL
BUN BLDV-MCNC: 31 MG/DL (ref 7–20)
CALCIUM SERPL-MCNC: 9.1 MG/DL (ref 8.3–10.6)
CHLORIDE BLD-SCNC: 102 MMOL/L (ref 99–110)
CO2: 21 MMOL/L (ref 21–32)
CREAT SERPL-MCNC: 0.9 MG/DL (ref 0.8–1.3)
CREAT SERPL-MCNC: 1 MG/DL (ref 0.8–1.3)
EKG ATRIAL RATE: 70 BPM
EKG DIAGNOSIS: NORMAL
EKG P AXIS: 102 DEGREES
EKG P-R INTERVAL: 220 MS
EKG Q-T INTERVAL: 418 MS
EKG QRS DURATION: 116 MS
EKG QTC CALCULATION (BAZETT): 451 MS
EKG R AXIS: -29 DEGREES
EKG T AXIS: 83 DEGREES
EKG VENTRICULAR RATE: 70 BPM
GFR SERPL CREATININE-BSD FRML MDRD: >60 ML/MIN/{1.73_M2}
GFR SERPL CREATININE-BSD FRML MDRD: >60 ML/MIN/{1.73_M2}
GLUCOSE BLD-MCNC: 146 MG/DL (ref 70–99)
HCT VFR BLD CALC: 28.3 % (ref 40.5–52.5)
HEMOGLOBIN: 9.5 G/DL (ref 13.5–17.5)
MAGNESIUM: 2 MG/DL (ref 1.8–2.4)
MCH RBC QN AUTO: 29 PG (ref 26–34)
MCHC RBC AUTO-ENTMCNC: 33.6 G/DL (ref 31–36)
MCV RBC AUTO: 86.5 FL (ref 80–100)
PDW BLD-RTO: 16.2 % (ref 12.4–15.4)
PLATELET # BLD: 169 K/UL (ref 135–450)
PMV BLD AUTO: 7.7 FL (ref 5–10.5)
POTASSIUM SERPL-SCNC: 4.2 MMOL/L (ref 3.5–5.1)
PRO-BNP: 6270 PG/ML (ref 0–124)
RBC # BLD: 3.27 M/UL (ref 4.2–5.9)
SODIUM BLD-SCNC: 137 MMOL/L (ref 136–145)
VANCOMYCIN RANDOM: 14.5 UG/ML
WBC # BLD: 11 K/UL (ref 4–11)

## 2023-01-22 PROCEDURE — 94668 MNPJ CHEST WALL SBSQ: CPT

## 2023-01-22 PROCEDURE — 6360000002 HC RX W HCPCS: Performed by: INTERNAL MEDICINE

## 2023-01-22 PROCEDURE — 6370000000 HC RX 637 (ALT 250 FOR IP): Performed by: INTERNAL MEDICINE

## 2023-01-22 PROCEDURE — 99291 CRITICAL CARE FIRST HOUR: CPT | Performed by: INTERNAL MEDICINE

## 2023-01-22 PROCEDURE — 36415 COLL VENOUS BLD VENIPUNCTURE: CPT

## 2023-01-22 PROCEDURE — 93010 ELECTROCARDIOGRAM REPORT: CPT | Performed by: INTERNAL MEDICINE

## 2023-01-22 PROCEDURE — 2580000003 HC RX 258: Performed by: INTERNAL MEDICINE

## 2023-01-22 PROCEDURE — 80048 BASIC METABOLIC PNL TOTAL CA: CPT

## 2023-01-22 PROCEDURE — 83735 ASSAY OF MAGNESIUM: CPT

## 2023-01-22 PROCEDURE — 82565 ASSAY OF CREATININE: CPT

## 2023-01-22 PROCEDURE — 1200000000 HC SEMI PRIVATE

## 2023-01-22 PROCEDURE — 94760 N-INVAS EAR/PLS OXIMETRY 1: CPT

## 2023-01-22 PROCEDURE — 94640 AIRWAY INHALATION TREATMENT: CPT

## 2023-01-22 PROCEDURE — 71045 X-RAY EXAM CHEST 1 VIEW: CPT

## 2023-01-22 PROCEDURE — 83880 ASSAY OF NATRIURETIC PEPTIDE: CPT

## 2023-01-22 PROCEDURE — 85027 COMPLETE CBC AUTOMATED: CPT

## 2023-01-22 PROCEDURE — 80202 ASSAY OF VANCOMYCIN: CPT

## 2023-01-22 PROCEDURE — 2060000000 HC ICU INTERMEDIATE R&B

## 2023-01-22 RX ORDER — SODIUM CHLORIDE FOR INHALATION 3 %
4 VIAL, NEBULIZER (ML) INHALATION 2 TIMES DAILY
Status: DISCONTINUED | OUTPATIENT
Start: 2023-01-22 | End: 2023-01-24 | Stop reason: HOSPADM

## 2023-01-22 RX ADMIN — ATORVASTATIN CALCIUM 80 MG: 80 TABLET, FILM COATED ORAL at 21:49

## 2023-01-22 RX ADMIN — GABAPENTIN 300 MG: 300 CAPSULE ORAL at 08:06

## 2023-01-22 RX ADMIN — Medication 10 ML: at 08:17

## 2023-01-22 RX ADMIN — SODIUM CHLORIDE SOLN NEBU 3% 4 ML: 3 NEBU SOLN at 20:46

## 2023-01-22 RX ADMIN — MUPIROCIN: 20 OINTMENT TOPICAL at 21:49

## 2023-01-22 RX ADMIN — VANCOMYCIN HYDROCHLORIDE 1250 MG: 1.25 INJECTION, POWDER, LYOPHILIZED, FOR SOLUTION INTRAVENOUS at 04:01

## 2023-01-22 RX ADMIN — ALBUTEROL SULFATE 2.5 MG: 2.5 SOLUTION RESPIRATORY (INHALATION) at 20:46

## 2023-01-22 RX ADMIN — BUSPIRONE HYDROCHLORIDE 5 MG: 5 TABLET ORAL at 08:06

## 2023-01-22 RX ADMIN — SERTRALINE 100 MG: 100 TABLET, FILM COATED ORAL at 08:06

## 2023-01-22 RX ADMIN — CEFEPIME 2000 MG: 2 INJECTION, POWDER, FOR SOLUTION INTRAVENOUS at 17:02

## 2023-01-22 RX ADMIN — CEFEPIME 2000 MG: 2 INJECTION, POWDER, FOR SOLUTION INTRAVENOUS at 23:30

## 2023-01-22 RX ADMIN — BUSPIRONE HYDROCHLORIDE 5 MG: 5 TABLET ORAL at 21:49

## 2023-01-22 RX ADMIN — GABAPENTIN 300 MG: 300 CAPSULE ORAL at 12:59

## 2023-01-22 RX ADMIN — MUPIROCIN: 20 OINTMENT TOPICAL at 08:16

## 2023-01-22 RX ADMIN — ASPIRIN 81 MG CHEWABLE TABLET 81 MG: 81 TABLET CHEWABLE at 08:06

## 2023-01-22 RX ADMIN — GABAPENTIN 300 MG: 300 CAPSULE ORAL at 21:49

## 2023-01-22 RX ADMIN — IPRATROPIUM BROMIDE 0.5 MG: 0.5 SOLUTION RESPIRATORY (INHALATION) at 08:18

## 2023-01-22 RX ADMIN — MIRTAZAPINE 15 MG: 15 TABLET, FILM COATED ORAL at 21:49

## 2023-01-22 RX ADMIN — TICAGRELOR 90 MG: 90 TABLET ORAL at 08:06

## 2023-01-22 RX ADMIN — APIXABAN 10 MG: 5 TABLET, FILM COATED ORAL at 21:49

## 2023-01-22 RX ADMIN — METHYLPREDNISOLONE SODIUM SUCCINATE 40 MG: 40 INJECTION, POWDER, FOR SOLUTION INTRAMUSCULAR; INTRAVENOUS at 08:08

## 2023-01-22 RX ADMIN — ALBUTEROL SULFATE 2.5 MG: 2.5 SOLUTION RESPIRATORY (INHALATION) at 08:18

## 2023-01-22 RX ADMIN — IPRATROPIUM BROMIDE 0.5 MG: 0.5 SOLUTION RESPIRATORY (INHALATION) at 20:46

## 2023-01-22 RX ADMIN — TICAGRELOR 90 MG: 90 TABLET ORAL at 21:49

## 2023-01-22 RX ADMIN — CEFEPIME 2000 MG: 2 INJECTION, POWDER, FOR SOLUTION INTRAVENOUS at 08:16

## 2023-01-22 RX ADMIN — APIXABAN 10 MG: 5 TABLET, FILM COATED ORAL at 08:06

## 2023-01-22 RX ADMIN — Medication 10 ML: at 21:49

## 2023-01-22 RX ADMIN — PANTOPRAZOLE SODIUM 40 MG: 40 TABLET, DELAYED RELEASE ORAL at 08:06

## 2023-01-22 ASSESSMENT — PAIN SCALES - GENERAL
PAINLEVEL_OUTOF10: 0

## 2023-01-22 NOTE — PROGRESS NOTES
4 Eyes Skin Assessment     NAME:  Shella Bumpers  YOB: 1955  MEDICAL RECORD NUMBER:  [id number]    The patient is being assessed for  Transfer to New Unit    I agree that One RN have performed a thorough Head to Toe Skin Assessment on the patient. ALL assessment sites listed below have been assessed. Areas assessed by both nurses:    Head, Face, Ears, Shoulders, Back, Chest, Arms, Elbows, Hands, Sacrum. Buttock, Coccyx, Ischium, and Legs. Feet and Heels        Does the Patient have a Wound? No noted wound(s)     Pt noted to have redness on scrotum and buttocks. L heel bony and red- mepilex in place. Pt noted to have redness to bilateral ears from O2 tubing.    Ilia Prevention initiated by RN: Yes   Wound Care Orders initiated by RN: No    Pressure Injury (Stage 3,4, Unstageable, DTI, NWPT, and Complex wounds) if present place referral order by RN under : No    New and Established Ostomies, if present place, referral order under : NA      Nurse 1 eSignature: Electronically signed by Rey Lyman RN on 1/22/23 at 12:37 PM EST    **SHARE this note so that the co-signing nurse is able to place an eSignature**    Nurse 2 eSignature: Electronically signed by Karthik Grover RN on 1/22/23 at 1:22 PM EST

## 2023-01-22 NOTE — PROGRESS NOTES
Clinical Pharmacy Note  Vancomycin Consult    Nadia Everett is a 75 y.o. male ordered Vancomycin for CAP/sepsis of unknown origin; consult received from Dr. Betsy Stephens to manage therapy. Also receiving cefepime. Allergies:  Patient has no known allergies. Temp max:  Temp (24hrs), Av.1 °F (36.7 °C), Min:97.7 °F (36.5 °C), Max:98.7 °F (37.1 °C)      Recent Labs     23  0355   WBC 8.4         Recent Labs     23  0355 23  0352   BUN 28* 29*   CREATININE 1.3 1.0           Intake/Output Summary (Last 24 hours) at 2023 0422  Last data filed at 2023 0400  Gross per 24 hour   Intake 2458. 59 ml   Output 2460 ml   Net -1.41 ml         Culture Results:   MRSA probe: positive    Ht Readings from Last 1 Encounters:   23 6' 3\" (1.905 m)        Wt Readings from Last 1 Encounters:   23 210 lb 5.1 oz (95.4 kg)         Estimated Creatinine Clearance: 85 mL/min (based on SCr of 1 mg/dL). Assessment:  Day # 4 of vancomycin. Current regimen: 1250 mg every 24 hours  Vancomycin level: 14.5 mg/L (23 hours after dose)  Predicted AUC: 425    Plan:  Continue 1250mg IV Q24H  Will order level after 2 doses more doses (0300 on )    Thank you for the consult.    Merle Triplett, PharmD  2023 4:22 AM

## 2023-01-22 NOTE — PROGRESS NOTES
Called patient , juana garber, left non emergent voicemail HIPAA friendly message    [address]     Electronically signed by Justus Ibarra RN on 1/22/2023 at 11:44 AM

## 2023-01-22 NOTE — PROGRESS NOTES
Pulmonary Critical Care Progress Note     Patient's name:  Cuauhtemoc Deleon  Medical Record Number: [id number]  Patient's account/billing number: [de-identified]  Patient's YOB: 1955  Age: 75 y.o. Date of Admission: 1/18/2023  5:03 PM  Date of Consult: 1/22/2023      Primary Care Physician: No primary care provider on file. Code Status: Full Code    Reason for consult: Acute respiratory failure with hypoxia and hypercapnia    Assessment and Plan     Acute respiratory with hypoxia and hypercapnia  Right lower lobe HCAP  Acute PE, unprovoked   CAD s/p stent  Epistaxis         Plan: Alternate BiPAP and high flow oxygen to keep sats more than 90%. Antibiotics cefepime and vancomycin x 7 days   Eliquis long term   Systemic steroid and bronchodilators   Aspiration precautions. Vest therapy and 3%        Subjective:  Weak  On high flow  No fever  Weak cough       HISTORY OF PRESENT ILLNESS:   /Ms. Cuauhtemoc Deleon is a 75 y.o.   gentleman with PMH stated below significant for h/o CVA with right hemiparesis, history of coronary artery disease, hypertension and hyperlipidemia NH resident presented with worsening shortness of breath cough for the last few days, transferred to the ICU overnight for hypoxic respiratory failure in severe respiratory distress  proBNP 697. Procalcitonin 0.17. Leukocytosis. CTA chest with subsegmental acute PE. Right lower lobe airspace disease.       REVIEW OF SYSTEMS:  Review of Systems -   C/o cough  Sob  Sputum  Not able to expectorate  No chest pain  Fever overnight        Physical Exam:    Vitals: /79   Pulse 68   Temp 98.4 °F (36.9 °C) (Oral)   Resp 22   Ht 6' 3\" (1.905 m)   Wt 210 lb 5.1 oz (95.4 kg)   SpO2 90%   BMI 26.29 kg/m²     Last Body weight:   Wt Readings from Last 3 Encounters:   01/22/23 210 lb 5.1 oz (95.4 kg)   09/12/22 204 lb 2.3 oz (92.6 kg)   02/10/22 188 lb 4.4 oz (85.4 kg)       Body Mass Index : Body mass index is 26.29 kg/m². Intake and Output summary:   Intake/Output Summary (Last 24 hours) at 1/22/2023 1004  Last data filed at 1/22/2023 0800  Gross per 24 hour   Intake 1569.32 ml   Output 1975 ml   Net -405.68 ml       Physical Examination:     PHYSICAL EXAM:    Gen: NAD  Eyes: PERRL. Anicteric sclera. No conjunctival injection. ENT: No discharge. Posterior oropharynx clear. External appearance of ears and nose normal.  Neck: Trachea midline. No mass, no lymphadenopathy    Resp: Diminished bilaterally with scattered rhonchi no wheezing  CV: Regular rate. Regular rhythm. No murmur or rub. No edema. GI: Soft, Non-tender. Non-distended. +BS  Skin: Warm, dry, w/o erythema. Lymph: No cervical or supraclavicular LAD. M/S: No cyanosis. No clubbing. Right above-knee amputation  Neuro:  CN 2-12 tested, no focal neurologic deficit. Moves all extremities  Psych: Awake lethargic right-sided weakness        Laboratory findings:-    CBC:   Recent Labs     01/22/23 0436   WBC 11.0   HGB 9.5*        BMP:    Recent Labs     01/20/23  0355 01/20/23  1842 01/21/23  0352 01/21/23  1950 01/22/23 0436   *  --  137  --   --    K 3.3*  3.3*   < > 3.5 3.9  --    CL 99  --  101  --   --    CO2 25  --  22  --   --    BUN 28*  --  29*  --   --    CREATININE 1.3  --  1.0  --  0.9   GLUCOSE 134*  --  170*  --   --     < > = values in this interval not displayed. S. Calcium:  Recent Labs     01/21/23 0352   CALCIUM 8.9       S. Magnesium:  Recent Labs     01/22/23 0436   MG 2.00     S. Phosphorus:  No results for input(s): PHOS in the last 72 hours. S. Glucose:  No results for input(s): POCGLU in the last 72 hours. Hepatic functions:   Recent Labs     01/21/23 0352   ALKPHOS 90   ALT 8*   AST 16   PROT 6.3*   BILITOT 0.4   LABALBU 3.1*       Cardiac enzymes:  No results for input(s): CKTOTAL, CKMB, CKMBINDEX, TROPONINI in the last 72 hours.       Radiology Review:  Pertinent images / reports were reviewed as a part of this visit. CTPA: Results for orders placed during the hospital encounter of 01/18/23    CT CHEST PULMONARY EMBOLISM W CONTRAST    Narrative  EXAMINATION:  CTA OF THE CHEST 1/18/2023 6:43 pm    TECHNIQUE:  CTA of the chest was performed after the administration of intravenous  contrast.  Multiplanar reformatted images are provided for review. MIP  images are provided for review. Automated exposure control, iterative  reconstruction, and/or weight based adjustment of the mA/kV was utilized to  reduce the radiation dose to as low as reasonably achievable. COMPARISON:  None. HISTORY:  ORDERING SYSTEM PROVIDED HISTORY: right sided posterior chest pain, elevated  troponin  TECHNOLOGIST PROVIDED HISTORY:  Reason for exam:->right sided posterior chest pain, elevated troponin  Decision Support Exception - unselect if not a suspected or confirmed  emergency medical condition->Emergency Medical Condition (MA)  Reason for Exam: right sided posterior chest pain, elevated troponin    FINDINGS:  Pulmonary Arteries: There are multiple small subsegmental filling defects in  the pulmonary arteries to the right lower lobe with contrast seen distal to  the filling defects. No other filling defects are seen. Mediastinum: No evidence of mediastinal lymphadenopathy. The heart and  pericardium demonstrate no acute abnormality. There is no significant right  heart enlargement. There is moderate calcified plaque throughout the aorta  which is mildly dilated with no aneurysm or dissection. Lungs/pleura: There are small bibasilar pleural effusions layering  posteriorly with associated adjacent atelectasis and consolidation  posteriorly which is more prominent on the right. There is right basilar  consolidation posteriorly. No endobronchial lesion is seen. There is no  pulmonary nodule or mass. Upper Abdomen: There are calcifications scattered in the gallbladder. The  adrenals are normal.    Soft Tissues/Bones: No acute bone or soft tissue abnormality. There is  moderate scoliosis    Impression  Subsegmental acute pulmonary emboli scattered in the pulmonary arteries to  the right lower lobe posteriorly. Moderate right basilar opacity posteriorly which could be due to a pulmonary  infarct vs pneumonia and atelectasis. Small bibasilar pleural effusions with bibasilar atelectasis posteriorly. Cholelithiasis. Mildly dilated and atherosclerotic thoracic aorta with no aneurysm or  dissection. The findings were called to Dr. Lety Marion in the emergency room at 7:20 p.m. CXR PA/LAT: Results for orders placed during the hospital encounter of 01/15/17    XR Chest Standard TWO VW    Narrative  EXAMINATION:  TWO VIEWS OF THE CHEST    1/15/2017 8:01 pm    COMPARISON:  September 24, 2014    HISTORY:  ORDERING SYSTEM PROVIDED HISTORY: fatigue  TECHNOLOGIST PROVIDED HISTORY:  Ordering Physician Provided Reason for Exam: cough  Acuity: Unknown  Type of Exam: Unknown    FINDINGS:  The cardiomediastinal silhouette is stable. There are increased lung  markings at the bilateral parahilar regions, may be related to bronchitis. There is no pneumothorax or pleural effusion. The visualized osseous  structures are within normal limits. Impression  Increased lung markings at the bilateral parahilar regions, may be related to  bronchitis. CXR portable: Results for orders placed during the hospital encounter of 01/18/23    XR CHEST PORTABLE    Narrative  EXAMINATION:  ONE XRAY VIEW OF THE CHEST    1/19/2023 1:36 am    COMPARISON:  Chest radiograph performed 01/18/2023. HISTORY:  ORDERING SYSTEM PROVIDED HISTORY: rapid  TECHNOLOGIST PROVIDED HISTORY:  Reason for exam:->rapid  Reason for Exam: rapid    FINDINGS:  There is chronic pulmonary change. There is no acute consolidation or  effusion. There is no pneumothorax. The mediastinal structures are stable.   The upper abdomen unremarkable. The extrathoracic soft tissues are  unremarkable. Impression  Chronic pulmonary change without acute cardiopulmonary process.       Critical Care time 31 min     Kym Aldana MD, M.D.            1/22/2023, 10:04 AM

## 2023-01-22 NOTE — PROGRESS NOTES
Pt transferred from [facility] to [facility]. Pt alert and oriented. VS and assessment completed. Telemetry verified with [facility]. Pt noted to have redness on scrotum and buttocks. L heel bony and red- mepilex in place. Pt noted to have redness to bilateral ears from O2 tubing. Venturi mask in place. Specialty bed ordered. Pt oriented to room and safety measures. Pt resting in bed with call light in reach.    Electronically signed by Jacob Julio RN on 1/22/23 at 12:35 PM EST

## 2023-01-22 NOTE — PLAN OF CARE
Problem: Discharge Planning  Goal: Discharge to home or other facility with appropriate resources  1/22/2023 0727 by Karthik Slacido RN  Outcome: Progressing  1/21/2023 2049 by Manny Jerome RN  Outcome: [facility] [place] (Taken 1/21/2023 2009)  Discharge to home or other facility with appropriate resources:   Identify barriers to discharge with patient and caregiver   Arrange for needed discharge resources and transportation as appropriate   Identify discharge learning needs (meds, wound care, etc)   Arrange for interpreters to assist at discharge as needed   Refer to discharge planning if patient needs post-hospital services based on physician order or complex needs related to functional status, cognitive ability or social support system     Problem: Pain  Goal: Verbalizes/displays adequate comfort level or baseline comfort level  1/22/2023 0727 by Karthik Salcido RN  Outcome: Progressing  1/21/2023 2049 by Manny Jerome RN  Outcome: Progressing  [address] (Taken 1/21/2023 2009)  Verbalizes/displays adequate comfort level or baseline comfort level:   Encourage patient to monitor pain and request assistance   Assess pain using appropriate pain scale   Administer analgesics based on type and severity of pain and evaluate response   Implement non-pharmacological measures as appropriate and evaluate response   Consider cultural and social influences on pain and pain management   Notify Licensed Independent Practitioner if interventions unsuccessful or patient reports new pain     Problem: Safety - Adult  Goal: Free from fall injury  1/22/2023 0727 by Karthik Salcido RN  Outcome: Progressing  1/21/2023 2049 by Manny Jerome RN  Outcome: Progressing     Problem: Skin/Tissue Integrity  Goal: Absence of new skin breakdown  Description: 1. Monitor for areas of redness and/or skin breakdown  2. Assess vascular access sites hourly  3. Every 4-6 hours minimum:  Change oxygen saturation probe site  4. Every 4-6 hours:  If on nasal continuous positive airway pressure, respiratory therapy assess nares and determine need for appliance change or resting period.   1/22/2023 0727 by Guillermo Jonas RN  Outcome: Progressing  1/21/2023 2049 by Suleman Villaseñor RN  Outcome: Progressing     Problem: Neurosensory - Adult  Goal: Achieves stable or improved neurological status  1/22/2023 0727 by Guillermo Jonas RN  Outcome: Progressing  1/21/2023 2049 by Suleman Villaseñor RN  Outcome: Progressing  [employer] (Taken 1/21/2023 2009)  Achieves stable or improved neurological status: Assess for and report changes in neurological status  Goal: Achieves maximal functionality and self care  1/22/2023 0727 by Guillermo Jonas RN  Outcome: Progressing  1/21/2023 2049 by Suleman Villaseñor RN  Outcome: Progressing  [employer] (Taken 1/21/2023 2009)  Achieves maximal functionality and self care: Monitor swallowing and airway patency with patient fatigue and changes in neurological status     Problem: Cardiovascular - Adult  Goal: Maintains optimal cardiac output and hemodynamic stability  1/22/2023 0727 by Guillermo Jonas RN  Outcome: Progressing  1/21/2023 2049 by Suleman Villaseñor RN  Outcome: Progressing  [employer] (Taken 1/21/2023 2009)  Maintains optimal cardiac output and hemodynamic stability:   Monitor blood pressure and heart rate   Monitor urine output and notify Licensed Independent Practitioner for values outside of normal range   Assess for signs of decreased cardiac output  Goal: Absence of cardiac dysrhythmias or at baseline  1/22/2023 0727 by Guillermo Jonas RN  Outcome: Progressing  1/21/2023 2049 by Suleman Villaseñor RN  Outcome: Progressing  [employer] (Taken 1/21/2023 2009)  Absence of cardiac dysrhythmias or at baseline: Monitor cardiac rate and rhythm     Problem: Skin/Tissue Integrity - Adult  Goal: Skin integrity remains intact  1/22/2023 0727 by Guillermo Jonas RN  Outcome: Progressing  1/21/2023 2049 by Jamari Moya Hannah Partida RN  Outcome: Progressing  Flowsheets (Taken 1/21/2023 2009)  Skin Integrity Remains Intact:   Monitor for areas of redness and/or skin breakdown   Assess vascular access sites hourly     Problem: Infection - Adult  Goal: Absence of infection at discharge  1/22/2023 0727 by Devin Castro RN  Outcome: Progressing  1/21/2023 2049 by Alex Pineda RN  Outcome: Progressing  [address] (Taken 1/21/2023 2009)  Absence of infection at discharge:   Assess and monitor for signs and symptoms of infection   Monitor lab/diagnostic results   Monitor all insertion sites i.e., indwelling lines, tubes and drains   Administer medications as ordered   Instruct and encourage patient and family to use good hand hygiene technique   Identify and instruct in appropriate isolation precautions for identified infection/condition  Goal: Absence of infection during hospitalization  1/22/2023 0727 by Devin Castro RN  Outcome: Progressing  1/21/2023 2049 by Alex Pineda RN  Outcome: Progressing  [facility] (Taken 1/21/2023 2009)  Absence of infection during hospitalization:   Assess and monitor for signs and symptoms of infection   Monitor lab/diagnostic results   Monitor all insertion sites i.e., indwelling lines, tubes and drains   Administer medications as ordered   Instruct and encourage patient and family to use good hand hygiene technique   Identify and instruct in appropriate isolation precautions for identified infection/condition     Problem: Metabolic/Fluid and Electrolytes - Adult  Goal: Electrolytes maintained within normal limits  1/22/2023 0727 by Devin Castro RN  Outcome: Progressing  1/21/2023 2049 by Alex Pineda RN  Outcome: Progressing  Flowsheets (Taken 1/21/2023 2009)  Electrolytes maintained within normal limits:   Monitor labs and assess patient for signs and symptoms of electrolyte imbalances   Administer electrolyte replacement as ordered   Monitor response to electrolyte replacements, including repeat lab results as appropriate   Instruct patient on fluid and nutrition restrictions as appropriate     Problem: Hematologic - Adult  Goal: Maintains hematologic stability  1/22/2023 0727 by Garrison Augustine RN  Outcome: Progressing  1/21/2023 2049 by Martha Dc RN  Outcome: Progressing  [address] (Taken 1/21/2023 2009)  Maintains hematologic stability:   Assess for signs and symptoms of bleeding or hemorrhage   Monitor labs for bleeding or clotting disorders     Problem: Respiratory - Adult  Goal: Achieves optimal ventilation and oxygenation  1/22/2023 0727 by Garrison Augustine RN  Outcome: Progressing  1/21/2023 2049 by Marhta Dc RN  Outcome: Progressing  Flowsheets (Taken 1/21/2023 2009)  Achieves optimal ventilation and oxygenation:   Assess for changes in respiratory status   Assess for changes in mentation and behavior   Position to facilitate oxygenation and minimize respiratory effort   Oxygen supplementation based on oxygen saturation or arterial blood gases   Encourage broncho-pulmonary hygiene including cough, deep breathe, incentive spirometry   Assess the need for suctioning and aspirate as needed   Assess and instruct to report shortness of breath or any respiratory difficulty   Respiratory therapy support as indicated     Problem: Genitourinary - Adult  Goal: Urinary catheter remains patent  1/22/2023 0727 by Garrison Augustine RN  Outcome: Progressing  1/21/2023 2049 by Martha Dc RN  Outcome: Progressing  Flowsheets (Taken 1/21/2023 2009)  Urinary catheter remains patent: Assess patency of urinary catheter

## 2023-01-22 NOTE — PLAN OF CARE
Problem: Discharge Planning  Goal: Discharge to home or other facility with appropriate resources  1/22/2023 1347 by Romel Love RN  Outcome: Progressing  1/22/2023 0727 by Arelis Martinez RN  Outcome: Progressing     Problem: Pain  Goal: Verbalizes/displays adequate comfort level or baseline comfort level  1/22/2023 1347 by Romel Love RN  Outcome: Progressing  1/22/2023 0727 by Arelis Martinez RN  Outcome: Progressing     Problem: ABCDS Injury Assessment  Goal: Absence of physical injury  1/22/2023 1347 by Romel Love RN  Outcome: Progressing  1/22/2023 0727 by Arelis Martinez RN  Outcome: Progressing     Problem: Safety - Adult  Goal: Free from fall injury  1/22/2023 1347 by Romel Love RN  Outcome: Progressing  Note: Fall precautions in place. Bed locked and in lowest position. Alarm on. Call light within reach. 1/22/2023 0727 by Arelis Martinez RN  Outcome: Progressing     Problem: Skin/Tissue Integrity  Goal: Absence of new skin breakdown  Description: 1. Monitor for areas of redness and/or skin breakdown  2. Assess vascular access sites hourly  3. Every 4-6 hours minimum:  Change oxygen saturation probe site  4. Every 4-6 hours:  If on nasal continuous positive airway pressure, respiratory therapy assess nares and determine need for appliance change or resting period.   1/22/2023 1347 by Romel Love RN  Outcome: Progressing  1/22/2023 0727 by Areils Martinez RN  Outcome: Progressing     Problem: Neurosensory - Adult  Goal: Achieves stable or improved neurological status  1/22/2023 1347 by Romel Love RN  Outcome: Progressing  1/22/2023 0727 by Arelis Martinez RN  Outcome: Progressing  Goal: Achieves maximal functionality and self care  1/22/2023 1347 by Romel Love RN  Outcome: Progressing  1/22/2023 0727 by Arelis Martinez RN  Outcome: Progressing     Problem: Cardiovascular - Adult  Goal: Maintains optimal cardiac output and hemodynamic stability  1/22/2023 1347 by Romel Love RN  Outcome: Progressing  1/22/2023 0727 by Sai Osorio RN  Outcome: Progressing  Goal: Absence of cardiac dysrhythmias or at baseline  1/22/2023 1347 by Dione Hoff RN  Outcome: Progressing  1/22/2023 0727 by Sai Osorio RN  Outcome: Progressing     Problem: Skin/Tissue Integrity - Adult  Goal: Skin integrity remains intact  1/22/2023 1347 by Dione Hoff RN  Outcome: Progressing  1/22/2023 0727 by Sai Osorio RN  Outcome: Progressing     Problem: Infection - Adult  Goal: Absence of infection at discharge  1/22/2023 1347 by Dione Hoff RN  Outcome: Progressing  1/22/2023 0727 by Sai Osorio RN  Outcome: Progressing  Goal: Absence of infection during hospitalization  1/22/2023 1347 by Dione Hoff RN  Outcome: Progressing  1/22/2023 0727 by Sai Osorio RN  Outcome: Progressing     Problem: Metabolic/Fluid and Electrolytes - Adult  Goal: Electrolytes maintained within normal limits  1/22/2023 1347 by Dione Hoff RN  Outcome: Progressing  1/22/2023 0727 by Sai Osorio RN  Outcome: Progressing     Problem: Hematologic - Adult  Goal: Maintains hematologic stability  1/22/2023 1347 by Dione Hoff RN  Outcome: Progressing  1/22/2023 0727 by Sai Osorio RN  Outcome: Progressing     Problem: Respiratory - Adult  Goal: Achieves optimal ventilation and oxygenation  1/22/2023 1347 by Dione Hoff RN  Outcome: Progressing  1/22/2023 0727 by Sai Osorio RN  Outcome: Progressing     Problem: Genitourinary - Adult  Goal: Urinary catheter remains patent  1/22/2023 1347 by Dione Hoff RN  Outcome: Progressing  1/22/2023 0727 by Sai Osorio RN  Outcome: Progressing

## 2023-01-22 NOTE — PLAN OF CARE
Problem: Discharge Planning  Goal: Discharge to home or other facility with appropriate resources  Outcome: Progressing  Flowsheets (Taken 1/21/2023 2009)  Discharge to home or other facility with appropriate resources:   Identify barriers to discharge with patient and caregiver   Arrange for needed discharge resources and transportation as appropriate   Identify discharge learning needs (meds, wound care, etc)   Arrange for interpreters to assist at discharge as needed   Refer to discharge planning if patient needs post-hospital services based on physician order or complex needs related to functional status, cognitive ability or social support system     Problem: Pain  Goal: Verbalizes/displays adequate comfort level or baseline comfort level  Outcome: Progressing  Flowsheets (Taken 1/21/2023 2009)  Verbalizes/displays adequate comfort level or baseline comfort level:   Encourage patient to monitor pain and request assistance   Assess pain using appropriate pain scale   Administer analgesics based on type and severity of pain and evaluate response   Implement non-pharmacological measures as appropriate and evaluate response   Consider cultural and social influences on pain and pain management   Notify Licensed Independent Practitioner if interventions unsuccessful or patient reports new pain     Problem: ABCDS Injury Assessment  Goal: Absence of physical injury  Outcome: Progressing     Problem: Safety - Adult  Goal: Free from fall injury  Outcome: Progressing     Problem: Skin/Tissue Integrity  Goal: Absence of new skin breakdown  Description: 1. Monitor for areas of redness and/or skin breakdown  2. Assess vascular access sites hourly  3. Every 4-6 hours minimum:  Change oxygen saturation probe site  4. Every 4-6 hours:  If on nasal continuous positive airway pressure, respiratory therapy assess nares and determine need for appliance change or resting period.   Outcome: Progressing     Problem: Neurosensory - Adult  Goal: Achieves stable or improved neurological status  Outcome: Progressing  Flowsheets (Taken 1/21/2023 2009)  Achieves stable or improved neurological status: Assess for and report changes in neurological status  Goal: Achieves maximal functionality and self care  Outcome: Progressing  Flowsheets (Taken 1/21/2023 2009)  Achieves maximal functionality and self care: Monitor swallowing and airway patency with patient fatigue and changes in neurological status     Problem: Cardiovascular - Adult  Goal: Maintains optimal cardiac output and hemodynamic stability  Outcome: Progressing  Flowsheets (Taken 1/21/2023 2009)  Maintains optimal cardiac output and hemodynamic stability:   Monitor blood pressure and heart rate   Monitor urine output and notify Licensed Independent Practitioner for values outside of normal range   Assess for signs of decreased cardiac output  Goal: Absence of cardiac dysrhythmias or at baseline  Outcome: Progressing  Flowsheets (Taken 1/21/2023 2009)  Absence of cardiac dysrhythmias or at baseline: Monitor cardiac rate and rhythm     Problem: Skin/Tissue Integrity - Adult  Goal: Skin integrity remains intact  Outcome: Progressing  Flowsheets (Taken 1/21/2023 2009)  Skin Integrity Remains Intact:   Monitor for areas of redness and/or skin breakdown   Assess vascular access sites hourly     Problem: Infection - Adult  Goal: Absence of infection at discharge  Outcome: Progressing  Flowsheets (Taken 1/21/2023 2009)  Absence of infection at discharge:   Assess and monitor for signs and symptoms of infection   Monitor lab/diagnostic results   Monitor all insertion sites i.e., indwelling lines, tubes and drains   Administer medications as ordered   Instruct and encourage patient and family to use good hand hygiene technique   Identify and instruct in appropriate isolation precautions for identified infection/condition  Goal: Absence of infection during hospitalization  Outcome: Progressing  Flowsheets (Taken 1/21/2023 2009)  Absence of infection during hospitalization:   Assess and monitor for signs and symptoms of infection   Monitor lab/diagnostic results   Monitor all insertion sites i.e., indwelling lines, tubes and drains   Administer medications as ordered   Instruct and encourage patient and family to use good hand hygiene technique   Identify and instruct in appropriate isolation precautions for identified infection/condition     Problem: Metabolic/Fluid and Electrolytes - Adult  Goal: Electrolytes maintained within normal limits  Outcome: Progressing  Flowsheets (Taken 1/21/2023 2009)  Electrolytes maintained within normal limits:   Monitor labs and assess patient for signs and symptoms of electrolyte imbalances   Administer electrolyte replacement as ordered   Monitor response to electrolyte replacements, including repeat lab results as appropriate   Instruct patient on fluid and nutrition restrictions as appropriate     Problem: Hematologic - Adult  Goal: Maintains hematologic stability  Outcome: Progressing  Flowsheets (Taken 1/21/2023 2009)  Maintains hematologic stability:   Assess for signs and symptoms of bleeding or hemorrhage   Monitor labs for bleeding or clotting disorders     Problem: Respiratory - Adult  Goal: Achieves optimal ventilation and oxygenation  Outcome: Progressing  Flowsheets (Taken 1/21/2023 2009)  Achieves optimal ventilation and oxygenation:   Assess for changes in respiratory status   Assess for changes in mentation and behavior   Position to facilitate oxygenation and minimize respiratory effort   Oxygen supplementation based on oxygen saturation or arterial blood gases   Encourage broncho-pulmonary hygiene including cough, deep breathe, incentive spirometry   Assess the need for suctioning and aspirate as needed   Assess and instruct to report shortness of breath or any respiratory difficulty   Respiratory therapy support as indicated     Problem: Genitourinary - Adult  Goal: Urinary catheter remains patent  Outcome: Progressing  Flowsheets (Taken 1/21/2023 2009)  Urinary catheter remains patent: Assess patency of urinary catheter

## 2023-01-22 NOTE — PROGRESS NOTES
Report given to [facility] nurse [address]  Pt will transfer to [id number]     Electronically signed by Veronica Mckee RN on 1/22/2023 at 11:46 AM]

## 2023-01-23 ENCOUNTER — APPOINTMENT (OUTPATIENT)
Dept: GENERAL RADIOLOGY | Age: 68
DRG: 981 | End: 2023-01-23
Payer: MEDICARE

## 2023-01-23 LAB
CREAT SERPL-MCNC: 0.9 MG/DL (ref 0.8–1.3)
CULTURE, BLOOD 2: NORMAL
GFR SERPL CREATININE-BSD FRML MDRD: >60 ML/MIN/{1.73_M2}
MAGNESIUM: 2 MG/DL (ref 1.8–2.4)
PROCALCITONIN: 0.11 NG/ML (ref 0–0.15)

## 2023-01-23 PROCEDURE — 71045 X-RAY EXAM CHEST 1 VIEW: CPT

## 2023-01-23 PROCEDURE — 6370000000 HC RX 637 (ALT 250 FOR IP): Performed by: INTERNAL MEDICINE

## 2023-01-23 PROCEDURE — 6360000002 HC RX W HCPCS: Performed by: INTERNAL MEDICINE

## 2023-01-23 PROCEDURE — 87633 RESP VIRUS 12-25 TARGETS: CPT

## 2023-01-23 PROCEDURE — 99232 SBSQ HOSP IP/OBS MODERATE 35: CPT | Performed by: INTERNAL MEDICINE

## 2023-01-23 PROCEDURE — 94761 N-INVAS EAR/PLS OXIMETRY MLT: CPT

## 2023-01-23 PROCEDURE — 2580000003 HC RX 258: Performed by: INTERNAL MEDICINE

## 2023-01-23 PROCEDURE — 82565 ASSAY OF CREATININE: CPT

## 2023-01-23 PROCEDURE — 99223 1ST HOSP IP/OBS HIGH 75: CPT | Performed by: INTERNAL MEDICINE

## 2023-01-23 PROCEDURE — 84145 PROCALCITONIN (PCT): CPT

## 2023-01-23 PROCEDURE — 87205 SMEAR GRAM STAIN: CPT

## 2023-01-23 PROCEDURE — 94640 AIRWAY INHALATION TREATMENT: CPT

## 2023-01-23 PROCEDURE — 2060000000 HC ICU INTERMEDIATE R&B

## 2023-01-23 PROCEDURE — 94669 MECHANICAL CHEST WALL OSCILL: CPT

## 2023-01-23 PROCEDURE — 36415 COLL VENOUS BLD VENIPUNCTURE: CPT

## 2023-01-23 PROCEDURE — 83735 ASSAY OF MAGNESIUM: CPT

## 2023-01-23 PROCEDURE — 87070 CULTURE OTHR SPECIMN AEROBIC: CPT

## 2023-01-23 RX ADMIN — GABAPENTIN 300 MG: 300 CAPSULE ORAL at 14:04

## 2023-01-23 RX ADMIN — MUPIROCIN: 20 OINTMENT TOPICAL at 20:46

## 2023-01-23 RX ADMIN — TIOTROPIUM BROMIDE INHALATION SPRAY 2 PUFF: 3.12 SPRAY, METERED RESPIRATORY (INHALATION) at 09:42

## 2023-01-23 RX ADMIN — APIXABAN 10 MG: 5 TABLET, FILM COATED ORAL at 08:46

## 2023-01-23 RX ADMIN — GABAPENTIN 300 MG: 300 CAPSULE ORAL at 20:45

## 2023-01-23 RX ADMIN — CEFEPIME 2000 MG: 2 INJECTION, POWDER, FOR SOLUTION INTRAVENOUS at 08:51

## 2023-01-23 RX ADMIN — PANTOPRAZOLE SODIUM 40 MG: 40 TABLET, DELAYED RELEASE ORAL at 08:46

## 2023-01-23 RX ADMIN — ALBUTEROL SULFATE 2.5 MG: 2.5 SOLUTION RESPIRATORY (INHALATION) at 21:25

## 2023-01-23 RX ADMIN — CEFEPIME 2000 MG: 2 INJECTION, POWDER, FOR SOLUTION INTRAVENOUS at 16:19

## 2023-01-23 RX ADMIN — TICAGRELOR 90 MG: 90 TABLET ORAL at 08:46

## 2023-01-23 RX ADMIN — METHYLPREDNISOLONE SODIUM SUCCINATE 40 MG: 40 INJECTION, POWDER, FOR SOLUTION INTRAMUSCULAR; INTRAVENOUS at 08:46

## 2023-01-23 RX ADMIN — TICAGRELOR 90 MG: 90 TABLET ORAL at 20:45

## 2023-01-23 RX ADMIN — VANCOMYCIN HYDROCHLORIDE 1250 MG: 1.25 INJECTION, POWDER, LYOPHILIZED, FOR SOLUTION INTRAVENOUS at 03:45

## 2023-01-23 RX ADMIN — Medication 10 ML: at 08:45

## 2023-01-23 RX ADMIN — MUPIROCIN: 20 OINTMENT TOPICAL at 08:47

## 2023-01-23 RX ADMIN — BUSPIRONE HYDROCHLORIDE 5 MG: 5 TABLET ORAL at 20:45

## 2023-01-23 RX ADMIN — ASPIRIN 81 MG CHEWABLE TABLET 81 MG: 81 TABLET CHEWABLE at 08:46

## 2023-01-23 RX ADMIN — MIRTAZAPINE 15 MG: 15 TABLET, FILM COATED ORAL at 20:46

## 2023-01-23 RX ADMIN — SERTRALINE 100 MG: 100 TABLET, FILM COATED ORAL at 08:46

## 2023-01-23 RX ADMIN — GABAPENTIN 300 MG: 300 CAPSULE ORAL at 08:46

## 2023-01-23 RX ADMIN — BUSPIRONE HYDROCHLORIDE 5 MG: 5 TABLET ORAL at 08:46

## 2023-01-23 RX ADMIN — OXYMETAZOLINE HYDROCHLORIDE 2 SPRAY: 0.05 SPRAY NASAL at 08:47

## 2023-01-23 RX ADMIN — SODIUM CHLORIDE SOLN NEBU 3% 4 ML: 3 NEBU SOLN at 09:42

## 2023-01-23 RX ADMIN — SODIUM CHLORIDE SOLN NEBU 3% 4 ML: 3 NEBU SOLN at 21:25

## 2023-01-23 RX ADMIN — ATORVASTATIN CALCIUM 80 MG: 80 TABLET, FILM COATED ORAL at 20:45

## 2023-01-23 RX ADMIN — ALBUTEROL SULFATE 2.5 MG: 2.5 SOLUTION RESPIRATORY (INHALATION) at 09:42

## 2023-01-23 RX ADMIN — APIXABAN 10 MG: 5 TABLET, FILM COATED ORAL at 20:45

## 2023-01-23 ASSESSMENT — PAIN SCALES - GENERAL: PAINLEVEL_OUTOF10: 0

## 2023-01-23 NOTE — PLAN OF CARE
Problem: Discharge Planning  Goal: Discharge to home or other facility with appropriate resources  Outcome: Progressing     Problem: Pain  Goal: Verbalizes/displays adequate comfort level or baseline comfort level  Outcome: Progressing     Problem: ABCDS Injury Assessment  Goal: Absence of physical injury  Outcome: Progressing     Problem: Safety - Adult  Goal: Free from fall injury  Outcome: Progressing     Problem: Skin/Tissue Integrity  Goal: Absence of new skin breakdown  Description: 1. Monitor for areas of redness and/or skin breakdown  2. Assess vascular access sites hourly  3. Every 4-6 hours minimum:  Change oxygen saturation probe site  4. Every 4-6 hours:  If on nasal continuous positive airway pressure, respiratory therapy assess nares and determine need for appliance change or resting period.   Outcome: Progressing     Problem: Neurosensory - Adult  Goal: Achieves stable or improved neurological status  Outcome: Progressing  Goal: Achieves maximal functionality and self care  Outcome: Progressing     Problem: Cardiovascular - Adult  Goal: Maintains optimal cardiac output and hemodynamic stability  Outcome: Progressing  Goal: Absence of cardiac dysrhythmias or at baseline  Outcome: Progressing     Problem: Skin/Tissue Integrity - Adult  Goal: Skin integrity remains intact  Outcome: Progressing  Flowsheets  Taken 1/23/2023 1416  Skin Integrity Remains Intact: Monitor for areas of redness and/or skin breakdown  Taken 1/23/2023 0853  Skin Integrity Remains Intact: Monitor for areas of redness and/or skin breakdown     Problem: Infection - Adult  Goal: Absence of infection at discharge  Outcome: Progressing  Goal: Absence of infection during hospitalization  Outcome: Progressing     Problem: Metabolic/Fluid and Electrolytes - Adult  Goal: Electrolytes maintained within normal limits  Outcome: Progressing     Problem: Hematologic - Adult  Goal: Maintains hematologic stability  Outcome: Progressing Problem: Respiratory - Adult  Goal: Achieves optimal ventilation and oxygenation  Outcome: Progressing     Problem: Genitourinary - Adult  Goal: Urinary catheter remains patent  Outcome: Progressing

## 2023-01-23 NOTE — PROGRESS NOTES
Pulmonary Critical Care Progress Note     Patient's name:  Leonel Hirsch  Medical Record Number: [id number]  Patient's account/billing number: [de-identified]  Patient's YOB: 1955  Age: 75 y.o. Date of Admission: 1/18/2023  5:03 PM  Date of Consult: 1/23/2023      Primary Care Physician: No primary care provider on file. Code Status: Full Code    Reason for consult: Acute respiratory failure with hypoxia and hypercapnia    Assessment and Plan     Acute respiratory with hypoxia and hypercapnia  Right lower lobe HCAP  Acute PE, unprovoked   CAD s/p stent  Epistaxis         Plan: Alternate BiPAP and high flow oxygen to keep sats more than 90%. Antibiotics cefepime and vancomycin x 7 days   Eliquis long term   Systemic steroid and bronchodilators   Aspiration precautions. Vest therapy and 3%        Subjective:  Weak  On high flow  No fever  Weak cough       HISTORY OF PRESENT ILLNESS:   /Ms. Leonel Hirsch is a 75 y.o.   gentleman with PMH stated below significant for h/o CVA with right hemiparesis, history of coronary artery disease, hypertension and hyperlipidemia NH resident presented with worsening shortness of breath cough for the last few days, transferred to the ICU overnight for hypoxic respiratory failure in severe respiratory distress  proBNP 697. Procalcitonin 0.17. Leukocytosis. CTA chest with subsegmental acute PE. Right lower lobe airspace disease.       REVIEW OF SYSTEMS:  Review of Systems -   C/o cough  Sob  Sputum  Not able to expectorate  No chest pain  Fever overnight        Physical Exam:    Vitals: BP (!) 191/98   Pulse 78   Temp 97.6 °F (36.4 °C) (Oral)   Resp 22   Ht 6' 3\" (1.905 m)   Wt 213 lb 10 oz (96.9 kg)   SpO2 91%   BMI 26.70 kg/m²     Last Body weight:   Wt Readings from Last 3 Encounters:   01/23/23 213 lb 10 oz (96.9 kg)   09/12/22 204 lb 2.3 oz (92.6 kg)   02/10/22 188 lb 4.4 oz (85.4 kg)       Body Mass Index : Body mass index is 26.7 kg/m². Intake and Output summary:   Intake/Output Summary (Last 24 hours) at 1/23/2023 1021  Last data filed at 1/23/2023 1006  Gross per 24 hour   Intake 996.8 ml   Output 2275 ml   Net -1278.2 ml         Physical Examination:     PHYSICAL EXAM:    Gen: NAD  Eyes: PERRL. Anicteric sclera. No conjunctival injection. ENT: No discharge. Posterior oropharynx clear. External appearance of ears and nose normal.  Neck: Trachea midline. No mass, no lymphadenopathy    Resp: Diminished bilaterally with scattered rhonchi no wheezing  CV: Regular rate. Regular rhythm. No murmur or rub. No edema. GI: Soft, Non-tender. Non-distended. +BS  Skin: Warm, dry, w/o erythema. Lymph: No cervical or supraclavicular LAD. M/S: No cyanosis. No clubbing. Right above-knee amputation  Neuro:  CN 2-12 tested, no focal neurologic deficit. Moves all extremities  Psych: Awake lethargic right-sided weakness        Laboratory findings:-    CBC:   Recent Labs     01/22/23 0436   WBC 11.0   HGB 9.5*          BMP:    Recent Labs     01/21/23  0352 01/21/23  1950 01/22/23  0436 01/23/23  0554     --  137  --    K 3.5   < > 4.2  --      --  102  --    CO2 22  --  21  --    BUN 29*  --  31*  --    CREATININE 1.0  --  1.0  0.9 0.9   GLUCOSE 170*  --  146*  --     < > = values in this interval not displayed. S. Calcium:  Recent Labs     01/22/23 0436   CALCIUM 9.1         S. Magnesium:  Recent Labs     01/23/23  0554   MG 2.00       S. Phosphorus:  No results for input(s): PHOS in the last 72 hours. S. Glucose:  No results for input(s): POCGLU in the last 72 hours. Hepatic functions:   Recent Labs     01/21/23 0352   ALKPHOS 90   ALT 8*   AST 16   PROT 6.3*   BILITOT 0.4   LABALBU 3.1*         Cardiac enzymes:  No results for input(s): CKTOTAL, CKMB, CKMBINDEX, TROPONINI in the last 72 hours.       Radiology Review:  Pertinent images / reports were reviewed as a part of this visit. CTPA: Results for orders placed during the hospital encounter of 01/18/23    CT CHEST PULMONARY EMBOLISM W CONTRAST    Narrative  EXAMINATION:  CTA OF THE CHEST 1/18/2023 6:43 pm    TECHNIQUE:  CTA of the chest was performed after the administration of intravenous  contrast.  Multiplanar reformatted images are provided for review. MIP  images are provided for review. Automated exposure control, iterative  reconstruction, and/or weight based adjustment of the mA/kV was utilized to  reduce the radiation dose to as low as reasonably achievable. COMPARISON:  None. HISTORY:  ORDERING SYSTEM PROVIDED HISTORY: right sided posterior chest pain, elevated  troponin  TECHNOLOGIST PROVIDED HISTORY:  Reason for exam:->right sided posterior chest pain, elevated troponin  Decision Support Exception - unselect if not a suspected or confirmed  emergency medical condition->Emergency Medical Condition (MA)  Reason for Exam: right sided posterior chest pain, elevated troponin    FINDINGS:  Pulmonary Arteries: There are multiple small subsegmental filling defects in  the pulmonary arteries to the right lower lobe with contrast seen distal to  the filling defects. No other filling defects are seen. Mediastinum: No evidence of mediastinal lymphadenopathy. The heart and  pericardium demonstrate no acute abnormality. There is no significant right  heart enlargement. There is moderate calcified plaque throughout the aorta  which is mildly dilated with no aneurysm or dissection. Lungs/pleura: There are small bibasilar pleural effusions layering  posteriorly with associated adjacent atelectasis and consolidation  posteriorly which is more prominent on the right. There is right basilar  consolidation posteriorly. No endobronchial lesion is seen. There is no  pulmonary nodule or mass. Upper Abdomen: There are calcifications scattered in the gallbladder.   The  adrenals are normal.    Soft Tissues/Bones: No acute bone or soft tissue abnormality. There is  moderate scoliosis    Impression  Subsegmental acute pulmonary emboli scattered in the pulmonary arteries to  the right lower lobe posteriorly. Moderate right basilar opacity posteriorly which could be due to a pulmonary  infarct vs pneumonia and atelectasis. Small bibasilar pleural effusions with bibasilar atelectasis posteriorly. Cholelithiasis. Mildly dilated and atherosclerotic thoracic aorta with no aneurysm or  dissection. The findings were called to Dr. Jose Blevins in the emergency room at 7:20 p.m. CXR PA/LAT: Results for orders placed during the hospital encounter of 01/15/17    XR Chest Standard TWO VW    Narrative  EXAMINATION:  TWO VIEWS OF THE CHEST    1/15/2017 8:01 pm    COMPARISON:  September 24, 2014    HISTORY:  ORDERING SYSTEM PROVIDED HISTORY: fatigue  TECHNOLOGIST PROVIDED HISTORY:  Ordering Physician Provided Reason for Exam: cough  Acuity: Unknown  Type of Exam: Unknown    FINDINGS:  The cardiomediastinal silhouette is stable. There are increased lung  markings at the bilateral parahilar regions, may be related to bronchitis. There is no pneumothorax or pleural effusion. The visualized osseous  structures are within normal limits. Impression  Increased lung markings at the bilateral parahilar regions, may be related to  bronchitis. CXR portable: Results for orders placed during the hospital encounter of 01/18/23    XR CHEST PORTABLE    Narrative  EXAMINATION:  ONE XRAY VIEW OF THE CHEST    1/19/2023 1:36 am    COMPARISON:  Chest radiograph performed 01/18/2023. HISTORY:  ORDERING SYSTEM PROVIDED HISTORY: rapid  TECHNOLOGIST PROVIDED HISTORY:  Reason for exam:->rapid  Reason for Exam: rapid    FINDINGS:  There is chronic pulmonary change. There is no acute consolidation or  effusion. There is no pneumothorax. The mediastinal structures are stable. The upper abdomen unremarkable.   The extrathoracic soft tissues are  unremarkable. Impression  Chronic pulmonary change without acute cardiopulmonary process.       Kym Aldana MD, M.D.            1/23/2023, 10:21 AM

## 2023-01-23 NOTE — DISCHARGE SUMMARY
Hospital Medicine Discharge Summary    Patient: Nadia Everett     Gender: male  : 1955   Age: 75 y.o. MRN: [id number]    Admitting Physician: Dori Torres DO  Discharge Physician: Mei Grayson MD     Code Status: Full Code     Admit Date: 2023   Discharge Date:   23    Disposition:  DC to [facility]    Discharge Diagnoses:  1. Acute subsegmental pulmonary embolism scattered right lower lobe. No RV strain. 2. RLL consolidation- infarct v PNA. MRSA pcr positive. 3. Type II NSTEMI  4. CAD s/p stent  5. Hypertension  6. Ectopy with first degree HB    Follow-up appointments:  as arranged with the patient    Outpatient to  list: none    Condition at Discharge:  Stable    Hospital Course:   75 y.o. male who presented to [facility] with past medical history of CAD, hypertension, hyperlipidemia, MI, history of CVA presented to the ED with chief complaint of shortness of breath. He had 2 days of worsening shortness of breath and adam on exertion. No h/o DVT/Pes. He recovered from COVID-19. Patient also reports has been being compliant with medication. Patient otherwise denied increased salt intake or worsening lower extremity edema. Patient also reports decreased oral intake. He was found to have a RLL PNA and subsegmental PE, He was started on a heparin gtt and transitioned to eliquis as well as vanc and cefepime. He had a left pleural effusion with anemia complete opacification of collapse of the right lung on 23 that improved markedly to now resolution on  23.       Discharge Medications:   Current Discharge Medication List        START taking these medications    Details   promethazine (PHENERGAN) 12.5 MG tablet Take 1 tablet by mouth every 6 hours as needed for Nausea  Qty: 20 tablet, Refills: 2      doxycycline hyclate (VIBRA-TABS) 100 MG tablet Take 1 tablet by mouth every 12 hours for 5 doses  Qty: 5 tablet, Refills: 0      oxymetazoline (AFRIN) 0.05 % nasal spray 2 sprays by Nasal route 2 times daily as needed for Congestion  Qty: 1 each, Refills: 3      sodium chloride, Inhalant, 3 % nebulizer solution Take 4 mLs by nebulization in the morning and 4 mLs in the evening.   Qty: 360 mL, Refills: 2      apixaban (ELIQUIS) 5 MG TABS tablet Take 2 tablets by mouth 2 times daily  Qty: 60 tablet, Refills: 3      tiotropium (SPIRIVA RESPIMAT) 2.5 MCG/ACT AERS inhaler Inhale 2 puffs into the lungs daily  Qty: 1 each, Refills: 3      ticagrelor (BRILINTA) 90 MG TABS tablet Take 1 tablet by mouth 2 times daily  Qty: 60 tablet, Refills: 11           Current Discharge Medication List        Current Discharge Medication List        CONTINUE these medications which have NOT CHANGED    Details   hydroCHLOROthiazide (HYDRODIURIL) 25 MG tablet Take 25 mg by mouth daily      lisinopril (PRINIVIL;ZESTRIL) 20 MG tablet Take 20 mg by mouth daily      !! miconazole (MICOTIN) 2 % powder Apply topically 2 times daily as needed for Itching      guaiFENesin (MUCINEX) 600 MG extended release tablet Take 600 mg by mouth 2 times daily as needed for Congestion      albuterol sulfate HFA (VENTOLIN HFA) 108 (90 Base) MCG/ACT inhaler Inhale 2 puffs into the lungs every 6 hours as needed for Wheezing      !! miconazole (MICOTIN) 2 % powder Apply topically 2 times daily as needed for Itching Apply to buttocks      aspirin 81 MG chewable tablet Take 81 mg by mouth daily      loratadine (CLARITIN) 10 MG tablet Take 10 mg by mouth daily      nystatin (MYCOSTATIN) 302735 UNIT/GM cream Apply topically 2 times daily as needed for Dry Skin Apply topically to buttocks      Carboxymethylcellul-Glycerin (REFRESH OPTIVE) 0.5-0.9 % SOLN Apply 1 drop to eye 4 times daily as needed      acetaminophen (TYLENOL) 325 MG tablet Take 650 mg by mouth every 6 hours as needed for Pain      Dimethicone-Zinc Oxide-Vit A-D ([employer] ZINC OXIDE DIAPER) 1-10 % CREA Apply topically 2 times daily as needed      sertraline (ZOLOFT) 100 MG tablet Take 100 mg by mouth daily      Sodium Phosphates (FLEET) 7-19 GM/118ML Place 1 enema rectally once as needed      gabapentin (NEURONTIN) 300 MG capsule Take 300 mg by mouth 3 times daily. polyethylene glycol (GLYCOLAX) 17 g packet Take 17 g by mouth daily as needed for Constipation      magnesium hydroxide (MILK OF MAGNESIA) 400 MG/5ML suspension Take 30 mLs by mouth daily as needed for Constipation      busPIRone (BUSPAR) 5 MG tablet Take 5 mg by mouth 2 times daily      senna (SENOKOT) 8.6 MG tablet Take 1 tablet by mouth daily      ferrous sulfate (IRON 325) 325 (65 Fe) MG tablet Take 325 mg by mouth daily (with breakfast)      pantoprazole (PROTONIX) 40 MG tablet Take 40 mg by mouth daily      mirabegron (MYRBETRIQ) 50 MG TB24 Take 50 mg by mouth daily      baclofen (LIORESAL) 20 MG tablet Take 20 mg by mouth 2 times daily      atenolol (TENORMIN) 25 MG tablet Take 1 tablet by mouth daily  Qty: 30 tablet, Refills: 0      atorvastatin (LIPITOR) 80 MG tablet Take 1 tablet by mouth nightly  Qty: 30 tablet, Refills: 3      Multiple Vitamins-Minerals (THERAPEUTIC MULTIVITAMIN-MINERALS) tablet Take 1 tablet by mouth daily      Ascorbic Acid (VITAMIN C) 500 MG tablet Take 500 mg by mouth daily      nitroGLYCERIN (NITROSTAT) 0.4 MG SL tablet Place 1 tablet under the tongue every 5 minutes as needed for Chest pain. Qty: 25 tablet, Refills: 2       !! - Potential duplicate medications found. Please discuss with provider.         Current Discharge Medication List        STOP taking these medications       levoFLOXacin (LEVAQUIN) 500 MG tablet Comments:   Reason for Stopping:         bisacodyl (DULCOLAX) 10 MG suppository Comments:   Reason for Stopping:         collagenase 250 UNIT/GM ointment Comments:   Reason for Stopping:         clopidogrel (PLAVIX) 75 MG tablet Comments:   Reason for Stopping:         mirtazapine (REMERON) 15 MG tablet Comments:   Reason for Stopping:               Discharge ROS:  A complete review of systems was asked and negative except for above     Discharge Exam:    BP (!) 170/92   Pulse 65   Temp 97.7 °F (36.5 °C) (Oral)   Resp 18   Ht 6' 3\" (1.905 m)   Wt 215 lb 13.3 oz (97.9 kg)   SpO2 97%   BMI 26.98 kg/m²    General appearance: No apparent distress, appears stated age and cooperative. HEENT: Pupils equal, round, and reactive to light. Conjunctivae/corneas clear. Neck: Supple, with full range of motion. No jugular venous distention. Trachea midline. Respiratory:  Normal respiratory effort. Some crackles right base. Cardiovascular: Regular rate and rhythm with normal S1/S2 without murmurs, rubs or gallops. Abdomen: Soft, non-tender, non-distended with normal bowel sounds. Musculoskeletal: No clubbing, cyanosis or edema bilaterally. Full range of motion without deformity. Skin: Skin color, texture, turgor normal.  No rashes or lesions. Neurologic:  Neurovascularly intact without any focal sensory/motor deficits. Cranial nerves: II-XII intact, grossly non-focal.  Psychiatric: Alert and oriented, thought content appropriate, normal insight  Capillary Refill: Brisk, 3 seconds, normal   Peripheral Pulses: +2 palpable, equal bilaterally     Labs:  For convenience and continuity at follow-up the following most recent labs are provided:    Lab Results   Component Value Date/Time    WBC 9.4 01/24/2023 05:27 AM    HGB 9.5 01/24/2023 05:27 AM    HCT 28.5 01/24/2023 05:27 AM    MCV 86.6 01/24/2023 05:27 AM     01/24/2023 05:27 AM     01/22/2023 04:36 AM    K 4.2 01/22/2023 04:36 AM    K 3.3 01/20/2023 03:55 AM     01/22/2023 04:36 AM    CO2 21 01/22/2023 04:36 AM    BUN 31 01/22/2023 04:36 AM    CREATININE 0.9 01/24/2023 05:27 AM    CALCIUM 9.1 01/22/2023 04:36 AM    PHOS 3.5 01/18/2023 08:52 PM    ALKPHOS 90 01/21/2023 03:52 AM    ALT 8 01/21/2023 03:52 AM    AST 16 01/21/2023 03:52 AM    BILITOT 0.4 01/21/2023 03:52 AM    LABALBU 3.1 01/21/2023 03:52 AM [address] 53 09/01/2022 05:23 AM    TRIG 97 09/01/2022 05:23 AM     Lab Results   Component Value Date    INR 1.03 08/31/2022    INR 1.61 (H) 01/07/2022    INR 1.09 04/21/2021       Radiology:  CT HEAD WO CONTRAST    Result Date: 1/18/2023  EXAMINATION: CT OF THE HEAD WITHOUT CONTRAST  1/18/2023 8:26 pm TECHNIQUE: CT of the head was performed without the administration of intravenous contrast. Automated exposure control, iterative reconstruction, and/or weight based adjustment of the mA/kV was utilized to reduce the radiation dose to as low as reasonably achievable. COMPARISON: CT head August 31, 2022 HISTORY: ORDERING SYSTEM PROVIDED HISTORY: r/u intra cranial TECHNOLOGIST PROVIDED HISTORY: Reason for exam:->r/u intra cranial Has a \"code stroke\" or \"stroke alert\" been called? ->No Decision Support Exception - unselect if not a suspected or confirmed emergency medical condition->Emergency Medical Condition (MA) Reason for Exam: r/u intra cranial FINDINGS: There is residual intravenous contrast material. BRAIN/VENTRICLES: There is encephalomalacia in the parasagittal left frontal parietal lobes, likely related to old infarction in the left LEXA territory. There is small old infarction in the right cerebellar hemisphere. There is small old infarction in the anterior right temporal lobe. There is mild parenchymal volume loss. There is periventricular white matter low attenuation, likely related to mild chronic microvascular disease. There is no acute intracranial hemorrhage, mass effect or midline shift. No abnormal extra-axial fluid collection. The gray-white differentiation is maintained without evidence of an acute infarct. There is no hydrocephalus. ORBITS: The visualized portion of the orbits demonstrate no acute abnormality. SINUSES: The visualized paranasal sinuses and left mastoid air cells demonstrate no acute abnormality. There is mild right mastoid effusion.  SOFT TISSUES/SKULL:  No acute abnormality of the visualized skull or soft tissues. No acute intracranial abnormality. Encephalomalacia in the parasagittal left frontal parietal lobes, likely related to old infarction in the left LEXA territory. Small old infarctions in the right cerebellar hemisphere and anterior right temporal lobe. Mild parenchymal volume loss. Mild chronic microvascular disease. VL Extremity Venous Left    Result Date: 1/20/2023  Lower Extremities DVT Study  Demographics   Patient Name       Patrice Robledo   Date of Study      01/20/2023         Gender              Male   Patient Number     [id number]         Date of Birth       1955   Visit Number       [id number]          Age                 75 year(s)   Accession Number   [id number]         Room Number         4124   Corporate ID       [id number]            Sarah Washburn [facility]   Ordering Physician Beverly Lang.,   Interpreting        Chuck Capps MD                 Physician           MD  Procedure Type of Study:   Veins:Lower Extremities DVT Study, VL EXTREMITY VENOUS DUPLEX LEFT. Vascular Sonographer Report  Indications for Study:Suspected pulmonary embolism. Impressions Left Impression No evidence of deep vein or superficial vein thrombosis involving the left lower extremity and the right common femoral vein. Calf and ankle edema noted. Calf veins were not well visualized on the left. Conclusions   Summary   No evidence of deep vein or superficial vein thrombosis involving the left  lower extremity. Signature   ------------------------------------------------------------------  Electronically signed by Chuck Capps MD (Interpreting  physician) on 01/20/2023 at 05:08 PM  ------------------------------------------------------------------  Patient Status:Routine. Study [address] [facility]. Technical Quality:Adequate visualization. Velocities are measured in cm/s ; Diameters are measured in mm Right Lower Extremities DVT Study Measurements Right 2D Measurements +--------------+----------+---------------+----------+ ! Location      ! Visualized! Compressibility! Thrombosis! +--------------+----------+---------------+----------+ ! Common Femoral!Yes       ! Yes            ! None      ! +--------------+----------+---------------+----------+ Right Doppler Measurements +--------------+------+------+------------+ ! Location      ! Signal!Reflux! Reflux (sec)! +--------------+------+------+------------+ ! Common Femoral!Phasic! No    !            ! +--------------+------+------+------------+ Left Lower Extremities DVT Study Measurements Left 2D Measurements +------------------------+----------+---------------+----------+ ! Location                ! Visualized! Compressibility! Thrombosis! +------------------------+----------+---------------+----------+ ! Sapheno Femoral Junction! Yes       ! Yes            ! None      ! +------------------------+----------+---------------+----------+ ! GSV Thigh               ! Yes       ! Yes            ! None      ! +------------------------+----------+---------------+----------+ ! Common Femoral          !Yes       ! Yes            ! None      ! +------------------------+----------+---------------+----------+ ! Prox Femoral            !Yes       ! Yes            ! None      ! +------------------------+----------+---------------+----------+ ! Mid Femoral             !Yes       ! Yes            ! None      ! +------------------------+----------+---------------+----------+ ! Dist Femoral            !Yes       ! Yes            ! None      ! +------------------------+----------+---------------+----------+ ! Deep Femoral            !Yes       ! Yes            ! None      ! +------------------------+----------+---------------+----------+ ! Popliteal               !Yes       ! Yes            ! None      ! +------------------------+----------+---------------+----------+ ! GSV Below Knee          ! Yes       ! Yes            ! None      ! +------------------------+----------+---------------+----------+ ! Gastroc                 ! Yes       ! Yes            ! None      ! +------------------------+----------+---------------+----------+ ! Soleal                  !Yes       ! Yes            ! None      ! +------------------------+----------+---------------+----------+ ! PTV                     ! Partial   !Yes            ! None      ! +------------------------+----------+---------------+----------+ ! ATV                     ! Yes       ! Yes            ! None      ! +------------------------+----------+---------------+----------+ ! Peroneal                !No        !               !          ! +------------------------+----------+---------------+----------+ ! GSV Calf                ! Yes       ! Yes            ! None      ! +------------------------+----------+---------------+----------+ ! SSV                     ! Yes       ! Yes            ! None      ! +------------------------+----------+---------------+----------+ Left Doppler Measurements +--------------+------+------+------------+ ! Location      ! Signal!Reflux! Reflux (sec)! +--------------+------+------+------------+ ! Common Femoral!Phasic! No    !            ! +--------------+------+------+------------+ ! Popliteal     !Phasic! No    !            ! +--------------+------+------+------------+    XR CHEST PORTABLE    Result Date: 1/22/2023  EXAMINATION: ONE XRAY VIEW OF THE CHEST 1/22/2023 11:12 am COMPARISON: 01/20/2023 HISTORY: ORDERING SYSTEM PROVIDED HISTORY: hypoxia, sob TECHNOLOGIST PROVIDED HISTORY: Reason for exam:->hypoxia, sob Reason for Exam: hypoxia, sob FINDINGS: Since prior examination, there has been development of near complete opacification of left hemithorax. Mild hazy opacity is seen at the right base. No gross pneumothorax.   Cardiac and mediastinal silhouette evaluation is limited by left-sided opacity. Near complete opacification of left hemithorax, suggestive of pleural effusion and left-sided atelectasis or airspace disease. Small right pleural effusion and right basilar atelectasis/airspace disease. XR CHEST PORTABLE    Result Date: 1/21/2023  EXAMINATION: ONE XRAY VIEW OF THE CHEST 1/20/2023 11:33 pm COMPARISON: 01/19/2023 HISTORY: ORDERING SYSTEM PROVIDED HISTORY: sob TECHNOLOGIST PROVIDED HISTORY: Reason for exam:->sob Reason for Exam: Chest Pain FINDINGS: The heart is mildly enlarged and more prominent. The pulmonary vessels are engorged centrally and more prominent. There are hazy perihilar and bibasilar opacities which is increased. There is mild blunting of the costophrenic sulci which is more apparent. Mild cardiomegaly and mild central pulmonary congestion which is more prominent. Hazy perihilar and bibasilar opacities which is increased. Small bibasilar pleural effusions which are more prominent. XR CHEST PORTABLE    Result Date: 1/19/2023  EXAMINATION: ONE XRAY VIEW OF THE CHEST 1/19/2023 1:36 am COMPARISON: Chest radiograph performed 01/18/2023. HISTORY: ORDERING SYSTEM PROVIDED HISTORY: rapid TECHNOLOGIST PROVIDED HISTORY: Reason for exam:->rapid Reason for Exam: rapid FINDINGS: There is chronic pulmonary change. There is no acute consolidation or effusion. There is no pneumothorax. The mediastinal structures are stable. The upper abdomen unremarkable. The extrathoracic soft tissues are unremarkable. Chronic pulmonary change without acute cardiopulmonary process. XR CHEST PORTABLE    Result Date: 1/18/2023  EXAMINATION: ONE XRAY VIEW OF THE CHEST 1/18/2023 5:23 pm COMPARISON: 09/07/2022. HISTORY: ORDERING SYSTEM PROVIDED HISTORY: SOB TECHNOLOGIST PROVIDED HISTORY: Reason for exam:->SOB Reason for Exam: cp, sob FINDINGS: The cardiac silhouette is enlarged. Aortic vascular calcification. The lungs are clear.   No infiltrate, or evidence of overt failure. There is mild blunting of the left lateral costophrenic sulcus which may be residual from the previous left pulmonary disease noted previously     No acute cardiopulmonary disease. Mild cardiomegaly without overt failure. CT CHEST PULMONARY EMBOLISM W CONTRAST    Result Date: 1/18/2023  EXAMINATION: CTA OF THE CHEST 1/18/2023 6:43 pm TECHNIQUE: CTA of the chest was performed after the administration of intravenous contrast.  Multiplanar reformatted images are provided for review. MIP images are provided for review. Automated exposure control, iterative reconstruction, and/or weight based adjustment of the mA/kV was utilized to reduce the radiation dose to as low as reasonably achievable. COMPARISON: None. HISTORY: ORDERING SYSTEM PROVIDED HISTORY: right sided posterior chest pain, elevated troponin TECHNOLOGIST PROVIDED HISTORY: Reason for exam:->right sided posterior chest pain, elevated troponin Decision Support Exception - unselect if not a suspected or confirmed emergency medical condition->Emergency Medical Condition (MA) Reason for Exam: right sided posterior chest pain, elevated troponin FINDINGS: Pulmonary Arteries: There are multiple small subsegmental filling defects in the pulmonary arteries to the right lower lobe with contrast seen distal to the filling defects. No other filling defects are seen. Mediastinum: No evidence of mediastinal lymphadenopathy. The heart and pericardium demonstrate no acute abnormality. There is no significant right heart enlargement. There is moderate calcified plaque throughout the aorta which is mildly dilated with no aneurysm or dissection. Lungs/pleura: There are small bibasilar pleural effusions layering posteriorly with associated adjacent atelectasis and consolidation posteriorly which is more prominent on the right. There is right basilar consolidation posteriorly. No endobronchial lesion is seen. There is no pulmonary nodule or mass. Upper Abdomen: There are calcifications scattered in the gallbladder. The adrenals are normal. Soft Tissues/Bones: No acute bone or soft tissue abnormality. There is moderate scoliosis     Subsegmental acute pulmonary emboli scattered in the pulmonary arteries to the right lower lobe posteriorly. Moderate right basilar opacity posteriorly which could be due to a pulmonary infarct vs pneumonia and atelectasis. Small bibasilar pleural effusions with bibasilar atelectasis posteriorly. Cholelithiasis. Mildly dilated and atherosclerotic thoracic aorta with no aneurysm or dissection. The findings were called to Dr. Hellen Gonzalez in the emergency room at 7:20 p.m. EKG     Rhythm: normal sinus   Rate: normal  Clinical Impression: no acute changes        The patient was seen and examined on day of discharge and this discharge summary is in conjunction with any daily progress note from day of discharge. Time Spent on discharge is 30 minutes  in the examination, evaluation, counseling and review of medications and discharge plan. Note that more than 30 minutes was spent in preparing discharge papers, discussing discharge with patient, medication review, etc.       Signed:    Christie Goodson MD   1/24/2023      Thank you No primary care provider on file. for the opportunity to be involved in this patient's care.  If you have any questions or concerns please feel free to contact me at [facility]

## 2023-01-23 NOTE — CONSULTS
Infectious Diseases Inpatient Consult Note      Reason for Consult:  Pneumonia, Resp failure and WBC elevation  - MRSA colonization     Requesting Physician:  Kathrine Areavlo      Primary Care Physician:  No primary care provider on file. History Obtained From:  [facility]      CHIEF COMPLAINT:     Chief Complaint   Patient presents with    Chest Pain     Pt c/o of chest pain, SOB, and lower back pain. Pt has a cough. States this all started yesterday. EMS gave duoneb en route and said pt had audible crackles. HISTORY OF PRESENT ILLNESS:  75 y.o. with a past medical history significant for peripheral vascular disease, right above-knee amputation, CVA with right sided weakness, coronary artery disease, nursing home resident admitted to the hospital secondary to shortness of breath and also chest pain. Admission CT chest indicated right lower lobe pulm embolism. In the hospital he also developed, edema requiring BiPAP  and EKG changes hence was seen by cardiology underwent coronary angiogram with stent placement. In the hospital developed fever T-max 100.6 with WBC elevation. Chest x-ray indicated cardiomegaly with pulmonary congestion and perihilar opacities concerning for pneumonia. He was initiated on IV antibiotics. MRSA probe positive for colonization. Chest x-ray from 1/22/23 indicated left lung collapse with near complete opacification of left hemithorax. Given the concern for pneumonia we are consulted for recommendations.        Past Medical History:    Past Medical History:   Diagnosis Date    Acute MI ([address] 09/2013    Anxiety     Arthritis     CAD (coronary artery disease)     COVID-19     Hyperlipidemia 2/3/2022    Hypertension     Influenza A 01/15/2017    Severe malnutrition ([facility] [address] 2/10/2022       Past Surgical History:    Past Surgical History:   Procedure Laterality Date    BACK SURGERY N/A 1989    herniated disc    CORONARY ANGIOPLASTY WITH STENT PLACEMENT  9/13    EYE SURGERY VASCULAR SURGERY         Current Medications:    Outpatient Medications Marked as Taking for the 23 encounter [facility] Encounter)   Medication Sig Dispense Refill    ticagrelor (BRILINTA) 90 MG TABS tablet Take 1 tablet by mouth 2 times daily 60 tablet 11    hydroCHLOROthiazide (HYDRODIURIL) 25 MG tablet Take 25 mg by mouth daily      lisinopril (PRINIVIL;ZESTRIL) 20 MG tablet Take 20 mg by mouth daily      miconazole (MICOTIN) 2 % powder Apply topically 2 times daily as needed for Itching      guaiFENesin (MUCINEX) 600 MG extended release tablet Take 600 mg by mouth 2 times daily as needed for Congestion         Allergies:  Patient has no known allergies. Immunizations :   Immunization History   Administered Date(s) Administered    COVID-19, PFIZER PURPLE top, DILUTE for use, (age 15 y+), 30mcg/0.3mL 2021, 2021    Influenza, FLUZONE (age 72 y+), High Dose, 0.7mL 10/22/2020    Influenza, High Dose (Fluzone 65 yrs and older) 2019    Pneumococcal Conjugate 13-valent ([id number]) 2019    Pneumococcal Polysaccharide ([id number]) 10/22/2020    Zoster Recombinant (Shingrix) 10/22/2020, 2021         Social History:     Social History     Tobacco Use    Smoking status: Former     Packs/day: 0.50     Years: 40.00     Pack years: 20.00     Types: Cigarettes     Quit date: 2019     Years since quittin.0    Smokeless tobacco: Never   Vaping Use    Vaping Use: Never used   Substance Use Topics    Alcohol use:  Yes     Alcohol/week: 2.0 standard drinks     Types: 2 Cans of beer per week     Comment: states used to drink 1 pint/vodka a day / now socially drinks     Social History     Tobacco Use   Smoking Status Former    Packs/day: 0.50    Years: 40.00    Pack years: 20.00    Types: Cigarettes    Quit date: 2019    Years since quittin.0   Smokeless Tobacco Never      Family History   Problem Relation Age of Onset    Hypertension Mother     Hypertension Father     Heart Failure Father     Stroke Maternal Grandfather     Cancer Paternal Uncle         throat          REVIEW OF SYSTEMS:      Constitutional:  negative for fevers, chills, night sweats  Eyes:  negative for blurred vision, eye discharge, visual disturbance   HEENT:  negative for hearing loss, ear drainage,nasal congestion  Respiratory:  cough+ , shortness of breath ++  or hemoptysis   Cardiovascular:  negative for chest pain, palpitations, syncope  Gastrointestinal:  negative for nausea, vomiting, diarrhea, constipation, abdominal pain  Genitourinary:  negative for frequency, dysuria, urinary incontinence, hematuria  Hematologic/Lymphatic:  negative for easy bruising, bleeding and lymphadenopathy  Allergic/Immunologic:  negative for recurrent infections, angioedema, anaphylaxis   Endocrine:  negative for weight changes, polyuria, polydipsia and polyphagia  Musculoskeletal:  negative for joint  pain, swelling, decreased range of motion  Integumentary: No rashes, skin lesions  Neurological:  negative for headaches, slurred speech, unilateral weakness  Psychiatric: negative for hallucinations,confusion,agitation.      PHYSICAL EXAM:      Vitals:    BP (!) 147/76   Pulse 69   Temp 97.6 °F (36.4 °C) (Oral)   Resp 16   Ht 6' 3\" (1.905 m)   Wt 213 lb 10 oz (96.9 kg)   SpO2 94%   BMI 26.70 kg/m²     General Appearance: alert,in some  acute distress, ++ pallor, no icterus   Skin: warm and dry, no rash or erythema  Head: normocephalic and atraumatic  Eyes: pupils equal, round, and reactive to light, conjunctivae normal  ENT: tympanic membrane, external ear and ear canal normal bilaterally, nose without deformity, nasal mucosa and turbinates normal without polyps  Neck: supple and non-tender without mass, no thyromegaly  no cervical lymphadenopathy  Pulmonary/Chest: Bi basal crepts++ o wheezes, rales or rhonchi, normal air movement, in some respiratory distress  Cardiovascular: normal rate, regular rhythm, normal S1 and S2, no murmurs, rubs, clicks, or gallops, no carotid bruits  Abdomen: soft, non-tender, non-distended, normal bowel sounds, no masses or organomegaly  Extremities: no cyanosis, clubbing or edema  Musculoskeletal: normal range of motion, no joint swelling, deformity or tenderness  Integumentary: No rashes, no abnormal skin lesions, no petechiae  Neurologic: reflexes normal and symmetric, no cranial nerve deficit  Psych:  Orientation, sensorium, mood normal   Lines: IV  Rt AKA+  Corbett+   RT UE weakness+    DATA:    CBC:   Lab Results   Component Value Date    WBC 11.0 01/22/2023    HGB 9.5 (L) 01/22/2023    HCT 28.3 (L) 01/22/2023    MCV 86.5 01/22/2023     01/22/2023     RENAL:   Lab Results   Component Value Date    CREATININE 0.9 01/23/2023    BUN 31 (H) 01/22/2023     01/22/2023    K 4.2 01/22/2023     01/22/2023    CO2 21 01/22/2023     SED RATE:   Lab Results   Component Value Date/Time    SEDRATE 87 02/05/2022 05:36 AM     CK:   Lab Results   Component Value Date/Time    CKTOTAL 63 04/23/2021 06:19 AM     CRP:   Lab Results   Component Value Date/Time    .7 02/05/2022 05:36 AM     Hepatic Function Panel:   Lab Results   Component Value Date/Time    ALKPHOS 90 01/21/2023 03:52 AM    ALT 8 01/21/2023 03:52 AM    AST 16 01/21/2023 03:52 AM    PROT 6.3 01/21/2023 03:52 AM    BILITOT 0.4 01/21/2023 03:52 AM    LABALBU 3.1 01/21/2023 03:52 AM     UA:  Lab Results   Component Value Date/Time    COLORU Yellow 09/01/2022 03:30 PM    CLARITYU Clear 09/01/2022 03:30 PM    GLUCOSEU Negative 09/01/2022 03:30 PM    BILIRUBINUR Negative 09/01/2022 03:30 PM    BILIRUBINUR NEG 01/16/2020 09:57 AM    KETUA Negative 09/01/2022 03:30 PM    SPECGRAV 1.014 09/01/2022 03:30 PM    BLOODU TRACE 09/01/2022 03:30 PM    PHUR 5.0 09/01/2022 03:30 PM    PROTEINU Negative 09/01/2022 03:30 PM    UROBILINOGEN 0.2 09/01/2022 03:30 PM    NITRU Negative 09/01/2022 03:30 PM    LEUKOCYTESUR LARGE 09/01/2022 03:30 PM    LABMICR YES 09/01/2022 03:30 PM    URINETYPE NotGiven 09/01/2022 03:30 PM      Urine Microscopic:   Lab Results   Component Value Date/Time    BACTERIA None Seen 09/01/2022 03:30 PM    COMU see below 04/26/2021 11:02 AM    HYALCAST 2 09/01/2022 03:30 PM    WBCUA 74 09/01/2022 03:30 PM    RBCUA 0 09/01/2022 03:30 PM    EPIU 0 09/01/2022 03:30 PM     Urine Reflex to Culture:   Lab Results   Component Value Date/Time    URRFLXCULT Not Indicated 05/28/2021 06:30 PM     WBC  15.7         MICRO: cultures reviewed and updated by me   Time       Culture, Blood 2 [[id number]] Collected: 01/19/23 0119   Order Status: Completed Specimen: Blood Updated: 01/23/23 0415    Culture, Blood 2 No Growth after 4 days of incubation. Narrative:     ORDER#: [id number]                          ORDERED BY: [facility]   SOURCE: Blood                              COLLECTED:  01/19/23 01:19   ANTIBIOTICS AT RENETTA.:                      RECEIVED :  01/19/23 01:32   If child <=2 yrs old please draw pediatric bottle. ~Blood Culture #2   Culture, Blood 1 [[id number]] Collected: 01/18/23 2052   Order Status: Completed Specimen: Blood Updated: 01/22/23 2215    Blood Culture, Routine No Growth after 4 days of incubation. Narrative:     ORDER#: [id number]                          ORDERED BY: [facility]   SOURCE: Blood                              COLLECTED:  01/18/23 20:52   ANTIBIOTICS AT RENETTA.:                      RECEIVED :  01/18/23 21:06   If child <=2 yrs old please draw pediatric bottle. ~Blood Culture 1   Strep Pneumoniae Antigen [[id number]] Collected: 01/19/23 0840   Order Status: Completed Specimen: Urine, clean catch Updated: 01/20/23 0915    STREP PNEUMONIAE ANTIGEN, URINE --    Presumptive Negative   Presumptive negative suggests no current or recent   pneumococcal infection.  Infection due to Strep pneumoniae   cannot be ruled out since the antigen present in the sample   may be below the detection limit of the test.   Normal Range:Presumptive Negative    Narrative:     ORDER#: [id number]                          ORDERED BY: LORENZA SHAW   SOURCE: Urine Clean Catch                  COLLECTED:  01/19/23 08:40   ANTIBIOTICS AT RENETTA.:                      RECEIVED :  01/19/23 09:00   Legionella antigen, urine [[id number]] Collected: 01/19/23 0840   Order Status: Completed Specimen: Urine-Corbett catheter Updated: 01/20/23 0914    L. pneumophila Serogp 1 Ur Ag --    Presumptive Negative   No Legionella pneumophila serogroup        Blood Culture:   Lab Results   Component Value Date/Time    BC No Growth after 4 days of incubation. 01/18/2023 08:52 PM    BLOODCULT2 No Growth after 4 days of incubation. 01/19/2023 01:19 AM       Viral Culture:    Lab Results   Component Value Date/Time    COVID19 Not Detected 01/18/2023 05:21 PM    COVID19 DETECTED 01/06/2022 05:54 PM     Urine Culture: No results for input(s): LABURIN in the last 72 hours.     Scheduled Meds:   tiotropium  2 puff Inhalation Daily    [START ON 1/24/2023] vancomycin  1,500 mg IntraVENous Q24H    sodium chloride (Inhalant)  4 mL Nebulization BID    mupirocin   Nasal BID    albuterol  2.5 mg Nebulization BID    methylPREDNISolone  40 mg IntraVENous Daily    cefepime  2,000 mg IntraVENous Q8H    vancomycin (VANCOCIN) intermittent dosing (placeholder)   Other RX Placeholder    sodium chloride flush  5-40 mL IntraVENous 2 times per day    apixaban  10 mg Oral BID    ticagrelor  90 mg Oral BID    atorvastatin  80 mg Oral Nightly    busPIRone  5 mg Oral BID    gabapentin  300 mg Oral TID    mirtazapine  15 mg Oral Nightly    pantoprazole  40 mg Oral Daily    sertraline  100 mg Oral Daily    aspirin  81 mg Oral Daily       Continuous Infusions:   sodium chloride         PRN Meds:  oxymetazoline, sodium chloride flush, acetaminophen, sodium chloride, potassium chloride, magnesium sulfate, promethazine **OR** ondansetron, [DISCONTINUED] acetaminophen **OR** acetaminophen    Imaging:   XR CHEST PORTABLE   Final Result   Near complete opacification of left hemithorax, suggestive of pleural   effusion and left-sided atelectasis or airspace disease. Small right pleural effusion and right basilar atelectasis/airspace disease. XR CHEST PORTABLE   Final Result   Mild cardiomegaly and mild central pulmonary congestion which is more   prominent. Hazy perihilar and bibasilar opacities which is increased. Small bibasilar pleural effusions which are more prominent. VL Extremity Venous Left   Final Result      XR CHEST PORTABLE   Final Result   Chronic pulmonary change without acute cardiopulmonary process. CT HEAD WO CONTRAST   Final Result   No acute intracranial abnormality. Encephalomalacia in the parasagittal left frontal parietal lobes, likely   related to old infarction in the left LEXA territory. Small old infarctions in the right cerebellar hemisphere and anterior right   temporal lobe. Mild parenchymal volume loss. Mild chronic microvascular disease. CT CHEST PULMONARY EMBOLISM W CONTRAST   Final Result   Subsegmental acute pulmonary emboli scattered in the pulmonary arteries to   the right lower lobe posteriorly. Moderate right basilar opacity posteriorly which could be due to a pulmonary   infarct vs pneumonia and atelectasis. Small bibasilar pleural effusions with bibasilar atelectasis posteriorly. Cholelithiasis. Mildly dilated and atherosclerotic thoracic aorta with no aneurysm or   dissection. The findings were called to Dr. Ruth Mercedes in the emergency room at 7:20 p.m. XR CHEST PORTABLE   Final Result   No acute cardiopulmonary disease. Mild cardiomegaly without overt failure. All pertinent images and reports for the current Hospitalization were reviewed by me.     IMPRESSION:    Patient Active Problem List   Diagnosis    Chest pain    MI, acute, non ST segment elevation (HCC)    Tobacco abuse    Essential hypertension    Acute MI ([facility] [address]    Acute CVA (cerebrovascular accident) ([facility] [address]    Right leg weakness    Acute ischemic left LEXA stroke (HCC)    Non-healing open wound of right heel    Right heel infection    Hyperlipidemia    CAD (coronary artery disease)    Cellulitis of left heel    Wound infection    Acute hematogenous osteomyelitis (HCC)    Right hemiparesis (HCC)    Severe malnutrition (HCC)    Facial weakness    H/O: stroke    NSTEMI (non-ST elevated myocardial infarction) ([facility] [address]    Acute pulmonary embolism (HCC)     Acute PE on admit  Also developed NSTEMI  Acute pulmonary edema  PAN  Fevers   Acute Hypoxic reps failure  Now weaned down to  Room Air  CVA  Rt AKA  Mucus plugging  Left lung collapse  MRSA colonization  WBC elevation    Given the on going resp symptoms and cxr from 1/22 with collapse Left lung with possible fluid   Follow up CXR aeration has improved     WBC now normalized and Resp status improving           Labs, Microbiology, Radiology and pertinent results from current hospitalization and care every where were reviewed by me as a part of the consultation. PLAN :  Cont IV Vancomycin as in patient   D/C Cefepime in AM  Resp cx  Procal check   Pneumonia panel if he expectorates  Will choose Doxycycline x 100 mg q 12 HR  X5 DAYS AT D/C  CXR Stat ordered   If no new resp issues ok for d/c plans      Addendum  : Left lung has expanded on CXR from 1/23/22        Discussed with patient/Family and Nursing   Risk of Complications/Morbidity: High      Illness(es)/ Infection present that pose threat to bodily function. There is potential for severe exacerbation of infection/side effects of treatment. Therapy requires intensive monitoring for antimicrobial agent toxicity. Thanks for allowing me to participate in your patient's care please call me with any questions or concerns.     Dr. Phong Allen MD  [address] [place] [address] Physician  Phone: [phone]   Fax : [phone]

## 2023-01-23 NOTE — DISCHARGE INSTR - COC
Continuity of Care Form    Patient Name: Steven Dc   :  1955  MRN:  [id number]    [address] [facility] date:  2023  Discharge date:  23    Code Status Order: Full Code   Advance Directives:     Admitting Physician:  Jos Pierce DO  PCP: No primary care provider on file.     Discharging Nurse: Yobany Zuñiga RN  [address] Unit/Room#: S8O-2419/5900-92  Discharging Unit Phone Number: [phone]    Emergency Contact:   Extended Emergency Contact Information  Primary Emergency Contact: Becca Cummins  Address: [facility] [address] [place] [address] Phone: [phone]  Mobile Phone: [phone]  Relation: Domestic Partner  Secondary Emergency Contact: Deidra Fuentes [address] Phone: [phone]  Relation: Parent    Past Surgical History:  Past Surgical History:   Procedure Laterality Date    BACK SURGERY N/A     herniated disc    CORONARY ANGIOPLASTY WITH STENT PLACEMENT      EYE SURGERY      VASCULAR SURGERY         Immunization History:   Immunization History   Administered Date(s) Administered    COVID-19, PFIZER PURPLE top, DILUTE for use, (age 15 y+), 30mcg/0.3mL 2021, 2021    Influenza, FLUZONE (age 72 y+), High Dose, 0.7mL 10/22/2020    Influenza, High Dose (Fluzone 65 yrs and older) 2019    Pneumococcal Conjugate 13-valent (Ejeaxmp16) 2019    Pneumococcal Polysaccharide (Lxtpbxxmj19) 10/22/2020    Zoster Recombinant (Shingrix) 10/22/2020, 2021       Active Problems:  Patient Active Problem List   Diagnosis Code    Chest pain R07.9    MI, acute, non ST segment elevation (HCC) I21.4    Tobacco abuse Z72.0    Essential hypertension I10    Acute MI ([address] I21.9    Acute CVA (cerebrovascular accident) ([address] I63.9    Right leg weakness R29.898    Acute ischemic left LEXA stroke (HCC) C62.578    Non-healing open wound of right heel S91.301A    Right heel infection L08.9    Hyperlipidemia E78.5    CAD (coronary artery disease) I25.10    Cellulitis of left heel L03.116    Wound infection T14. 8XXA, L08.9    Acute hematogenous osteomyelitis (HCC) M86.00    Right hemiparesis ([facility]) G81.91    Severe malnutrition ([facility] E43    Facial weakness R29.810    H/O: stroke Z86.73    NSTEMI (non-ST elevated myocardial infarction) ([facility] I21.4    Acute pulmonary embolism ([facility]) I26.99       Isolation/Infection:   Isolation            Contact          Patient Infection Status       Infection Onset Added Last Indicated Last Indicated By Review Planned Expiration Resolved Resolved By    MRSA 22 MRSA DNA Probe, Nasal        MDRO (multi-drug resistant organism) 22 Culture, Wound        Resolved    COVID-19 (Rule Out) 23 COVID-19, Rapid (Ordered)   23 Rule-Out Test Resulted    COVID-19 (Rule Out) 22 COVID-19, Rapid (Ordered)   22 Rule-Out Test Resulted    COVID-19 22 COVID-19   22     COVID-19 (Rule Out) 22 COVID-19 (Ordered)   22 Rule-Out Test Resulted    COVID-19 (Rule Out) 22 COVID-19, Rapid (Ordered)   22 Rule-Out Test Resulted    COVID-19 (Rule Out) 21 COVID-19, Rapid (Ordered)   21 Rule-Out Test Resulted            Nurse Assessment:  Last Vital Signs: BP (!) 147/76   Pulse 69   Temp 97.6 °F (36.4 °C) (Oral)   Resp 16   Ht 6' 3\" (1.905 m)   Wt 213 lb 10 oz (96.9 kg)   SpO2 94%   BMI 26.70 kg/m²     Last documented pain score (0-10 scale): Pain Level: 0  Last Weight:   Wt Readings from Last 1 Encounters:   23 213 lb 10 oz (96.9 kg)     Mental Status:  oriented and alert    IV Access:  - None    Nursing Mobility/ADLs:  Walking   Dependent  Transfer  Dependent  Bathing  Dependent  Dressing  Dependent  Toileting  Dependent  Feeding  Assisted  Med Admin  Assisted  Med Delivery   whole    Wound Care Documentation and Therapy:  Wound 02/03/22 Heel Right;Left rt heel is black, draining yellow fluid, left has open area with scant bleeding and yellow discharghe. (Active)   Number of days: 354       Wound 02/03/22 Heel Right;Left dry,  (Active)   Number of days: 354       Wound 01/10/22 Heel Left Red base w/ yellow slough (Active)   Number of days: 377        Elimination:  Continence: Bowel: No  Bladder: No  Urinary Catheter: Removal Date 1/24/23    Colostomy/Ileostomy/Ileal Conduit: No       Date of Last BM: 1/24/23    Intake/Output Summary (Last 24 hours) at 1/23/2023 1534  Last data filed at 1/23/2023 1006  Gross per 24 hour   Intake 516.8 ml   Output 2275 ml   Net -1758.2 ml     I/O last 3 completed shifts: In: 1726.1 [P.O.:600; IV Piggyback:1126.1]  Out: 3000 [Urine:3000]    Safety Concerns: At Risk for Falls    Impairments/Disabilities:      Amputation - Right AKA, right-sided weakness and some delayed speech from previous stroke    Nutrition Therapy:  Current Nutrition Therapy:   - Oral Diet:  Low Sodium (2gm),    Routes of Feeding: Oral  Liquids: Thin Liquids  Daily Fluid Restriction: 48-64 ounces/day  Last Modified Barium Swallow with Video (Video Swallowing Test): not done    Treatments at the Time of Hospital Discharge:   Respiratory Treatments: albuterol nebulizer, NaCl nebulizer, spiriva inhaler  Oxygen Therapy:  is not on home oxygen therapy. Ventilator:    - No ventilator support    Rehab Therapies: Physical Therapy and Occupational Therapy  Weight Bearing Status/Restrictions: No weight bearing restrictions  Other Medical Equipment (for information only, NOT a DME order):  {EQUIPMENT:[id number]}  Other Treatments:     Heart Failure Instructions for Daily Management  Patient was treated for chronic diastolic heart failure. he  will require the following:    Please weigh daily on the same scale and approximately the same time of day.   Report weight gain of 3 pounds/day or 5 pounds/week to : flores [place] and [facility] [phone]. Please use hospital discharge weight as baseline reference. Please monitor for signs and symptoms of and report to MD:  Worsening Heart Failure: sudden weight gain, shortness of breath, lower extremity or general edema/swelling, abdominal bloating/swelling, inability to lie flat, intolerance to usual activity, or cough (especially at night). Report these finding even if no increase in weight. Dehydration:  having difficulty or a decrease in urination, dizziness, worsening fatigue, or new onset/worsening of generalized weakness. Please continue a LOW SODIUM diet and LIMIT fluid intake to 48 - 64 ounces ( 1.5 - 2 liters) per day. Call facility MD and [facility] [phone] and/or [facility] Felicitas Bryant @ [phone] with any questions or concerns. Please continue heart failure education to patient and family/support system. See After Visit Summary for hospital follow up appointment details. Consider spiritual care referral for support and/or completion of advance directives [phone]. Consider: having the facility MD complete required 7 day follow up, [place] [address] telehealth program if patient agreeable and able to participate, and palliative care consult for ongoing goals of care, end of life, and/or chronic disease management discussions.      FOLLOW UP APPOINTMENT     Provider Lui Austin   2/1/2023 10:20 AM Kinjal Marrufo, [address] [facility]       Patient's personal belongings (please select all that are sent with patient):  Glasses, clothing, cell phone    RN SIGNATURE:  Electronically signed by Maxim Granados on 1/24/23 at 4:57 PM EST    CASE MANAGEMENT/SOCIAL WORK SECTION    Inpatient Status Date: 1/18/23    Readmission Risk Assessment Score:  Readmission Risk              Risk of Unplanned Readmission:  26           [facility] Facility/ Agency   [facility]  [facility]  Phone:  [phone]  Fax:  [phone]    / signature: Electronically signed by [id number] SHERRI Francis on 1/23/23 at 3:34 PM EST    PHYSICIAN SECTION    Prognosis: Fair    Condition at Discharge: Stable    Rehab Potential (if transferring to Rehab): Fair    Recommended Labs or Other Treatments After Discharge: none    Physician Certification: I certify the above information and transfer of Zee Langford  is necessary for the continuing treatment of the diagnosis listed and that he requires [facility] for less 30 days.      Update Admission H&P: No change in H&P    PHYSICIAN SIGNATURE:  Electronically signed by Too Swift MD on 1/24/23 at 1:47 PM EST

## 2023-01-23 NOTE — PROGRESS NOTES
Hospitalist Progress Note      PCP: No primary care provider on file. Date of Admission: 1/18/2023    Chief Complaint:   SOB    Hospital Course:    75 y.o. male who presented to [facility] with past medical history of CAD, hypertension, hyperlipidemia, MI, history of CVA presented to the ED with chief complaint of shortness of breath. He had 2 days of worsening shortness of breath and adam on exertion. No h/o DVT/Pes. He recovered from COVID-19. Patient also reports has been being compliant with medication. Patient otherwise denied increased salt intake or worsening lower extremity edema. Patient also reports decreased oral intake. He was found to have a RLL PNA and subsegmental PE, He was started on a heparin gtt and transitioned to eliquis as well as vanc and cefepime.     Subjective:   Feeling better  No F/C  Tolerating a diet   Epistaxis stopped      Medications:  Reviewed    Infusion Medications    sodium chloride       Scheduled Medications    sodium chloride (Inhalant)  4 mL Nebulization BID    ipratropium  0.5 mg Nebulization BID    mupirocin   Nasal BID    albuterol  2.5 mg Nebulization BID    methylPREDNISolone  40 mg IntraVENous Daily    vancomycin  1,250 mg IntraVENous Q24H    cefepime  2,000 mg IntraVENous Q8H    vancomycin (VANCOCIN) intermittent dosing (placeholder)   Other RX Placeholder    sodium chloride flush  5-40 mL IntraVENous 2 times per day    apixaban  10 mg Oral BID    ticagrelor  90 mg Oral BID    atorvastatin  80 mg Oral Nightly    busPIRone  5 mg Oral BID    gabapentin  300 mg Oral TID    mirtazapine  15 mg Oral Nightly    pantoprazole  40 mg Oral Daily    sertraline  100 mg Oral Daily    aspirin  81 mg Oral Daily     PRN Meds: oxymetazoline, sodium chloride flush, acetaminophen, sodium chloride, potassium chloride, magnesium sulfate, promethazine **OR** ondansetron, [DISCONTINUED] acetaminophen **OR** acetaminophen      Intake/Output Summary (Last 24 hours) at 1/22/2023 2031  Last data filed at 1/22/2023 1702  Gross per 24 hour   Intake 1256.12 ml   Output 1600 ml   Net -343.88 ml         Physical Exam Performed:    BP (!) 194/89   Pulse 69   Temp 97.3 °F (36.3 °C) (Oral)   Resp 16   Ht 6' 3\" (1.905 m)   Wt 210 lb 5.1 oz (95.4 kg)   SpO2 93%   BMI 26.29 kg/m²     General appearance: No apparent distress, appears stated age and cooperative. HEENT: Pupils equal, round, and reactive to light. Conjunctivae/corneas clear. Neck: Supple, with full range of motion. No jugular venous distention. Trachea midline. Respiratory:  Normal respiratory effort. Some crackles right base. Cardiovascular: Regular rate and rhythm with normal S1/S2 without murmurs, rubs or gallops. Abdomen: Soft, non-tender, non-distended with normal bowel sounds. Musculoskeletal: No clubbing, cyanosis or edema bilaterally. Full range of motion without deformity. Skin: Skin color, texture, turgor normal.  No rashes or lesions. Neurologic:  Neurovascularly intact without any focal sensory/motor deficits. Cranial nerves: II-XII intact, grossly non-focal.  Psychiatric: Alert and oriented, thought content appropriate, normal insight  Capillary Refill: Brisk, 3 seconds, normal   Peripheral Pulses: +2 palpable, equal bilaterally       Labs:   Recent Labs     01/20/23  0355 01/22/23  0436   WBC 8.4 11.0   HGB 9.4* 9.5*   HCT 28.1* 28.3*   * 169       Recent Labs     01/20/23  0355 01/20/23  1842 01/21/23  0352 01/21/23  1950 01/22/23  0436   *  --  137  --  137   K 3.3*  3.3*   < > 3.5 3.9 4.2   CL 99  --  101  --  102   CO2 25  --  22  --  21   BUN 28*  --  29*  --  31*   CREATININE 1.3  --  1.0  --  1.0  0.9   CALCIUM 8.4  --  8.9  --  9.1    < > = values in this interval not displayed. Recent Labs     01/20/23  0355 01/21/23 0352   AST 12* 16   ALT 6* 8*   BILITOT 0.4 0.4   ALKPHOS 83 90       No results for input(s): INR in the last 72 hours.   No results for input(s): CKTOTAL, TROPONINI in the last 72 hours. Urinalysis:      Lab Results   Component Value Date/Time    NITRU Negative 09/01/2022 03:30 PM    WBCUA 74 09/01/2022 03:30 PM    BACTERIA None Seen 09/01/2022 03:30 PM    RBCUA 0 09/01/2022 03:30 PM    BLOODU TRACE 09/01/2022 03:30 PM    SPECGRAV 1.014 09/01/2022 03:30 PM    GLUCOSEU Negative 09/01/2022 03:30 PM       Radiology:  XR CHEST PORTABLE   Final Result   Near complete opacification of left hemithorax, suggestive of pleural   effusion and left-sided atelectasis or airspace disease. Small right pleural effusion and right basilar atelectasis/airspace disease. XR CHEST PORTABLE   Final Result   Mild cardiomegaly and mild central pulmonary congestion which is more   prominent. Hazy perihilar and bibasilar opacities which is increased. Small bibasilar pleural effusions which are more prominent. VL Extremity Venous Left   Final Result      XR CHEST PORTABLE   Final Result   Chronic pulmonary change without acute cardiopulmonary process. CT HEAD WO CONTRAST   Final Result   No acute intracranial abnormality. Encephalomalacia in the parasagittal left frontal parietal lobes, likely   related to old infarction in the left LEXA territory. Small old infarctions in the right cerebellar hemisphere and anterior right   temporal lobe. Mild parenchymal volume loss. Mild chronic microvascular disease. CT CHEST PULMONARY EMBOLISM W CONTRAST   Final Result   Subsegmental acute pulmonary emboli scattered in the pulmonary arteries to   the right lower lobe posteriorly. Moderate right basilar opacity posteriorly which could be due to a pulmonary   infarct vs pneumonia and atelectasis. Small bibasilar pleural effusions with bibasilar atelectasis posteriorly. Cholelithiasis. Mildly dilated and atherosclerotic thoracic aorta with no aneurysm or   dissection.       The findings were called to Dr. Melissa Borrego in the emergency room at 7:20 p.m. XR CHEST PORTABLE   Final Result   No acute cardiopulmonary disease. Mild cardiomegaly without overt failure. IP CONSULT TO HOSPITALIST  IP CONSULT TO CARDIOLOGY  IP CONSULT TO HEART FAILURE NURSE/COORDINATOR  IP CONSULT TO DIETITIAN  PHARMACY TO DOSE VANCOMYCIN  IP CONSULT TO CRITICAL CARE  IP CONSULT TO CARDIAC REHAB  IP CONSULT TO CARDIAC REHAB    Assessment:    1. Acute subsegmental pulmonary embolism scattered right lower lobe. No RV strain. 2. RLL consolidation- infarct v PNA. MRSA pcr positive. 3. Type II NSTEMI  4. CAD s/p stent  5. Hypertension  6.  Ectopy with first degree HB      New probs  Epistaxis    Plan  -cont cefepime/ vanc  -prn suppl oxygen, keep sats >90%  -cont percussion vest  -cont eliquis  -cont home meds  -duonebs and steroids  -Affrin nasal spray  -CBC qd  -check Mag/K and replete prn     Diet: cardiac diet     DVT Prophylaxis: eliquis     Dispo:   Expected LOS of two days    Appropriate for A1 Discharge Unit: No      Shanna Melton MD

## 2023-01-23 NOTE — CARE COORDINATION
01/23/23 1534   IMM Letter   IMM Letter given to Patient/Family/Significant other/Guardian/POA/by: James CRONINW to pt at bedside, copy left with pt   IMM Letter date given: 01/23/23   IMM Letter time given: 1445   Electronically signed by SHERRI Keyes on 1/23/2023 at 3:36 PM

## 2023-01-23 NOTE — CARE COORDINATION
Spoke with pt at bedside. He reports he will likely be dc today and plans to go back to [address] [facility]. Called and spoke with Linda Barnes who reports pt can return, no precert or COVID needed.   Will need transport  Electronically signed by SHERRI Gil on 1/23/2023 at 3:33 PM

## 2023-01-23 NOTE — PROGRESS NOTES
Clinical Pharmacy Note  Vancomycin Consult    Mario Alberto Childs is a 75 y.o. male ordered Vancomycin for CAP/sepsis of unknown origin; consult received from Dr. Ilia Mclaughlin to manage therapy. Also receiving cefepime. Allergies:  Patient has no known allergies. Temp max:  Temp (24hrs), Av.9 °F (36.6 °C), Min:97.3 °F (36.3 °C), Max:99 °F (37.2 °C)      Recent Labs     23  0436   WBC 11.0         Recent Labs     23  0352 23  0436 23  0554   BUN 29* 31*  --    CREATININE 1.0 1.0  0.9 0.9           Intake/Output Summary (Last 24 hours) at 2023 0919  Last data filed at 2023 7940  Gross per 24 hour   Intake 996.8 ml   Output 1800 ml   Net -803.2 ml         Culture Results:   MRSA probe: positive    Ht Readings from Last 1 Encounters:   23 6' 3\" (1.905 m)        Wt Readings from Last 1 Encounters:   23 213 lb 10 oz (96.9 kg)         Estimated Creatinine Clearance: 94 mL/min (based on SCr of 0.9 mg/dL). Assessment:  Day # 5 of vancomycin. Current regimen: 1250 mg every 24 hours  Based on today's SCr, predicted AUC: 392  Goal -600    Plan:  Increase vanco to 1500 mg IV Q24H today x 2 doses to completed 7 day course of therapy    Thank you for the consult.      Abodulaye Gutierrez, PharmD.  2023  9:20 AM

## 2023-01-24 VITALS
OXYGEN SATURATION: 97 % | RESPIRATION RATE: 18 BRPM | SYSTOLIC BLOOD PRESSURE: 170 MMHG | DIASTOLIC BLOOD PRESSURE: 92 MMHG | WEIGHT: 215.83 LBS | TEMPERATURE: 97.7 F | HEART RATE: 65 BPM | BODY MASS INDEX: 26.84 KG/M2 | HEIGHT: 75 IN

## 2023-01-24 PROBLEM — Z22.322 MRSA (METHICILLIN RESISTANT STAPHYLOCOCCUS AUREUS) COLONIZATION: Status: ACTIVE | Noted: 2023-01-24

## 2023-01-24 PROBLEM — J98.11 COLLAPSE OF LEFT LUNG: Status: ACTIVE | Noted: 2023-01-24

## 2023-01-24 PROBLEM — D72.9 NEUTROPHILIA: Status: ACTIVE | Noted: 2023-01-24

## 2023-01-24 PROBLEM — I63.9 CEREBROVASCULAR ACCIDENT (CVA) (HCC): Status: ACTIVE | Noted: 2023-01-24

## 2023-01-24 PROBLEM — J96.01 ACUTE RESPIRATORY FAILURE WITH HYPOXIA (HCC): Status: ACTIVE | Noted: 2023-01-24

## 2023-01-24 LAB
CREAT SERPL-MCNC: 0.9 MG/DL (ref 0.8–1.3)
GFR SERPL CREATININE-BSD FRML MDRD: >60 ML/MIN/{1.73_M2}
HCT VFR BLD CALC: 28.5 % (ref 40.5–52.5)
HEMOGLOBIN: 9.5 G/DL (ref 13.5–17.5)
MCH RBC QN AUTO: 28.8 PG (ref 26–34)
MCHC RBC AUTO-ENTMCNC: 33.2 G/DL (ref 31–36)
MCV RBC AUTO: 86.6 FL (ref 80–100)
PDW BLD-RTO: 16.6 % (ref 12.4–15.4)
PLATELET # BLD: 195 K/UL (ref 135–450)
PMV BLD AUTO: 7 FL (ref 5–10.5)
RBC # BLD: 3.3 M/UL (ref 4.2–5.9)
REPORT: NORMAL
VANCOMYCIN RANDOM: 12.4 UG/ML
WBC # BLD: 9.4 K/UL (ref 4–11)

## 2023-01-24 PROCEDURE — 2580000003 HC RX 258: Performed by: INTERNAL MEDICINE

## 2023-01-24 PROCEDURE — 6370000000 HC RX 637 (ALT 250 FOR IP): Performed by: INTERNAL MEDICINE

## 2023-01-24 PROCEDURE — 82565 ASSAY OF CREATININE: CPT

## 2023-01-24 PROCEDURE — 85027 COMPLETE CBC AUTOMATED: CPT

## 2023-01-24 PROCEDURE — 36415 COLL VENOUS BLD VENIPUNCTURE: CPT

## 2023-01-24 PROCEDURE — 6360000002 HC RX W HCPCS: Performed by: INTERNAL MEDICINE

## 2023-01-24 PROCEDURE — 99233 SBSQ HOSP IP/OBS HIGH 50: CPT | Performed by: INTERNAL MEDICINE

## 2023-01-24 PROCEDURE — 99232 SBSQ HOSP IP/OBS MODERATE 35: CPT | Performed by: INTERNAL MEDICINE

## 2023-01-24 PROCEDURE — 80202 ASSAY OF VANCOMYCIN: CPT

## 2023-01-24 RX ORDER — DOXYCYCLINE HYCLATE 100 MG
100 TABLET ORAL EVERY 12 HOURS SCHEDULED
Qty: 5 TABLET | Refills: 0 | Status: SHIPPED | OUTPATIENT
Start: 2023-01-25 | End: 2023-01-28

## 2023-01-24 RX ORDER — PROMETHAZINE HYDROCHLORIDE 12.5 MG/1
12.5 TABLET ORAL EVERY 6 HOURS PRN
Qty: 20 TABLET | Refills: 2 | Status: SHIPPED | OUTPATIENT
Start: 2023-01-24 | End: 2023-01-31

## 2023-01-24 RX ORDER — LISINOPRIL 20 MG/1
20 TABLET ORAL DAILY
Status: DISCONTINUED | OUTPATIENT
Start: 2023-01-24 | End: 2023-01-24 | Stop reason: HOSPADM

## 2023-01-24 RX ORDER — ATENOLOL 25 MG/1
25 TABLET ORAL DAILY
Status: DISCONTINUED | OUTPATIENT
Start: 2023-01-24 | End: 2023-01-24 | Stop reason: HOSPADM

## 2023-01-24 RX ORDER — OXYMETAZOLINE HYDROCHLORIDE 0.05 G/100ML
2 SPRAY NASAL 2 TIMES DAILY PRN
Qty: 1 EACH | Refills: 3 | Status: SHIPPED | OUTPATIENT
Start: 2023-01-24 | End: 2023-02-23

## 2023-01-24 RX ORDER — SODIUM CHLORIDE FOR INHALATION 3 %
4 VIAL, NEBULIZER (ML) INHALATION 2 TIMES DAILY
Qty: 360 ML | Refills: 2 | Status: SHIPPED | OUTPATIENT
Start: 2023-01-24

## 2023-01-24 RX ORDER — DOXYCYCLINE HYCLATE 100 MG
100 TABLET ORAL EVERY 12 HOURS SCHEDULED
Status: DISCONTINUED | OUTPATIENT
Start: 2023-01-25 | End: 2023-01-24 | Stop reason: HOSPADM

## 2023-01-24 RX ORDER — HYDROCHLOROTHIAZIDE 25 MG/1
25 TABLET ORAL DAILY
Status: DISCONTINUED | OUTPATIENT
Start: 2023-01-24 | End: 2023-01-24 | Stop reason: HOSPADM

## 2023-01-24 RX ADMIN — MUPIROCIN: 20 OINTMENT TOPICAL at 10:13

## 2023-01-24 RX ADMIN — SODIUM CHLORIDE SOLN NEBU 3% 4 ML: 3 NEBU SOLN at 09:21

## 2023-01-24 RX ADMIN — ALBUTEROL SULFATE 2.5 MG: 2.5 SOLUTION RESPIRATORY (INHALATION) at 09:18

## 2023-01-24 RX ADMIN — APIXABAN 10 MG: 5 TABLET, FILM COATED ORAL at 10:04

## 2023-01-24 RX ADMIN — LISINOPRIL 20 MG: 20 TABLET ORAL at 10:05

## 2023-01-24 RX ADMIN — VANCOMYCIN HYDROCHLORIDE 1500 MG: 1.5 INJECTION, POWDER, LYOPHILIZED, FOR SOLUTION INTRAVENOUS at 04:18

## 2023-01-24 RX ADMIN — METHYLPREDNISOLONE SODIUM SUCCINATE 40 MG: 40 INJECTION, POWDER, FOR SOLUTION INTRAMUSCULAR; INTRAVENOUS at 10:05

## 2023-01-24 RX ADMIN — CEFEPIME 2000 MG: 2 INJECTION, POWDER, FOR SOLUTION INTRAVENOUS at 00:04

## 2023-01-24 RX ADMIN — SERTRALINE 100 MG: 100 TABLET, FILM COATED ORAL at 10:04

## 2023-01-24 RX ADMIN — ATENOLOL 25 MG: 25 TABLET ORAL at 10:04

## 2023-01-24 RX ADMIN — GABAPENTIN 300 MG: 300 CAPSULE ORAL at 10:04

## 2023-01-24 RX ADMIN — GABAPENTIN 300 MG: 300 CAPSULE ORAL at 14:27

## 2023-01-24 RX ADMIN — PANTOPRAZOLE SODIUM 40 MG: 40 TABLET, DELAYED RELEASE ORAL at 10:04

## 2023-01-24 RX ADMIN — ASPIRIN 81 MG CHEWABLE TABLET 81 MG: 81 TABLET CHEWABLE at 10:04

## 2023-01-24 RX ADMIN — Medication 10 ML: at 10:03

## 2023-01-24 RX ADMIN — TICAGRELOR 90 MG: 90 TABLET ORAL at 10:05

## 2023-01-24 RX ADMIN — HYDROCHLOROTHIAZIDE 25 MG: 25 TABLET ORAL at 10:04

## 2023-01-24 RX ADMIN — BUSPIRONE HYDROCHLORIDE 5 MG: 5 TABLET ORAL at 10:05

## 2023-01-24 RX ADMIN — TIOTROPIUM BROMIDE INHALATION SPRAY 2 PUFF: 3.12 SPRAY, METERED RESPIRATORY (INHALATION) at 09:21

## 2023-01-24 NOTE — PROGRESS NOTES
Physical Therapy  PT referral received and chart reviewed. Pt LTC; dependent via [employer]. PT will sign off at this time. Nursing aware.   Michelle Canales, PT

## 2023-01-24 NOTE — CARE COORDINATION
CASE MANAGEMENT DISCHARGE SUMMARY:    DISCHARGE DATE: 1/24/2023    Discharging to Facility/ Agency   Name: [facility]  Address:  [address] [facility]  Phone:  [phone]  Fax:  [phone]     TRANSPORTATION: [facility]             TIME: 6:45pm    INSURANCE PRECERT OBTAINED: N/A; LTC resident    HENS/PASAAR COMPLETED: N/A; LTC resident    COMMENTS: Patient, patient's family Atlbarb Mena), bedside RN, and facility aware of discharge plan and timeline. Left HIPAA-compliant message for Keyser, patient's domestic partner, informing of discharge plan.     Electronically signed by Ned Lemus RN on 1/24/2023 at 2:05 PM

## 2023-01-24 NOTE — PROGRESS NOTES
Clinical Pharmacy Note  Medication Counseling    Reviewed new medications started during hospital admission: eliquis, , 10. Indications and side effects were emphasized during counseling. All medication-related questions addressed. Patient verbalized understanding of education. Should the patient express any additional questions or concerns regarding their medications, please do not hesitate to contact the pharmacy department. Patient/caregiver aware they may refuse medications during hospital stay. 10 minutes spent educating patient regarding medications.

## 2023-01-24 NOTE — PROGRESS NOTES
Prepared patient for discharge. Removed IVs. Called report to [place] at [facility]. Transport scheduled to pick patient up at 1845.

## 2023-01-24 NOTE — PROGRESS NOTES
Pulmonary Critical Care Progress Note     Patient's name:  Aram Victoria  Medical Record Number: [id number]  Patient's account/billing number: [de-identified]  Patient's YOB: 1955  Age: 75 y.o. Date of Admission: 1/18/2023  5:03 PM  Date of Consult: 1/24/2023      Primary Care Physician: No primary care provider on file. Code Status: Full Code    Reason for consult: Acute respiratory failure with hypoxia and hypercapnia    Assessment and Plan     Acute respiratory with hypoxia and hypercapnia  Right lower lobe HCAP  Acute PE, unprovoked   CAD s/p stent  Epistaxis         Plan:  O2 keep sat > 90%  Up to chair  IS/ACapella   Antibiotics per ID  Eliquis long term   Systemic steroid and bronchodilators   Aspiration precautions. Vest therapy and 3%        Subjective:  Weak  No fever  Weak cough       HISTORY OF PRESENT ILLNESS:   /Ms. Aram Victoria is a 75 y.o.   gentleman with PMH stated below significant for h/o CVA with right hemiparesis, history of coronary artery disease, hypertension and hyperlipidemia NH resident presented with worsening shortness of breath cough for the last few days, transferred to the ICU overnight for hypoxic respiratory failure in severe respiratory distress  proBNP 697. Procalcitonin 0.17. Leukocytosis. CTA chest with subsegmental acute PE. Right lower lobe airspace disease. REVIEW OF SYSTEMS:  Review of Systems -   C/o cough  Sob  Sputum  Not able to expectorate  No chest pain  Fever overnight        Physical Exam:    Vitals: BP (!) 167/75   Pulse 69   Temp 97.6 °F (36.4 °C) (Oral)   Resp 18   Ht 6' 3\" (1.905 m)   Wt 215 lb 13.3 oz (97.9 kg)   SpO2 96%   BMI 26.98 kg/m²     Last Body weight:   Wt Readings from Last 3 Encounters:   01/24/23 215 lb 13.3 oz (97.9 kg)   09/12/22 204 lb 2.3 oz (92.6 kg)   02/10/22 188 lb 4.4 oz (85.4 kg)       Body Mass Index : Body mass index is 26.98 kg/m².       Intake and Output summary:   Intake/Output Summary (Last 24 hours) at 1/24/2023 1114  Last data filed at 1/24/2023 0847  Gross per 24 hour   Intake 200 ml   Output 1450 ml   Net -1250 ml       Physical Examination:     PHYSICAL EXAM:    Gen: NAD  Eyes: PERRL. Anicteric sclera. No conjunctival injection. ENT: No discharge. Posterior oropharynx clear. External appearance of ears and nose normal.  Neck: Trachea midline. No mass, no lymphadenopathy    Resp: Diminished bilaterally with scattered rhonchi no wheezing  CV: Regular rate. Regular rhythm. No murmur or rub. No edema. GI: Soft, Non-tender. Non-distended. +BS  Skin: Warm, dry, w/o erythema. Lymph: No cervical or supraclavicular LAD. M/S: No cyanosis. No clubbing. Right above-knee amputation  Neuro:  CN 2-12 tested, no focal neurologic deficit. Moves all extremities  Psych: Awake lethargic right-sided weakness        Laboratory findings:-    CBC:   Recent Labs     01/24/23  0527   WBC 9.4   HGB 9.5*        BMP:    Recent Labs     01/22/23  0436 01/23/23  0554 01/24/23  0527     --   --    K 4.2  --   --      --   --    CO2 21  --   --    BUN 31*  --   --    CREATININE 1.0  0.9 0.9 0.9   GLUCOSE 146*  --   --      S. Calcium:  Recent Labs     01/22/23  0436   CALCIUM 9.1       S. Magnesium:  Recent Labs     01/23/23  0554   MG 2.00     S. Phosphorus:  No results for input(s): PHOS in the last 72 hours. S. Glucose:  No results for input(s): POCGLU in the last 72 hours. Hepatic functions:   No results for input(s): ALKPHOS, ALT, AST, PROT, BILITOT, BILIDIR, LABALBU in the last 72 hours. Cardiac enzymes:  No results for input(s): CKTOTAL, CKMB, CKMBINDEX, TROPONINI in the last 72 hours. Radiology Review:  Pertinent images / reports were reviewed as a part of this visit.       CTPA: Results for orders placed during the hospital encounter of 01/18/23    CT CHEST PULMONARY EMBOLISM W CONTRAST    Narrative  EXAMINATION:  CTA OF THE CHEST 1/18/2023 6:43 pm    TECHNIQUE:  CTA of the chest was performed after the administration of intravenous  contrast.  Multiplanar reformatted images are provided for review. MIP  images are provided for review. Automated exposure control, iterative  reconstruction, and/or weight based adjustment of the mA/kV was utilized to  reduce the radiation dose to as low as reasonably achievable. COMPARISON:  None. HISTORY:  ORDERING SYSTEM PROVIDED HISTORY: right sided posterior chest pain, elevated  troponin  TECHNOLOGIST PROVIDED HISTORY:  Reason for exam:->right sided posterior chest pain, elevated troponin  Decision Support Exception - unselect if not a suspected or confirmed  emergency medical condition->Emergency Medical Condition (MA)  Reason for Exam: right sided posterior chest pain, elevated troponin    FINDINGS:  Pulmonary Arteries: There are multiple small subsegmental filling defects in  the pulmonary arteries to the right lower lobe with contrast seen distal to  the filling defects. No other filling defects are seen. Mediastinum: No evidence of mediastinal lymphadenopathy. The heart and  pericardium demonstrate no acute abnormality. There is no significant right  heart enlargement. There is moderate calcified plaque throughout the aorta  which is mildly dilated with no aneurysm or dissection. Lungs/pleura: There are small bibasilar pleural effusions layering  posteriorly with associated adjacent atelectasis and consolidation  posteriorly which is more prominent on the right. There is right basilar  consolidation posteriorly. No endobronchial lesion is seen. There is no  pulmonary nodule or mass. Upper Abdomen: There are calcifications scattered in the gallbladder. The  adrenals are normal.    Soft Tissues/Bones: No acute bone or soft tissue abnormality.   There is  moderate scoliosis    Impression  Subsegmental acute pulmonary emboli scattered in the pulmonary arteries to  the right lower lobe posteriorly. Moderate right basilar opacity posteriorly which could be due to a pulmonary  infarct vs pneumonia and atelectasis. Small bibasilar pleural effusions with bibasilar atelectasis posteriorly. Cholelithiasis. Mildly dilated and atherosclerotic thoracic aorta with no aneurysm or  dissection. The findings were called to Dr. Abdoulaye Stewart in the emergency room at 7:20 p.m. CXR PA/LAT: Results for orders placed during the hospital encounter of 01/15/17    XR Chest Standard TWO VW    Narrative  EXAMINATION:  TWO VIEWS OF THE CHEST    1/15/2017 8:01 pm    COMPARISON:  September 24, 2014    HISTORY:  ORDERING SYSTEM PROVIDED HISTORY: fatigue  TECHNOLOGIST PROVIDED HISTORY:  Ordering Physician Provided Reason for Exam: cough  Acuity: Unknown  Type of Exam: Unknown    FINDINGS:  The cardiomediastinal silhouette is stable. There are increased lung  markings at the bilateral parahilar regions, may be related to bronchitis. There is no pneumothorax or pleural effusion. The visualized osseous  structures are within normal limits. Impression  Increased lung markings at the bilateral parahilar regions, may be related to  bronchitis. CXR portable: Results for orders placed during the hospital encounter of 01/18/23    XR CHEST PORTABLE    Narrative  EXAMINATION:  ONE XRAY VIEW OF THE CHEST    1/19/2023 1:36 am    COMPARISON:  Chest radiograph performed 01/18/2023. HISTORY:  ORDERING SYSTEM PROVIDED HISTORY: rapid  TECHNOLOGIST PROVIDED HISTORY:  Reason for exam:->rapid  Reason for Exam: rapid    FINDINGS:  There is chronic pulmonary change. There is no acute consolidation or  effusion. There is no pneumothorax. The mediastinal structures are stable. The upper abdomen unremarkable. The extrathoracic soft tissues are  unremarkable. Impression  Chronic pulmonary change without acute cardiopulmonary process.       Carine Kraus MD, MDIPIKA.            1/24/2023, 11:14 AM

## 2023-01-24 NOTE — PROGRESS NOTES
Infectious Diseases Inpatient Progress Note      CHIEF COMPLAINT:     \NSTEMI  PNA  Resp failure  Mucusplugging  MRSA colonization   WBC elevation       HISTORY OF PRESENT ILLNESS:  75 y.o. with a past medical history significant for peripheral vascular disease, right above-knee amputation, CVA with right sided weakness, coronary artery disease, nursing home resident admitted to the hospital secondary to shortness of breath and also chest pain. Admission CT chest indicated right lower lobe pulm embolism. In the hospital he also developed, edema requiring BiPAP  and EKG changes hence was seen by cardiology underwent coronary angiogram with stent placement. In the hospital developed fever T-max 100.6 with WBC elevation. Chest x-ray indicated cardiomegaly with pulmonary congestion and perihilar opacities concerning for pneumonia. He was initiated on IV antibiotics. MRSA probe positive for colonization. Chest x-ray from 1/22/23 indicated left lung collapse with near complete opacification of left hemithorax. Given the concern for pneumonia we are consulted for recommendations. Interval history :  Tolerating antibiotic therapy okay no fevers WBC normalized respiratory symptoms slowly improving repeat chest x-ray indicates left lung is expanded, sputum culture Candida noted procalcitonin trending down    Past Medical History:    Past Medical History:   Diagnosis Date    Acute MI ([facility] [address] 09/2013    Anxiety     Arthritis     CAD (coronary artery disease)     COVID-19     Hyperlipidemia 2/3/2022    Hypertension     Influenza A 01/15/2017    Severe malnutrition ([facility] [address] 2/10/2022       Past Surgical History:    Past Surgical History:   Procedure Laterality Date    BACK SURGERY N/A 1989    herniated disc    CORONARY ANGIOPLASTY WITH STENT PLACEMENT  9/13    EYE SURGERY      VASCULAR SURGERY         Current Medications:    Outpatient Medications Marked as Taking for the 1/18/23 encounter [facility] Encounter) Medication Sig Dispense Refill    ticagrelor (BRILINTA) 90 MG TABS tablet Take 1 tablet by mouth 2 times daily 60 tablet 11    hydroCHLOROthiazide (HYDRODIURIL) 25 MG tablet Take 25 mg by mouth daily      lisinopril (PRINIVIL;ZESTRIL) 20 MG tablet Take 20 mg by mouth daily      miconazole (MICOTIN) 2 % powder Apply topically 2 times daily as needed for Itching      guaiFENesin (MUCINEX) 600 MG extended release tablet Take 600 mg by mouth 2 times daily as needed for Congestion         Allergies:  Patient has no known allergies. Immunizations :   Immunization History   Administered Date(s) Administered    COVID-19, PFIZER PURPLE top, DILUTE for use, (age 15 y+), 30mcg/0.3mL 2021, 2021    Influenza, FLUZONE (age 72 y+), High Dose, 0.7mL 10/22/2020    Influenza, High Dose (Fluzone 65 yrs and older) 2019    Pneumococcal Conjugate 13-valent (Olrexlj51) 2019    Pneumococcal Polysaccharide (Bipxdoyve17) 10/22/2020    Zoster Recombinant (Shingrix) 10/22/2020, 2021         Social History:     Social History     Tobacco Use    Smoking status: Former     Packs/day: 0.50     Years: 40.00     Pack years: 20.00     Types: Cigarettes     Quit date: 2019     Years since quittin.0    Smokeless tobacco: Never   Vaping Use    Vaping Use: Never used   Substance Use Topics    Alcohol use:  Yes     Alcohol/week: 2.0 standard drinks     Types: 2 Cans of beer per week     Comment: states used to drink 1 pint/vodka a day / now socially drinks     Social History     Tobacco Use   Smoking Status Former    Packs/day: 0.50    Years: 40.00    Pack years: 20.00    Types: Cigarettes    Quit date: 2019    Years since quittin.0   Smokeless Tobacco Never      Family History   Problem Relation Age of Onset    Hypertension Mother     Hypertension Father     Heart Failure Father     Stroke Maternal Grandfather     Cancer Paternal Uncle         throat          REVIEW OF SYSTEMS:      Constitutional: negative for fevers, chills, night sweats  Eyes:  negative for blurred vision, eye discharge, visual disturbance   HEENT:  negative for hearing loss, ear drainage,nasal congestion  Respiratory:  cough+ , shortness of breath ++  or hemoptysis   Cardiovascular:  negative for chest pain, palpitations, syncope  Gastrointestinal:  negative for nausea, vomiting, diarrhea, constipation, abdominal pain  Genitourinary:  negative for frequency, dysuria, urinary incontinence, hematuria  Hematologic/Lymphatic:  negative for easy bruising, bleeding and lymphadenopathy  Allergic/Immunologic:  negative for recurrent infections, angioedema, anaphylaxis   Endocrine:  negative for weight changes, polyuria, polydipsia and polyphagia  Musculoskeletal:  negative for joint  pain, swelling, decreased range of motion  Integumentary: No rashes, skin lesions  Neurological:  negative for headaches, slurred speech, unilateral weakness  Psychiatric: negative for hallucinations,confusion,agitation.      PHYSICAL EXAM:      Vitals:    BP (!) 167/75   Pulse 69   Temp 97.6 °F (36.4 °C) (Oral)   Resp 18   Ht 6' 3\" (1.905 m)   Wt 215 lb 13.3 oz (97.9 kg)   SpO2 96%   BMI 26.98 kg/m²     General Appearance: alert,in some  acute distress, ++ pallor, no icterus   Skin: warm and dry, no rash or erythema  Head: normocephalic and atraumatic  Eyes: pupils equal, round, and reactive to light, conjunctivae normal  ENT: tympanic membrane, external ear and ear canal normal bilaterally, nose without deformity, nasal mucosa and turbinates normal without polyps  Neck: supple and non-tender without mass, no thyromegaly  no cervical lymphadenopathy  Pulmonary/Chest: Bi basal crepts++ o wheezes, rales or rhonchi, normal air movement, in some respiratory distress  Cardiovascular: normal rate, regular rhythm, normal S1 and S2, no murmurs, rubs, clicks, or gallops, no carotid bruits  Abdomen: soft, non-tender, non-distended, normal bowel sounds, no masses or organomegaly  Extremities: no cyanosis, clubbing or edema  Musculoskeletal: normal range of motion, no joint swelling, deformity or tenderness  Integumentary: No rashes, no abnormal skin lesions, no petechiae  Neurologic: reflexes normal and symmetric, no cranial nerve deficit  Psych:  Orientation, sensorium, mood normal   Lines: IV  Rt AKA+  Corbett+   RT UE weakness+    DATA:    CBC:   Lab Results   Component Value Date    WBC 9.4 01/24/2023    HGB 9.5 (L) 01/24/2023    HCT 28.5 (L) 01/24/2023    MCV 86.6 01/24/2023     01/24/2023     RENAL:   Lab Results   Component Value Date    CREATININE 0.9 01/24/2023    BUN 31 (H) 01/22/2023     01/22/2023    K 4.2 01/22/2023     01/22/2023    CO2 21 01/22/2023     SED RATE:   Lab Results   Component Value Date/Time    SEDRATE 87 02/05/2022 05:36 AM     CK:   Lab Results   Component Value Date/Time    CKTOTAL 63 04/23/2021 06:19 AM     CRP:   Lab Results   Component Value Date/Time    .7 02/05/2022 05:36 AM     Hepatic Function Panel:   Lab Results   Component Value Date/Time    ALKPHOS 90 01/21/2023 03:52 AM    ALT 8 01/21/2023 03:52 AM    AST 16 01/21/2023 03:52 AM    PROT 6.3 01/21/2023 03:52 AM    BILITOT 0.4 01/21/2023 03:52 AM    LABALBU 3.1 01/21/2023 03:52 AM     UA:  Lab Results   Component Value Date/Time    COLORU Yellow 09/01/2022 03:30 PM    CLARITYU Clear 09/01/2022 03:30 PM    GLUCOSEU Negative 09/01/2022 03:30 PM    BILIRUBINUR Negative 09/01/2022 03:30 PM    BILIRUBINUR NEG 01/16/2020 09:57 AM    KETUA Negative 09/01/2022 03:30 PM    SPECGRAV 1.014 09/01/2022 03:30 PM    BLOODU TRACE 09/01/2022 03:30 PM    PHUR 5.0 09/01/2022 03:30 PM    PROTEINU Negative 09/01/2022 03:30 PM    UROBILINOGEN 0.2 09/01/2022 03:30 PM    NITRU Negative 09/01/2022 03:30 PM    LEUKOCYTESUR LARGE 09/01/2022 03:30 PM    LABMICR YES 09/01/2022 03:30 PM    URINETYPE NotGiven 09/01/2022 03:30 PM      Urine Microscopic:   Lab Results   Component Value Date/Time BACTERIA None Seen 09/01/2022 03:30 PM    COMU see below 04/26/2021 11:02 AM    HYALCAST 2 09/01/2022 03:30 PM    WBCUA 74 09/01/2022 03:30 PM    RBCUA 0 09/01/2022 03:30 PM    EPIU 0 09/01/2022 03:30 PM     Urine Reflex to Culture:   Lab Results   Component Value Date/Time    URRFLXCULT Not Indicated 05/28/2021 06:30 PM     WBC  15.7         MICRO: cultures reviewed and updated by me   Time       Culture, Blood 2 [[id number]] Collected: 01/19/23 0119   Order Status: Completed Specimen: Blood Updated: 01/23/23 0415    Culture, Blood 2 No Growth after 4 days of incubation. Narrative:     ORDER#: [id number]                          ORDERED BY: Nanda Valiente   SOURCE: Blood                              COLLECTED:  01/19/23 01:19   ANTIBIOTICS AT RENETTA.:                      RECEIVED :  01/19/23 01:32   If child <=2 yrs old please draw pediatric bottle. ~Blood Culture #2   Culture, Blood 1 [[id number]] Collected: 01/18/23 2052   Order Status: Completed Specimen: Blood Updated: 01/22/23 2215    Blood Culture, Routine No Growth after 4 days of incubation. Narrative:     ORDER#: [id number]                          ORDERED BY: Nanda Valiente   SOURCE: Blood                              COLLECTED:  01/18/23 20:52   ANTIBIOTICS AT RENETTA.:                      RECEIVED :  01/18/23 21:06   If child <=2 yrs old please draw pediatric bottle. ~Blood Culture 1   Strep Pneumoniae Antigen [[id number]] Collected: 01/19/23 0840   Order Status: Completed Specimen: Urine, clean catch Updated: 01/20/23 0915    STREP PNEUMONIAE ANTIGEN, URINE --    Presumptive Negative   Presumptive negative suggests no current or recent   pneumococcal infection.  Infection due to Strep pneumoniae   cannot be ruled out since the antigen present in the sample   may be below the detection limit of the test.   Normal Range:Presumptive Negative    Narrative:     ORDER#: [id number]                          ORDERED BY: LORENZA SHAW   SOURCE: Urine Clean Catch COLLECTED:  01/19/23 08:40   ANTIBIOTICS AT RENETTA.:                      RECEIVED :  01/19/23 09:00   Legionella antigen, urine [[id number]] Collected: 01/19/23 0840   Order Status: Completed Specimen: Urine-Corbett catheter Updated: 01/20/23 0914    L. pneumophila Serogp 1 Ur Ag --    Presumptive Negative   No Legionella pneumophila serogroup        Blood Culture:   Lab Results   Component Value Date/Time    BC No Growth after 4 days of incubation. 01/18/2023 08:52 PM    BLOODCULT2 No Growth after 4 days of incubation. 01/19/2023 01:19 AM            Culture, Respiratory [[id number]] Collected: 01/23/23 1815   Order Status: Completed Specimen: Sputum Expectorated Updated: 01/24/23 1022    CULTURE, RESPIRATORY Further report to follow    Gram Stain Result 3+ WBC's (Polymorphonuclear)   1+ Epithelial Cells   1+ Gram positive cocci    Narrative:     ORDER#: [id number]                          ORDERED BY: Sherly Espinosa   SOURCE: Sputum Expectorated                COLLECTED:  01/23/23 18:15      Viral Culture:    Lab Results   Component Value Date/Time    COVID19 Not Detected 01/18/2023 05:21 PM    COVID19 DETECTED 01/06/2022 05:54 PM     Urine Culture: No results for input(s): Suellen Jaramillo in the last 72 hours.     Scheduled Meds:   lisinopril  20 mg Oral Daily    atenolol  25 mg Oral Daily    hydroCHLOROthiazide  25 mg Oral Daily    tiotropium  2 puff Inhalation Daily    vancomycin  1,500 mg IntraVENous Q24H    sodium chloride (Inhalant)  4 mL Nebulization BID    mupirocin   Nasal BID    albuterol  2.5 mg Nebulization BID    vancomycin (VANCOCIN) intermittent dosing (placeholder)   Other RX Placeholder    sodium chloride flush  5-40 mL IntraVENous 2 times per day    apixaban  10 mg Oral BID    ticagrelor  90 mg Oral BID    atorvastatin  80 mg Oral Nightly    busPIRone  5 mg Oral BID    gabapentin  300 mg Oral TID    mirtazapine  15 mg Oral Nightly    pantoprazole  40 mg Oral Daily    sertraline  100 mg Oral Daily    aspirin  81 mg Oral Daily       Continuous Infusions:   sodium chloride         PRN Meds:  oxymetazoline, sodium chloride flush, acetaminophen, sodium chloride, potassium chloride, magnesium sulfate, promethazine **OR** ondansetron, [DISCONTINUED] acetaminophen **OR** acetaminophen    Imaging:   XR CHEST PORTABLE   Final Result   Significant interval improvement in aeration of the left lung now with only a   small left pleural effusion. No acute infiltrates. XR CHEST PORTABLE   Final Result   Near complete opacification of left hemithorax, suggestive of pleural   effusion and left-sided atelectasis or airspace disease. Small right pleural effusion and right basilar atelectasis/airspace disease. XR CHEST PORTABLE   Final Result   Mild cardiomegaly and mild central pulmonary congestion which is more   prominent. Hazy perihilar and bibasilar opacities which is increased. Small bibasilar pleural effusions which are more prominent. VL Extremity Venous Left   Final Result      XR CHEST PORTABLE   Final Result   Chronic pulmonary change without acute cardiopulmonary process. CT HEAD WO CONTRAST   Final Result   No acute intracranial abnormality. Encephalomalacia in the parasagittal left frontal parietal lobes, likely   related to old infarction in the left LEXA territory. Small old infarctions in the right cerebellar hemisphere and anterior right   temporal lobe. Mild parenchymal volume loss. Mild chronic microvascular disease. CT CHEST PULMONARY EMBOLISM W CONTRAST   Final Result   Subsegmental acute pulmonary emboli scattered in the pulmonary arteries to   the right lower lobe posteriorly. Moderate right basilar opacity posteriorly which could be due to a pulmonary   infarct vs pneumonia and atelectasis. Small bibasilar pleural effusions with bibasilar atelectasis posteriorly. Cholelithiasis.       Mildly dilated and atherosclerotic thoracic aorta with no aneurysm or   dissection. The findings were called to Dr. Srini Chou in the emergency room at 7:20 p.m. XR CHEST PORTABLE   Final Result   No acute cardiopulmonary disease. Mild cardiomegaly without overt failure. All pertinent images and reports for the current Hospitalization were reviewed by me.     IMPRESSION:    Patient Active Problem List   Diagnosis    Chest pain    MI, acute, non ST segment elevation (HCC)    Tobacco abuse    Essential hypertension    Acute MI ([address]    Acute CVA (cerebrovascular accident) ([facility] [address]    Right leg weakness    Acute ischemic left LEXA stroke (HCC)    Non-healing open wound of right heel    Right heel infection    Hyperlipidemia    CAD (coronary artery disease)    Cellulitis of left heel    Wound infection    Acute hematogenous osteomyelitis (HCC)    Right hemiparesis (HCC)    Severe malnutrition (HCC)    Facial weakness    H/O: stroke    NSTEMI (non-ST elevated myocardial infarction) ([address]    Acute pulmonary embolism (HCC)    Acute respiratory failure with hypoxia (HCC)    MRSA (methicillin resistant Staphylococcus aureus) colonization    Neutrophilia    Collapse of left lung    Cerebrovascular accident (CVA) ([facility] [address]     Acute PE on admit  Also developed NSTEMI  Acute pulmonary edema  PAN  Fevers   Acute Hypoxic reps failure  Now weaned down to  Room Air  CVA  Rt AKA  Mucus plugging  Left lung collapse  MRSA colonization  WBC elevation    Given the on going resp symptoms and cxr from 1/22 with collapse Left lung with possible fluid   Follow up CXR aeration has improved     WBC now normalized and Resp status slowly improving sputum culture positive for Candida    Repeat chest x-ray left lung is expanded      Will be able to choose oral antibiotic for discharge planning discussed the primary team    Labs, Microbiology, Radiology and pertinent results from current hospitalization and care every where were reviewed by me as a part of the consultation.    PLAN :  Cont IV Vancomycin as in patient d/w pharmacy   Resp cx candida   Procal check  improving    Pneumonia panel if he expectorates  Will choose Doxycycline x 100 mg q 12 HR  X5 DAYS AT D/C  CXR reviewed  If no new resp issues ok for d/c plans    On anticoagulation for pE       Discussed with patient/Family and Nursing d/w Primary team   Risk of Complications/Morbidity: High      Illness(es)/ Infection present that pose threat to bodily function.   There is potential for severe exacerbation of infection/side effects of treatment.  Therapy requires intensive monitoring for antimicrobial agent toxicity.   Thanks for allowing me to participate in your patient's care please call me with any questions or concerns.    Dr. Ilda Ruiz MD  Infectious Disease  [facility] Phys[facility]  Phone: [phone]   Fax : [phone]

## 2023-01-24 NOTE — PROGRESS NOTES
Hospitalist Progress Note      PCP: No primary care provider on file. Date of Admission: 1/18/2023    Chief Complaint:   SOB    Hospital Course:    75 y.o. male who presented to [facility] with past medical history of CAD, hypertension, hyperlipidemia, MI, history of CVA presented to the ED with chief complaint of shortness of breath. He had 2 days of worsening shortness of breath and adam on exertion. No h/o DVT/Pes. He recovered from COVID-19. Patient also reports has been being compliant with medication. Patient otherwise denied increased salt intake or worsening lower extremity edema. Patient also reports decreased oral intake. He was found to have a RLL PNA and subsegmental PE, He was started on a heparin gtt and transitioned to eliquis as well as vanc and cefepime.     Subjective:   Feeling better  No F/C  Tolerating a diet   Epistaxis stopped      Medications:  Reviewed    Infusion Medications    sodium chloride       Scheduled Medications    tiotropium  2 puff Inhalation Daily    [START ON 1/24/2023] vancomycin  1,500 mg IntraVENous Q24H    sodium chloride (Inhalant)  4 mL Nebulization BID    mupirocin   Nasal BID    albuterol  2.5 mg Nebulization BID    methylPREDNISolone  40 mg IntraVENous Daily    cefepime  2,000 mg IntraVENous Q8H    vancomycin (VANCOCIN) intermittent dosing (placeholder)   Other RX Placeholder    sodium chloride flush  5-40 mL IntraVENous 2 times per day    apixaban  10 mg Oral BID    ticagrelor  90 mg Oral BID    atorvastatin  80 mg Oral Nightly    busPIRone  5 mg Oral BID    gabapentin  300 mg Oral TID    mirtazapine  15 mg Oral Nightly    pantoprazole  40 mg Oral Daily    sertraline  100 mg Oral Daily    aspirin  81 mg Oral Daily     PRN Meds: oxymetazoline, sodium chloride flush, acetaminophen, sodium chloride, potassium chloride, magnesium sulfate, promethazine **OR** ondansetron, [DISCONTINUED] acetaminophen **OR** acetaminophen      Intake/Output Summary (Last 24 hours) at 1/23/2023 2307  Last data filed at 1/23/2023 1939  Gross per 24 hour   Intake 300 ml   Output 1925 ml   Net -1625 ml         Physical Exam Performed:    BP (!) 144/91   Pulse 65   Temp 98.5 °F (36.9 °C) (Oral)   Resp 18   Ht 6' 3\" (1.905 m)   Wt 213 lb 10 oz (96.9 kg)   SpO2 95%   BMI 26.70 kg/m²     General appearance: No apparent distress, appears stated age and cooperative.  HEENT: Pupils equal, round, and reactive to light. Conjunctivae/corneas clear.  Neck: Supple, with full range of motion. No jugular venous distention. Trachea midline.  Respiratory:  Normal respiratory effort. Some crackles right base.  Cardiovascular: Regular rate and rhythm with normal S1/S2 without murmurs, rubs or gallops.  Abdomen: Soft, non-tender, non-distended with normal bowel sounds.  Musculoskeletal: No clubbing, cyanosis or edema bilaterally.  Full range of motion without deformity.  Skin: Skin color, texture, turgor normal.  No rashes or lesions.  Neurologic:  Neurovascularly intact without any focal sensory/motor deficits. Cranial nerves: II-XII intact, grossly non-focal.  Psychiatric: Alert and oriented, thought content appropriate, normal insight  Capillary Refill: Brisk, 3 seconds, normal   Peripheral Pulses: +2 palpable, equal bilaterally       Labs:   Recent Labs     01/22/23 0436   WBC 11.0   HGB 9.5*   HCT 28.3*          Recent Labs     01/21/23  0352 01/21/23  1950 01/22/23  0436 01/23/23  0554     --  137  --    K 3.5 3.9 4.2  --      --  102  --    CO2 22  --  21  --    BUN 29*  --  31*  --    CREATININE 1.0  --  1.0  0.9 0.9   CALCIUM 8.9  --  9.1  --        Recent Labs     01/21/23 0352   AST 16   ALT 8*   BILITOT 0.4   ALKPHOS 90       No results for input(s): INR in the last 72 hours.  No results for input(s): CKTOTAL, TROPONINI in the last 72 hours.      Urinalysis:      Lab Results   Component Value Date/Time    NITRU Negative 09/01/2022 03:30 PM    WBCUA 74 09/01/2022  03:30 PM    BACTERIA None Seen 09/01/2022 03:30 PM    RBCUA 0 09/01/2022 03:30 PM    BLOODU TRACE 09/01/2022 03:30 PM    SPECGRAV 1.014 09/01/2022 03:30 PM    GLUCOSEU Negative 09/01/2022 03:30 PM       Radiology:  XR CHEST PORTABLE   Final Result   Significant interval improvement in aeration of the left lung now with only a   small left pleural effusion. No acute infiltrates. XR CHEST PORTABLE   Final Result   Near complete opacification of left hemithorax, suggestive of pleural   effusion and left-sided atelectasis or airspace disease. Small right pleural effusion and right basilar atelectasis/airspace disease. XR CHEST PORTABLE   Final Result   Mild cardiomegaly and mild central pulmonary congestion which is more   prominent. Hazy perihilar and bibasilar opacities which is increased. Small bibasilar pleural effusions which are more prominent. VL Extremity Venous Left   Final Result      XR CHEST PORTABLE   Final Result   Chronic pulmonary change without acute cardiopulmonary process. CT HEAD WO CONTRAST   Final Result   No acute intracranial abnormality. Encephalomalacia in the parasagittal left frontal parietal lobes, likely   related to old infarction in the left LEXA territory. Small old infarctions in the right cerebellar hemisphere and anterior right   temporal lobe. Mild parenchymal volume loss. Mild chronic microvascular disease. CT CHEST PULMONARY EMBOLISM W CONTRAST   Final Result   Subsegmental acute pulmonary emboli scattered in the pulmonary arteries to   the right lower lobe posteriorly. Moderate right basilar opacity posteriorly which could be due to a pulmonary   infarct vs pneumonia and atelectasis. Small bibasilar pleural effusions with bibasilar atelectasis posteriorly. Cholelithiasis. Mildly dilated and atherosclerotic thoracic aorta with no aneurysm or   dissection.       The findings were called to Dr. Shauna Mims in the emergency room at 7:20 p.m. XR CHEST PORTABLE   Final Result   No acute cardiopulmonary disease. Mild cardiomegaly without overt failure. IP CONSULT TO HOSPITALIST  IP CONSULT TO CARDIOLOGY  IP CONSULT TO HEART FAILURE NURSE/COORDINATOR  IP CONSULT TO DIETITIAN  PHARMACY TO DOSE VANCOMYCIN  IP CONSULT TO CRITICAL CARE  IP CONSULT TO CARDIAC REHAB  IP CONSULT TO CARDIAC REHAB  IP CONSULT TO INFECTIOUS DISEASES    Assessment:    1. Acute subsegmental pulmonary embolism scattered right lower lobe. No RV strain. 2. RLL consolidation- infarct v PNA. MRSA pcr positive. 3. Type II NSTEMI  4. CAD s/p stent  5. Hypertension  6.  Ectopy with first degree HB      New probs  Epistaxis    Plan  -cont cefepime/ vanc  -prn suppl oxygen, keep sats >90%  -cont percussion vest  -cont eliquis  -cont home meds  -duonebs and steroids  -Affrin nasal spray  -CBC qd  -check Mag/K and replete prn     Diet: cardiac diet     DVT Prophylaxis: eliquis     Dispo:   Expected LOS of two days    Appropriate for A1 Discharge Unit: Cassie Hansen MD

## 2023-01-24 NOTE — NURSE NAVIGATOR
Discharge order noted. Pt is returning to [facility]. He has instructions on the [address] for diastolic heart failure  He has a follow up appointment with Cardiology noted on his RUSSELL--2/1/23@ 1020 am as well.     His weight today is 215 lbs
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
## 2023-01-25 PROBLEM — Z89.611 HISTORY OF ABOVE-KNEE AMPUTATION OF RIGHT LOWER EXTREMITY (HCC): Status: ACTIVE | Noted: 2023-01-25

## 2023-01-25 PROBLEM — J18.9 HAP (HOSPITAL-ACQUIRED PNEUMONIA): Status: ACTIVE | Noted: 2023-01-25

## 2023-01-25 PROBLEM — R79.89 ELEVATED PROCALCITONIN: Status: ACTIVE | Noted: 2023-01-25

## 2023-01-25 LAB
CULTURE, RESPIRATORY: ABNORMAL
CULTURE, RESPIRATORY: ABNORMAL
GRAM STAIN RESULT: ABNORMAL
ORGANISM: ABNORMAL

## 2023-01-25 NOTE — PROGRESS NOTES
Hospitalist Progress Note      PCP: No primary care provider on file.    Date of Admission: 1/18/2023    Chief Complaint:   SOB    Hospital Course:    67 y.o. male who presented to [facility] with past medical history of CAD, hypertension, hyperlipidemia, MI, history of CVA presented to the ED with chief complaint of shortness of breath     Patient reported that he has been having worsening shortness of breath, cough in the past 2 days no known alleviating factor, exacerbating factor is walking and exertion reports that he does not have history of DVT or clots before.  Patient reports that he did recover from COVID-19.  Patient also reports has been being compliant with medication.  Patient otherwise denied increased salt intake or worsening lower extremity edema.  Patient also reports decreased oral intake  Subjective:   Better  No F/C  Tolerating a diet       Medications:  Reviewed    Infusion Medications   REM    Scheduled Medications   REM    PRN Meds: REM      Intake/Output Summary (Last 24 hours) at 1/24/2023 2211  Last data filed at 1/24/2023 1207  Gross per 24 hour   Intake 200 ml   Output 1810 ml   Net -1610 ml         Physical Exam Performed:    BP (!) 170/92   Pulse 65   Temp 97.7 °F (36.5 °C) (Oral)   Resp 18   Ht 6' 3\" (1.905 m)   Wt 215 lb 13.3 oz (97.9 kg)   SpO2 97%   BMI 26.98 kg/m²     General appearance: No apparent distress, appears stated age and cooperative.  HEENT: Pupils equal, round, and reactive to light. Conjunctivae/corneas clear.  Neck: Supple, with full range of motion. No jugular venous distention. Trachea midline.  Respiratory:  Normal respiratory effort. Some crackles right base.  Cardiovascular: Regular rate and rhythm with normal S1/S2 without murmurs, rubs or gallops.  Abdomen: Soft, non-tender, non-distended with normal bowel sounds.  Musculoskeletal: No clubbing, cyanosis or edema bilaterally.  Full range of motion without deformity.  Skin: Skin color, texture,  turgor normal.  No rashes or lesions. Neurologic:  Neurovascularly intact without any focal sensory/motor deficits. Cranial nerves: II-XII intact, grossly non-focal.  Psychiatric: Alert and oriented, thought content appropriate, normal insight  Capillary Refill: Brisk, 3 seconds, normal   Peripheral Pulses: +2 palpable, equal bilaterally       Labs:   Recent Labs     01/22/23  0436 01/24/23  0527   WBC 11.0 9.4   HGB 9.5* 9.5*   HCT 28.3* 28.5*    195       Recent Labs     01/22/23  0436 01/23/23  0554 01/24/23  0527     --   --    K 4.2  --   --      --   --    CO2 21  --   --    BUN 31*  --   --    CREATININE 1.0  0.9 0.9 0.9   CALCIUM 9.1  --   --        No results for input(s): AST, ALT, BILIDIR, BILITOT, ALKPHOS in the last 72 hours. No results for input(s): INR in the last 72 hours. No results for input(s): Lein Ill in the last 72 hours. Urinalysis:      Lab Results   Component Value Date/Time    NITRU Negative 09/01/2022 03:30 PM    WBCUA 74 09/01/2022 03:30 PM    BACTERIA None Seen 09/01/2022 03:30 PM    RBCUA 0 09/01/2022 03:30 PM    BLOODU TRACE 09/01/2022 03:30 PM    SPECGRAV 1.014 09/01/2022 03:30 PM    GLUCOSEU Negative 09/01/2022 03:30 PM       Radiology:  XR CHEST PORTABLE   Final Result   Significant interval improvement in aeration of the left lung now with only a   small left pleural effusion. No acute infiltrates. XR CHEST PORTABLE   Final Result   Near complete opacification of left hemithorax, suggestive of pleural   effusion and left-sided atelectasis or airspace disease. Small right pleural effusion and right basilar atelectasis/airspace disease. XR CHEST PORTABLE   Final Result   Mild cardiomegaly and mild central pulmonary congestion which is more   prominent. Hazy perihilar and bibasilar opacities which is increased. Small bibasilar pleural effusions which are more prominent.          VL Extremity Venous Left   Final Result      XR CHEST PORTABLE   Final Result   Chronic pulmonary change without acute cardiopulmonary process. CT HEAD WO CONTRAST   Final Result   No acute intracranial abnormality. Encephalomalacia in the parasagittal left frontal parietal lobes, likely   related to old infarction in the left LEXA territory. Small old infarctions in the right cerebellar hemisphere and anterior right   temporal lobe. Mild parenchymal volume loss. Mild chronic microvascular disease. CT CHEST PULMONARY EMBOLISM W CONTRAST   Final Result   Subsegmental acute pulmonary emboli scattered in the pulmonary arteries to   the right lower lobe posteriorly. Moderate right basilar opacity posteriorly which could be due to a pulmonary   infarct vs pneumonia and atelectasis. Small bibasilar pleural effusions with bibasilar atelectasis posteriorly. Cholelithiasis. Mildly dilated and atherosclerotic thoracic aorta with no aneurysm or   dissection. The findings were called to Dr. Valorie Vila in the emergency room at 7:20 p.m. XR CHEST PORTABLE   Final Result   No acute cardiopulmonary disease. Mild cardiomegaly without overt failure. IP CONSULT TO HOSPITALIST  IP CONSULT TO CARDIOLOGY  IP CONSULT TO HEART FAILURE NURSE/COORDINATOR  IP CONSULT TO DIETITIAN  PHARMACY TO DOSE VANCOMYCIN  IP CONSULT TO CRITICAL CARE  IP CONSULT TO CARDIAC REHAB  IP CONSULT TO CARDIAC REHAB  IP CONSULT TO INFECTIOUS DISEASES    Assessment:    1. Acute subsegmental pulmonary embolism scattered right lower lobe. No RV strain. 2. RLL consolidation- infarct v PNA  3. Type II NSTEMI  4. CAD s/p stent  5.  Hypertension    Plan  -cont cefepime/ vanc  -prn suppl oxygen, keep sats >90%  -cont percussion vest  -cont eliquis  -cont home meds     Diet: cardiac diet     DVT Prophylaxis: Heparin     Dispo:   Expected LOS of two days    Appropriate for A1 Discharge Unit: No      Tab Hall MD

## 2023-02-01 ENCOUNTER — HOSPITAL ENCOUNTER (OUTPATIENT)
Dept: GENERAL RADIOLOGY | Age: 68
Discharge: HOME | End: 2023-02-01
Payer: MEDICARE

## 2023-02-01 ENCOUNTER — OFFICE VISIT (OUTPATIENT)
Dept: CARDIOLOGY CLINIC | Age: 68
End: 2023-02-01
Payer: MEDICARE

## 2023-02-01 ENCOUNTER — HOSPITAL ENCOUNTER (OUTPATIENT)
Age: 68
Discharge: HOME | End: 2023-02-01
Payer: MEDICARE

## 2023-02-01 VITALS
BODY MASS INDEX: 26.98 KG/M2 | SYSTOLIC BLOOD PRESSURE: 118 MMHG | DIASTOLIC BLOOD PRESSURE: 64 MMHG | HEART RATE: 51 BPM | HEIGHT: 75 IN | OXYGEN SATURATION: 98 %

## 2023-02-01 DIAGNOSIS — I26.99 ACUTE PULMONARY EMBOLISM, UNSPECIFIED PULMONARY EMBOLISM TYPE, UNSPECIFIED WHETHER ACUTE COR PULMONALE PRESENT (HCC): ICD-10-CM

## 2023-02-01 DIAGNOSIS — I21.4 NSTEMI (NON-ST ELEVATED MYOCARDIAL INFARCTION) (HCC): Primary | ICD-10-CM

## 2023-02-01 DIAGNOSIS — J90 PLEURAL EFFUSION: ICD-10-CM

## 2023-02-01 DIAGNOSIS — I50.22 CHRONIC SYSTOLIC (CONGESTIVE) HEART FAILURE (HCC): ICD-10-CM

## 2023-02-01 DIAGNOSIS — R00.1 BRADYCARDIA: ICD-10-CM

## 2023-02-01 PROCEDURE — 71046 X-RAY EXAM CHEST 2 VIEWS: CPT

## 2023-02-01 PROCEDURE — 93000 ELECTROCARDIOGRAM COMPLETE: CPT | Performed by: NURSE PRACTITIONER

## 2023-02-01 PROCEDURE — 3078F DIAST BP <80 MM HG: CPT | Performed by: NURSE PRACTITIONER

## 2023-02-01 PROCEDURE — 3074F SYST BP LT 130 MM HG: CPT | Performed by: NURSE PRACTITIONER

## 2023-02-01 PROCEDURE — 99214 OFFICE O/P EST MOD 30 MIN: CPT | Performed by: NURSE PRACTITIONER

## 2023-02-01 PROCEDURE — 1123F ACP DISCUSS/DSCN MKR DOCD: CPT | Performed by: NURSE PRACTITIONER

## 2023-02-01 RX ORDER — CLOPIDOGREL BISULFATE 75 MG/1
75 TABLET ORAL DAILY
COMMUNITY

## 2023-02-01 RX ORDER — ATENOLOL 25 MG/1
12.5 TABLET ORAL DAILY
Qty: 30 TABLET | Refills: 3 | Status: SHIPPED | OUTPATIENT
Start: 2023-02-01

## 2023-02-01 NOTE — PROGRESS NOTES
The [address] [place] [address], [address] [place] [address] [address] [address]  [phone]    PrimaryCare Doctor:  No primary care provider on file. Primary Cardiologist: Dr. Karla Alonzo   Patient presents with    Follow-Up from Hospital     No cc         History of Present Illness:  Benedict Brooks is a 75 y.o. male with PMH CAD/MI/stent, HTN, first degree HB with PVCs, HLP, CVA (dysphasia), PAD s/p RLE amputation. Hx Covid +    Adm [facility] 1/18-1/23/2023 with acute subsegmental PE RLL, developed NSTEMI/elevated troponin and non specific T wave changes. ECHO showed inferior septal RWMA. RV fx normal.  Hep gtt> eliquis. Underwent TERRY to OM2 per Dr. Carie Colón. RLL consolidation vs PNA-MRSA PCR positive. ID consulted and started antibiotics. He had a left pleural effusion with anemia complete opacification of collapse of the right lung on 1/23/23 that improved markedly to now resolution on  1/24/23. Complicated by DHF/pulm edema requiring bipap. DC wt 215 lbs  DC to [facility]    Patient presents to [facility] cardiology for follow up for CAD, PE, HF. He is accompanied by his wife. He has resided at [facility] for several years. He is doing well since discharge. His mobility and activity is limited D/T amputation. He works with PT/OT. He denies any SOB, CP, LH, syncope, palpitations, edema, wt gain. Wt log reviewed. 1/10 201lbs>1/30 197 lbs. VS flowsheet reviewed - HR, RR and BP stable, afebrile. Review of Systems:   General: Denies fever, chills, fatigue, weakness  Skin: Denies skin changes, rash, itching, lesions.   HEENT: Denies headache, dizziness, vision changes, nosebleeds, sore throat, nasal drainage  RESP: Denies cough, sputum, dyspnea, wheeze, snoring  CARD: Denies palpitations,  murmur  GI:Denies nausea, vomiting, heartburn, loss of appetite, change in bowels  : Denies frequency, pain, incontinence, polyuria  VASC: Denies claudication, leg cramps, clots  MUSC/SKEL: Denies pain, stiffness, arthritis  PSYCH: Denies anxiety, depression, stress  NEURO: Denies numbness, tingling, weakness,change in mood or memory  HEME: Denies abn bruising, bleeding, anemia  ENDO: Denies intolerance to heat, cold, excessive thirst or hunger, hx thyroid disease    /64   Pulse 51   Ht 6' 3\" (1.905 m)   SpO2 98%   BMI 26.98 kg/m²   Wt Readings from Last 3 Encounters:   01/24/23 215 lb 13.3 oz (97.9 kg)   09/12/22 204 lb 2.3 oz (92.6 kg)   02/10/22 188 lb 4.4 oz (85.4 kg)       Physical Exam:  GEN: Appears frail, no acute distress  SKIN: Pink, warm, dry. Nails without clubbing. HEENT: PERRLA. Normocephalic, atraumatic. Neck supple. No adenopathy. LUNG: AP diameter normal. Decreased BS LLL. No wheeze, rales, or ronchi. Respiratory effort normal.  HEART: S1S2 A/R. No carotid bruit. No murmur, rub or gallop. ABD: Soft, nontender. +BS X 4 quads. No hepatomegaly. EXT: Radial and pedal pulses 2+ LLE. RLE amputation. Without varicosities. No edema. MUSCSKEL: Good ROM X4 extremities. No deformity. NEURO: A/O X3. Calm and cooperative. Dysphasia from previous CVA. Past Medical History:   has a past medical history of Acute MI ([facility] [address] Anxiety, Arthritis, CAD (coronary artery disease), COVID-19, HAP (hospital-acquired pneumonia), Hyperlipidemia, Hypertension, Influenza A, and Severe malnutrition ([facility] [address] Surgical History:   has a past surgical history that includes back surgery (N/A, 1989); Coronary angioplasty with stent (9/13); vascular surgery; and eye surgery. Social History:   reports that he quit smoking about 4 years ago. His smoking use included cigarettes. He has a 20.00 pack-year smoking history. He has never used smokeless tobacco. He reports current alcohol use of about 2.0 standard drinks per week.    Family History:   Family History   Problem Relation Age of Onset    Hypertension Mother     Hypertension Father     Heart Failure Father     Stroke Maternal Grandfather     Cancer Paternal Uncle         throat       HomeMedications:  Prior to Admission medications    Medication Sig Start Date End Date Taking? Authorizing Provider   clopidogrel (PLAVIX) 75 MG tablet Take 75 mg by mouth daily   Yes Historical Provider, MD   oxymetazoline (AFRIN) 0.05 % nasal spray 2 sprays by Nasal route 2 times daily as needed for Congestion 1/24/23 2/23/23 Yes Cyndy Lugo MD   sodium chloride, Inhalant, 3 % nebulizer solution Take 4 mLs by nebulization in the morning and 4 mLs in the evening.  1/24/23  Yes Cyndy Lugo MD   apixaban (ELIQUIS) 5 MG TABS tablet Take 2 tablets by mouth 2 times daily 1/24/23  Yes Cyndy Lugo MD   tiotropium (SPIRIVA RESPIMAT) 2.5 MCG/ACT AERS inhaler Inhale 2 puffs into the lungs daily 1/25/23  Yes Cyndy Lugo MD   ticagrelor (BRILINTA) 90 MG TABS tablet Take 1 tablet by mouth 2 times daily 1/20/23  Yes Sujatha Casillas MD   hydroCHLOROthiazide (HYDRODIURIL) 25 MG tablet Take 25 mg by mouth daily   Yes Historical Provider, MD   lisinopril (PRINIVIL;ZESTRIL) 20 MG tablet Take 20 mg by mouth daily   Yes Historical Provider, MD   miconazole (MICOTIN) 2 % powder Apply topically 2 times daily as needed for Itching   Yes Historical Provider, MD   guaiFENesin (MUCINEX) 600 MG extended release tablet Take 600 mg by mouth 2 times daily as needed for Congestion   Yes Historical Provider, MD   albuterol sulfate HFA (PROVENTIL;VENTOLIN;PROAIR) 108 (90 Base) MCG/ACT inhaler Inhale 2 puffs into the lungs every 6 hours as needed for Wheezing   Yes Historical Provider, MD   miconazole (MICOTIN) 2 % powder Apply topically 2 times daily as needed for Itching Apply to buttocks   Yes Historical Provider, MD   aspirin 81 MG chewable tablet Take 81 mg by mouth daily   Yes Historical Provider, MD   loratadine (CLARITIN) 10 MG tablet Take 10 mg by mouth daily   Yes Historical Provider, MD   nystatin (MYCOSTATIN) 902276 UNIT/GM cream Apply topically 2 times daily as needed for Dry Skin Apply topically to buttocks   Yes Historical Provider, MD   acetaminophen (TYLENOL) 325 MG tablet Take 650 mg by mouth every 6 hours as needed for Pain   Yes Historical Provider, MD   Dimethicone-Zinc Oxide-Vit A-D ([employer] ZINC OXIDE DIAPER) 1-10 % CREA Apply topically 2 times daily as needed   Yes Historical Provider, MD   sertraline (ZOLOFT) 100 MG tablet Take 100 mg by mouth daily   Yes Historical Provider, MD   sodium phosphate (FLEET) 7-19 GM/118ML Place 1 enema rectally once as needed   Yes Historical Provider, MD   gabapentin (NEURONTIN) 300 MG capsule Take 300 mg by mouth 3 times daily.    Yes Historical Provider, MD   polyethylene glycol (GLYCOLAX) 17 g packet Take 17 g by mouth daily as needed for Constipation   Yes Historical Provider, MD   magnesium hydroxide (MILK OF MAGNESIA) 400 MG/5ML suspension Take 30 mLs by mouth daily as needed for Constipation   Yes Historical Provider, MD   senna (SENOKOT) 8.6 MG tablet Take 1 tablet by mouth daily   Yes Historical Provider, MD   ferrous sulfate (IRON 325) 325 (65 Fe) MG tablet Take 325 mg by mouth daily (with breakfast)   Yes Historical Provider, MD   pantoprazole (PROTONIX) 40 MG tablet Take 40 mg by mouth daily   Yes Historical Provider, MD   mirabegron (MYRBETRIQ) 50 MG TB24 Take 50 mg by mouth daily   Yes Historical Provider, MD   baclofen (LIORESAL) 20 MG tablet Take 20 mg by mouth 2 times daily   Yes Historical Provider, MD   atenolol (TENORMIN) 25 MG tablet Take 1 tablet by mouth daily 5/12/21  Yes Geena Drake MD   atorvastatin (LIPITOR) 80 MG tablet Take 1 tablet by mouth nightly 4/23/21  Yes Ruth Lr MD   Multiple Vitamins-Minerals (THERAPEUTIC MULTIVITAMIN-MINERALS) tablet Take 1 tablet by mouth daily   Yes Historical Provider, MD   Ascorbic Acid (VITAMIN C) 500 MG tablet Take 500 mg by mouth daily   Yes Historical Provider, MD   nitroGLYCERIN (NITROSTAT) 0.4 MG SL tablet Place 1 tablet under the tongue every 5 minutes as needed for Chest pain. 9/11/13  Yes MARSHALL Dunn - CNP   busPIRone (BUSPAR) 5 MG tablet Take 5 mg by mouth 2 times daily    Historical Provider, MD        Allergies:  Patient has no known allergies.       LABS: Results reviewed with patient today.    CBC:   Lab Results   Component Value Date/Time    WBC 9.4 01/24/2023 05:27 AM    WBC 11.0 01/22/2023 04:36 AM    WBC 8.4 01/20/2023 03:55 AM    RBC 3.30 01/24/2023 05:27 AM    RBC 3.27 01/22/2023 04:36 AM    RBC 3.26 01/20/2023 03:55 AM    HGB 9.5 01/24/2023 05:27 AM    HGB 9.5 01/22/2023 04:36 AM    HGB 9.4 01/20/2023 03:55 AM    HCT 28.5 01/24/2023 05:27 AM    HCT 28.3 01/22/2023 04:36 AM    HCT 28.1 01/20/2023 03:55 AM    MCV 86.6 01/24/2023 05:27 AM    MCV 86.5 01/22/2023 04:36 AM    MCV 86.2 01/20/2023 03:55 AM    RDW 16.6 01/24/2023 05:27 AM    RDW 16.2 01/22/2023 04:36 AM    RDW 17.2 01/20/2023 03:55 AM     01/24/2023 05:27 AM     01/22/2023 04:36 AM     01/20/2023 03:55 AM     BMP:  Lab Results   Component Value Date/Time     01/22/2023 04:36 AM     01/21/2023 03:52 AM     01/20/2023 03:55 AM    K 4.2 01/22/2023 04:36 AM    K 3.9 01/21/2023 07:50 PM    K 3.5 01/21/2023 03:52 AM    K 3.3 01/20/2023 03:55 AM    K 3.5 01/18/2023 05:21 PM    K 4.2 09/03/2022 05:47 AM     01/22/2023 04:36 AM     01/21/2023 03:52 AM    CL 99 01/20/2023 03:55 AM    CO2 21 01/22/2023 04:36 AM    CO2 22 01/21/2023 03:52 AM    CO2 25 01/20/2023 03:55 AM    PHOS 3.5 01/18/2023 08:52 PM    PHOS 3.3 01/08/2022 03:09 PM    BUN 31 01/22/2023 04:36 AM    BUN 29 01/21/2023 03:52 AM    BUN 28 01/20/2023 03:55 AM    CREATININE 0.9 01/24/2023 05:27 AM    CREATININE 0.9 01/23/2023 05:54 AM    CREATININE 0.9 01/22/2023 04:36 AM    CREATININE 1.0 01/22/2023 04:36 AM       BNP:   Lab Results   Component Value Date/Time    PROBNP 6,270 01/22/2023 04:36 AM    PROBNP 697 01/18/2023 05:21 PM    PROBNP 8,905  01/15/2017 09:16 PM     BMP and CBC 2/2/2023 results reviewed - WNL See scan in media  Na improved 143    Parameters:   > 450 pg/mL under age 48  > 900 pg/mL ages 54-65  > 1800 pg/mL over age 76    Iron Studies:  No results found for: TIBC, FERRITIN  GLUCOSE: No results for input(s): GLUCOSE in the last 72 hours. LIVER PROFILE:   Lab Results   Component Value Date/Time    AST 16 01/21/2023 03:52 AM    ALT 8 01/21/2023 03:52 AM    LABALBU 3.1 01/21/2023 03:52 AM    BILITOT 0.4 01/21/2023 03:52 AM    ALKPHOS 90 01/21/2023 03:52 AM     PT/INR:   Lab Results   Component Value Date/Time    PROTIME 13.4 08/31/2022 02:25 PM    INR 1.03 08/31/2022 02:25 PM     Cardiac Enzymes:  Lab Results   Component Value Date/Time    CKTOTAL 63 04/23/2021 06:19 AM    TROPONINI 0.05 01/19/2023 02:24 AM     FASTING LIPID PANEL:  Lab Results   Component Value Date/Time    CHOL 110 09/01/2022 05:23 AM    HDL 38 09/01/2022 05:23 AM    HDL 60 02/22/2011 01:05 AM    LDLCALC 53 09/01/2022 05:23 AM    TRIG 97 09/01/2022 05:23 AM     TSH: No results found for: TSH    Cardiac Imaging: Reports interpreted by me and reviewed with patient today. EKG: Sinus Igor HR 49    Arterial Doppler 1/10/2022   Limited study D/T covid restrictions   Summary        Limited evaluation of both extremities. Evidence of calcific plaque    throughout both extremities. OMKAR not obtained. Popliteal arteries unable to    be visualized due to positioning. Multiphasic waveforms throughout SFA    bilaterally with evidence of distal monophasic waveforms therefore cannot    exclude a popliteal lesion bilaterally. The left anterior tibial artery    appears to be occluded. ECHO: 1/19/2023     Summary   Suboptimal image quality; Definity contrast administered. Left ventricular cavity size is normal.   There is moderately increased left ventricular wall thickness suggestive of   LVH.    Overall left ventricular systolic function appears normal.   Ejection fraction is visually estimated to be 55-60%. Grade II diastolic dysfunction with elevated LV filling pressures. The right ventricle is borderline dilated. Right ventricular systolic function is normal .   Mild to moderate tricuspid regurgitation. Estimated pulmonary artery systolic pressure is mildly to moderately   elevated at 46 mmHg assuming a right atrial pressure of 15 mmHg. Prior negative bubble study in 9/2022. ANGIOGRAM/CORONARY ARTERIOGRAM:  1/19/2023      The left main coronary artery is normal.     The left anterior descending artery mild diffuse disease. D1 is normal     The left circumflex artery has a 99% lesion at the bifurcation of OM 2. OM1 is normal     The right coronary artery is dominant vessel with moderate disease there is a mid 50% lesion              RPDA is normal        INTERVENTION  Guide catheter: 6 LLamasoftra XB 3 5 guide  Runthrough wire used to cross the left circumflex lesion  Lesion was predilated with a 2.5 regular balloon and IVUS catheter was passed to the distal lesion and confirmed a minimal luminal area of 3 millimeters square  A 3.0 x 18 mm drug-eluting stent was deployed to the OM 2  The lesion was then postdilated with a 3.0 NC balloon     SUMMARY:   Single-vessel obstructive CAD status post successful IVUS guided PCI x1 drug-eluting stent  Post capillary  pulmonary hypertension with mild residual left-sided filling pressure     RECOMMENDATION:  .     -Recommend beta blockade, high potency statin, aspirin and brilinta for 12 months  -Referral to cardiac rehab placed  -will transition to oral diuresis on discharge  - Can start DOAC tomorrow morning; recommend triple therapy for 30 days and then will discontinue aspirin on day 30     Venous Doppler 1/20/2023  Summary  No evidence of deep vein or superficial vein thrombosis involving the left lower extremity.         Assessment/Plan:      1.) Acute subsegmental RLL PE, without cor pulmonale:   -venous doppler Neg  -on asa, plavix and eliquis    2.) NSTEMI/ CAD: Stable, denies any CP/SOB or anginal symptoms. EKG without ischemia. -C with TERRY to OM2  -DAPT X1 month then DC asa (also on eliquis)  Beta Blocker: Decrease atenolol to 12.5mg D/T bradycardia  ACEi/ARB:lisinopril  Anti anginal: NTG SL PRN  Lipid management/high intensity statin: lipitor  Risk factor management: high blood pressure, high cholesterol, Diabetes, smoking, obesity, family hx  Lifestyle modification: Heart healthy diet, regular exercise, weight loss, smoking cessation, stress reduction  Cardiac Rehab:getting PT currently    3.) Chronic diastolic heart failure: Euvolemic today, Pulm edema resolved at DC  NYHA Class II   Stage C  Diuretic: HCTZ  Cont lisinopril and atenolol. Does not need SGLT2i or AA  2gm Na diet, daily weight, 64 oz fluid restriction  Avoid NSAIDS and other nephrotoxic meds    4.) Bradycardia:   Asymptomatic  Decrease BB    5.) LLL pl Effusion:   Decreased L base. Repeat CXR. Instructions:     Heart Healthy Diet  Blood pressure control if  you have high blood pressure  Diabetic control if you have diabetes  Smoking cessation if you smoke  Weight loss if BMI >30  Regular exercise when OK per cardiac rehab  Stress Reduction    Call your Doctor if you have:   Increased shortness of breath, weakness, or increased fatigue  A fast or a slow heartbeat  Problems or questions with your medications  Feeling lightheaded or dizzy  Unusual swelling of lower legs/feet, chest discomfort, unusual weakness or fatigue    I appreciate the opportunity of cooperating in the care of this individual.    MARSHALL Mccracken - CNP, CNP, 2/1/2023,11:05 AM

## 2023-02-16 ENCOUNTER — TELEPHONE (OUTPATIENT)
Dept: CARDIOLOGY CLINIC | Age: 68
End: 2023-02-16

## 2023-02-17 NOTE — TELEPHONE ENCOUNTER
[place] from [facility] called said she called the on call last night for the pt but pt did not end up going to the ER. His hemoglobin was 7. 2. She wanted to pass this info along to Henok Bal NP.     [phone]
[place], [place] called to report hemoglobin of 8.1 (2/16/2023). She will keep office updated.
Per Kinjal's last note 2/1/2023 patient was to continue aspirin, Plavix and Eliquis. Our med list have both Plavix and Brilinta listed. Spoke with Louis Carbone RN at [facility] who states patient hemoglobin was 7.2 on 2/16/2023. Labs re-drawn today and awaiting results. She states the patient has been taking aspirin, Brilinta and Eliquis. Med list updated. She states the house doctor held aspirin, Brilinta and Eliquis and increased Protonix from 40 mg daily to to 40 mg BID. Occult stool collected on 2/12/23 and positive. Repeat occult stool collected 2/15/23 and awaiting results. Louis Carbone RN will be faxing copy of current labs to our office. She states patient reports feeling fatigued, however vitals are stable.
.

## 2023-03-20 ENCOUNTER — OFFICE VISIT (OUTPATIENT)
Dept: CARDIOLOGY CLINIC | Age: 68
End: 2023-03-20
Payer: MEDICARE

## 2023-03-20 VITALS
BODY MASS INDEX: 26.73 KG/M2 | OXYGEN SATURATION: 99 % | SYSTOLIC BLOOD PRESSURE: 117 MMHG | DIASTOLIC BLOOD PRESSURE: 67 MMHG | WEIGHT: 215 LBS | HEIGHT: 75 IN | HEART RATE: 59 BPM

## 2023-03-20 DIAGNOSIS — I50.32 CHRONIC DIASTOLIC HEART FAILURE (HCC): ICD-10-CM

## 2023-03-20 DIAGNOSIS — I21.4 NSTEMI (NON-ST ELEVATED MYOCARDIAL INFARCTION) (HCC): Primary | ICD-10-CM

## 2023-03-20 DIAGNOSIS — I10 ESSENTIAL HYPERTENSION: ICD-10-CM

## 2023-03-20 DIAGNOSIS — I25.10 CORONARY ARTERY DISEASE INVOLVING NATIVE CORONARY ARTERY OF NATIVE HEART WITHOUT ANGINA PECTORIS: ICD-10-CM

## 2023-03-20 DIAGNOSIS — Z86.711 HISTORY OF PULMONARY EMBOLUS (PE): ICD-10-CM

## 2023-03-20 DIAGNOSIS — Z86.73 HISTORY OF CVA (CEREBROVASCULAR ACCIDENT): ICD-10-CM

## 2023-03-20 DIAGNOSIS — I73.9 PAD (PERIPHERAL ARTERY DISEASE) (HCC): ICD-10-CM

## 2023-03-20 DIAGNOSIS — E78.5 HYPERLIPIDEMIA LDL GOAL <70: ICD-10-CM

## 2023-03-20 PROCEDURE — 93000 ELECTROCARDIOGRAM COMPLETE: CPT | Performed by: INTERNAL MEDICINE

## 2023-03-20 PROCEDURE — 3074F SYST BP LT 130 MM HG: CPT | Performed by: INTERNAL MEDICINE

## 2023-03-20 PROCEDURE — 99214 OFFICE O/P EST MOD 30 MIN: CPT | Performed by: INTERNAL MEDICINE

## 2023-03-20 PROCEDURE — 3078F DIAST BP <80 MM HG: CPT | Performed by: INTERNAL MEDICINE

## 2023-03-20 PROCEDURE — 1123F ACP DISCUSS/DSCN MKR DOCD: CPT | Performed by: INTERNAL MEDICINE

## 2023-03-20 NOTE — PROGRESS NOTES
Interventional Cardiology Consultation     Simin Steven  1955    PCP: Navya Vigil MD  Reason for Referral: CAD/PE  Chief Complaint: \"I feel fine:  Chief Complaint   Patient presents with    Follow-up     6 weeks follow up      Subjective:   History of Present Illness: The patient is 75 y.o. male with a past medical history significant for CAD, PE, CVA, hypertension, right BKA, CHF, and hyperlipidemia who presents for management of CAD. He was admitted 1/18-1/23/23 for an acute PE and developed a NSTEMI. He underwent a left heart catheretization resulting in x1DES to OM2. Today he presents for follow up in a wheelchair and states that overall he is feeling well. He denies any new sounding cardiac complaints. He denies any chest pains or worsening shortness of breath. He reports medication compliance and is tolerating. He denies any abnormal bleeding or bruising. He denies exertional chest pain/pressure, dyspnea at rest, worsening SMITH, PND, orthopnea, palpitations, lightheadedness, weight changes, changes in LE edema, and syncope.     Past Medical History:   Diagnosis Date    Acute MI ([facility] [address] 09/2013    Anxiety     Arthritis     CAD (coronary artery disease)     Chronic systolic (congestive) heart failure 2/1/2023    COVID-19     HAP (hospital-acquired pneumonia) 1/25/2023    Hyperlipidemia 2/3/2022    Hypertension     Influenza A 01/15/2017    Severe malnutrition ([facility] [address] 2/10/2022     Past Surgical History:   Procedure Laterality Date    BACK SURGERY N/A 1989    herniated disc    CORONARY ANGIOPLASTY WITH STENT PLACEMENT  9/13    EYE SURGERY      VASCULAR SURGERY       Family History   Problem Relation Age of Onset    Hypertension Mother     Hypertension Father     Heart Failure Father     Stroke Maternal Grandfather     Cancer Paternal Uncle         throat     Social History     Tobacco Use    Smoking status: Former     Packs/day: 0.50     Years: 40.00     Pack years: 20.00     Types:
WDL

## 2023-04-19 ENCOUNTER — HOSPITAL ENCOUNTER (OUTPATIENT)
Dept: VASCULAR LAB | Age: 68
Discharge: HOME | End: 2023-04-19
Payer: MEDICARE

## 2023-04-19 PROCEDURE — 93926 LOWER EXTREMITY STUDY: CPT

## 2023-04-25 ENCOUNTER — TELEPHONE (OUTPATIENT)
Dept: ORTHOPEDIC SURGERY | Age: 68
End: 2023-04-25

## 2023-04-25 NOTE — TELEPHONE ENCOUNTER
General Question     Subject: patient call and would like to have Dr. Ulises Salcido recommend them to another provider in the practice for his legs and his one leg is amputated he just need a call back regarding this matter if you can't reach him or his wife please leave a VM on there phone. Please Advise.    Patient Denilson Reynolds"  Contact Number: [phone]

## 2023-04-27 NOTE — TELEPHONE ENCOUNTER
Left message for patient to call his prosthetic company to see who they are going to be using for physicians since Dr. Kendal Wild will no longer be doing outpatient. I left the name of Dr. Jay Martinez at [employer] for Botox if he is interested in pursuing this.

## 2023-04-30 NOTE — PROGRESS NOTES
Pt awake and AAO lying in bed watching TV. Pt denies pain. Pt admitted with R foot heel infection and L heel wound. Pt has h/o stroke and is contracted in R U and LE's. R knee is bent and any passive ROM causes pt pain. LLE is toned and contracted. Bilat heels wound dressings CDI. Prevalon heel protectors in place. Attempted to reposition pt and pt refused. Lungs decreased. No sob or cough. On RA. Belly round and soft with active BS. Pt denies constipation. Male purewick in place. Pt is incontinent. Mepilex to buttocks. Was able to turn him slightly on R side. No edema noted. Doppler pulses. Pt does have h/o PVD. L PICC site and dressing WDL. Pt does have IVPB Zosyn on scheduled basis. Vascular, podiatry, and ID following patient. Pt is contact isolation for MDRO in wound. Air mattress in place. Call light in reach. Bed alarm on. Patent

## 2023-05-03 ENCOUNTER — CARE COORDINATION (OUTPATIENT)
Dept: CARE COORDINATION | Age: 68
End: 2023-05-03

## 2023-05-05 ENCOUNTER — CARE COORDINATION (OUTPATIENT)
Dept: CARE COORDINATION | Age: 68
End: 2023-05-05

## 2023-05-09 ENCOUNTER — CARE COORDINATION (OUTPATIENT)
Dept: CARE COORDINATION | Age: 68
End: 2023-05-09

## 2023-05-09 NOTE — CARE COORDINATION
Attempted to reach patient by phone x3. No answer. Left brief message with name, contact number and asked for return call back. Waiting for patient response at this time. Will update notes when callback is received. No additional outreaches if return call not received.

## 2023-05-19 ENCOUNTER — TELEPHONE (OUTPATIENT)
Dept: CARDIOLOGY CLINIC | Age: 68
End: 2023-05-19

## 2023-05-23 NOTE — TELEPHONE ENCOUNTER
Please add patient on to be seen in office with Dr. Mc Akers this Friday at 12:30. Dr. Mc Akers is aware and the nurse at [facility] is aware and they will notify patient's significant other.

## 2023-05-23 NOTE — TELEPHONE ENCOUNTER
Jennifer Hernandez returned call, reports worsening left foot pain, discoloration, and coolness of his extremity that has worsened over the past week. She denies any open wounds and states he has weakly palpable pulses. Discussed with Dr. Stefania Segura, offer appointment today. Reviewed with Jennifer Hernandez, unable to arrange transportation, significant other brings patient and she is available on Friday. Scheduled appointment for 12/2612:30 at [facility] she states she will notify the significant other. Educated on when to proceed to the the ED. She verbalized an understanding.

## 2023-05-26 ENCOUNTER — OFFICE VISIT (OUTPATIENT)
Dept: CARDIOLOGY CLINIC | Age: 68
End: 2023-05-26
Payer: MEDICARE

## 2023-05-26 VITALS
HEIGHT: 75 IN | DIASTOLIC BLOOD PRESSURE: 88 MMHG | HEART RATE: 80 BPM | BODY MASS INDEX: 26.87 KG/M2 | SYSTOLIC BLOOD PRESSURE: 134 MMHG | OXYGEN SATURATION: 96 %

## 2023-05-26 DIAGNOSIS — I25.10 CORONARY ARTERY DISEASE INVOLVING NATIVE CORONARY ARTERY OF NATIVE HEART WITHOUT ANGINA PECTORIS: Primary | ICD-10-CM

## 2023-05-26 DIAGNOSIS — Z86.73 HISTORY OF CVA (CEREBROVASCULAR ACCIDENT): ICD-10-CM

## 2023-05-26 DIAGNOSIS — Z86.711 HISTORY OF PULMONARY EMBOLUS (PE): ICD-10-CM

## 2023-05-26 DIAGNOSIS — I50.32 CHRONIC DIASTOLIC HEART FAILURE (HCC): ICD-10-CM

## 2023-05-26 DIAGNOSIS — I73.9 PAD (PERIPHERAL ARTERY DISEASE) (HCC): ICD-10-CM

## 2023-05-26 DIAGNOSIS — I10 ESSENTIAL HYPERTENSION: ICD-10-CM

## 2023-05-26 DIAGNOSIS — E78.5 HYPERLIPIDEMIA LDL GOAL <70: ICD-10-CM

## 2023-05-26 PROCEDURE — 1036F TOBACCO NON-USER: CPT | Performed by: INTERNAL MEDICINE

## 2023-05-26 PROCEDURE — 3079F DIAST BP 80-89 MM HG: CPT | Performed by: INTERNAL MEDICINE

## 2023-05-26 PROCEDURE — 99213 OFFICE O/P EST LOW 20 MIN: CPT | Performed by: INTERNAL MEDICINE

## 2023-05-26 PROCEDURE — 3075F SYST BP GE 130 - 139MM HG: CPT | Performed by: INTERNAL MEDICINE

## 2023-05-26 PROCEDURE — 1123F ACP DISCUSS/DSCN MKR DOCD: CPT | Performed by: INTERNAL MEDICINE

## 2023-05-26 PROCEDURE — G8427 DOCREV CUR MEDS BY ELIG CLIN: HCPCS | Performed by: INTERNAL MEDICINE

## 2023-05-26 PROCEDURE — 3017F COLORECTAL CA SCREEN DOC REV: CPT | Performed by: INTERNAL MEDICINE

## 2023-05-26 PROCEDURE — G8417 CALC BMI ABV UP PARAM F/U: HCPCS | Performed by: INTERNAL MEDICINE

## 2023-05-26 RX ORDER — DILTIAZEM HYDROCHLORIDE 60 MG/1
TABLET, FILM COATED ORAL
COMMUNITY
Start: 2023-05-19

## 2023-05-26 RX ORDER — SERTRALINE HYDROCHLORIDE 25 MG/1
TABLET, FILM COATED ORAL
COMMUNITY
Start: 2023-05-23 | End: 2023-05-26

## 2023-05-26 RX ORDER — LEVOFLOXACIN 750 MG/1
TABLET ORAL
COMMUNITY
Start: 2023-05-22

## 2023-05-26 NOTE — PATIENT INSTRUCTIONS
Call with any non healing foot wounds. Call with any light, white coloration paleness of foot, coldness or pain or  numbness.

## 2023-05-26 NOTE — PROGRESS NOTES
Interventional Cardiology Consultation     Brynn Hicks  1955    PCP: Martha Franz MD  Reason for Referral: CAD/PE  Chief Complaint: \"I feel fine:  Chief Complaint   Patient presents with    Follow-up     Left leg discoloration      Subjective:   History of Present Illness: The patient is 75 y.o. male with a past medical history significant for CAD, PE, CVA, hypertension, right BKA, CHF, and hyperlipidemia who presents for management of CAD. He was admitted 1/18-1/23/23 for an acute PE and developed a NSTEMI. He underwent a left heart catheretization resulting in x1DES to OM2. He was seen in office 04/03/2023 and left lower extremity arterial doppler was ordered. On 04/19/2023 per testing result the study performed while the patient was in a wheelchair making this study outside of protocol - accuracy of findings especially can not be guaranteed. Normal L OMKAR at rest however calcific vessel noted may affect diagnostic status. Entire leg not scanned. No popliteal disease noted. Left SAM occlusion. Today he presents for follow up in a wheelchair sent by nursing home for concerns regarding his left leg and foot coloration  He denies pain in hte left leg / foot. No open wounds noted. He is compliant with his medications and tolerating them well. He denies chest pain/pressure, tightness, edema, shortness of breath, heart racing, palpitations, lightheadedness, dizziness, syncope, presyncope,  PND or orthopnea.        Past Medical History:   Diagnosis Date    Acute MI ([address] 09/2013    Anxiety     Arthritis     CAD (coronary artery disease)     Chronic systolic (congestive) heart failure 2/1/2023    COVID-19     HAP (hospital-acquired pneumonia) 1/25/2023    Hyperlipidemia 2/3/2022    Hypertension     Influenza A 01/15/2017    Severe malnutrition ([address] 2/10/2022     Past Surgical History:   Procedure Laterality Date    BACK SURGERY N/A 1989    herniated disc    CORONARY ANGIOPLASTY

## 2023-09-20 NOTE — PROGRESS NOTES
Interventional Cardiology Consultation     Davey Meza  1955    PCP: MARSHALL Flanagan NP  Reason for Referral: CAD/PE  Chief Complaint: \"I feel good\"  Chief Complaint   Patient presents with    6 Month Follow-Up     6mo F/U CAD     Subjective:   History of Present Illness: The patient is 70 y.o. male with a past medical history significant for CAD, PE, CVA, hypertension, right BKA, CHF, and hyperlipidemia. He was admitted -23 for an acute PE and developed an NSTEMI. He underwent a left heart catheretization resulting in x1DES to OM2. Patient presents today for follow up. Patient arrives in wheelchair, states he is in the process of getting a prosthetic. Patient currently denies any weight gain, edema, palpitations, chest pain, shortness of breath, dizziness, and syncope. Past Medical History:   Diagnosis Date    Acute MI ([address]) 2013    Anxiety     Arthritis     CAD (coronary artery disease)     Chronic systolic (congestive) heart failure 2023    COVID-19     HAP (hospital-acquired pneumonia) 2023    Hyperlipidemia 2/3/2022    Hypertension     Influenza A 01/15/2017    Severe malnutrition ([address]) 2/10/2022     Past Surgical History:   Procedure Laterality Date    BACK SURGERY N/A     herniated disc    CORONARY ANGIOPLASTY WITH STENT PLACEMENT      EYE SURGERY      VASCULAR SURGERY       Family History   Problem Relation Age of Onset    Hypertension Mother     Hypertension Father     Heart Failure Father     Stroke Maternal Grandfather     Cancer Paternal Uncle         throat     Social History     Tobacco Use    Smoking status: Former     Packs/day: 0.50     Years: 40.00     Additional pack years: 0.00     Total pack years: 20.00     Types: Cigarettes     Quit date: 2019     Years since quittin.6    Smokeless tobacco: Never   Vaping Use    Vaping Use: Never used   Substance Use Topics    Alcohol use:  Yes     Alcohol/week: 2.0

## 2023-09-25 ENCOUNTER — OFFICE VISIT (OUTPATIENT)
Dept: CARDIOLOGY CLINIC | Age: 68
End: 2023-09-25
Payer: MEDICARE

## 2023-09-25 VITALS
DIASTOLIC BLOOD PRESSURE: 82 MMHG | OXYGEN SATURATION: 97 % | HEART RATE: 56 BPM | SYSTOLIC BLOOD PRESSURE: 148 MMHG | TEMPERATURE: 98.1 F

## 2023-09-25 DIAGNOSIS — I25.118 CORONARY ARTERY DISEASE INVOLVING NATIVE HEART WITH OTHER FORM OF ANGINA PECTORIS, UNSPECIFIED VESSEL OR LESION TYPE (HCC): Primary | ICD-10-CM

## 2023-09-25 DIAGNOSIS — I26.99 ACUTE PULMONARY EMBOLISM, UNSPECIFIED PULMONARY EMBOLISM TYPE, UNSPECIFIED WHETHER ACUTE COR PULMONALE PRESENT (HCC): ICD-10-CM

## 2023-09-25 PROCEDURE — 99214 OFFICE O/P EST MOD 30 MIN: CPT | Performed by: INTERNAL MEDICINE

## 2023-09-25 PROCEDURE — 3078F DIAST BP <80 MM HG: CPT | Performed by: INTERNAL MEDICINE

## 2023-09-25 PROCEDURE — G8417 CALC BMI ABV UP PARAM F/U: HCPCS | Performed by: INTERNAL MEDICINE

## 2023-09-25 PROCEDURE — G8427 DOCREV CUR MEDS BY ELIG CLIN: HCPCS | Performed by: INTERNAL MEDICINE

## 2023-09-25 PROCEDURE — 1036F TOBACCO NON-USER: CPT | Performed by: INTERNAL MEDICINE

## 2023-09-25 PROCEDURE — 3074F SYST BP LT 130 MM HG: CPT | Performed by: INTERNAL MEDICINE

## 2023-09-25 PROCEDURE — 1123F ACP DISCUSS/DSCN MKR DOCD: CPT | Performed by: INTERNAL MEDICINE

## 2023-09-25 PROCEDURE — 3017F COLORECTAL CA SCREEN DOC REV: CPT | Performed by: INTERNAL MEDICINE

## 2023-09-25 RX ORDER — ASPIRIN 81 MG/1
81 TABLET ORAL DAILY
Qty: 90 TABLET | Refills: 1 | Status: SHIPPED | OUTPATIENT
Start: 2023-09-25

## 2023-09-25 RX ORDER — UMECLIDINIUM 62.5 UG/1
AEROSOL, POWDER ORAL
COMMUNITY
Start: 2023-09-04

## 2023-10-16 NOTE — PROGRESS NOTES
Occupational Therapy  Facility/Department: [address] IP REHAB  Daily Treatment Note  NAME: Roberth Whitman  : 1955  MRN: [id number]    Date of Service: 2021    Discharge Recommendations:  Continue to assess pending progress, Patient would benefit from continued therapy after discharge, 24 hour supervision or assist       Assessment   Performance deficits / Impairments: Decreased functional mobility ; Decreased strength;Decreased endurance;Decreased ADL status; Decreased high-level IADLs;Decreased balance;Decreased ROM; Decreased sensation;Decreased coordination;Decreased fine motor control  Assessment: Pt tolerated tx session well. He continues to require mod/max Ax2 therapists for stand/squat pivot transfers toward his strong side. Pt sat on edge of mat x15 mins and participated in neuromuscular re-ed, weight shifting/sitting balance activities with good tolerance. Pt required assist throughout to facilitate WB through R UE. Pt required max Ax2 for bed mob on flat therapy mat. Pt is pleasant and hardworking, though limited by significant R side deficits and expressive aphasia. Cont per POC  Prognosis: Good  OT Education: OT Role;Plan of Care;Precautions; ADL Adaptive Strategies;Transfer Training;Home Exercise Program  Barriers to Learning: exp aphasia  REQUIRES OT FOLLOW UP: Yes  Activity Tolerance  Activity Tolerance: Treatment limited secondary to decreased cognition;Patient limited by fatigue  Activity Tolerance: Aphasia, fatigues easily  Safety Devices  Safety Devices in place: Not Applicable(returned to room w/ transporter)         Patient Diagnosis(es): There were no encounter diagnoses. has a past medical history of Acute MI ([facility] [address] Anxiety, Arthritis, CAD (coronary artery disease), Hypertension, and Influenza A.   has a past surgical history that includes back surgery (N/A, ) and Coronary angioplasty with stent ().     Restrictions  Restrictions/Precautions  Restrictions/Precautions: Fall Physical Therapy  10T Initial evaluation       Visit Type: initial evaluation  SUBJECTIVE  \"I just want to get out of here, they don't let you get up!\"  Agrees to PT to walk in the room. Per RN conversation with daughter patient only walks about 10 feet at a time at home.        COPD exacerbation  Suprarenal, infrarenal abdominal aortic aneurysm                Patient / Family Goal: return home    Pain   Patient does not demonstrate pain behaviors.     OBJECTIVE     Cognitive Status   Orientation    - Oriented to: person and place  Functional Communication   - Overall Status: impaired  Attention Span    - Attention: - attends with cues to redirect   - Attention impairment: distractibility, fatigue and reduced memory  Following Direction   - follows one step commands with increased time and follows one step commands with repetition  Memory   - - decreased recall of biographical information  Safety Awareness/Insight   - decreased awareness of need for safety  Awareness of Deficits   - decreased awareness of deficits    Patient Activity Tolerance: 1 to 1 activity to rest      Strength  (out of 5 unless noted, standard test position unless noted)   Comments / Details: Demonstrates 3/5 seated in chair but proximal functional deficits observed with mobility       Standing Balance  (JASSON = base of support)  Min assist static 2 UE support, min to mod assist dynamic 2 UE support       Bed Mobility  Deferred, remains in chair and prefers to return to chair  Transfers  Assistive devices: gait belt, 2-wheeled walker  - Sit to stand: moderate assist  - Stand to sit: minimal assist  - Stand pivot: minimal assist  From recliner and standard chair no arm rest. Mod assist to attain anterior weight shift/ balance    Ambulation / Gait  - Assistive device: gait belt and two-wheeled walker  - Distance (feet unless otherwise indicated): 10, 10  - Assist Level: minimal assist  - Surface: even  - Description: forward flexed at hips  Seated  Risk  Position Activity Restriction  Other position/activity restrictions: R hemiplegia and increased tone RLE, exp. aphasia  Subjective   General  Chart Reviewed: Yes  Patient assessed for rehabilitation services?: Yes  Additional Pertinent Hx: Per Dr. Pete Chapman is a 77 yo M with pmh CAD s/p stent, HTN, OA, Anxiety, former tobacco use who initially presented 4/21/2021 with right side weakness and aphasia. He was outside TPA window. Imaging revealed acute ischemic infarcts in the left LEXA territory with thrombosis in A3 segment. Neurology consulted. Patient is being treated with DAPT (for 30 days then monotherapy) and statin. Course complicated by HTN. Currently, patient reports ongoing right side weakness and difficult with speech. He denies headache, vision changes, tingling/numbness. He is motivated to start inpatient rehab program.\"  Family / Caregiver Present: No  Referring Practitioner: Dr Michelle Morales  Diagnosis: CVA- parasagital left frontal lobe  Subjective  Subjective: Pt met in dept. Agreeable, seems to have more difficulty verbalizing this date. Denied pain      Orientation  Orientation  Overall Orientation Status: Within Functional Limits  Objective             Balance  Sitting Balance: Contact guard assistance(Seated edge of therapy mat)  Standing Balance: Maximum assistance(max Ax2)  Functional Mobility  Functional - Mobility Device: Wheelchair  Activity: Other  Assist Level: Dependent/Total  Functional Mobility Comments: OT/PT managed w/c throughout session  Bed mobility  Supine to Sit: Maximum assistance;2 Person assistance(flat therapy mat, assist to hook L LE under R LE)  Sit to Supine: Maximum assistance;2 Person assistance(Pt hooked L LE under R LE with assist, but required A to raise B LEs onto therapy mat + assist for trunk)  Transfers  Stand Pivot Transfers: Maximum assistance;2 Person assistance; Moderate assistance  Transfer Comments: Squat pivot transfer from w/c <> therapy mat mod/max rest break between ambulation trials     Interventions     Skilled input: Verbal instruction/cues, tactile instruction/cues and posture correction  Verbal Consent: Writer verbally educated and received verbal consent for hand placement, positioning of patient, and techniques to be performed today from patient for clothing adjustments for techniques, therapist position for techniques and hand placement and palpation for techniques as described above and how they are pertinent to the patient's plan of care.  Additional Intervention Details: Education on safety/ fall prevention, use of call light, increased activity/ ambulation in room with staff assist         Education:   - Present and ready to learn: patient  Education provided during session:  - Results of above outlined education: Needs reinforcement    ASSESSMENT   Patient will benefit from skilled therapy to address listed impairments and functional limitations.  Interferring components: cognitive deficits and decreased activity tolerance    Discharge needs based on today's assessment:   - Current level of function: slightly below baseline level of function   - Therapy needs at discharge: therapy 1-3 times per week   - Activities of daily living (ADLs) requiring support at discharge: bed mobility, transfers, ambulation and stairs   - Instrumental activities of daily living (IADLs) requiring support at discharge: home management   - Impairments that require further therapy intervention: activity tolerance, balance, executive functioning and safety awareness    AM-PAC  - Generalized Prior Level of Function: Needs a little help ([facility] 12-21)       Key: MOD A=moderate assistance, IND/MOD I=independent/modified independent  - Generalized Current Level of Function     - Current Mobility Score: 15       AM-PAC Scoring Key= >21 Modified Independent; 20-21 Supervision; 18-19 Minimal assist; 13-17 Moderate assist; 9-12 Max assist; <9 Total assist    Patient is a 89 yo  female here with COPD exacerbation, Suprarenal, infrarenal abdominal aortic aneurysm. Baseline lives in apartment with adult children, uses a walker and ambulates only short distances per family conversation with RN. Currently requires min to mod assist for mobility. Will benefit from continued skilled PT to address deficits below baseline.        • Predicted patient presentation: Moderate (evolving) - Patient comorbidities and complexities, as defined above, may have varying impact on steady progress for prescribed plan of care.    PLAN (while hospitalized)  Suggestions for next session as indicated: Bed mobility, transfer reps, standing tolerance, ambulation 2ww    PT Frequency: 3-5 x per week      PT/OT Mobility Equipment for Discharge: no anticipated needs, patient reports having 2ww  Interventions: compensatory technique education, energy conservation, gait training, strengthening, safety education, continued evaluation, equipment eval/education, patient/family training, bed mobility, endurance training and functional transfer training  Agreement to plan and goals: patient agrees with goals and treatment plan        GOALS  Review Date: 10/23/2023  Long Term Goals: (to be met by time of discharge from hospital)  Sit to supine: Patient will complete sit to supine modified independent.  Supine to sit: Patient will complete supine to sit modified independent.  Sit to stand: Patient will complete sit to stand transfer with least restrictive device, supervision.   Ambulation (even): Patient will ambulate on even surface for 50 feet with 2-wheeled walker, supervision.     Documented in the chart in the following areas: Prior Level of Function. Assessment/Plan.      Patient at End of Session:   Location: in chair  Safety measures: alarm system in place/re-engaged, equipment intact, lines intact and call light within reach      Therapy procedure time and total treatment time can be found documented on the Time Entry  Plus. Dependent for sit<>stand with use of lift. Pt required max A from PT to advance R LE + min/mod A for standing balance in [facility] from OT at times d/t right side lean and R LE buckling. Pt continues to be limited by flexor tone in R LE causing foot to catch on the ground. Pt performed w/c mobility back to his room with SBA, with mod/max cues to attend to and avoid objects on right side. Slow pace as pt very fatigued. Pt completed sit>stand transfer using grab bars with max Ax2. Total A from OT to pull pants down with max A from PT for standing balance. Pivoted to HD BSC placed over commode with max Ax2. Use of urinal while seated on commode d/t poor position on HD BSC. Pt's depends were also saturated w/ urine, required total A to change while seated. Pt used bathing wipe for anterior pericare while seated w/ setup + vc's. Pt required significantly extra time and max prompting to choose between returning to bed or chair when given choices. Pt finally requested bed. Pt completed sit> jojo stedy with max Ax2, leaning heavily to the right d/t fatigue. Max Ax2 for standing balance and pulling pants up over hips. Dependent to transfer back to bed w/ use of jojo stedy. Max Ax2 for sit>supine. Pt was left positioned for comfort in bed with alarm engaged, needs in reach. Assessment: Pt tolerated tx session fairly well despite increased fatigue. He is pleasant and hardworking but still requires assist x2 therapists for all fxl transfers and mobility. He will benefit from continued cotx 2 times per day to maximize function.     Plan   Plan  Times per week: 5-6  Times per day: Twice a day  Plan weeks: 3-4  Current Treatment Recommendations: Strengthening, Endurance Training, Balance Training, ROM, Functional Mobility Training, Safety Education & Training, Gait Training, Self-Care / ADL, Patient/Caregiver Education & Training, Neuromuscular Re-education, Home Management Training, flowsheet   Equipment Evaluation, Education, & procurement    Goals  Short term goals  Time Frame for Short term goals: 1 week  Short term goal 1: Pt will be min A with functional transfers  Short term goal 2: Pt will be min A with functional mobility  Short term goal 3: Pt will be min A with bed mobility  Short term goal 4: Pt will be mod A with bathing and dressing  Short term goal 5: Pt will tolerate UE neuromuscular reeducation to increase function in RUE for assist with ADLs  Long term goals  Time Frame for Long term goals : 3-4 weeks  Long term goal 1: Pt will be mod I with functional transfers  Long term goal 2: Pt will be mod I with functional mobility  Long term goal 3: Pt will be mod I with bed mobility  Long term goal 4: Pt will be mod I with bathing and dressing  Long term goal 5: Pt will be mod I with toileting  Patient Goals   Patient goals : Pt nodded agreement when asked if he wanted to be more independent       Therapy Time   Individual Concurrent Group Co-treatment   Time In       1115   Time Out       1200   Minutes       45      Therapy Time   Individual Co-treatment   Time In  [place]   Time Out  [address]   Minutes  [address], OTR/L [id number]

## 2023-11-05 ENCOUNTER — APPOINTMENT (OUTPATIENT)
Dept: CT IMAGING | Age: 68
End: 2023-11-05
Payer: MEDICARE

## 2023-11-05 ENCOUNTER — APPOINTMENT (OUTPATIENT)
Dept: GENERAL RADIOLOGY | Age: 68
End: 2023-11-05
Payer: MEDICARE

## 2023-11-05 ENCOUNTER — HOSPITAL ENCOUNTER (OUTPATIENT)
Age: 68
Setting detail: OBSERVATION
Discharge: NURSING, REHAB OR LONG-TERM CARE | End: 2023-11-07
Attending: EMERGENCY MEDICINE | Admitting: HOSPITALIST
Payer: MEDICARE

## 2023-11-05 DIAGNOSIS — Z86.73 HISTORY OF CVA (CEREBROVASCULAR ACCIDENT): ICD-10-CM

## 2023-11-05 DIAGNOSIS — R41.82 ALTERED MENTAL STATUS, UNSPECIFIED ALTERED MENTAL STATUS TYPE: Primary | ICD-10-CM

## 2023-11-05 PROBLEM — G45.9 TIA (TRANSIENT ISCHEMIC ATTACK): Status: ACTIVE | Noted: 2023-11-05

## 2023-11-05 LAB
ANION GAP SERPL CALCULATED.3IONS-SCNC: 10 MMOL/L (ref 3–16)
BASOPHILS # BLD: 0.1 K/UL (ref 0–0.2)
BASOPHILS NFR BLD: 0.6 %
BILIRUB UR QL STRIP.AUTO: NEGATIVE
BUN SERPL-MCNC: 18 MG/DL (ref 7–20)
CALCIUM SERPL-MCNC: 9 MG/DL (ref 8.3–10.6)
CHLORIDE SERPL-SCNC: 102 MMOL/L (ref 99–110)
CHOLEST SERPL-MCNC: 110 MG/DL (ref 0–199)
CLARITY UR: CLEAR
CO2 SERPL-SCNC: 26 MMOL/L (ref 21–32)
COLOR UR: YELLOW
CREAT SERPL-MCNC: 1 MG/DL (ref 0.8–1.3)
DEPRECATED RDW RBC AUTO: 16 % (ref 12.4–15.4)
EOSINOPHIL # BLD: 0.1 K/UL (ref 0–0.6)
EOSINOPHIL NFR BLD: 1.3 %
GFR SERPLBLD CREATININE-BSD FMLA CKD-EPI: >60 ML/MIN/{1.73_M2}
GLUCOSE BLD-MCNC: 109 MG/DL (ref 70–99)
GLUCOSE SERPL-MCNC: 113 MG/DL (ref 70–99)
GLUCOSE UR STRIP.AUTO-MCNC: NEGATIVE MG/DL
HCT VFR BLD AUTO: 38.6 % (ref 40.5–52.5)
HDLC SERPL-MCNC: 42 MG/DL (ref 40–60)
HGB BLD-MCNC: 13.4 G/DL (ref 13.5–17.5)
HGB UR QL STRIP.AUTO: NEGATIVE
KETONES UR STRIP.AUTO-MCNC: NEGATIVE MG/DL
LDLC SERPL CALC-MCNC: 46 MG/DL
LEUKOCYTE ESTERASE UR QL STRIP.AUTO: NEGATIVE
LYMPHOCYTES # BLD: 1.4 K/UL (ref 1–5.1)
LYMPHOCYTES NFR BLD: 17 %
MCH RBC QN AUTO: 30.7 PG (ref 26–34)
MCHC RBC AUTO-ENTMCNC: 34.8 G/DL (ref 31–36)
MCV RBC AUTO: 88.2 FL (ref 80–100)
MONOCYTES # BLD: 0.5 K/UL (ref 0–1.3)
MONOCYTES NFR BLD: 5.6 %
NEUTROPHILS # BLD: 6.1 K/UL (ref 1.7–7.7)
NEUTROPHILS NFR BLD: 75.5 %
NITRITE UR QL STRIP.AUTO: NEGATIVE
PERFORMED ON: ABNORMAL
PH UR STRIP.AUTO: 7 [PH] (ref 5–8)
PLATELET # BLD AUTO: 139 K/UL (ref 135–450)
PMV BLD AUTO: 8 FL (ref 5–10.5)
POTASSIUM SERPL-SCNC: 4.4 MMOL/L (ref 3.5–5.1)
PROT UR STRIP.AUTO-MCNC: NEGATIVE MG/DL
RBC # BLD AUTO: 4.37 M/UL (ref 4.2–5.9)
SODIUM SERPL-SCNC: 138 MMOL/L (ref 136–145)
SP GR UR STRIP.AUTO: 1.01 (ref 1–1.03)
TRIGL SERPL-MCNC: 110 MG/DL (ref 0–150)
UA COMPLETE W REFLEX CULTURE PNL UR: NORMAL
UA DIPSTICK W REFLEX MICRO PNL UR: NORMAL
URN SPEC COLLECT METH UR: NORMAL
UROBILINOGEN UR STRIP-ACNC: 1 E.U./DL
VLDLC SERPL CALC-MCNC: 22 MG/DL
WBC # BLD AUTO: 8.1 K/UL (ref 4–11)

## 2023-11-05 PROCEDURE — 36415 COLL VENOUS BLD VENIPUNCTURE: CPT

## 2023-11-05 PROCEDURE — 70450 CT HEAD/BRAIN W/O DYE: CPT

## 2023-11-05 PROCEDURE — 80061 LIPID PANEL: CPT

## 2023-11-05 PROCEDURE — 96361 HYDRATE IV INFUSION ADD-ON: CPT

## 2023-11-05 PROCEDURE — 6370000000 HC RX 637 (ALT 250 FOR IP): Performed by: PHYSICIAN ASSISTANT

## 2023-11-05 PROCEDURE — 71045 X-RAY EXAM CHEST 1 VIEW: CPT

## 2023-11-05 PROCEDURE — G0378 HOSPITAL OBSERVATION PER HR: HCPCS

## 2023-11-05 PROCEDURE — 85025 COMPLETE CBC W/AUTO DIFF WBC: CPT

## 2023-11-05 PROCEDURE — 70498 CT ANGIOGRAPHY NECK: CPT

## 2023-11-05 PROCEDURE — 6360000004 HC RX CONTRAST MEDICATION: Performed by: PHYSICIAN ASSISTANT

## 2023-11-05 PROCEDURE — 2580000003 HC RX 258: Performed by: HOSPITALIST

## 2023-11-05 PROCEDURE — 99285 EMERGENCY DEPT VISIT HI MDM: CPT

## 2023-11-05 PROCEDURE — 93005 ELECTROCARDIOGRAM TRACING: CPT | Performed by: EMERGENCY MEDICINE

## 2023-11-05 PROCEDURE — 81003 URINALYSIS AUTO W/O SCOPE: CPT

## 2023-11-05 PROCEDURE — 80048 BASIC METABOLIC PNL TOTAL CA: CPT

## 2023-11-05 PROCEDURE — 6370000000 HC RX 637 (ALT 250 FOR IP): Performed by: HOSPITALIST

## 2023-11-05 RX ORDER — ACETAMINOPHEN 325 MG/1
650 TABLET ORAL EVERY 6 HOURS PRN
Status: DISCONTINUED | OUTPATIENT
Start: 2023-11-05 | End: 2023-11-07 | Stop reason: HOSPADM

## 2023-11-05 RX ORDER — SODIUM CHLORIDE 0.9 % (FLUSH) 0.9 %
5-40 SYRINGE (ML) INJECTION EVERY 12 HOURS SCHEDULED
Status: DISCONTINUED | OUTPATIENT
Start: 2023-11-05 | End: 2023-11-07 | Stop reason: HOSPADM

## 2023-11-05 RX ORDER — SODIUM CHLORIDE 0.9 % (FLUSH) 0.9 %
5-40 SYRINGE (ML) INJECTION PRN
Status: DISCONTINUED | OUTPATIENT
Start: 2023-11-05 | End: 2023-11-07 | Stop reason: HOSPADM

## 2023-11-05 RX ORDER — CETIRIZINE HYDROCHLORIDE 10 MG/1
10 TABLET ORAL DAILY
COMMUNITY

## 2023-11-05 RX ORDER — ALBUTEROL SULFATE 90 UG/1
2 AEROSOL, METERED RESPIRATORY (INHALATION) EVERY 6 HOURS PRN
Status: DISCONTINUED | OUTPATIENT
Start: 2023-11-05 | End: 2023-11-07 | Stop reason: HOSPADM

## 2023-11-05 RX ORDER — POTASSIUM CHLORIDE 20 MEQ/1
40 TABLET, EXTENDED RELEASE ORAL PRN
Status: DISCONTINUED | OUTPATIENT
Start: 2023-11-05 | End: 2023-11-07 | Stop reason: HOSPADM

## 2023-11-05 RX ORDER — ACETAMINOPHEN 650 MG/1
650 SUPPOSITORY RECTAL EVERY 6 HOURS PRN
Status: DISCONTINUED | OUTPATIENT
Start: 2023-11-05 | End: 2023-11-07 | Stop reason: HOSPADM

## 2023-11-05 RX ORDER — ASPIRIN 81 MG/1
81 TABLET ORAL DAILY
Status: DISCONTINUED | OUTPATIENT
Start: 2023-11-06 | End: 2023-11-07 | Stop reason: HOSPADM

## 2023-11-05 RX ORDER — LISINOPRIL 10 MG/1
20 TABLET ORAL ONCE
Status: COMPLETED | OUTPATIENT
Start: 2023-11-05 | End: 2023-11-05

## 2023-11-05 RX ORDER — MAGNESIUM SULFATE IN WATER 40 MG/ML
2000 INJECTION, SOLUTION INTRAVENOUS PRN
Status: DISCONTINUED | OUTPATIENT
Start: 2023-11-05 | End: 2023-11-07 | Stop reason: HOSPADM

## 2023-11-05 RX ORDER — SODIUM CHLORIDE 9 MG/ML
INJECTION, SOLUTION INTRAVENOUS PRN
Status: DISCONTINUED | OUTPATIENT
Start: 2023-11-05 | End: 2023-11-07 | Stop reason: HOSPADM

## 2023-11-05 RX ORDER — LANOLIN ALCOHOL/MO/W.PET/CERES
3 CREAM (GRAM) TOPICAL NIGHTLY
COMMUNITY

## 2023-11-05 RX ORDER — IPRATROPIUM BROMIDE AND ALBUTEROL SULFATE 2.5; .5 MG/3ML; MG/3ML
1 SOLUTION RESPIRATORY (INHALATION) EVERY 6 HOURS PRN
COMMUNITY

## 2023-11-05 RX ORDER — HYDROCHLOROTHIAZIDE 25 MG/1
25 TABLET ORAL ONCE
Status: COMPLETED | OUTPATIENT
Start: 2023-11-05 | End: 2023-11-05

## 2023-11-05 RX ORDER — FLUTICASONE PROPIONATE 50 MCG
1 SPRAY, SUSPENSION (ML) NASAL DAILY
COMMUNITY

## 2023-11-05 RX ORDER — LIDOCAINE 50 MG/G
1 PATCH TOPICAL DAILY PRN
COMMUNITY

## 2023-11-05 RX ORDER — GABAPENTIN 300 MG/1
300 CAPSULE ORAL 3 TIMES DAILY
Status: DISCONTINUED | OUTPATIENT
Start: 2023-11-05 | End: 2023-11-07 | Stop reason: HOSPADM

## 2023-11-05 RX ORDER — ATENOLOL 50 MG/1
25 TABLET ORAL ONCE
Status: COMPLETED | OUTPATIENT
Start: 2023-11-05 | End: 2023-11-05

## 2023-11-05 RX ORDER — ONDANSETRON 4 MG/1
4 TABLET, ORALLY DISINTEGRATING ORAL EVERY 8 HOURS PRN
Status: DISCONTINUED | OUTPATIENT
Start: 2023-11-05 | End: 2023-11-07 | Stop reason: HOSPADM

## 2023-11-05 RX ORDER — BACLOFEN 10 MG/1
20 TABLET ORAL 2 TIMES DAILY
Status: DISCONTINUED | OUTPATIENT
Start: 2023-11-05 | End: 2023-11-07 | Stop reason: HOSPADM

## 2023-11-05 RX ORDER — ATENOLOL 25 MG/1
12.5 TABLET ORAL DAILY
Status: DISCONTINUED | OUTPATIENT
Start: 2023-11-06 | End: 2023-11-07 | Stop reason: HOSPADM

## 2023-11-05 RX ORDER — BUSPIRONE HYDROCHLORIDE 5 MG/1
5 TABLET ORAL 2 TIMES DAILY
Status: DISCONTINUED | OUTPATIENT
Start: 2023-11-05 | End: 2023-11-07 | Stop reason: HOSPADM

## 2023-11-05 RX ORDER — POLYETHYLENE GLYCOL 3350 17 G/17G
17 POWDER, FOR SOLUTION ORAL DAILY PRN
Status: DISCONTINUED | OUTPATIENT
Start: 2023-11-05 | End: 2023-11-07 | Stop reason: HOSPADM

## 2023-11-05 RX ORDER — SODIUM CHLORIDE 9 MG/ML
INJECTION, SOLUTION INTRAVENOUS CONTINUOUS
Status: DISCONTINUED | OUTPATIENT
Start: 2023-11-05 | End: 2023-11-06

## 2023-11-05 RX ORDER — ATORVASTATIN CALCIUM 80 MG/1
80 TABLET, FILM COATED ORAL NIGHTLY
Status: DISCONTINUED | OUTPATIENT
Start: 2023-11-05 | End: 2023-11-07 | Stop reason: HOSPADM

## 2023-11-05 RX ORDER — POTASSIUM CHLORIDE 7.45 MG/ML
10 INJECTION INTRAVENOUS PRN
Status: DISCONTINUED | OUTPATIENT
Start: 2023-11-05 | End: 2023-11-07 | Stop reason: HOSPADM

## 2023-11-05 RX ORDER — ONDANSETRON 2 MG/ML
4 INJECTION INTRAMUSCULAR; INTRAVENOUS EVERY 6 HOURS PRN
Status: DISCONTINUED | OUTPATIENT
Start: 2023-11-05 | End: 2023-11-07 | Stop reason: HOSPADM

## 2023-11-05 RX ADMIN — HYDROCHLOROTHIAZIDE 25 MG: 25 TABLET ORAL at 14:54

## 2023-11-05 RX ADMIN — BACLOFEN 20 MG: 10 TABLET ORAL at 22:52

## 2023-11-05 RX ADMIN — ATENOLOL 25 MG: 50 TABLET ORAL at 14:54

## 2023-11-05 RX ADMIN — GABAPENTIN 300 MG: 300 CAPSULE ORAL at 22:52

## 2023-11-05 RX ADMIN — SODIUM CHLORIDE: 9 INJECTION, SOLUTION INTRAVENOUS at 19:02

## 2023-11-05 RX ADMIN — BUSPIRONE HYDROCHLORIDE 5 MG: 5 TABLET ORAL at 22:52

## 2023-11-05 RX ADMIN — ATORVASTATIN CALCIUM 80 MG: 80 TABLET, FILM COATED ORAL at 22:52

## 2023-11-05 RX ADMIN — APIXABAN 5 MG: 5 TABLET, FILM COATED ORAL at 22:52

## 2023-11-05 RX ADMIN — LISINOPRIL 20 MG: 10 TABLET ORAL at 14:54

## 2023-11-05 RX ADMIN — Medication 10 ML: at 22:57

## 2023-11-05 RX ADMIN — IOPAMIDOL 75 ML: 755 INJECTION, SOLUTION INTRAVENOUS at 11:33

## 2023-11-05 ASSESSMENT — PAIN - FUNCTIONAL ASSESSMENT: PAIN_FUNCTIONAL_ASSESSMENT: NONE - DENIES PAIN

## 2023-11-05 NOTE — ED NOTES
RN to bedside due to desaturation alarm. Pt sleeping heavily and 02 sat 88% on RA. RN placed on 2L NC.       Ezequiel Ybarra RN  11/05/23 3541

## 2023-11-05 NOTE — ED NOTES
RN updated PA on pt BP elevated, and 2L NC placed. New BP medication orders received and implemented.       Hamzah Phelps RN  11/05/23 [id number]

## 2023-11-05 NOTE — H&P
V2.0  History and Physical      Name:  Prasad Rodriguez /Age/Sex: 1955  (70 y.o. male)   MRN & CSN:  [id number] & [id number] Encounter Date/Time: 2023 3:21 PM EST   [facility] PCP: MARSHALL Rodriguez NP       Hospital Day: 1    Assessment and Plan:   Prasad Rodriguez is a 70 y.o. male with a pmh of  who presents with TIA (transient ischemic attack)    Hospital Problems             Last Modified POA    * (Principal) TIA (transient ischemic attack) 2023 Yes        Stroke like symptoms  TIA  H/o CVA , right side residual weakness and some baseline dysarthria   H/o PE  H/o  CAD s/p PCI  HTN          Plan    Admit as observation  Telemetry monitering  ASA  Statin  Check Lipid panel    Neurology consult  Stroke work up per neurology   PT and OT consult  Speech and swallow evaluation    Continue home meds for other chronic conditions    Code status    Diet : NPO    Can eat if pass bedside swallow eval    DVT ppx :  On eliquis      Disposition:   Current Living situation:   Expected Disposition:   Estimated D/C:     Diet No diet orders on file   DVT Prophylaxis [] Lovenox, []  Heparin, [] SCDs, [] Ambulation,  [] Eliquis, [] Xarelto, [] Coumadin   Code Status Prior   Surrogate Decision Maker/ POA      Personally reviewed Lab Studies and Imaging     Discussed management of the case with ED  who recommended   Hospital admission for stroke like symptoms management              History from:     Family  patient    History of Present Illness:     Chief Complaint: stroke like symptoms    Prasad Rodriguez is a 70 y.o. male with pmh of  CAD, hypertension, hyperlipidemia, MI, history of CVA with residual right sided weakness , some degree of dysarthria   Was sent from NH as NH found him more confused when he woke up today morning   Last known normal around 9 pm  Patient symptoms resolved while in ED   Denies left UE or LE focal motor or sensory weakness/alteration    Denies fever or chills or

## 2023-11-05 NOTE — ED NOTES
Report given to Sanotsh Castillo RN to assume care. EDSBAR discussed. Waiting for provider to review results.      Obdulio Hatfield RN  11/05/23 4740

## 2023-11-05 NOTE — ED NOTES
Pt cleaned up and bed changed per RN and ED tech. SBAR to [employer].       Ross Tang RN  11/05/23 1600

## 2023-11-05 NOTE — ED TRIAGE NOTES
Pt arrived from [address] [place] [address] via EMS with c/c of HTN and AMS. Pt woke up confused at 0700 today with BP of 198/100 with history of HTN. Pt has hx of stroke with right side deficits and asphasia; right arm weakness, mild asphasia noted. Right above the knee amputation noted. Hypertensive at this time with history; pt states he took his HTN medication today. Other Vs stable. A&Ox4. Respirations even, easy, unlabored. Normal sinus on the monitor. Skin warm and dry.

## 2023-11-05 NOTE — ED NOTES
Fall risk screening completed. Fall risk bracelet applied to patient. Non-skid socks provided and placed on patient. The fall risk is indicated using  fall sign . Based on score, a bed alarm was not indicated. The call light is within the patient's reach, and instructions for use were provided. The bed is in the lowest position with wheels locked. The patient has been advised to notify staff, using the call light, if there is a need to get up or use restroom. The patient verbalized understanding of safety precautions and how to contact staff for assistance.          Bert Goldsmith RN  11/05/23 8326

## 2023-11-05 NOTE — PLAN OF CARE
Problem: Discharge Planning  Goal: Discharge to home or other facility with appropriate resources  Outcome: Progressing  Flowsheets (Taken 11/5/2023 6423)  Discharge to home or other facility with appropriate resources:   Identify barriers to discharge with patient and caregiver   Arrange for needed discharge resources and transportation as appropriate     Problem: Safety - Adult  Goal: Free from fall injury  Outcome: Progressing     Problem: ABCDS Injury Assessment  Goal: Absence of physical injury  Outcome: Progressing     Problem: Skin/Tissue Integrity  Goal: Absence of new skin breakdown  Description: 1. Monitor for areas of redness and/or skin breakdown  2. Assess vascular access sites hourly  3. Every 4-6 hours minimum:  Change oxygen saturation probe site  4. Every 4-6 hours:  If on nasal continuous positive airway pressure, respiratory therapy assess nares and determine need for appliance change or resting period.   Outcome: Progressing

## 2023-11-05 NOTE — ED NOTES
NIHSS of 1, pt at baseline with history of stroke deficits present.      Beulah Bal RN  11/05/23 8099

## 2023-11-05 NOTE — ED PROVIDER NOTES
[address] [facility] [address] [address]      Pt Name: Jamal Todd  MRN: [id number]  [address] [facility] 1955  Date of evaluation: 11/5/2023  Provider: CARIN Duarte  PCP: MARSHALL Ortiz NP  Note Started: 11:03 AM EST     This patient was also seen and evaluated by the attending physician Shruthi Mascorro MD.    [address]       Chief Complaint   Patient presents with    Altered Mental Status    Hypertension     Woke up 070 confused, HTN. LKW 2100       HISTORY OF PRESENT ILLNESS   (Location, Timing/Onset, Context/Setting, Quality, Duration, Modifying Factors, Severity, Associated Signs and Symptoms)  Note limiting factors. Jamal Todd is a 70 y.o. male who presents with reported episode of confusion. Patient comes from a nursing care facility, has past history of stroke that with left him with right-sided deficits, including some right arm weakness and facial droop, and he has had right leg amputation. Patient says he recalls waking up this morning and being somewhat confused. This is report from EMS as they were told by staff at the nursing facility, so patient was brought to the ED. Patient denies any loss of consciousness. He says the confusion passed and right now he feels back to normal.  Says yesterday he was feeling fine. Denies any new deficits now. Denies being in the hospital recently or any recent infections as far as he knows. No difficulty breathing. Denies headache. Denies chest pain. Nursing Notes were all reviewed and agreed with or any disagreements were addressed in the HPI. REVIEW OF SYSTEMS    (2-9 systems for level 4, 10 or more for level 5)     Positives and pertinent negatives as per HPI.      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

## 2023-11-05 NOTE — ED PROVIDER NOTES
ED Attending Attestation Note    This patient was seen by the advanced practice provider. I personally saw the patient and performed a substantive portion of the visit including all aspects of the medical decision making. Briefly, 70 y.o. male presents with AMS. Hx prior stroke with residual facial droop/RUE weakness and aphasia, AMS today, LKWT 9PM yesterday, patient said he was confused when he woke up, no other complaints   While he does have chronic aphasia, family states that is worse today than baseline. Focused exam:   Gen: 70 y.o. male, NAD, nontoxic appearing  HEENT: NCAT. MMM, PERRL. EOMI. right facial droop, dysarthria  CV: RRR w/o MRG  Vascular: intact and symmetric radial and DP pulses bilaterally  Lungs: CTAB. No incr WOB. Abdomen: Soft, nontender, nondistended. No rebound/guarding. Neuro: awake and alert, speech with dysarthria and expressive aphasia, R facial droop; right hemiparesis     MDM:   This is a 65-year-old gentleman with history of prior stroke with residual right hemiparesis, dysarthria and aphasia who presented with confusion and or worsening of his baseline expressive aphasia. Upon presentation, he was awake and alert and answering questions appropriately. Last known well time was yesterday. He is anticoagulated on Eliquis. He is not an appropriate candidate for tenecteplase. CT a head and neck was obtained. He does not have evidence of large vessel occlusion. 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 He has a chronic a 3 occlusion. Discussed with Dr. Andrez Bates with neurosurgery, these findings are chronic and he is not a candidate for angio or acute intervention. No metabolic derangement or infection noted on additional work-up to explain altered mental status. We will admit him for brain MRI and further evaluation and management.     For further details of the patient's emergency department visit, please see the advanced practice provider's

## 2023-11-05 NOTE — PLAN OF CARE
Problem: Neurosensory - Adult  Goal: Achieves stable or improved neurological status  Outcome: Progressing     Electronically signed by Dao Madrigal RN on 11/5/2023 at 4:55 PM

## 2023-11-06 ENCOUNTER — APPOINTMENT (OUTPATIENT)
Dept: MRI IMAGING | Age: 68
End: 2023-11-06
Payer: MEDICARE

## 2023-11-06 LAB
ANION GAP SERPL CALCULATED.3IONS-SCNC: 9 MMOL/L (ref 3–16)
BASOPHILS # BLD: 0.1 K/UL (ref 0–0.2)
BASOPHILS NFR BLD: 0.9 %
BUN SERPL-MCNC: 20 MG/DL (ref 7–20)
CALCIUM SERPL-MCNC: 9.1 MG/DL (ref 8.3–10.6)
CHLORIDE SERPL-SCNC: 103 MMOL/L (ref 99–110)
CO2 SERPL-SCNC: 25 MMOL/L (ref 21–32)
CREAT SERPL-MCNC: 1 MG/DL (ref 0.8–1.3)
DEPRECATED RDW RBC AUTO: 16.8 % (ref 12.4–15.4)
EKG ATRIAL RATE: 70 BPM
EKG DIAGNOSIS: NORMAL
EKG P AXIS: 68 DEGREES
EKG P-R INTERVAL: 252 MS
EKG Q-T INTERVAL: 402 MS
EKG QRS DURATION: 96 MS
EKG QTC CALCULATION (BAZETT): 434 MS
EKG R AXIS: -13 DEGREES
EKG T AXIS: 72 DEGREES
EKG VENTRICULAR RATE: 70 BPM
EOSINOPHIL # BLD: 0.1 K/UL (ref 0–0.6)
EOSINOPHIL NFR BLD: 1.3 %
GFR SERPLBLD CREATININE-BSD FMLA CKD-EPI: >60 ML/MIN/{1.73_M2}
GLUCOSE SERPL-MCNC: 86 MG/DL (ref 70–99)
HCT VFR BLD AUTO: 36.7 % (ref 40.5–52.5)
HGB BLD-MCNC: 12.6 G/DL (ref 13.5–17.5)
LYMPHOCYTES # BLD: 2 K/UL (ref 1–5.1)
LYMPHOCYTES NFR BLD: 23.9 %
MCH RBC QN AUTO: 30.4 PG (ref 26–34)
MCHC RBC AUTO-ENTMCNC: 34.2 G/DL (ref 31–36)
MCV RBC AUTO: 88.7 FL (ref 80–100)
MONOCYTES # BLD: 0.6 K/UL (ref 0–1.3)
MONOCYTES NFR BLD: 7.5 %
NEUTROPHILS # BLD: 5.5 K/UL (ref 1.7–7.7)
NEUTROPHILS NFR BLD: 66.4 %
PLATELET # BLD AUTO: 159 K/UL (ref 135–450)
PMV BLD AUTO: 8.2 FL (ref 5–10.5)
POTASSIUM SERPL-SCNC: 3.9 MMOL/L (ref 3.5–5.1)
RBC # BLD AUTO: 4.14 M/UL (ref 4.2–5.9)
SODIUM SERPL-SCNC: 137 MMOL/L (ref 136–145)
WBC # BLD AUTO: 8.3 K/UL (ref 4–11)

## 2023-11-06 PROCEDURE — 94761 N-INVAS EAR/PLS OXIMETRY MLT: CPT

## 2023-11-06 PROCEDURE — 93010 ELECTROCARDIOGRAM REPORT: CPT | Performed by: INTERNAL MEDICINE

## 2023-11-06 PROCEDURE — 70544 MR ANGIOGRAPHY HEAD W/O DYE: CPT

## 2023-11-06 PROCEDURE — 2700000000 HC OXYGEN THERAPY PER DAY

## 2023-11-06 PROCEDURE — 92610 EVALUATE SWALLOWING FUNCTION: CPT

## 2023-11-06 PROCEDURE — 70547 MR ANGIOGRAPHY NECK W/O DYE: CPT

## 2023-11-06 PROCEDURE — 2580000003 HC RX 258: Performed by: HOSPITALIST

## 2023-11-06 PROCEDURE — 36415 COLL VENOUS BLD VENIPUNCTURE: CPT

## 2023-11-06 PROCEDURE — G0378 HOSPITAL OBSERVATION PER HR: HCPCS

## 2023-11-06 PROCEDURE — 85025 COMPLETE CBC W/AUTO DIFF WBC: CPT

## 2023-11-06 PROCEDURE — 9990000010 HC NO CHARGE VISIT

## 2023-11-06 PROCEDURE — 96361 HYDRATE IV INFUSION ADD-ON: CPT

## 2023-11-06 PROCEDURE — 92523 SPEECH SOUND LANG COMPREHEN: CPT

## 2023-11-06 PROCEDURE — 6370000000 HC RX 637 (ALT 250 FOR IP): Performed by: HOSPITALIST

## 2023-11-06 PROCEDURE — 80048 BASIC METABOLIC PNL TOTAL CA: CPT

## 2023-11-06 RX ORDER — LORAZEPAM 2 MG/ML
0.5 INJECTION INTRAMUSCULAR
Status: COMPLETED | OUTPATIENT
Start: 2023-11-06 | End: 2023-11-07

## 2023-11-06 RX ADMIN — APIXABAN 5 MG: 5 TABLET, FILM COATED ORAL at 10:44

## 2023-11-06 RX ADMIN — BACLOFEN 20 MG: 10 TABLET ORAL at 21:30

## 2023-11-06 RX ADMIN — ATORVASTATIN CALCIUM 80 MG: 80 TABLET, FILM COATED ORAL at 21:31

## 2023-11-06 RX ADMIN — ATENOLOL 12.5 MG: 25 TABLET ORAL at 10:44

## 2023-11-06 RX ADMIN — BUSPIRONE HYDROCHLORIDE 5 MG: 5 TABLET ORAL at 21:31

## 2023-11-06 RX ADMIN — BUSPIRONE HYDROCHLORIDE 5 MG: 5 TABLET ORAL at 10:44

## 2023-11-06 RX ADMIN — GABAPENTIN 300 MG: 300 CAPSULE ORAL at 13:45

## 2023-11-06 RX ADMIN — SODIUM CHLORIDE: 9 INJECTION, SOLUTION INTRAVENOUS at 06:37

## 2023-11-06 RX ADMIN — BACLOFEN 20 MG: 10 TABLET ORAL at 10:43

## 2023-11-06 RX ADMIN — ASPIRIN 81 MG: 81 TABLET, COATED ORAL at 10:44

## 2023-11-06 RX ADMIN — Medication 10 ML: at 21:31

## 2023-11-06 RX ADMIN — GABAPENTIN 300 MG: 300 CAPSULE ORAL at 10:43

## 2023-11-06 RX ADMIN — GABAPENTIN 300 MG: 300 CAPSULE ORAL at 21:30

## 2023-11-06 NOTE — CONSULTS
Neurology Consult Note  Reason for Consult: TIA    Chief complaint: trouble talking    Dr Hayder Boudreaux MD asked me to see Kami Vale in consultation for evaluation of TIA    History of Present Illness:  Kami Vale is a 70 y.o. male who presents with trouble talking. I obtained my information primarily via chart review. The patient is having trouble communicating during my encounter. The patient has a hx of L LEXA stroke w/ residual R sided motor deficit and some degree of aphasia. Per chart, the patient was at his nursing facility and when he woke up yesterday morning staff apparently felt he was having some confusion so he was transported to the ED to be evaluated. He tells me that he felt he was having more trouble talking and moving his right than usual.      Initial BP was 172/89. CT head no acute intracranial findings. CTA head/neck as below. Neurosurgery was contacted from the ED regarding the dissection and this was felt to be chronic. Currently he still feels that his deficits are a bit more than what he usually experiences. Previous neurology evaluations are noted. EEGs done here were abnormal.    He was evaluated at [facility] in June for R sided weakness apparently after a witnessed tonic clonic seizure. Imaging/results from that encounter were reviewed. He is on anticoagulation and asa at home.       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

## 2023-11-06 NOTE — PLAN OF CARE
Problem: Discharge Planning  Goal: Discharge to home or other facility with appropriate resources  11/6/2023 0102 by Haroldo Wilcox RN  Outcome: Progressing  11/5/2023 1639 by Abhay Maurer RN  Outcome: Progressing  Flowsheets (Taken 11/5/2023 1638)  Discharge to home or other facility with appropriate resources:   Identify barriers to discharge with patient and caregiver   Arrange for needed discharge resources and transportation as appropriate     Problem: Safety - Adult  Goal: Free from fall injury  11/6/2023 0102 by Haroldo Wilcox RN  Outcome: Progressing  11/5/2023 1639 by Abhay Maurer RN  Outcome: Progressing     Problem: ABCDS Injury Assessment  Goal: Absence of physical injury  11/6/2023 0102 by Haroldo Wilcox RN  Outcome: Progressing  11/5/2023 1639 by Abhay Maurer RN  Outcome: Progressing     Problem: Skin/Tissue Integrity  Goal: Absence of new skin breakdown  Description: 1. Monitor for areas of redness and/or skin breakdown  2. Assess vascular access sites hourly  3. Every 4-6 hours minimum:  Change oxygen saturation probe site  4. Every 4-6 hours:  If on nasal continuous positive airway pressure, respiratory therapy assess nares and determine need for appliance change or resting period.   11/6/2023 0102 by Haroldo Wilcox RN  Outcome: Progressing  11/5/2023 1639 by Abhay Maurer RN  Outcome: Progressing     Problem: Neurosensory - Adult  Goal: Achieves stable or improved neurological status  11/6/2023 0102 by Haroldo Wilcox RN  Outcome: Progressing  11/5/2023 1655 by Abhay Maurer RN  Outcome: Progressing

## 2023-11-07 ENCOUNTER — APPOINTMENT (OUTPATIENT)
Dept: MRI IMAGING | Age: 68
End: 2023-11-07
Payer: MEDICARE

## 2023-11-07 VITALS
TEMPERATURE: 97.8 F | SYSTOLIC BLOOD PRESSURE: 125 MMHG | HEIGHT: 75 IN | HEART RATE: 47 BPM | OXYGEN SATURATION: 93 % | DIASTOLIC BLOOD PRESSURE: 73 MMHG | RESPIRATION RATE: 15 BRPM | WEIGHT: 223.99 LBS | BODY MASS INDEX: 27.85 KG/M2

## 2023-11-07 PROCEDURE — 6360000002 HC RX W HCPCS: Performed by: INTERNAL MEDICINE

## 2023-11-07 PROCEDURE — 94760 N-INVAS EAR/PLS OXIMETRY 1: CPT

## 2023-11-07 PROCEDURE — 6370000000 HC RX 637 (ALT 250 FOR IP): Performed by: HOSPITALIST

## 2023-11-07 PROCEDURE — G0378 HOSPITAL OBSERVATION PER HR: HCPCS

## 2023-11-07 PROCEDURE — 94640 AIRWAY INHALATION TREATMENT: CPT

## 2023-11-07 PROCEDURE — 96374 THER/PROPH/DIAG INJ IV PUSH: CPT

## 2023-11-07 PROCEDURE — 2580000003 HC RX 258: Performed by: HOSPITALIST

## 2023-11-07 PROCEDURE — 6370000000 HC RX 637 (ALT 250 FOR IP): Performed by: NURSE PRACTITIONER

## 2023-11-07 PROCEDURE — 70551 MRI BRAIN STEM W/O DYE: CPT

## 2023-11-07 RX ORDER — LEVETIRACETAM 500 MG/1
500 TABLET ORAL 2 TIMES DAILY
Status: DISCONTINUED | OUTPATIENT
Start: 2023-11-07 | End: 2023-11-07 | Stop reason: HOSPADM

## 2023-11-07 RX ORDER — LEVETIRACETAM 500 MG/1
500 TABLET ORAL 2 TIMES DAILY
Qty: 60 TABLET | Refills: 3 | Status: SHIPPED | OUTPATIENT
Start: 2023-11-07

## 2023-11-07 RX ADMIN — Medication 10 ML: at 08:59

## 2023-11-07 RX ADMIN — TIOTROPIUM BROMIDE INHALATION SPRAY 2 PUFF: 3.12 SPRAY, METERED RESPIRATORY (INHALATION) at 12:07

## 2023-11-07 RX ADMIN — LEVETIRACETAM 500 MG: 500 TABLET, FILM COATED ORAL at 14:38

## 2023-11-07 RX ADMIN — LORAZEPAM 0.5 MG: 2 INJECTION INTRAMUSCULAR; INTRAVENOUS at 07:34

## 2023-11-07 RX ADMIN — GABAPENTIN 300 MG: 300 CAPSULE ORAL at 08:58

## 2023-11-07 RX ADMIN — ASPIRIN 81 MG: 81 TABLET, COATED ORAL at 08:59

## 2023-11-07 RX ADMIN — POLYETHYLENE GLYCOL 3350 17 G: 17 POWDER, FOR SOLUTION ORAL at 08:58

## 2023-11-07 RX ADMIN — BUSPIRONE HYDROCHLORIDE 5 MG: 5 TABLET ORAL at 08:59

## 2023-11-07 RX ADMIN — GABAPENTIN 300 MG: 300 CAPSULE ORAL at 13:37

## 2023-11-07 RX ADMIN — BACLOFEN 20 MG: 10 TABLET ORAL at 08:58

## 2023-11-07 RX ADMIN — ATENOLOL 12.5 MG: 25 TABLET ORAL at 08:59

## 2023-11-07 NOTE — DISCHARGE INSTR - COC
Continuity of Care Form    Patient Name: Alethea Medina   :  1955  MRN:  [id number]    [address] date:  2023  Discharge date:  2023    Code Status Order: Full Code   Advance Directives:     Admitting Physician:  Sae Kaufman MD  PCP: MARSHALL Warren NP    Discharging Nurse: Mary Guidry, [address] Unit/Room#: E1O-3990/4594-86  Discharging Unit Phone Number: [phone]    Emergency Contact:   Extended Emergency Contact Information  Primary Emergency Contact: Becca Cummins  Address: [address] [facility], [address] [place] [address] of [address] [address] Phone: [phone]  Mobile Phone: [phone]  Relation: Domestic Partner    Past Surgical History:  Past Surgical History:   Procedure Laterality Date    BACK SURGERY N/A     herniated disc    CORONARY ANGIOPLASTY WITH STENT PLACEMENT      EYE SURGERY      VASCULAR SURGERY         Immunization History:   Immunization History   Administered Date(s) Administered    COVID-19, PFIZER PURPLE top, DILUTE for use, (age 15 y+), 30mcg/0.3mL 2021, 2021    Influenza, FLUZONE (age 72 y+), High Dose, 0.7mL 10/22/2020    Influenza, High Dose (Fluzone 65 yrs and older) 2019    Pneumococcal, PCV-13, PREVNAR 13, (age 6w+), IM, 0.5mL 2019    Pneumococcal, PPSV23, PNEUMOVAX 23, (age 2y+), SC/IM, 0.5mL 10/22/2020    Zoster Recombinant (Shingrix) 10/22/2020, 2021       Active Problems:  Patient Active Problem List   Diagnosis Code    Chest pain R07.9    MI, acute, non ST segment elevation (HCC) I21.4    Tobacco abuse Z72.0    Essential hypertension I10    Acute MI ([address]) I21.9    Acute CVA (cerebrovascular accident) ([address]) I63.9    Right leg weakness R29.898    Acute ischemic left LEXA stroke (HCC) I63.522    Non-healing open wound of right heel S91.301A    Right heel infection L08.9    Hyperlipidemia E78.5    Coronary artery disease involving native heart I25.10    Cellulitis of left heel L03. [facility]

## 2023-11-07 NOTE — PLAN OF CARE
Problem: Discharge Planning  Goal: Discharge to home or other facility with appropriate resources  Outcome: Progressing     Problem: Safety - Adult  Goal: Free from fall injury  Outcome: Progressing     Problem: ABCDS Injury Assessment  Goal: Absence of physical injury  Outcome: Progressing     Problem: Skin/Tissue Integrity  Goal: Absence of new skin breakdown  Description: 1. Monitor for areas of redness and/or skin breakdown  2. Assess vascular access sites hourly  3. Every 4-6 hours minimum:  Change oxygen saturation probe site  4. Every 4-6 hours:  If on nasal continuous positive airway pressure, respiratory therapy assess nares and determine need for appliance change or resting period.   Outcome: Progressing     Problem: Neurosensory - Adult  Goal: Achieves stable or improved neurological status  Outcome: Progressing     Problem: Chronic Conditions and Co-morbidities  Goal: Patient's chronic conditions and co-morbidity symptoms are monitored and maintained or improved  Outcome: Progressing     Problem: Infection - Adult  Goal: Absence of infection at discharge  Outcome: Progressing  Goal: Absence of infection during hospitalization  Outcome: Progressing

## 2023-11-07 NOTE — CARE COORDINATION
Case Management Discharge Note          Date / Time of Note: 11/7/2023 3:03 PM                  Patient Name: Tevin Bassett   YOB: 1955  Diagnosis: TIA (transient ischemic attack) [G45.9]  History of CVA (cerebrovascular accident) [Z86.73]  Altered mental status, unspecified altered mental status type [R41.82]   Date / Time: 11/5/2023  9:22 AM    Financial:  Payor: Vivian Bhat / Plan: Tara Bowman HMO / Product Type: Medicare /      Pharmacy:    [facility] [id number] [facility] [address] [facility] [address] - F [phone] [address] [facility] [address]  Phone: [phone] Fax: [phone]    [address], [address] [facility],[address] [facility] [address] [facility] [phone]  [address] [place] [address]  Phone: [phone] Fax: [phone]      Assistance purchasing medications?: Potential Assistance Purchasing Medications: No  Assistance provided by Case Management: None at this time    DISCHARGE Disposition: [address] [facility] [address] (LTC)    Nursing Facility:   Discharging to Facility/ Agency   Name: [facility]  Address:  [address]  Phone:  [phone]  Fax:  [phone]     LOC at discharge: [address] [place] [address] Completed:  Yes             NURSING REPORT NUMBER: [id number]               NURSING FAX NUMBER: [phone]    Notification completed in HENS/PAS?:  Not Applicable    Transportation:  Transportation PLAN for discharge: EMS transportation   Mode of Transport: Ambulance stretcher - BLS  Reason for medical transport: Other: TIA, stroke  Name of [address] [place] [address] Road: [facility]  Phone: [phone]  Time of Transport: 5:30pm    Transport form completed: Yes    IMM Completed:   Not Indicated    Additional CM Notes: Pt return to [employer] at 5:30pm    The Plan for Transition of Care is related to the following treatment goals of TIA
list with basic dialogue that supports the patient's individualized plan of care/goals and shares the quality data associated with the providers was provided to: (P) Patient   Patient Representative Name:       The Patient and/or Patient Representative Agree with the Discharge Plan?  (P) Yes    Destiny Bhagat LMSW, [address] [place] [address] Work Case Management   Phone: [phone]  Fax: [phone]

## 2024-08-15 NOTE — PLAN OF CARE
Problem: Falls - Risk of:  Goal: Will remain free from falls  Description: Will remain free from falls  Outcome: Ongoing  Note: Admitted with CVA. Expressive aphasia. Free of fall. Transfers with maximove. Right sided weakness. Yellow socks on. Wheels locked. Bed lowest position. Alarm on. Call light, over the bed table, and personal belonging in reach. Hourly rounding. Patient instructed to call for needs or changes. Problem: ACTIVITY INTOLERANCE/IMPAIRED MOBILITY  Goal: Mobility/activity is maintained at optimum level for patient  Outcome: Ongoing  Note: Encouraged frequent rest. Calm and quiet environment provided. Rounding every 2 hours. Problem: Skin Integrity:  Goal: Absence of new skin breakdown  Description: Absence of new skin breakdown  Outcome: Ongoing  Note: Incontinent of bowel and bladder. Leandra care with wipes. No new skin breakdown. Repositioned every 2 hours. Pillow support. Heels elevated. Problem: Pain:  Goal: Pain level will decrease  Description: Pain level will decrease  Outcome: Ongoing  Note: Patient denies of any pain during the shift. Will continue to monitor. Problem: COMMUNICATION IMPAIRMENT  Goal: Ability to express needs and understand communication  Note: Expressive aphasia. Frequent rounding. Will monitor. PHYSICAL EXAM:  GENERAL: NAD, lying in bed comfortably  HEAD:  Atraumatic, Normocephalic  EYES: EOMI, PERRLA, conjunctiva and sclera clear  ENT: Moist mucous membranes  NECK: Supple, No JVD  CHEST/LUNG: Clear to auscultation bilaterally; No rales, rhonchi, wheezing, or rubs. Unlabored respirations  HEART: Irregularly irregular; +soft systolic murmur  ABDOMEN: Bowel sounds present; Soft, Nontender, Nondistended.  EXTREMITIES:  2+ Peripheral Pulses, brisk capillary refill. Mild b/l LE edema.  No cyanosis. R foot s/p 2nd and 3rd toe amputatation with no open wounds. L foot with approx 1.5cm wound with scab at plantar aspect of 1st tarsometatarsal joint, closed wound on second digit distal phalanx, small scabs on dorsal aspect of L foot. No erythema or tenderness of feet.  NERVOUS SYSTEM:  Alert & Oriented X3, speech clear. No deficits   MSK: FROM all 4 extremities, full and equal strength

## 2025-07-19 NOTE — PROGRESS NOTES
-[facility] 05/2022 detailed in HPI  -continue home ASA  -hold home fenofibrate and repatha for now >> resume at DC  -no statin due to allergies  -continue tele monitoring  -EKG PRN concerns      Facility/Department: [address] PROGRESSIVE CARE  SLP Clinical Swallow Evaluation and Speech Language Cognitive Assessment     Patient: Alex Johnson   : 1955   MRN: [id number]      Evaluation Date: 2022      Admitting Dx: Facial weakness [R29.810]  Facial droop [R29.810]  Elevated troponin [R77.8]  Pain: Denies                                    Chart Review:   H&P: Patient is a 44-year-old male with past medical history of CAD hypertension hyperlipidemia who presents to the hospital due to left-sided facial droop. 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 Otherwise denied chest pain shortness of breath nausea vomiting difficulty speaking. Mentions he thinks that he has been slow to respond to questions today. Otherwise denies fevers chills diarrhea constipation dysuria. He also denies any new numbness tingling or weakness in arms legs. Current diet:     Imaging:  CT head 22  Impression   CT brain:       1. No acute intracranial abnormality. 2. Involutional parenchymal changes with microvascular ischemic disease. 3. Chronic left parasagittal frontoparietal encephalomalacia/gliosis. 4. Scattered chronic lacunar infarcts such as in the bilateral basal ganglia   and right cerebellum. CTA head/neck:       1. 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 2. Otherwise, no evidence of large vessel occlusion or significant stenosis   in the remaining major arteries of the head and neck. 3. Mild (less than 50%) possibly moderate (50-69%) stenosis of the proximal   left ICA with an associated ulcerated atherosclerotic plaque. 4. Mild prominence of the pulmonary artery, may be related to pulmonary   arterial hypertension. 5. Bubbly air-fluid level present within the trachea and left main bronchus,   compatible with aspiration.    6. Scattered dental caries and periapical lucency. CHEST X-RAY 8/31/22  Impression   No radiographic evidence of acute pulmonary disease     MRI brain ordered    Modified Barium Swallow Study: None in EMR    History/Prior Level of Function:   Living Status: Lives in ECF  Prior Dysphagia History: Patient seen here 4/22/21-5/13/21 with ARU stay following CVA, recommendation for regular/thin liquids at time of discharge  Prior Speech History: Patient with hx of aphasia, cognitive deficits following CVA April 2021, seen on rehab for lang/cog    Reason for referral: SLP evaluation orders received due to   CVA protocol    DYSPHAGIA BEDSIDE SWALLOW EVALUATION   Dysphagia Impressions/Dysphagia Diagnosis: Oropharyngeal Dysphagia   Dysphagia Diagnosis: Mild oropharyngeal dysphagia   Accepted and tolerated evaluation at bedside  Patient alert, cooperative and pleasant, oriented x4  Mild oropharyngeal dysphagia characterized by slow but effective mastication, right sided weakness at baseline, left facial droop, reduced lingual coordination, at risk for premature loss of bolus, delayed swallow initiation, and decreased laryngeal elevation. No overt s/s of aspiration or penetration noted or pocketing. Patient noted to have wet congested cough prior to PO intake and reports he has at baseline d/t allergies  Recommend cont reg/thin liquids  ST to continue to follow during acute admission and f/u for diet tolerance monitor    Recommended Diet and Intervention:  Diet Solids Recommendation:  Regular texture diet  Liquid Consistency Recommendation:   Thin liquids  Recommended form of Meds: Whole with water      Dysphagia Therapeutic Intervention:  Diet Tolerance Monitoring , Patient/Family Education     Compensatory Swallowing Strategies:  Upright as possible with all PO intake , Small bites/sips , Eat/feed slowly, Remain upright 30-45 min     TEST DATA:   Patient Positioning: Upright in bed     Consistencies presented: thin liquids; mildly/nectar thick; puree; soft/bite size; regular    Oral Mechanism Exam:  []WFL [x]Mild   [] Moderate  []Severe  []To be assessed  Labial Symmetry , Labial Strength, Lingual ROM, Lingual Strength   Voltional cough: WFL  Volitional swallow: Delayed  Voice: WFL    Oral Phase: []WFL [x]Mild   [] Moderate  []Severe  []To be assessed   [x]Impaired/Prolonged Mastication:   []Spillage Left:   []Spillage Right:  []Pocketing Left:   []Pocketing Right:   []Decreased Anterior to Posterior Transit:   [x]Suspected Premature Bolus Loss:   []Lingual/Palatal Residue:   []Other:     Pharyngeal Phase: []WFL [x]Mild   [] Moderate  []Severe  []To be assessed   [x]Delayed Swallow:   []Suspected Pharyngeal Pooling:   [x]Decreased Laryngeal Elevation:   []Absent Swallow:  []Wet Vocal Quality:   []Throat Clearing-Immediate:   []Throat Clearing-Delayed:   []Cough-Immediate:   []Cough-Delayed:  []Change in Vital Signs:  []Suspected Delayed Pharyngeal Clearing:  []Other:     SHORT TERM DYSPHAGIA GOALS  Pt will functionally tolerate recommended diet with no overt clinical s/s of aspiration   Pt will demonstrate understanding of aspiration risk and precautions via education/demonstration with occasional prompting    Dysphagia Prognosis: Good for diet tolerance monitor        SPEECH LANGUAGE COGNITIVE ASSESSMENT:     Speech Diagnosis:   Expressive Aphasia , Word Finding Difficulty, Cognitive-Linguistic Deficits     Impressions:   Aphasia Diagnosis: Patient presents with mild-mod expressive aphasia characterized by extended time to formulate sentences/find words, mild word finding difficulties in conversation, impaired thought organization. Receptive language appears intact; followed 2-step abstract commands with 100% accuracy, answered abstract/complex yes/no questions with 100% accuracy. Read with 100% accuracy.  Unable to write d/t right hand dominant weakness  Cognitive diagnosis: Presents with mild-moderate cognitive deficits characterized by [facility] and working memory deficits, suspect deficits with problem solving  Speech Diagnosis: no dysarthria noted      Test Data:   Vision: Wears glasses for reading    Hearing: [facility]    COMPREHENSION  Auditory Comprehension: Within functional limits     EXPRESSION  Verbal Expressive Language: Mild  and Moderate   Impaired Initiation  Impaired Divergent Naming  Anomia    Extended time to generate sentences, named 10 animals in 1 minute    Written Expressive Language:  did not test, reports he is unable to write due to right sided weakness at baseline    Pragmatics/Social Functioning: Within functional limits       MOTOR SPEECH  Motor Speech: Within functional limits     VOICE  Voice: Within functional limits     COGNITION    Overall Orientation : Within functional limits      Attention:   requires further assessment    Memory: Mild  and Moderate    Impaired Short-term Memory  Recalled 2/3 words after delay, recalled 3/4 pieces of information from paragraph read verbally    Problem Solving: Mild  requires further assessment      Safety/Judgement: Within functional limits      GOALS:  Short Term Speech/Language/Cognitive Goals:   Pt will improve verbal expression for functional expression via graded tasks to 95%  Pt will participate in ongoing cognitive assessment with goals to be established as indicated   Patient will complete short term memory tasks of increasing length and complexity with 85% accuracy given min cues    Plan of care: 3-5 times per week during acute care stay. SLP Prognosis:   good    Discharge Recommendations:  Recommend ongoing SLP for dysphagia therapy upon discharge from hospital     EDUCATION:   Provided education regarding role of SLP, results of assessment, recommendations and general speech pathology plan of care. [x] Pt verbalized understanding and agreement   [] Pt requires ongoing learning   [] No evidence of comprehension     If patient discharges prior to next visit, this note will serve as discharge.      Time code